# Patient Record
Sex: MALE | Race: WHITE | Employment: FULL TIME | RURAL
[De-identification: names, ages, dates, MRNs, and addresses within clinical notes are randomized per-mention and may not be internally consistent; named-entity substitution may affect disease eponyms.]

---

## 2017-07-18 ENCOUNTER — OFFICE VISIT (OUTPATIENT)
Dept: FAMILY MEDICINE CLINIC | Age: 32
End: 2017-07-18

## 2017-07-18 VITALS
WEIGHT: 289 LBS | HEART RATE: 78 BPM | SYSTOLIC BLOOD PRESSURE: 122 MMHG | RESPIRATION RATE: 16 BRPM | OXYGEN SATURATION: 100 % | TEMPERATURE: 98.6 F | BODY MASS INDEX: 40.46 KG/M2 | HEIGHT: 71 IN | DIASTOLIC BLOOD PRESSURE: 80 MMHG

## 2017-07-18 DIAGNOSIS — L98.9 SKIN LESION OF SCALP: ICD-10-CM

## 2017-07-18 DIAGNOSIS — I10 ESSENTIAL HYPERTENSION: Primary | ICD-10-CM

## 2017-07-18 RX ORDER — AMLODIPINE BESYLATE 10 MG/1
10 TABLET ORAL DAILY
Qty: 90 TAB | Refills: 0 | Status: SHIPPED | OUTPATIENT
Start: 2017-07-18 | End: 2017-10-09 | Stop reason: SDUPTHER

## 2017-07-18 NOTE — PROGRESS NOTES
Subjective:     Georgia Bundy is a 34 y.o. male who presents for follow up of hypertension. Diet and Lifestyle: generally follows a low fat low cholesterol diet  Home BP Monitoring: is not well controlled at home, ranging 140's/90's    Cardiovascular ROS: taking medications as instructed, no medication side effects noted, no TIA's, no dyspnea on exertion. New concerns: Pt states that he feels like he needs to increase his BP medication. Over the past 2-3 weeks, he has noted increase in BP's, when he is checking them at home. His BP has been running around 140/150's/80's-90's. He sometimes will have headaches, and chest pain off and on. At times, he will have swelling in his ankles. Stress has increased at work, and he knows that this has made it worse. In addition, patient states that he has a spot on his head that showed up a few weeks ago. It is red, and is worrisome to the patient. It does not itch, and does not hurt, but he has a lot of skin cancer in his family, so would like for it to be looked at. Patient Active Problem List    Diagnosis Date Noted    Skin lesion of scalp 07/18/2017    Physical exam, routine 10/20/2016    Acute right-sided thoracic back pain 09/27/2016    Elevated liver enzymes 08/05/2016    Strep throat exposure 10/20/2015    Screening for thyroid disorder 10/14/2015    Poison ivy 09/15/2015    JESUS (obstructive sleep apnea) 10/15/2014    Hypertension 10/15/2014    S/P vasectomy 08/06/2014    Pure hypercholesterolemia      Current Outpatient Prescriptions   Medication Sig Dispense Refill    amLODIPine (NORVASC) 10 mg tablet Take 1 Tab by mouth daily. 90 Tab 0    b complex vitamins (B COMPLEX 1) tablet Take 1 Tab by mouth daily.  FERROUS FUMARATE/VIT BCOMP&C (SUPER B COMPLEX PO) Take  by mouth.  cholecalciferol (VITAMIN D3) 1,000 unit tablet Take  by mouth daily.       atorvastatin (LIPITOR) 10 mg tablet TAKE ONE TABLET BY MOUTH ONCE DAILY 30 Tab 1     No Known Allergies  Past Medical History:   Diagnosis Date    Hypertension 10/15/2014    Poison ivy 9/15/2015    Pure hypercholesterolemia     S/P vasectomy 2014     History reviewed. No pertinent surgical history. Family History   Problem Relation Age of Onset    Diabetes Mother     Heart Attack Father 43    Stroke Maternal Grandmother     Cancer Paternal Grandfather       age 45 - Hodgkin's   [place] Cancer Maternal Grandfather       in 42's with melanoma     Social History   Substance Use Topics    Smoking status: Never Smoker    Smokeless tobacco: Never Used    Alcohol use No        Lab Results  Component Value Date/Time   WBC 7.8 2016 09:34 AM   HGB 16.0 2016 09:34 AM   HCT 47.9 2016 09:34 AM   PLATELET 489 13/10/1199 09:34 AM   MCV 92 2016 09:34 AM     Lab Results  Component Value Date/Time   Cholesterol, total 200 2016 09:34 AM   HDL Cholesterol 53 2016 09:34 AM   LDL, calculated 124 2016 09:34 AM   Triglyceride 115 2016 09:34 AM   CHOL/HDL Ratio 3.9 2010 10:30 AM   Lab Results  Component Value Date/Time   GFR est non- 2016 09:34 AM   GFR est  2016 09:34 AM   Creatinine 0.83 2016 09:34 AM   BUN 13 2016 09:34 AM   Sodium 142 2016 09:34 AM   Potassium 4.1 2016 09:34 AM   Chloride 99 2016 09:34 AM   CO2 25 2016 09:34 AM                Review of Systems, additional:  Pertinent items are noted in HPI. Objective:   Visit Vitals    /80    Pulse 78    Temp 98.6 °F (37 °C) (Oral)    Resp 16    Ht 5' 11\" (1.803 m)    Wt 289 lb (131.1 kg)    SpO2 100%    BMI 40.31 kg/m2     Appearance: alert, well appearing, and in no distress. General exam: CVS exam BP noted to be well controlled today in office, S1, S2 normal, no gallop, no murmur, chest clear, no JVD, no HSM, no edema, peripheral vascular exam both carotids normal upstroke without bruits.   Pin pint, shiny, erythematous annular lesion noted on right side of scalp. Lab review: patient is going to return for labs in 1 month. Assessment/Plan:     hypertension stable. current treatment plan is effective, no change in therapy  orders and follow up as documented in patient record. ICD-10-CM ICD-9-CM    1. Essential hypertension I10 401.9 amLODIPine (NORVASC) 10 mg tablet   2. Skin lesion of scalp L98.9 709.9 REFERRAL TO DERMATOLOGY     Increased BP medication to 10 mg daily, based on history and BP's taken at home. Emphasized the importance of returning to the office in 1 month, for office visit and labs, and we will be able to re-check BP at that time. Due to lesion on scalp, informed patient to call dermatology and make an appointment ASAP. Pt informed to return to office with worsening of symptoms, or PRN with any questions or concerns. Pt verbalizes understanding of plan of care and denies further questions or concerns at this time.

## 2017-07-18 NOTE — PROGRESS NOTES
Identified pt with two pt identifiers(name and ). Chief Complaint   Patient presents with    Hypertension     elevated BP x2 months    Headache    Skin Problem     right scalp x3 weeks      Pt is not fasting today. There are no preventive care reminders to display for this patient. Wt Readings from Last 3 Encounters:   17 289 lb (131.1 kg)   10/28/16 289 lb (131.1 kg)   10/20/16 278 lb (126.1 kg)     Temp Readings from Last 3 Encounters:   17 98.6 °F (37 °C) (Oral)   10/28/16 98.1 °F (36.7 °C) (Oral)   10/20/16 98.2 °F (36.8 °C) (Oral)     BP Readings from Last 3 Encounters:   17 122/80   10/28/16 130/82   10/20/16 132/82     Pulse Readings from Last 3 Encounters:   17 78   10/28/16 (!) 120   10/20/16 90         Learning Assessment:  :     Learning Assessment 2014   PRIMARY LEARNER Patient   HIGHEST LEVEL OF EDUCATION - PRIMARY LEARNER  GRADUATED HIGH SCHOOL OR GED   BARRIERS PRIMARY LEARNER NONE   CO-LEARNER CAREGIVER No   PRIMARY LANGUAGE ENGLISH   LEARNER PREFERENCE PRIMARY DEMONSTRATION     PICTURES     VIDEOS     LISTENING   ANSWERED BY patient   RELATIONSHIP SELF       Depression Screening:  :     PHQ over the last two weeks 2017   Little interest or pleasure in doing things Not at all   Feeling down, depressed or hopeless Not at all   Total Score PHQ 2 0         Abuse Screening:  :     Abuse Screening Questionnaire 10/15/2014   Do you ever feel afraid of your partner? N   Are you in a relationship with someone who physically or mentally threatens you? N   Is it safe for you to go home?  Y       Coordination of Care Questionnaire:  :     1) Have you been to an emergency room, urgent care clinic since your last visit? no   Hospitalized since your last visit? no             2) Have you seen or consulted any other health care providers outside of [address] [place] [address] since your last visit? no  (Include any pap smears or colon screenings in this section.)    3) Do you have an Advance Directive on file? no  Are you interested in receiving information about Advance Directives? no    Patient is accompanied by self I have received verbal consent from Ramiro Sarkar to discuss any/all medical information while they are present in the room. Reviewed record in preparation for visit and have obtained necessary documentation. Medication reconciliation up to date and corrected with patient at this time.

## 2017-07-18 NOTE — MR AVS SNAPSHOT
Visit Information Date & Time Provider Department Dept. Phone Encounter #  
 7/18/2017  9:30 AM Davey Otoniel Macdonald [phone] [id number] Follow-up Instructions Return in about 1 month (around 8/18/2017), or if symptoms worsen or fail to improve, for labs and office visit, recheck BP. Upcoming Health Maintenance Date Due INFLUENZA AGE 9 TO ADULT 8/1/2017 DTaP/Tdap/Td series (2 - Td) 2/1/2020 Allergies as of 7/18/2017  Review Complete On: 7/18/2017 By: Davey Macdonald NP No Known Allergies Current Immunizations  Reviewed on 5/16/2016 Name Date Influenza Vaccine 10/17/2013 Rabies Immune Globulin 5/2/2016  5:54 PM  
 Rabies Vaccine IM 5/16/2016  3:53 PM, 5/9/2016  3:09 PM, 5/5/2016  4:06 PM, 5/2/2016  5:53 PM  
 TDAP Vaccine 2/1/2010 Not reviewed this visit You Were Diagnosed With   
  
 Codes Comments Essential hypertension    -  Primary ICD-10-CM: I10 
ICD-9-CM: 401.9 Skin lesion of scalp     ICD-10-CM: L98.9 ICD-9-CM: 709.9 Vitals BP Pulse Temp Resp Height(growth percentile) Weight(growth percentile) 122/80 78 98.6 °F (37 °C) (Oral) 16 5' 11\" (1.803 m) 289 lb (131.1 kg) SpO2 BMI Smoking Status 100% 40.31 kg/m2 Never Smoker BMI and BSA Data Body Mass Index Body Surface Area  
 40.31 kg/m 2 2.56 m 2 Preferred Pharmacy Pharmacy Name Phone [facility] [address] [phone] Your Updated Medication List  
  
   
This list is accurate as of: 7/18/17  9:45 AM.  Always use your most recent med list. amLODIPine 10 mg tablet Commonly known as:  Delcie Sparks Take 1 Tab by mouth daily. atorvastatin 10 mg tablet Commonly known as:  LIPITOR  
TAKE ONE TABLET BY MOUTH ONCE DAILY B COMPLEX 1 tablet Generic drug:  b complex vitamins Take 1 Tab by mouth daily. SUPER B COMPLEX PO Take  by mouth. VITAMIN D3 1,000 unit tablet Generic drug:  cholecalciferol Take  by mouth daily. Prescriptions Sent to Pharmacy Refills  
 amLODIPine (NORVASC) 10 mg tablet 0 Sig: Take 1 Tab by mouth daily. Class: Normal  
 Pharmacy: [facility] [address]  #: [phone] Route: Oral  
  
We Performed the Following REFERRAL TO DERMATOLOGY [REF19 Custom] Comments:  
 Please evaluate patient for skin lesion on scalp Follow-up Instructions Return in about 1 month (around 8/18/2017), or if symptoms worsen or fail to improve, for labs and office visit, recheck BP. Referral Information Referral ID Referred By Referred To  
  
 [id number] Rowan BRAR 91 Ronald Steve MD   
   [facility] [address] Phone: [phone] Fax: [phone] Visits Status Start Date End Date 1 New Request 7/18/17 7/18/18 If your referral has a status of pending review or denied, additional information will be sent to support the outcome of this decision. Patient Instructions High Blood Pressure: Care Instructions Your Care Instructions If your blood pressure is usually above 140/90, you have high blood pressure, or hypertension. That means the top number is 140 or higher or the bottom number is 90 or higher, or both. Despite what a lot of people think, high blood pressure usually doesn't cause headaches or make you feel dizzy or lightheaded. It usually has no symptoms. But it does increase your risk for heart attack, stroke, and kidney or eye damage. The higher your blood pressure, the more your risk increases. Your doctor will give you a goal for your blood pressure. Your goal will be based on your health and your age. An example of a goal is to keep your blood pressure below 140/90.  
Lifestyle changes, such as eating healthy and being active, are always important to help lower blood pressure. You might also take medicine to reach your blood pressure goal. 
Follow-up care is a key part of your treatment and safety. Be sure to make and go to all appointments, and call your doctor if you are having problems. It's also a good idea to know your test results and keep a list of the medicines you take. How can you care for yourself at home? Medical treatment · If you stop taking your medicine, your blood pressure will go back up. You may take one or more types of medicine to lower your blood pressure. Be safe with medicines. Take your medicine exactly as prescribed. Call your doctor if you think you are having a problem with your medicine. · Talk to your doctor before you start taking aspirin every day. Aspirin can help certain people lower their risk of a heart attack or stroke. But taking aspirin isn't right for everyone, because it can cause serious bleeding. · See your doctor regularly. You may need to see the doctor more often at first or until your blood pressure comes down. · If you are taking blood pressure medicine, talk to your doctor before you take decongestants or anti-inflammatory medicine, such as ibuprofen. Some of these medicines can raise blood pressure. · Learn how to check your blood pressure at home. Lifestyle changes · Stay at a healthy weight. This is especially important if you put on weight around the waist. Losing even 10 pounds can help you lower your blood pressure. · If your doctor recommends it, get more exercise. Walking is a good choice. Bit by bit, increase the amount you walk every day. Try for at least 30 minutes on most days of the week. You also may want to swim, bike, or do other activities. · Avoid or limit alcohol. Talk to your doctor about whether you can drink any alcohol. · Try to limit how much sodium you eat to less than 2,300 milligrams (mg) a day. Your doctor may ask you to try to eat less than 1,500 mg a day. · Eat plenty of fruits (such as bananas and oranges), vegetables, legumes, whole grains, and low-fat dairy products. · Lower the amount of saturated fat in your diet. Saturated fat is found in animal products such as milk, cheese, and meat. Limiting these foods may help you lose weight and also lower your risk for heart disease. · Do not smoke. Smoking increases your risk for heart attack and stroke. If you need help quitting, talk to your doctor about stop-smoking programs and medicines. These can increase your chances of quitting for good. When should you call for help? Call 911 anytime you think you may need emergency care. This may mean having symptoms that suggest that your blood pressure is causing a serious heart or blood vessel problem. Your blood pressure may be over 180/110. For example, call 911 if: 
· You have symptoms of a heart attack. These may include: ¨ Chest pain or pressure, or a strange feeling in the chest. 
¨ Sweating. ¨ Shortness of breath. ¨ Nausea or vomiting. ¨ Pain, pressure, or a strange feeling in the back, neck, jaw, or upper belly or in one or both shoulders or arms. ¨ Lightheadedness or sudden weakness. ¨ A fast or irregular heartbeat. · You have symptoms of a stroke. These may include: 
¨ Sudden numbness, tingling, weakness, or loss of movement in your face, arm, or leg, especially on only one side of your body. ¨ Sudden vision changes. ¨ Sudden trouble speaking. ¨ Sudden confusion or trouble understanding simple statements. ¨ Sudden problems with walking or balance. ¨ A sudden, severe headache that is different from past headaches. · You have severe back or belly pain. Do not wait until your blood pressure comes down on its own. Get help right away. Call your doctor now or seek immediate care if: 
· Your blood pressure is much higher than normal (such as 180/110 or higher), but you don't have symptoms. · You think high blood pressure is causing symptoms, such as: ¨ Severe headache. ¨ Blurry vision. Watch closely for changes in your health, and be sure to contact your doctor if: 
· Your blood pressure measures 140/90 or higher at least 2 times. That means the top number is 140 or higher or the bottom number is 90 or higher, or both. · You think you may be having side effects from your blood pressure medicine. · Your blood pressure is usually normal, but it goes above normal at least 2 times. Where can you learn more? Go to http://cele-morris.info/. Enter G045 in the search box to learn more about \"High Blood Pressure: Care Instructions. \" Current as of: August 8, 2016 Content Version: 11.3 © 1993-8912 [employer]. Care instructions adapted under license by [employer] (which disclaims liability or warranty for this information). If you have questions about a medical condition or this instruction, always ask your healthcare professional. [place] [address] any warranty or liability for your use of this information. Introducing [facility]! Dear Branda Brunner: Thank you for requesting a MashMango account. Our records indicate that you have previously registered for a MashMango account but its currently inactive. Please call our MashMango support line at [phone]. Additional Information If you have questions, please visit the Frequently Asked Questions section of the MashMango website at [email or web address] Remember, [email or web address] is NOT to be used for urgent needs. For medical emergencies, dial 911. Now available from your iPhone and Android! Please provide this summary of care documentation to your next provider. Your primary care clinician is listed as Lizbeth Martinez. If you have any questions after today's visit, please call [phone].

## 2017-07-18 NOTE — PATIENT INSTRUCTIONS

## 2017-10-09 ENCOUNTER — OFFICE VISIT (OUTPATIENT)
Dept: FAMILY MEDICINE CLINIC | Age: 32
End: 2017-10-09

## 2017-10-09 VITALS
SYSTOLIC BLOOD PRESSURE: 120 MMHG | DIASTOLIC BLOOD PRESSURE: 82 MMHG | HEART RATE: 98 BPM | RESPIRATION RATE: 18 BRPM | BODY MASS INDEX: 39.62 KG/M2 | TEMPERATURE: 97.8 F | HEIGHT: 71 IN | OXYGEN SATURATION: 97 % | WEIGHT: 283 LBS

## 2017-10-09 DIAGNOSIS — Z13.29 SCREENING FOR THYROID DISORDER: ICD-10-CM

## 2017-10-09 DIAGNOSIS — H91.92 DECREASED HEARING OF LEFT EAR: ICD-10-CM

## 2017-10-09 DIAGNOSIS — E78.00 PURE HYPERCHOLESTEROLEMIA: ICD-10-CM

## 2017-10-09 DIAGNOSIS — Z00.00 PHYSICAL EXAM, ROUTINE: Primary | ICD-10-CM

## 2017-10-09 DIAGNOSIS — I10 ESSENTIAL HYPERTENSION: ICD-10-CM

## 2017-10-09 RX ORDER — AMLODIPINE BESYLATE 10 MG/1
10 TABLET ORAL DAILY
Qty: 90 TAB | Refills: 1 | Status: SHIPPED | OUTPATIENT
Start: 2017-10-09 | End: 2018-06-05 | Stop reason: SDUPTHER

## 2017-10-09 RX ORDER — MULTIVITAMIN
2 TABLET ORAL
COMMUNITY
End: 2019-10-04 | Stop reason: ALTCHOICE

## 2017-10-09 NOTE — PROGRESS NOTES
Subjective:   Sheryl Wright is a 34 y.o. male presenting for his annual checkup. ROS:  Feeling well. No dyspnea or chest pain on exertion. No abdominal pain, change in bowel habits, black or bloody stools. No urinary tract or prostatic symptoms. No neurological complaints. Specific concerns today: Pt states that he is here for an annual physical, with fasting labs. Pt declines flu vaccine at this time. Pt's only concern is that he has noted some decreased hearing in his left ear. Pt states that they have started using head sets at work, and he notes that he has to turn the volume up very high in the left ear, but not in the right ear. Patient Active Problem List    Diagnosis Date Noted    Decreased hearing of left ear 10/09/2017    Skin lesion of scalp 07/18/2017    Physical exam, routine 10/20/2016    Acute right-sided thoracic back pain 09/27/2016    Elevated liver enzymes 08/05/2016    Strep throat exposure 10/20/2015    Screening for thyroid disorder 10/14/2015    Poison ivy 09/15/2015    JESUS (obstructive sleep apnea) 10/15/2014    Hypertension 10/15/2014    S/P vasectomy 08/06/2014    Pure hypercholesterolemia      Current Outpatient Prescriptions   Medication Sig Dispense Refill    Krill-OM3-DHA-EPA-OM6-Lip-Astx (KRILL OIL) 1000-130(40-80) mg cap Take  by mouth.  cinnamon bark (CINNAMON) 500 mg cap Take  by mouth.  amLODIPine (NORVASC) 10 mg tablet Take 1 Tab by mouth daily. 90 Tab 1    b complex vitamins (B COMPLEX 1) tablet Take 1 Tab by mouth daily.  FERROUS FUMARATE/VIT BCOMP&C (SUPER B COMPLEX PO) Take  by mouth.  cholecalciferol (VITAMIN D3) 1,000 unit tablet Take  by mouth daily.       atorvastatin (LIPITOR) 10 mg tablet TAKE ONE TABLET BY MOUTH ONCE DAILY 30 Tab 1     No Known Allergies  Past Medical History:   Diagnosis Date    Hypertension 10/15/2014    Poison ivy 9/15/2015    Pure hypercholesterolemia     S/P vasectomy 8/6/2014 History reviewed. No pertinent surgical history. Family History   Problem Relation Age of Onset    Diabetes Mother     Heart Attack Father 43    Stroke Maternal Grandmother     Cancer Paternal Grandfather       age 45 - Hodgkin's   [facility] Cancer Maternal Grandfather       in 42's with melanoma     Social History   Substance Use Topics    Smoking status: Never Smoker    Smokeless tobacco: Never Used    Alcohol use No        Lab Results  Component Value Date/Time   WBC 7.8 2016 09:34 AM   HGB 16.0 2016 09:34 AM   HCT 47.9 2016 09:34 AM   PLATELET 260  09:34 AM   MCV 92 2016 09:34 AM     Lab Results  Component Value Date/Time   Cholesterol, total 200 2016 09:34 AM   HDL Cholesterol 53 2016 09:34 AM   LDL, calculated 124 2016 09:34 AM   Triglyceride 115 2016 09:34 AM   CHOL/HDL Ratio 3.9 2010 10:30 AM   Lab Results  Component Value Date/Time   GFR est non- 2016 09:34 AM   GFR est  2016 09:34 AM   Creatinine 0.83 2016 09:34 AM   BUN 13 2016 09:34 AM   Sodium 142 2016 09:34 AM   Potassium 4.1 2016 09:34 AM   Chloride 99 2016 09:34 AM   CO2 25 2016 09:34 AM                Objective:   Visit Vitals    BP (!) 135/96 (BP 1 Location: Left arm, BP Patient Position: Sitting)    Pulse 98    Temp 97.8 °F (36.6 °C) (Oral)    Resp 18    Ht 5' 11\" (1.803 m)    Wt 283 lb (128.4 kg)    SpO2 97%    BMI 39.47 kg/m2     The patient appears well, alert, oriented x 3, in no distress. ENT normal.  Neck supple. No adenopathy or thyromegaly. CARLA. Lungs are clear, good air entry, no wheezes, rhonchi or rales. S1 and S2 normal, no murmurs, regular rate and rhythm. Abdomen is soft without tenderness, guarding, mass or organomegaly.  exam: not examined. Extremities show no edema, normal peripheral pulses. Neurological is normal without focal findings.     Assessment/Plan:   healthy adult male  lose weight, increase physical activity. ICD-10-CM ICD-9-CM    1. Physical exam, routine Z00.00 V70.0 CBC W/O DIFF      METABOLIC PANEL, COMPREHENSIVE   2. Essential hypertension I10 401.9 amLODIPine (NORVASC) 10 mg tablet   3. Pure hypercholesterolemia E78.00 272.0 LIPID PANEL   4. Decreased hearing of left ear H91.92 389.9 REFERRAL TO ENT-OTOLARYNGOLOGY   5. Screening for thyroid disorder Z13.29 V77.0 THYROID CASCADE PROFILE   . Informed patient that we will notify him when his labs return, and inform him of any change in plan of care at that time. Educated about calling ENT for an appointment today, for full hearing test.    Pt informed to return to office with worsening of symptoms, or PRN with any questions or concerns. Pt verbalizes understanding of plan of care and denies further questions or concerns at this time.

## 2017-10-09 NOTE — PROGRESS NOTES
Chief Complaint   Patient presents with    Physical     pt is fasting for labs today    Medication Refill     needs Amlodipine refilled     \"REVIEWED RECORD IN PREPARATION FOR VISIT AND HAVE OBTAINED THE NECESSARY DOCUMENTATION\"  1. Have you been to the ER, urgent care clinic since your last visit? Hospitalized since your last visit? No    2. Have you seen or consulted any other health care providers outside of the [address] [place] [address] since your last visit? Include any pap smears or colon screening. No  Patient does not have advanced directives.

## 2017-10-09 NOTE — MR AVS SNAPSHOT
Visit Information Date & Time Provider Department Dept. Phone Encounter #  
 10/9/2017  8:45 AM Fan Cline  [address] [phone] [id number] Follow-up Instructions Return if symptoms worsen or fail to improve. Upcoming Health Maintenance Date Due DTaP/Tdap/Td series (2 - Td) 2/1/2020 Allergies as of 10/9/2017  Review Complete On: 10/9/2017 By: Fan Cline NP No Known Allergies Current Immunizations  Reviewed on 5/16/2016 Name Date Influenza Vaccine 10/17/2013 Rabies Immune Globulin 5/2/2016  5:54 PM  
 Rabies Vaccine IM 5/16/2016  3:53 PM, 5/9/2016  3:09 PM, 5/5/2016  4:06 PM, 5/2/2016  5:53 PM  
 TDAP Vaccine 2/1/2010 Not reviewed this visit You Were Diagnosed With   
  
 Codes Comments Physical exam, routine    -  Primary ICD-10-CM: Z00.00 ICD-9-CM: V70.0 Essential hypertension     ICD-10-CM: I10 
ICD-9-CM: 401.9 Pure hypercholesterolemia     ICD-10-CM: E78.00 ICD-9-CM: 272.0 Decreased hearing of left ear     ICD-10-CM: H91.92 
ICD-9-CM: 389.9 Screening for thyroid disorder     ICD-10-CM: Z13.29 ICD-9-CM: V77.0 Vitals BP Pulse Temp Resp Height(growth percentile) Weight(growth percentile) 120/82 98 97.8 °F (36.6 °C) (Oral) 18 5' 11\" (1.803 m) 283 lb (128.4 kg) SpO2 BMI Smoking Status 97% 39.47 kg/m2 Never Smoker Vitals History BMI and BSA Data Body Mass Index Body Surface Area  
 39.47 kg/m 2 2.54 m 2 Preferred Pharmacy Pharmacy Name Phone  [address] [phone] Your Updated Medication List  
  
   
This list is accurate as of: 10/9/17  9:01 AM.  Always use your most recent med list. amLODIPine 10 mg tablet Commonly known as:  Elspeth Bakes Take 1 Tab by mouth daily. atorvastatin 10 mg tablet Commonly known as:  LIPITOR  
TAKE ONE TABLET BY MOUTH ONCE DAILY B COMPLEX 1 tablet Generic drug:  b complex vitamins Take 1 Tab by mouth daily. CINNAMON 500 mg Cap Generic drug:  cinnamon bark Take  by mouth. Krill Oil 1000-130(40-80) mg Cap Generic drug:  Krill-OM3-DHA-EPA-OM6-Lip-Astx Take  by mouth. SUPER B COMPLEX PO Take  by mouth. VITAMIN D3 1,000 unit tablet Generic drug:  cholecalciferol Take  by mouth daily. Prescriptions Sent to Pharmacy Refills  
 amLODIPine (NORVASC) 10 mg tablet 1 Sig: Take 1 Tab by mouth daily. Class: Normal  
 Pharmacy: [facility] [address] Ph #: [phone] Route: Oral  
  
We Performed the Following CBC W/O DIFF [27456 CPT(R)] LIPID PANEL [79511 CPT(R)] METABOLIC PANEL, COMPREHENSIVE [38364 CPT(R)] REFERRAL TO ENT-OTOLARYNGOLOGY [ZDR90 Custom] THYROID CASCADE PROFILE [[id number] Custom] Follow-up Instructions Return if symptoms worsen or fail to improve. Referral Information Referral ID Referred By Referred To  
  
 [id number] Anamaria Chaparro, [address] [place] [address] [facility] [address] [place] [address]  [place], [address] [facility] Visits Status Start Date End Date 1 New Request 10/9/17 10/9/18 If your referral has a status of pending review or denied, additional information will be sent to support the outcome of this decision. Patient Instructions Hearing Loss: Care Instructions Your Care Instructions Hearing loss is a sudden or slow decrease in how well you hear. It can range from mild to severe. Permanent hearing loss can occur with aging. It also can happen when you are exposed long-term to loud noise. Examples include listening to loud music, riding motorcycles, or being around other loud machines. Hearing loss can affect your work and home life. It can make you feel lonely or depressed. You may feel that you have lost your independence. But hearing aids and other devices can help you hear better and feel connected to others. Follow-up care is a key part of your treatment and safety. Be sure to make and go to all appointments, and call your doctor if you are having problems. It's also a good idea to know your test results and keep a list of the medicines you take. How can you care for yourself at home? · Avoid loud noises whenever possible. This helps keep your hearing from getting worse. · Always wear hearing protection around loud noises. · Wear a hearing aid as directed. See a person who can help you pick a hearing aid that fits you. · Have hearing tests as your doctor suggests. They can show whether your hearing has changed. Your hearing aid may need to be adjusted. · Use other devices as needed. These may include: ¨ Telephone amplifiers and hearing aids that can connect to a television, stereo, radio, or microphone. ¨ Devices that use lights or vibrations. These alert you to the doorbell, a ringing telephone, or a baby monitor. ¨ Television closed-captioning. This shows the words at the bottom of the screen. Most new TVs can do this. Savannah Joshua (text telephone). This lets you type messages back and forth on the telephone instead of talking or listening. These devices are also called TDD. When messages are typed on the keyboard, they are sent over the phone line to a receiving TTY. The message is shown on a monitor. · Use pagers, fax machines, and email if it is hard for you to communicate by telephone. · Try to learn a listening technique called speech-reading. It is not lip-reading. You pay attention to people's gestures, expressions, posture, and tone of voice. These clues can help you understand what a person is saying. Face the person you are talking to, and have him or her face you. Make sure the lighting is good. You need to see the other person's face clearly. · Think about counseling if you need help to adjust to your hearing loss. When should you call for help? Watch closely for changes in your health, and be sure to contact your doctor if: 
· You think your hearing is getting worse. · You have new symptoms, such as dizziness or nausea. Where can you learn more? Go to http://cele-morris.info/. Enter Q247 in the search box to learn more about \"Hearing Loss: Care Instructions. \" Current as of: July 29, 2016 Content Version: 11.3 © 1468-0964 6connect. Care instructions adapted under license by [employer] (which disclaims liability or warranty for this information). If you have questions about a medical condition or this instruction, always ask your healthcare professional. [address] any warranty or liability for your use of this information. Introducing [facility]! Dear Kena Sy: Thank you for requesting a G-cluster account. Our records indicate that you have previously registered for a G-cluster account but its currently inactive. Please call our G-cluster support line at [phone]. Additional Information If you have questions, please visit the Frequently Asked Questions section of the G-cluster website at [email or web address] Remember, G-cluster is NOT to be used for urgent needs. For medical emergencies, dial 911. Now available from your iPhone and Android! Please provide this summary of care documentation to your next provider. Your primary care clinician is listed as Lizbeth Martinez. If you have any questions after today's visit, please call [phone].

## 2017-10-09 NOTE — PATIENT INSTRUCTIONS
Hearing Loss: Care Instructions  Your Care Instructions  Hearing loss is a sudden or slow decrease in how well you hear. It can range from mild to severe. Permanent hearing loss can occur with aging. It also can happen when you are exposed long-term to loud noise. Examples include listening to loud music, riding motorcycles, or being around other loud machines. Hearing loss can affect your work and home life. It can make you feel lonely or depressed. You may feel that you have lost your independence. But hearing aids and other devices can help you hear better and feel connected to others. Follow-up care is a key part of your treatment and safety. Be sure to make and go to all appointments, and call your doctor if you are having problems. It's also a good idea to know your test results and keep a list of the medicines you take. How can you care for yourself at home? · Avoid loud noises whenever possible. This helps keep your hearing from getting worse. · Always wear hearing protection around loud noises. · Wear a hearing aid as directed. See a person who can help you pick a hearing aid that fits you. · Have hearing tests as your doctor suggests. They can show whether your hearing has changed. Your hearing aid may need to be adjusted. · Use other devices as needed. These may include:  ¨ Telephone amplifiers and hearing aids that can connect to a television, stereo, radio, or microphone. ¨ Devices that use lights or vibrations. These alert you to the doorbell, a ringing telephone, or a baby monitor. ¨ Television closed-captioning. This shows the words at the bottom of the screen. Most new TVs can do this. Kaycee Maria (text telephone). This lets you type messages back and forth on the telephone instead of talking or listening. These devices are also called TDD. When messages are typed on the keyboard, they are sent over the phone line to a receiving TTY. The message is shown on a monitor.   · Use pagers, fax machines, and email if it is hard for you to communicate by telephone. · Try to learn a listening technique called speech-reading. It is not lip-reading. You pay attention to people's gestures, expressions, posture, and tone of voice. These clues can help you understand what a person is saying. Face the person you are talking to, and have him or her face you. Make sure the lighting is good. You need to see the other person's face clearly. · Think about counseling if you need help to adjust to your hearing loss. When should you call for help? Watch closely for changes in your health, and be sure to contact your doctor if:  · You think your hearing is getting worse. · You have new symptoms, such as dizziness or nausea. Where can you learn more? Go to http://cele-morris.info/. Enter Z648 in the search box to learn more about \"Hearing Loss: Care Instructions. \"  Current as of: July 29, 2016  Content Version: 11.3  © 5369-7826 [employer]aster[employer]. Care instructions adapted under license by [employer] (which disclaims liability or warranty for this information). If you have questions about a medical condition or this instruction, always ask your healthcare professional. [place] [address] any warranty or liability for your use of this information.

## 2017-10-10 ENCOUNTER — TELEPHONE (OUTPATIENT)
Dept: FAMILY MEDICINE CLINIC | Age: 32
End: 2017-10-10

## 2017-10-10 LAB
ALBUMIN SERPL-MCNC: 4.6 G/DL (ref 3.5–5.5)
ALBUMIN/GLOB SERPL: 1.9 {RATIO} (ref 1.2–2.2)
ALP SERPL-CCNC: 62 IU/L (ref 39–117)
ALT SERPL-CCNC: 56 IU/L (ref 0–44)
AST SERPL-CCNC: 28 IU/L (ref 0–40)
BILIRUB SERPL-MCNC: 0.6 MG/DL (ref 0–1.2)
BUN SERPL-MCNC: 10 MG/DL (ref 6–20)
BUN/CREAT SERPL: 12 (ref 9–20)
CALCIUM SERPL-MCNC: 9.4 MG/DL (ref 8.7–10.2)
CHLORIDE SERPL-SCNC: 99 MMOL/L (ref 96–106)
CHOLEST SERPL-MCNC: 207 MG/DL (ref 100–199)
CO2 SERPL-SCNC: 26 MMOL/L (ref 18–29)
CREAT SERPL-MCNC: 0.84 MG/DL (ref 0.76–1.27)
ERYTHROCYTE [DISTWIDTH] IN BLOOD BY AUTOMATED COUNT: 13.4 % (ref 12.3–15.4)
GLOBULIN SER CALC-MCNC: 2.4 G/DL (ref 1.5–4.5)
GLUCOSE SERPL-MCNC: 89 MG/DL (ref 65–99)
HCT VFR BLD AUTO: 47 % (ref 37.5–51)
HDLC SERPL-MCNC: 52 MG/DL
HGB BLD-MCNC: 15.7 G/DL (ref 12.6–17.7)
INTERPRETATION, 910389: NORMAL
LDLC SERPL CALC-MCNC: 127 MG/DL (ref 0–99)
MCH RBC QN AUTO: 30.5 PG (ref 26.6–33)
MCHC RBC AUTO-ENTMCNC: 33.4 G/DL (ref 31.5–35.7)
MCV RBC AUTO: 91 FL (ref 79–97)
PLATELET # BLD AUTO: 259 X10E3/UL (ref 150–379)
POTASSIUM SERPL-SCNC: 4.2 MMOL/L (ref 3.5–5.2)
PROT SERPL-MCNC: 7 G/DL (ref 6–8.5)
RBC # BLD AUTO: 5.15 X10E6/UL (ref 4.14–5.8)
SODIUM SERPL-SCNC: 141 MMOL/L (ref 134–144)
TRIGL SERPL-MCNC: 142 MG/DL (ref 0–149)
TSH SERPL DL<=0.005 MIU/L-ACNC: 1.97 UIU/ML (ref 0.45–4.5)
VLDLC SERPL CALC-MCNC: 28 MG/DL (ref 5–40)
WBC # BLD AUTO: 8 X10E3/UL (ref 3.4–10.8)

## 2017-10-10 NOTE — TELEPHONE ENCOUNTER
----- Message from Gage Jimenez NP sent at 10/10/2017 11:44 AM EDT -----  Please call patient and let him know that his labs returned:  1. Cholesterol is still high. Is he willing to re-start medication? He is at increased risk of heart attack and/or stroke, due to HTN, obesity and hyperlipidemia. Let me know if he needs refills. Thanks!

## 2017-10-10 NOTE — TELEPHONE ENCOUNTER
Pt states he wants to hold off on medication at this time and try dietary changes. Will return in 6 months for fasting labs.

## 2017-10-10 NOTE — PROGRESS NOTES
Please call patient and let him know that his labs returned:  1. Cholesterol is still high. Is he willing to re-start medication? He is at increased risk of heart attack and/or stroke, due to HTN, obesity and hyperlipidemia. Let me know if he needs refills. Thanks!

## 2018-02-15 ENCOUNTER — OFFICE VISIT (OUTPATIENT)
Dept: FAMILY MEDICINE CLINIC | Age: 33
End: 2018-02-15

## 2018-02-15 VITALS
TEMPERATURE: 98.6 F | OXYGEN SATURATION: 98 % | WEIGHT: 308 LBS | SYSTOLIC BLOOD PRESSURE: 124 MMHG | BODY MASS INDEX: 43.12 KG/M2 | HEIGHT: 71 IN | DIASTOLIC BLOOD PRESSURE: 90 MMHG | RESPIRATION RATE: 18 BRPM | HEART RATE: 122 BPM

## 2018-02-15 DIAGNOSIS — M54.41 ACUTE RIGHT-SIDED LOW BACK PAIN WITH RIGHT-SIDED SCIATICA: Primary | ICD-10-CM

## 2018-02-15 RX ORDER — DICLOFENAC SODIUM 75 MG/1
75 TABLET, DELAYED RELEASE ORAL
Qty: 30 TAB | Refills: 0 | Status: SHIPPED | OUTPATIENT
Start: 2018-02-15 | End: 2019-10-04 | Stop reason: ALTCHOICE

## 2018-02-15 RX ORDER — METAXALONE 800 MG/1
800 TABLET ORAL
Qty: 30 TAB | Refills: 0 | Status: SHIPPED | OUTPATIENT
Start: 2018-02-15 | End: 2019-10-04 | Stop reason: ALTCHOICE

## 2018-02-15 NOTE — PATIENT INSTRUCTIONS
Learning About Relief for Back Pain  What is back tension and strain? Back strain happens when you overstretch, or pull, a muscle in your back. You may hurt your back in an accident or when you exercise or lift something. Most back pain will get better with rest and time. You can take care of yourself at home to help your back heal.  What can you do first to relieve back pain? When you first feel back pain, try these steps:  · Walk. Take a short walk (10 to 20 minutes) on a level surface (no slopes, hills, or stairs) every 2 to 3 hours. Walk only distances you can manage without pain, especially leg pain. · Relax. Find a comfortable position for rest. Some people are comfortable on the floor or a medium-firm bed with a small pillow under their head and another under their knees. Some people prefer to lie on their side with a pillow between their knees. Don't stay in one position for too long. · Try heat or ice. Try using a heating pad on a low or medium setting, or take a warm shower, for 15 to 20 minutes every 2 to 3 hours. Or you can buy single-use heat wraps that last up to 8 hours. You can also try an ice pack for 10 to 15 minutes every 2 to 3 hours. You can use an ice pack or a bag of frozen vegetables wrapped in a thin towel. There is not strong evidence that either heat or ice will help, but you can try them to see if they help. You may also want to try switching between heat and cold. · Take pain medicine exactly as directed. ¨ If the doctor gave you a prescription medicine for pain, take it as prescribed. ¨ If you are not taking a prescription pain medicine, ask your doctor if you can take an over-the-counter medicine. What else can you do? · Stretch and exercise. Exercises that increase flexibility may relieve your pain and make it easier for your muscles to keep your spine in a good, neutral position. And don't forget to keep walking. · Do self-massage.  You can use self-massage to unwind after work or school or to energize yourself in the morning. You can easily massage your feet, hands, or neck. Self-massage works best if you are in comfortable clothes and are sitting or lying in a comfortable position. Use oil or lotion to massage bare skin. · Reduce stress. Back pain can lead to a vicious [place]: Distress about the pain tenses the muscles in your back, which in turn causes more pain. Learn how to relax your mind and your muscles to lower your stress. Where can you learn more? Go to http://cele-morris.info/. Enter K136 in the search box to learn more about \"Learning About Relief for Back Pain. \"  Current as of: March 21, 2017  Content Version: 11.4  © 6439-0474 Healthwise, Incorporated. Care instructions adapted under license by [employer] (which disclaims liability or warranty for this information). If you have questions about a medical condition or this instruction, always ask your healthcare professional. [place] [address] any warranty or liability for your use of this information.

## 2018-02-15 NOTE — LETTER
NOTIFICATION OF RETURN TO WORK / SCHOOL 
 
2/15/2018 12:00 PM 
 
Mr. Geraldo Vazquez [address] [place] [phone] Brenda Gooden To Whom It May Concern: 
 
Geraldo Vazquez is under the care of [address] [place] [address]. He will be able to return to work/school on 2/15/18 with light duty - no heavy lifting (more than 10 pounds) for the next 5 days. If there are questions or concerns please have the patient contact our office. Sincerely, Bonita Escobar MD

## 2018-02-15 NOTE — PROGRESS NOTES
Subjective:      Maik Saleem is a 34 y.o. male here with complaint of right lower back pain. Onset was earlier today while at work lifting an object. Pain is located mostly in on the right with radiation into the top of the right thigh, associated with numbness in the right leg. He reports previous h/o sciatica. Denies bowel or bladder incontinence, dysuria, saddle anesthesia. Exacerbated with certain movements. Alleviating factors: Aleve with some minimal relief. Current Outpatient Prescriptions   Medication Sig Dispense Refill    Krill-OM3-DHA-EPA-OM6-Lip-Astx (KRILL OIL) 1000-130(40-80) mg cap Take  by mouth.  cinnamon bark (CINNAMON) 500 mg cap Take  by mouth.  amLODIPine (NORVASC) 10 mg tablet Take 1 Tab by mouth daily. 90 Tab 1    b complex vitamins (B COMPLEX 1) tablet Take 1 Tab by mouth daily.  FERROUS FUMARATE/VIT BCOMP&C (SUPER B COMPLEX PO) Take  by mouth. No Known Allergies      Past Medical History:   Diagnosis Date    Hypertension 10/15/2014    Poison ivy 9/15/2015    Pure hypercholesterolemia     S/P vasectomy 8/6/2014       Social History   Substance Use Topics    Smoking status: Never Smoker    Smokeless tobacco: Never Used    Alcohol use No        Review of Systems  Pertinent items are noted in HPI. Objective:     Visit Vitals    /90 (BP 1 Location: Right arm, BP Patient Position: Sitting)  Comment: Manual    Pulse (!) 122    Temp 98.6 °F (37 °C) (Oral)  Comment: .     Resp 18    Ht 5' 11\" (1.803 m)    Wt 308 lb (139.7 kg)    SpO2 98%    BMI 42.96 kg/m2      General appearance - alert, well appearing, and in no distress  Eyes - pupils equal and reactive, extraocular eye movements intact, sclera anicteric  Chest - clear to auscultation, no wheezes, rales or rhonchi, symmetric air entry, no tachypnea, retractions or cyanosis  Heart - normal rate, regular rhythm, normal S1, S2, no murmurs, rubs, clicks or gallops  Back - limited range of motion, pain with motion noted during exam, tenderness noted right lumbar paraspinal muscles, positive straight-leg raise, sensory exam intact bilateral lower extremities, gait is slightly antalgic, peripheral pulses intact     Assessment/Plan:   Oliva George is a 34 y.o. male seen for:     1. Acute right-sided low back pain with right-sided sciatica: likely back strain. No alarming history or physical exam findings and XR imaging not warranted at this time. Will treat as below. - metaxalone (SKELAXIN) 800 mg tablet; Take 1 Tab by mouth three (3) times daily as needed. Indications: Muscle Spasm  Dispense: 30 Tab; Refill: 0  - diclofenac EC (VOLTAREN) 75 mg EC tablet; Take 1 Tab by mouth two (2) times daily as needed. Dispense: 30 Tab; Refill: 0  - work note provided     I have discussed the diagnosis with the patient and the intended plan as seen in the above orders. The patient has received an after-visit summary and questions were answered concerning future plans. I have discussed medication side effects and warnings with the patient as well. Patient verbalizes understanding of plan of care and denies further questions or concerns at this time. Informed patient to return to the office if symptoms worsen or if new symptoms arise.     Follow-up Disposition: Not on File

## 2018-02-15 NOTE — PROGRESS NOTES
Identified pt with two pt identifiers(name and ). Chief Complaint   Patient presents with    Back Pain     Threw out back this morning        There are no preventive care reminders to display for this patient. Wt Readings from Last 3 Encounters:   02/15/18 308 lb (139.7 kg)   10/09/17 283 lb (128.4 kg)   17 289 lb (131.1 kg)     Temp Readings from Last 3 Encounters:   02/15/18 98.6 °F (37 °C) (Oral)   10/09/17 97.8 °F (36.6 °C) (Oral)   17 98.6 °F (37 °C) (Oral)     BP Readings from Last 3 Encounters:   02/15/18 124/90   10/09/17 120/82   17 122/80     Pulse Readings from Last 3 Encounters:   02/15/18 (!) 122   10/09/17 98   17 78         Learning Assessment:  :     Learning Assessment 2014   PRIMARY LEARNER Patient   HIGHEST LEVEL OF EDUCATION - PRIMARY LEARNER  GRADUATED HIGH SCHOOL OR GED   BARRIERS PRIMARY LEARNER NONE   CO-LEARNER CAREGIVER No   PRIMARY LANGUAGE ENGLISH   LEARNER PREFERENCE PRIMARY DEMONSTRATION     PICTURES     VIDEOS     LISTENING   ANSWERED BY patient   RELATIONSHIP SELF       Depression Screening:  :     PHQ over the last two weeks 2017   Little interest or pleasure in doing things Not at all   Feeling down, depressed or hopeless Not at all   Total Score PHQ 2 0       Fall Risk Assessment:  :     No flowsheet data found. Abuse Screening:  :     Abuse Screening Questionnaire 10/15/2014   Do you ever feel afraid of your partner? N   Are you in a relationship with someone who physically or mentally threatens you? N   Is it safe for you to go home?  Y       Coordination of Care Questionnaire:  :     1) Have you been to an emergency room, urgent care clinic since your last visit? no   Hospitalized since your last visit? no             2) Have you seen or consulted any other health care providers outside of [address] [place] [address] since your last visit? no  (Include any pap smears or colon screenings in this section.)    3) Do you have an Advance Directive on file? no  Are you interested in receiving information about Advance Directives? no    Reviewed record in preparation for visit and have obtained necessary documentation. Medication reconciliation up to date and corrected with patient at this time.

## 2018-04-10 ENCOUNTER — OFFICE VISIT (OUTPATIENT)
Dept: FAMILY MEDICINE CLINIC | Age: 33
End: 2018-04-10

## 2018-04-10 VITALS
HEIGHT: 71 IN | RESPIRATION RATE: 18 BRPM | SYSTOLIC BLOOD PRESSURE: 127 MMHG | HEART RATE: 93 BPM | OXYGEN SATURATION: 97 % | BODY MASS INDEX: 42.7 KG/M2 | WEIGHT: 305 LBS | TEMPERATURE: 97.3 F | DIASTOLIC BLOOD PRESSURE: 80 MMHG

## 2018-04-10 DIAGNOSIS — L23.7 POISON IVY: Primary | ICD-10-CM

## 2018-04-10 DIAGNOSIS — M79.604 RIGHT LEG PAIN: ICD-10-CM

## 2018-04-10 PROBLEM — E66.01 OBESITY, MORBID (HCC): Status: ACTIVE | Noted: 2018-04-10

## 2018-04-10 RX ORDER — METHYLPREDNISOLONE 4 MG/1
TABLET ORAL
Qty: 1 DOSE PACK | Refills: 0 | Status: SHIPPED | OUTPATIENT
Start: 2018-04-10 | End: 2018-06-05 | Stop reason: ALTCHOICE

## 2018-04-10 RX ORDER — DICLOFENAC SODIUM 75 MG/1
75 TABLET, DELAYED RELEASE ORAL 2 TIMES DAILY
Qty: 30 TAB | Refills: 0 | Status: SHIPPED | OUTPATIENT
Start: 2018-04-10 | End: 2018-06-05 | Stop reason: SDUPTHER

## 2018-04-10 NOTE — MR AVS SNAPSHOT
[address] [facility], [place] 
 
 
 [address] [facility] 
[phone] Patient: Yamini Ruiz MRN: [id number] :[id number] Visit Information Date & Time Provider Department Dept. Phone Encounter #  
 4/10/2018  8:45 AM Cammy Dee  [address] [phone] [id number] Follow-up Instructions Return if symptoms worsen or fail to improve. Upcoming Health Maintenance Date Due DTaP/Tdap/Td series (2 - Td) 2020 Allergies as of 4/10/2018  Review Complete On: 4/10/2018 By: Cammy Dee NP No Known Allergies Current Immunizations  Reviewed on 2016 Name Date Influenza Vaccine 10/17/2013 Rabies Immune Globulin 2016  5:54 PM  
 Rabies Vaccine IM 2016  3:53 PM, 2016  3:09 PM, 2016  4:06 PM, 2016  5:53 PM  
 TDAP Vaccine 2010 Not reviewed this visit You Were Diagnosed With   
  
 Codes Comments Poison ivy    -  Primary ICD-10-CM: L23.7 ICD-9-CM: 692.6 Right leg pain     ICD-10-CM: M79.604 ICD-9-CM: 729.5 Vitals BP Pulse Temp Resp Height(growth percentile) Weight(growth percentile) 127/80 (BP 1 Location: Left arm, BP Patient Position: Sitting) 93 97.3 °F (36.3 °C) (Oral) 18 5' 11\" (1.803 m) 305 lb (138.3 kg) SpO2 BMI Smoking Status 97% 42.54 kg/m2 Never Smoker BMI and BSA Data Body Mass Index Body Surface Area 42.54 kg/m 2 2.63 m 2 Preferred Pharmacy Pharmacy Name Phone [facility] [address] [phone] Your Updated Medication List  
  
   
This list is accurate as of 4/10/18  9:02 AM.  Always use your most recent med list. amLODIPine 10 mg tablet Commonly known as:  Skip Newcomer Take 1 Tab by mouth daily. B COMPLEX 1 tablet Generic drug:  b complex vitamins Take 1 Tab by mouth daily. CINNAMON 500 mg Cap Generic drug:  cinnamon bark Take  by mouth. * diclofenac EC 75 mg EC tablet Commonly known as:  VOLTAREN Take 1 Tab by mouth two (2) times daily as needed. * diclofenac EC 75 mg EC tablet Commonly known as:  VOLTAREN Take 1 Tab by mouth two (2) times a day. Krill Oil 1000-130(40-80) mg Cap Generic drug:  Krill-OM3-DHA-EPA-OM6-Lip-Astx Take  by mouth.  
  
 metaxalone 800 mg tablet Commonly known as:  SKELAXIN Take 1 Tab by mouth three (3) times daily as needed. Indications: Muscle Spasm  
  
 methylPREDNISolone 4 mg tablet Commonly known as:  Murriel Him Take as prescribed. SUPER B COMPLEX PO Take  by mouth. * Notice: This list has 2 medication(s) that are the same as other medications prescribed for you. Read the directions carefully, and ask your doctor or other care provider to review them with you. Prescriptions Sent to Pharmacy Refills  
 diclofenac EC (VOLTAREN) 75 mg EC tablet 0 Sig: Take 1 Tab by mouth two (2) times a day. Class: Normal  
 Pharmacy: [address] [place] [address] #: [phone] Route: Oral  
 methylPREDNISolone (MEDROL DOSEPACK) 4 mg tablet 0 Sig: Take as prescribed. Class: Normal  
 Pharmacy: [address] [place] [address] #: [phone] Follow-up Instructions Return if symptoms worsen or fail to improve. Patient Instructions Poison [place], [place], and [place]: Care Instructions Your Care Instructions Poison ivy, poison oak, and poison sumac are plants that can cause a skin rash upon contact. The red, itchy rash often shows up in lines or streaks and may cause fluid-filled blisters or large, raised hives. The rash is caused by an allergic reaction to an oil in poison ivy, oak, and sumac.  The rash may occur when you touch the plant or when you touch clothing, pet fur, sporting gear, gardening tools, or other objects that have come in contact with one of these plants. You cannot catch or spread the rash, even if you touch it or the blister fluid, because the plant oil will already have been absorbed or washed off the skin. The rash may seem to be spreading, but either it is still developing from earlier contact or you have touched something that still has the plant oil on it. Follow-up care is a key part of your treatment and safety. Be sure to make and go to all appointments, and call your doctor if you are having problems. It's also a good idea to know your test results and keep a list of the medicines you take. How can you care for yourself at home? · If your doctor prescribed a cream, use it as directed. If your doctor prescribed medicine, take it exactly as prescribed. Call your doctor if you think you are having a problem with your medicine. · Use cold, wet cloths to reduce itching. · Keep cool, and stay out of the sun. · Leave the rash open to the air. · Wash all clothing or other things that may have come in contact with the plant oil. · Avoid most lotions and ointments until the rash heals. Calamine lotion may help relieve symptoms of a plant rash. Use it 3 or 4 times a day. To prevent poison ivy exposure If you know that you will be near poison ivy, oak, or sumac, you can try these options: · Use a product designed to help prevent plant oil from getting on the skin. These products, such as Ivy X Pre-Contact Skin Solution, come in lotions, sprays, or towelettes. You put the product on your skin right before you go outdoors. · If you did not use a preventive product and you have had contact with plant oil, clean it off your skin as soon as possible. Use a product such as Tecnu Original Outdoor Skin Cleanser. These products can also be used to clean plant oil from clothing or tools. When should you call for help? Call your doctor now or seek immediate medical care if: 
? · Your rash gets worse, and you start to feel bad and have a fever, a stiff neck, nausea, and vomiting. ? · You have signs of infection, such as: 
¨ Increased pain, swelling, warmth, or redness. ¨ Red streaks leading from the rash. ¨ Pus draining from the rash. ¨ A fever. ? Watch closely for changes in your health, and be sure to contact your doctor if: 
? · You have new blisters or bruises, or the rash spreads and looks like a sunburn. ? · The rash gets worse, or it comes back after nearly disappearing. ? · You think a medicine you are using is making your rash worse. ? · Your rash does not clear up after 1 to 2 weeks of home treatment. ? · You have joint aches or body aches with your rash. Where can you learn more? Go to http://cele-morris.info/. Enter G102 in the search box to learn more about \"Poison [facility], and Sum: Care Instructions. \" Current as of: October 13, 2016 Content Version: 11.4 © 2171-6602 Drippler. Care instructions adapted under license by [employer] (IMMI) (which disclaims liability or warranty for this information). If you have questions about a medical condition or this instruction, always ask your healthcare professional. [place] [address] any warranty or liability for your use of this information. Introducing [facility]! Dear Branda Brunner: Thank you for requesting a MedPassage account. Our records indicate that you have previously registered for a MedPassage account but its currently inactive. Please call our MedPassage support line at [phone]. Additional Information If you have questions, please visit the Frequently Asked Questions section of the MedPassage website at [email or web address] Remember, [place] is NOT to be used for urgent needs. For medical emergencies, dial 911. Now available from your iPhone and Android! Please provide this summary of care documentation to your next provider. Your primary care clinician is listed as Lizbeth Martinez. If you have any questions after today's visit, please call [phone].

## 2018-04-10 NOTE — PROGRESS NOTES
Chief Complaint   Patient presents with    Poison Ivy/Poison Oak/Poison [place] Exposure     pt states he has poison ivy on both legs    Leg Pain     pt states he pulled a muscle in the back of his right leg    Immunization/Injection     pt states he is going to [facility] in the near future and asking if he needs any vaccines     \"REVIEWED RECORD IN PREPARATION FOR VISIT AND HAVE OBTAINED THE NECESSARY DOCUMENTATION\"  1. Have you been to the ER, urgent care clinic since your last visit? Hospitalized since your last visit? No    2. Have you seen or consulted any other health care providers outside of the [address] [place] [address] since your last visit? Include any pap smears or colon screening. No  Patient does not have advanced directives.

## 2018-04-10 NOTE — PROGRESS NOTES
HISTORY OF PRESENT ILLNESS  Chanda Chandler is a 34 y.o. male. HPI  Pt presents with \"poison ivy and leg pain\"    Pt states that he has poison ivy on his left lower leg, that has been present for about a week and a half. He has tried OTC treatment with creams, without any improvement. The rash is itchy, red and raised. In addition, he has pulled a muscle in his right lower leg. Pt states that this has been going on for about 2 weeks. No swelling or redness noted in the leg. Pain is noted when he extends the leg fully. No pain to palpation, rest, flexion, etc.  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 He has not taken anything OTC for the pain. Review of Systems   Constitutional: Negative for fever. HENT: Negative for congestion. Respiratory: Negative for cough. Gastrointestinal: Negative for diarrhea and vomiting. Musculoskeletal: Positive for joint pain. Skin: Positive for itching and rash. Physical Exam   Constitutional: He is oriented to person, place, and time. He appears well-developed and well-nourished. HENT:   Head: Normocephalic and atraumatic. Cardiovascular: Normal rate, regular rhythm and normal heart sounds. Pulmonary/Chest: Effort normal and breath sounds normal.   Musculoskeletal:        Legs:  Neurological: He is alert and oriented to person, place, and time. Skin: Skin is warm and dry. Psychiatric: He has a normal mood and affect. His behavior is normal.       ASSESSMENT and PLAN    ICD-10-CM ICD-9-CM    1. Poison ivy L23.7 692.6 methylPREDNISolone (MEDROL DOSEPACK) 4 mg tablet   2. Right leg pain M79.604 729.5 diclofenac EC (VOLTAREN) 75 mg EC tablet      methylPREDNISolone (MEDROL DOSEPACK) 4 mg tablet     Informed patient that I have sent medication to the pharmacy, and he should take as prescribed.   Educated about warning signs of blood clot in leg, and to go to ER with ANY of these symptoms. Informed patient to return to office with continued and/or worsening of pain. Pt informed to return to office with worsening of symptoms, or PRN with any questions or concerns. Pt verbalizes understanding of plan of care and denies further questions or concerns at this time.

## 2018-04-10 NOTE — PATIENT INSTRUCTIONS
Poison [place], [place], and [place]: Care Instructions  Your Care Instructions    Poison ivy, poison oak, and poison sumac are plants that can cause a skin rash upon contact. The red, itchy rash often shows up in lines or streaks and may cause fluid-filled blisters or large, raised hives. The rash is caused by an allergic reaction to an oil in poison ivy, oak, and sumac. The rash may occur when you touch the plant or when you touch clothing, pet fur, sporting gear, gardening tools, or other objects that have come in contact with one of these plants. You cannot catch or spread the rash, even if you touch it or the blister fluid, because the plant oil will already have been absorbed or washed off the skin. The rash may seem to be spreading, but either it is still developing from earlier contact or you have touched something that still has the plant oil on it. Follow-up care is a key part of your treatment and safety. Be sure to make and go to all appointments, and call your doctor if you are having problems. It's also a good idea to know your test results and keep a list of the medicines you take. How can you care for yourself at home? · If your doctor prescribed a cream, use it as directed. If your doctor prescribed medicine, take it exactly as prescribed. Call your doctor if you think you are having a problem with your medicine. · Use cold, wet cloths to reduce itching. · Keep cool, and stay out of the sun. · Leave the rash open to the air. · Wash all clothing or other things that may have come in contact with the plant oil. · Avoid most lotions and ointments until the rash heals. Calamine lotion may help relieve symptoms of a plant rash. Use it 3 or 4 times a day. To prevent poison ivy exposure  If you know that you will be near poison ivy, oak, or sumac, you can try these options:  · Use a product designed to help prevent plant oil from getting on the skin.  These products, such as Ivy X Pre-Contact Skin Solution, come in lotions, sprays, or towelettes. You put the product on your skin right before you go outdoors. · If you did not use a preventive product and you have had contact with plant oil, clean it off your skin as soon as possible. Use a product such as Tecnu Original Outdoor Skin Cleanser. These products can also be used to clean plant oil from clothing or tools. When should you call for help? Call your doctor now or seek immediate medical care if:  ? · Your rash gets worse, and you start to feel bad and have a fever, a stiff neck, nausea, and vomiting. ? · You have signs of infection, such as:  ¨ Increased pain, swelling, warmth, or redness. ¨ Red streaks leading from the rash. ¨ Pus draining from the rash. ¨ A fever. ? Watch closely for changes in your health, and be sure to contact your doctor if:  ? · You have new blisters or bruises, or the rash spreads and looks like a sunburn. ? · The rash gets worse, or it comes back after nearly disappearing. ? · You think a medicine you are using is making your rash worse. ? · Your rash does not clear up after 1 to 2 weeks of home treatment. ? · You have joint aches or body aches with your rash. Where can you learn more? Go to http://cele-morris.info/. Enter Z894 in the search box to learn more about \"Poison [place], [place], and [place]: Care Instructions. \"  Current as of: October 13, 2016  Content Version: 11.4  © 3775-7627 Insmed. Care instructions adapted under license by iConnectivity (which disclaims liability or warranty for this information). If you have questions about a medical condition or this instruction, always ask your healthcare professional. [place] [address] any warranty or liability for your use of this information.

## 2018-04-20 ENCOUNTER — TELEPHONE (OUTPATIENT)
Dept: FAMILY MEDICINE CLINIC | Age: 33
End: 2018-04-20

## 2018-04-20 DIAGNOSIS — L23.7 POISON IVY: Primary | ICD-10-CM

## 2018-04-20 RX ORDER — PREDNISONE 5 MG/1
TABLET ORAL
Qty: 21 TAB | Refills: 0 | Status: SHIPPED | OUTPATIENT
Start: 2018-04-20 | End: 2018-06-05 | Stop reason: ALTCHOICE

## 2018-04-20 NOTE — TELEPHONE ENCOUNTER
Pt came in office on 4/10/18 and was given medication to clear up poison ivy but finished medication on 4/17/18 and still not completely gone and would like more medication called into the [facility] DR JUANPABLO MENDOZA please call and advise

## 2018-04-20 NOTE — TELEPHONE ENCOUNTER
I have sent in another dose pack.   Should symptoms continue, would need another appointment  Thanks

## 2018-06-05 ENCOUNTER — OFFICE VISIT (OUTPATIENT)
Dept: FAMILY MEDICINE CLINIC | Age: 33
End: 2018-06-05

## 2018-06-05 VITALS
OXYGEN SATURATION: 97 % | WEIGHT: 302.8 LBS | RESPIRATION RATE: 20 BRPM | HEART RATE: 96 BPM | TEMPERATURE: 98.9 F | SYSTOLIC BLOOD PRESSURE: 132 MMHG | BODY MASS INDEX: 42.39 KG/M2 | HEIGHT: 71 IN | DIASTOLIC BLOOD PRESSURE: 82 MMHG

## 2018-06-05 DIAGNOSIS — I10 ESSENTIAL HYPERTENSION: Primary | ICD-10-CM

## 2018-06-05 RX ORDER — AMLODIPINE BESYLATE 10 MG/1
10 TABLET ORAL DAILY
Qty: 90 TAB | Refills: 1 | Status: SHIPPED | OUTPATIENT
Start: 2018-06-05 | End: 2018-11-30 | Stop reason: SDUPTHER

## 2018-06-05 NOTE — PATIENT INSTRUCTIONS

## 2018-06-05 NOTE — PROGRESS NOTES
Subjective:     Kaila Alegria is a 34 y.o. male who presents for follow up of hypertension. Diet and Lifestyle: generally follows a low fat low cholesterol diet  Home BP Monitoring: is not measured at home    Cardiovascular ROS: taking medications as instructed, no medication side effects noted, no TIA's, no chest pain on exertion, no dyspnea on exertion, no swelling of ankles. New concerns: Pt states that he is just here for refills of BP medication. He did not take his BP medication this morning, and BP noted to be mildly elevated. He is not fasting at this time. Patient Active Problem List    Diagnosis Date Noted    Obesity, morbid ([address] 04/10/2018    Decreased hearing of left ear 10/09/2017    Skin lesion of scalp 07/18/2017    Physical exam, routine 10/20/2016    Acute right-sided thoracic back pain 09/27/2016    Elevated liver enzymes 08/05/2016    Strep throat exposure 10/20/2015    Screening for thyroid disorder 10/14/2015    Poison ivy 09/15/2015    JESUS (obstructive sleep apnea) 10/15/2014    Hypertension 10/15/2014    S/P vasectomy 08/06/2014    Pure hypercholesterolemia      Current Outpatient Prescriptions   Medication Sig Dispense Refill    amLODIPine (NORVASC) 10 mg tablet Take 1 Tab by mouth daily. 90 Tab 1    metaxalone (SKELAXIN) 800 mg tablet Take 1 Tab by mouth three (3) times daily as needed. Indications: Muscle Spasm 30 Tab 0    diclofenac EC (VOLTAREN) 75 mg EC tablet Take 1 Tab by mouth two (2) times daily as needed. 30 Tab 0    Krill-OM3-DHA-EPA-OM6-Lip-Astx (KRILL OIL) 1000-130(40-80) mg cap Take  by mouth.  cinnamon bark (CINNAMON) 500 mg cap Take  by mouth.  b complex vitamins (B COMPLEX 1) tablet Take 1 Tab by mouth daily.  FERROUS FUMARATE/VIT BCOMP&C (SUPER B COMPLEX PO) Take  by mouth.        No Known Allergies  Past Medical History:   Diagnosis Date    Hypertension 10/15/2014    Poison ivy 9/15/2015    Pure hypercholesterolemia  S/P vasectomy 2014     History reviewed. No pertinent surgical history. Family History   Problem Relation Age of Onset    Diabetes Mother     Heart Attack Father 43    Stroke Maternal Grandmother     Cancer Paternal Grandfather       age 45 - Hodgkin's   Talia Virginiaman Cancer Maternal Grandfather       in 42's with melanoma     Social History   Substance Use Topics    Smoking status: Never Smoker    Smokeless tobacco: Never Used    Alcohol use No        Lab Results  Component Value Date/Time   WBC 8.0 10/09/2017 09:07 AM   HGB 15.7 10/09/2017 09:07 AM   HCT 47.0 10/09/2017 09:07 AM   PLATELET 082  09:07 AM   MCV 91 10/09/2017 09:07 AM     Lab Results  Component Value Date/Time   Cholesterol, total 207 (H) 10/09/2017 09:07 AM   HDL Cholesterol 52 10/09/2017 09:07 AM   LDL, calculated 127 (H) 10/09/2017 09:07 AM   Triglyceride 142 10/09/2017 09:07 AM   CHOL/HDL Ratio 3.9 2010 10:30 AM     Lab Results  Component Value Date/Time   GFR est non- 10/09/2017 09:07 AM   GFR est  10/09/2017 09:07 AM   Creatinine 0.84 10/09/2017 09:07 AM   BUN 10 10/09/2017 09:07 AM   Sodium 141 10/09/2017 09:07 AM   Potassium 4.2 10/09/2017 09:07 AM   Chloride 99 10/09/2017 09:07 AM   CO2 26 10/09/2017 09:07 AM        Review of Systems, additional:  Pertinent items are noted in HPI. Objective:     Visit Vitals    /90 (BP 1 Location: Left arm, BP Patient Position: Sitting)    Pulse 96    Temp 98.9 °F (37.2 °C) (Oral)    Resp 20    Ht 5' 11\" (1.803 m)    Wt 302 lb 12.8 oz (137.3 kg)    SpO2 97%    BMI 42.23 kg/m2     Appearance: alert, well appearing, and in no distress. General exam: CVS exam BP noted to be mildly elevated today in office, S1, S2 normal, no gallop, no murmur, chest clear, no JVD, no HSM, no edema, peripheral vascular exam both carotids normal upstroke without bruits. Lab review: orders written for new lab studies as appropriate; see orders.      Assessment/Plan: hypertension stable. current treatment plan is effective, no change in therapy  orders and follow up as documented in patient record  reviewed diet, exercise and weight control. ICD-10-CM ICD-9-CM    1. Essential hypertension I10 401.9 amLODIPine (NORVASC) 10 mg tablet      CBC W/O DIFF      METABOLIC PANEL, COMPREHENSIVE     Informed patient that we will notify him when his labs return. Educated about importance of taking medication daily, and risks of uncontrolled BP such as heart attack, stroke, etc.    Pt informed to return to office with worsening of symptoms, or PRN with any questions or concerns. Pt verbalizes understanding of plan of care and denies further questions or concerns at this time.

## 2018-06-05 NOTE — PROGRESS NOTES
Chief Complaint   Patient presents with    Medication Refill     pt states he needs Amlodipine refilled before he goes on a missions trip to [address] [place] [address] [address] DOCUMENTATION\"  1. Have you been to the ER, urgent care clinic since your last visit? Hospitalized since your last visit? No    2. Have you seen or consulted any other health care providers outside of the [address] [place] [address] since your last visit? Include any pap smears or colon screening. No  Patient does not have advanced directives.

## 2018-06-05 NOTE — MR AVS SNAPSHOT
[facility] ([place], [place] [address] [facility] 
[phone] Patient: Alon Christopher MRN: [id number] :1985 Visit Information Date & Time Provider Department Dept. Phone Encounter #  
 2018  8:00 AM Otoniel Saucedo Gerardo [phone] [id number] Follow-up Instructions Return in about 6 months (around 2018), or if symptoms worsen or fail to improve. Upcoming Health Maintenance Date Due Influenza Age 5 to Adult 2018 DTaP/Tdap/Td series (2 - Td) 2020 Allergies as of 2018  Review Complete On: 2018 By: Jami Hinkle NP No Known Allergies Current Immunizations  Reviewed on 2016 Name Date Influenza Vaccine 10/17/2013 Rabies Immune Globulin 2016  5:54 PM  
 Rabies Vaccine IM 2016  3:53 PM, 2016  3:09 PM, 2016  4:06 PM, 2016  5:53 PM  
 TDAP Vaccine 2010 Not reviewed this visit You Were Diagnosed With   
  
 Codes Comments Essential hypertension    -  Primary ICD-10-CM: I10 
ICD-9-CM: 401.9 Vitals BP Pulse Temp Resp Height(growth percentile) Weight(growth percentile) 132/82 96 98.9 °F (37.2 °C) (Oral) 20 5' 11\" (1.803 m) 302 lb 12.8 oz (137.3 kg) SpO2 BMI Smoking Status 97% 42.23 kg/m2 Never Smoker Vitals History BMI and BSA Data Body Mass Index Body Surface Area  
 42.23 kg/m 2 2.62 m 2 Preferred Pharmacy Pharmacy Name Phone  [address] [phone] Your Updated Medication List  
  
   
This list is accurate as of 18  8:13 AM.  Always use your most recent med list. amLODIPine 10 mg tablet Commonly known as:  Choudhary Barges Take 1 Tab by mouth daily. B COMPLEX 1 tablet Generic drug:  b complex vitamins Take 1 Tab by mouth daily. CINNAMON 500 mg Cap Generic drug:  cinnamon bark Take  by mouth. diclofenac EC 75 mg EC tablet Commonly known as:  VOLTAREN Take 1 Tab by mouth two (2) times daily as needed. Krill Oil 1000-130(40-80) mg Cap Generic drug:  Krill-OM3-DHA-EPA-OM6-Lip-Astx Take  by mouth.  
  
 metaxalone 800 mg tablet Commonly known as:  SKELAXIN Take 1 Tab by mouth three (3) times daily as needed. Indications: Muscle Spasm SUPER B COMPLEX PO Take  by mouth. Prescriptions Sent to Pharmacy Refills  
 amLODIPine (NORVASC) 10 mg tablet 1 Sig: Take 1 Tab by mouth daily. Class: Normal  
 Pharmacy: [place] [address] Ph #: [phone] Route: Oral  
  
We Performed the Following CBC W/O DIFF [24659 CPT(R)] METABOLIC PANEL, COMPREHENSIVE [02889 CPT(R)] Follow-up Instructions Return in about 6 months (around 12/5/2018), or if symptoms worsen or fail to improve. Patient Instructions DASH Diet: Care Instructions Your Care Instructions The DASH diet is an eating plan that can help lower your blood pressure. DASH stands for Dietary Approaches to Stop Hypertension. Hypertension is high blood pressure. The DASH diet focuses on eating foods that are high in calcium, potassium, and magnesium. These nutrients can lower blood pressure. The foods that are highest in these nutrients are fruits, vegetables, low-fat dairy products, nuts, seeds, and legumes. But taking calcium, potassium, and magnesium supplements instead of eating foods that are high in those nutrients does not have the same effect. The DASH diet also includes whole grains, fish, and poultry. The DASH diet is one of several lifestyle changes your doctor may recommend to lower your high blood pressure. Your doctor may also want you to decrease the amount of sodium in your diet. Lowering sodium while following the DASH diet can lower blood pressure even further than just the DASH diet alone. Follow-up care is a key part of your treatment and safety. Be sure to make and go to all appointments, and call your doctor if you are having problems. It's also a good idea to know your test results and keep a list of the medicines you take. How can you care for yourself at home? Following the DASH diet · Eat 4 to 5 servings of fruit each day. A serving is 1 medium-sized piece of fruit, ½ cup chopped or canned fruit, 1/4 cup dried fruit, or 4 ounces (½ cup) of fruit juice. Choose fruit more often than fruit juice. · Eat 4 to 5 servings of vegetables each day. A serving is 1 cup of lettuce or raw leafy vegetables, ½ cup of chopped or cooked vegetables, or 4 ounces (½ cup) of vegetable juice. Choose vegetables more often than vegetable juice. · Get 2 to 3 servings of low-fat and fat-free dairy each day. A serving is 8 ounces of milk, 1 cup of yogurt, or 1 ½ ounces of cheese. · Eat 6 to 8 servings of grains each day. A serving is 1 slice of bread, 1 ounce of dry cereal, or ½ cup of cooked rice, pasta, or cooked cereal. Try to choose whole-grain products as much as possible. · Limit lean meat, poultry, and fish to 2 servings each day. A serving is 3 ounces, about the size of a deck of cards. · Eat 4 to 5 servings of nuts, seeds, and legumes (cooked dried beans, lentils, and split peas) each week. A serving is 1/3 cup of nuts, 2 tablespoons of seeds, or ½ cup of cooked beans or peas. · Limit fats and oils to 2 to 3 servings each day. A serving is 1 teaspoon of vegetable oil or 2 tablespoons of salad dressing. · Limit sweets and added sugars to 5 servings or less a week. A serving is 1 tablespoon jelly or jam, ½ cup sorbet, or 1 cup of lemonade. · Eat less than 2,300 milligrams (mg) of sodium a day. If you limit your sodium to 1,500 mg a day, you can lower your blood pressure even more. Tips for success · Start small.  Do not try to make dramatic changes to your diet all at once. You might feel that you are missing out on your favorite foods and then be more likely to not follow the plan. Make small changes, and stick with them. Once those changes become habit, add a few more changes. · Try some of the following: ¨ Make it a goal to eat a fruit or vegetable at every meal and at snacks. This will make it easy to get the recommended amount of fruits and vegetables each day. ¨ Try yogurt topped with fruit and nuts for a snack or healthy dessert. ¨ Add lettuce, tomato, cucumber, and onion to sandwiches. ¨ Combine a ready-made pizza crust with low-fat mozzarella cheese and lots of vegetable toppings. Try using tomatoes, squash, spinach, broccoli, carrots, cauliflower, and onions. ¨ Have a variety of cut-up vegetables with a low-fat dip as an appetizer instead of chips and dip. ¨ Sprinkle sunflower seeds or chopped almonds over salads. Or try adding chopped walnuts or almonds to cooked vegetables. ¨ Try some vegetarian meals using beans and peas. Add garbanzo or kidney beans to salads. Make burritos and tacos with mashed zabala beans or black beans. Where can you learn more? Go to http://ecle-morris.info/. Enter F138 in the search box to learn more about \"DASH Diet: Care Instructions. \" Current as of: September 21, 2016 Content Version: 11.4 © 4484-5398 [employer]. Care instructions adapted under license by [employer] (which disclaims liability or warranty for this information). If you have questions about a medical condition or this instruction, always ask your healthcare professional. [place] [address] any warranty or liability for your use of this information. Introducing [facility]! Dear Jeovany Mariscal: Thank you for requesting a Cloud[employer] account.   Our records indicate that you have previously registered for a Cloud[employer]eds account but its currently inactive. Please call our [employer] support line at [phone]. Additional Information If you have questions, please visit the Frequently Asked Questions section of the [employer] website at https://Open mHealth. [employer]. [email or web address]/. Remember, [employer] is NOT to be used for urgent needs. For medical emergencies, dial 911. Now available from your iPhone and Android! Please provide this summary of care documentation to your next provider. Your primary care clinician is listed as Lizbeth Martinez. If you have any questions after today's visit, please call [phone].

## 2018-06-06 ENCOUNTER — TELEPHONE (OUTPATIENT)
Dept: FAMILY MEDICINE CLINIC | Age: 33
End: 2018-06-06

## 2018-06-06 LAB
ALBUMIN SERPL-MCNC: 4.6 G/DL (ref 3.5–5.5)
ALBUMIN/GLOB SERPL: 1.6 {RATIO} (ref 1.2–2.2)
ALP SERPL-CCNC: 61 IU/L (ref 39–117)
ALT SERPL-CCNC: 66 IU/L (ref 0–44)
AST SERPL-CCNC: 37 IU/L (ref 0–40)
BILIRUB SERPL-MCNC: 0.6 MG/DL (ref 0–1.2)
BUN SERPL-MCNC: 10 MG/DL (ref 6–20)
BUN/CREAT SERPL: 13 (ref 9–20)
CALCIUM SERPL-MCNC: 9.2 MG/DL (ref 8.7–10.2)
CHLORIDE SERPL-SCNC: 100 MMOL/L (ref 96–106)
CO2 SERPL-SCNC: 26 MMOL/L (ref 18–29)
CREAT SERPL-MCNC: 0.78 MG/DL (ref 0.76–1.27)
ERYTHROCYTE [DISTWIDTH] IN BLOOD BY AUTOMATED COUNT: 13.9 % (ref 12.3–15.4)
GFR SERPLBLD CREATININE-BSD FMLA CKD-EPI: 119 ML/MIN/1.73
GFR SERPLBLD CREATININE-BSD FMLA CKD-EPI: 137 ML/MIN/1.73
GLOBULIN SER CALC-MCNC: 2.9 G/DL (ref 1.5–4.5)
GLUCOSE SERPL-MCNC: 84 MG/DL (ref 65–99)
HCT VFR BLD AUTO: 45.9 % (ref 37.5–51)
HGB BLD-MCNC: 15.4 G/DL (ref 13–17.7)
MCH RBC QN AUTO: 30.8 PG (ref 26.6–33)
MCHC RBC AUTO-ENTMCNC: 33.6 G/DL (ref 31.5–35.7)
MCV RBC AUTO: 92 FL (ref 79–97)
PLATELET # BLD AUTO: 277 X10E3/UL (ref 150–379)
POTASSIUM SERPL-SCNC: 4.2 MMOL/L (ref 3.5–5.2)
PROT SERPL-MCNC: 7.5 G/DL (ref 6–8.5)
RBC # BLD AUTO: 5 X10E6/UL (ref 4.14–5.8)
SODIUM SERPL-SCNC: 138 MMOL/L (ref 134–144)
WBC # BLD AUTO: 8.8 X10E3/UL (ref 3.4–10.8)

## 2018-06-06 NOTE — TELEPHONE ENCOUNTER
----- Message from Lennox Judd NP sent at 6/6/2018 10:14 AM EDT -----  Please call patient and let him know that his labs returned. Stable, besides liver enzyme. This number has been increasing over the past year. Needs to decrease alcohol and acetaminophen intake, and re-check in 3 months. If still elevated, will discuss referral to hepatology. Thanks!

## 2018-06-06 NOTE — PROGRESS NOTES
Please call patient and let him know that his labs returned. Stable, besides liver enzyme. This number has been increasing over the past year. Needs to decrease alcohol and acetaminophen intake, and re-check in 3 months. If still elevated, will discuss referral to hepatology. Thanks!

## 2018-11-30 ENCOUNTER — OFFICE VISIT (OUTPATIENT)
Dept: FAMILY MEDICINE CLINIC | Age: 33
End: 2018-11-30

## 2018-11-30 VITALS
OXYGEN SATURATION: 99 % | DIASTOLIC BLOOD PRESSURE: 79 MMHG | TEMPERATURE: 98.3 F | HEIGHT: 71 IN | BODY MASS INDEX: 43.4 KG/M2 | RESPIRATION RATE: 18 BRPM | SYSTOLIC BLOOD PRESSURE: 124 MMHG | HEART RATE: 99 BPM | WEIGHT: 310 LBS

## 2018-11-30 DIAGNOSIS — R19.4 CHANGE IN BOWEL HABITS: ICD-10-CM

## 2018-11-30 DIAGNOSIS — M54.41 ACUTE RIGHT-SIDED LOW BACK PAIN WITH RIGHT-SIDED SCIATICA: ICD-10-CM

## 2018-11-30 DIAGNOSIS — I10 ESSENTIAL HYPERTENSION: ICD-10-CM

## 2018-11-30 DIAGNOSIS — E78.00 PURE HYPERCHOLESTEROLEMIA: Primary | ICD-10-CM

## 2018-11-30 DIAGNOSIS — Z13.29 SCREENING FOR THYROID DISORDER: ICD-10-CM

## 2018-11-30 RX ORDER — METAXALONE 800 MG/1
800 TABLET ORAL
Qty: 30 TAB | Refills: 0 | Status: CANCELLED | OUTPATIENT
Start: 2018-11-30

## 2018-11-30 RX ORDER — NAPROXEN 500 MG/1
500 TABLET ORAL 2 TIMES DAILY WITH MEALS
Qty: 30 TAB | Refills: 2 | Status: SHIPPED | OUTPATIENT
Start: 2018-11-30 | End: 2019-10-04 | Stop reason: ALTCHOICE

## 2018-11-30 RX ORDER — METHYLPREDNISOLONE 4 MG/1
TABLET ORAL
Qty: 1 DOSE PACK | Refills: 0 | Status: SHIPPED | OUTPATIENT
Start: 2018-11-30 | End: 2018-12-13 | Stop reason: ALTCHOICE

## 2018-11-30 RX ORDER — AMLODIPINE BESYLATE 10 MG/1
10 TABLET ORAL DAILY
Qty: 90 TAB | Refills: 1 | Status: SHIPPED | OUTPATIENT
Start: 2018-11-30 | End: 2019-10-04 | Stop reason: SDUPTHER

## 2018-11-30 RX ORDER — AMLODIPINE BESYLATE 10 MG/1
10 TABLET ORAL DAILY
Qty: 90 TAB | Refills: 1 | Status: SHIPPED | OUTPATIENT
Start: 2018-11-30 | End: 2018-11-30 | Stop reason: SDUPTHER

## 2018-11-30 NOTE — PROGRESS NOTES
Subjective:   Rochelle Meyers is a 28 y.o. male presenting for his annual checkup. ROS:  Feeling well. No dyspnea or chest pain on exertion. No abdominal pain, black or bloody stools. No urinary tract or prostatic symptoms. No neurological complaints. Specific concerns today:     Pt is fasting, and will get labs today. Pt declines a flu vaccine at this time. Pt states that he has been having difficulty with his low back pain and sciatica for about 2 weeks. When he sits for long periods of times, drives, etc, he has shooting pain down his right leg and some numbness in the right leg. He has dealt with this for years. Naproxen normally works quite well for him, without any negative side effects. In addition, he has dealt with GI issues for about 12 years. He has 4-5 bowel movements a day. Sometimes it is diarrhea, other times it is formed stool. No abdominal pain, no blood in stool. Sometimes, he battles constipation. Pt states that his family has a lot of GI history and complications, and this worries him. Patient Active Problem List    Diagnosis Date Noted    Obesity, morbid ([address] 04/10/2018    Decreased hearing of left ear 10/09/2017    Skin lesion of scalp 07/18/2017    Physical exam, routine 10/20/2016    Acute right-sided thoracic back pain 09/27/2016    Elevated liver enzymes 08/05/2016    Strep throat exposure 10/20/2015    Screening for thyroid disorder 10/14/2015    Poison ivy 09/15/2015    JESUS (obstructive sleep apnea) 10/15/2014    Hypertension 10/15/2014    S/P vasectomy 08/06/2014    Pure hypercholesterolemia      Current Outpatient Medications   Medication Sig Dispense Refill    naproxen (NAPROSYN) 500 mg tablet Take 1 Tab by mouth two (2) times daily (with meals). 30 Tab 2    methylPREDNISolone (MEDROL DOSEPACK) 4 mg tablet Take as prescribed. 1 Dose Pack 0    amLODIPine (NORVASC) 10 mg tablet Take 1 Tab by mouth daily.  90 Tab 1    Krill-OM3-DHA-EPA-OM6-Lip-Astx (KRILL OIL) 1000-130(40-80) mg cap Take  by mouth.  cinnamon bark (CINNAMON) 500 mg cap Take  by mouth.  b complex vitamins (B COMPLEX 1) tablet Take 1 Tab by mouth daily.  FERROUS FUMARATE/VIT BCOMP&C (SUPER B COMPLEX PO) Take  by mouth.  metaxalone (SKELAXIN) 800 mg tablet Take 1 Tab by mouth three (3) times daily as needed. Indications: Muscle Spasm 30 Tab 0    diclofenac EC (VOLTAREN) 75 mg EC tablet Take 1 Tab by mouth two (2) times daily as needed. 30 Tab 0     No Known Allergies  Past Medical History:   Diagnosis Date    Hypertension 10/15/2014    Poison ivy 9/15/2015    Pure hypercholesterolemia     S/P vasectomy 2014     History reviewed. No pertinent surgical history.   Family History   Problem Relation Age of Onset    Diabetes Mother     Heart Attack Father 43    Stroke Maternal Grandmother     Cancer Paternal Grandfather          age 45 - Hodgkin's   [facility] Cancer Maternal Grandfather          in 42's with melanoma     Social History     Tobacco Use    Smoking status: Never Smoker    Smokeless tobacco: Never Used   Substance Use Topics    Alcohol use: No     Alcohol/week: 0.0 oz        Lab Results   Component Value Date/Time    WBC 8.8 2018 08:18 AM    HGB 15.4 2018 08:18 AM    HCT 45.9 2018 08:18 AM    PLATELET 461  08:18 AM    MCV 92 2018 08:18 AM     Lab Results   Component Value Date/Time    Cholesterol, total 207 (H) 10/09/2017 09:07 AM    HDL Cholesterol 52 10/09/2017 09:07 AM    LDL, calculated 127 (H) 10/09/2017 09:07 AM    Triglyceride 142 10/09/2017 09:07 AM    CHOL/HDL Ratio 3.9 2010 10:30 AM     Lab Results   Component Value Date/Time    GFR est non- 2018 08:18 AM    GFR est  2018 08:18 AM    Creatinine 0.78 2018 08:18 AM    BUN 10 2018 08:18 AM    Sodium 138 2018 08:18 AM    Potassium 4.2 2018 08:18 AM    Chloride 100 2018 08:18 AM CO2 26 06/05/2018 08:18 AM        Objective:     Visit Vitals  /79 (BP 1 Location: Left arm, BP Patient Position: Sitting)   Pulse 99   Temp 98.3 °F (36.8 °C) (Oral)   Resp 18   Ht 5' 11\" (1.803 m)   Wt 310 lb (140.6 kg)   SpO2 99%   BMI 43.24 kg/m²     The patient appears well, alert, oriented x 3, in no distress. ENT normal.  Neck supple. No adenopathy or thyromegaly. CARLA. Lungs are clear, good air entry, no wheezes, rhonchi or rales. S1 and S2 normal, no murmurs, regular rate and rhythm. Abdomen is soft without tenderness, guarding, mass or organomegaly.  exam: no penile lesions or discharge, no testicular masses or tenderness, no hernias. Extremities show no edema, normal peripheral pulses. Neurological is normal without focal findings. Assessment/Plan:   healthy adult male  lose weight, increase physical activity. ICD-10-CM ICD-9-CM    1. Pure hypercholesterolemia E78.00 272.0 CBC W/O DIFF      METABOLIC PANEL, COMPREHENSIVE      LIPID PANEL   2. Essential hypertension I10 401.9 amLODIPine (NORVASC) 10 mg tablet      DISCONTINUED: amLODIPine (NORVASC) 10 mg tablet   3. Acute right-sided low back pain with right-sided sciatica M54.41 724.2 naproxen (NAPROSYN) 500 mg tablet     724.3 methylPREDNISolone (MEDROL DOSEPACK) 4 mg tablet      XR SPINE LUMB 2 OR 3 V   4. Screening for thyroid disorder Z13.29 V77.0 THYROID CASCADE PROFILE   5. Change in bowel habits R19.4 787.99 REFERRAL TO GASTROENTEROLOGY   . HISTORY OF PRESENT ILLNESS  Deshawn Vasquez is a 28 y.o. male. HPI    Review of Systems   Constitutional: Negative for fever. HENT: Negative for congestion. Respiratory: Negative for cough. Gastrointestinal: Positive for constipation and diarrhea. Negative for vomiting. Musculoskeletal: Positive for back pain. Physical Exam   HENT:   Nose: Nose normal.       ASSESSMENT and PLAN    ICD-10-CM ICD-9-CM    1.  Pure hypercholesterolemia E78.00 272.0 CBC W/O DIFF      METABOLIC PANEL, COMPREHENSIVE      LIPID PANEL   2. Essential hypertension I10 401.9 amLODIPine (NORVASC) 10 mg tablet      DISCONTINUED: amLODIPine (NORVASC) 10 mg tablet   3. Acute right-sided low back pain with right-sided sciatica M54.41 724.2 naproxen (NAPROSYN) 500 mg tablet     724.3 methylPREDNISolone (MEDROL DOSEPACK) 4 mg tablet      XR SPINE LUMB 2 OR 3 V   4. Screening for thyroid disorder Z13.29 V77.0 THYROID CASCADE PROFILE   5. Change in bowel habits R19.4 787.99 REFERRAL TO GASTROENTEROLOGY     Informed patient that I have sent medication to the pharmacy, and he should take as prescribed. Educated about calling GI for an appointment. Educated that we will notify him when labs return, and inform him of any change in plan of care at that time. Pt informed to return to office with worsening of symptoms, or PRN with any questions or concerns. Pt verbalizes understanding of plan of care and denies further questions or concerns at this time.

## 2018-11-30 NOTE — PROGRESS NOTES
Identified pt with two pt identifiers(name and ). Chief Complaint   Patient presents with    Complete Physical    Cholesterol Problem    Hypertension    Labs     NPO since midnight    Medication Refill     muscle relaxer, BP medication and cholesterol medication if labs show he needs to resume using it    Leg Pain     has been experiencing sciatica on/off     Irritable Bowel Syndrome     has had some IBS sx and would like to discuss this        Health Maintenance Due   Topic    Influenza Age 5 to Adult        Wt Readings from Last 3 Encounters:   18 310 lb (140.6 kg)   18 302 lb 12.8 oz (137.3 kg)   04/10/18 305 lb (138.3 kg)     Temp Readings from Last 3 Encounters:   18 98.3 °F (36.8 °C) (Oral)   18 98.9 °F (37.2 °C) (Oral)   04/10/18 97.3 °F (36.3 °C) (Oral)     BP Readings from Last 3 Encounters:   18 124/79   18 132/82   04/10/18 127/80     Pulse Readings from Last 3 Encounters:   18 99   18 96   04/10/18 93         Learning Assessment:  :     Learning Assessment 2014   PRIMARY LEARNER Patient   HIGHEST LEVEL OF EDUCATION - PRIMARY LEARNER  GRADUATED HIGH SCHOOL OR GED   BARRIERS PRIMARY LEARNER NONE   CO-LEARNER CAREGIVER No   PRIMARY LANGUAGE ENGLISH   LEARNER PREFERENCE PRIMARY DEMONSTRATION     PICTURES     VIDEOS     LISTENING   ANSWERED BY patient   RELATIONSHIP SELF       Depression Screening:  :     PHQ over the last two weeks 2018   Little interest or pleasure in doing things Not at all   Feeling down, depressed, irritable, or hopeless Not at all   Total Score PHQ 2 0       Fall Risk Assessment:  :     No flowsheet data found. Abuse Screening:  :     Abuse Screening Questionnaire 10/15/2014   Do you ever feel afraid of your partner? N   Are you in a relationship with someone who physically or mentally threatens you? N   Is it safe for you to go home?  Y       Coordination of Care Questionnaire:  :     1) Have you been to an emergency room, urgent care clinic since your last visit? no   Hospitalized since your last visit? no             2) Have you seen or consulted any other health care providers outside of [address] [place] [address] since your last visit? yes, has been to see dermatology since last office visit twice and had to pre-cancerous moles removed  (Include any pap smears or colon screenings in this section.)    3) Do you have an Advance Directive on file? no  Are you interested in receiving information about Advance Directives? no    Patient is accompanied by self. Reviewed record in preparation for visit and have obtained necessary documentation. Medication reconciliation up to date and corrected with patient at this time.

## 2018-11-30 NOTE — PATIENT INSTRUCTIONS
Back Pain: Care Instructions  Your Care Instructions    Back pain has many possible causes. It is often related to problems with muscles and ligaments of the back. It may also be related to problems with the nerves, discs, or bones of the back. Moving, lifting, standing, sitting, or sleeping in an awkward way can strain the back. Sometimes you don't notice the injury until later. Arthritis is another common cause of back pain. Although it may hurt a lot, back pain usually improves on its own within several weeks. Most people recover in 12 weeks or less. Using good home treatment and being careful not to stress your back can help you feel better sooner. Follow-up care is a key part of your treatment and safety. Be sure to make and go to all appointments, and call your doctor if you are having problems. It's also a good idea to know your test results and keep a list of the medicines you take. How can you care for yourself at home? · Sit or lie in positions that are most comfortable and reduce your pain. Try one of these positions when you lie down:  ? Lie on your back with your knees bent and supported by large pillows. ? Lie on the floor with your legs on the seat of a sofa or chair. ? Lie on your side with your knees and hips bent and a pillow between your legs. ? Lie on your stomach if it does not make pain worse. · Do not sit up in bed, and avoid soft couches and twisted positions. Bed rest can help relieve pain at first, but it delays healing. Avoid bed rest after the first day of back pain. · Change positions every 30 minutes. If you must sit for long periods of time, take breaks from sitting. Get up and walk around, or lie in a comfortable position. · Try using a heating pad on a low or medium setting for 15 to 20 minutes every 2 or 3 hours. Try a warm shower in place of one session with the heating pad. · You can also try an ice pack for 10 to 15 minutes every 2 to 3 hours.  Put a thin cloth between the ice pack and your skin. · Take pain medicines exactly as directed. ? If the doctor gave you a prescription medicine for pain, take it as prescribed. ? If you are not taking a prescription pain medicine, ask your doctor if you can take an over-the-counter medicine. · Take short walks several times a day. You can start with 5 to 10 minutes, 3 or 4 times a day, and work up to longer walks. Walk on level surfaces and avoid hills and stairs until your back is better. · Return to work and other activities as soon as you can. Continued rest without activity is usually not good for your back. · To prevent future back pain, do exercises to stretch and strengthen your back and stomach. Learn how to use good posture, safe lifting techniques, and proper body mechanics. When should you call for help? Call your doctor now or seek immediate medical care if:    · You have new or worsening numbness in your legs.     · You have new or worsening weakness in your legs. (This could make it hard to stand up.)     · You lose control of your bladder or bowels.    Watch closely for changes in your health, and be sure to contact your doctor if:    · You have a fever, lose weight, or don't feel well.     · You do not get better as expected. Where can you learn more? Go to http://cele-morris.info/. Enter C706 in the search box to learn more about \"Back Pain: Care Instructions. \"  Current as of: November 29, 2017  Content Version: 11.8  © 5114-1184 [employer]. Care instructions adapted under license by [employer] (which disclaims liability or warranty for this information). If you have questions about a medical condition or this instruction, always ask your healthcare professional. [place] [address] any warranty or liability for your use of this information.

## 2018-12-01 LAB
ALBUMIN SERPL-MCNC: 4.5 G/DL (ref 3.5–5.5)
ALBUMIN/GLOB SERPL: 1.8 {RATIO} (ref 1.2–2.2)
ALP SERPL-CCNC: 69 IU/L (ref 39–117)
ALT SERPL-CCNC: 59 IU/L (ref 0–44)
AST SERPL-CCNC: 31 IU/L (ref 0–40)
BILIRUB SERPL-MCNC: 0.5 MG/DL (ref 0–1.2)
BUN SERPL-MCNC: 12 MG/DL (ref 6–20)
BUN/CREAT SERPL: 14 (ref 9–20)
CALCIUM SERPL-MCNC: 9.4 MG/DL (ref 8.7–10.2)
CHLORIDE SERPL-SCNC: 100 MMOL/L (ref 96–106)
CHOLEST SERPL-MCNC: 206 MG/DL (ref 100–199)
CO2 SERPL-SCNC: 27 MMOL/L (ref 20–29)
CREAT SERPL-MCNC: 0.87 MG/DL (ref 0.76–1.27)
ERYTHROCYTE [DISTWIDTH] IN BLOOD BY AUTOMATED COUNT: 13.9 % (ref 12.3–15.4)
GLOBULIN SER CALC-MCNC: 2.5 G/DL (ref 1.5–4.5)
GLUCOSE SERPL-MCNC: 103 MG/DL (ref 65–99)
HCT VFR BLD AUTO: 46.1 % (ref 37.5–51)
HDLC SERPL-MCNC: 50 MG/DL
HGB BLD-MCNC: 15.8 G/DL (ref 13–17.7)
INTERPRETATION, 910389: NORMAL
LDLC SERPL CALC-MCNC: 125 MG/DL (ref 0–99)
MCH RBC QN AUTO: 30.9 PG (ref 26.6–33)
MCHC RBC AUTO-ENTMCNC: 34.3 G/DL (ref 31.5–35.7)
MCV RBC AUTO: 90 FL (ref 79–97)
PLATELET # BLD AUTO: 252 X10E3/UL (ref 150–379)
POTASSIUM SERPL-SCNC: 4.4 MMOL/L (ref 3.5–5.2)
PROT SERPL-MCNC: 7 G/DL (ref 6–8.5)
RBC # BLD AUTO: 5.12 X10E6/UL (ref 4.14–5.8)
SODIUM SERPL-SCNC: 141 MMOL/L (ref 134–144)
TRIGL SERPL-MCNC: 154 MG/DL (ref 0–149)
TSH SERPL DL<=0.005 MIU/L-ACNC: 2.51 UIU/ML (ref 0.45–4.5)
VLDLC SERPL CALC-MCNC: 31 MG/DL (ref 5–40)
WBC # BLD AUTO: 8 X10E3/UL (ref 3.4–10.8)

## 2018-12-03 NOTE — PROGRESS NOTES
Pt was advised and verbalized understanding. He is willing to start a medication at this time and would like it sent to [facility] on file. He is aware of need to return in 4-6 months for appt and fasting labs to have this rechecked.

## 2018-12-03 NOTE — PROGRESS NOTES
Please call patient and let him know that his labs returned:  Cholesterol is high, and has been for 3 years. Is he willing to start a cholesterol medication at this time?   Let me know  Thanks

## 2018-12-04 DIAGNOSIS — E78.00 PURE HYPERCHOLESTEROLEMIA: Primary | ICD-10-CM

## 2018-12-04 RX ORDER — ATORVASTATIN CALCIUM 10 MG/1
10 TABLET, FILM COATED ORAL DAILY
Qty: 90 TAB | Refills: 1 | Status: SHIPPED | OUTPATIENT
Start: 2018-12-04 | End: 2018-12-04 | Stop reason: SDUPTHER

## 2018-12-04 RX ORDER — ATORVASTATIN CALCIUM 10 MG/1
10 TABLET, FILM COATED ORAL DAILY
Qty: 90 TAB | Refills: 1 | Status: SHIPPED | OUTPATIENT
Start: 2018-12-04 | End: 2019-06-10 | Stop reason: SDUPTHER

## 2018-12-06 ENCOUNTER — HOSPITAL ENCOUNTER (OUTPATIENT)
Dept: GENERAL RADIOLOGY | Age: 33
Discharge: HOME | End: 2018-12-06
Attending: NURSE PRACTITIONER
Payer: COMMERCIAL

## 2018-12-06 DIAGNOSIS — M54.41 ACUTE RIGHT-SIDED LOW BACK PAIN WITH RIGHT-SIDED SCIATICA: ICD-10-CM

## 2018-12-06 PROCEDURE — 72100 X-RAY EXAM L-S SPINE 2/3 VWS: CPT

## 2018-12-07 NOTE — PROGRESS NOTES
Patient was advised, verbalized understanding and has no further questions at this time. He reports he is going to see [facility] as the last time he had trouble with same sx massage worked the [employer] out.

## 2018-12-07 NOTE — PROGRESS NOTES
Please call patient and let him know that his x-ray returned. Stable, besides mild degenerative changes. I am happy to refer to PT if he would be interested. Thanks!

## 2018-12-13 ENCOUNTER — OFFICE VISIT (OUTPATIENT)
Dept: FAMILY MEDICINE CLINIC | Age: 33
End: 2018-12-13

## 2018-12-13 VITALS
HEART RATE: 108 BPM | RESPIRATION RATE: 20 BRPM | TEMPERATURE: 97.6 F | BODY MASS INDEX: 43.82 KG/M2 | DIASTOLIC BLOOD PRESSURE: 78 MMHG | HEIGHT: 71 IN | OXYGEN SATURATION: 97 % | SYSTOLIC BLOOD PRESSURE: 135 MMHG | WEIGHT: 313 LBS

## 2018-12-13 DIAGNOSIS — Z87.09 HISTORY OF STREP PHARYNGITIS: ICD-10-CM

## 2018-12-13 DIAGNOSIS — R09.81 NASAL CONGESTION: ICD-10-CM

## 2018-12-13 DIAGNOSIS — J02.9 SORE THROAT: ICD-10-CM

## 2018-12-13 DIAGNOSIS — J02.0 STREP THROAT: Primary | ICD-10-CM

## 2018-12-13 LAB — S PYO AG THROAT QL: POSITIVE

## 2018-12-13 RX ORDER — AZITHROMYCIN 250 MG/1
TABLET, FILM COATED ORAL
Qty: 6 TAB | Refills: 0 | Status: SHIPPED | OUTPATIENT
Start: 2018-12-13 | End: 2018-12-18

## 2018-12-13 RX ORDER — AZITHROMYCIN 250 MG/1
TABLET, FILM COATED ORAL
Qty: 6 TAB | Refills: 0 | Status: SHIPPED | OUTPATIENT
Start: 2018-12-13 | End: 2018-12-13 | Stop reason: SDUPTHER

## 2018-12-13 NOTE — PROGRESS NOTES
HISTORY OF PRESENT ILLNESS  Javed Orta is a 28 y.o. male. HPI  Pt presents with \"sore throat, cough and laryngitis\"    Symptoms started 5 days ago, and have been worsening over the past 2 days  Sore throat  Cough, productive at times  He lost his voice 2 days ago  Nasal congestion    No fever  OTC: Mucinex DM  Review of Systems   Constitutional: Negative for fever. HENT: Positive for congestion and sore throat. Respiratory: Positive for cough and sputum production. Gastrointestinal: Negative for diarrhea and vomiting. Physical Exam   Constitutional: He is oriented to person, place, and time. He appears well-developed and well-nourished. HENT:   Head: Normocephalic and atraumatic. Right Ear: Hearing, tympanic membrane, external ear and ear canal normal.   Left Ear: Hearing, tympanic membrane, external ear and ear canal normal.   Nose: Mucosal edema and rhinorrhea present. Mouth/Throat: Posterior oropharyngeal edema and posterior oropharyngeal erythema present. Neck: Normal range of motion. Neck supple. Cardiovascular: Normal rate, regular rhythm and normal heart sounds. Pulmonary/Chest: Effort normal and breath sounds normal. He has no wheezes. He has no rales. Lymphadenopathy:     He has cervical adenopathy. Right cervical: Superficial cervical adenopathy present. Left cervical: Superficial cervical adenopathy present. Neurological: He is alert and oriented to person, place, and time. Skin: Skin is warm and dry. Psychiatric: He has a normal mood and affect. His behavior is normal.       ASSESSMENT and PLAN    ICD-10-CM ICD-9-CM    1. Strep throat J02.0 034.0 azithromycin (ZITHROMAX) 250 mg tablet      DISCONTINUED: azithromycin (ZITHROMAX) 250 mg tablet   2. Sore throat J02.9 462 AMB POC RAPID STREP TEST   3. Nasal congestion R09.81 478.19 AMB POC RAPID STREP TEST   4.  History of strep pharyngitis Z87.09 V12.09 AMB POC RAPID STREP TEST     Informed patient that I have sent medication to the pharmacy, and he should take as prescribed. Educated about staying well hydrated, and treating fever as needed. Pt informed to return to office with worsening of symptoms, or PRN with any questions or concerns. Pt verbalizes understanding of plan of care and denies further questions or concerns at this time.

## 2018-12-13 NOTE — LETTER
NOTIFICATION RETURN TO WORK / SCHOOL 
 
12/13/2018 10:24 AM 
 
Mr. Baron Kendall [address] [place] [id number] To Whom It May Concern: 
 
Baron Kendall is currently under the care of [facility]. He needs to be excused from work on 12/13, due to illness. If there are questions or concerns please have the patient contact our office. Sincerely, Andrzej Olivo NP

## 2018-12-13 NOTE — PROGRESS NOTES
Identified pt with two pt identifiers(name and ). Chief Complaint   Patient presents with    Sore Throat     Sx first started a few days ago.  Cough    Laryngitis        Health Maintenance Due   Topic    Influenza Age 5 to Adult        Wt Readings from Last 3 Encounters:   18 313 lb (142 kg)   18 310 lb (140.6 kg)   18 302 lb 12.8 oz (137.3 kg)     Temp Readings from Last 3 Encounters:   18 97.6 °F (36.4 °C) (Oral)   18 98.3 °F (36.8 °C) (Oral)   18 98.9 °F (37.2 °C) (Oral)     BP Readings from Last 3 Encounters:   18 135/78   18 124/79   18 132/82     Pulse Readings from Last 3 Encounters:   18 (!) 108   18 99   18 96         Learning Assessment:  :     Learning Assessment 2014   PRIMARY LEARNER Patient   HIGHEST LEVEL OF EDUCATION - PRIMARY LEARNER  GRADUATED HIGH SCHOOL OR GED   BARRIERS PRIMARY LEARNER NONE   CO-LEARNER CAREGIVER No   PRIMARY LANGUAGE ENGLISH   LEARNER PREFERENCE PRIMARY DEMONSTRATION     PICTURES     VIDEOS     LISTENING   ANSWERED BY patient   RELATIONSHIP SELF       Depression Screening:  :     PHQ over the last two weeks 2018   Little interest or pleasure in doing things Not at all   Feeling down, depressed, irritable, or hopeless Not at all   Total Score PHQ 2 0       Fall Risk Assessment:  :     No flowsheet data found. Abuse Screening:  :     Abuse Screening Questionnaire 10/15/2014   Do you ever feel afraid of your partner? N   Are you in a relationship with someone who physically or mentally threatens you? N   Is it safe for you to go home?  Y       Coordination of Care Questionnaire:  :     1) Have you been to an emergency room, urgent care clinic since your last visit? no   Hospitalized since your last visit? no             2) Have you seen or consulted any other health care providers outside of [address] [place] [address] since your last visit? no  (Include any pap smears or colon screenings in this section.)    3) Do you have an Advance Directive on file? no  Are you interested in receiving information about Advance Directives? no    Patient is accompanied by self. Reviewed record in preparation for visit and have obtained necessary documentation. Medication reconciliation up to date and corrected with patient at this time. Order for POC Rapid Strep Testing placed per [facility] Michelle's verbal order.

## 2018-12-13 NOTE — PATIENT INSTRUCTIONS

## 2018-12-19 ENCOUNTER — TELEPHONE (OUTPATIENT)
Dept: FAMILY MEDICINE CLINIC | Age: 33
End: 2018-12-19

## 2018-12-19 NOTE — TELEPHONE ENCOUNTER
Pt came in 12/13/18 and has strep and feeling a little better but would like more meds to get rid of all of it please send to [facility] in [address] - pt can be reached at [phone]

## 2018-12-19 NOTE — TELEPHONE ENCOUNTER
Pt was advised, per Bella Trevino, NP if sx did not resolve with tx given at last week's appt he would need to be seen for reevaluation of current sx. Pt verbalized understanding.

## 2019-03-19 ENCOUNTER — OFFICE VISIT (OUTPATIENT)
Dept: FAMILY MEDICINE CLINIC | Age: 34
End: 2019-03-19

## 2019-03-19 ENCOUNTER — HOSPITAL ENCOUNTER (OUTPATIENT)
Dept: GENERAL RADIOLOGY | Age: 34
Discharge: HOME | End: 2019-03-19
Attending: NURSE PRACTITIONER
Payer: COMMERCIAL

## 2019-03-19 VITALS
HEART RATE: 100 BPM | SYSTOLIC BLOOD PRESSURE: 138 MMHG | BODY MASS INDEX: 44.1 KG/M2 | DIASTOLIC BLOOD PRESSURE: 82 MMHG | WEIGHT: 315 LBS | RESPIRATION RATE: 18 BRPM | TEMPERATURE: 97.7 F | HEIGHT: 71 IN | OXYGEN SATURATION: 97 %

## 2019-03-19 DIAGNOSIS — M79.641 PAIN IN BOTH HANDS: Primary | ICD-10-CM

## 2019-03-19 DIAGNOSIS — M79.642 PAIN IN BOTH HANDS: Primary | ICD-10-CM

## 2019-03-19 DIAGNOSIS — M25.511 ACUTE PAIN OF RIGHT SHOULDER: ICD-10-CM

## 2019-03-19 DIAGNOSIS — I10 ESSENTIAL HYPERTENSION: ICD-10-CM

## 2019-03-19 PROCEDURE — 73030 X-RAY EXAM OF SHOULDER: CPT

## 2019-03-19 NOTE — PROGRESS NOTES
HISTORY OF PRESENT ILLNESS  Victorino Colon is a 28 y.o. male. HPI   Pt presents with \"hands and feet swelling, and right shoulder pain\"    Pt states that half way through his work day, he notes that his hands and feet, bilaterally, will be swollen. His hands are painful when swollen, but his feet are not swollen. Pt states that when he does not work, he does not have the swelling. This has been going for about 8 months, but has been worsening in the past 2 months. Pt never wakes up with the swelling. In addition, patient states that for the past couple of weeks, he has had right shoulder pain. Pain is noted with lifting the shoulder, and is a deep pain. No swelling or erythema noted. Pt lifts a lot while at work, and is left handed. Review of Systems   Constitutional: Negative for fever. HENT: Negative for congestion. Respiratory: Negative for cough. Gastrointestinal: Negative for diarrhea and vomiting. Physical Exam   Constitutional: He is oriented to person, place, and time. He appears well-developed and well-nourished. HENT:   Head: Normocephalic and atraumatic. Cardiovascular: Normal rate, regular rhythm and normal heart sounds. Pulmonary/Chest: Effort normal and breath sounds normal.   Musculoskeletal:        Right shoulder: He exhibits decreased range of motion and pain. Neurological: He is alert and oriented to person, place, and time. Skin: Skin is warm and dry. Psychiatric: He has a normal mood and affect. His behavior is normal.       ASSESSMENT and PLAN    ICD-10-CM ICD-9-CM    1. Pain in both hands M79.641 729.5 CBC W/O DIFF    A98.998  METABOLIC PANEL, COMPREHENSIVE      NATALEE IFA W/REFLEX  CASCADE      RHEUMATOID FACTOR, QL   2. Acute pain of right shoulder M25.511 719.41 CBC W/O DIFF      METABOLIC PANEL, COMPREHENSIVE      NATALEE IFA W/REFLEX  CASCADE      RHEUMATOID FACTOR, QL      XR SHOULDER RT AP/LAT MIN 2 V   3.  Essential hypertension I10 401.9 CBC W/O DIFF METABOLIC PANEL, COMPREHENSIVE     Informed patient that we will notify him when labs return  If stable, will add in Hctz, as BP is still elevated mildly  Educated about getting x-ray of shoulder, and we will notify him of this when it returns. Pt informed to return to office with worsening of symptoms, or PRN with any questions or concerns. Pt verbalizes understanding of plan of care and denies further questions or concerns at this time.

## 2019-03-19 NOTE — PROGRESS NOTES
Identified pt with two pt identifiers(name and ). Chief Complaint   Patient presents with    Ankle swelling     reports midway through day his feet, ankles and hands will swell - reports right foot swells first - all sx have been occurring over the past 3 wks - questions if sx could be related to increase in BP medication or addition of cholesterol medication    Hand Swelling     reports when hands swell they are painful    Shoulder Pain     right shoulder over the past two weeks        There are no preventive care reminders to display for this patient. Wt Readings from Last 3 Encounters:   19 317 lb (143.8 kg)   18 313 lb (142 kg)   18 310 lb (140.6 kg)     Temp Readings from Last 3 Encounters:   19 97.7 °F (36.5 °C) (Oral)   18 97.6 °F (36.4 °C) (Oral)   18 98.3 °F (36.8 °C) (Oral)     BP Readings from Last 3 Encounters:   19 138/82   18 135/78   18 124/79     Pulse Readings from Last 3 Encounters:   19 100   18 (!) 108   18 99         Learning Assessment:  :     Learning Assessment 2014   PRIMARY LEARNER Patient   HIGHEST LEVEL OF EDUCATION - PRIMARY LEARNER  GRADUATED HIGH SCHOOL OR GED   BARRIERS PRIMARY LEARNER NONE   CO-LEARNER CAREGIVER No   PRIMARY LANGUAGE ENGLISH   LEARNER PREFERENCE PRIMARY DEMONSTRATION     PICTURES     VIDEOS     LISTENING   ANSWERED BY patient   RELATIONSHIP SELF       Depression Screening:  :     3 most recent PHQ Screens 2018   Little interest or pleasure in doing things Not at all   Feeling down, depressed, irritable, or hopeless Not at all   Total Score PHQ 2 0       Fall Risk Assessment:  :     No flowsheet data found. Abuse Screening:  :     Abuse Screening Questionnaire 3/19/2019 10/15/2014   Do you ever feel afraid of your partner? N N   Are you in a relationship with someone who physically or mentally threatens you? N N   Is it safe for you to go home?  Lobo Mejia       Coordination of Care Questionnaire:  :     1) Have you been to an emergency room, urgent care clinic since your last visit? no   Hospitalized since your last visit? no             2) Have you seen or consulted any other health care providers outside of [facility] since your last visit? no  (Include any pap smears or colon screenings in this section.)    3) Do you have an Advance Directive on file? no  Are you interested in receiving information about Advance Directives? no    Patient is accompanied by self. Reviewed record in preparation for visit and have obtained necessary documentation. Medication reconciliation up to date and corrected with patient at this time.

## 2019-03-19 NOTE — PATIENT INSTRUCTIONS
Leg and Ankle Edema: Care Instructions  Your Care Instructions  Swelling in the legs, ankles, and feet is called edema. It is common after you sit or stand for a while. Long plane flights or car rides often cause swelling in the legs and feet. You may also have swelling if you have to stand for long periods of time at your job. Problems with the veins in the legs (varicose veins) and changes in hormones can also cause swelling. Sometimes the swelling in the ankles and feet is caused by a more serious problem, such as heart failure, infection, blood clots, or liver or kidney disease. Follow-up care is a key part of your treatment and safety. Be sure to make and go to all appointments, and call your doctor if you are having problems. It's also a good idea to know your test results and keep a list of the medicines you take. How can you care for yourself at home? · If your doctor gave you medicine, take it as prescribed. Call your doctor if you think you are having a problem with your medicine. · Whenever you are resting, raise your legs up. Try to keep the swollen area higher than the level of your heart. · Take breaks from standing or sitting in one position. ? Walk around to increase the blood flow in your lower legs. ? Move your feet and ankles often while you stand, or tighten and relax your leg muscles. · Wear support stockings. Put them on in the morning, before swelling gets worse. · Eat a balanced diet. Lose weight if you need to. · Limit the amount of salt (sodium) in your diet. Salt holds fluid in the body and may increase swelling. When should you call for help? Call 911 anytime you think you may need emergency care. For example, call if:    · You have symptoms of a blood clot in your lung (called a pulmonary embolism). These may include:  ? Sudden chest pain. ? Trouble breathing. ?  Coughing up blood.    Call your doctor now or seek immediate medical care if:    · You have signs of a blood clot, such as:  ? Pain in your calf, back of the knee, thigh, or groin. ? Redness and swelling in your leg or groin.     · You have symptoms of infection, such as:  ? Increased pain, swelling, warmth, or redness. ? Red streaks or pus. ? A fever.    Watch closely for changes in your health, and be sure to contact your doctor if:    · Your swelling is getting worse.     · You have new or worsening pain in your legs.     · You do not get better as expected. Where can you learn more? Go to http://cele-morris.info/. Enter V431 in the search box to learn more about \"Leg and Ankle Edema: Care Instructions. \"  Current as of: September 23, 2018  Content Version: 11.9  © 7377-9678 [employer]. Care instructions adapted under license by [employer] (which disclaims liability or warranty for this information). If you have questions about a medical condition or this instruction, always ask your healthcare professional. [place] [address] any warranty or liability for your use of this information.

## 2019-03-20 NOTE — PROGRESS NOTES
Please call patient and let him know that x-ray was normal.  If pain continues, should be seen by Ortho  Thanks

## 2019-03-21 LAB
ALBUMIN SERPL-MCNC: 4.5 G/DL (ref 3.5–5.5)
ALBUMIN/GLOB SERPL: 1.7 {RATIO} (ref 1.2–2.2)
ALP SERPL-CCNC: 73 IU/L (ref 39–117)
ALT SERPL-CCNC: 71 IU/L (ref 0–44)
ANA TITR SER IF: NEGATIVE {TITER}
AST SERPL-CCNC: 44 IU/L (ref 0–40)
BILIRUB SERPL-MCNC: 0.4 MG/DL (ref 0–1.2)
BUN SERPL-MCNC: 11 MG/DL (ref 6–20)
BUN/CREAT SERPL: 15 (ref 9–20)
CALCIUM SERPL-MCNC: 9.2 MG/DL (ref 8.7–10.2)
CHLORIDE SERPL-SCNC: 102 MMOL/L (ref 96–106)
CO2 SERPL-SCNC: 23 MMOL/L (ref 20–29)
CREAT SERPL-MCNC: 0.73 MG/DL (ref 0.76–1.27)
ERYTHROCYTE [DISTWIDTH] IN BLOOD BY AUTOMATED COUNT: 13.8 % (ref 12.3–15.4)
GLOBULIN SER CALC-MCNC: 2.6 G/DL (ref 1.5–4.5)
GLUCOSE SERPL-MCNC: 107 MG/DL (ref 65–99)
HCT VFR BLD AUTO: 45.8 % (ref 37.5–51)
HGB BLD-MCNC: 15.5 G/DL (ref 13–17.7)
MCH RBC QN AUTO: 31 PG (ref 26.6–33)
MCHC RBC AUTO-ENTMCNC: 33.8 G/DL (ref 31.5–35.7)
MCV RBC AUTO: 92 FL (ref 79–97)
PLATELET # BLD AUTO: 272 X10E3/UL (ref 150–379)
POTASSIUM SERPL-SCNC: 4.4 MMOL/L (ref 3.5–5.2)
PROT SERPL-MCNC: 7.1 G/DL (ref 6–8.5)
RBC # BLD AUTO: 5 X10E6/UL (ref 4.14–5.8)
RHEUMATOID FACT SERPL-ACNC: <10 IU/ML (ref 0–13.9)
SODIUM SERPL-SCNC: 142 MMOL/L (ref 134–144)
WBC # BLD AUTO: 8.7 X10E3/UL (ref 3.4–10.8)

## 2019-03-22 ENCOUNTER — TELEPHONE (OUTPATIENT)
Dept: FAMILY MEDICINE CLINIC | Age: 34
End: 2019-03-22

## 2019-03-22 RX ORDER — HYDROCHLOROTHIAZIDE 12.5 MG/1
12.5 TABLET ORAL DAILY
Qty: 90 TAB | Refills: 0 | Status: SHIPPED | OUTPATIENT
Start: 2019-03-22 | End: 2019-10-04 | Stop reason: SDUPTHER

## 2019-03-22 NOTE — TELEPHONE ENCOUNTER
----- Message from Teresa Story NP sent at 3/22/2019  8:17 AM EDT -----  Please call patient and let him know that his labs returned and are stable.   I will send in low dose Hctz for his swelling and BP  Should return to office in 3 months for office visit and fasting labs  Thanks

## 2019-03-22 NOTE — PROGRESS NOTES
Please call patient and let him know that his labs returned and are stable.   I will send in low dose Hctz for his swelling and BP  Should return to office in 3 months for office visit and fasting labs  Thanks

## 2019-06-10 DIAGNOSIS — E78.00 PURE HYPERCHOLESTEROLEMIA: ICD-10-CM

## 2019-06-11 RX ORDER — ATORVASTATIN CALCIUM 10 MG/1
TABLET, FILM COATED ORAL
Qty: 90 TAB | Refills: 1 | Status: SHIPPED | OUTPATIENT
Start: 2019-06-11 | End: 2019-10-04 | Stop reason: SDUPTHER

## 2019-10-04 ENCOUNTER — OFFICE VISIT (OUTPATIENT)
Dept: FAMILY MEDICINE CLINIC | Age: 34
End: 2019-10-04

## 2019-10-04 VITALS
HEIGHT: 71 IN | BODY MASS INDEX: 43.96 KG/M2 | WEIGHT: 314 LBS | TEMPERATURE: 98.7 F | OXYGEN SATURATION: 98 % | HEART RATE: 102 BPM | RESPIRATION RATE: 16 BRPM | DIASTOLIC BLOOD PRESSURE: 91 MMHG | SYSTOLIC BLOOD PRESSURE: 129 MMHG

## 2019-10-04 DIAGNOSIS — I10 ESSENTIAL HYPERTENSION: Primary | ICD-10-CM

## 2019-10-04 DIAGNOSIS — E78.00 PURE HYPERCHOLESTEROLEMIA: ICD-10-CM

## 2019-10-04 RX ORDER — AMLODIPINE BESYLATE 10 MG/1
10 TABLET ORAL DAILY
Qty: 90 TAB | Refills: 1 | Status: SHIPPED | OUTPATIENT
Start: 2019-10-04 | End: 2019-12-06 | Stop reason: SDUPTHER

## 2019-10-04 RX ORDER — HYDROCHLOROTHIAZIDE 12.5 MG/1
12.5 TABLET ORAL DAILY
Qty: 90 TAB | Refills: 1 | Status: SHIPPED | OUTPATIENT
Start: 2019-10-04 | End: 2019-12-06 | Stop reason: SDUPTHER

## 2019-10-04 RX ORDER — ATORVASTATIN CALCIUM 10 MG/1
TABLET, FILM COATED ORAL
Qty: 90 TAB | Refills: 1 | Status: SHIPPED | OUTPATIENT
Start: 2019-10-04 | End: 2019-12-06 | Stop reason: SDUPTHER

## 2019-10-04 NOTE — PROGRESS NOTES
Identified pt with two pt identifiers(name and ). Chief Complaint   Patient presents with    Cholesterol Problem    Hypertension    Labs     NPO since midnight        There are no preventive care reminders to display for this patient. Wt Readings from Last 3 Encounters:   10/04/19 314 lb (142.4 kg)   19 317 lb (143.8 kg)   18 313 lb (142 kg)     Temp Readings from Last 3 Encounters:   10/04/19 98.7 °F (37.1 °C) (Oral)   19 97.7 °F (36.5 °C) (Oral)   18 97.6 °F (36.4 °C) (Oral)     BP Readings from Last 3 Encounters:   10/04/19 (!) 129/91   19 138/82   18 135/78     Pulse Readings from Last 3 Encounters:   10/04/19 (!) 102   19 100   18 (!) 108         Learning Assessment:  :     Learning Assessment 2014   PRIMARY LEARNER Patient   HIGHEST LEVEL OF EDUCATION - PRIMARY LEARNER  GRADUATED HIGH SCHOOL OR GED   BARRIERS PRIMARY LEARNER NONE   CO-LEARNER CAREGIVER No   PRIMARY LANGUAGE ENGLISH   LEARNER PREFERENCE PRIMARY DEMONSTRATION     PICTURES     VIDEOS     LISTENING   ANSWERED BY patient   RELATIONSHIP SELF       Depression Screening:  :     3 most recent PHQ Screens 10/4/2019   Little interest or pleasure in doing things Not at all   Feeling down, depressed, irritable, or hopeless Not at all   Total Score PHQ 2 0       Fall Risk Assessment:  :     No flowsheet data found. Abuse Screening:  :     Abuse Screening Questionnaire 10/4/2019 3/19/2019 10/15/2014   Do you ever feel afraid of your partner? N N N   Are you in a relationship with someone who physically or mentally threatens you? N N N   Is it safe for you to go home?  Rosalinda Castano       Coordination of Care Questionnaire:  :     1) Have you been to an emergency room, urgent care clinic since your last visit? no   Hospitalized since your last visit? no             2) Have you seen or consulted any other health care providers outside of [address] [place] [address] since your last visit? no  (Include any pap smears or colon screenings in this section.)    3) Do you have an Advance Directive on file? no  Are you interested in receiving information about Advance Directives? no    Patient is accompanied by spouse. I have received verbal consent from Ever Hyde to discuss any/all medical information while they are present in the room. Reviewed record in preparation for visit and have obtained necessary documentation. Medication reconciliation up to date and corrected with patient at this time.

## 2019-10-04 NOTE — PROGRESS NOTES
Subjective:     Filiberto Cartwright is a 27 y.o. male who presents for follow up of hypertension and hyperlipidemia. Diet and Lifestyle: generally follows a low fat low cholesterol diet  Home BP Monitoring: is not measured at home    Cardiovascular ROS: taking medications as instructed, no medication side effects noted, no TIA's, no chest pain on exertion, no dyspnea on exertion, no swelling of ankles. New concerns: Pt is here for refills and fasting labs. He has no concerns at this time. He has been out of HCTZ for a while, and BP is noted to be mildly elevated. Patient Active Problem List    Diagnosis Date Noted    Obesity, morbid ([address] 04/10/2018    Decreased hearing of left ear 10/09/2017    Skin lesion of scalp 07/18/2017    Acute right-sided thoracic back pain 09/27/2016    Elevated liver enzymes 08/05/2016    Strep throat exposure 10/20/2015    Poison ivy 09/15/2015    JESUS (obstructive sleep apnea) 10/15/2014    Hypertension 10/15/2014    S/P vasectomy 08/06/2014    Pure hypercholesterolemia      Current Outpatient Medications   Medication Sig Dispense Refill    glucosamine HCl/chondroitin bone (GLUCOSAMINE-CHONDROITIN PO) Take  by mouth. 1,500mg/1,200mg/MGMSM 1,000mg      atorvastatin (LIPITOR) 10 mg tablet TAKE 1 TABLET DAILY 90 Tab 1    amLODIPine (NORVASC) 10 mg tablet Take 1 Tab by mouth daily. 90 Tab 1    hydroCHLOROthiazide (HYDRODIURIL) 12.5 mg tablet Take 1 Tab by mouth daily. 90 Tab 1    Krill-OM3-DHA-EPA-OM6-Lip-Astx (KRILL OIL) 1000-130(40-80) mg cap Take 1 Cap by mouth once over twenty-four (24) hours.  FERROUS FUMARATE/VIT BCOMP&C (SUPER B COMPLEX PO) Take  by mouth. No Known Allergies  Past Medical History:   Diagnosis Date    Hypertension 10/15/2014    Poison ivy 9/15/2015    Pure hypercholesterolemia     S/P vasectomy 8/6/2014     History reviewed. No pertinent surgical history.   Family History   Problem Relation Age of Onset    Diabetes Mother    [address] [facility] Heart Attack Father 43    Stroke Maternal Grandmother     Cancer Paternal Grandfather          age 45 - Hodgkin's   [facility] Cancer Maternal Grandfather          in 42's with melanoma     Social History     Tobacco Use    Smoking status: Never Smoker    Smokeless tobacco: Never Used   Substance Use Topics    Alcohol use: No     Alcohol/week: 0.0 standard drinks        Lab Results   Component Value Date/Time    WBC 8.7 2019 01:46 PM    HGB 15.5 2019 01:46 PM    HCT 45.8 2019 01:46 PM    PLATELET 258  01:46 PM    MCV 92 2019 01:46 PM     Lab Results   Component Value Date/Time    Cholesterol, total 206 (H) 2018 09:47 AM    HDL Cholesterol 50 2018 09:47 AM    LDL, calculated 125 (H) 2018 09:47 AM    Triglyceride 154 (H) 2018 09:47 AM    CHOL/HDL Ratio 3.9 2010 10:30 AM     Lab Results   Component Value Date/Time    GFR est non- 2019 01:46 PM    GFR est  2019 01:46 PM    Creatinine 0.73 (L) 2019 01:46 PM    BUN 11 2019 01:46 PM    Sodium 142 2019 01:46 PM    Potassium 4.4 2019 01:46 PM    Chloride 102 2019 01:46 PM    CO2 23 2019 01:46 PM        Review of Systems, additional:  Pertinent items are noted in HPI. Objective:     Visit Vitals  BP (!) 129/91 (BP 1 Location: Left arm, BP Patient Position: Sitting)   Pulse (!) 102   Temp 98.7 °F (37.1 °C) (Oral)   Resp 16   Ht 5' 11\" (1.803 m)   Wt 314 lb (142.4 kg)   SpO2 98%   BMI 43.79 kg/m²     Appearance: alert, well appearing, and in no distress. General exam: CVS exam BP noted to be mildly elevated today in office, S1, S2 normal, no gallop, no murmur, chest clear, no JVD, no HSM, no edema. Lab review: orders written for new lab studies as appropriate; see orders. Assessment/Plan:     hypertension stable. current treatment plan is effective, no change in therapy. ICD-10-CM ICD-9-CM    1.  Essential hypertension I10 401.9 CBC W/O DIFF      METABOLIC PANEL, COMPREHENSIVE      LIPID PANEL      amLODIPine (NORVASC) 10 mg tablet      hydroCHLOROthiazide (HYDRODIURIL) 12.5 mg tablet   2. Pure hypercholesterolemia E78.00 272.0 atorvastatin (LIPITOR) 10 mg tablet     Will notify when labs return  Educated about taking medications as prescribed    Pt informed to return to office with worsening of symptoms, or PRN with any questions or concerns. Pt verbalizes understanding of plan of care and denies further questions or concerns at this time.

## 2019-10-05 LAB
ALBUMIN SERPL-MCNC: 4.5 G/DL (ref 3.5–5.5)
ALBUMIN/GLOB SERPL: 1.7 {RATIO} (ref 1.2–2.2)
ALP SERPL-CCNC: 71 IU/L (ref 39–117)
ALT SERPL-CCNC: 67 IU/L (ref 0–44)
AST SERPL-CCNC: 38 IU/L (ref 0–40)
BILIRUB SERPL-MCNC: 0.6 MG/DL (ref 0–1.2)
BUN SERPL-MCNC: 9 MG/DL (ref 6–20)
BUN/CREAT SERPL: 11 (ref 9–20)
CALCIUM SERPL-MCNC: 9.2 MG/DL (ref 8.7–10.2)
CHLORIDE SERPL-SCNC: 99 MMOL/L (ref 96–106)
CHOLEST SERPL-MCNC: 182 MG/DL (ref 100–199)
CO2 SERPL-SCNC: 25 MMOL/L (ref 20–29)
CREAT SERPL-MCNC: 0.81 MG/DL (ref 0.76–1.27)
ERYTHROCYTE [DISTWIDTH] IN BLOOD BY AUTOMATED COUNT: 13.4 % (ref 12.3–15.4)
GLOBULIN SER CALC-MCNC: 2.6 G/DL (ref 1.5–4.5)
GLUCOSE SERPL-MCNC: 88 MG/DL (ref 65–99)
HCT VFR BLD AUTO: 48.9 % (ref 37.5–51)
HDLC SERPL-MCNC: 54 MG/DL
HGB BLD-MCNC: 16 G/DL (ref 13–17.7)
INTERPRETATION, 910389: NORMAL
LDLC SERPL CALC-MCNC: 105 MG/DL (ref 0–99)
MCH RBC QN AUTO: 30.4 PG (ref 26.6–33)
MCHC RBC AUTO-ENTMCNC: 32.7 G/DL (ref 31.5–35.7)
MCV RBC AUTO: 93 FL (ref 79–97)
PLATELET # BLD AUTO: 284 X10E3/UL (ref 150–450)
POTASSIUM SERPL-SCNC: 4.3 MMOL/L (ref 3.5–5.2)
PROT SERPL-MCNC: 7.1 G/DL (ref 6–8.5)
RBC # BLD AUTO: 5.27 X10E6/UL (ref 4.14–5.8)
SODIUM SERPL-SCNC: 141 MMOL/L (ref 134–144)
TRIGL SERPL-MCNC: 113 MG/DL (ref 0–149)
VLDLC SERPL CALC-MCNC: 23 MG/DL (ref 5–40)
WBC # BLD AUTO: 8.6 X10E3/UL (ref 3.4–10.8)

## 2019-10-07 NOTE — PROGRESS NOTES
Please call patient and let him know that his labs returned and are stable.   Should return in 6 months for office visit and fasting labs  Thanks

## 2019-12-06 ENCOUNTER — OFFICE VISIT (OUTPATIENT)
Dept: FAMILY MEDICINE CLINIC | Age: 34
End: 2019-12-06

## 2019-12-06 VITALS
BODY MASS INDEX: 44.1 KG/M2 | WEIGHT: 315 LBS | SYSTOLIC BLOOD PRESSURE: 134 MMHG | OXYGEN SATURATION: 97 % | TEMPERATURE: 98 F | HEIGHT: 71 IN | HEART RATE: 99 BPM | RESPIRATION RATE: 16 BRPM | DIASTOLIC BLOOD PRESSURE: 86 MMHG

## 2019-12-06 DIAGNOSIS — Z00.00 PHYSICAL EXAM: Primary | ICD-10-CM

## 2019-12-06 DIAGNOSIS — I10 ESSENTIAL HYPERTENSION: ICD-10-CM

## 2019-12-06 DIAGNOSIS — M54.50 ACUTE MIDLINE LOW BACK PAIN WITHOUT SCIATICA: ICD-10-CM

## 2019-12-06 DIAGNOSIS — E78.00 PURE HYPERCHOLESTEROLEMIA: ICD-10-CM

## 2019-12-06 RX ORDER — HYDROCHLOROTHIAZIDE 12.5 MG/1
12.5 TABLET ORAL DAILY
Qty: 90 TAB | Refills: 1 | Status: SHIPPED | OUTPATIENT
Start: 2019-12-06 | End: 2020-04-10

## 2019-12-06 RX ORDER — METHYLPREDNISOLONE 4 MG/1
TABLET ORAL
Qty: 1 DOSE PACK | Refills: 0 | Status: SHIPPED | OUTPATIENT
Start: 2019-12-06 | End: 2021-01-01 | Stop reason: ALTCHOICE

## 2019-12-06 RX ORDER — AMLODIPINE BESYLATE 10 MG/1
10 TABLET ORAL DAILY
Qty: 90 TAB | Refills: 1 | Status: SHIPPED | OUTPATIENT
Start: 2019-12-06 | End: 2020-09-21 | Stop reason: SDUPTHER

## 2019-12-06 RX ORDER — ATORVASTATIN CALCIUM 10 MG/1
TABLET, FILM COATED ORAL
Qty: 90 TAB | Refills: 1 | Status: SHIPPED | OUTPATIENT
Start: 2019-12-06 | End: 2020-09-21 | Stop reason: SDUPTHER

## 2019-12-06 NOTE — PATIENT INSTRUCTIONS
Well Visit, Ages 25 to 48: Care Instructions Your Care Instructions Physical exams can help you stay healthy. Your doctor has checked your overall health and may have suggested ways to take good care of yourself. He or she also may have recommended tests. At home, you can help prevent illness with healthy eating, regular exercise, and other steps. Follow-up care is a key part of your treatment and safety. Be sure to make and go to all appointments, and call your doctor if you are having problems. It's also a good idea to know your test results and keep a list of the medicines you take. How can you care for yourself at home? · Reach and stay at a healthy weight. This will lower your risk for many problems, such as obesity, diabetes, heart disease, and high blood pressure. · Get at least 30 minutes of physical activity on most days of the week. Walking is a good choice. You also may want to do other activities, such as running, swimming, cycling, or playing tennis or team sports. Discuss any changes in your exercise program with your doctor. · Do not smoke or allow others to smoke around you. If you need help quitting, talk to your doctor about stop-smoking programs and medicines. These can increase your chances of quitting for good. · Talk to your doctor about whether you have any risk factors for sexually transmitted infections (STIs). Having one sex partner (who does not have STIs and does not have sex with anyone else) is a good way to avoid these infections. · Use birth control if you do not want to have children at this time. Talk with your doctor about the choices available and what might be best for you. · Protect your skin from too much sun. When you're outdoors from 10 a.m. to 4 p.m., stay in the shade or cover up with clothing and a hat with a wide brim. Wear sunglasses that block UV rays. Even when it's cloudy, put broad-spectrum sunscreen (SPF 30 or higher) on any exposed skin. · See a dentist one or two times a year for checkups and to have your teeth cleaned. · Wear a seat belt in the car. Follow your doctor's advice about when to have certain tests. These tests can spot problems early. For everyone · Cholesterol. Have the fat (cholesterol) in your blood tested after age 21. Your doctor will tell you how often to have this done based on your age, family history, or other things that can increase your risk for heart disease. · Blood pressure. Have your blood pressure checked during a routine doctor visit. Your doctor will tell you how often to check your blood pressure based on your age, your blood pressure results, and other factors. · Vision. Talk with your doctor about how often to have a glaucoma test. 
· Diabetes. Ask your doctor whether you should have tests for diabetes. · Colon cancer. Your risk for colorectal cancer gets higher as you get older. Some experts say that adults should start regular screening at age 48 and stop at age 76. Others say to start before age 48 or continue after age 76. Talk with your doctor about your risk and when to start and stop screening. For women · Breast exam and mammogram. Talk to your doctor about when you should have a clinical breast exam and a mammogram. Medical experts differ on whether and how often women under 50 should have these tests. Your doctor can help you decide what is right for you. · Cervical cancer screening test and pelvic exam. Begin with a Pap test at age 24. The test often is part of a pelvic exam. Starting at age 27, you may choose to have a Pap test, an HPV test, or both tests at the same time (called co-testing). Talk with your doctor about how often to have testing. · Tests for sexually transmitted infections (STIs). Ask whether you should have tests for STIs. You may be at risk if you have sex with more than one person, especially if your partners do not wear condoms. For men · Tests for sexually transmitted infections (STIs). Ask whether you should have tests for STIs. You may be at risk if you have sex with more than one person, especially if you do not wear a condom. · Testicular cancer exam. Ask your doctor whether you should check your testicles regularly. · Prostate exam. Talk to your doctor about whether you should have a blood test (called a PSA test) for prostate cancer. Experts differ on whether and when men should have this test. Some experts suggest it if you are older than 39 and are -American or have a father or brother who got prostate cancer when he was younger than 72. When should you call for help? Watch closely for changes in your health, and be sure to contact your doctor if you have any problems or symptoms that concern you. Where can you learn more? Go to http://cele-morris.info/. Enter P072 in the search box to learn more about \"Well Visit, Ages 25 to 48: Care Instructions. \" Current as of: December 13, 2018 Content Version: 12.2 © 6772-1844 [employer]. Care instructions adapted under license by [employer] (which disclaims liability or warranty for this information). If you have questions about a medical condition or this instruction, always ask your healthcare professional. [address] any warranty or liability for your use of this information.

## 2019-12-06 NOTE — PROGRESS NOTES
Subjective:  
Diego Welch is a 27 y.o. male presenting for his annual checkup. ROS:  Feeling well. No dyspnea or chest pain on exertion. No abdominal pain, change in bowel habits, black or bloody stools. No urinary tract or prostatic symptoms. No neurological complaints. Specific concerns today: Pt is here today for physical.  Pt needs medication refills at this time. He declines flu vaccine. In addition, he has been picking up hours at a [employer], and believes that he has a pinched nerve in his back. At times, he will have some tingling in fingers. No radiation of pain down the legs. He is able to move through full ROM. Patient Active Problem List  
 Diagnosis Date Noted  Obesity, morbid ([address] 04/10/2018  Decreased hearing of left ear 10/09/2017  
 Skin lesion of scalp 07/18/2017  Acute right-sided thoracic back pain 09/27/2016  Elevated liver enzymes 08/05/2016  Strep throat exposure 10/20/2015  Poison ivy 09/15/2015  JESUS (obstructive sleep apnea) 10/15/2014  Hypertension 10/15/2014  S/P vasectomy 08/06/2014  Pure hypercholesterolemia Current Outpatient Medications Medication Sig Dispense Refill  atorvastatin (LIPITOR) 10 mg tablet TAKE 1 TABLET DAILY 90 Tab 1  
 hydroCHLOROthiazide (HYDRODIURIL) 12.5 mg tablet Take 1 Tab by mouth daily. 90 Tab 1  
 amLODIPine (NORVASC) 10 mg tablet Take 1 Tab by mouth daily. 90 Tab 1  
 methylPREDNISolone (MEDROL DOSEPACK) 4 mg tablet Take as prescribed 1 Dose Pack 0  
 glucosamine HCl/chondroitin bone (GLUCOSAMINE-CHONDROITIN PO) Take  by mouth. 1,500mg/1,200mg/MGMSM 1,000mg  Krill-OM3-DHA-EPA-OM6-Lip-Astx (KRILL OIL) 1000-130(40-80) mg cap Take 1 Cap by mouth once over twenty-four (24) hours.  FERROUS FUMARATE/VIT BCOMP&C (SUPER B COMPLEX PO) Take  by mouth. No Known Allergies Past Medical History:  
Diagnosis Date  Hypertension 10/15/2014  Poison ivy 9/15/2015  Pure hypercholesterolemia  S/P vasectomy 2014 History reviewed. No pertinent surgical history. Family History Problem Relation Age of Onset  Diabetes Mother  Heart Attack Father 43  Stroke Maternal Grandmother  Cancer Paternal Grandfather   
      age 45 - Hodgkin's  Cancer Maternal Grandfather   
      in 42's with melanoma Social History Tobacco Use  Smoking status: Never Smoker  Smokeless tobacco: Never Used Substance Use Topics  Alcohol use: No  
  Alcohol/week: 0.0 standard drinks Lab Results Component Value Date/Time WBC 8.6 10/04/2019 09:33 AM  
 HGB 16.0 10/04/2019 09:33 AM  
 HCT 48.9 10/04/2019 09:33 AM  
 PLATELET 095  09:33 AM  
 MCV 93 10/04/2019 09:33 AM  
 
Lab Results Component Value Date/Time Cholesterol, total 182 10/04/2019 09:33 AM  
 HDL Cholesterol 54 10/04/2019 09:33 AM  
 LDL, calculated 105 (H) 10/04/2019 09:33 AM  
 Triglyceride 113 10/04/2019 09:33 AM  
 CHOL/HDL Ratio 3.9 2010 10:30 AM  
 
Lab Results Component Value Date/Time GFR est non- 10/04/2019 09:33 AM  
 GFR est  10/04/2019 09:33 AM  
 Creatinine 0.81 10/04/2019 09:33 AM  
 BUN 9 10/04/2019 09:33 AM  
 Sodium 141 10/04/2019 09:33 AM  
 Potassium 4.3 10/04/2019 09:33 AM  
 Chloride 99 10/04/2019 09:33 AM  
 CO2 25 10/04/2019 09:33 AM  
  
 
Objective:  
 
Visit Vitals /86 (BP 1 Location: Left arm, BP Patient Position: Sitting) Pulse 99 Temp 98 °F (36.7 °C) (Oral) Resp 16 Ht 5' 11\" (1.803 m) Wt 320 lb (145.2 kg) SpO2 97% BMI 44.63 kg/m² The patient appears well, alert, oriented x 3, in no distress. ENT normal.  Neck supple. No adenopathy or thyromegaly. CARLA. Lungs are clear, good air entry, no wheezes, rhonchi or rales. S1 and S2 normal, no murmurs, regular rate and rhythm. Abdomen is soft without tenderness, guarding, mass or organomegaly.  exam: not examined.   Extremities show no edema, normal peripheral pulses. Neurological is normal without focal findings. Assessment/Plan:  
healthy adult male 
lose weight, increase physical activity, call if any problems. ICD-10-CM ICD-9-CM 1. Acute midline low back pain without sciatica M54.5 724.2 methylPREDNISolone (MEDROL DOSEPACK) 4 mg tablet 2. Pure hypercholesterolemia E78.00 272.0 atorvastatin (LIPITOR) 10 mg tablet 3. Essential hypertension I10 401.9 hydroCHLOROthiazide (HYDRODIURIL) 12.5 mg tablet  
   amLODIPine (NORVASC) 10 mg tablet Kyle Angry Educated about trying medrol dose pack for back pain Should pain continue should notify office, for x-ray. Pt informed to return to office with worsening of symptoms, or PRN with any questions or concerns. Pt verbalizes understanding of plan of care and denies further questions or concerns at this time.

## 2019-12-06 NOTE — PROGRESS NOTES
Identified pt with two pt identifiers(name and ). Chief Complaint Patient presents with  Complete Physical  
 Labs NPO since midnight Health Maintenance Due Topic  Influenza Age 5 to Adult Wt Readings from Last 3 Encounters:  
19 320 lb (145.2 kg) 10/04/19 314 lb (142.4 kg) 19 317 lb (143.8 kg) Temp Readings from Last 3 Encounters:  
19 98 °F (36.7 °C) (Oral) 10/04/19 98.7 °F (37.1 °C) (Oral) 19 97.7 °F (36.5 °C) (Oral) BP Readings from Last 3 Encounters:  
19 134/86  
10/04/19 (!) 129/91  
19 138/82 Pulse Readings from Last 3 Encounters:  
19 99  
10/04/19 (!) 102  
19 100 Learning Assessment: 
:  
 
Learning Assessment 2014 PRIMARY LEARNER Patient HIGHEST LEVEL OF EDUCATION - PRIMARY LEARNER  GRADUATED HIGH SCHOOL OR GED  
BARRIERS PRIMARY LEARNER NONE  
CO-LEARNER CAREGIVER No  
PRIMARY LANGUAGE ENGLISH  
LEARNER PREFERENCE PRIMARY DEMONSTRATION  
  PICTURES  
  VIDEOS  
  LISTENING  
ANSWERED BY patient RELATIONSHIP SELF Depression Screening: 
:  
 
3 most recent PHQ Screens 10/4/2019 Little interest or pleasure in doing things Not at all Feeling down, depressed, irritable, or hopeless Not at all Total Score PHQ 2 0 Fall Risk Assessment: 
:  
 
No flowsheet data found. Abuse Screening: 
:  
 
Abuse Screening Questionnaire 10/4/2019 3/19/2019 10/15/2014 Do you ever feel afraid of your partner? N N N Are you in a relationship with someone who physically or mentally threatens you? N N N Is it safe for you to go home? Jesse Oh Coordination of Care Questionnaire: 
:  
 
1) Have you been to an emergency room, urgent care clinic since your last visit? no  
Hospitalized since your last visit? no          
 
2) Have you seen or consulted any other health care providers outside of [address] [place] [address] since your last visit? no  (Include any pap smears or colon screenings in this section.) 3) Do you have an Advance Directive on file? no 
Are you interested in receiving information about Advance Directives? no 
 
Reviewed record in preparation for visit and have obtained necessary documentation. Medication reconciliation up to date and corrected with patient at this time.

## 2020-04-07 ENCOUNTER — HOSPITAL ENCOUNTER (EMERGENCY)
Age: 35
Discharge: OTHER INSTITUTION | End: 2020-04-07
Attending: EMERGENCY MEDICINE | Admitting: EMERGENCY MEDICINE
Payer: COMMERCIAL

## 2020-04-07 VITALS
DIASTOLIC BLOOD PRESSURE: 62 MMHG | TEMPERATURE: 98.3 F | BODY MASS INDEX: 44.1 KG/M2 | HEART RATE: 108 BPM | OXYGEN SATURATION: 91 % | HEIGHT: 71 IN | RESPIRATION RATE: 16 BRPM | SYSTOLIC BLOOD PRESSURE: 137 MMHG | WEIGHT: 315 LBS

## 2020-04-07 DIAGNOSIS — T20.10XA FACIAL BURN, FIRST DEGREE, INITIAL ENCOUNTER: ICD-10-CM

## 2020-04-07 DIAGNOSIS — T24.132A SUPERFICIAL BURN OF LEFT LOWER LEG, INITIAL ENCOUNTER: ICD-10-CM

## 2020-04-07 DIAGNOSIS — T22.222A PARTIAL THICKNESS BURN OF LEFT ELBOW, INITIAL ENCOUNTER: ICD-10-CM

## 2020-04-07 DIAGNOSIS — T30.0 BURN: Primary | ICD-10-CM

## 2020-04-07 PROCEDURE — 99285 EMERGENCY DEPT VISIT HI MDM: CPT

## 2020-04-07 PROCEDURE — 74011250636 HC RX REV CODE- 250/636: Performed by: EMERGENCY MEDICINE

## 2020-04-07 PROCEDURE — 74011250637 HC RX REV CODE- 250/637: Performed by: EMERGENCY MEDICINE

## 2020-04-07 RX ORDER — OXYCODONE AND ACETAMINOPHEN 5; 325 MG/1; MG/1
1 TABLET ORAL
Status: COMPLETED | OUTPATIENT
Start: 2020-04-07 | End: 2020-04-07

## 2020-04-07 RX ADMIN — SODIUM CHLORIDE, SODIUM LACTATE, POTASSIUM CHLORIDE, AND CALCIUM CHLORIDE 1000 ML: 600; 310; 30; 20 INJECTION, SOLUTION INTRAVENOUS at 21:25

## 2020-04-07 RX ADMIN — OXYCODONE HYDROCHLORIDE AND ACETAMINOPHEN 1 TABLET: 5; 325 TABLET ORAL at 21:22
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
## 2020-04-08 NOTE — ED NOTES
TRANSFER - OUT REPORT: 
 
Verbal report given to Aliza Mcleod(name) on Vazquez Talley  being transferred to ER(unit) for routine progression of care Report consisted of patients Situation, Background, Assessment and  
Recommendations(SBAR). Information from the following report(s) SBAR, ED Summary, MAR, Recent Results and Cardiac Rhythm Sinus Tachy was reviewed with the receiving nurse. Lines:  
Peripheral IV 04/07/20 Right Antecubital (Active) Site Assessment Clean, dry, & intact 4/7/2020  9:25 PM  
Phlebitis Assessment 0 4/7/2020  9:25 PM  
Infiltration Assessment 0 4/7/2020  9:25 PM  
Dressing Status Clean, dry, & intact 4/7/2020  9:25 PM  
Dressing Type Transparent 4/7/2020  9:25 PM  
Hub Color/Line Status Pink 4/7/2020  9:25 PM  
  
 
Opportunity for questions and clarification was provided. Patient transported with: 
 Monitor

## 2020-04-10 DIAGNOSIS — I10 ESSENTIAL HYPERTENSION: ICD-10-CM

## 2020-04-10 RX ORDER — HYDROCHLOROTHIAZIDE 12.5 MG/1
TABLET ORAL
Qty: 90 TAB | Refills: 1 | Status: SHIPPED | OUTPATIENT
Start: 2020-04-10 | End: 2020-09-21 | Stop reason: SDUPTHER

## 2020-09-21 ENCOUNTER — VIRTUAL VISIT (OUTPATIENT)
Dept: FAMILY MEDICINE CLINIC | Age: 35
End: 2020-09-21
Payer: COMMERCIAL

## 2020-09-21 DIAGNOSIS — I10 ESSENTIAL HYPERTENSION: Primary | ICD-10-CM

## 2020-09-21 DIAGNOSIS — E78.00 PURE HYPERCHOLESTEROLEMIA: ICD-10-CM

## 2020-09-21 PROCEDURE — 99213 OFFICE O/P EST LOW 20 MIN: CPT | Performed by: NURSE PRACTITIONER

## 2020-09-21 RX ORDER — HYDROCHLOROTHIAZIDE 12.5 MG/1
TABLET ORAL
Qty: 90 TAB | Refills: 1 | Status: SHIPPED | OUTPATIENT
Start: 2020-09-21 | End: 2021-01-01 | Stop reason: SDUPTHER

## 2020-09-21 RX ORDER — AMLODIPINE BESYLATE 10 MG/1
10 TABLET ORAL DAILY
Qty: 90 TAB | Refills: 1 | Status: SHIPPED | OUTPATIENT
Start: 2020-09-21 | End: 2021-01-01

## 2020-09-21 RX ORDER — ATORVASTATIN CALCIUM 10 MG/1
TABLET, FILM COATED ORAL
Qty: 90 TAB | Refills: 1 | Status: SHIPPED | OUTPATIENT
Start: 2020-09-21 | End: 2021-01-01

## 2020-09-21 NOTE — PROGRESS NOTES
HISTORY OF PRESENT ILLNESS Dallas Ramey is a 38 y.o. male. HPI Pt presents with \"medication refill\" Visit was conducted via [employer]. me Pt was located at home, provider was located at [facility] Visit lasted 4 minutes Dallas Ramey, who was evaluated through a synchronous (real-time) audio-video encounter, and/or his healthcare decision maker, is aware that it is a billable service, with coverage as determined by his insurance carrier. He provided verbal consent to proceed: Yes, and patient identification was verified. It was conducted pursuant to the emergency declaration under the [facility], [address] [place] [address] authority and the [employer] Act. A caregiver was present when appropriate. Ability to conduct physical exam was limited. I was in the office. The patient was at home. Pt states that he needs refills of his medications He notes that some days his BP is a little elevated, and when this occurs, he also notes that he has some swelling in his ankles He is wondering if the HCTZ should be increased? He denies any other concerns or complaints at this time Review of Systems Constitutional: Negative for fever. Physical Exam 
Constitutional:   
   Appearance: Normal appearance. Pulmonary:  
   Effort: Pulmonary effort is normal.  
Neurological:  
   Mental Status: He is alert and oriented to person, place, and time. Psychiatric:     
   Mood and Affect: Mood normal.  
 
 
 
ASSESSMENT and PLAN 
  ICD-10-CM ICD-9-CM 1. Essential hypertension  I10 401.9 hydroCHLOROthiazide (HYDRODIURIL) 12.5 mg tablet  
   amLODIPine (NORVASC) 10 mg tablet CBC W/O DIFF  
   METABOLIC PANEL, COMPREHENSIVE 2. Pure hypercholesterolemia  E78.00 272.0 atorvastatin (LIPITOR) 10 mg tablet LIPID PANEL Will notify when labs return Educated that should kidney function be stable, will allow increased dose of HCTZ Pt informed to return to office with worsening of symptoms, or PRN with any questions or concerns. Pt verbalizes understanding of plan of care and denies further questions or concerns at this time.

## 2020-10-08 DIAGNOSIS — R73.01 ELEVATED FASTING GLUCOSE: Primary | ICD-10-CM

## 2020-10-08 LAB
ALBUMIN SERPL-MCNC: 4.5 G/DL (ref 4–5)
ALBUMIN/GLOB SERPL: 1.9 {RATIO} (ref 1.2–2.2)
ALP SERPL-CCNC: 76 IU/L (ref 39–117)
ALT SERPL-CCNC: 59 IU/L (ref 0–44)
AST SERPL-CCNC: 32 IU/L (ref 0–40)
BILIRUB SERPL-MCNC: 0.5 MG/DL (ref 0–1.2)
BUN SERPL-MCNC: 8 MG/DL (ref 6–20)
BUN/CREAT SERPL: 10 (ref 9–20)
CALCIUM SERPL-MCNC: 9.1 MG/DL (ref 8.7–10.2)
CHLORIDE SERPL-SCNC: 104 MMOL/L (ref 96–106)
CHOLEST SERPL-MCNC: 158 MG/DL (ref 100–199)
CO2 SERPL-SCNC: 25 MMOL/L (ref 20–29)
CREAT SERPL-MCNC: 0.81 MG/DL (ref 0.76–1.27)
ERYTHROCYTE [DISTWIDTH] IN BLOOD BY AUTOMATED COUNT: 12.9 % (ref 11.6–15.4)
GLOBULIN SER CALC-MCNC: 2.4 G/DL (ref 1.5–4.5)
GLUCOSE SERPL-MCNC: 116 MG/DL (ref 65–99)
HCT VFR BLD AUTO: 46.1 % (ref 37.5–51)
HDLC SERPL-MCNC: 48 MG/DL
HGB BLD-MCNC: 15.8 G/DL (ref 13–17.7)
INTERPRETATION, 910389: NORMAL
LDLC SERPL CALC-MCNC: 93 MG/DL (ref 0–99)
MCH RBC QN AUTO: 30.6 PG (ref 26.6–33)
MCHC RBC AUTO-ENTMCNC: 34.3 G/DL (ref 31.5–35.7)
MCV RBC AUTO: 89 FL (ref 79–97)
PLATELET # BLD AUTO: 256 X10E3/UL (ref 150–450)
POTASSIUM SERPL-SCNC: 4.1 MMOL/L (ref 3.5–5.2)
PROT SERPL-MCNC: 6.9 G/DL (ref 6–8.5)
RBC # BLD AUTO: 5.16 X10E6/UL (ref 4.14–5.8)
SODIUM SERPL-SCNC: 142 MMOL/L (ref 134–144)
TRIGL SERPL-MCNC: 89 MG/DL (ref 0–149)
VLDLC SERPL CALC-MCNC: 17 MG/DL (ref 5–40)
WBC # BLD AUTO: 8.2 X10E3/UL (ref 3.4–10.8)

## 2020-10-08 NOTE — PROGRESS NOTES
Please call patient and let him know that his labs returned. Glucose is elevated.   Should return for lab only visit for re-check of HgbA1C  Otherwise stable  Thanks

## 2021-01-01 ENCOUNTER — APPOINTMENT (OUTPATIENT)
Dept: VASCULAR SURGERY | Age: 36
DRG: 870 | End: 2021-01-01
Attending: STUDENT IN AN ORGANIZED HEALTH CARE EDUCATION/TRAINING PROGRAM
Payer: COMMERCIAL

## 2021-01-01 ENCOUNTER — APPOINTMENT (OUTPATIENT)
Dept: GENERAL RADIOLOGY | Age: 36
DRG: 870 | End: 2021-01-01
Attending: INTERNAL MEDICINE
Payer: COMMERCIAL

## 2021-01-01 ENCOUNTER — APPOINTMENT (OUTPATIENT)
Dept: GENERAL RADIOLOGY | Age: 36
DRG: 870 | End: 2021-01-01
Attending: ANESTHESIOLOGY
Payer: COMMERCIAL

## 2021-01-01 ENCOUNTER — APPOINTMENT (OUTPATIENT)
Dept: GENERAL RADIOLOGY | Age: 36
DRG: 870 | End: 2021-01-01
Attending: FAMILY MEDICINE
Payer: COMMERCIAL

## 2021-01-01 ENCOUNTER — APPOINTMENT (OUTPATIENT)
Dept: GENERAL RADIOLOGY | Age: 36
DRG: 870 | End: 2021-01-01
Attending: STUDENT IN AN ORGANIZED HEALTH CARE EDUCATION/TRAINING PROGRAM
Payer: COMMERCIAL

## 2021-01-01 ENCOUNTER — OFFICE VISIT (OUTPATIENT)
Dept: FAMILY MEDICINE CLINIC | Age: 36
End: 2021-01-01
Payer: COMMERCIAL

## 2021-01-01 ENCOUNTER — LAB ONLY (OUTPATIENT)
Dept: FAMILY MEDICINE CLINIC | Age: 36
End: 2021-01-01

## 2021-01-01 ENCOUNTER — CLINICAL SUPPORT (OUTPATIENT)
Dept: DIABETES SERVICES | Age: 36
End: 2021-01-01
Payer: COMMERCIAL

## 2021-01-01 ENCOUNTER — TELEPHONE (OUTPATIENT)
Dept: FAMILY MEDICINE CLINIC | Age: 36
End: 2021-01-01

## 2021-01-01 ENCOUNTER — APPOINTMENT (OUTPATIENT)
Dept: VASCULAR SURGERY | Age: 36
DRG: 870 | End: 2021-01-01
Attending: INTERNAL MEDICINE
Payer: COMMERCIAL

## 2021-01-01 ENCOUNTER — HOSPITAL ENCOUNTER (INPATIENT)
Age: 36
LOS: 34 days | DRG: 870 | End: 2021-12-23
Attending: EMERGENCY MEDICINE | Admitting: STUDENT IN AN ORGANIZED HEALTH CARE EDUCATION/TRAINING PROGRAM
Payer: COMMERCIAL

## 2021-01-01 ENCOUNTER — APPOINTMENT (OUTPATIENT)
Dept: CT IMAGING | Age: 36
DRG: 870 | End: 2021-01-01
Attending: EMERGENCY MEDICINE
Payer: COMMERCIAL

## 2021-01-01 ENCOUNTER — VIRTUAL VISIT (OUTPATIENT)
Dept: FAMILY MEDICINE CLINIC | Age: 36
End: 2021-01-01
Payer: COMMERCIAL

## 2021-01-01 ENCOUNTER — APPOINTMENT (OUTPATIENT)
Dept: NON INVASIVE DIAGNOSTICS | Age: 36
DRG: 870 | End: 2021-01-01
Attending: INTERNAL MEDICINE
Payer: COMMERCIAL

## 2021-01-01 VITALS
DIASTOLIC BLOOD PRESSURE: 88 MMHG | HEIGHT: 71 IN | SYSTOLIC BLOOD PRESSURE: 126 MMHG | HEART RATE: 102 BPM | OXYGEN SATURATION: 98 % | BODY MASS INDEX: 41.3 KG/M2 | RESPIRATION RATE: 16 BRPM | WEIGHT: 295 LBS | TEMPERATURE: 97 F

## 2021-01-01 VITALS
RESPIRATION RATE: 30 BRPM | HEIGHT: 71 IN | SYSTOLIC BLOOD PRESSURE: 47 MMHG | BODY MASS INDEX: 35.63 KG/M2 | OXYGEN SATURATION: 85 % | WEIGHT: 254.5 LBS | HEART RATE: 110 BPM | DIASTOLIC BLOOD PRESSURE: 22 MMHG | TEMPERATURE: 103 F

## 2021-01-01 VITALS
HEART RATE: 90 BPM | SYSTOLIC BLOOD PRESSURE: 123 MMHG | RESPIRATION RATE: 20 BRPM | DIASTOLIC BLOOD PRESSURE: 85 MMHG | BODY MASS INDEX: 42.56 KG/M2 | WEIGHT: 304 LBS | OXYGEN SATURATION: 96 % | TEMPERATURE: 98.1 F | HEIGHT: 71 IN

## 2021-01-01 DIAGNOSIS — R65.21 SEPTIC SHOCK (HCC): ICD-10-CM

## 2021-01-01 DIAGNOSIS — H91.92 DECREASED HEARING OF LEFT EAR: ICD-10-CM

## 2021-01-01 DIAGNOSIS — E78.00 PURE HYPERCHOLESTEROLEMIA: ICD-10-CM

## 2021-01-01 DIAGNOSIS — E11.9 CONTROLLED TYPE 2 DIABETES MELLITUS WITHOUT COMPLICATION, UNSPECIFIED WHETHER LONG TERM INSULIN USE (HCC): ICD-10-CM

## 2021-01-01 DIAGNOSIS — J80 ACUTE RESPIRATORY DISTRESS SYNDROME (ARDS) DUE TO COVID-19 VIRUS (HCC): ICD-10-CM

## 2021-01-01 DIAGNOSIS — Z78.9 ON TOTAL PARENTERAL NUTRITION (TPN): ICD-10-CM

## 2021-01-01 DIAGNOSIS — E11.9 NEW ONSET TYPE 2 DIABETES MELLITUS (HCC): Primary | ICD-10-CM

## 2021-01-01 DIAGNOSIS — R73.9 HYPERGLYCEMIA: ICD-10-CM

## 2021-01-01 DIAGNOSIS — I10 PRIMARY HYPERTENSION: ICD-10-CM

## 2021-01-01 DIAGNOSIS — G47.33 OSA (OBSTRUCTIVE SLEEP APNEA): ICD-10-CM

## 2021-01-01 DIAGNOSIS — Z71.89 DNR (DO NOT RESUSCITATE) DISCUSSION: ICD-10-CM

## 2021-01-01 DIAGNOSIS — U07.1 ACUTE HYPOXEMIC RESPIRATORY FAILURE DUE TO COVID-19 (HCC): ICD-10-CM

## 2021-01-01 DIAGNOSIS — J96.01 ACUTE HYPOXEMIC RESPIRATORY FAILURE DUE TO COVID-19 (HCC): ICD-10-CM

## 2021-01-01 DIAGNOSIS — R74.8 ELEVATED LIVER ENZYMES: ICD-10-CM

## 2021-01-01 DIAGNOSIS — A41.9 SEVERE SEPSIS (HCC): ICD-10-CM

## 2021-01-01 DIAGNOSIS — A41.9 SEPTIC SHOCK (HCC): ICD-10-CM

## 2021-01-01 DIAGNOSIS — G89.29 CHRONIC BILATERAL LOW BACK PAIN, UNSPECIFIED WHETHER SCIATICA PRESENT: ICD-10-CM

## 2021-01-01 DIAGNOSIS — U07.1 COVID-19: Primary | ICD-10-CM

## 2021-01-01 DIAGNOSIS — I10 ESSENTIAL HYPERTENSION: ICD-10-CM

## 2021-01-01 DIAGNOSIS — U07.1 ACUTE RESPIRATORY DISTRESS SYNDROME (ARDS) DUE TO COVID-19 VIRUS (HCC): ICD-10-CM

## 2021-01-01 DIAGNOSIS — J98.2 PNEUMOMEDIASTINUM (HCC): ICD-10-CM

## 2021-01-01 DIAGNOSIS — J80 ARDS (ADULT RESPIRATORY DISTRESS SYNDROME) (HCC): ICD-10-CM

## 2021-01-01 DIAGNOSIS — E11.65 UNCONTROLLED TYPE 2 DIABETES MELLITUS WITH HYPERGLYCEMIA (HCC): Primary | ICD-10-CM

## 2021-01-01 DIAGNOSIS — R09.02 HYPOXIA: ICD-10-CM

## 2021-01-01 DIAGNOSIS — Z79.899 ENCOUNTER FOR LONG-TERM CURRENT USE OF MEDICATION: ICD-10-CM

## 2021-01-01 DIAGNOSIS — J93.9 PNEUMOTHORAX ON LEFT: ICD-10-CM

## 2021-01-01 DIAGNOSIS — E78.49 OTHER HYPERLIPIDEMIA: ICD-10-CM

## 2021-01-01 DIAGNOSIS — J93.83 OTHER PNEUMOTHORAX: ICD-10-CM

## 2021-01-01 DIAGNOSIS — Z51.5 ENCOUNTER FOR PALLIATIVE CARE: ICD-10-CM

## 2021-01-01 DIAGNOSIS — M54.50 CHRONIC BILATERAL LOW BACK PAIN, UNSPECIFIED WHETHER SCIATICA PRESENT: ICD-10-CM

## 2021-01-01 DIAGNOSIS — E66.01 OBESITY, MORBID (HCC): ICD-10-CM

## 2021-01-01 DIAGNOSIS — R65.20 SEVERE SEPSIS (HCC): ICD-10-CM

## 2021-01-01 DIAGNOSIS — Z78.9 ON TUBE FEEDING DIET: ICD-10-CM

## 2021-01-01 DIAGNOSIS — K42.9 PERIUMBILICAL HERNIA: ICD-10-CM

## 2021-01-01 DIAGNOSIS — E11.9 NEW ONSET TYPE 2 DIABETES MELLITUS (HCC): ICD-10-CM

## 2021-01-01 DIAGNOSIS — E87.1 HYPONATREMIA: ICD-10-CM

## 2021-01-01 DIAGNOSIS — E66.01 CLASS 3 OBESITY (HCC): ICD-10-CM

## 2021-01-01 DIAGNOSIS — I10 ESSENTIAL HYPERTENSION: Primary | ICD-10-CM

## 2021-01-01 DIAGNOSIS — L08.9 OOZING SKIN INFLAMMATION: ICD-10-CM

## 2021-01-01 LAB
1,3 BETA GLUCAN SER-MCNC: 36 PG/ML
ALBUMIN SERPL-MCNC: 1.2 G/DL (ref 3.5–5)
ALBUMIN SERPL-MCNC: 1.9 G/DL (ref 3.5–5)
ALBUMIN SERPL-MCNC: 2.2 G/DL (ref 3.5–5)
ALBUMIN SERPL-MCNC: 2.3 G/DL (ref 3.5–5)
ALBUMIN SERPL-MCNC: 2.4 G/DL (ref 3.5–5)
ALBUMIN SERPL-MCNC: 2.5 G/DL (ref 3.5–5)
ALBUMIN SERPL-MCNC: 2.6 G/DL (ref 3.5–5)
ALBUMIN SERPL-MCNC: 2.7 G/DL (ref 3.5–5)
ALBUMIN SERPL-MCNC: 2.8 G/DL (ref 3.5–5)
ALBUMIN SERPL-MCNC: 2.9 G/DL (ref 3.5–5)
ALBUMIN SERPL-MCNC: 2.9 G/DL (ref 3.5–5)
ALBUMIN SERPL-MCNC: 3 G/DL (ref 3.5–5)
ALBUMIN SERPL-MCNC: 3.1 G/DL (ref 3.5–5)
ALBUMIN SERPL-MCNC: 3.2 G/DL (ref 3.5–5)
ALBUMIN SERPL-MCNC: 3.3 G/DL (ref 3.5–5)
ALBUMIN SERPL-MCNC: 3.4 G/DL (ref 3.5–5)
ALBUMIN SERPL-MCNC: 3.5 G/DL (ref 3.5–5)
ALBUMIN SERPL-MCNC: 4.1 G/DL (ref 3.5–5)
ALBUMIN SERPL-MCNC: 4.3 G/DL (ref 3.5–5)
ALBUMIN/GLOB SERPL: 0.5 {RATIO} (ref 1.1–2.2)
ALBUMIN/GLOB SERPL: 0.6 {RATIO} (ref 1.1–2.2)
ALBUMIN/GLOB SERPL: 0.7 {RATIO} (ref 1.1–2.2)
ALBUMIN/GLOB SERPL: 0.8 {RATIO} (ref 1.1–2.2)
ALBUMIN/GLOB SERPL: 0.9 {RATIO} (ref 1.1–2.2)
ALBUMIN/GLOB SERPL: 1 {RATIO} (ref 1.1–2.2)
ALBUMIN/GLOB SERPL: 1.2 {RATIO} (ref 1.1–2.2)
ALBUMIN/GLOB SERPL: 1.4 {RATIO} (ref 1.1–2.2)
ALBUMIN/GLOB SERPL: ABNORMAL {RATIO} (ref 1.1–2.2)
ALP SERPL-CCNC: 100 U/L (ref 45–117)
ALP SERPL-CCNC: 101 U/L (ref 45–117)
ALP SERPL-CCNC: 102 U/L (ref 45–117)
ALP SERPL-CCNC: 103 U/L (ref 45–117)
ALP SERPL-CCNC: 105 U/L (ref 45–117)
ALP SERPL-CCNC: 106 U/L (ref 45–117)
ALP SERPL-CCNC: 112 U/L (ref 45–117)
ALP SERPL-CCNC: 112 U/L (ref 45–117)
ALP SERPL-CCNC: 114 U/L (ref 45–117)
ALP SERPL-CCNC: 117 U/L (ref 45–117)
ALP SERPL-CCNC: 122 U/L (ref 45–117)
ALP SERPL-CCNC: 125 U/L (ref 45–117)
ALP SERPL-CCNC: 126 U/L (ref 45–117)
ALP SERPL-CCNC: 128 U/L (ref 45–117)
ALP SERPL-CCNC: 130 U/L (ref 45–117)
ALP SERPL-CCNC: 132 U/L (ref 45–117)
ALP SERPL-CCNC: 133 U/L (ref 45–117)
ALP SERPL-CCNC: 54 U/L (ref 45–117)
ALP SERPL-CCNC: 54 U/L (ref 45–117)
ALP SERPL-CCNC: 58 U/L (ref 45–117)
ALP SERPL-CCNC: 58 U/L (ref 45–117)
ALP SERPL-CCNC: 59 U/L (ref 45–117)
ALP SERPL-CCNC: 62 U/L (ref 45–117)
ALP SERPL-CCNC: 68 U/L (ref 45–117)
ALP SERPL-CCNC: 70 U/L (ref 45–117)
ALP SERPL-CCNC: 80 U/L (ref 45–117)
ALP SERPL-CCNC: 81 U/L (ref 45–117)
ALP SERPL-CCNC: 82 U/L (ref 45–117)
ALP SERPL-CCNC: 89 U/L (ref 45–117)
ALP SERPL-CCNC: 90 U/L (ref 45–117)
ALP SERPL-CCNC: 94 U/L (ref 45–117)
ALP SERPL-CCNC: 94 U/L (ref 45–117)
ALP SERPL-CCNC: 99 U/L (ref 45–117)
ALT SERPL-CCNC: 101 U/L (ref 12–78)
ALT SERPL-CCNC: 149 U/L (ref 12–78)
ALT SERPL-CCNC: 166 U/L (ref 12–78)
ALT SERPL-CCNC: 190 U/L (ref 12–78)
ALT SERPL-CCNC: 200 U/L (ref 12–78)
ALT SERPL-CCNC: 205 U/L (ref 12–78)
ALT SERPL-CCNC: 207 U/L (ref 12–78)
ALT SERPL-CCNC: 209 U/L (ref 12–78)
ALT SERPL-CCNC: 210 U/L (ref 12–78)
ALT SERPL-CCNC: 215 U/L (ref 12–78)
ALT SERPL-CCNC: 236 U/L (ref 12–78)
ALT SERPL-CCNC: 29 U/L (ref 12–78)
ALT SERPL-CCNC: 30 U/L (ref 12–78)
ALT SERPL-CCNC: 31 U/L (ref 12–78)
ALT SERPL-CCNC: 31 U/L (ref 12–78)
ALT SERPL-CCNC: 34 U/L (ref 12–78)
ALT SERPL-CCNC: 35 U/L (ref 12–78)
ALT SERPL-CCNC: 352 U/L (ref 12–78)
ALT SERPL-CCNC: 36 U/L (ref 12–78)
ALT SERPL-CCNC: 36 U/L (ref 12–78)
ALT SERPL-CCNC: 365 U/L (ref 12–78)
ALT SERPL-CCNC: 37 U/L (ref 12–78)
ALT SERPL-CCNC: 37 U/L (ref 12–78)
ALT SERPL-CCNC: 38 U/L (ref 12–78)
ALT SERPL-CCNC: 44 U/L (ref 12–78)
ALT SERPL-CCNC: 45 U/L (ref 12–78)
ALT SERPL-CCNC: 46 U/L (ref 12–78)
ALT SERPL-CCNC: 46 U/L (ref 12–78)
ALT SERPL-CCNC: 66 U/L (ref 12–78)
ALT SERPL-CCNC: 70 U/L (ref 12–78)
ALT SERPL-CCNC: 70 U/L (ref 12–78)
ALT SERPL-CCNC: 78 U/L (ref 12–78)
ALT SERPL-CCNC: 82 U/L (ref 12–78)
ANION GAP SERPL CALC-SCNC: 1 MMOL/L (ref 5–15)
ANION GAP SERPL CALC-SCNC: 12 MMOL/L (ref 5–15)
ANION GAP SERPL CALC-SCNC: 12 MMOL/L (ref 5–15)
ANION GAP SERPL CALC-SCNC: 2 MMOL/L (ref 5–15)
ANION GAP SERPL CALC-SCNC: 3 MMOL/L (ref 5–15)
ANION GAP SERPL CALC-SCNC: 4 MMOL/L (ref 5–15)
ANION GAP SERPL CALC-SCNC: 5 MMOL/L (ref 5–15)
ANION GAP SERPL CALC-SCNC: 6 MMOL/L (ref 5–15)
ANION GAP SERPL CALC-SCNC: 7 MMOL/L (ref 5–15)
ANION GAP SERPL CALC-SCNC: 8 MMOL/L (ref 5–15)
ANION GAP SERPL CALC-SCNC: 8 MMOL/L (ref 5–15)
ANION GAP SERPL CALC-SCNC: 9 MMOL/L (ref 5–15)
ANION GAP SERPL CALC-SCNC: 9 MMOL/L (ref 5–15)
APPEARANCE UR: CLEAR
APTT PPP: 20.3 SEC (ref 22.1–31)
APTT PPP: 20.6 SEC (ref 22.1–31)
APTT PPP: 20.8 SEC (ref 22.1–31)
APTT PPP: 21 SEC (ref 22.1–31)
APTT PPP: 21.4 SEC (ref 22.1–31)
APTT PPP: 21.6 SEC (ref 22.1–31)
APTT PPP: 21.7 SEC (ref 22.1–31)
APTT PPP: 21.7 SEC (ref 22.1–31)
APTT PPP: 21.9 SEC (ref 22.1–31)
APTT PPP: 22.1 SEC (ref 22.1–31)
APTT PPP: 22.4 SEC (ref 22.1–31)
APTT PPP: 22.8 SEC (ref 22.1–31)
APTT PPP: 22.8 SEC (ref 22.1–31)
APTT PPP: 23 SEC (ref 22.1–31)
APTT PPP: 23.9 SEC (ref 22.1–31)
APTT PPP: 24.2 SEC (ref 22.1–31)
APTT PPP: 25 SEC (ref 24–33)
APTT PPP: 25.3 SEC (ref 22.1–31)
APTT PPP: <20 SEC (ref 22.1–31)
APTT PPP: <20 SEC (ref 22.1–31)
APTT PPP: NORMAL SEC (ref 22.1–31)
ARTERIAL PATENCY WRIST A: ABNORMAL
ARTERIAL PATENCY WRIST A: NO
ARTERIAL PATENCY WRIST A: POSITIVE
ARTERIAL PATENCY WRIST A: YES
AST SERPL-CCNC: 114 U/L (ref 15–37)
AST SERPL-CCNC: 115 U/L (ref 15–37)
AST SERPL-CCNC: 122 U/L (ref 15–37)
AST SERPL-CCNC: 28 U/L (ref 15–37)
AST SERPL-CCNC: 30 U/L (ref 15–37)
AST SERPL-CCNC: 37 U/L (ref 15–37)
AST SERPL-CCNC: 38 U/L (ref 15–37)
AST SERPL-CCNC: 40 U/L (ref 15–37)
AST SERPL-CCNC: 41 U/L (ref 15–37)
AST SERPL-CCNC: 42 U/L (ref 15–37)
AST SERPL-CCNC: 43 U/L (ref 15–37)
AST SERPL-CCNC: 44 U/L (ref 15–37)
AST SERPL-CCNC: 48 U/L (ref 15–37)
AST SERPL-CCNC: 49 U/L (ref 15–37)
AST SERPL-CCNC: 54 U/L (ref 15–37)
AST SERPL-CCNC: 54 U/L (ref 15–37)
AST SERPL-CCNC: 56 U/L (ref 15–37)
AST SERPL-CCNC: 58 U/L (ref 15–37)
AST SERPL-CCNC: 62 U/L (ref 15–37)
AST SERPL-CCNC: 63 U/L (ref 15–37)
AST SERPL-CCNC: 63 U/L (ref 15–37)
AST SERPL-CCNC: 64 U/L (ref 15–37)
AST SERPL-CCNC: 65 U/L (ref 15–37)
AST SERPL-CCNC: 65 U/L (ref 15–37)
AST SERPL-CCNC: 71 U/L (ref 15–37)
AST SERPL-CCNC: 72 U/L (ref 15–37)
AST SERPL-CCNC: 76 U/L (ref 15–37)
AST SERPL-CCNC: 81 U/L (ref 15–37)
AST SERPL-CCNC: 83 U/L (ref 15–37)
AST SERPL-CCNC: 91 U/L (ref 15–37)
AST SERPL-CCNC: 98 U/L (ref 15–37)
ATRIAL RATE: 126 BPM
ATRIAL RATE: 141 BPM
B PERT DNA SPEC QL NAA+PROBE: NOT DETECTED
BACTERIA SPEC CULT: NORMAL
BACTERIA URNS QL MICRO: NEGATIVE /HPF
BASE DEFICIT BLDA-SCNC: 1.5 MMOL/L
BASE DEFICIT BLDA-SCNC: 4.8 MMOL/L
BASE DEFICIT BLDV-SCNC: 0.5 MMOL/L
BASE EXCESS BLD CALC-SCNC: 10.4 MMOL/L
BASE EXCESS BLD CALC-SCNC: 10.7 MMOL/L
BASE EXCESS BLD CALC-SCNC: 10.9 MMOL/L
BASE EXCESS BLD CALC-SCNC: 11.2 MMOL/L
BASE EXCESS BLD CALC-SCNC: 11.4 MMOL/L
BASE EXCESS BLD CALC-SCNC: 13.4 MMOL/L
BASE EXCESS BLD CALC-SCNC: 15 MMOL/L
BASE EXCESS BLD CALC-SCNC: 4.3 MMOL/L
BASE EXCESS BLD CALC-SCNC: 4.5 MMOL/L
BASE EXCESS BLD CALC-SCNC: 5.3 MMOL/L
BASE EXCESS BLD CALC-SCNC: 5.6 MMOL/L
BASE EXCESS BLD CALC-SCNC: 5.8 MMOL/L
BASE EXCESS BLD CALC-SCNC: 6.7 MMOL/L
BASE EXCESS BLD CALC-SCNC: 7.5 MMOL/L
BASE EXCESS BLD CALC-SCNC: 8.9 MMOL/L
BASE EXCESS BLDA CALC-SCNC: 0.8 MMOL/L
BASE EXCESS BLDA CALC-SCNC: 10.1 MMOL/L
BASE EXCESS BLDA CALC-SCNC: 11.1 MMOL/L
BASE EXCESS BLDA CALC-SCNC: 11.9 MMOL/L
BASE EXCESS BLDA CALC-SCNC: 13.4 MMOL/L
BASE EXCESS BLDA CALC-SCNC: 5.2 MMOL/L
BASE EXCESS BLDA CALC-SCNC: 5.6 MMOL/L
BASE EXCESS BLDA CALC-SCNC: 7.1 MMOL/L
BASE EXCESS BLDA CALC-SCNC: 7.3 MMOL/L
BASE EXCESS BLDA CALC-SCNC: 8 MMOL/L
BASE EXCESS BLDA CALC-SCNC: 9 MMOL/L
BASE EXCESS BLDA CALC-SCNC: 9.7 MMOL/L
BASE EXCESS BLDV CALC-SCNC: 1.3 MMOL/L
BASOPHILS # BLD: 0 K/UL (ref 0–0.1)
BASOPHILS # BLD: 0.1 K/UL (ref 0–0.1)
BASOPHILS # BLD: NORMAL K/UL (ref 0–0.1)
BASOPHILS NFR BLD: 0 % (ref 0–1)
BASOPHILS NFR BLD: 1 % (ref 0–1)
BASOPHILS NFR BLD: NORMAL % (ref 0–1)
BDY SITE: ABNORMAL
BILIRUB DIRECT SERPL-MCNC: 0.3 MG/DL (ref 0–0.2)
BILIRUB SERPL-MCNC: 0.4 MG/DL (ref 0.2–1)
BILIRUB SERPL-MCNC: 0.4 MG/DL (ref 0.2–1)
BILIRUB SERPL-MCNC: 0.5 MG/DL (ref 0.2–1)
BILIRUB SERPL-MCNC: 0.6 MG/DL (ref 0.2–1)
BILIRUB SERPL-MCNC: 0.7 MG/DL (ref 0.2–1)
BILIRUB SERPL-MCNC: 0.8 MG/DL (ref 0.2–1)
BILIRUB SERPL-MCNC: 0.9 MG/DL (ref 0.2–1)
BILIRUB SERPL-MCNC: 1 MG/DL (ref 0.2–1)
BILIRUB SERPL-MCNC: 1.1 MG/DL (ref 0.2–1)
BILIRUB SERPL-MCNC: 1.5 MG/DL (ref 0.2–1)
BILIRUB SERPL-MCNC: 1.6 MG/DL (ref 0.2–1)
BILIRUB UR QL: NEGATIVE
BNP SERPL-MCNC: 15 PG/ML
BNP SERPL-MCNC: 88 PG/ML
BORDETELLA PARAPERTUSSIS PCR, BORPAR: NOT DETECTED
BUN SERPL-MCNC: 11 MG/DL (ref 6–20)
BUN SERPL-MCNC: 18 MG/DL (ref 6–20)
BUN SERPL-MCNC: 18 MG/DL (ref 6–20)
BUN SERPL-MCNC: 20 MG/DL (ref 6–20)
BUN SERPL-MCNC: 20 MG/DL (ref 6–20)
BUN SERPL-MCNC: 21 MG/DL (ref 6–20)
BUN SERPL-MCNC: 21 MG/DL (ref 6–20)
BUN SERPL-MCNC: 22 MG/DL (ref 6–20)
BUN SERPL-MCNC: 22 MG/DL (ref 6–20)
BUN SERPL-MCNC: 23 MG/DL (ref 6–20)
BUN SERPL-MCNC: 24 MG/DL (ref 6–20)
BUN SERPL-MCNC: 25 MG/DL (ref 6–20)
BUN SERPL-MCNC: 26 MG/DL (ref 6–20)
BUN SERPL-MCNC: 27 MG/DL (ref 6–20)
BUN SERPL-MCNC: 27 MG/DL (ref 6–20)
BUN SERPL-MCNC: 28 MG/DL (ref 6–20)
BUN SERPL-MCNC: 29 MG/DL (ref 6–20)
BUN SERPL-MCNC: 29 MG/DL (ref 6–20)
BUN SERPL-MCNC: 30 MG/DL (ref 6–20)
BUN SERPL-MCNC: 32 MG/DL (ref 6–20)
BUN SERPL-MCNC: 34 MG/DL (ref 6–20)
BUN SERPL-MCNC: 38 MG/DL (ref 6–20)
BUN SERPL-MCNC: 40 MG/DL (ref 6–20)
BUN SERPL-MCNC: 40 MG/DL (ref 6–20)
BUN SERPL-MCNC: 41 MG/DL (ref 6–20)
BUN SERPL-MCNC: 43 MG/DL (ref 6–20)
BUN SERPL-MCNC: 46 MG/DL (ref 6–20)
BUN SERPL-MCNC: 47 MG/DL (ref 6–20)
BUN SERPL-MCNC: 49 MG/DL (ref 6–20)
BUN SERPL-MCNC: 8 MG/DL (ref 6–20)
BUN/CREAT SERPL: 10 (ref 12–20)
BUN/CREAT SERPL: 16 (ref 12–20)
BUN/CREAT SERPL: 21 (ref 12–20)
BUN/CREAT SERPL: 21 (ref 12–20)
BUN/CREAT SERPL: 22 (ref 12–20)
BUN/CREAT SERPL: 24 (ref 12–20)
BUN/CREAT SERPL: 24 (ref 12–20)
BUN/CREAT SERPL: 25 (ref 12–20)
BUN/CREAT SERPL: 26 (ref 12–20)
BUN/CREAT SERPL: 27 (ref 12–20)
BUN/CREAT SERPL: 29 (ref 12–20)
BUN/CREAT SERPL: 30 (ref 12–20)
BUN/CREAT SERPL: 31 (ref 12–20)
BUN/CREAT SERPL: 32 (ref 12–20)
BUN/CREAT SERPL: 33 (ref 12–20)
BUN/CREAT SERPL: 38 (ref 12–20)
BUN/CREAT SERPL: 42 (ref 12–20)
BUN/CREAT SERPL: 43 (ref 12–20)
BUN/CREAT SERPL: 45 (ref 12–20)
BUN/CREAT SERPL: 48 (ref 12–20)
BUN/CREAT SERPL: 50 (ref 12–20)
BUN/CREAT SERPL: 54 (ref 12–20)
BUN/CREAT SERPL: 56 (ref 12–20)
BUN/CREAT SERPL: 58 (ref 12–20)
BUN/CREAT SERPL: 58 (ref 12–20)
BUN/CREAT SERPL: 59 (ref 12–20)
BUN/CREAT SERPL: 60 (ref 12–20)
BUN/CREAT SERPL: 62 (ref 12–20)
BUN/CREAT SERPL: 63 (ref 12–20)
BUN/CREAT SERPL: 63 (ref 12–20)
BUN/CREAT SERPL: 68 (ref 12–20)
BUN/CREAT SERPL: 69 (ref 12–20)
BUN/CREAT SERPL: 72 (ref 12–20)
BUN/CREAT SERPL: 76 (ref 12–20)
BUN/CREAT SERPL: 85 (ref 12–20)
BUN/CREAT SERPL: 88 (ref 12–20)
C PNEUM DNA SPEC QL NAA+PROBE: NOT DETECTED
CALCIUM SERPL-MCNC: 7.4 MG/DL (ref 8.5–10.1)
CALCIUM SERPL-MCNC: 7.8 MG/DL (ref 8.5–10.1)
CALCIUM SERPL-MCNC: 7.9 MG/DL (ref 8.5–10.1)
CALCIUM SERPL-MCNC: 8.1 MG/DL (ref 8.5–10.1)
CALCIUM SERPL-MCNC: 8.2 MG/DL (ref 8.5–10.1)
CALCIUM SERPL-MCNC: 8.3 MG/DL (ref 8.5–10.1)
CALCIUM SERPL-MCNC: 8.3 MG/DL (ref 8.5–10.1)
CALCIUM SERPL-MCNC: 8.4 MG/DL (ref 8.5–10.1)
CALCIUM SERPL-MCNC: 8.5 MG/DL (ref 8.5–10.1)
CALCIUM SERPL-MCNC: 8.6 MG/DL (ref 8.5–10.1)
CALCIUM SERPL-MCNC: 8.7 MG/DL (ref 8.5–10.1)
CALCIUM SERPL-MCNC: 8.8 MG/DL (ref 8.5–10.1)
CALCIUM SERPL-MCNC: 8.9 MG/DL (ref 8.5–10.1)
CALCIUM SERPL-MCNC: 9 MG/DL (ref 8.5–10.1)
CALCIUM SERPL-MCNC: 9.1 MG/DL (ref 8.5–10.1)
CALCIUM SERPL-MCNC: 9.1 MG/DL (ref 8.5–10.1)
CALCIUM SERPL-MCNC: 9.2 MG/DL (ref 8.5–10.1)
CALCIUM SERPL-MCNC: 9.3 MG/DL (ref 8.5–10.1)
CALCIUM SERPL-MCNC: 9.5 MG/DL (ref 8.5–10.1)
CALCIUM SERPL-MCNC: 9.6 MG/DL (ref 8.5–10.1)
CALCIUM SERPL-MCNC: ABNORMAL MG/DL (ref 8.5–10.1)
CALCULATED P AXIS, ECG09: -25 DEGREES
CALCULATED P AXIS, ECG09: 32 DEGREES
CALCULATED R AXIS, ECG10: 22 DEGREES
CALCULATED R AXIS, ECG10: 54 DEGREES
CALCULATED T AXIS, ECG11: 22 DEGREES
CALCULATED T AXIS, ECG11: 28 DEGREES
CAMPYLOBACTER SPECIES, DNA: NEGATIVE
CHLORIDE SERPL-SCNC: 100 MMOL/L (ref 97–108)
CHLORIDE SERPL-SCNC: 101 MMOL/L (ref 97–108)
CHLORIDE SERPL-SCNC: 102 MMOL/L (ref 97–108)
CHLORIDE SERPL-SCNC: 103 MMOL/L (ref 97–108)
CHLORIDE SERPL-SCNC: 104 MMOL/L (ref 97–108)
CHLORIDE SERPL-SCNC: 104 MMOL/L (ref 97–108)
CHLORIDE SERPL-SCNC: 105 MMOL/L (ref 97–108)
CHLORIDE SERPL-SCNC: 106 MMOL/L (ref 97–108)
CHLORIDE SERPL-SCNC: 108 MMOL/L (ref 97–108)
CHLORIDE SERPL-SCNC: 109 MMOL/L (ref 97–108)
CHLORIDE SERPL-SCNC: 109 MMOL/L (ref 97–108)
CHLORIDE SERPL-SCNC: 110 MMOL/L (ref 97–108)
CHLORIDE SERPL-SCNC: 112 MMOL/L (ref 97–108)
CHLORIDE SERPL-SCNC: 90 MMOL/L (ref 97–108)
CHLORIDE SERPL-SCNC: 91 MMOL/L (ref 97–108)
CHLORIDE SERPL-SCNC: 94 MMOL/L (ref 97–108)
CHLORIDE SERPL-SCNC: 95 MMOL/L (ref 97–108)
CHLORIDE SERPL-SCNC: 95 MMOL/L (ref 97–108)
CHLORIDE SERPL-SCNC: 96 MMOL/L (ref 97–108)
CHLORIDE SERPL-SCNC: 97 MMOL/L (ref 97–108)
CHLORIDE SERPL-SCNC: 97 MMOL/L (ref 97–108)
CHLORIDE SERPL-SCNC: 98 MMOL/L (ref 97–108)
CHLORIDE SERPL-SCNC: 98 MMOL/L (ref 97–108)
CHLORIDE SERPL-SCNC: 99 MMOL/L (ref 97–108)
CHOLEST SERPL-MCNC: 160 MG/DL
CHOLEST SERPL-MCNC: 174 MG/DL
CO2 SERPL-SCNC: 21 MMOL/L (ref 21–32)
CO2 SERPL-SCNC: 22 MMOL/L (ref 21–32)
CO2 SERPL-SCNC: 24 MMOL/L (ref 21–32)
CO2 SERPL-SCNC: 25 MMOL/L (ref 21–32)
CO2 SERPL-SCNC: 26 MMOL/L (ref 21–32)
CO2 SERPL-SCNC: 27 MMOL/L (ref 21–32)
CO2 SERPL-SCNC: 28 MMOL/L (ref 21–32)
CO2 SERPL-SCNC: 29 MMOL/L (ref 21–32)
CO2 SERPL-SCNC: 30 MMOL/L (ref 21–32)
CO2 SERPL-SCNC: 32 MMOL/L (ref 21–32)
CO2 SERPL-SCNC: 32 MMOL/L (ref 21–32)
CO2 SERPL-SCNC: 33 MMOL/L (ref 21–32)
CO2 SERPL-SCNC: 34 MMOL/L (ref 21–32)
CO2 SERPL-SCNC: 34 MMOL/L (ref 21–32)
CO2 SERPL-SCNC: 35 MMOL/L (ref 21–32)
CO2 SERPL-SCNC: 35 MMOL/L (ref 21–32)
CO2 SERPL-SCNC: 36 MMOL/L (ref 21–32)
CO2 SERPL-SCNC: 37 MMOL/L (ref 21–32)
CO2 SERPL-SCNC: 37 MMOL/L (ref 21–32)
CO2 SERPL-SCNC: 38 MMOL/L (ref 21–32)
COLOR UR: ABNORMAL
COLOR UR: NORMAL
COLOR UR: NORMAL
COMMENT, HOLDF: NORMAL
CREAT SERPL-MCNC: 0.33 MG/DL (ref 0.7–1.3)
CREAT SERPL-MCNC: 0.37 MG/DL (ref 0.7–1.3)
CREAT SERPL-MCNC: 0.4 MG/DL (ref 0.7–1.3)
CREAT SERPL-MCNC: 0.4 MG/DL (ref 0.7–1.3)
CREAT SERPL-MCNC: 0.43 MG/DL (ref 0.7–1.3)
CREAT SERPL-MCNC: 0.46 MG/DL (ref 0.7–1.3)
CREAT SERPL-MCNC: 0.47 MG/DL (ref 0.7–1.3)
CREAT SERPL-MCNC: 0.48 MG/DL (ref 0.7–1.3)
CREAT SERPL-MCNC: 0.5 MG/DL (ref 0.7–1.3)
CREAT SERPL-MCNC: 0.53 MG/DL (ref 0.7–1.3)
CREAT SERPL-MCNC: 0.56 MG/DL (ref 0.7–1.3)
CREAT SERPL-MCNC: 0.56 MG/DL (ref 0.7–1.3)
CREAT SERPL-MCNC: 0.59 MG/DL (ref 0.7–1.3)
CREAT SERPL-MCNC: 0.6 MG/DL (ref 0.7–1.3)
CREAT SERPL-MCNC: 0.61 MG/DL (ref 0.7–1.3)
CREAT SERPL-MCNC: 0.61 MG/DL (ref 0.7–1.3)
CREAT SERPL-MCNC: 0.68 MG/DL (ref 0.7–1.3)
CREAT SERPL-MCNC: 0.69 MG/DL (ref 0.7–1.3)
CREAT SERPL-MCNC: 0.69 MG/DL (ref 0.7–1.3)
CREAT SERPL-MCNC: 0.71 MG/DL (ref 0.7–1.3)
CREAT SERPL-MCNC: 0.76 MG/DL (ref 0.7–1.3)
CREAT SERPL-MCNC: 0.78 MG/DL (ref 0.7–1.3)
CREAT SERPL-MCNC: 0.79 MG/DL (ref 0.7–1.3)
CREAT SERPL-MCNC: 0.81 MG/DL (ref 0.7–1.3)
CREAT SERPL-MCNC: 0.82 MG/DL (ref 0.7–1.3)
CREAT SERPL-MCNC: 0.83 MG/DL (ref 0.7–1.3)
CREAT SERPL-MCNC: 0.83 MG/DL (ref 0.7–1.3)
CREAT SERPL-MCNC: 0.84 MG/DL (ref 0.7–1.3)
CREAT SERPL-MCNC: 0.84 MG/DL (ref 0.7–1.3)
CREAT SERPL-MCNC: 0.85 MG/DL (ref 0.7–1.3)
CREAT SERPL-MCNC: 0.94 MG/DL (ref 0.7–1.3)
CREAT SERPL-MCNC: 0.95 MG/DL (ref 0.7–1.3)
CREAT SERPL-MCNC: 0.98 MG/DL (ref 0.7–1.3)
CREAT SERPL-MCNC: 1.07 MG/DL (ref 0.7–1.3)
CREAT SERPL-MCNC: 1.31 MG/DL (ref 0.7–1.3)
CREAT UR-MCNC: 160 MG/DL
CRP SERPL-MCNC: 0.47 MG/DL (ref 0–0.6)
CRP SERPL-MCNC: 0.92 MG/DL (ref 0–0.6)
CRP SERPL-MCNC: 1.61 MG/DL (ref 0–0.6)
CRP SERPL-MCNC: 24.7 MG/DL (ref 0–0.6)
CRP SERPL-MCNC: 25.8 MG/DL (ref 0–0.6)
CRP SERPL-MCNC: 3.04 MG/DL (ref 0–0.6)
CRP SERPL-MCNC: 9.77 MG/DL (ref 0–0.6)
CRP SERPL-MCNC: <0.29 MG/DL (ref 0–0.6)
D DIMER PPP FEU-MCNC: 0.64 MG/L FEU (ref 0–0.65)
D DIMER PPP FEU-MCNC: 0.81 MG/L FEU (ref 0–0.65)
D DIMER PPP FEU-MCNC: 0.86 MG/L FEU (ref 0–0.65)
D DIMER PPP FEU-MCNC: 1.14 MG/L FEU (ref 0–0.65)
D DIMER PPP FEU-MCNC: 1.19 MG/L FEU (ref 0–0.65)
D DIMER PPP FEU-MCNC: 1.22 MG/L FEU (ref 0–0.65)
D DIMER PPP FEU-MCNC: 1.31 MG/L FEU (ref 0–0.65)
D DIMER PPP FEU-MCNC: 11 MG/L FEU (ref 0–0.65)
D DIMER PPP FEU-MCNC: 11.35 MG/L FEU (ref 0–0.65)
D DIMER PPP FEU-MCNC: 2.15 MG/L FEU (ref 0–0.65)
D DIMER PPP FEU-MCNC: 2.36 MG/L FEU (ref 0–0.65)
D DIMER PPP FEU-MCNC: 2.4 MG/L FEU (ref 0–0.65)
D DIMER PPP FEU-MCNC: 2.49 MG/L FEU (ref 0–0.65)
D DIMER PPP FEU-MCNC: 2.49 MG/L FEU (ref 0–0.65)
D DIMER PPP FEU-MCNC: 2.59 MG/L FEU (ref 0–0.65)
D DIMER PPP FEU-MCNC: 2.68 MG/L FEU (ref 0–0.65)
D DIMER PPP FEU-MCNC: 4.13 MG/L FEU (ref 0–0.65)
D DIMER PPP FEU-MCNC: 4.16 MG/L FEU (ref 0–0.65)
D DIMER PPP FEU-MCNC: 4.23 MG/L FEU (ref 0–0.65)
D DIMER PPP FEU-MCNC: 4.47 MG/L FEU (ref 0–0.65)
D DIMER PPP FEU-MCNC: 6.32 MG/L FEU (ref 0–0.65)
D DIMER PPP FEU-MCNC: 6.46 MG/L FEU (ref 0–0.65)
D DIMER PPP FEU-MCNC: 9.23 MG/L FEU (ref 0–0.65)
D DIMER PPP FEU-MCNC: 9.58 MG/L FEU (ref 0–0.65)
D DIMER PPP FEU-MCNC: NORMAL MG/L FEU (ref 0–0.65)
D-DIMER, 115188: 5.59 MG/L FEU (ref 0–0.49)
DATE LAST DOSE: ABNORMAL
DATE LAST DOSE: ABNORMAL
DIAGNOSIS, 93000: NORMAL
DIAGNOSIS, 93000: NORMAL
DIFFERENTIAL METHOD BLD: ABNORMAL
ECHO AO ASC DIAM: 2.89 CM
ECHO AV ANNULUS DIAM: 3.52 CM
ECHO AV AREA PEAK VELOCITY: 3.35 CM2
ECHO AV AREA VTI: 2.88 CM2
ECHO AV AREA/BSA PEAK VELOCITY: 1.4 CM2/M2
ECHO AV AREA/BSA VTI: 1.2 CM2/M2
ECHO AV MEAN GRADIENT: 3.16 MMHG
ECHO AV PEAK GRADIENT: 5.24 MMHG
ECHO AV PEAK VELOCITY: 114.46 CM/S
ECHO AV VTI: 20.08 CM
ECHO IVC PROX: 2.28 CM
ECHO LA AREA 4C: 14.4 CM2
ECHO LA MAJOR AXIS: 3.1 CM
ECHO LA MINOR AXIS: 1.31 CM
ECHO LA VOL 2C: 42.13 ML (ref 18–58)
ECHO LA VOL 4C: 30.58 ML (ref 18–58)
ECHO LA VOL BP: 40.55 ML (ref 18–58)
ECHO LA VOL/BSA BIPLANE: 17.18 ML/M2 (ref 16–28)
ECHO LA VOLUME INDEX A2C: 17.85 ML/M2 (ref 16–28)
ECHO LA VOLUME INDEX A4C: 12.96 ML/M2 (ref 16–28)
ECHO LV E' LATERAL VELOCITY: 8.75 CM/S
ECHO LV E' SEPTAL VELOCITY: 8.92 CM/S
ECHO LV INTERNAL DIMENSION DIASTOLIC: 4.28 CM (ref 4.2–5.9)
ECHO LV INTERNAL DIMENSION SYSTOLIC: 2.76 CM
ECHO LV IVSD: 1.04 CM (ref 0.6–1)
ECHO LV MASS 2D: 153.5 G (ref 88–224)
ECHO LV MASS INDEX 2D: 65 G/M2 (ref 49–115)
ECHO LV POSTERIOR WALL DIASTOLIC: 1.08 CM (ref 0.6–1)
ECHO LVOT CARDIAC OUTPUT: 16.63 LITER/MINUTE
ECHO LVOT DIAM: 2.26 CM
ECHO LVOT PEAK GRADIENT: 3.63 MMHG
ECHO LVOT PEAK VELOCITY: 95.3 CM/S
ECHO LVOT SV: 57.8 ML
ECHO LVOT VTI: 14.35 CM
ECHO MV A VELOCITY: 53.14 CM/S
ECHO MV E DECELERATION TIME (DT): 290.97 MS
ECHO MV E VELOCITY: 66.51 CM/S
ECHO MV E/A RATIO: 1.25
ECHO MV E/E' LATERAL: 7.6
ECHO MV E/E' RATIO (AVERAGED): 7.53
ECHO MV E/E' SEPTAL: 7.46
ECHO PV MAX VELOCITY: 86.49 CM/S
ECHO PV PEAK INSTANTANEOUS GRADIENT SYSTOLIC: 3.06 MMHG
ECHO PV REGURGITANT MAX VELOCITY: 190.84 CM/S
ECHO RV INTERNAL DIMENSION: 3.79 CM
ECHO RV TAPSE: 1.73 CM (ref 1.5–2)
ENTEROTOXIGEN E COLI, DNA: NEGATIVE
EOSINOPHIL # BLD: 0 K/UL (ref 0–0.4)
EOSINOPHIL # BLD: 0.1 K/UL (ref 0–0.4)
EOSINOPHIL # BLD: 0.2 K/UL (ref 0–0.4)
EOSINOPHIL # BLD: 0.3 K/UL (ref 0–0.4)
EOSINOPHIL # BLD: 0.3 K/UL (ref 0–0.4)
EOSINOPHIL # BLD: 0.5 K/UL (ref 0–0.4)
EOSINOPHIL # BLD: 0.5 K/UL (ref 0–0.4)
EOSINOPHIL # BLD: 0.6 K/UL (ref 0–0.4)
EOSINOPHIL # BLD: NORMAL K/UL (ref 0–0.4)
EOSINOPHIL NFR BLD: 0 % (ref 0–7)
EOSINOPHIL NFR BLD: 1 % (ref 0–7)
EOSINOPHIL NFR BLD: 2 % (ref 0–7)
EOSINOPHIL NFR BLD: 2 % (ref 0–7)
EOSINOPHIL NFR BLD: 3 % (ref 0–7)
EOSINOPHIL NFR BLD: 4 % (ref 0–7)
EOSINOPHIL NFR BLD: NORMAL % (ref 0–7)
EPITH CASTS URNS QL MICRO: ABNORMAL /LPF
EPITH CASTS URNS QL MICRO: NORMAL /LPF
EPITH CASTS URNS QL MICRO: NORMAL /LPF
ERYTHROCYTE [DISTWIDTH] IN BLOOD BY AUTOMATED COUNT: 12.1 % (ref 11.5–14.5)
ERYTHROCYTE [DISTWIDTH] IN BLOOD BY AUTOMATED COUNT: 12.4 % (ref 11.5–14.5)
ERYTHROCYTE [DISTWIDTH] IN BLOOD BY AUTOMATED COUNT: 12.7 % (ref 11.5–14.5)
ERYTHROCYTE [DISTWIDTH] IN BLOOD BY AUTOMATED COUNT: 12.7 % (ref 11.5–14.5)
ERYTHROCYTE [DISTWIDTH] IN BLOOD BY AUTOMATED COUNT: 12.8 % (ref 11.5–14.5)
ERYTHROCYTE [DISTWIDTH] IN BLOOD BY AUTOMATED COUNT: 12.9 % (ref 11.5–14.5)
ERYTHROCYTE [DISTWIDTH] IN BLOOD BY AUTOMATED COUNT: 12.9 % (ref 11.5–14.5)
ERYTHROCYTE [DISTWIDTH] IN BLOOD BY AUTOMATED COUNT: 13 % (ref 11.5–14.5)
ERYTHROCYTE [DISTWIDTH] IN BLOOD BY AUTOMATED COUNT: 13.1 % (ref 11.5–14.5)
ERYTHROCYTE [DISTWIDTH] IN BLOOD BY AUTOMATED COUNT: 13.2 % (ref 11.5–14.5)
ERYTHROCYTE [DISTWIDTH] IN BLOOD BY AUTOMATED COUNT: 13.2 % (ref 11.5–14.5)
ERYTHROCYTE [DISTWIDTH] IN BLOOD BY AUTOMATED COUNT: 13.4 % (ref 11.5–14.5)
ERYTHROCYTE [DISTWIDTH] IN BLOOD BY AUTOMATED COUNT: 13.6 % (ref 11.5–14.5)
ERYTHROCYTE [DISTWIDTH] IN BLOOD BY AUTOMATED COUNT: 13.7 % (ref 11.5–14.5)
ERYTHROCYTE [DISTWIDTH] IN BLOOD BY AUTOMATED COUNT: 13.7 % (ref 11.5–14.5)
ERYTHROCYTE [DISTWIDTH] IN BLOOD BY AUTOMATED COUNT: 13.8 % (ref 11.5–14.5)
ERYTHROCYTE [DISTWIDTH] IN BLOOD BY AUTOMATED COUNT: 13.8 % (ref 11.5–14.5)
ERYTHROCYTE [DISTWIDTH] IN BLOOD BY AUTOMATED COUNT: 13.9 % (ref 11.5–14.5)
ERYTHROCYTE [DISTWIDTH] IN BLOOD BY AUTOMATED COUNT: 14.2 % (ref 11.5–14.5)
ERYTHROCYTE [DISTWIDTH] IN BLOOD BY AUTOMATED COUNT: 14.3 % (ref 11.5–14.5)
ERYTHROCYTE [DISTWIDTH] IN BLOOD BY AUTOMATED COUNT: 14.6 % (ref 11.5–14.5)
ERYTHROCYTE [DISTWIDTH] IN BLOOD BY AUTOMATED COUNT: 14.8 % (ref 11.5–14.5)
ERYTHROCYTE [DISTWIDTH] IN BLOOD BY AUTOMATED COUNT: 15.1 % (ref 11.5–14.5)
ERYTHROCYTE [DISTWIDTH] IN BLOOD BY AUTOMATED COUNT: NORMAL % (ref 11.5–14.5)
EST. AVERAGE GLUCOSE BLD GHB EST-MCNC: 120 MG/DL
EST. AVERAGE GLUCOSE BLD GHB EST-MCNC: 272 MG/DL
FERRITIN SERPL-MCNC: 1006 NG/ML (ref 26–388)
FERRITIN SERPL-MCNC: 1031 NG/ML (ref 26–388)
FERRITIN SERPL-MCNC: 1036 NG/ML (ref 26–388)
FERRITIN SERPL-MCNC: 1078 NG/ML (ref 26–388)
FERRITIN SERPL-MCNC: 1110 NG/ML (ref 26–388)
FERRITIN SERPL-MCNC: 1249 NG/ML (ref 26–388)
FERRITIN SERPL-MCNC: 848 NG/ML (ref 26–388)
FERRITIN SERPL-MCNC: 871 NG/ML (ref 26–388)
FERRITIN SERPL-MCNC: 908 NG/ML (ref 26–388)
FERRITIN SERPL-MCNC: 941 NG/ML (ref 26–388)
FERRITIN SERPL-MCNC: 943 NG/ML (ref 26–388)
FERRITIN SERPL-MCNC: 954 NG/ML (ref 26–388)
FERRITIN SERPL-MCNC: 975 NG/ML (ref 26–388)
FERRITIN SERPL-MCNC: 992 NG/ML (ref 26–388)
FIBRINOGEN ACTIVITY, 001610: 258 MG/DL (ref 193–507)
FIBRINOGEN PPP-MCNC: 239 MG/DL (ref 200–475)
FIBRINOGEN PPP-MCNC: 261 MG/DL (ref 200–475)
FIBRINOGEN PPP-MCNC: 274 MG/DL (ref 200–475)
FIBRINOGEN PPP-MCNC: 352 MG/DL (ref 200–475)
FIBRINOGEN PPP-MCNC: 391 MG/DL (ref 200–475)
FIBRINOGEN PPP-MCNC: 458 MG/DL (ref 200–475)
FIBRINOGEN PPP-MCNC: 462 MG/DL (ref 200–475)
FIBRINOGEN PPP-MCNC: 469 MG/DL (ref 200–475)
FIBRINOGEN PPP-MCNC: 530 MG/DL (ref 200–475)
FIBRINOGEN PPP-MCNC: 556 MG/DL (ref 200–475)
FIBRINOGEN PPP-MCNC: 577 MG/DL (ref 200–475)
FIBRINOGEN PPP-MCNC: 598 MG/DL (ref 200–475)
FIBRINOGEN PPP-MCNC: 616 MG/DL (ref 200–475)
FIBRINOGEN PPP-MCNC: 627 MG/DL (ref 200–475)
FIBRINOGEN PPP-MCNC: 639 MG/DL (ref 200–475)
FIBRINOGEN PPP-MCNC: 644 MG/DL (ref 200–475)
FIBRINOGEN PPP-MCNC: 670 MG/DL (ref 200–475)
FIBRINOGEN PPP-MCNC: 676 MG/DL (ref 200–475)
FIBRINOGEN PPP-MCNC: 727 MG/DL (ref 200–475)
FIBRINOGEN PPP-MCNC: 727 MG/DL (ref 200–475)
FIBRINOGEN PPP-MCNC: 799 MG/DL (ref 200–475)
FIBRINOGEN PPP-MCNC: >800 MG/DL (ref 200–475)
FIBRINOGEN PPP-MCNC: NORMAL MG/DL (ref 200–475)
FIO2 ON VENT: 100 %
FIO2 ON VENT: 70 %
FIO2 ON VENT: 80 %
FLUAV H1 2009 PAND RNA SPEC QL NAA+PROBE: NOT DETECTED
FLUAV H1 RNA SPEC QL NAA+PROBE: NOT DETECTED
FLUAV H3 RNA SPEC QL NAA+PROBE: NOT DETECTED
FLUAV SUBTYP SPEC NAA+PROBE: NOT DETECTED
FLUBV RNA SPEC QL NAA+PROBE: NOT DETECTED
FLUID CULTURE, SPNG2: NORMAL
FSP PPP LA-ACNC: 5 UG/ML
GAS FLOW.O2 O2 DELIVERY SYS: 70 L/MIN
GAS FLOW.O2 O2 DELIVERY SYS: ABNORMAL L/MIN
GAS FLOW.O2 SETTING OXYMISER: 20 BPM
GAS FLOW.O2 SETTING OXYMISER: 26 L/MIN
GAS FLOW.O2 SETTING OXYMISER: 28 BPM
GAS FLOW.O2 SETTING OXYMISER: 28 L/MIN
GAS FLOW.O2 SETTING OXYMISER: 30 BPM
GLOBULIN SER CALC-MCNC: 3 G/DL (ref 2–4)
GLOBULIN SER CALC-MCNC: 3.1 G/DL (ref 2–4)
GLOBULIN SER CALC-MCNC: 3.1 G/DL (ref 2–4)
GLOBULIN SER CALC-MCNC: 3.3 G/DL (ref 2–4)
GLOBULIN SER CALC-MCNC: 3.4 G/DL (ref 2–4)
GLOBULIN SER CALC-MCNC: 3.4 G/DL (ref 2–4)
GLOBULIN SER CALC-MCNC: 3.5 G/DL (ref 2–4)
GLOBULIN SER CALC-MCNC: 3.6 G/DL (ref 2–4)
GLOBULIN SER CALC-MCNC: 3.6 G/DL (ref 2–4)
GLOBULIN SER CALC-MCNC: 3.7 G/DL (ref 2–4)
GLOBULIN SER CALC-MCNC: 3.8 G/DL (ref 2–4)
GLOBULIN SER CALC-MCNC: 3.8 G/DL (ref 2–4)
GLOBULIN SER CALC-MCNC: 3.9 G/DL (ref 2–4)
GLOBULIN SER CALC-MCNC: 4 G/DL (ref 2–4)
GLOBULIN SER CALC-MCNC: 4 G/DL (ref 2–4)
GLOBULIN SER CALC-MCNC: 4.2 G/DL (ref 2–4)
GLOBULIN SER CALC-MCNC: 4.3 G/DL (ref 2–4)
GLOBULIN SER CALC-MCNC: 4.4 G/DL (ref 2–4)
GLOBULIN SER CALC-MCNC: 4.6 G/DL (ref 2–4)
GLOBULIN SER CALC-MCNC: 4.6 G/DL (ref 2–4)
GLOBULIN SER CALC-MCNC: 4.7 G/DL (ref 2–4)
GLOBULIN SER CALC-MCNC: 4.8 G/DL (ref 2–4)
GLOBULIN SER CALC-MCNC: 4.9 G/DL (ref 2–4)
GLOBULIN SER CALC-MCNC: 4.9 G/DL (ref 2–4)
GLOBULIN SER CALC-MCNC: 5 G/DL (ref 2–4)
GLOBULIN SER CALC-MCNC: ABNORMAL G/DL (ref 2–4)
GLUCOSE BLD STRIP.AUTO-MCNC: 105 MG/DL (ref 65–117)
GLUCOSE BLD STRIP.AUTO-MCNC: 107 MG/DL (ref 65–117)
GLUCOSE BLD STRIP.AUTO-MCNC: 108 MG/DL (ref 65–117)
GLUCOSE BLD STRIP.AUTO-MCNC: 113 MG/DL (ref 65–117)
GLUCOSE BLD STRIP.AUTO-MCNC: 114 MG/DL (ref 65–117)
GLUCOSE BLD STRIP.AUTO-MCNC: 114 MG/DL (ref 65–117)
GLUCOSE BLD STRIP.AUTO-MCNC: 117 MG/DL (ref 65–117)
GLUCOSE BLD STRIP.AUTO-MCNC: 120 MG/DL (ref 65–117)
GLUCOSE BLD STRIP.AUTO-MCNC: 122 MG/DL (ref 65–117)
GLUCOSE BLD STRIP.AUTO-MCNC: 125 MG/DL (ref 65–117)
GLUCOSE BLD STRIP.AUTO-MCNC: 125 MG/DL (ref 65–117)
GLUCOSE BLD STRIP.AUTO-MCNC: 126 MG/DL (ref 65–117)
GLUCOSE BLD STRIP.AUTO-MCNC: 127 MG/DL (ref 65–117)
GLUCOSE BLD STRIP.AUTO-MCNC: 129 MG/DL (ref 65–117)
GLUCOSE BLD STRIP.AUTO-MCNC: 131 MG/DL (ref 65–117)
GLUCOSE BLD STRIP.AUTO-MCNC: 132 MG/DL (ref 65–117)
GLUCOSE BLD STRIP.AUTO-MCNC: 133 MG/DL (ref 65–117)
GLUCOSE BLD STRIP.AUTO-MCNC: 134 MG/DL (ref 65–117)
GLUCOSE BLD STRIP.AUTO-MCNC: 136 MG/DL (ref 65–117)
GLUCOSE BLD STRIP.AUTO-MCNC: 137 MG/DL (ref 65–117)
GLUCOSE BLD STRIP.AUTO-MCNC: 138 MG/DL (ref 65–117)
GLUCOSE BLD STRIP.AUTO-MCNC: 140 MG/DL (ref 65–117)
GLUCOSE BLD STRIP.AUTO-MCNC: 141 MG/DL (ref 65–117)
GLUCOSE BLD STRIP.AUTO-MCNC: 142 MG/DL (ref 65–117)
GLUCOSE BLD STRIP.AUTO-MCNC: 143 MG/DL (ref 65–117)
GLUCOSE BLD STRIP.AUTO-MCNC: 143 MG/DL (ref 65–117)
GLUCOSE BLD STRIP.AUTO-MCNC: 144 MG/DL (ref 65–117)
GLUCOSE BLD STRIP.AUTO-MCNC: 145 MG/DL (ref 65–117)
GLUCOSE BLD STRIP.AUTO-MCNC: 146 MG/DL (ref 65–117)
GLUCOSE BLD STRIP.AUTO-MCNC: 147 MG/DL (ref 65–117)
GLUCOSE BLD STRIP.AUTO-MCNC: 148 MG/DL (ref 65–117)
GLUCOSE BLD STRIP.AUTO-MCNC: 148 MG/DL (ref 65–117)
GLUCOSE BLD STRIP.AUTO-MCNC: 149 MG/DL (ref 65–117)
GLUCOSE BLD STRIP.AUTO-MCNC: 150 MG/DL (ref 65–117)
GLUCOSE BLD STRIP.AUTO-MCNC: 151 MG/DL (ref 65–117)
GLUCOSE BLD STRIP.AUTO-MCNC: 151 MG/DL (ref 65–117)
GLUCOSE BLD STRIP.AUTO-MCNC: 152 MG/DL (ref 65–117)
GLUCOSE BLD STRIP.AUTO-MCNC: 152 MG/DL (ref 65–117)
GLUCOSE BLD STRIP.AUTO-MCNC: 153 MG/DL (ref 65–117)
GLUCOSE BLD STRIP.AUTO-MCNC: 154 MG/DL (ref 65–117)
GLUCOSE BLD STRIP.AUTO-MCNC: 155 MG/DL (ref 65–117)
GLUCOSE BLD STRIP.AUTO-MCNC: 155 MG/DL (ref 65–117)
GLUCOSE BLD STRIP.AUTO-MCNC: 156 MG/DL (ref 65–117)
GLUCOSE BLD STRIP.AUTO-MCNC: 157 MG/DL (ref 65–117)
GLUCOSE BLD STRIP.AUTO-MCNC: 159 MG/DL (ref 65–117)
GLUCOSE BLD STRIP.AUTO-MCNC: 159 MG/DL (ref 65–117)
GLUCOSE BLD STRIP.AUTO-MCNC: 160 MG/DL (ref 65–117)
GLUCOSE BLD STRIP.AUTO-MCNC: 161 MG/DL (ref 65–117)
GLUCOSE BLD STRIP.AUTO-MCNC: 162 MG/DL (ref 65–117)
GLUCOSE BLD STRIP.AUTO-MCNC: 164 MG/DL (ref 65–117)
GLUCOSE BLD STRIP.AUTO-MCNC: 166 MG/DL (ref 65–117)
GLUCOSE BLD STRIP.AUTO-MCNC: 166 MG/DL (ref 65–117)
GLUCOSE BLD STRIP.AUTO-MCNC: 168 MG/DL (ref 65–117)
GLUCOSE BLD STRIP.AUTO-MCNC: 168 MG/DL (ref 65–117)
GLUCOSE BLD STRIP.AUTO-MCNC: 169 MG/DL (ref 65–117)
GLUCOSE BLD STRIP.AUTO-MCNC: 170 MG/DL (ref 65–117)
GLUCOSE BLD STRIP.AUTO-MCNC: 171 MG/DL (ref 65–117)
GLUCOSE BLD STRIP.AUTO-MCNC: 171 MG/DL (ref 65–117)
GLUCOSE BLD STRIP.AUTO-MCNC: 172 MG/DL (ref 65–117)
GLUCOSE BLD STRIP.AUTO-MCNC: 172 MG/DL (ref 65–117)
GLUCOSE BLD STRIP.AUTO-MCNC: 173 MG/DL (ref 65–117)
GLUCOSE BLD STRIP.AUTO-MCNC: 173 MG/DL (ref 65–117)
GLUCOSE BLD STRIP.AUTO-MCNC: 175 MG/DL (ref 65–117)
GLUCOSE BLD STRIP.AUTO-MCNC: 176 MG/DL (ref 65–117)
GLUCOSE BLD STRIP.AUTO-MCNC: 177 MG/DL (ref 65–117)
GLUCOSE BLD STRIP.AUTO-MCNC: 178 MG/DL (ref 65–117)
GLUCOSE BLD STRIP.AUTO-MCNC: 178 MG/DL (ref 65–117)
GLUCOSE BLD STRIP.AUTO-MCNC: 179 MG/DL (ref 65–117)
GLUCOSE BLD STRIP.AUTO-MCNC: 183 MG/DL (ref 65–117)
GLUCOSE BLD STRIP.AUTO-MCNC: 183 MG/DL (ref 65–117)
GLUCOSE BLD STRIP.AUTO-MCNC: 186 MG/DL (ref 65–117)
GLUCOSE BLD STRIP.AUTO-MCNC: 186 MG/DL (ref 65–117)
GLUCOSE BLD STRIP.AUTO-MCNC: 189 MG/DL (ref 65–117)
GLUCOSE BLD STRIP.AUTO-MCNC: 190 MG/DL (ref 65–117)
GLUCOSE BLD STRIP.AUTO-MCNC: 192 MG/DL (ref 65–117)
GLUCOSE BLD STRIP.AUTO-MCNC: 195 MG/DL (ref 65–117)
GLUCOSE BLD STRIP.AUTO-MCNC: 197 MG/DL (ref 65–117)
GLUCOSE BLD STRIP.AUTO-MCNC: 198 MG/DL (ref 65–117)
GLUCOSE BLD STRIP.AUTO-MCNC: 199 MG/DL (ref 65–117)
GLUCOSE BLD STRIP.AUTO-MCNC: 200 MG/DL (ref 65–117)
GLUCOSE BLD STRIP.AUTO-MCNC: 201 MG/DL (ref 65–117)
GLUCOSE BLD STRIP.AUTO-MCNC: 201 MG/DL (ref 65–117)
GLUCOSE BLD STRIP.AUTO-MCNC: 202 MG/DL (ref 65–117)
GLUCOSE BLD STRIP.AUTO-MCNC: 202 MG/DL (ref 65–117)
GLUCOSE BLD STRIP.AUTO-MCNC: 208 MG/DL (ref 65–117)
GLUCOSE BLD STRIP.AUTO-MCNC: 208 MG/DL (ref 65–117)
GLUCOSE BLD STRIP.AUTO-MCNC: 209 MG/DL (ref 65–117)
GLUCOSE BLD STRIP.AUTO-MCNC: 210 MG/DL (ref 65–117)
GLUCOSE BLD STRIP.AUTO-MCNC: 213 MG/DL (ref 65–117)
GLUCOSE BLD STRIP.AUTO-MCNC: 217 MG/DL (ref 65–117)
GLUCOSE BLD STRIP.AUTO-MCNC: 225 MG/DL (ref 65–117)
GLUCOSE BLD STRIP.AUTO-MCNC: 227 MG/DL (ref 65–117)
GLUCOSE BLD STRIP.AUTO-MCNC: 228 MG/DL (ref 65–117)
GLUCOSE BLD STRIP.AUTO-MCNC: 236 MG/DL (ref 65–117)
GLUCOSE BLD STRIP.AUTO-MCNC: 238 MG/DL (ref 65–117)
GLUCOSE BLD STRIP.AUTO-MCNC: 250 MG/DL (ref 65–117)
GLUCOSE BLD STRIP.AUTO-MCNC: 251 MG/DL (ref 65–117)
GLUCOSE BLD STRIP.AUTO-MCNC: 255 MG/DL (ref 65–117)
GLUCOSE BLD STRIP.AUTO-MCNC: 263 MG/DL (ref 65–117)
GLUCOSE BLD STRIP.AUTO-MCNC: 284 MG/DL (ref 65–117)
GLUCOSE BLD STRIP.AUTO-MCNC: 307 MG/DL (ref 65–117)
GLUCOSE BLD STRIP.AUTO-MCNC: 98 MG/DL (ref 65–117)
GLUCOSE SERPL-MCNC: 115 MG/DL (ref 65–100)
GLUCOSE SERPL-MCNC: 121 MG/DL (ref 65–100)
GLUCOSE SERPL-MCNC: 122 MG/DL (ref 65–100)
GLUCOSE SERPL-MCNC: 126 MG/DL (ref 65–100)
GLUCOSE SERPL-MCNC: 129 MG/DL (ref 65–100)
GLUCOSE SERPL-MCNC: 130 MG/DL (ref 65–100)
GLUCOSE SERPL-MCNC: 132 MG/DL (ref 65–100)
GLUCOSE SERPL-MCNC: 132 MG/DL (ref 65–100)
GLUCOSE SERPL-MCNC: 136 MG/DL (ref 65–100)
GLUCOSE SERPL-MCNC: 137 MG/DL (ref 65–100)
GLUCOSE SERPL-MCNC: 137 MG/DL (ref 65–100)
GLUCOSE SERPL-MCNC: 140 MG/DL (ref 65–100)
GLUCOSE SERPL-MCNC: 144 MG/DL (ref 65–100)
GLUCOSE SERPL-MCNC: 152 MG/DL (ref 65–100)
GLUCOSE SERPL-MCNC: 153 MG/DL (ref 65–100)
GLUCOSE SERPL-MCNC: 153 MG/DL (ref 65–100)
GLUCOSE SERPL-MCNC: 154 MG/DL (ref 65–100)
GLUCOSE SERPL-MCNC: 159 MG/DL (ref 65–100)
GLUCOSE SERPL-MCNC: 160 MG/DL (ref 65–100)
GLUCOSE SERPL-MCNC: 162 MG/DL (ref 65–100)
GLUCOSE SERPL-MCNC: 164 MG/DL (ref 65–100)
GLUCOSE SERPL-MCNC: 170 MG/DL (ref 65–100)
GLUCOSE SERPL-MCNC: 191 MG/DL (ref 65–100)
GLUCOSE SERPL-MCNC: 195 MG/DL (ref 65–100)
GLUCOSE SERPL-MCNC: 211 MG/DL (ref 65–100)
GLUCOSE SERPL-MCNC: 219 MG/DL (ref 65–100)
GLUCOSE SERPL-MCNC: 220 MG/DL (ref 65–100)
GLUCOSE SERPL-MCNC: 226 MG/DL (ref 65–100)
GLUCOSE SERPL-MCNC: 232 MG/DL (ref 65–100)
GLUCOSE SERPL-MCNC: 234 MG/DL (ref 65–100)
GLUCOSE SERPL-MCNC: 235 MG/DL (ref 65–100)
GLUCOSE SERPL-MCNC: 238 MG/DL (ref 65–100)
GLUCOSE SERPL-MCNC: 243 MG/DL (ref 65–100)
GLUCOSE SERPL-MCNC: 271 MG/DL (ref 65–100)
GLUCOSE SERPL-MCNC: 297 MG/DL (ref 65–100)
GLUCOSE SERPL-MCNC: 299 MG/DL (ref 65–100)
GLUCOSE SERPL-MCNC: 310 MG/DL (ref 65–100)
GLUCOSE SERPL-MCNC: 341 MG/DL (ref 65–100)
GLUCOSE SERPL-MCNC: 468 MG/DL (ref 65–100)
GLUCOSE SERPL-MCNC: 95 MG/DL (ref 65–100)
GLUCOSE UR STRIP.AUTO-MCNC: NEGATIVE MG/DL
GRAM STN SPEC: NORMAL
HADV DNA SPEC QL NAA+PROBE: NOT DETECTED
HBA1C MFR BLD: 11.1 % (ref 4–5.6)
HBA1C MFR BLD: 5.8 % (ref 4–5.6)
HCO3 BLD-SCNC: 33.1 MMOL/L (ref 22–26)
HCO3 BLD-SCNC: 34.8 MMOL/L (ref 22–26)
HCO3 BLD-SCNC: 35.2 MMOL/L (ref 22–26)
HCO3 BLD-SCNC: 36 MMOL/L (ref 22–26)
HCO3 BLD-SCNC: 36.2 MMOL/L (ref 22–26)
HCO3 BLD-SCNC: 37.1 MMOL/L (ref 22–26)
HCO3 BLD-SCNC: 37.3 MMOL/L (ref 22–26)
HCO3 BLD-SCNC: 38.5 MMOL/L (ref 22–26)
HCO3 BLD-SCNC: 38.5 MMOL/L (ref 22–26)
HCO3 BLD-SCNC: 38.8 MMOL/L (ref 22–26)
HCO3 BLD-SCNC: 38.8 MMOL/L (ref 22–26)
HCO3 BLD-SCNC: 39 MMOL/L (ref 22–26)
HCO3 BLD-SCNC: 39.6 MMOL/L (ref 22–26)
HCO3 BLD-SCNC: 40.2 MMOL/L (ref 22–26)
HCO3 BLD-SCNC: 47.9 MMOL/L (ref 22–26)
HCO3 BLDA-SCNC: 20 MMOL/L (ref 22–26)
HCO3 BLDA-SCNC: 21 MMOL/L (ref 22–26)
HCO3 BLDA-SCNC: 29 MMOL/L (ref 22–26)
HCO3 BLDA-SCNC: 32 MMOL/L (ref 22–26)
HCO3 BLDA-SCNC: 34 MMOL/L (ref 22–26)
HCO3 BLDA-SCNC: 35 MMOL/L (ref 22–26)
HCO3 BLDA-SCNC: 37 MMOL/L (ref 22–26)
HCO3 BLDA-SCNC: 38 MMOL/L (ref 22–26)
HCO3 BLDA-SCNC: 39 MMOL/L (ref 22–26)
HCO3 BLDV-SCNC: 24.3 MMOL/L (ref 23–28)
HCO3 BLDV-SCNC: 24.9 MMOL/L (ref 23–28)
HCOV 229E RNA SPEC QL NAA+PROBE: NOT DETECTED
HCOV HKU1 RNA SPEC QL NAA+PROBE: NOT DETECTED
HCOV NL63 RNA SPEC QL NAA+PROBE: NOT DETECTED
HCOV OC43 RNA SPEC QL NAA+PROBE: NOT DETECTED
HCT VFR BLD AUTO: 31 % (ref 36.6–50.3)
HCT VFR BLD AUTO: 32.2 % (ref 36.6–50.3)
HCT VFR BLD AUTO: 32.3 % (ref 36.6–50.3)
HCT VFR BLD AUTO: 32.4 % (ref 36.6–50.3)
HCT VFR BLD AUTO: 32.5 % (ref 36.6–50.3)
HCT VFR BLD AUTO: 33 % (ref 36.6–50.3)
HCT VFR BLD AUTO: 33.1 % (ref 36.6–50.3)
HCT VFR BLD AUTO: 34 % (ref 36.6–50.3)
HCT VFR BLD AUTO: 34.4 % (ref 36.6–50.3)
HCT VFR BLD AUTO: 34.7 % (ref 36.6–50.3)
HCT VFR BLD AUTO: 35.4 % (ref 36.6–50.3)
HCT VFR BLD AUTO: 36.5 % (ref 36.6–50.3)
HCT VFR BLD AUTO: 37 % (ref 36.6–50.3)
HCT VFR BLD AUTO: 38.6 % (ref 36.6–50.3)
HCT VFR BLD AUTO: 39 % (ref 36.6–50.3)
HCT VFR BLD AUTO: 39 % (ref 36.6–50.3)
HCT VFR BLD AUTO: 39.8 % (ref 36.6–50.3)
HCT VFR BLD AUTO: 40.1 % (ref 36.6–50.3)
HCT VFR BLD AUTO: 40.9 % (ref 36.6–50.3)
HCT VFR BLD AUTO: 41 % (ref 36.6–50.3)
HCT VFR BLD AUTO: 41.1 % (ref 36.6–50.3)
HCT VFR BLD AUTO: 41.1 % (ref 36.6–50.3)
HCT VFR BLD AUTO: 41.9 % (ref 36.6–50.3)
HCT VFR BLD AUTO: 42.3 % (ref 36.6–50.3)
HCT VFR BLD AUTO: 42.7 % (ref 36.6–50.3)
HCT VFR BLD AUTO: 43 % (ref 36.6–50.3)
HCT VFR BLD AUTO: 43 % (ref 36.6–50.3)
HCT VFR BLD AUTO: 45.4 % (ref 36.6–50.3)
HCT VFR BLD AUTO: 45.7 % (ref 36.6–50.3)
HCT VFR BLD AUTO: 45.8 % (ref 36.6–50.3)
HCT VFR BLD AUTO: 45.9 % (ref 36.6–50.3)
HCT VFR BLD AUTO: 45.9 % (ref 36.6–50.3)
HCT VFR BLD AUTO: 46.2 % (ref 36.6–50.3)
HCT VFR BLD AUTO: 48.1 % (ref 36.6–50.3)
HCT VFR BLD AUTO: 48.8 % (ref 36.6–50.3)
HCT VFR BLD AUTO: 49.3 % (ref 36.6–50.3)
HCT VFR BLD AUTO: NORMAL % (ref 36.6–50.3)
HDLC SERPL-MCNC: 37 MG/DL
HDLC SERPL-MCNC: 54 MG/DL
HDLC SERPL: 3.2 {RATIO} (ref 0–5)
HDLC SERPL: 4.3 {RATIO} (ref 0–5)
HGB BLD-MCNC: 10 G/DL (ref 12.1–17)
HGB BLD-MCNC: 10.1 G/DL (ref 12.1–17)
HGB BLD-MCNC: 10.2 G/DL (ref 12.1–17)
HGB BLD-MCNC: 10.3 G/DL (ref 12.1–17)
HGB BLD-MCNC: 10.7 G/DL (ref 12.1–17)
HGB BLD-MCNC: 11.1 G/DL (ref 12.1–17)
HGB BLD-MCNC: 11.4 G/DL (ref 12.1–17)
HGB BLD-MCNC: 11.8 G/DL (ref 12.1–17)
HGB BLD-MCNC: 11.8 G/DL (ref 12.1–17)
HGB BLD-MCNC: 11.9 G/DL (ref 12.1–17)
HGB BLD-MCNC: 12.3 G/DL (ref 12.1–17)
HGB BLD-MCNC: 12.7 G/DL (ref 12.1–17)
HGB BLD-MCNC: 12.7 G/DL (ref 12.1–17)
HGB BLD-MCNC: 13 G/DL (ref 12.1–17)
HGB BLD-MCNC: 13 G/DL (ref 12.1–17)
HGB BLD-MCNC: 13.1 G/DL (ref 12.1–17)
HGB BLD-MCNC: 13.4 G/DL (ref 12.1–17)
HGB BLD-MCNC: 13.7 G/DL (ref 12.1–17)
HGB BLD-MCNC: 13.7 G/DL (ref 12.1–17)
HGB BLD-MCNC: 13.9 G/DL (ref 12.1–17)
HGB BLD-MCNC: 14 G/DL (ref 12.1–17)
HGB BLD-MCNC: 14.2 G/DL (ref 12.1–17)
HGB BLD-MCNC: 14.2 G/DL (ref 12.1–17)
HGB BLD-MCNC: 14.9 G/DL (ref 12.1–17)
HGB BLD-MCNC: 15.5 G/DL (ref 12.1–17)
HGB BLD-MCNC: 15.6 G/DL (ref 12.1–17)
HGB BLD-MCNC: 15.7 G/DL (ref 12.1–17)
HGB BLD-MCNC: 15.8 G/DL (ref 12.1–17)
HGB BLD-MCNC: 16.6 G/DL (ref 12.1–17)
HGB BLD-MCNC: 16.8 G/DL (ref 12.1–17)
HGB BLD-MCNC: 17.1 G/DL (ref 12.1–17)
HGB BLD-MCNC: NORMAL G/DL (ref 12.1–17)
HGB UR QL STRIP: NEGATIVE
HMPV RNA SPEC QL NAA+PROBE: NOT DETECTED
HPIV1 RNA SPEC QL NAA+PROBE: NOT DETECTED
HPIV2 RNA SPEC QL NAA+PROBE: NOT DETECTED
HPIV3 RNA SPEC QL NAA+PROBE: NOT DETECTED
HPIV4 RNA SPEC QL NAA+PROBE: NOT DETECTED
HYALINE CASTS URNS QL MICRO: ABNORMAL /LPF (ref 0–5)
HYALINE CASTS URNS QL MICRO: NORMAL /LPF (ref 0–5)
HYALINE CASTS URNS QL MICRO: NORMAL /LPF (ref 0–5)
IMM GRANULOCYTES # BLD AUTO: 0 K/UL
IMM GRANULOCYTES # BLD AUTO: 0.1 K/UL (ref 0–0.04)
IMM GRANULOCYTES # BLD AUTO: 0.2 K/UL (ref 0–0.04)
IMM GRANULOCYTES # BLD AUTO: 0.3 K/UL (ref 0–0.04)
IMM GRANULOCYTES # BLD AUTO: 0.3 K/UL (ref 0–0.04)
IMM GRANULOCYTES # BLD AUTO: 0.6 K/UL (ref 0–0.04)
IMM GRANULOCYTES # BLD AUTO: 0.6 K/UL (ref 0–0.04)
IMM GRANULOCYTES # BLD AUTO: 0.8 K/UL (ref 0–0.04)
IMM GRANULOCYTES # BLD AUTO: 0.8 K/UL (ref 0–0.04)
IMM GRANULOCYTES # BLD AUTO: NORMAL K/UL (ref 0–0.04)
IMM GRANULOCYTES NFR BLD AUTO: 0 %
IMM GRANULOCYTES NFR BLD AUTO: 1 % (ref 0–0.5)
IMM GRANULOCYTES NFR BLD AUTO: 2 % (ref 0–0.5)
IMM GRANULOCYTES NFR BLD AUTO: 3 % (ref 0–0.5)
IMM GRANULOCYTES NFR BLD AUTO: 3 % (ref 0–0.5)
IMM GRANULOCYTES NFR BLD AUTO: 4 % (ref 0–0.5)
IMM GRANULOCYTES NFR BLD AUTO: NORMAL % (ref 0–0.5)
INR PPP: 1 (ref 0.9–1.1)
INR PPP: 1.1 (ref 0.9–1.1)
INR PPP: 1.2 (ref 0.9–1.1)
INR PPP: 1.2 (ref 0.9–1.1)
INR PPP: 1.2 (ref 0.9–1.2)
INR PPP: NORMAL (ref 0.9–1.1)
KETONES UR QL STRIP.AUTO: ABNORMAL MG/DL
KETONES UR QL STRIP.AUTO: NEGATIVE MG/DL
KETONES UR QL STRIP.AUTO: NEGATIVE MG/DL
L PNEUMO1 AG UR QL IA: NEGATIVE
LA VOL DISK BP: 37.53 ML (ref 18–58)
LACTATE SERPL-SCNC: 1.1 MMOL/L (ref 0.4–2)
LDH SERPL L TO P-CCNC: 1058 U/L (ref 85–241)
LDH SERPL L TO P-CCNC: 1062 U/L (ref 85–241)
LDH SERPL L TO P-CCNC: 1142 U/L (ref 85–241)
LDH SERPL L TO P-CCNC: 1146 U/L (ref 85–241)
LDH SERPL L TO P-CCNC: 1316 U/L (ref 85–241)
LDH SERPL L TO P-CCNC: 1356 U/L (ref 85–241)
LDH SERPL L TO P-CCNC: 1399 U/L (ref 85–241)
LDH SERPL L TO P-CCNC: 1817 U/L (ref 85–241)
LDH SERPL L TO P-CCNC: 731 U/L (ref 85–241)
LDH SERPL L TO P-CCNC: 756 U/L (ref 85–241)
LDH SERPL L TO P-CCNC: 979 U/L (ref 85–241)
LDH SERPL L TO P-CCNC: 984 U/L (ref 85–241)
LDLC SERPL CALC-MCNC: 70 MG/DL (ref 0–100)
LDLC SERPL CALC-MCNC: 98.6 MG/DL (ref 0–100)
LEUKOCYTE ESTERASE UR QL STRIP.AUTO: NEGATIVE
LIPID PROFILE,FLP: ABNORMAL
LVOT MG: 2.05 MMHG
LYMPHOCYTES # BLD: 0.3 K/UL (ref 0.8–3.5)
LYMPHOCYTES # BLD: 0.3 K/UL (ref 0.8–3.5)
LYMPHOCYTES # BLD: 0.4 K/UL (ref 0.8–3.5)
LYMPHOCYTES # BLD: 0.5 K/UL (ref 0.8–3.5)
LYMPHOCYTES # BLD: 0.5 K/UL (ref 0.8–3.5)
LYMPHOCYTES # BLD: 0.6 K/UL (ref 0.8–3.5)
LYMPHOCYTES # BLD: 0.7 K/UL (ref 0.8–3.5)
LYMPHOCYTES # BLD: 0.8 K/UL (ref 0.8–3.5)
LYMPHOCYTES # BLD: 0.9 K/UL (ref 0.8–3.5)
LYMPHOCYTES # BLD: 1 K/UL (ref 0.8–3.5)
LYMPHOCYTES # BLD: 1.2 K/UL (ref 0.8–3.5)
LYMPHOCYTES # BLD: 1.3 K/UL (ref 0.8–3.5)
LYMPHOCYTES # BLD: 1.4 K/UL (ref 0.8–3.5)
LYMPHOCYTES # BLD: 1.6 K/UL (ref 0.8–3.5)
LYMPHOCYTES # BLD: 1.7 K/UL (ref 0.8–3.5)
LYMPHOCYTES # BLD: 1.8 K/UL (ref 0.8–3.5)
LYMPHOCYTES # BLD: 1.8 K/UL (ref 0.8–3.5)
LYMPHOCYTES # BLD: NORMAL K/UL (ref 0.8–3.5)
LYMPHOCYTES NFR BLD: 11 % (ref 12–49)
LYMPHOCYTES NFR BLD: 15 % (ref 12–49)
LYMPHOCYTES NFR BLD: 2 % (ref 12–49)
LYMPHOCYTES NFR BLD: 3 % (ref 12–49)
LYMPHOCYTES NFR BLD: 4 % (ref 12–49)
LYMPHOCYTES NFR BLD: 4 % (ref 12–49)
LYMPHOCYTES NFR BLD: 5 % (ref 12–49)
LYMPHOCYTES NFR BLD: 6 % (ref 12–49)
LYMPHOCYTES NFR BLD: 7 % (ref 12–49)
LYMPHOCYTES NFR BLD: 7 % (ref 12–49)
LYMPHOCYTES NFR BLD: 8 % (ref 12–49)
LYMPHOCYTES NFR BLD: 9 % (ref 12–49)
LYMPHOCYTES NFR BLD: 9 % (ref 12–49)
LYMPHOCYTES NFR BLD: NORMAL % (ref 12–49)
M PNEUMO DNA SPEC QL NAA+PROBE: NOT DETECTED
MAGNESIUM SERPL-MCNC: 1.6 MG/DL (ref 1.6–2.4)
MAGNESIUM SERPL-MCNC: 1.9 MG/DL (ref 1.6–2.4)
MAGNESIUM SERPL-MCNC: 1.9 MG/DL (ref 1.6–2.4)
MAGNESIUM SERPL-MCNC: 2 MG/DL (ref 1.6–2.4)
MAGNESIUM SERPL-MCNC: 2.1 MG/DL (ref 1.6–2.4)
MAGNESIUM SERPL-MCNC: 2.2 MG/DL (ref 1.6–2.4)
MAGNESIUM SERPL-MCNC: 2.3 MG/DL (ref 1.6–2.4)
MAGNESIUM SERPL-MCNC: 2.4 MG/DL (ref 1.6–2.4)
MAGNESIUM SERPL-MCNC: 2.5 MG/DL (ref 1.6–2.4)
MAGNESIUM SERPL-MCNC: 2.6 MG/DL (ref 1.6–2.4)
MAGNESIUM SERPL-MCNC: 2.6 MG/DL (ref 1.6–2.4)
MAGNESIUM SERPL-MCNC: 2.7 MG/DL (ref 1.6–2.4)
MAGNESIUM SERPL-MCNC: 2.8 MG/DL (ref 1.6–2.4)
MAGNESIUM SERPL-MCNC: <0.3 MG/DL (ref 1.6–2.4)
MCH RBC QN AUTO: 30 PG (ref 26–34)
MCH RBC QN AUTO: 30 PG (ref 26–34)
MCH RBC QN AUTO: 30.2 PG (ref 26–34)
MCH RBC QN AUTO: 30.2 PG (ref 26–34)
MCH RBC QN AUTO: 30.3 PG (ref 26–34)
MCH RBC QN AUTO: 30.4 PG (ref 26–34)
MCH RBC QN AUTO: 30.4 PG (ref 26–34)
MCH RBC QN AUTO: 30.5 PG (ref 26–34)
MCH RBC QN AUTO: 30.6 PG (ref 26–34)
MCH RBC QN AUTO: 30.7 PG (ref 26–34)
MCH RBC QN AUTO: 30.7 PG (ref 26–34)
MCH RBC QN AUTO: 30.8 PG (ref 26–34)
MCH RBC QN AUTO: 30.9 PG (ref 26–34)
MCH RBC QN AUTO: 30.9 PG (ref 26–34)
MCH RBC QN AUTO: 31 PG (ref 26–34)
MCH RBC QN AUTO: 31 PG (ref 26–34)
MCH RBC QN AUTO: 31.3 PG (ref 26–34)
MCH RBC QN AUTO: 31.6 PG (ref 26–34)
MCH RBC QN AUTO: 31.6 PG (ref 26–34)
MCH RBC QN AUTO: 32.1 PG (ref 26–34)
MCH RBC QN AUTO: 33.2 PG (ref 26–34)
MCH RBC QN AUTO: 35.5 PG (ref 26–34)
MCH RBC QN AUTO: NORMAL PG (ref 26–34)
MCHC RBC AUTO-ENTMCNC: 29.7 G/DL (ref 30–36.5)
MCHC RBC AUTO-ENTMCNC: 30.3 G/DL (ref 30–36.5)
MCHC RBC AUTO-ENTMCNC: 30.5 G/DL (ref 30–36.5)
MCHC RBC AUTO-ENTMCNC: 30.8 G/DL (ref 30–36.5)
MCHC RBC AUTO-ENTMCNC: 31.4 G/DL (ref 30–36.5)
MCHC RBC AUTO-ENTMCNC: 31.5 G/DL (ref 30–36.5)
MCHC RBC AUTO-ENTMCNC: 31.5 G/DL (ref 30–36.5)
MCHC RBC AUTO-ENTMCNC: 31.6 G/DL (ref 30–36.5)
MCHC RBC AUTO-ENTMCNC: 31.7 G/DL (ref 30–36.5)
MCHC RBC AUTO-ENTMCNC: 31.9 G/DL (ref 30–36.5)
MCHC RBC AUTO-ENTMCNC: 32.2 G/DL (ref 30–36.5)
MCHC RBC AUTO-ENTMCNC: 32.2 G/DL (ref 30–36.5)
MCHC RBC AUTO-ENTMCNC: 32.3 G/DL (ref 30–36.5)
MCHC RBC AUTO-ENTMCNC: 32.6 G/DL (ref 30–36.5)
MCHC RBC AUTO-ENTMCNC: 32.6 G/DL (ref 30–36.5)
MCHC RBC AUTO-ENTMCNC: 32.7 G/DL (ref 30–36.5)
MCHC RBC AUTO-ENTMCNC: 32.9 G/DL (ref 30–36.5)
MCHC RBC AUTO-ENTMCNC: 32.9 G/DL (ref 30–36.5)
MCHC RBC AUTO-ENTMCNC: 33 G/DL (ref 30–36.5)
MCHC RBC AUTO-ENTMCNC: 33.1 G/DL (ref 30–36.5)
MCHC RBC AUTO-ENTMCNC: 33.2 G/DL (ref 30–36.5)
MCHC RBC AUTO-ENTMCNC: 33.3 G/DL (ref 30–36.5)
MCHC RBC AUTO-ENTMCNC: 33.3 G/DL (ref 30–36.5)
MCHC RBC AUTO-ENTMCNC: 33.5 G/DL (ref 30–36.5)
MCHC RBC AUTO-ENTMCNC: 33.7 G/DL (ref 30–36.5)
MCHC RBC AUTO-ENTMCNC: 33.8 G/DL (ref 30–36.5)
MCHC RBC AUTO-ENTMCNC: 33.9 G/DL (ref 30–36.5)
MCHC RBC AUTO-ENTMCNC: 34 G/DL (ref 30–36.5)
MCHC RBC AUTO-ENTMCNC: 34.1 G/DL (ref 30–36.5)
MCHC RBC AUTO-ENTMCNC: 34.2 G/DL (ref 30–36.5)
MCHC RBC AUTO-ENTMCNC: 34.3 G/DL (ref 30–36.5)
MCHC RBC AUTO-ENTMCNC: 34.4 G/DL (ref 30–36.5)
MCHC RBC AUTO-ENTMCNC: 34.5 G/DL (ref 30–36.5)
MCHC RBC AUTO-ENTMCNC: 34.7 G/DL (ref 30–36.5)
MCHC RBC AUTO-ENTMCNC: 34.7 G/DL (ref 30–36.5)
MCHC RBC AUTO-ENTMCNC: 36.6 G/DL (ref 30–36.5)
MCHC RBC AUTO-ENTMCNC: NORMAL G/DL (ref 30–36.5)
MCV RBC AUTO: 101 FL (ref 80–99)
MCV RBC AUTO: 103 FL (ref 80–99)
MCV RBC AUTO: 104.4 FL (ref 80–99)
MCV RBC AUTO: 87.8 FL (ref 80–99)
MCV RBC AUTO: 88.8 FL (ref 80–99)
MCV RBC AUTO: 88.9 FL (ref 80–99)
MCV RBC AUTO: 89.2 FL (ref 80–99)
MCV RBC AUTO: 89.2 FL (ref 80–99)
MCV RBC AUTO: 89.3 FL (ref 80–99)
MCV RBC AUTO: 89.4 FL (ref 80–99)
MCV RBC AUTO: 89.6 FL (ref 80–99)
MCV RBC AUTO: 90.7 FL (ref 80–99)
MCV RBC AUTO: 90.9 FL (ref 80–99)
MCV RBC AUTO: 91 FL (ref 80–99)
MCV RBC AUTO: 91.1 FL (ref 80–99)
MCV RBC AUTO: 91.3 FL (ref 80–99)
MCV RBC AUTO: 91.9 FL (ref 80–99)
MCV RBC AUTO: 92.6 FL (ref 80–99)
MCV RBC AUTO: 92.8 FL (ref 80–99)
MCV RBC AUTO: 93 FL (ref 80–99)
MCV RBC AUTO: 93.3 FL (ref 80–99)
MCV RBC AUTO: 93.4 FL (ref 80–99)
MCV RBC AUTO: 94.1 FL (ref 80–99)
MCV RBC AUTO: 94.4 FL (ref 80–99)
MCV RBC AUTO: 95 FL (ref 80–99)
MCV RBC AUTO: 96.1 FL (ref 80–99)
MCV RBC AUTO: 96.5 FL (ref 80–99)
MCV RBC AUTO: 96.5 FL (ref 80–99)
MCV RBC AUTO: 97 FL (ref 80–99)
MCV RBC AUTO: 97.3 FL (ref 80–99)
MCV RBC AUTO: 97.3 FL (ref 80–99)
MCV RBC AUTO: 98.3 FL (ref 80–99)
MCV RBC AUTO: 98.6 FL (ref 80–99)
MCV RBC AUTO: 98.9 FL (ref 80–99)
MCV RBC AUTO: 99.4 FL (ref 80–99)
MCV RBC AUTO: 99.4 FL (ref 80–99)
MCV RBC AUTO: NORMAL FL (ref 80–99)
METAMYELOCYTES NFR BLD MANUAL: 1 %
METAMYELOCYTES NFR BLD MANUAL: 2 %
METAMYELOCYTES NFR BLD MANUAL: 22 %
METAMYELOCYTES NFR BLD MANUAL: 3 %
METAMYELOCYTES NFR BLD MANUAL: 4 %
METAMYELOCYTES NFR BLD MANUAL: 7 %
MICROALBUMIN UR-MCNC: 5.07 MG/DL
MICROALBUMIN/CREAT UR-RTO: 32 MG/G (ref 0–30)
MONOCYTES # BLD: 0 K/UL (ref 0–1)
MONOCYTES # BLD: 0.1 K/UL (ref 0–1)
MONOCYTES # BLD: 0.2 K/UL (ref 0–1)
MONOCYTES # BLD: 0.2 K/UL (ref 0–1)
MONOCYTES # BLD: 0.3 K/UL (ref 0–1)
MONOCYTES # BLD: 0.4 K/UL (ref 0–1)
MONOCYTES # BLD: 0.5 K/UL (ref 0–1)
MONOCYTES # BLD: 0.6 K/UL (ref 0–1)
MONOCYTES # BLD: 0.6 K/UL (ref 0–1)
MONOCYTES # BLD: 0.7 K/UL (ref 0–1)
MONOCYTES # BLD: 0.8 K/UL (ref 0–1)
MONOCYTES # BLD: 1 K/UL (ref 0–1)
MONOCYTES # BLD: 1 K/UL (ref 0–1)
MONOCYTES # BLD: 1.1 K/UL (ref 0–1)
MONOCYTES # BLD: 1.2 K/UL (ref 0–1)
MONOCYTES # BLD: 1.3 K/UL (ref 0–1)
MONOCYTES # BLD: NORMAL K/UL (ref 0–1)
MONOCYTES NFR BLD: 0 % (ref 5–13)
MONOCYTES NFR BLD: 1 % (ref 5–13)
MONOCYTES NFR BLD: 2 % (ref 5–13)
MONOCYTES NFR BLD: 3 % (ref 5–13)
MONOCYTES NFR BLD: 4 % (ref 5–13)
MONOCYTES NFR BLD: 5 % (ref 5–13)
MONOCYTES NFR BLD: 6 % (ref 5–13)
MONOCYTES NFR BLD: 6 % (ref 5–13)
MONOCYTES NFR BLD: 7 % (ref 5–13)
MONOCYTES NFR BLD: 7 % (ref 5–13)
MONOCYTES NFR BLD: NORMAL % (ref 5–13)
MYELOCYTES NFR BLD MANUAL: 1 %
MYELOCYTES NFR BLD MANUAL: 11 %
MYELOCYTES NFR BLD MANUAL: 2 %
MYELOCYTES NFR BLD MANUAL: 3 %
MYELOCYTES NFR BLD MANUAL: 4 %
MYELOCYTES NFR BLD MANUAL: 5 %
MYELOCYTES NFR BLD MANUAL: 6 %
NEUTS BAND NFR BLD MANUAL: 1 % (ref 0–6)
NEUTS BAND NFR BLD MANUAL: 1 % (ref 0–6)
NEUTS BAND NFR BLD MANUAL: 10 % (ref 0–6)
NEUTS BAND NFR BLD MANUAL: 13 % (ref 0–6)
NEUTS BAND NFR BLD MANUAL: 13 % (ref 0–6)
NEUTS BAND NFR BLD MANUAL: 14 % (ref 0–6)
NEUTS BAND NFR BLD MANUAL: 16 % (ref 0–6)
NEUTS BAND NFR BLD MANUAL: 16 % (ref 0–6)
NEUTS BAND NFR BLD MANUAL: 18 % (ref 0–6)
NEUTS BAND NFR BLD MANUAL: 19 % (ref 0–6)
NEUTS BAND NFR BLD MANUAL: 2 % (ref 0–6)
NEUTS BAND NFR BLD MANUAL: 2 % (ref 0–6)
NEUTS BAND NFR BLD MANUAL: 3 % (ref 0–6)
NEUTS BAND NFR BLD MANUAL: 3 % (ref 0–6)
NEUTS BAND NFR BLD MANUAL: 4 % (ref 0–6)
NEUTS BAND NFR BLD MANUAL: 9 % (ref 0–6)
NEUTS SEG # BLD: 10.3 K/UL (ref 1.8–8)
NEUTS SEG # BLD: 10.8 K/UL (ref 1.8–8)
NEUTS SEG # BLD: 11.2 K/UL (ref 1.8–8)
NEUTS SEG # BLD: 11.4 K/UL (ref 1.8–8)
NEUTS SEG # BLD: 11.4 K/UL (ref 1.8–8)
NEUTS SEG # BLD: 11.5 K/UL (ref 1.8–8)
NEUTS SEG # BLD: 12 K/UL (ref 1.8–8)
NEUTS SEG # BLD: 12.3 K/UL (ref 1.8–8)
NEUTS SEG # BLD: 12.5 K/UL (ref 1.8–8)
NEUTS SEG # BLD: 12.6 K/UL (ref 1.8–8)
NEUTS SEG # BLD: 13.4 K/UL (ref 1.8–8)
NEUTS SEG # BLD: 13.7 K/UL (ref 1.8–8)
NEUTS SEG # BLD: 14.2 K/UL (ref 1.8–8)
NEUTS SEG # BLD: 15.1 K/UL (ref 1.8–8)
NEUTS SEG # BLD: 16.1 K/UL (ref 1.8–8)
NEUTS SEG # BLD: 16.4 K/UL (ref 1.8–8)
NEUTS SEG # BLD: 17.2 K/UL (ref 1.8–8)
NEUTS SEG # BLD: 17.4 K/UL (ref 1.8–8)
NEUTS SEG # BLD: 18.4 K/UL (ref 1.8–8)
NEUTS SEG # BLD: 19.3 K/UL (ref 1.8–8)
NEUTS SEG # BLD: 21.4 K/UL (ref 1.8–8)
NEUTS SEG # BLD: 22.8 K/UL (ref 1.8–8)
NEUTS SEG # BLD: 24.3 K/UL (ref 1.8–8)
NEUTS SEG # BLD: 25.4 K/UL (ref 1.8–8)
NEUTS SEG # BLD: 25.7 K/UL (ref 1.8–8)
NEUTS SEG # BLD: 26.1 K/UL (ref 1.8–8)
NEUTS SEG # BLD: 27.8 K/UL (ref 1.8–8)
NEUTS SEG # BLD: 30.3 K/UL (ref 1.8–8)
NEUTS SEG # BLD: 6.5 K/UL (ref 1.8–8)
NEUTS SEG # BLD: 8.7 K/UL (ref 1.8–8)
NEUTS SEG # BLD: 8.9 K/UL (ref 1.8–8)
NEUTS SEG # BLD: 9 K/UL (ref 1.8–8)
NEUTS SEG # BLD: 9.2 K/UL (ref 1.8–8)
NEUTS SEG # BLD: 9.4 K/UL (ref 1.8–8)
NEUTS SEG # BLD: 9.6 K/UL (ref 1.8–8)
NEUTS SEG # BLD: 9.9 K/UL (ref 1.8–8)
NEUTS SEG # BLD: NORMAL K/UL (ref 1.8–8)
NEUTS SEG NFR BLD: 49 % (ref 32–75)
NEUTS SEG NFR BLD: 59 % (ref 32–75)
NEUTS SEG NFR BLD: 63 % (ref 32–75)
NEUTS SEG NFR BLD: 69 % (ref 32–75)
NEUTS SEG NFR BLD: 72 % (ref 32–75)
NEUTS SEG NFR BLD: 73 % (ref 32–75)
NEUTS SEG NFR BLD: 73 % (ref 32–75)
NEUTS SEG NFR BLD: 75 % (ref 32–75)
NEUTS SEG NFR BLD: 77 % (ref 32–75)
NEUTS SEG NFR BLD: 77 % (ref 32–75)
NEUTS SEG NFR BLD: 81 % (ref 32–75)
NEUTS SEG NFR BLD: 82 % (ref 32–75)
NEUTS SEG NFR BLD: 85 % (ref 32–75)
NEUTS SEG NFR BLD: 87 % (ref 32–75)
NEUTS SEG NFR BLD: 88 % (ref 32–75)
NEUTS SEG NFR BLD: 89 % (ref 32–75)
NEUTS SEG NFR BLD: 90 % (ref 32–75)
NEUTS SEG NFR BLD: 91 % (ref 32–75)
NEUTS SEG NFR BLD: 92 % (ref 32–75)
NEUTS SEG NFR BLD: 93 % (ref 32–75)
NEUTS SEG NFR BLD: 94 % (ref 32–75)
NEUTS SEG NFR BLD: NORMAL % (ref 32–75)
NITRITE UR QL STRIP.AUTO: NEGATIVE
NRBC # BLD: 0 K/UL (ref 0–0.01)
NRBC # BLD: 0.02 K/UL (ref 0–0.01)
NRBC # BLD: 0.04 K/UL (ref 0–0.01)
NRBC # BLD: 0.08 K/UL (ref 0–0.01)
NRBC # BLD: 0.1 K/UL (ref 0–0.01)
NRBC # BLD: 0.16 K/UL (ref 0–0.01)
NRBC # BLD: 0.5 K/UL (ref 0–0.01)
NRBC # BLD: NORMAL K/UL (ref 0–0.01)
NRBC BLD-RTO: 0 PER 100 WBC
NRBC BLD-RTO: 0.1 PER 100 WBC
NRBC BLD-RTO: 0.1 PER 100 WBC
NRBC BLD-RTO: 0.2 PER 100 WBC
NRBC BLD-RTO: 0.2 PER 100 WBC
NRBC BLD-RTO: 0.3 PER 100 WBC
NRBC BLD-RTO: 0.7 PER 100 WBC
NRBC BLD-RTO: 1 PER 100 WBC
NRBC BLD-RTO: 1.4 PER 100 WBC
NRBC BLD-RTO: 2.2 PER 100 WBC
NRBC BLD-RTO: NORMAL PER 100 WBC
O+P SPEC MICRO: NORMAL
O+P STL CONC: NORMAL
O2/TOTAL GAS SETTING VFR VENT: 100 %
O2/TOTAL GAS SETTING VFR VENT: 60 %
O2/TOTAL GAS SETTING VFR VENT: 70 %
O2/TOTAL GAS SETTING VFR VENT: 80 %
ORGANISM ID, SPNG3: NORMAL
P SHIGELLOIDES DNA STL QL NAA+PROBE: NEGATIVE
P-R INTERVAL, ECG05: 160 MS
P-R INTERVAL, ECG05: 70 MS
PCO2 BLD: 104.4 MMHG (ref 35–45)
PCO2 BLD: 114 MMHG (ref 35–45)
PCO2 BLD: 124.9 MMHG (ref 35–45)
PCO2 BLD: 56.6 MMHG (ref 35–45)
PCO2 BLD: 56.6 MMHG (ref 35–45)
PCO2 BLD: 59.3 MMHG (ref 35–45)
PCO2 BLD: 59.4 MMHG (ref 35–45)
PCO2 BLD: 61 MMHG (ref 35–45)
PCO2 BLD: 63.3 MMHG (ref 35–45)
PCO2 BLD: 63.8 MMHG (ref 35–45)
PCO2 BLD: 64.1 MMHG (ref 35–45)
PCO2 BLD: 66.1 MMHG (ref 35–45)
PCO2 BLD: 71.5 MMHG (ref 35–45)
PCO2 BLD: 76.3 MMHG (ref 35–45)
PCO2 BLD: 93.9 MMHG (ref 35–45)
PCO2 BLDA: 32 MMHG (ref 35–45)
PCO2 BLDA: 36 MMHG (ref 35–45)
PCO2 BLDA: 48 MMHG (ref 35–45)
PCO2 BLDA: 53 MMHG (ref 35–45)
PCO2 BLDA: 54 MMHG (ref 35–45)
PCO2 BLDA: 54 MMHG (ref 35–45)
PCO2 BLDA: 55 MMHG (ref 35–45)
PCO2 BLDA: 55 MMHG (ref 35–45)
PCO2 BLDA: 56 MMHG (ref 35–45)
PCO2 BLDA: 57 MMHG (ref 35–45)
PCO2 BLDA: 58 MMHG (ref 35–45)
PCO2 BLDA: 59 MMHG (ref 35–45)
PCO2 BLDA: 65 MMHG (ref 35–45)
PCO2 BLDA: 65 MMHG (ref 35–45)
PCO2 BLDV: 35.7 MMHG (ref 41–51)
PCO2 BLDV: 39.3 MMHG (ref 41–51)
PEEP RESPIRATORY: 12 CM[H2O]
PEEP RESPIRATORY: 13 CMH2O
PEEP RESPIRATORY: 13 CM[H2O]
PEEP RESPIRATORY: 14 CMH2O
PEEP RESPIRATORY: 14 CM[H2O]
PEEP RESPIRATORY: 14 CM[H2O]
PEEP RESPIRATORY: 15 CMH2O
PEEP RESPIRATORY: 16 CMH2O
PEEP RESPIRATORY: 16 CM[H2O]
PEEP RESPIRATORY: 18 CM[H2O]
PH BLD: 7.1 [PH] (ref 7.35–7.45)
PH BLD: 7.18 [PH] (ref 7.35–7.45)
PH BLD: 7.19 [PH] (ref 7.35–7.45)
PH BLD: 7.23 [PH] (ref 7.35–7.45)
PH BLD: 7.28 [PH] (ref 7.35–7.45)
PH BLD: 7.31 [PH] (ref 7.35–7.45)
PH BLD: 7.36 [PH] (ref 7.35–7.45)
PH BLD: 7.36 [PH] (ref 7.35–7.45)
PH BLD: 7.39 [PH] (ref 7.35–7.45)
PH BLD: 7.39 [PH] (ref 7.35–7.45)
PH BLD: 7.4 [PH] (ref 7.35–7.45)
PH BLD: 7.4 [PH] (ref 7.35–7.45)
PH BLD: 7.41 [PH] (ref 7.35–7.45)
PH BLD: 7.43 [PH] (ref 7.35–7.45)
PH BLD: 7.44 [PH] (ref 7.35–7.45)
PH BLDA: 7.31 [PH] (ref 7.35–7.45)
PH BLDA: 7.33 [PH] (ref 7.35–7.45)
PH BLDA: 7.36 [PH] (ref 7.35–7.45)
PH BLDA: 7.38 [PH] (ref 7.35–7.45)
PH BLDA: 7.4 [PH] (ref 7.35–7.45)
PH BLDA: 7.43 [PH] (ref 7.35–7.45)
PH BLDA: 7.43 [PH] (ref 7.35–7.45)
PH BLDA: 7.44 [PH] (ref 7.35–7.45)
PH BLDA: 7.45 [PH] (ref 7.35–7.45)
PH BLDA: 7.45 [PH] (ref 7.35–7.45)
PH BLDA: 7.48 [PH] (ref 7.35–7.45)
PH BLDA: 7.5 [PH] (ref 7.35–7.45)
PH BLDV: 7.4 [PH] (ref 7.32–7.42)
PH BLDV: 7.45 [PH] (ref 7.32–7.42)
PH UR STRIP: 5.5 [PH] (ref 5–8)
PH UR STRIP: 5.5 [PH] (ref 5–8)
PH UR STRIP: 6 [PH] (ref 5–8)
PHOSPHATE SERPL-MCNC: 3.4 MG/DL (ref 2.6–4.7)
PHOSPHATE SERPL-MCNC: 3.4 MG/DL (ref 2.6–4.7)
PHOSPHATE SERPL-MCNC: 3.5 MG/DL (ref 2.6–4.7)
PHOSPHATE SERPL-MCNC: 3.6 MG/DL (ref 2.6–4.7)
PHOSPHATE SERPL-MCNC: 3.6 MG/DL (ref 2.6–4.7)
PHOSPHATE SERPL-MCNC: 3.8 MG/DL (ref 2.6–4.7)
PHOSPHATE SERPL-MCNC: 4 MG/DL (ref 2.6–4.7)
PHOSPHATE SERPL-MCNC: 4.4 MG/DL (ref 2.6–4.7)
PHOSPHATE SERPL-MCNC: 4.5 MG/DL (ref 2.6–4.7)
PHOSPHATE SERPL-MCNC: 5 MG/DL (ref 2.6–4.7)
PHOSPHATE SERPL-MCNC: 5.1 MG/DL (ref 2.6–4.7)
PHOSPHATE SERPL-MCNC: 6.2 MG/DL (ref 2.6–4.7)
PIP ISTAT,IPIP: 37
PLATELET # BLD AUTO: 202 K/UL (ref 150–400)
PLATELET # BLD AUTO: 225 K/UL (ref 150–400)
PLATELET # BLD AUTO: 230 K/UL (ref 150–400)
PLATELET # BLD AUTO: 230 K/UL (ref 150–400)
PLATELET # BLD AUTO: 235 K/UL (ref 150–400)
PLATELET # BLD AUTO: 239 K/UL (ref 150–400)
PLATELET # BLD AUTO: 240 K/UL (ref 150–400)
PLATELET # BLD AUTO: 240 K/UL (ref 150–400)
PLATELET # BLD AUTO: 253 X10E3/UL (ref 150–450)
PLATELET # BLD AUTO: 255 K/UL (ref 150–400)
PLATELET # BLD AUTO: 255 K/UL (ref 150–400)
PLATELET # BLD AUTO: 256 K/UL (ref 150–400)
PLATELET # BLD AUTO: 258 K/UL (ref 150–400)
PLATELET # BLD AUTO: 264 K/UL (ref 150–400)
PLATELET # BLD AUTO: 265 K/UL (ref 150–400)
PLATELET # BLD AUTO: 268 K/UL (ref 150–400)
PLATELET # BLD AUTO: 273 K/UL (ref 150–400)
PLATELET # BLD AUTO: 276 K/UL (ref 150–400)
PLATELET # BLD AUTO: 280 K/UL (ref 150–400)
PLATELET # BLD AUTO: 282 K/UL (ref 150–400)
PLATELET # BLD AUTO: 291 K/UL (ref 150–400)
PLATELET # BLD AUTO: 292 K/UL (ref 150–400)
PLATELET # BLD AUTO: 293 K/UL (ref 150–400)
PLATELET # BLD AUTO: 295 K/UL (ref 150–400)
PLATELET # BLD AUTO: 308 K/UL (ref 150–400)
PLATELET # BLD AUTO: 310 K/UL (ref 150–400)
PLATELET # BLD AUTO: 310 K/UL (ref 150–400)
PLATELET # BLD AUTO: 317 K/UL (ref 150–400)
PLATELET # BLD AUTO: 320 K/UL (ref 150–400)
PLATELET # BLD AUTO: 321 K/UL (ref 150–400)
PLATELET # BLD AUTO: 326 K/UL (ref 150–400)
PLATELET # BLD AUTO: 332 K/UL (ref 150–400)
PLATELET # BLD AUTO: 332 K/UL (ref 150–400)
PLATELET # BLD AUTO: 334 K/UL (ref 150–400)
PLATELET # BLD AUTO: 340 K/UL (ref 150–400)
PLATELET # BLD AUTO: 352 K/UL (ref 150–400)
PLATELET # BLD AUTO: 352 K/UL (ref 150–400)
PLATELET # BLD AUTO: NORMAL K/UL (ref 150–400)
PLEASE NOTE, SPNG4: NORMAL
PMV BLD AUTO: 10.2 FL (ref 8.9–12.9)
PMV BLD AUTO: 10.2 FL (ref 8.9–12.9)
PMV BLD AUTO: 10.3 FL (ref 8.9–12.9)
PMV BLD AUTO: 10.4 FL (ref 8.9–12.9)
PMV BLD AUTO: 10.5 FL (ref 8.9–12.9)
PMV BLD AUTO: 10.7 FL (ref 8.9–12.9)
PMV BLD AUTO: 10.8 FL (ref 8.9–12.9)
PMV BLD AUTO: 10.9 FL (ref 8.9–12.9)
PMV BLD AUTO: 11 FL (ref 8.9–12.9)
PMV BLD AUTO: 11.1 FL (ref 8.9–12.9)
PMV BLD AUTO: 11.2 FL (ref 8.9–12.9)
PMV BLD AUTO: 11.2 FL (ref 8.9–12.9)
PMV BLD AUTO: 11.3 FL (ref 8.9–12.9)
PMV BLD AUTO: 11.4 FL (ref 8.9–12.9)
PMV BLD AUTO: 11.5 FL (ref 8.9–12.9)
PMV BLD AUTO: 11.5 FL (ref 8.9–12.9)
PMV BLD AUTO: 11.6 FL (ref 8.9–12.9)
PMV BLD AUTO: 11.7 FL (ref 8.9–12.9)
PMV BLD AUTO: 12 FL (ref 8.9–12.9)
PMV BLD AUTO: NORMAL FL (ref 8.9–12.9)
PO2 BLD: 115 MMHG (ref 80–100)
PO2 BLD: 147 MMHG (ref 80–100)
PO2 BLD: 163 MMHG (ref 80–100)
PO2 BLD: 168 MMHG (ref 80–100)
PO2 BLD: 195 MMHG (ref 80–100)
PO2 BLD: 27 MMHG (ref 80–100)
PO2 BLD: 47 MMHG (ref 80–100)
PO2 BLD: 52 MMHG (ref 80–100)
PO2 BLD: 54 MMHG (ref 80–100)
PO2 BLD: 55 MMHG (ref 80–100)
PO2 BLD: 64 MMHG (ref 80–100)
PO2 BLD: 75 MMHG (ref 80–100)
PO2 BLD: 83 MMHG (ref 80–100)
PO2 BLD: 84 MMHG (ref 80–100)
PO2 BLD: 98 MMHG (ref 80–100)
PO2 BLDA: 126 MMHG (ref 80–100)
PO2 BLDA: 135 MMHG (ref 80–100)
PO2 BLDA: 140 MMHG (ref 80–100)
PO2 BLDA: 141 MMHG (ref 80–100)
PO2 BLDA: 160 MMHG (ref 80–100)
PO2 BLDA: 208 MMHG (ref 80–100)
PO2 BLDA: 55 MMHG (ref 80–100)
PO2 BLDA: 56 MMHG (ref 80–100)
PO2 BLDA: 65 MMHG (ref 80–100)
PO2 BLDA: 67 MMHG (ref 80–100)
PO2 BLDA: 68 MMHG (ref 80–100)
PO2 BLDA: 72 MMHG (ref 80–100)
PO2 BLDA: 74 MMHG (ref 80–100)
PO2 BLDA: 94 MMHG (ref 80–100)
PO2 BLDV: 36 MMHG (ref 25–40)
PO2 BLDV: 38 MMHG (ref 25–40)
POTASSIUM SERPL-SCNC: 3.1 MMOL/L (ref 3.5–5.1)
POTASSIUM SERPL-SCNC: 3.2 MMOL/L (ref 3.5–5.1)
POTASSIUM SERPL-SCNC: 3.3 MMOL/L (ref 3.5–5.1)
POTASSIUM SERPL-SCNC: 3.4 MMOL/L (ref 3.5–5.1)
POTASSIUM SERPL-SCNC: 3.4 MMOL/L (ref 3.5–5.1)
POTASSIUM SERPL-SCNC: 3.6 MMOL/L (ref 3.5–5.1)
POTASSIUM SERPL-SCNC: 3.8 MMOL/L (ref 3.5–5.1)
POTASSIUM SERPL-SCNC: 3.8 MMOL/L (ref 3.5–5.1)
POTASSIUM SERPL-SCNC: 3.9 MMOL/L (ref 3.5–5.1)
POTASSIUM SERPL-SCNC: 3.9 MMOL/L (ref 3.5–5.1)
POTASSIUM SERPL-SCNC: 4 MMOL/L (ref 3.5–5.1)
POTASSIUM SERPL-SCNC: 4.1 MMOL/L (ref 3.5–5.1)
POTASSIUM SERPL-SCNC: 4.2 MMOL/L (ref 3.5–5.1)
POTASSIUM SERPL-SCNC: 4.3 MMOL/L (ref 3.5–5.1)
POTASSIUM SERPL-SCNC: 4.3 MMOL/L (ref 3.5–5.1)
POTASSIUM SERPL-SCNC: 4.4 MMOL/L (ref 3.5–5.1)
POTASSIUM SERPL-SCNC: 4.5 MMOL/L (ref 3.5–5.1)
POTASSIUM SERPL-SCNC: 4.5 MMOL/L (ref 3.5–5.1)
POTASSIUM SERPL-SCNC: 4.6 MMOL/L (ref 3.5–5.1)
POTASSIUM SERPL-SCNC: 4.7 MMOL/L (ref 3.5–5.1)
POTASSIUM SERPL-SCNC: 4.8 MMOL/L (ref 3.5–5.1)
PRESSURE SUPPORT SETTING VENT: 19 CMH2O
PROCALCITONIN SERPL-MCNC: 0.05 NG/ML
PROCALCITONIN SERPL-MCNC: 0.06 NG/ML
PROCALCITONIN SERPL-MCNC: 0.1 NG/ML
PROCALCITONIN SERPL-MCNC: 0.13 NG/ML
PROCALCITONIN SERPL-MCNC: 0.14 NG/ML
PROCALCITONIN SERPL-MCNC: 0.15 NG/ML
PROCALCITONIN SERPL-MCNC: 0.15 NG/ML
PROCALCITONIN SERPL-MCNC: 0.43 NG/ML
PROCALCITONIN SERPL-MCNC: 0.52 NG/ML
PROT SERPL-MCNC: 5.8 G/DL (ref 6.4–8.2)
PROT SERPL-MCNC: 5.9 G/DL (ref 6.4–8.2)
PROT SERPL-MCNC: 6 G/DL (ref 6.4–8.2)
PROT SERPL-MCNC: 6 G/DL (ref 6.4–8.2)
PROT SERPL-MCNC: 6.1 G/DL (ref 6.4–8.2)
PROT SERPL-MCNC: 6.3 G/DL (ref 6.4–8.2)
PROT SERPL-MCNC: 6.4 G/DL (ref 6.4–8.2)
PROT SERPL-MCNC: 6.4 G/DL (ref 6.4–8.2)
PROT SERPL-MCNC: 6.5 G/DL (ref 6.4–8.2)
PROT SERPL-MCNC: 6.6 G/DL (ref 6.4–8.2)
PROT SERPL-MCNC: 6.7 G/DL (ref 6.4–8.2)
PROT SERPL-MCNC: 6.9 G/DL (ref 6.4–8.2)
PROT SERPL-MCNC: 7 G/DL (ref 6.4–8.2)
PROT SERPL-MCNC: 7.1 G/DL (ref 6.4–8.2)
PROT SERPL-MCNC: 7.2 G/DL (ref 6.4–8.2)
PROT SERPL-MCNC: 7.3 G/DL (ref 6.4–8.2)
PROT SERPL-MCNC: 7.3 G/DL (ref 6.4–8.2)
PROT SERPL-MCNC: 7.4 G/DL (ref 6.4–8.2)
PROT SERPL-MCNC: 7.5 G/DL (ref 6.4–8.2)
PROT SERPL-MCNC: 7.5 G/DL (ref 6.4–8.2)
PROT SERPL-MCNC: 7.7 G/DL (ref 6.4–8.2)
PROT SERPL-MCNC: 7.8 G/DL (ref 6.4–8.2)
PROT SERPL-MCNC: 8.2 G/DL (ref 6.4–8.2)
PROT SERPL-MCNC: ABNORMAL G/DL (ref 6.4–8.2)
PROT UR STRIP-MCNC: 30 MG/DL
PROT UR STRIP-MCNC: NEGATIVE MG/DL
PROT UR STRIP-MCNC: NEGATIVE MG/DL
PROTHROMBIN TIME: 10.2 SEC (ref 9–11.1)
PROTHROMBIN TIME: 10.3 SEC (ref 9–11.1)
PROTHROMBIN TIME: 10.4 SEC (ref 9–11.1)
PROTHROMBIN TIME: 10.4 SEC (ref 9–11.1)
PROTHROMBIN TIME: 10.6 SEC (ref 9–11.1)
PROTHROMBIN TIME: 10.7 SEC (ref 9–11.1)
PROTHROMBIN TIME: 10.8 SEC (ref 9–11.1)
PROTHROMBIN TIME: 10.8 SEC (ref 9–11.1)
PROTHROMBIN TIME: 10.9 SEC (ref 9–11.1)
PROTHROMBIN TIME: 10.9 SEC (ref 9–11.1)
PROTHROMBIN TIME: 11.3 SEC (ref 9–11.1)
PROTHROMBIN TIME: 11.4 SEC (ref 9–11.1)
PROTHROMBIN TIME: 11.4 SEC (ref 9–11.1)
PROTHROMBIN TIME: 11.5 SEC (ref 9–11.1)
PROTHROMBIN TIME: 11.5 SEC (ref 9–11.1)
PROTHROMBIN TIME: 11.6 SEC (ref 9–11.1)
PROTHROMBIN TIME: 11.7 SEC (ref 9–11.1)
PROTHROMBIN TIME: 12.1 SEC (ref 9–11.1)
PROTHROMBIN TIME: 12.2 SEC (ref 9.1–12)
PROTHROMBIN TIME: 12.2 SEC (ref 9–11.1)
PROTHROMBIN TIME: NORMAL SEC (ref 9–11.1)
Q-T INTERVAL, ECG07: 278 MS
Q-T INTERVAL, ECG07: 360 MS
QRS DURATION, ECG06: 68 MS
QRS DURATION, ECG06: 82 MS
QTC CALCULATION (BEZET), ECG08: 402 MS
QTC CALCULATION (BEZET), ECG08: 551 MS
RBC # BLD AUTO: 3.07 M/UL (ref 4.1–5.7)
RBC # BLD AUTO: 3.16 M/UL (ref 4.1–5.7)
RBC # BLD AUTO: 3.26 M/UL (ref 4.1–5.7)
RBC # BLD AUTO: 3.32 M/UL (ref 4.1–5.7)
RBC # BLD AUTO: 3.33 M/UL (ref 4.1–5.7)
RBC # BLD AUTO: 3.34 M/UL (ref 4.1–5.7)
RBC # BLD AUTO: 3.34 M/UL (ref 4.1–5.7)
RBC # BLD AUTO: 3.36 M/UL (ref 4.1–5.7)
RBC # BLD AUTO: 3.46 M/UL (ref 4.1–5.7)
RBC # BLD AUTO: 3.51 M/UL (ref 4.1–5.7)
RBC # BLD AUTO: 3.64 M/UL (ref 4.1–5.7)
RBC # BLD AUTO: 3.91 M/UL (ref 4.1–5.7)
RBC # BLD AUTO: 3.93 M/UL (ref 4.1–5.7)
RBC # BLD AUTO: 4.04 M/UL (ref 4.1–5.7)
RBC # BLD AUTO: 4.16 M/UL (ref 4.1–5.7)
RBC # BLD AUTO: 4.16 M/UL (ref 4.1–5.7)
RBC # BLD AUTO: 4.21 M/UL (ref 4.1–5.7)
RBC # BLD AUTO: 4.22 M/UL (ref 4.1–5.7)
RBC # BLD AUTO: 4.26 M/UL (ref 4.1–5.7)
RBC # BLD AUTO: 4.28 M/UL (ref 4.1–5.7)
RBC # BLD AUTO: 4.48 M/UL (ref 4.1–5.7)
RBC # BLD AUTO: 4.48 M/UL (ref 4.1–5.7)
RBC # BLD AUTO: 4.49 M/UL (ref 4.1–5.7)
RBC # BLD AUTO: 4.51 M/UL (ref 4.1–5.7)
RBC # BLD AUTO: 4.69 M/UL (ref 4.1–5.7)
RBC # BLD AUTO: 4.74 M/UL (ref 4.1–5.7)
RBC # BLD AUTO: 4.84 M/UL (ref 4.1–5.7)
RBC # BLD AUTO: 5.05 M/UL (ref 4.1–5.7)
RBC # BLD AUTO: 5.12 M/UL (ref 4.1–5.7)
RBC # BLD AUTO: 5.12 M/UL (ref 4.1–5.7)
RBC # BLD AUTO: 5.15 M/UL (ref 4.1–5.7)
RBC # BLD AUTO: 5.17 M/UL (ref 4.1–5.7)
RBC # BLD AUTO: 5.18 M/UL (ref 4.1–5.7)
RBC # BLD AUTO: 5.31 M/UL (ref 4.1–5.7)
RBC # BLD AUTO: 5.38 M/UL (ref 4.1–5.7)
RBC # BLD AUTO: 5.53 M/UL (ref 4.1–5.7)
RBC # BLD AUTO: NORMAL M/UL (ref 4.1–5.7)
RBC #/AREA URNS HPF: ABNORMAL /HPF (ref 0–5)
RBC #/AREA URNS HPF: NORMAL /HPF (ref 0–5)
RBC #/AREA URNS HPF: NORMAL /HPF (ref 0–5)
RBC MORPH BLD: ABNORMAL
REPORTED DOSE,DOSE: ABNORMAL UNITS
REPORTED DOSE,DOSE: ABNORMAL UNITS
REPORTED DOSE/TIME,TMG: ABNORMAL
REPORTED DOSE/TIME,TMG: ABNORMAL
RSV RNA SPEC QL NAA+PROBE: NOT DETECTED
RV+EV RNA SPEC QL NAA+PROBE: NOT DETECTED
S PNEUM AG SPEC QL LA: NEGATIVE
SALMONELLA SPECIES, DNA: NEGATIVE
SAMPLES BEING HELD,HOLD: NORMAL
SAO2 % BLD: 35.1 % (ref 92–97)
SAO2 % BLD: 62.6 % (ref 92–97)
SAO2 % BLD: 74.5 % (ref 92–97)
SAO2 % BLD: 76.7 % (ref 92–97)
SAO2 % BLD: 87.2 % (ref 92–97)
SAO2 % BLD: 88 % (ref 92–97)
SAO2 % BLD: 88 % (ref 92–97)
SAO2 % BLD: 90 % (ref 92–97)
SAO2 % BLD: 91 % (ref 92–97)
SAO2 % BLD: 91 % (ref 92–97)
SAO2 % BLD: 93.7 % (ref 92–97)
SAO2 % BLD: 94 % (ref 92–97)
SAO2 % BLD: 94 % (ref 92–97)
SAO2 % BLD: 94.7 % (ref 92–97)
SAO2 % BLD: 95.5 % (ref 92–97)
SAO2 % BLD: 96 % (ref 92–97)
SAO2 % BLD: 97 % (ref 92–97)
SAO2 % BLD: 97.3 % (ref 92–97)
SAO2 % BLD: 98.1 % (ref 92–97)
SAO2 % BLD: 99 % (ref 92–97)
SAO2 % BLD: 99.3 % (ref 92–97)
SAO2 % BLD: 99.4 % (ref 92–97)
SAO2 % BLD: 99.5 % (ref 92–97)
SAO2 % BLD: 99.7 % (ref 92–97)
SAO2 % BLDV: 72 % (ref 65–88)
SAO2 % BLDV: 72.7 % (ref 65–88)
SAO2% DEVICE SAO2% SENSOR NAME: ABNORMAL
SARS-COV-2 PCR, COVPCR: DETECTED
SARS-COV-2, COV2: NORMAL
SARS-COV-2, COV2: NORMAL
SARS-COV-2, XPLCVT: NOT DETECTED
SARS-COV-2, XPLCVT: NOT DETECTED
SERVICE CMNT-IMP: ABNORMAL
SERVICE CMNT-IMP: NORMAL
SHIGA TOXIN PRODUCING, DNA: NEGATIVE
SHIGELLA SP+EIEC IPAH STL QL NAA+PROBE: NEGATIVE
SODIUM SERPL-SCNC: 130 MMOL/L (ref 136–145)
SODIUM SERPL-SCNC: 132 MMOL/L (ref 136–145)
SODIUM SERPL-SCNC: 133 MMOL/L (ref 136–145)
SODIUM SERPL-SCNC: 134 MMOL/L (ref 136–145)
SODIUM SERPL-SCNC: 135 MMOL/L (ref 136–145)
SODIUM SERPL-SCNC: 135 MMOL/L (ref 136–145)
SODIUM SERPL-SCNC: 136 MMOL/L (ref 136–145)
SODIUM SERPL-SCNC: 137 MMOL/L (ref 136–145)
SODIUM SERPL-SCNC: 138 MMOL/L (ref 136–145)
SODIUM SERPL-SCNC: 139 MMOL/L (ref 136–145)
SODIUM SERPL-SCNC: 140 MMOL/L (ref 136–145)
SODIUM SERPL-SCNC: 141 MMOL/L (ref 136–145)
SODIUM SERPL-SCNC: 143 MMOL/L (ref 136–145)
SODIUM SERPL-SCNC: 144 MMOL/L (ref 136–145)
SODIUM SERPL-SCNC: 144 MMOL/L (ref 136–145)
SODIUM SERPL-SCNC: 146 MMOL/L (ref 136–145)
SODIUM SERPL-SCNC: 146 MMOL/L (ref 136–145)
SODIUM SERPL-SCNC: 147 MMOL/L (ref 136–145)
SODIUM SERPL-SCNC: 147 MMOL/L (ref 136–145)
SOURCE, COVRS: NORMAL
SOURCE, COVRS: NORMAL
SP GR UR REFRACTOMETRY: 1.01 (ref 1–1.03)
SP GR UR REFRACTOMETRY: 1.02 (ref 1–1.03)
SP GR UR REFRACTOMETRY: 1.02 (ref 1–1.03)
SPECIMEN SITE: ABNORMAL
SPECIMEN SOURCE: NORMAL
SPECIMEN TYPE: ABNORMAL
SPECIMEN, SPNG1: NORMAL
THERAPEUTIC RANGE,PTTT: ABNORMAL SECS (ref 58–77)
THERAPEUTIC RANGE,PTTT: NORMAL SECS (ref 58–77)
TRIGL SERPL-MCNC: 107 MG/DL (ref ?–150)
TRIGL SERPL-MCNC: 258 MG/DL (ref ?–150)
TRIGL SERPL-MCNC: 265 MG/DL (ref ?–150)
TRIGL SERPL-MCNC: 299 MG/DL (ref ?–150)
TRIGL SERPL-MCNC: 358 MG/DL (ref ?–150)
TRIGL SERPL-MCNC: 358 MG/DL (ref ?–150)
TRIGL SERPL-MCNC: 390 MG/DL (ref ?–150)
TRIGL SERPL-MCNC: 618 MG/DL (ref ?–150)
TROPONIN-HIGH SENSITIVITY: 9 NG/L (ref 0–76)
TSH SERPL DL<=0.05 MIU/L-ACNC: 0.38 UIU/ML (ref 0.36–3.74)
UA: UC IF INDICATED,UAUC: ABNORMAL
UA: UC IF INDICATED,UAUC: NORMAL
UA: UC IF INDICATED,UAUC: NORMAL
UROBILINOGEN UR QL STRIP.AUTO: 0.2 EU/DL (ref 0.2–1)
UROBILINOGEN UR QL STRIP.AUTO: 0.2 EU/DL (ref 0.2–1)
UROBILINOGEN UR QL STRIP.AUTO: 1 EU/DL (ref 0.2–1)
VANCOMYCIN SERPL-MCNC: 8.2 UG/ML
VANCOMYCIN TROUGH SERPL-MCNC: 12.6 UG/ML (ref 5–10)
VANCOMYCIN TROUGH SERPL-MCNC: 2.9 UG/ML (ref 5–10)
VENTILATION MODE VENT: ABNORMAL
VENTRICULAR RATE, ECG03: 126 BPM
VENTRICULAR RATE, ECG03: 141 BPM
VIBRIO SPECIES, DNA: NEGATIVE
VLDLC SERPL CALC-MCNC: 21.4 MG/DL
VLDLC SERPL CALC-MCNC: 53 MG/DL
VT SETTING VENT: 400 ML
VT SETTING VENT: 550 ML
WBC # BLD AUTO: 10.3 K/UL (ref 4.1–11.1)
WBC # BLD AUTO: 11.2 K/UL (ref 4.1–11.1)
WBC # BLD AUTO: 11.5 K/UL (ref 4.1–11.1)
WBC # BLD AUTO: 11.5 K/UL (ref 4.1–11.1)
WBC # BLD AUTO: 11.6 K/UL (ref 4.1–11.1)
WBC # BLD AUTO: 11.6 K/UL (ref 4.1–11.1)
WBC # BLD AUTO: 11.7 K/UL (ref 4.1–11.1)
WBC # BLD AUTO: 12 K/UL (ref 4.1–11.1)
WBC # BLD AUTO: 12.3 K/UL (ref 4.1–11.1)
WBC # BLD AUTO: 12.4 K/UL (ref 4.1–11.1)
WBC # BLD AUTO: 12.6 K/UL (ref 4.1–11.1)
WBC # BLD AUTO: 12.8 K/UL (ref 4.1–11.1)
WBC # BLD AUTO: 12.9 K/UL (ref 4.1–11.1)
WBC # BLD AUTO: 13.1 K/UL (ref 4.1–11.1)
WBC # BLD AUTO: 13.1 K/UL (ref 4.1–11.1)
WBC # BLD AUTO: 13.5 K/UL (ref 4.1–11.1)
WBC # BLD AUTO: 13.5 K/UL (ref 4.1–11.1)
WBC # BLD AUTO: 14.7 K/UL (ref 4.1–11.1)
WBC # BLD AUTO: 15.4 K/UL (ref 4.1–11.1)
WBC # BLD AUTO: 15.5 K/UL (ref 4.1–11.1)
WBC # BLD AUTO: 17.2 K/UL (ref 4.1–11.1)
WBC # BLD AUTO: 18.5 K/UL (ref 4.1–11.1)
WBC # BLD AUTO: 19.2 K/UL (ref 4.1–11.1)
WBC # BLD AUTO: 19.3 K/UL (ref 4.1–11.1)
WBC # BLD AUTO: 21.1 K/UL (ref 4.1–11.1)
WBC # BLD AUTO: 22.3 K/UL (ref 4.1–11.1)
WBC # BLD AUTO: 22.6 K/UL (ref 4.1–11.1)
WBC # BLD AUTO: 24.3 K/UL (ref 4.1–11.1)
WBC # BLD AUTO: 25.7 K/UL (ref 4.1–11.1)
WBC # BLD AUTO: 26.1 K/UL (ref 4.1–11.1)
WBC # BLD AUTO: 27 K/UL (ref 4.1–11.1)
WBC # BLD AUTO: 27.6 K/UL (ref 4.1–11.1)
WBC # BLD AUTO: 28.3 K/UL (ref 4.1–11.1)
WBC # BLD AUTO: 31.6 K/UL (ref 4.1–11.1)
WBC # BLD AUTO: 32.9 K/UL (ref 4.1–11.1)
WBC # BLD AUTO: 9.9 K/UL (ref 4.1–11.1)
WBC # BLD AUTO: NORMAL K/UL (ref 4.1–11.1)
WBC #/AREA STL HPF: NORMAL /HPF (ref 0–4)
WBC MORPH BLD: ABNORMAL
WBC URNS QL MICRO: ABNORMAL /HPF (ref 0–4)
WBC URNS QL MICRO: NORMAL /HPF (ref 0–4)
WBC URNS QL MICRO: NORMAL /HPF (ref 0–4)
Y. ENTEROCOLITICA, DNA: NEGATIVE

## 2021-01-01 PROCEDURE — 74011250637 HC RX REV CODE- 250/637: Performed by: STUDENT IN AN ORGANIZED HEALTH CARE EDUCATION/TRAINING PROGRAM

## 2021-01-01 PROCEDURE — 76937 US GUIDE VASCULAR ACCESS: CPT

## 2021-01-01 PROCEDURE — 85379 FIBRIN DEGRADATION QUANT: CPT

## 2021-01-01 PROCEDURE — 83735 ASSAY OF MAGNESIUM: CPT

## 2021-01-01 PROCEDURE — 74011000250 HC RX REV CODE- 250: Performed by: INTERNAL MEDICINE

## 2021-01-01 PROCEDURE — 36600 WITHDRAWAL OF ARTERIAL BLOOD: CPT

## 2021-01-01 PROCEDURE — 74011250636 HC RX REV CODE- 250/636: Performed by: STUDENT IN AN ORGANIZED HEALTH CARE EDUCATION/TRAINING PROGRAM

## 2021-01-01 PROCEDURE — 84100 ASSAY OF PHOSPHORUS: CPT

## 2021-01-01 PROCEDURE — 82728 ASSAY OF FERRITIN: CPT

## 2021-01-01 PROCEDURE — 85730 THROMBOPLASTIN TIME PARTIAL: CPT

## 2021-01-01 PROCEDURE — 74011250637 HC RX REV CODE- 250/637: Performed by: INTERNAL MEDICINE

## 2021-01-01 PROCEDURE — 74011636637 HC RX REV CODE- 636/637: Performed by: STUDENT IN AN ORGANIZED HEALTH CARE EDUCATION/TRAINING PROGRAM

## 2021-01-01 PROCEDURE — 36415 COLL VENOUS BLD VENIPUNCTURE: CPT

## 2021-01-01 PROCEDURE — 86140 C-REACTIVE PROTEIN: CPT

## 2021-01-01 PROCEDURE — 74011250637 HC RX REV CODE- 250/637: Performed by: ANESTHESIOLOGY

## 2021-01-01 PROCEDURE — C9113 INJ PANTOPRAZOLE SODIUM, VIA: HCPCS | Performed by: INTERNAL MEDICINE

## 2021-01-01 PROCEDURE — 85384 FIBRINOGEN ACTIVITY: CPT

## 2021-01-01 PROCEDURE — 80202 ASSAY OF VANCOMYCIN: CPT

## 2021-01-01 PROCEDURE — 85025 COMPLETE CBC W/AUTO DIFF WBC: CPT

## 2021-01-01 PROCEDURE — 94640 AIRWAY INHALATION TREATMENT: CPT

## 2021-01-01 PROCEDURE — 74011250636 HC RX REV CODE- 250/636: Performed by: INTERNAL MEDICINE

## 2021-01-01 PROCEDURE — 65610000006 HC RM INTENSIVE CARE

## 2021-01-01 PROCEDURE — 85610 PROTHROMBIN TIME: CPT

## 2021-01-01 PROCEDURE — 74011000250 HC RX REV CODE- 250: Performed by: STUDENT IN AN ORGANIZED HEALTH CARE EDUCATION/TRAINING PROGRAM

## 2021-01-01 PROCEDURE — 94660 CPAP INITIATION&MGMT: CPT

## 2021-01-01 PROCEDURE — 99291 CRITICAL CARE FIRST HOUR: CPT | Performed by: FAMILY MEDICINE

## 2021-01-01 PROCEDURE — 74011000258 HC RX REV CODE- 258: Performed by: INTERNAL MEDICINE

## 2021-01-01 PROCEDURE — 94664 DEMO&/EVAL PT USE INHALER: CPT

## 2021-01-01 PROCEDURE — 94003 VENT MGMT INPAT SUBQ DAY: CPT

## 2021-01-01 PROCEDURE — 77030037878 HC DRSG MEPILEX >48IN BORD MOLN -B

## 2021-01-01 PROCEDURE — 71045 X-RAY EXAM CHEST 1 VIEW: CPT

## 2021-01-01 PROCEDURE — 87040 BLOOD CULTURE FOR BACTERIA: CPT

## 2021-01-01 PROCEDURE — 80053 COMPREHEN METABOLIC PANEL: CPT

## 2021-01-01 PROCEDURE — 74011000258 HC RX REV CODE- 258: Performed by: ANESTHESIOLOGY

## 2021-01-01 PROCEDURE — 82962 GLUCOSE BLOOD TEST: CPT

## 2021-01-01 PROCEDURE — 94645 CONT INHLJ TX EACH ADDL HOUR: CPT

## 2021-01-01 PROCEDURE — 87899 AGENT NOS ASSAY W/OPTIC: CPT

## 2021-01-01 PROCEDURE — 74011250636 HC RX REV CODE- 250/636: Performed by: ANESTHESIOLOGY

## 2021-01-01 PROCEDURE — 74011000250 HC RX REV CODE- 250: Performed by: ANESTHESIOLOGY

## 2021-01-01 PROCEDURE — 77010033711 HC HIGH FLOW OXYGEN

## 2021-01-01 PROCEDURE — 87449 NOS EACH ORGANISM AG IA: CPT

## 2021-01-01 PROCEDURE — XW033H5 INTRODUCTION OF TOCILIZUMAB INTO PERIPHERAL VEIN, PERCUTANEOUS APPROACH, NEW TECHNOLOGY GROUP 5: ICD-10-PCS | Performed by: INTERNAL MEDICINE

## 2021-01-01 PROCEDURE — 2709999900 HC NON-CHARGEABLE SUPPLY

## 2021-01-01 PROCEDURE — 81001 URINALYSIS AUTO W/SCOPE: CPT

## 2021-01-01 PROCEDURE — 77010033678 HC OXYGEN DAILY

## 2021-01-01 PROCEDURE — 77030018798 HC PMP KT ENTRL FED COVD -A

## 2021-01-01 PROCEDURE — C1751 CATH, INF, PER/CENT/MIDLINE: HCPCS

## 2021-01-01 PROCEDURE — 77030040393 HC DRSG OPTIFOAM GENT MDII -B

## 2021-01-01 PROCEDURE — 74011250636 HC RX REV CODE- 250/636

## 2021-01-01 PROCEDURE — 74011250636 HC RX REV CODE- 250/636: Performed by: EMERGENCY MEDICINE

## 2021-01-01 PROCEDURE — C9113 INJ PANTOPRAZOLE SODIUM, VIA: HCPCS | Performed by: ANESTHESIOLOGY

## 2021-01-01 PROCEDURE — 74011000636 HC RX REV CODE- 636: Performed by: RADIOLOGY

## 2021-01-01 PROCEDURE — 82803 BLOOD GASES ANY COMBINATION: CPT

## 2021-01-01 PROCEDURE — 65660000000 HC RM CCU STEPDOWN

## 2021-01-01 PROCEDURE — 83615 LACTATE (LD) (LDH) ENZYME: CPT

## 2021-01-01 PROCEDURE — 99233 SBSQ HOSP IP/OBS HIGH 50: CPT | Performed by: FAMILY MEDICINE

## 2021-01-01 PROCEDURE — 77030020847 HC STATLOK BARD -A

## 2021-01-01 PROCEDURE — 97110 THERAPEUTIC EXERCISES: CPT

## 2021-01-01 PROCEDURE — 99233 SBSQ HOSP IP/OBS HIGH 50: CPT | Performed by: STUDENT IN AN ORGANIZED HEALTH CARE EDUCATION/TRAINING PROGRAM

## 2021-01-01 PROCEDURE — 84145 PROCALCITONIN (PCT): CPT

## 2021-01-01 PROCEDURE — 83880 ASSAY OF NATRIURETIC PEPTIDE: CPT

## 2021-01-01 PROCEDURE — 84478 ASSAY OF TRIGLYCERIDES: CPT

## 2021-01-01 PROCEDURE — C9113 INJ PANTOPRAZOLE SODIUM, VIA: HCPCS | Performed by: STUDENT IN AN ORGANIZED HEALTH CARE EDUCATION/TRAINING PROGRAM

## 2021-01-01 PROCEDURE — 87177 OVA AND PARASITES SMEARS: CPT

## 2021-01-01 PROCEDURE — 93005 ELECTROCARDIOGRAM TRACING: CPT

## 2021-01-01 PROCEDURE — 84443 ASSAY THYROID STIM HORMONE: CPT

## 2021-01-01 PROCEDURE — 74018 RADEX ABDOMEN 1 VIEW: CPT

## 2021-01-01 PROCEDURE — 77030040831 HC BAG URINE DRNG MDII -A

## 2021-01-01 PROCEDURE — 89055 LEUKOCYTE ASSESSMENT FECAL: CPT

## 2021-01-01 PROCEDURE — 74011000258 HC RX REV CODE- 258: Performed by: STUDENT IN AN ORGANIZED HEALTH CARE EDUCATION/TRAINING PROGRAM

## 2021-01-01 PROCEDURE — 99232 SBSQ HOSP IP/OBS MODERATE 35: CPT | Performed by: FAMILY MEDICINE

## 2021-01-01 PROCEDURE — 77030008761

## 2021-01-01 PROCEDURE — 87103 BLOOD FUNGUS CULTURE: CPT

## 2021-01-01 PROCEDURE — 87506 IADNA-DNA/RNA PROBE TQ 6-11: CPT

## 2021-01-01 PROCEDURE — 77030005513 HC CATH URETH FOL11 MDII -B

## 2021-01-01 PROCEDURE — 74011636637 HC RX REV CODE- 636/637: Performed by: INTERNAL MEDICINE

## 2021-01-01 PROCEDURE — 97162 PT EVAL MOD COMPLEX 30 MIN: CPT

## 2021-01-01 PROCEDURE — 0202U NFCT DS 22 TRGT SARS-COV-2: CPT

## 2021-01-01 PROCEDURE — 93306 TTE W/DOPPLER COMPLETE: CPT | Performed by: SPECIALIST

## 2021-01-01 PROCEDURE — 84484 ASSAY OF TROPONIN QUANT: CPT

## 2021-01-01 PROCEDURE — 5A1955Z RESPIRATORY VENTILATION, GREATER THAN 96 CONSECUTIVE HOURS: ICD-10-PCS | Performed by: FAMILY MEDICINE

## 2021-01-01 PROCEDURE — 93306 TTE W/DOPPLER COMPLETE: CPT

## 2021-01-01 PROCEDURE — 77030020291 HC FLEXSEAL FMS BMS -C

## 2021-01-01 PROCEDURE — 3E0436Z INTRODUCTION OF NUTRITIONAL SUBSTANCE INTO CENTRAL VEIN, PERCUTANEOUS APPROACH: ICD-10-PCS | Performed by: FAMILY MEDICINE

## 2021-01-01 PROCEDURE — U0005 INFEC AGEN DETEC AMPLI PROBE: HCPCS

## 2021-01-01 PROCEDURE — 77030029065 HC DRSG HEMO QCLOT ZMED -B

## 2021-01-01 PROCEDURE — 99223 1ST HOSP IP/OBS HIGH 75: CPT | Performed by: INTERNAL MEDICINE

## 2021-01-01 PROCEDURE — 77030040922 HC BLNKT HYPOTHRM STRY -A

## 2021-01-01 PROCEDURE — 99223 1ST HOSP IP/OBS HIGH 75: CPT | Performed by: STUDENT IN AN ORGANIZED HEALTH CARE EDUCATION/TRAINING PROGRAM

## 2021-01-01 PROCEDURE — 97116 GAIT TRAINING THERAPY: CPT

## 2021-01-01 PROCEDURE — 77030018729 HC ELECTRD DEFIB PAD CARD -B

## 2021-01-01 PROCEDURE — 82248 BILIRUBIN DIRECT: CPT

## 2021-01-01 PROCEDURE — 99214 OFFICE O/P EST MOD 30 MIN: CPT | Performed by: FAMILY MEDICINE

## 2021-01-01 PROCEDURE — 94760 N-INVAS EAR/PLS OXIMETRY 1: CPT

## 2021-01-01 PROCEDURE — 93970 EXTREMITY STUDY: CPT

## 2021-01-01 PROCEDURE — 83605 ASSAY OF LACTIC ACID: CPT

## 2021-01-01 PROCEDURE — 87086 URINE CULTURE/COLONY COUNT: CPT

## 2021-01-01 PROCEDURE — 74011000636 HC RX REV CODE- 636

## 2021-01-01 PROCEDURE — 71275 CT ANGIOGRAPHY CHEST: CPT

## 2021-01-01 PROCEDURE — 0BH17EZ INSERTION OF ENDOTRACHEAL AIRWAY INTO TRACHEA, VIA NATURAL OR ARTIFICIAL OPENING: ICD-10-PCS | Performed by: ANESTHESIOLOGY

## 2021-01-01 PROCEDURE — 36573 INSJ PICC RS&I 5 YR+: CPT | Performed by: INTERNAL MEDICINE

## 2021-01-01 PROCEDURE — 99232 SBSQ HOSP IP/OBS MODERATE 35: CPT | Performed by: STUDENT IN AN ORGANIZED HEALTH CARE EDUCATION/TRAINING PROGRAM

## 2021-01-01 PROCEDURE — 77030018786 HC NDL GD F/USND BARD -B

## 2021-01-01 PROCEDURE — 31500 INSERT EMERGENCY AIRWAY: CPT

## 2021-01-01 PROCEDURE — G0108 DIAB MANAGE TRN  PER INDIV: HCPCS

## 2021-01-01 PROCEDURE — 94644 CONT INHLJ TX 1ST HOUR: CPT

## 2021-01-01 PROCEDURE — P9045 ALBUMIN (HUMAN), 5%, 250 ML: HCPCS | Performed by: STUDENT IN AN ORGANIZED HEALTH CARE EDUCATION/TRAINING PROGRAM

## 2021-01-01 PROCEDURE — 92950 HEART/LUNG RESUSCITATION CPR: CPT

## 2021-01-01 PROCEDURE — 94761 N-INVAS EAR/PLS OXIMETRY MLT: CPT

## 2021-01-01 PROCEDURE — 87070 CULTURE OTHR SPECIMN AEROBIC: CPT

## 2021-01-01 PROCEDURE — 05HN33Z INSERTION OF INFUSION DEVICE INTO LEFT INTERNAL JUGULAR VEIN, PERCUTANEOUS APPROACH: ICD-10-PCS | Performed by: ANESTHESIOLOGY

## 2021-01-01 PROCEDURE — 5A09557 ASSISTANCE WITH RESPIRATORY VENTILATION, GREATER THAN 96 CONSECUTIVE HOURS, CONTINUOUS POSITIVE AIRWAY PRESSURE: ICD-10-PCS | Performed by: FAMILY MEDICINE

## 2021-01-01 PROCEDURE — 77030019940 HC BLNKT HYPOTHRM STRY -B

## 2021-01-01 PROCEDURE — 99284 EMERGENCY DEPT VISIT MOD MDM: CPT

## 2021-01-01 PROCEDURE — 94002 VENT MGMT INPAT INIT DAY: CPT

## 2021-01-01 PROCEDURE — 77030040361 HC SLV COMPR DVT MDII -B

## 2021-01-01 RX ORDER — BUMETANIDE 0.25 MG/ML
1 INJECTION INTRAMUSCULAR; INTRAVENOUS EVERY 12 HOURS
Status: DISCONTINUED | OUTPATIENT
Start: 2021-01-01 | End: 2021-01-01

## 2021-01-01 RX ORDER — BUMETANIDE 0.25 MG/ML
0.5 INJECTION INTRAMUSCULAR; INTRAVENOUS EVERY 8 HOURS
Status: DISCONTINUED | OUTPATIENT
Start: 2021-01-01 | End: 2021-01-01

## 2021-01-01 RX ORDER — SIMETHICONE 80 MG
80 TABLET,CHEWABLE ORAL
Status: DISCONTINUED | OUTPATIENT
Start: 2021-01-01 | End: 2021-01-01

## 2021-01-01 RX ORDER — PROCHLORPERAZINE EDISYLATE 5 MG/ML
10 INJECTION INTRAMUSCULAR; INTRAVENOUS
Status: DISCONTINUED | OUTPATIENT
Start: 2021-01-01 | End: 2021-01-01

## 2021-01-01 RX ORDER — POTASSIUM CHLORIDE 29.8 MG/ML
20 INJECTION INTRAVENOUS ONCE
Status: COMPLETED | OUTPATIENT
Start: 2021-01-01 | End: 2021-01-01

## 2021-01-01 RX ORDER — POTASSIUM CHLORIDE 7.45 MG/ML
10 INJECTION INTRAVENOUS
Status: COMPLETED | OUTPATIENT
Start: 2021-01-01 | End: 2021-01-01

## 2021-01-01 RX ORDER — PROPOFOL 10 MG/ML
0-50 VIAL (ML) INTRAVENOUS
Status: DISCONTINUED | OUTPATIENT
Start: 2021-01-01 | End: 2021-01-01 | Stop reason: HOSPADM

## 2021-01-01 RX ORDER — FACIAL-BODY WIPES
10 EACH TOPICAL DAILY PRN
Status: DISCONTINUED | OUTPATIENT
Start: 2021-01-01 | End: 2021-01-01 | Stop reason: HOSPADM

## 2021-01-01 RX ORDER — VANCOMYCIN/0.9 % SOD CHLORIDE 1.5G/250ML
1500 PLASTIC BAG, INJECTION (ML) INTRAVENOUS EVERY 8 HOURS
Status: DISCONTINUED | OUTPATIENT
Start: 2021-01-01 | End: 2021-01-01 | Stop reason: DRUGHIGH

## 2021-01-01 RX ORDER — BENZONATATE 100 MG/1
200 CAPSULE ORAL 3 TIMES DAILY
Status: DISCONTINUED | OUTPATIENT
Start: 2021-01-01 | End: 2021-01-01

## 2021-01-01 RX ORDER — METOCLOPRAMIDE HYDROCHLORIDE 5 MG/ML
5 INJECTION INTRAMUSCULAR; INTRAVENOUS 2 TIMES DAILY
Status: DISCONTINUED | OUTPATIENT
Start: 2021-01-01 | End: 2021-01-01

## 2021-01-01 RX ORDER — BUMETANIDE 0.25 MG/ML
1 INJECTION INTRAMUSCULAR; INTRAVENOUS EVERY 8 HOURS
Status: DISCONTINUED | OUTPATIENT
Start: 2021-01-01 | End: 2021-01-01

## 2021-01-01 RX ORDER — PROPOFOL 10 MG/ML
0-50 VIAL (ML) INTRAVENOUS
Status: DISCONTINUED | OUTPATIENT
Start: 2021-01-01 | End: 2021-01-01

## 2021-01-01 RX ORDER — INSULIN GLARGINE 100 [IU]/ML
3 INJECTION, SOLUTION SUBCUTANEOUS ONCE
Status: COMPLETED | OUTPATIENT
Start: 2021-01-01 | End: 2021-01-01

## 2021-01-01 RX ORDER — SODIUM CHLORIDE 9 MG/ML
75 INJECTION, SOLUTION INTRAVENOUS CONTINUOUS
Status: DISCONTINUED | OUTPATIENT
Start: 2021-01-01 | End: 2021-01-01

## 2021-01-01 RX ORDER — DEXAMETHASONE SODIUM PHOSPHATE 10 MG/ML
10 INJECTION INTRAMUSCULAR; INTRAVENOUS EVERY 24 HOURS
Status: DISCONTINUED | OUTPATIENT
Start: 2021-01-01 | End: 2021-01-01

## 2021-01-01 RX ORDER — PANTOPRAZOLE SODIUM 40 MG/1
40 TABLET, DELAYED RELEASE ORAL
Status: DISCONTINUED | OUTPATIENT
Start: 2021-01-01 | End: 2021-01-01

## 2021-01-01 RX ORDER — POLYETHYLENE GLYCOL 3350 17 G/17G
17 POWDER, FOR SOLUTION ORAL DAILY PRN
Status: DISCONTINUED | OUTPATIENT
Start: 2021-01-01 | End: 2021-01-01

## 2021-01-01 RX ORDER — ATORVASTATIN CALCIUM 20 MG/1
20 TABLET, FILM COATED ORAL DAILY
Status: DISCONTINUED | OUTPATIENT
Start: 2021-01-01 | End: 2021-01-01 | Stop reason: HOSPADM

## 2021-01-01 RX ORDER — METOCLOPRAMIDE HYDROCHLORIDE 5 MG/ML
5 INJECTION INTRAMUSCULAR; INTRAVENOUS EVERY 6 HOURS
Status: DISCONTINUED | OUTPATIENT
Start: 2021-01-01 | End: 2021-01-01

## 2021-01-01 RX ORDER — BLOOD-GLUCOSE METER
EACH MISCELLANEOUS
COMMUNITY
Start: 2021-01-01 | End: 2021-01-01

## 2021-01-01 RX ORDER — PROPOFOL 10 MG/ML
200 INJECTION, EMULSION INTRAVENOUS
Status: COMPLETED | OUTPATIENT
Start: 2021-01-01 | End: 2021-01-01

## 2021-01-01 RX ORDER — ENOXAPARIN SODIUM 100 MG/ML
40 INJECTION SUBCUTANEOUS EVERY 12 HOURS
Status: DISCONTINUED | OUTPATIENT
Start: 2021-01-01 | End: 2021-01-01

## 2021-01-01 RX ORDER — IPRATROPIUM BROMIDE AND ALBUTEROL SULFATE 2.5; .5 MG/3ML; MG/3ML
3 SOLUTION RESPIRATORY (INHALATION)
Status: DISCONTINUED | OUTPATIENT
Start: 2021-01-01 | End: 2021-01-01 | Stop reason: HOSPADM

## 2021-01-01 RX ORDER — INSULIN LISPRO 100 [IU]/ML
INJECTION, SOLUTION INTRAVENOUS; SUBCUTANEOUS
Status: DISCONTINUED | OUTPATIENT
Start: 2021-01-01 | End: 2021-01-01

## 2021-01-01 RX ORDER — AMLODIPINE BESYLATE 5 MG/1
10 TABLET ORAL DAILY
Status: DISCONTINUED | OUTPATIENT
Start: 2021-01-01 | End: 2021-01-01

## 2021-01-01 RX ORDER — ACETAMINOPHEN 650 MG/1
975 SUPPOSITORY RECTAL
Status: DISCONTINUED | OUTPATIENT
Start: 2021-01-01 | End: 2021-01-01

## 2021-01-01 RX ORDER — GUAIFENESIN 600 MG/1
600 TABLET, EXTENDED RELEASE ORAL EVERY 12 HOURS
Status: DISCONTINUED | OUTPATIENT
Start: 2021-01-01 | End: 2021-01-01

## 2021-01-01 RX ORDER — NOREPINEPHRINE BITARTRATE/D5W 8 MG/250ML
.5-16 PLASTIC BAG, INJECTION (ML) INTRAVENOUS
Status: DISCONTINUED | OUTPATIENT
Start: 2021-01-01 | End: 2021-01-01 | Stop reason: HOSPADM

## 2021-01-01 RX ORDER — INSULIN GLARGINE 100 [IU]/ML
8 INJECTION, SOLUTION SUBCUTANEOUS DAILY
Status: DISCONTINUED | OUTPATIENT
Start: 2021-01-01 | End: 2021-01-01

## 2021-01-01 RX ORDER — DEXMEDETOMIDINE HYDROCHLORIDE 4 UG/ML
.1-1.5 INJECTION, SOLUTION INTRAVENOUS
Status: DISCONTINUED | OUTPATIENT
Start: 2021-01-01 | End: 2021-01-01

## 2021-01-01 RX ORDER — VANCOMYCIN/0.9 % SOD CHLORIDE 1.5G/250ML
1500 PLASTIC BAG, INJECTION (ML) INTRAVENOUS EVERY 12 HOURS
Status: DISCONTINUED | OUTPATIENT
Start: 2021-01-01 | End: 2021-01-01

## 2021-01-01 RX ORDER — MAGNESIUM SULFATE 100 %
4 CRYSTALS MISCELLANEOUS AS NEEDED
Status: DISCONTINUED | OUTPATIENT
Start: 2021-01-01 | End: 2021-01-01 | Stop reason: HOSPADM

## 2021-01-01 RX ORDER — LIDOCAINE 4 G/100G
1 PATCH TOPICAL EVERY 24 HOURS
Status: DISCONTINUED | OUTPATIENT
Start: 2021-01-01 | End: 2021-01-01

## 2021-01-01 RX ORDER — ACETAMINOPHEN 500 MG
1000 TABLET ORAL
Status: DISCONTINUED | OUTPATIENT
Start: 2021-01-01 | End: 2021-01-01

## 2021-01-01 RX ORDER — DEXAMETHASONE SODIUM PHOSPHATE 100 MG/10ML
10 INJECTION INTRAMUSCULAR; INTRAVENOUS EVERY 12 HOURS
Status: COMPLETED | OUTPATIENT
Start: 2021-01-01 | End: 2021-01-01

## 2021-01-01 RX ORDER — INSULIN GLARGINE 100 [IU]/ML
30 INJECTION, SOLUTION SUBCUTANEOUS DAILY
Status: DISCONTINUED | OUTPATIENT
Start: 2021-01-01 | End: 2021-01-01

## 2021-01-01 RX ORDER — ACETAMINOPHEN 325 MG/1
650 TABLET ORAL
Status: DISCONTINUED | OUTPATIENT
Start: 2021-01-01 | End: 2021-01-01

## 2021-01-01 RX ORDER — INSULIN LISPRO 100 [IU]/ML
INJECTION, SOLUTION INTRAVENOUS; SUBCUTANEOUS EVERY 6 HOURS
Status: DISCONTINUED | OUTPATIENT
Start: 2021-01-01 | End: 2021-01-01 | Stop reason: HOSPADM

## 2021-01-01 RX ORDER — NOREPINEPHRINE BITARTRATE/D5W 8 MG/250ML
.5-3 PLASTIC BAG, INJECTION (ML) INTRAVENOUS
Status: DISCONTINUED | OUTPATIENT
Start: 2021-01-01 | End: 2021-01-01

## 2021-01-01 RX ORDER — ROCURONIUM BROMIDE 10 MG/ML
50 INJECTION, SOLUTION INTRAVENOUS
Status: COMPLETED | OUTPATIENT
Start: 2021-01-01 | End: 2021-01-01

## 2021-01-01 RX ORDER — HYDROCODONE POLISTIREX AND CHLORPHENIRAMINE POLISTIREX 10; 8 MG/5ML; MG/5ML
5 SUSPENSION, EXTENDED RELEASE ORAL EVERY 12 HOURS
Status: DISCONTINUED | OUTPATIENT
Start: 2021-01-01 | End: 2021-01-01

## 2021-01-01 RX ORDER — ONDANSETRON 2 MG/ML
4 INJECTION INTRAMUSCULAR; INTRAVENOUS
Status: DISCONTINUED | OUTPATIENT
Start: 2021-01-01 | End: 2021-01-01 | Stop reason: HOSPADM

## 2021-01-01 RX ORDER — GLYCERIN ADULT
1 SUPPOSITORY, RECTAL RECTAL
Status: COMPLETED | OUTPATIENT
Start: 2021-01-01 | End: 2021-01-01

## 2021-01-01 RX ORDER — HYDROCHLOROTHIAZIDE 25 MG/1
12.5 TABLET ORAL DAILY
Status: DISCONTINUED | OUTPATIENT
Start: 2021-01-01 | End: 2021-01-01

## 2021-01-01 RX ORDER — ATORVASTATIN CALCIUM 10 MG/1
TABLET, FILM COATED ORAL
Qty: 90 TABLET | Refills: 0 | Status: SHIPPED | OUTPATIENT
Start: 2021-01-01

## 2021-01-01 RX ORDER — ZINC GLUCONATE 50 MG
1 TABLET ORAL DAILY
Status: DISCONTINUED | OUTPATIENT
Start: 2021-01-01 | End: 2021-01-01

## 2021-01-01 RX ORDER — ENOXAPARIN SODIUM 100 MG/ML
0.5 INJECTION SUBCUTANEOUS EVERY 12 HOURS
Status: DISCONTINUED | OUTPATIENT
Start: 2021-01-01 | End: 2021-01-01

## 2021-01-01 RX ORDER — INSULIN GLARGINE 100 [IU]/ML
20 INJECTION, SOLUTION SUBCUTANEOUS
Qty: 15 ML | Refills: 1 | Status: SHIPPED | OUTPATIENT
Start: 2021-01-01 | End: 2021-01-01

## 2021-01-01 RX ORDER — ZINC SULFATE 50(220)MG
1 CAPSULE ORAL DAILY
Status: DISCONTINUED | OUTPATIENT
Start: 2021-01-01 | End: 2021-01-01

## 2021-01-01 RX ORDER — HYDROCHLOROTHIAZIDE 12.5 MG/1
TABLET ORAL
Qty: 90 TABLET | Refills: 1 | Status: SHIPPED | OUTPATIENT
Start: 2021-01-01 | End: 2021-01-01

## 2021-01-01 RX ORDER — CALCIUM CARB/MAGNESIUM CARB 311-232MG
5 TABLET ORAL
Status: DISCONTINUED | OUTPATIENT
Start: 2021-01-01 | End: 2021-01-01

## 2021-01-01 RX ORDER — SODIUM BICARBONATE 1 MEQ/ML
150 SYRINGE (ML) INTRAVENOUS ONCE
Status: COMPLETED | OUTPATIENT
Start: 2021-01-01 | End: 2021-01-01

## 2021-01-01 RX ORDER — BUMETANIDE 0.25 MG/ML
0.5 INJECTION INTRAMUSCULAR; INTRAVENOUS 2 TIMES DAILY
Status: DISCONTINUED | OUTPATIENT
Start: 2021-01-01 | End: 2021-01-01

## 2021-01-01 RX ORDER — METFORMIN HYDROCHLORIDE 500 MG/1
1000 TABLET, EXTENDED RELEASE ORAL
Qty: 60 TAB | Refills: 5 | Status: SHIPPED | OUTPATIENT
Start: 2021-01-01 | End: 2021-01-01

## 2021-01-01 RX ORDER — METAXALONE 800 MG/1
800 TABLET ORAL
COMMUNITY

## 2021-01-01 RX ORDER — LORAZEPAM 2 MG/ML
2 INJECTION INTRAMUSCULAR ONCE
Status: COMPLETED | OUTPATIENT
Start: 2021-01-01 | End: 2021-01-01

## 2021-01-01 RX ORDER — NAPROXEN SODIUM 220 MG
220 TABLET ORAL ONCE
Status: COMPLETED | OUTPATIENT
Start: 2021-01-01 | End: 2021-01-01

## 2021-01-01 RX ORDER — INSULIN PUMP SYRINGE, 3 ML
EACH MISCELLANEOUS
Qty: 1 KIT | Refills: 0 | Status: SHIPPED | OUTPATIENT
Start: 2021-01-01 | End: 2021-01-01

## 2021-01-01 RX ORDER — ATORVASTATIN CALCIUM 10 MG/1
10 TABLET, FILM COATED ORAL DAILY
Qty: 90 TAB | Refills: 1 | Status: SHIPPED | OUTPATIENT
Start: 2021-01-01 | End: 2021-01-01

## 2021-01-01 RX ORDER — MIDAZOLAM IN 0.9 % SOD.CHLORID 1 MG/ML
0-20 PLASTIC BAG, INJECTION (ML) INTRAVENOUS
Status: DISCONTINUED | OUTPATIENT
Start: 2021-01-01 | End: 2021-01-01 | Stop reason: HOSPADM

## 2021-01-01 RX ORDER — ENOXAPARIN SODIUM 100 MG/ML
40 INJECTION SUBCUTANEOUS DAILY
Status: DISCONTINUED | OUTPATIENT
Start: 2021-01-01 | End: 2021-01-01

## 2021-01-01 RX ORDER — HYDROCHLOROTHIAZIDE 12.5 MG/1
TABLET ORAL
Qty: 90 TABLET | Refills: 1 | Status: SHIPPED | OUTPATIENT
Start: 2021-01-01

## 2021-01-01 RX ORDER — ACETAMINOPHEN 500 MG
1000 TABLET ORAL
Status: DISCONTINUED | OUTPATIENT
Start: 2021-01-01 | End: 2021-01-01 | Stop reason: HOSPADM

## 2021-01-01 RX ORDER — HYDROCHLOROTHIAZIDE 12.5 MG/1
TABLET ORAL
Qty: 90 TAB | Refills: 1 | Status: SHIPPED | OUTPATIENT
Start: 2021-01-01 | End: 2021-01-01 | Stop reason: SDUPTHER

## 2021-01-01 RX ORDER — ENOXAPARIN SODIUM 150 MG/ML
135 INJECTION SUBCUTANEOUS EVERY 12 HOURS
Status: DISCONTINUED | OUTPATIENT
Start: 2021-01-01 | End: 2021-01-01

## 2021-01-01 RX ORDER — MIDAZOLAM IN 0.9 % SOD.CHLORID 1 MG/ML
0-10 PLASTIC BAG, INJECTION (ML) INTRAVENOUS
Status: DISCONTINUED | OUTPATIENT
Start: 2021-01-01 | End: 2021-01-01

## 2021-01-01 RX ORDER — AMLODIPINE BESYLATE 10 MG/1
TABLET ORAL
Qty: 90 TAB | Refills: 1 | Status: SHIPPED | OUTPATIENT
Start: 2021-01-01

## 2021-01-01 RX ORDER — BUMETANIDE 0.25 MG/ML
2 INJECTION INTRAMUSCULAR; INTRAVENOUS
Status: COMPLETED | OUTPATIENT
Start: 2021-01-01 | End: 2021-01-01

## 2021-01-01 RX ORDER — FLUTICASONE PROPIONATE 50 MCG
2 SPRAY, SUSPENSION (ML) NASAL DAILY
Status: DISCONTINUED | OUTPATIENT
Start: 2021-01-01 | End: 2021-01-01

## 2021-01-01 RX ORDER — ATORVASTATIN CALCIUM 10 MG/1
TABLET, FILM COATED ORAL
Qty: 90 TABLET | Refills: 0 | Status: ON HOLD | OUTPATIENT
Start: 2021-01-01 | End: 2021-01-01

## 2021-01-01 RX ORDER — ALBUMIN HUMAN 50 G/1000ML
25 SOLUTION INTRAVENOUS ONCE
Status: COMPLETED | OUTPATIENT
Start: 2021-01-01 | End: 2021-01-01

## 2021-01-01 RX ORDER — POTASSIUM CHLORIDE 7.45 MG/ML
10 INJECTION INTRAVENOUS
Status: DISCONTINUED | OUTPATIENT
Start: 2021-01-01 | End: 2021-01-01

## 2021-01-01 RX ORDER — IPRATROPIUM BROMIDE AND ALBUTEROL SULFATE 2.5; .5 MG/3ML; MG/3ML
3 SOLUTION RESPIRATORY (INHALATION)
Status: DISCONTINUED | OUTPATIENT
Start: 2021-01-01 | End: 2021-01-01

## 2021-01-01 RX ORDER — INSULIN LISPRO 100 [IU]/ML
INJECTION, SOLUTION INTRAVENOUS; SUBCUTANEOUS EVERY 6 HOURS
Status: DISCONTINUED | OUTPATIENT
Start: 2021-01-01 | End: 2021-01-01

## 2021-01-01 RX ORDER — AMOXICILLIN 250 MG
1 CAPSULE ORAL 2 TIMES DAILY
Status: DISCONTINUED | OUTPATIENT
Start: 2021-01-01 | End: 2021-01-01 | Stop reason: HOSPADM

## 2021-01-01 RX ORDER — ACETAMINOPHEN 650 MG/1
975 SUPPOSITORY RECTAL
Status: DISCONTINUED | OUTPATIENT
Start: 2021-01-01 | End: 2021-01-01 | Stop reason: HOSPADM

## 2021-01-01 RX ORDER — ATORVASTATIN CALCIUM 20 MG/1
20 TABLET, FILM COATED ORAL DAILY
Status: DISCONTINUED | OUTPATIENT
Start: 2021-01-01 | End: 2021-01-01

## 2021-01-01 RX ORDER — OXYMETAZOLINE HCL 0.05 %
2 SPRAY, NON-AEROSOL (ML) NASAL
Status: DISCONTINUED | OUTPATIENT
Start: 2021-01-01 | End: 2021-01-01

## 2021-01-01 RX ORDER — NAPROXEN 500 MG/1
500 TABLET ORAL
COMMUNITY

## 2021-01-01 RX ORDER — LANCETS
EACH MISCELLANEOUS
Qty: 50 EACH | Refills: 11 | Status: SHIPPED | OUTPATIENT
Start: 2021-01-01 | End: 2021-01-01

## 2021-01-01 RX ORDER — INSULIN GLARGINE 100 [IU]/ML
25 INJECTION, SOLUTION SUBCUTANEOUS DAILY
Status: DISCONTINUED | OUTPATIENT
Start: 2021-01-01 | End: 2021-01-01

## 2021-01-01 RX ORDER — DEXAMETHASONE SODIUM PHOSPHATE 10 MG/ML
6 INJECTION INTRAMUSCULAR; INTRAVENOUS EVERY 24 HOURS
Status: DISCONTINUED | OUTPATIENT
Start: 2021-01-01 | End: 2021-01-01 | Stop reason: HOSPADM

## 2021-01-01 RX ORDER — ONDANSETRON 4 MG/1
4 TABLET, ORALLY DISINTEGRATING ORAL
Status: DISCONTINUED | OUTPATIENT
Start: 2021-01-01 | End: 2021-01-01 | Stop reason: HOSPADM

## 2021-01-01 RX ORDER — ENOXAPARIN SODIUM 100 MG/ML
40 INJECTION SUBCUTANEOUS EVERY 12 HOURS
Status: DISCONTINUED | OUTPATIENT
Start: 2021-01-01 | End: 2021-01-01 | Stop reason: HOSPADM

## 2021-01-01 RX ORDER — INSULIN GLARGINE 100 [IU]/ML
38 INJECTION, SOLUTION SUBCUTANEOUS DAILY
Status: DISCONTINUED | OUTPATIENT
Start: 2021-01-01 | End: 2021-01-01 | Stop reason: HOSPADM

## 2021-01-01 RX ORDER — LANCETS 30 GAUGE
EACH MISCELLANEOUS
COMMUNITY
Start: 2021-01-01 | End: 2021-01-01

## 2021-01-01 RX ORDER — ROCURONIUM BROMIDE 10 MG/ML
0.6 INJECTION, SOLUTION INTRAVENOUS
Status: COMPLETED | OUTPATIENT
Start: 2021-01-01 | End: 2021-01-01

## 2021-01-01 RX ORDER — METAXALONE 800 MG/1
800 TABLET ORAL 3 TIMES DAILY
Qty: 30 TABLET | Refills: 1 | Status: SHIPPED | OUTPATIENT
Start: 2021-01-01 | End: 2021-01-01

## 2021-01-01 RX ORDER — MAGNESIUM SULFATE HEPTAHYDRATE 40 MG/ML
2 INJECTION, SOLUTION INTRAVENOUS
Status: DISCONTINUED | OUTPATIENT
Start: 2021-01-01 | End: 2021-01-01

## 2021-01-01 RX ORDER — LORAZEPAM 2 MG/ML
0.5 INJECTION INTRAMUSCULAR ONCE
Status: COMPLETED | OUTPATIENT
Start: 2021-01-01 | End: 2021-01-01

## 2021-01-01 RX ORDER — INSULIN GLARGINE 100 [IU]/ML
18 INJECTION, SOLUTION SUBCUTANEOUS DAILY
Status: DISCONTINUED | OUTPATIENT
Start: 2021-01-01 | End: 2021-01-01

## 2021-01-01 RX ORDER — HYDROMORPHONE HYDROCHLORIDE 1 MG/ML
0.5 INJECTION, SOLUTION INTRAMUSCULAR; INTRAVENOUS; SUBCUTANEOUS
Status: DISCONTINUED | OUTPATIENT
Start: 2021-01-01 | End: 2021-01-01

## 2021-01-01 RX ORDER — SODIUM CHLORIDE 0.9 % (FLUSH) 0.9 %
5-40 SYRINGE (ML) INJECTION AS NEEDED
Status: DISCONTINUED | OUTPATIENT
Start: 2021-01-01 | End: 2021-01-01 | Stop reason: HOSPADM

## 2021-01-01 RX ORDER — SODIUM CHLORIDE 0.9 % (FLUSH) 0.9 %
5-40 SYRINGE (ML) INJECTION EVERY 8 HOURS
Status: DISCONTINUED | OUTPATIENT
Start: 2021-01-01 | End: 2021-01-01 | Stop reason: HOSPADM

## 2021-01-01 RX ORDER — INSULIN GLARGINE 100 [IU]/ML
14 INJECTION, SOLUTION SUBCUTANEOUS DAILY
Status: DISCONTINUED | OUTPATIENT
Start: 2021-01-01 | End: 2021-01-01

## 2021-01-01 RX ORDER — ENOXAPARIN SODIUM 150 MG/ML
1 INJECTION SUBCUTANEOUS EVERY 12 HOURS
Status: DISCONTINUED | OUTPATIENT
Start: 2021-01-01 | End: 2021-01-01

## 2021-01-01 RX ORDER — DEXAMETHASONE SODIUM PHOSPHATE 10 MG/ML
10 INJECTION INTRAMUSCULAR; INTRAVENOUS EVERY 12 HOURS
Status: DISCONTINUED | OUTPATIENT
Start: 2021-01-01 | End: 2021-01-01

## 2021-01-01 RX ORDER — PEN NEEDLE, DIABETIC 30 GX3/16"
NEEDLE, DISPOSABLE MISCELLANEOUS
Qty: 50 PACKAGE | Refills: 11 | Status: SHIPPED | OUTPATIENT
Start: 2021-01-01 | End: 2021-01-01

## 2021-01-01 RX ORDER — NAPROXEN 500 MG/1
500 TABLET ORAL 2 TIMES DAILY WITH MEALS
Qty: 60 TABLET | Refills: 1 | Status: SHIPPED | OUTPATIENT
Start: 2021-01-01 | End: 2021-01-01

## 2021-01-01 RX ORDER — VANCOMYCIN 1.75 GRAM/500 ML IN 0.9 % SODIUM CHLORIDE INTRAVENOUS
1750 EVERY 8 HOURS
Status: DISCONTINUED | OUTPATIENT
Start: 2021-01-01 | End: 2021-01-01

## 2021-01-01 RX ORDER — LORAZEPAM 2 MG/ML
1 INJECTION INTRAMUSCULAR
Status: DISCONTINUED | OUTPATIENT
Start: 2021-01-01 | End: 2021-01-01

## 2021-01-01 RX ORDER — FUROSEMIDE 10 MG/ML
20 INJECTION INTRAMUSCULAR; INTRAVENOUS 2 TIMES DAILY
Status: DISCONTINUED | OUTPATIENT
Start: 2021-01-01 | End: 2021-01-01 | Stop reason: HOSPADM

## 2021-01-01 RX ORDER — DEXTROSE 50 % IN WATER (D50W) INTRAVENOUS SYRINGE
12.5-25 AS NEEDED
Status: DISCONTINUED | OUTPATIENT
Start: 2021-01-01 | End: 2021-01-01 | Stop reason: HOSPADM

## 2021-01-01 RX ORDER — ACETAMINOPHEN 650 MG/1
650 SUPPOSITORY RECTAL
Status: DISCONTINUED | OUTPATIENT
Start: 2021-01-01 | End: 2021-01-01

## 2021-01-01 RX ORDER — ASCORBIC ACID 500 MG
500 TABLET ORAL 2 TIMES DAILY
Status: DISCONTINUED | OUTPATIENT
Start: 2021-01-01 | End: 2021-01-01

## 2021-01-01 RX ORDER — HYDROXYZINE 25 MG/1
25 TABLET, FILM COATED ORAL
Status: DISCONTINUED | OUTPATIENT
Start: 2021-01-01 | End: 2021-01-01

## 2021-01-01 RX ORDER — INSULIN GLARGINE 100 [IU]/ML
34 INJECTION, SOLUTION SUBCUTANEOUS DAILY
Status: DISCONTINUED | OUTPATIENT
Start: 2021-01-01 | End: 2021-01-01

## 2021-01-01 RX ORDER — MAGNESIUM SULFATE 1 G/100ML
1 INJECTION INTRAVENOUS
Status: COMPLETED | OUTPATIENT
Start: 2021-01-01 | End: 2021-01-01

## 2021-01-01 RX ORDER — CHLORHEXIDINE GLUCONATE 1.2 MG/ML
15 RINSE ORAL EVERY 12 HOURS
Status: DISCONTINUED | OUTPATIENT
Start: 2021-01-01 | End: 2021-01-01 | Stop reason: HOSPADM

## 2021-01-01 RX ORDER — DEXAMETHASONE SODIUM PHOSPHATE 10 MG/ML
6 INJECTION INTRAMUSCULAR; INTRAVENOUS EVERY 24 HOURS
Status: DISCONTINUED | OUTPATIENT
Start: 2021-01-01 | End: 2021-01-01

## 2021-01-01 RX ORDER — MAGNESIUM SULFATE 100 %
4 CRYSTALS MISCELLANEOUS AS NEEDED
Status: DISCONTINUED | OUTPATIENT
Start: 2021-01-01 | End: 2021-01-01

## 2021-01-01 RX ORDER — INSULIN GLARGINE 100 [IU]/ML
22 INJECTION, SOLUTION SUBCUTANEOUS DAILY
Status: DISCONTINUED | OUTPATIENT
Start: 2021-01-01 | End: 2021-01-01

## 2021-01-01 RX ORDER — PETROLATUM,WHITE
OINTMENT IN PACKET (GRAM) TOPICAL AS NEEDED
Status: DISCONTINUED | OUTPATIENT
Start: 2021-01-01 | End: 2021-01-01 | Stop reason: HOSPADM

## 2021-01-01 RX ORDER — METOLAZONE 2.5 MG/1
5 TABLET ORAL 2 TIMES DAILY
Status: DISCONTINUED | OUTPATIENT
Start: 2021-01-01 | End: 2021-01-01

## 2021-01-01 RX ORDER — POLYETHYLENE GLYCOL 3350 17 G/17G
17 POWDER, FOR SOLUTION ORAL DAILY
Status: DISCONTINUED | OUTPATIENT
Start: 2021-01-01 | End: 2021-01-01 | Stop reason: HOSPADM

## 2021-01-01 RX ORDER — BISACODYL 5 MG
5 TABLET, DELAYED RELEASE (ENTERIC COATED) ORAL DAILY PRN
Status: DISCONTINUED | OUTPATIENT
Start: 2021-01-01 | End: 2021-01-01

## 2021-01-01 RX ADMIN — MIDAZOLAM 16 MG/HR: 5 INJECTION, SOLUTION INTRAMUSCULAR; INTRAVENOUS at 04:18

## 2021-01-01 RX ADMIN — Medication 50 MG: at 08:06

## 2021-01-01 RX ADMIN — ATORVASTATIN CALCIUM 20 MG: 20 TABLET, FILM COATED ORAL at 08:22

## 2021-01-01 RX ADMIN — METOLAZONE 5 MG: 2.5 TABLET ORAL at 08:02

## 2021-01-01 RX ADMIN — PHENYLEPHRINE HYDROCHLORIDE 60 MCG/MIN: 10 INJECTION INTRAVENOUS at 23:15

## 2021-01-01 RX ADMIN — IPRATROPIUM BROMIDE AND ALBUTEROL 2 PUFF: 20; 100 SPRAY, METERED RESPIRATORY (INHALATION) at 00:29

## 2021-01-01 RX ADMIN — PROPOFOL 50 MCG/KG/MIN: 10 INJECTION, EMULSION INTRAVENOUS at 13:02

## 2021-01-01 RX ADMIN — IPRATROPIUM BROMIDE AND ALBUTEROL SULFATE 3 ML: 2.5; .5 SOLUTION RESPIRATORY (INHALATION) at 11:25

## 2021-01-01 RX ADMIN — GUAIFENESIN 600 MG: 600 TABLET ORAL at 08:08

## 2021-01-01 RX ADMIN — Medication 150 MCG/HR: at 10:37

## 2021-01-01 RX ADMIN — DOCUSATE SODIUM 50 MG AND SENNOSIDES 8.6 MG 1 TABLET: 8.6; 5 TABLET, FILM COATED ORAL at 18:31

## 2021-01-01 RX ADMIN — Medication 1 CAPSULE: at 08:43

## 2021-01-01 RX ADMIN — BUMETANIDE 0.5 MG: 0.25 INJECTION INTRAMUSCULAR; INTRAVENOUS at 13:15

## 2021-01-01 RX ADMIN — BUMETANIDE 0.5 MG: 0.25 INJECTION INTRAMUSCULAR; INTRAVENOUS at 17:36

## 2021-01-01 RX ADMIN — PROPOFOL 45 MCG/KG/MIN: 10 INJECTION, EMULSION INTRAVENOUS at 18:46

## 2021-01-01 RX ADMIN — PHENYLEPHRINE HYDROCHLORIDE 35 MCG/MIN: 10 INJECTION INTRAVENOUS at 09:44

## 2021-01-01 RX ADMIN — MIDAZOLAM 8 MG/HR: 5 INJECTION, SOLUTION INTRAMUSCULAR; INTRAVENOUS at 06:42

## 2021-01-01 RX ADMIN — INSULIN LISPRO 3 UNITS: 100 INJECTION, SOLUTION INTRAVENOUS; SUBCUTANEOUS at 11:09

## 2021-01-01 RX ADMIN — Medication 10 ML: at 06:09

## 2021-01-01 RX ADMIN — CISATRACURIUM BESYLATE 3.5 MCG/KG/MIN: 10 INJECTION INTRAVENOUS at 07:31

## 2021-01-01 RX ADMIN — INSULIN LISPRO 3 UNITS: 100 INJECTION, SOLUTION INTRAVENOUS; SUBCUTANEOUS at 00:40

## 2021-01-01 RX ADMIN — PROPOFOL 50 MCG/KG/MIN: 10 INJECTION, EMULSION INTRAVENOUS at 02:33

## 2021-01-01 RX ADMIN — MINERAL OIL AND PETROLATUM: 150; 830 OINTMENT OPHTHALMIC at 20:45

## 2021-01-01 RX ADMIN — MINERAL OIL AND PETROLATUM: 150; 830 OINTMENT OPHTHALMIC at 09:02

## 2021-01-01 RX ADMIN — LANSOPRAZOLE 30 MG: KIT at 08:20

## 2021-01-01 RX ADMIN — ACETAMINOPHEN 975 MG: 650 SUPPOSITORY RECTAL at 00:31

## 2021-01-01 RX ADMIN — POTASSIUM PHOSPHATE, MONOBASIC POTASSIUM PHOSPHATE, DIBASIC: 224; 236 INJECTION, SOLUTION, CONCENTRATE INTRAVENOUS at 17:12

## 2021-01-01 RX ADMIN — CHLORHEXIDINE GLUCONATE 15 ML: 1.2 RINSE ORAL at 20:03

## 2021-01-01 RX ADMIN — BENZONATATE 200 MG: 100 CAPSULE ORAL at 21:13

## 2021-01-01 RX ADMIN — OXYCODONE HYDROCHLORIDE AND ACETAMINOPHEN 500 MG: 500 TABLET ORAL at 08:31

## 2021-01-01 RX ADMIN — Medication 5 MG: at 21:43

## 2021-01-01 RX ADMIN — PROPOFOL 50 MCG/KG/MIN: 10 INJECTION, EMULSION INTRAVENOUS at 17:55

## 2021-01-01 RX ADMIN — Medication 10 ML: at 06:04

## 2021-01-01 RX ADMIN — PANTOPRAZOLE SODIUM 40 MG: 40 TABLET, DELAYED RELEASE ORAL at 07:30

## 2021-01-01 RX ADMIN — INSULIN LISPRO 3 UNITS: 100 INJECTION, SOLUTION INTRAVENOUS; SUBCUTANEOUS at 00:22

## 2021-01-01 RX ADMIN — INSULIN LISPRO 3 UNITS: 100 INJECTION, SOLUTION INTRAVENOUS; SUBCUTANEOUS at 17:08

## 2021-01-01 RX ADMIN — SIMETHICONE 80 MG: 80 TABLET, CHEWABLE ORAL at 21:51

## 2021-01-01 RX ADMIN — SODIUM CHLORIDE 200 MG: 9 INJECTION, SOLUTION INTRAVENOUS at 09:58

## 2021-01-01 RX ADMIN — Medication 1 CAPSULE: at 08:22

## 2021-01-01 RX ADMIN — MIDAZOLAM 13 MG/HR: 5 INJECTION, SOLUTION INTRAMUSCULAR; INTRAVENOUS at 20:22

## 2021-01-01 RX ADMIN — PROPOFOL 45 MCG/KG/MIN: 10 INJECTION, EMULSION INTRAVENOUS at 06:21

## 2021-01-01 RX ADMIN — GUAIFENESIN 600 MG: 600 TABLET ORAL at 20:32

## 2021-01-01 RX ADMIN — HYDROCODONE POLISTIREX AND CHLORPHENIRAMINE POLISTIREX 5 ML: 10; 8 SUSPENSION, EXTENDED RELEASE ORAL at 08:12

## 2021-01-01 RX ADMIN — INSULIN LISPRO 3 UNITS: 100 INJECTION, SOLUTION INTRAVENOUS; SUBCUTANEOUS at 17:30

## 2021-01-01 RX ADMIN — DEXAMETHASONE SODIUM PHOSPHATE 6 MG: 10 INJECTION INTRAMUSCULAR; INTRAVENOUS at 20:00

## 2021-01-01 RX ADMIN — MIDAZOLAM 15 MG/HR: 5 INJECTION, SOLUTION INTRAMUSCULAR; INTRAVENOUS at 00:05

## 2021-01-01 RX ADMIN — VANCOMYCIN HYDROCHLORIDE 1500 MG: 10 INJECTION, POWDER, LYOPHILIZED, FOR SOLUTION INTRAVENOUS at 10:51

## 2021-01-01 RX ADMIN — FUROSEMIDE 20 MG: 10 INJECTION, SOLUTION INTRAMUSCULAR; INTRAVENOUS at 09:11

## 2021-01-01 RX ADMIN — INSULIN LISPRO 3 UNITS: 100 INJECTION, SOLUTION INTRAVENOUS; SUBCUTANEOUS at 18:31

## 2021-01-01 RX ADMIN — BUMETANIDE 1 MG: 0.25 INJECTION INTRAMUSCULAR; INTRAVENOUS at 13:53

## 2021-01-01 RX ADMIN — CISATRACURIUM BESYLATE 3 MCG/KG/MIN: 10 INJECTION INTRAVENOUS at 16:05

## 2021-01-01 RX ADMIN — PROPOFOL 50 MCG/KG/MIN: 10 INJECTION, EMULSION INTRAVENOUS at 18:37

## 2021-01-01 RX ADMIN — PROPOFOL 50 MCG/KG/MIN: 10 INJECTION, EMULSION INTRAVENOUS at 17:15

## 2021-01-01 RX ADMIN — CHLORHEXIDINE GLUCONATE 15 ML: 1.2 RINSE ORAL at 20:10

## 2021-01-01 RX ADMIN — Medication 1 CAPSULE: at 08:11

## 2021-01-01 RX ADMIN — PHENYLEPHRINE HYDROCHLORIDE 50 MCG/MIN: 10 INJECTION INTRAVENOUS at 09:58

## 2021-01-01 RX ADMIN — METOCLOPRAMIDE 5 MG: 5 INJECTION, SOLUTION INTRAMUSCULAR; INTRAVENOUS at 08:00

## 2021-01-01 RX ADMIN — Medication 300 MCG/HR: at 20:15

## 2021-01-01 RX ADMIN — SIMETHICONE 80 MG: 80 TABLET, CHEWABLE ORAL at 15:04

## 2021-01-01 RX ADMIN — ATORVASTATIN CALCIUM 20 MG: 20 TABLET, FILM COATED ORAL at 08:01

## 2021-01-01 RX ADMIN — Medication 10 ML: at 05:27

## 2021-01-01 RX ADMIN — CISATRACURIUM BESYLATE 1 MCG/KG/MIN: 10 INJECTION INTRAVENOUS at 06:35

## 2021-01-01 RX ADMIN — SALINE NASAL SPRAY 2 SPRAY: 1.5 SOLUTION NASAL at 08:50

## 2021-01-01 RX ADMIN — IPRATROPIUM BROMIDE AND ALBUTEROL SULFATE 3 ML: 2.5; .5 SOLUTION RESPIRATORY (INHALATION) at 15:12

## 2021-01-01 RX ADMIN — MIDAZOLAM 15 MG/HR: 5 INJECTION, SOLUTION INTRAMUSCULAR; INTRAVENOUS at 10:09

## 2021-01-01 RX ADMIN — INSULIN LISPRO 3 UNITS: 100 INJECTION, SOLUTION INTRAVENOUS; SUBCUTANEOUS at 08:21

## 2021-01-01 RX ADMIN — Medication 10 ML: at 05:39

## 2021-01-01 RX ADMIN — BUMETANIDE 0.5 MG: 0.25 INJECTION INTRAMUSCULAR; INTRAVENOUS at 23:31

## 2021-01-01 RX ADMIN — ATORVASTATIN CALCIUM 20 MG: 20 TABLET, FILM COATED ORAL at 08:28

## 2021-01-01 RX ADMIN — HYDROXYZINE HYDROCHLORIDE 25 MG: 25 TABLET, FILM COATED ORAL at 22:44

## 2021-01-01 RX ADMIN — CISATRACURIUM BESYLATE 2 MCG/KG/MIN: 10 INJECTION INTRAVENOUS at 23:22

## 2021-01-01 RX ADMIN — Medication 10 ML: at 13:29

## 2021-01-01 RX ADMIN — ENOXAPARIN SODIUM 135 MG: 150 INJECTION SUBCUTANEOUS at 08:12

## 2021-01-01 RX ADMIN — MIDAZOLAM 8 MG/HR: 5 INJECTION, SOLUTION INTRAMUSCULAR; INTRAVENOUS at 20:19

## 2021-01-01 RX ADMIN — DOCUSATE SODIUM 50 MG AND SENNOSIDES 8.6 MG 1 TABLET: 8.6; 5 TABLET, FILM COATED ORAL at 08:41

## 2021-01-01 RX ADMIN — Medication 10 ML: at 06:26

## 2021-01-01 RX ADMIN — PHENYLEPHRINE HYDROCHLORIDE 50 MCG/MIN: 10 INJECTION INTRAVENOUS at 03:25

## 2021-01-01 RX ADMIN — MIDAZOLAM 10 MG/HR: 5 INJECTION, SOLUTION INTRAMUSCULAR; INTRAVENOUS at 23:08

## 2021-01-01 RX ADMIN — HYDROXYZINE HYDROCHLORIDE 25 MG: 25 TABLET, FILM COATED ORAL at 08:01

## 2021-01-01 RX ADMIN — ACETAMINOPHEN 975 MG: 650 SUPPOSITORY RECTAL at 08:26

## 2021-01-01 RX ADMIN — PROPOFOL 50 MCG/KG/MIN: 10 INJECTION, EMULSION INTRAVENOUS at 00:44

## 2021-01-01 RX ADMIN — Medication 10 ML: at 14:03

## 2021-01-01 RX ADMIN — DOCUSATE SODIUM 50 MG AND SENNOSIDES 8.6 MG 1 TABLET: 8.6; 5 TABLET, FILM COATED ORAL at 17:11

## 2021-01-01 RX ADMIN — BUMETANIDE 1 MG: 0.25 INJECTION INTRAMUSCULAR; INTRAVENOUS at 13:29

## 2021-01-01 RX ADMIN — BENZONATATE 200 MG: 100 CAPSULE ORAL at 13:56

## 2021-01-01 RX ADMIN — VANCOMYCIN HYDROCHLORIDE 1500 MG: 10 INJECTION, POWDER, LYOPHILIZED, FOR SOLUTION INTRAVENOUS at 21:40

## 2021-01-01 RX ADMIN — IOPAMIDOL 80 ML: 755 INJECTION, SOLUTION INTRAVENOUS at 15:01

## 2021-01-01 RX ADMIN — FENTANYL CITRATE 150 MCG/HR: 50 INJECTION, SOLUTION INTRAMUSCULAR; INTRAVENOUS at 21:25

## 2021-01-01 RX ADMIN — Medication 5 MG: at 00:47

## 2021-01-01 RX ADMIN — PROPOFOL 50 MCG/KG/MIN: 10 INJECTION, EMULSION INTRAVENOUS at 17:38

## 2021-01-01 RX ADMIN — ATORVASTATIN CALCIUM 20 MG: 20 TABLET, FILM COATED ORAL at 09:39

## 2021-01-01 RX ADMIN — MIDAZOLAM 10 MG/HR: 5 INJECTION, SOLUTION INTRAMUSCULAR; INTRAVENOUS at 04:24

## 2021-01-01 RX ADMIN — INSULIN LISPRO 10 UNITS: 100 INJECTION, SOLUTION INTRAVENOUS; SUBCUTANEOUS at 06:18

## 2021-01-01 RX ADMIN — IPRATROPIUM BROMIDE AND ALBUTEROL SULFATE 3 ML: 2.5; .5 SOLUTION RESPIRATORY (INHALATION) at 07:49

## 2021-01-01 RX ADMIN — Medication 10 ML: at 21:16

## 2021-01-01 RX ADMIN — BENZONATATE 200 MG: 100 CAPSULE ORAL at 15:04

## 2021-01-01 RX ADMIN — MEROPENEM 500 MG: 500 INJECTION, POWDER, FOR SOLUTION INTRAVENOUS at 03:51

## 2021-01-01 RX ADMIN — DEXAMETHASONE SODIUM PHOSPHATE 10 MG: 10 INJECTION, SOLUTION INTRAMUSCULAR; INTRAVENOUS at 08:02

## 2021-01-01 RX ADMIN — Medication 200 MCG/HR: at 10:25

## 2021-01-01 RX ADMIN — FENTANYL CITRATE 150 MCG/HR: 50 INJECTION, SOLUTION INTRAMUSCULAR; INTRAVENOUS at 03:37

## 2021-01-01 RX ADMIN — Medication 10 ML: at 13:54

## 2021-01-01 RX ADMIN — HYDROMORPHONE HYDROCHLORIDE 0.5 MG: 1 INJECTION, SOLUTION INTRAMUSCULAR; INTRAVENOUS; SUBCUTANEOUS at 16:30

## 2021-01-01 RX ADMIN — INSULIN LISPRO 3 UNITS: 100 INJECTION, SOLUTION INTRAVENOUS; SUBCUTANEOUS at 00:16

## 2021-01-01 RX ADMIN — DEXAMETHASONE SODIUM PHOSPHATE 6 MG: 10 INJECTION, SOLUTION INTRAMUSCULAR; INTRAVENOUS at 08:39

## 2021-01-01 RX ADMIN — MINERAL OIL AND PETROLATUM: 150; 830 OINTMENT OPHTHALMIC at 20:44

## 2021-01-01 RX ADMIN — SALINE NASAL SPRAY 2 SPRAY: 1.5 SOLUTION NASAL at 12:27

## 2021-01-01 RX ADMIN — SALINE NASAL SPRAY 2 SPRAY: 1.5 SOLUTION NASAL at 07:46

## 2021-01-01 RX ADMIN — BUMETANIDE 1 MG: 0.25 INJECTION INTRAMUSCULAR; INTRAVENOUS at 00:29

## 2021-01-01 RX ADMIN — CISATRACURIUM BESYLATE 3 MCG/KG/MIN: 10 INJECTION INTRAVENOUS at 23:04

## 2021-01-01 RX ADMIN — MEROPENEM 500 MG: 500 INJECTION, POWDER, FOR SOLUTION INTRAVENOUS at 20:45

## 2021-01-01 RX ADMIN — PROPOFOL 200 MG: 10 INJECTION, EMULSION INTRAVENOUS at 15:39

## 2021-01-01 RX ADMIN — DOCUSATE SODIUM 50 MG AND SENNOSIDES 8.6 MG 1 TABLET: 8.6; 5 TABLET, FILM COATED ORAL at 18:00

## 2021-01-01 RX ADMIN — PROPOFOL 45 MCG/KG/MIN: 10 INJECTION, EMULSION INTRAVENOUS at 13:06

## 2021-01-01 RX ADMIN — PROPOFOL 50 MCG/KG/MIN: 10 INJECTION, EMULSION INTRAVENOUS at 01:01

## 2021-01-01 RX ADMIN — DOCUSATE SODIUM 50 MG AND SENNOSIDES 8.6 MG 1 TABLET: 8.6; 5 TABLET, FILM COATED ORAL at 17:30

## 2021-01-01 RX ADMIN — CISATRACURIUM BESYLATE 3 MCG/KG/MIN: 200 INJECTION, SOLUTION INTRAVENOUS at 21:22

## 2021-01-01 RX ADMIN — SALINE NASAL SPRAY 2 SPRAY: 1.5 SOLUTION NASAL at 11:50

## 2021-01-01 RX ADMIN — INSULIN LISPRO 4 UNITS: 100 INJECTION, SOLUTION INTRAVENOUS; SUBCUTANEOUS at 06:32

## 2021-01-01 RX ADMIN — PHENYLEPHRINE HYDROCHLORIDE 55 MCG/MIN: 10 INJECTION INTRAVENOUS at 23:15

## 2021-01-01 RX ADMIN — MINERAL OIL AND PETROLATUM: 150; 830 OINTMENT OPHTHALMIC at 08:33

## 2021-01-01 RX ADMIN — VANCOMYCIN HYDROCHLORIDE 1750 MG: 10 INJECTION, POWDER, LYOPHILIZED, FOR SOLUTION INTRAVENOUS at 13:57

## 2021-01-01 RX ADMIN — DEXMEDETOMIDINE HYDROCHLORIDE 0.4 MCG/KG/HR: 400 INJECTION INTRAVENOUS at 10:34

## 2021-01-01 RX ADMIN — IPRATROPIUM BROMIDE AND ALBUTEROL SULFATE 3 ML: 2.5; .5 SOLUTION RESPIRATORY (INHALATION) at 01:38

## 2021-01-01 RX ADMIN — OXYCODONE HYDROCHLORIDE AND ACETAMINOPHEN 500 MG: 500 TABLET ORAL at 08:43

## 2021-01-01 RX ADMIN — BUMETANIDE 1 MG: 0.25 INJECTION INTRAMUSCULAR; INTRAVENOUS at 13:05

## 2021-01-01 RX ADMIN — ACETAMINOPHEN 975 MG: 650 SUPPOSITORY RECTAL at 23:47

## 2021-01-01 RX ADMIN — MIDAZOLAM 8 MG/HR: 5 INJECTION, SOLUTION INTRAMUSCULAR; INTRAVENOUS at 07:13

## 2021-01-01 RX ADMIN — PHENYLEPHRINE HYDROCHLORIDE 30 MCG/MIN: 10 INJECTION INTRAVENOUS at 02:46

## 2021-01-01 RX ADMIN — VASOPRESSIN 0.03 UNITS/MIN: 20 INJECTION INTRAVENOUS at 10:31

## 2021-01-01 RX ADMIN — BUMETANIDE 1 MG: 0.25 INJECTION INTRAMUSCULAR; INTRAVENOUS at 05:52

## 2021-01-01 RX ADMIN — Medication 10 ML: at 22:29

## 2021-01-01 RX ADMIN — SALINE NASAL SPRAY 2 SPRAY: 1.5 SOLUTION NASAL at 15:05

## 2021-01-01 RX ADMIN — CISATRACURIUM BESYLATE 4 MCG/KG/MIN: 10 INJECTION INTRAVENOUS at 23:06

## 2021-01-01 RX ADMIN — OXYCODONE HYDROCHLORIDE AND ACETAMINOPHEN 500 MG: 500 TABLET ORAL at 08:02

## 2021-01-01 RX ADMIN — Medication 50 MG: at 08:48

## 2021-01-01 RX ADMIN — VASOPRESSIN 0.03 UNITS/MIN: 20 INJECTION INTRAVENOUS at 22:29

## 2021-01-01 RX ADMIN — BUMETANIDE 0.5 MG: 0.25 INJECTION INTRAMUSCULAR; INTRAVENOUS at 17:27

## 2021-01-01 RX ADMIN — Medication 10 ML: at 21:12

## 2021-01-01 RX ADMIN — Medication 10 ML: at 13:17

## 2021-01-01 RX ADMIN — IPRATROPIUM BROMIDE AND ALBUTEROL SULFATE 3 ML: 2.5; .5 SOLUTION RESPIRATORY (INHALATION) at 07:31

## 2021-01-01 RX ADMIN — INSULIN LISPRO 3 UNITS: 100 INJECTION, SOLUTION INTRAVENOUS; SUBCUTANEOUS at 17:11

## 2021-01-01 RX ADMIN — Medication 10 ML: at 05:33

## 2021-01-01 RX ADMIN — ENOXAPARIN SODIUM 40 MG: 100 INJECTION SUBCUTANEOUS at 19:47

## 2021-01-01 RX ADMIN — MINERAL OIL AND PETROLATUM: 150; 830 OINTMENT OPHTHALMIC at 21:58

## 2021-01-01 RX ADMIN — HYDROCODONE POLISTIREX AND CHLORPHENIRAMINE POLISTIREX 5 ML: 10; 8 SUSPENSION, EXTENDED RELEASE ORAL at 08:06

## 2021-01-01 RX ADMIN — INSULIN LISPRO 4 UNITS: 100 INJECTION, SOLUTION INTRAVENOUS; SUBCUTANEOUS at 05:32

## 2021-01-01 RX ADMIN — HYDROXYZINE HYDROCHLORIDE 25 MG: 25 TABLET, FILM COATED ORAL at 00:47

## 2021-01-01 RX ADMIN — PROPOFOL 50 MCG/KG/MIN: 10 INJECTION, EMULSION INTRAVENOUS at 02:15

## 2021-01-01 RX ADMIN — FUROSEMIDE 20 MG: 10 INJECTION, SOLUTION INTRAMUSCULAR; INTRAVENOUS at 18:31

## 2021-01-01 RX ADMIN — Medication 10 ML: at 05:29

## 2021-01-01 RX ADMIN — MIDAZOLAM 8 MG/HR: 5 INJECTION, SOLUTION INTRAMUSCULAR; INTRAVENOUS at 09:17

## 2021-01-01 RX ADMIN — DEXAMETHASONE SODIUM PHOSPHATE 10 MG: 10 INJECTION INTRAMUSCULAR; INTRAVENOUS at 21:47

## 2021-01-01 RX ADMIN — DEXAMETHASONE SODIUM PHOSPHATE 10 MG: 10 INJECTION INTRAMUSCULAR; INTRAVENOUS at 21:43

## 2021-01-01 RX ADMIN — MEROPENEM 500 MG: 500 INJECTION, POWDER, FOR SOLUTION INTRAVENOUS at 15:42

## 2021-01-01 RX ADMIN — DOCUSATE SODIUM 50 MG AND SENNOSIDES 8.6 MG 1 TABLET: 8.6; 5 TABLET, FILM COATED ORAL at 08:39

## 2021-01-01 RX ADMIN — CISATRACURIUM BESYLATE 2 MCG/KG/MIN: 10 INJECTION INTRAVENOUS at 00:15

## 2021-01-01 RX ADMIN — CHLORHEXIDINE GLUCONATE 15 ML: 1.2 RINSE ORAL at 20:40

## 2021-01-01 RX ADMIN — CISATRACURIUM BESYLATE 4 MCG/KG/MIN: 10 INJECTION INTRAVENOUS at 21:20

## 2021-01-01 RX ADMIN — PROPOFOL 45 MCG/KG/MIN: 10 INJECTION, EMULSION INTRAVENOUS at 19:51

## 2021-01-01 RX ADMIN — IPRATROPIUM BROMIDE AND ALBUTEROL SULFATE 3 ML: 2.5; .5 SOLUTION RESPIRATORY (INHALATION) at 20:26

## 2021-01-01 RX ADMIN — BENZONATATE 200 MG: 100 CAPSULE ORAL at 20:32

## 2021-01-01 RX ADMIN — PROPOFOL 50 MCG/KG/MIN: 10 INJECTION, EMULSION INTRAVENOUS at 15:20

## 2021-01-01 RX ADMIN — IPRATROPIUM BROMIDE AND ALBUTEROL 2 PUFF: 20; 100 SPRAY, METERED RESPIRATORY (INHALATION) at 00:58

## 2021-01-01 RX ADMIN — INSULIN LISPRO 3 UNITS: 100 INJECTION, SOLUTION INTRAVENOUS; SUBCUTANEOUS at 17:18

## 2021-01-01 RX ADMIN — IPRATROPIUM BROMIDE AND ALBUTEROL SULFATE 3 ML: 2.5; .5 SOLUTION RESPIRATORY (INHALATION) at 01:54

## 2021-01-01 RX ADMIN — PROPOFOL 50 MCG/KG/MIN: 10 INJECTION, EMULSION INTRAVENOUS at 21:02

## 2021-01-01 RX ADMIN — MIDAZOLAM 8 MG/HR: 5 INJECTION, SOLUTION INTRAMUSCULAR; INTRAVENOUS at 20:56

## 2021-01-01 RX ADMIN — Medication 5 MG: at 21:28

## 2021-01-01 RX ADMIN — DOCUSATE SODIUM 50 MG AND SENNOSIDES 8.6 MG 1 TABLET: 8.6; 5 TABLET, FILM COATED ORAL at 17:26

## 2021-01-01 RX ADMIN — CHLORHEXIDINE GLUCONATE 15 ML: 1.2 RINSE ORAL at 08:07

## 2021-01-01 RX ADMIN — Medication 200 MCG/HR: at 14:05

## 2021-01-01 RX ADMIN — SALINE NASAL SPRAY 2 SPRAY: 1.5 SOLUTION NASAL at 03:59

## 2021-01-01 RX ADMIN — PROPOFOL 50 MCG/KG/MIN: 10 INJECTION, EMULSION INTRAVENOUS at 18:07

## 2021-01-01 RX ADMIN — ATORVASTATIN CALCIUM 20 MG: 20 TABLET, FILM COATED ORAL at 08:21

## 2021-01-01 RX ADMIN — PHENYLEPHRINE HYDROCHLORIDE 20 MCG/MIN: 10 INJECTION INTRAVENOUS at 02:04

## 2021-01-01 RX ADMIN — BENZONATATE 200 MG: 100 CAPSULE ORAL at 20:25

## 2021-01-01 RX ADMIN — Medication 1 CAPSULE: at 09:39

## 2021-01-01 RX ADMIN — IPRATROPIUM BROMIDE AND ALBUTEROL SULFATE 3 ML: 2.5; .5 SOLUTION RESPIRATORY (INHALATION) at 07:10

## 2021-01-01 RX ADMIN — IPRATROPIUM BROMIDE AND ALBUTEROL SULFATE 3 ML: 2.5; .5 SOLUTION RESPIRATORY (INHALATION) at 14:27

## 2021-01-01 RX ADMIN — DOCUSATE SODIUM 50 MG AND SENNOSIDES 8.6 MG 1 TABLET: 8.6; 5 TABLET, FILM COATED ORAL at 08:06

## 2021-01-01 RX ADMIN — GUAIFENESIN 600 MG: 600 TABLET ORAL at 08:01

## 2021-01-01 RX ADMIN — IPRATROPIUM BROMIDE AND ALBUTEROL 2 PUFF: 20; 100 SPRAY, METERED RESPIRATORY (INHALATION) at 08:51

## 2021-01-01 RX ADMIN — IPRATROPIUM BROMIDE AND ALBUTEROL SULFATE 3 ML: 2.5; .5 SOLUTION RESPIRATORY (INHALATION) at 14:24

## 2021-01-01 RX ADMIN — PANTOPRAZOLE SODIUM 40 MG: 40 INJECTION, POWDER, FOR SOLUTION INTRAVENOUS at 08:54

## 2021-01-01 RX ADMIN — PROPOFOL 50 MCG/KG/MIN: 10 INJECTION, EMULSION INTRAVENOUS at 04:12

## 2021-01-01 RX ADMIN — INSULIN LISPRO 3 UNITS: 100 INJECTION, SOLUTION INTRAVENOUS; SUBCUTANEOUS at 00:00

## 2021-01-01 RX ADMIN — PROPOFOL 50 MCG/KG/MIN: 10 INJECTION, EMULSION INTRAVENOUS at 12:50

## 2021-01-01 RX ADMIN — Medication 50 MG: at 08:26

## 2021-01-01 RX ADMIN — IPRATROPIUM BROMIDE AND ALBUTEROL SULFATE 3 ML: 2.5; .5 SOLUTION RESPIRATORY (INHALATION) at 08:16

## 2021-01-01 RX ADMIN — INSULIN LISPRO 4 UNITS: 100 INJECTION, SOLUTION INTRAVENOUS; SUBCUTANEOUS at 05:35

## 2021-01-01 RX ADMIN — PROPOFOL 50 MCG/KG/MIN: 10 INJECTION, EMULSION INTRAVENOUS at 01:46

## 2021-01-01 RX ADMIN — IPRATROPIUM BROMIDE AND ALBUTEROL SULFATE 3 ML: 2.5; .5 SOLUTION RESPIRATORY (INHALATION) at 01:06

## 2021-01-01 RX ADMIN — DOCUSATE SODIUM 50 MG AND SENNOSIDES 8.6 MG 1 TABLET: 8.6; 5 TABLET, FILM COATED ORAL at 17:54

## 2021-01-01 RX ADMIN — PHENYLEPHRINE HYDROCHLORIDE 30 MCG/MIN: 10 INJECTION INTRAVENOUS at 19:45

## 2021-01-01 RX ADMIN — IPRATROPIUM BROMIDE AND ALBUTEROL SULFATE 3 ML: 2.5; .5 SOLUTION RESPIRATORY (INHALATION) at 04:09

## 2021-01-01 RX ADMIN — SALINE NASAL SPRAY 2 SPRAY: 1.5 SOLUTION NASAL at 16:20

## 2021-01-01 RX ADMIN — CISATRACURIUM BESYLATE 3 MCG/KG/MIN: 10 INJECTION INTRAVENOUS at 10:07

## 2021-01-01 RX ADMIN — Medication 300 MCG/HR: at 02:27

## 2021-01-01 RX ADMIN — PROPOFOL 45 MCG/KG/MIN: 10 INJECTION, EMULSION INTRAVENOUS at 16:43

## 2021-01-01 RX ADMIN — OXYCODONE HYDROCHLORIDE AND ACETAMINOPHEN 500 MG: 500 TABLET ORAL at 09:36

## 2021-01-01 RX ADMIN — IPRATROPIUM BROMIDE AND ALBUTEROL 1 PUFF: 20; 100 SPRAY, METERED RESPIRATORY (INHALATION) at 01:15

## 2021-01-01 RX ADMIN — PROPOFOL 50 MCG/KG/MIN: 10 INJECTION, EMULSION INTRAVENOUS at 15:05

## 2021-01-01 RX ADMIN — CHLORHEXIDINE GLUCONATE 15 ML: 1.2 RINSE ORAL at 21:10

## 2021-01-01 RX ADMIN — INSULIN LISPRO 7 UNITS: 100 INJECTION, SOLUTION INTRAVENOUS; SUBCUTANEOUS at 23:43

## 2021-01-01 RX ADMIN — MINERAL OIL AND PETROLATUM: 150; 830 OINTMENT OPHTHALMIC at 20:47

## 2021-01-01 RX ADMIN — PANTOPRAZOLE SODIUM 40 MG: 40 INJECTION, POWDER, FOR SOLUTION INTRAVENOUS at 08:39

## 2021-01-01 RX ADMIN — PHENYLEPHRINE HYDROCHLORIDE 45 MCG/MIN: 10 INJECTION INTRAVENOUS at 05:00

## 2021-01-01 RX ADMIN — VANCOMYCIN HYDROCHLORIDE 1500 MG: 10 INJECTION, POWDER, LYOPHILIZED, FOR SOLUTION INTRAVENOUS at 12:27

## 2021-01-01 RX ADMIN — Medication 200 MCG/HR: at 12:25

## 2021-01-01 RX ADMIN — PANTOPRAZOLE SODIUM 40 MG: 40 INJECTION, POWDER, FOR SOLUTION INTRAVENOUS at 08:01

## 2021-01-01 RX ADMIN — ALBUMIN (HUMAN) 25 G: 12.5 INJECTION, SOLUTION INTRAVENOUS at 00:03

## 2021-01-01 RX ADMIN — DEXAMETHASONE SODIUM PHOSPHATE 10 MG: 10 INJECTION, SOLUTION INTRAMUSCULAR; INTRAVENOUS at 08:32

## 2021-01-01 RX ADMIN — MIDAZOLAM 20 MG/HR: 5 INJECTION, SOLUTION INTRAMUSCULAR; INTRAVENOUS at 20:25

## 2021-01-01 RX ADMIN — IPRATROPIUM BROMIDE AND ALBUTEROL SULFATE 3 ML: 2.5; .5 SOLUTION RESPIRATORY (INHALATION) at 07:56

## 2021-01-01 RX ADMIN — MIDAZOLAM 13 MG/HR: 5 INJECTION, SOLUTION INTRAMUSCULAR; INTRAVENOUS at 21:20

## 2021-01-01 RX ADMIN — PROPOFOL 50 MCG/KG/MIN: 10 INJECTION, EMULSION INTRAVENOUS at 18:03

## 2021-01-01 RX ADMIN — ATORVASTATIN CALCIUM 20 MG: 20 TABLET, FILM COATED ORAL at 08:41

## 2021-01-01 RX ADMIN — VANCOMYCIN HYDROCHLORIDE 1750 MG: 10 INJECTION, POWDER, LYOPHILIZED, FOR SOLUTION INTRAVENOUS at 04:15

## 2021-01-01 RX ADMIN — BUMETANIDE 1 MG: 0.25 INJECTION INTRAMUSCULAR; INTRAVENOUS at 20:38

## 2021-01-01 RX ADMIN — CHLORHEXIDINE GLUCONATE 15 ML: 1.2 RINSE ORAL at 08:40

## 2021-01-01 RX ADMIN — DOXYCYCLINE 100 MG: 100 INJECTION, POWDER, LYOPHILIZED, FOR SOLUTION INTRAVENOUS at 04:40

## 2021-01-01 RX ADMIN — SALINE NASAL SPRAY 2 SPRAY: 1.5 SOLUTION NASAL at 15:46

## 2021-01-01 RX ADMIN — POLYETHYLENE GLYCOL 3350 17 G: 17 POWDER, FOR SOLUTION ORAL at 08:03

## 2021-01-01 RX ADMIN — INSULIN LISPRO 7 UNITS: 100 INJECTION, SOLUTION INTRAVENOUS; SUBCUTANEOUS at 01:37

## 2021-01-01 RX ADMIN — VASOPRESSIN 0.03 UNITS/MIN: 20 INJECTION INTRAVENOUS at 10:35

## 2021-01-01 RX ADMIN — ATORVASTATIN CALCIUM 20 MG: 20 TABLET, FILM COATED ORAL at 08:07

## 2021-01-01 RX ADMIN — METOCLOPRAMIDE 5 MG: 5 INJECTION, SOLUTION INTRAMUSCULAR; INTRAVENOUS at 17:54

## 2021-01-01 RX ADMIN — OXYCODONE HYDROCHLORIDE AND ACETAMINOPHEN 500 MG: 500 TABLET ORAL at 17:54

## 2021-01-01 RX ADMIN — PROPOFOL 50 MCG/KG/MIN: 10 INJECTION, EMULSION INTRAVENOUS at 00:30

## 2021-01-01 RX ADMIN — INSULIN LISPRO 3 UNITS: 100 INJECTION, SOLUTION INTRAVENOUS; SUBCUTANEOUS at 05:36

## 2021-01-01 RX ADMIN — MEROPENEM 500 MG: 500 INJECTION, POWDER, FOR SOLUTION INTRAVENOUS at 20:21

## 2021-01-01 RX ADMIN — CHLORHEXIDINE GLUCONATE 15 ML: 1.2 RINSE ORAL at 08:33

## 2021-01-01 RX ADMIN — MINERAL OIL AND PETROLATUM: 150; 830 OINTMENT OPHTHALMIC at 09:29

## 2021-01-01 RX ADMIN — OXYCODONE HYDROCHLORIDE AND ACETAMINOPHEN 500 MG: 500 TABLET ORAL at 08:06

## 2021-01-01 RX ADMIN — SODIUM CHLORIDE 100 MG: 900 INJECTION, SOLUTION INTRAVENOUS at 16:34

## 2021-01-01 RX ADMIN — CHLORHEXIDINE GLUCONATE 15 ML: 1.2 RINSE ORAL at 20:30

## 2021-01-01 RX ADMIN — Medication 1 CAPSULE: at 08:01

## 2021-01-01 RX ADMIN — METOCLOPRAMIDE 5 MG: 5 INJECTION, SOLUTION INTRAMUSCULAR; INTRAVENOUS at 18:43

## 2021-01-01 RX ADMIN — BUMETANIDE 1 MG: 0.25 INJECTION INTRAMUSCULAR; INTRAVENOUS at 08:33

## 2021-01-01 RX ADMIN — MIDAZOLAM 20 MG/HR: 5 INJECTION, SOLUTION INTRAMUSCULAR; INTRAVENOUS at 06:22

## 2021-01-01 RX ADMIN — OXYCODONE HYDROCHLORIDE AND ACETAMINOPHEN 500 MG: 500 TABLET ORAL at 17:26

## 2021-01-01 RX ADMIN — MIDAZOLAM 13 MG/HR: 5 INJECTION, SOLUTION INTRAMUSCULAR; INTRAVENOUS at 09:09

## 2021-01-01 RX ADMIN — OXYCODONE HYDROCHLORIDE AND ACETAMINOPHEN 500 MG: 500 TABLET ORAL at 17:40

## 2021-01-01 RX ADMIN — FENTANYL CITRATE 150 MCG/HR: 50 INJECTION, SOLUTION INTRAMUSCULAR; INTRAVENOUS at 06:11

## 2021-01-01 RX ADMIN — Medication 10 ML: at 21:43

## 2021-01-01 RX ADMIN — DOCUSATE SODIUM 50 MG AND SENNOSIDES 8.6 MG 1 TABLET: 8.6; 5 TABLET, FILM COATED ORAL at 17:50

## 2021-01-01 RX ADMIN — MIDAZOLAM 16 MG/HR: 5 INJECTION, SOLUTION INTRAMUSCULAR; INTRAVENOUS at 23:09

## 2021-01-01 RX ADMIN — DEXAMETHASONE SODIUM PHOSPHATE 10 MG: 10 INJECTION INTRAMUSCULAR; INTRAVENOUS at 20:25

## 2021-01-01 RX ADMIN — PROPOFOL 50 MCG/KG/MIN: 10 INJECTION, EMULSION INTRAVENOUS at 16:09

## 2021-01-01 RX ADMIN — MEROPENEM 500 MG: 500 INJECTION, POWDER, FOR SOLUTION INTRAVENOUS at 20:00

## 2021-01-01 RX ADMIN — CHLORHEXIDINE GLUCONATE 15 ML: 1.2 RINSE ORAL at 21:41

## 2021-01-01 RX ADMIN — CISATRACURIUM BESYLATE 2 MCG/KG/MIN: 10 INJECTION INTRAVENOUS at 02:49

## 2021-01-01 RX ADMIN — FLUTICASONE PROPIONATE 2 SPRAY: 50 SPRAY, METERED NASAL at 09:39

## 2021-01-01 RX ADMIN — INSULIN LISPRO 3 UNITS: 100 INJECTION, SOLUTION INTRAVENOUS; SUBCUTANEOUS at 17:45

## 2021-01-01 RX ADMIN — BUMETANIDE 1 MG: 0.25 INJECTION INTRAMUSCULAR; INTRAVENOUS at 20:48

## 2021-01-01 RX ADMIN — IPRATROPIUM BROMIDE AND ALBUTEROL 1 PUFF: 20; 100 SPRAY, METERED RESPIRATORY (INHALATION) at 20:00

## 2021-01-01 RX ADMIN — CISATRACURIUM BESYLATE 3.5 MCG/KG/MIN: 10 INJECTION INTRAVENOUS at 08:09

## 2021-01-01 RX ADMIN — ATORVASTATIN CALCIUM 20 MG: 20 TABLET, FILM COATED ORAL at 08:36

## 2021-01-01 RX ADMIN — OXYCODONE HYDROCHLORIDE AND ACETAMINOPHEN 500 MG: 500 TABLET ORAL at 17:07

## 2021-01-01 RX ADMIN — HYDROCODONE POLISTIREX AND CHLORPHENIRAMINE POLISTIREX 5 ML: 10; 8 SUSPENSION, EXTENDED RELEASE ORAL at 08:03

## 2021-01-01 RX ADMIN — SALINE NASAL SPRAY 2 SPRAY: 1.5 SOLUTION NASAL at 08:41

## 2021-01-01 RX ADMIN — PROPOFOL 50 MCG/KG/MIN: 10 INJECTION, EMULSION INTRAVENOUS at 19:34

## 2021-01-01 RX ADMIN — WATER 2 G: 1 INJECTION INTRAMUSCULAR; INTRAVENOUS; SUBCUTANEOUS at 15:16

## 2021-01-01 RX ADMIN — FENTANYL CITRATE 150 MCG/HR: 50 INJECTION, SOLUTION INTRAMUSCULAR; INTRAVENOUS at 09:56

## 2021-01-01 RX ADMIN — VASOPRESSIN 0.04 UNITS/MIN: 20 INJECTION INTRAVENOUS at 03:36

## 2021-01-01 RX ADMIN — ACETAMINOPHEN 975 MG: 650 SUPPOSITORY RECTAL at 06:07

## 2021-01-01 RX ADMIN — CISATRACURIUM BESYLATE 3 MCG/KG/MIN: 10 INJECTION INTRAVENOUS at 08:26

## 2021-01-01 RX ADMIN — INSULIN LISPRO 4 UNITS: 100 INJECTION, SOLUTION INTRAVENOUS; SUBCUTANEOUS at 05:56

## 2021-01-01 RX ADMIN — SALINE NASAL SPRAY 2 SPRAY: 1.5 SOLUTION NASAL at 23:48

## 2021-01-01 RX ADMIN — ATORVASTATIN CALCIUM 20 MG: 20 TABLET, FILM COATED ORAL at 08:39

## 2021-01-01 RX ADMIN — CHLORHEXIDINE GLUCONATE 15 ML: 1.2 RINSE ORAL at 20:38

## 2021-01-01 RX ADMIN — CHLORHEXIDINE GLUCONATE 15 ML: 1.2 RINSE ORAL at 20:13

## 2021-01-01 RX ADMIN — MINERAL OIL AND PETROLATUM: 150; 830 OINTMENT OPHTHALMIC at 08:06

## 2021-01-01 RX ADMIN — FENTANYL CITRATE 150 MCG/HR: 50 INJECTION, SOLUTION INTRAMUSCULAR; INTRAVENOUS at 20:05

## 2021-01-01 RX ADMIN — PHENYLEPHRINE HYDROCHLORIDE 40 MCG/MIN: 10 INJECTION INTRAVENOUS at 00:20

## 2021-01-01 RX ADMIN — IPRATROPIUM BROMIDE AND ALBUTEROL SULFATE 3 ML: 2.5; .5 SOLUTION RESPIRATORY (INHALATION) at 07:41

## 2021-01-01 RX ADMIN — Medication 300 MCG/HR: at 05:34

## 2021-01-01 RX ADMIN — BUMETANIDE 1 MG: 0.25 INJECTION INTRAMUSCULAR; INTRAVENOUS at 21:57

## 2021-01-01 RX ADMIN — IPRATROPIUM BROMIDE AND ALBUTEROL SULFATE 3 ML: 2.5; .5 SOLUTION RESPIRATORY (INHALATION) at 01:42

## 2021-01-01 RX ADMIN — BENZONATATE 200 MG: 100 CAPSULE ORAL at 17:25

## 2021-01-01 RX ADMIN — ENOXAPARIN SODIUM 120 MG: 120 INJECTION SUBCUTANEOUS at 21:10

## 2021-01-01 RX ADMIN — MIDAZOLAM 10 MG/HR: 5 INJECTION, SOLUTION INTRAMUSCULAR; INTRAVENOUS at 05:57

## 2021-01-01 RX ADMIN — CHLORHEXIDINE GLUCONATE 15 ML: 1.2 RINSE ORAL at 10:42

## 2021-01-01 RX ADMIN — CISATRACURIUM BESYLATE 3 MCG/KG/MIN: 10 INJECTION INTRAVENOUS at 04:50

## 2021-01-01 RX ADMIN — ENOXAPARIN SODIUM 40 MG: 100 INJECTION SUBCUTANEOUS at 08:06

## 2021-01-01 RX ADMIN — ENOXAPARIN SODIUM 40 MG: 100 INJECTION SUBCUTANEOUS at 08:48

## 2021-01-01 RX ADMIN — IPRATROPIUM BROMIDE AND ALBUTEROL 2 PUFF: 20; 100 SPRAY, METERED RESPIRATORY (INHALATION) at 08:29

## 2021-01-01 RX ADMIN — ATORVASTATIN CALCIUM 20 MG: 20 TABLET, FILM COATED ORAL at 08:12

## 2021-01-01 RX ADMIN — Medication 10 ML: at 05:41

## 2021-01-01 RX ADMIN — FENTANYL CITRATE 150 MCG/HR: 50 INJECTION, SOLUTION INTRAMUSCULAR; INTRAVENOUS at 23:56

## 2021-01-01 RX ADMIN — ATORVASTATIN CALCIUM 20 MG: 20 TABLET, FILM COATED ORAL at 08:26

## 2021-01-01 RX ADMIN — MIDAZOLAM 8 MG/HR: 5 INJECTION, SOLUTION INTRAMUSCULAR; INTRAVENOUS at 16:41

## 2021-01-01 RX ADMIN — SODIUM CHLORIDE 100 MG: 900 INJECTION, SOLUTION INTRAVENOUS at 17:12

## 2021-01-01 RX ADMIN — VASOPRESSIN 0.03 UNITS/MIN: 20 INJECTION INTRAVENOUS at 23:30

## 2021-01-01 RX ADMIN — ATORVASTATIN CALCIUM 20 MG: 20 TABLET, FILM COATED ORAL at 08:05

## 2021-01-01 RX ADMIN — IPRATROPIUM BROMIDE AND ALBUTEROL SULFATE 3 ML: 2.5; .5 SOLUTION RESPIRATORY (INHALATION) at 02:37

## 2021-01-01 RX ADMIN — OXYCODONE HYDROCHLORIDE AND ACETAMINOPHEN 500 MG: 500 TABLET ORAL at 08:03

## 2021-01-01 RX ADMIN — INSULIN GLARGINE 14 UNITS: 100 INJECTION, SOLUTION SUBCUTANEOUS at 08:06

## 2021-01-01 RX ADMIN — POLYETHYLENE GLYCOL 3350 17 G: 17 POWDER, FOR SOLUTION ORAL at 08:41

## 2021-01-01 RX ADMIN — ATORVASTATIN CALCIUM 20 MG: 20 TABLET, FILM COATED ORAL at 08:19

## 2021-01-01 RX ADMIN — POLYETHYLENE GLYCOL 3350 17 G: 17 POWDER, FOR SOLUTION ORAL at 08:06

## 2021-01-01 RX ADMIN — POLYETHYLENE GLYCOL 3350 17 G: 17 POWDER, FOR SOLUTION ORAL at 08:54

## 2021-01-01 RX ADMIN — IPRATROPIUM BROMIDE AND ALBUTEROL SULFATE 3 ML: 2.5; .5 SOLUTION RESPIRATORY (INHALATION) at 08:28

## 2021-01-01 RX ADMIN — Medication 300 MCG/HR: at 10:08

## 2021-01-01 RX ADMIN — PROPOFOL 50 MCG/KG/MIN: 10 INJECTION, EMULSION INTRAVENOUS at 09:58

## 2021-01-01 RX ADMIN — Medication 300 MCG/HR: at 15:56

## 2021-01-01 RX ADMIN — CHLORHEXIDINE GLUCONATE 15 ML: 1.2 RINSE ORAL at 21:44

## 2021-01-01 RX ADMIN — SODIUM CHLORIDE 75 ML/HR: 9 INJECTION, SOLUTION INTRAVENOUS at 00:58

## 2021-01-01 RX ADMIN — INSULIN GLARGINE 3 UNITS: 100 INJECTION, SOLUTION SUBCUTANEOUS at 10:09

## 2021-01-01 RX ADMIN — PROPOFOL 50 MCG/KG/MIN: 10 INJECTION, EMULSION INTRAVENOUS at 16:47

## 2021-01-01 RX ADMIN — DOXYCYCLINE 100 MG: 100 INJECTION, POWDER, LYOPHILIZED, FOR SOLUTION INTRAVENOUS at 17:07

## 2021-01-01 RX ADMIN — INSULIN LISPRO 3 UNITS: 100 INJECTION, SOLUTION INTRAVENOUS; SUBCUTANEOUS at 17:00

## 2021-01-01 RX ADMIN — IPRATROPIUM BROMIDE AND ALBUTEROL SULFATE 3 ML: 2.5; .5 SOLUTION RESPIRATORY (INHALATION) at 11:11

## 2021-01-01 RX ADMIN — Medication 10 ML: at 21:10

## 2021-01-01 RX ADMIN — HYDROCODONE POLISTIREX AND CHLORPHENIRAMINE POLISTIREX 5 ML: 10; 8 SUSPENSION, EXTENDED RELEASE ORAL at 20:23

## 2021-01-01 RX ADMIN — IPRATROPIUM BROMIDE AND ALBUTEROL SULFATE 3 ML: 2.5; .5 SOLUTION RESPIRATORY (INHALATION) at 08:17

## 2021-01-01 RX ADMIN — PROPOFOL 50 MCG/KG/MIN: 10 INJECTION, EMULSION INTRAVENOUS at 08:09

## 2021-01-01 RX ADMIN — PHENYLEPHRINE HYDROCHLORIDE 30 MCG/MIN: 10 INJECTION INTRAVENOUS at 14:06

## 2021-01-01 RX ADMIN — ENOXAPARIN SODIUM 40 MG: 100 INJECTION SUBCUTANEOUS at 19:36

## 2021-01-01 RX ADMIN — INSULIN LISPRO 3 UNITS: 100 INJECTION, SOLUTION INTRAVENOUS; SUBCUTANEOUS at 00:03

## 2021-01-01 RX ADMIN — IPRATROPIUM BROMIDE AND ALBUTEROL SULFATE 3 ML: 2.5; .5 SOLUTION RESPIRATORY (INHALATION) at 13:50

## 2021-01-01 RX ADMIN — METOCLOPRAMIDE 5 MG: 5 INJECTION, SOLUTION INTRAMUSCULAR; INTRAVENOUS at 01:00

## 2021-01-01 RX ADMIN — Medication 10 ML: at 13:59

## 2021-01-01 RX ADMIN — MEROPENEM 500 MG: 500 INJECTION, POWDER, FOR SOLUTION INTRAVENOUS at 08:00

## 2021-01-01 RX ADMIN — SIMETHICONE 80 MG: 80 TABLET, CHEWABLE ORAL at 10:38

## 2021-01-01 RX ADMIN — MINERAL OIL AND PETROLATUM: 150; 830 OINTMENT OPHTHALMIC at 20:03

## 2021-01-01 RX ADMIN — CHLORHEXIDINE GLUCONATE 15 ML: 1.2 RINSE ORAL at 09:29

## 2021-01-01 RX ADMIN — HYDROXYZINE HYDROCHLORIDE 25 MG: 25 TABLET, FILM COATED ORAL at 09:01

## 2021-01-01 RX ADMIN — PROPOFOL 50 MCG/KG/MIN: 10 INJECTION, EMULSION INTRAVENOUS at 10:38

## 2021-01-01 RX ADMIN — Medication 10 ML: at 05:40

## 2021-01-01 RX ADMIN — INSULIN LISPRO 3 UNITS: 100 INJECTION, SOLUTION INTRAVENOUS; SUBCUTANEOUS at 17:46

## 2021-01-01 RX ADMIN — IPRATROPIUM BROMIDE AND ALBUTEROL 2 PUFF: 20; 100 SPRAY, METERED RESPIRATORY (INHALATION) at 15:47

## 2021-01-01 RX ADMIN — SALINE NASAL SPRAY 2 SPRAY: 1.5 SOLUTION NASAL at 00:00

## 2021-01-01 RX ADMIN — MEROPENEM 500 MG: 500 INJECTION, POWDER, FOR SOLUTION INTRAVENOUS at 02:55

## 2021-01-01 RX ADMIN — Medication 10 ML: at 13:43

## 2021-01-01 RX ADMIN — POTASSIUM PHOSPHATE, MONOBASIC POTASSIUM PHOSPHATE, DIBASIC: 224; 236 INJECTION, SOLUTION, CONCENTRATE INTRAVENOUS at 17:00

## 2021-01-01 RX ADMIN — MIDAZOLAM 20 MG/HR: 5 INJECTION, SOLUTION INTRAMUSCULAR; INTRAVENOUS at 11:27

## 2021-01-01 RX ADMIN — IPRATROPIUM BROMIDE AND ALBUTEROL SULFATE 3 ML: 2.5; .5 SOLUTION RESPIRATORY (INHALATION) at 09:02

## 2021-01-01 RX ADMIN — PROPOFOL 45 MCG/KG/MIN: 10 INJECTION, EMULSION INTRAVENOUS at 07:11

## 2021-01-01 RX ADMIN — IPRATROPIUM BROMIDE AND ALBUTEROL SULFATE 3 ML: 2.5; .5 SOLUTION RESPIRATORY (INHALATION) at 20:49

## 2021-01-01 RX ADMIN — BENZONATATE 200 MG: 100 CAPSULE ORAL at 14:41

## 2021-01-01 RX ADMIN — MINERAL OIL AND PETROLATUM: 150; 830 OINTMENT OPHTHALMIC at 08:12

## 2021-01-01 RX ADMIN — FENTANYL CITRATE 150 MCG/HR: 50 INJECTION, SOLUTION INTRAMUSCULAR; INTRAVENOUS at 01:26

## 2021-01-01 RX ADMIN — Medication 1 CAPSULE: at 08:07

## 2021-01-01 RX ADMIN — IPRATROPIUM BROMIDE AND ALBUTEROL SULFATE 3 ML: 2.5; .5 SOLUTION RESPIRATORY (INHALATION) at 20:00

## 2021-01-01 RX ADMIN — DEXAMETHASONE SODIUM PHOSPHATE 6 MG: 10 INJECTION INTRAMUSCULAR; INTRAVENOUS at 20:23

## 2021-01-01 RX ADMIN — IPRATROPIUM BROMIDE AND ALBUTEROL SULFATE 3 ML: 2.5; .5 SOLUTION RESPIRATORY (INHALATION) at 13:21

## 2021-01-01 RX ADMIN — IPRATROPIUM BROMIDE AND ALBUTEROL SULFATE 3 ML: 2.5; .5 SOLUTION RESPIRATORY (INHALATION) at 04:50

## 2021-01-01 RX ADMIN — Medication 10 ML: at 06:29

## 2021-01-01 RX ADMIN — GUAIFENESIN 600 MG: 600 TABLET ORAL at 21:46

## 2021-01-01 RX ADMIN — INSULIN LISPRO 3 UNITS: 100 INJECTION, SOLUTION INTRAVENOUS; SUBCUTANEOUS at 17:04

## 2021-01-01 RX ADMIN — MEROPENEM 500 MG: 500 INJECTION, POWDER, FOR SOLUTION INTRAVENOUS at 04:24

## 2021-01-01 RX ADMIN — CISATRACURIUM BESYLATE 3 MCG/KG/MIN: 200 INJECTION, SOLUTION INTRAVENOUS at 06:52

## 2021-01-01 RX ADMIN — ENOXAPARIN SODIUM 40 MG: 100 INJECTION SUBCUTANEOUS at 08:11

## 2021-01-01 RX ADMIN — IPRATROPIUM BROMIDE AND ALBUTEROL 2 PUFF: 20; 100 SPRAY, METERED RESPIRATORY (INHALATION) at 13:49

## 2021-01-01 RX ADMIN — PROPOFOL 50 MCG/KG/MIN: 10 INJECTION, EMULSION INTRAVENOUS at 18:30

## 2021-01-01 RX ADMIN — IPRATROPIUM BROMIDE AND ALBUTEROL SULFATE 3 ML: 2.5; .5 SOLUTION RESPIRATORY (INHALATION) at 14:54

## 2021-01-01 RX ADMIN — PROPOFOL 50 MCG/KG/MIN: 10 INJECTION, EMULSION INTRAVENOUS at 04:57

## 2021-01-01 RX ADMIN — DEXAMETHASONE SODIUM PHOSPHATE 6 MG: 10 INJECTION INTRAMUSCULAR; INTRAVENOUS at 20:29

## 2021-01-01 RX ADMIN — VASOPRESSIN 0.03 UNITS/MIN: 20 INJECTION INTRAVENOUS at 23:29

## 2021-01-01 RX ADMIN — CISATRACURIUM BESYLATE 3.5 MCG/KG/MIN: 10 INJECTION INTRAVENOUS at 00:00

## 2021-01-01 RX ADMIN — BENZONATATE 200 MG: 100 CAPSULE ORAL at 13:18

## 2021-01-01 RX ADMIN — ENOXAPARIN SODIUM 40 MG: 100 INJECTION SUBCUTANEOUS at 08:22

## 2021-01-01 RX ADMIN — PHENYLEPHRINE HYDROCHLORIDE 60 MCG/MIN: 10 INJECTION INTRAVENOUS at 06:06

## 2021-01-01 RX ADMIN — Medication 50 MG: at 08:01

## 2021-01-01 RX ADMIN — WATER 2 G: 1 INJECTION INTRAMUSCULAR; INTRAVENOUS; SUBCUTANEOUS at 23:08

## 2021-01-01 RX ADMIN — DEXMEDETOMIDINE HYDROCHLORIDE 0.4 MCG/KG/HR: 400 INJECTION INTRAVENOUS at 16:22

## 2021-01-01 RX ADMIN — DEXAMETHASONE SODIUM PHOSPHATE 6 MG: 10 INJECTION, SOLUTION INTRAMUSCULAR; INTRAVENOUS at 08:32

## 2021-01-01 RX ADMIN — ACETAMINOPHEN 1000 MG: 500 TABLET ORAL at 14:02

## 2021-01-01 RX ADMIN — FENTANYL CITRATE 150 MCG/HR: 50 INJECTION, SOLUTION INTRAMUSCULAR; INTRAVENOUS at 15:36

## 2021-01-01 RX ADMIN — ENOXAPARIN SODIUM 40 MG: 100 INJECTION SUBCUTANEOUS at 08:05

## 2021-01-01 RX ADMIN — IPRATROPIUM BROMIDE AND ALBUTEROL SULFATE 3 ML: 2.5; .5 SOLUTION RESPIRATORY (INHALATION) at 19:58

## 2021-01-01 RX ADMIN — OXYCODONE HYDROCHLORIDE AND ACETAMINOPHEN 500 MG: 500 TABLET ORAL at 17:36

## 2021-01-01 RX ADMIN — MINERAL OIL AND PETROLATUM: 150; 830 OINTMENT OPHTHALMIC at 08:16

## 2021-01-01 RX ADMIN — ACETAMINOPHEN 975 MG: 650 SUPPOSITORY RECTAL at 21:25

## 2021-01-01 RX ADMIN — Medication 1 CAPSULE: at 08:39

## 2021-01-01 RX ADMIN — FENTANYL CITRATE 150 MCG/HR: 50 INJECTION, SOLUTION INTRAMUSCULAR; INTRAVENOUS at 04:47

## 2021-01-01 RX ADMIN — PROPOFOL 50 MCG/KG/MIN: 10 INJECTION, EMULSION INTRAVENOUS at 07:12

## 2021-01-01 RX ADMIN — PROPOFOL 45 MCG/KG/MIN: 10 INJECTION, EMULSION INTRAVENOUS at 00:37

## 2021-01-01 RX ADMIN — BUMETANIDE 1 MG: 0.25 INJECTION INTRAMUSCULAR; INTRAVENOUS at 13:01

## 2021-01-01 RX ADMIN — SODIUM BICARBONATE 150 MEQ: 84 INJECTION, SOLUTION INTRAVENOUS at 18:12

## 2021-01-01 RX ADMIN — DEXAMETHASONE SODIUM PHOSPHATE 6 MG: 10 INJECTION INTRAMUSCULAR; INTRAVENOUS at 21:43

## 2021-01-01 RX ADMIN — METOLAZONE 5 MG: 2.5 TABLET ORAL at 18:07

## 2021-01-01 RX ADMIN — IPRATROPIUM BROMIDE AND ALBUTEROL SULFATE 3 ML: 2.5; .5 SOLUTION RESPIRATORY (INHALATION) at 13:13

## 2021-01-01 RX ADMIN — MIDAZOLAM 10 MG/HR: 5 INJECTION, SOLUTION INTRAMUSCULAR; INTRAVENOUS at 13:15

## 2021-01-01 RX ADMIN — POTASSIUM CHLORIDE 10 MEQ: 7.46 INJECTION, SOLUTION INTRAVENOUS at 11:26

## 2021-01-01 RX ADMIN — DEXAMETHASONE SODIUM PHOSPHATE 6 MG: 10 INJECTION INTRAMUSCULAR; INTRAVENOUS at 20:25

## 2021-01-01 RX ADMIN — ATORVASTATIN CALCIUM 20 MG: 20 TABLET, FILM COATED ORAL at 08:31

## 2021-01-01 RX ADMIN — DEXMEDETOMIDINE HYDROCHLORIDE 0.4 MCG/KG/HR: 400 INJECTION INTRAVENOUS at 06:55

## 2021-01-01 RX ADMIN — Medication 10 ML: at 21:49

## 2021-01-01 RX ADMIN — NOREPINEPHRINE BITARTRATE 20 MCG/MIN: 1 INJECTION, SOLUTION, CONCENTRATE INTRAVENOUS at 03:24

## 2021-01-01 RX ADMIN — IPRATROPIUM BROMIDE AND ALBUTEROL SULFATE 3 ML: 2.5; .5 SOLUTION RESPIRATORY (INHALATION) at 21:01

## 2021-01-01 RX ADMIN — DOCUSATE SODIUM 50 MG AND SENNOSIDES 8.6 MG 1 TABLET: 8.6; 5 TABLET, FILM COATED ORAL at 08:31

## 2021-01-01 RX ADMIN — SALINE NASAL SPRAY 2 SPRAY: 1.5 SOLUTION NASAL at 20:37

## 2021-01-01 RX ADMIN — ENOXAPARIN SODIUM 40 MG: 100 INJECTION SUBCUTANEOUS at 20:32

## 2021-01-01 RX ADMIN — FLUTICASONE PROPIONATE 2 SPRAY: 50 SPRAY, METERED NASAL at 08:13

## 2021-01-01 RX ADMIN — MINERAL OIL AND PETROLATUM: 150; 830 OINTMENT OPHTHALMIC at 09:07

## 2021-01-01 RX ADMIN — IPRATROPIUM BROMIDE AND ALBUTEROL SULFATE 3 ML: 2.5; .5 SOLUTION RESPIRATORY (INHALATION) at 14:55

## 2021-01-01 RX ADMIN — CHLORHEXIDINE GLUCONATE 15 ML: 1.2 RINSE ORAL at 20:48

## 2021-01-01 RX ADMIN — Medication 250 MCG/HR: at 15:34

## 2021-01-01 RX ADMIN — BUMETANIDE 1 MG: 0.25 INJECTION INTRAMUSCULAR; INTRAVENOUS at 21:16

## 2021-01-01 RX ADMIN — ACETAMINOPHEN 975 MG: 650 SUPPOSITORY RECTAL at 09:46

## 2021-01-01 RX ADMIN — CISATRACURIUM BESYLATE 3 MCG/KG/MIN: 10 INJECTION INTRAVENOUS at 19:08

## 2021-01-01 RX ADMIN — ACETAMINOPHEN 975 MG: 650 SUPPOSITORY RECTAL at 06:16

## 2021-01-01 RX ADMIN — MINERAL OIL AND PETROLATUM: 150; 830 OINTMENT OPHTHALMIC at 08:01

## 2021-01-01 RX ADMIN — FENTANYL CITRATE 150 MCG/HR: 50 INJECTION, SOLUTION INTRAMUSCULAR; INTRAVENOUS at 03:08

## 2021-01-01 RX ADMIN — DEXAMETHASONE SODIUM PHOSPHATE 6 MG: 10 INJECTION INTRAMUSCULAR; INTRAVENOUS at 20:38

## 2021-01-01 RX ADMIN — IPRATROPIUM BROMIDE AND ALBUTEROL SULFATE 3 ML: 2.5; .5 SOLUTION RESPIRATORY (INHALATION) at 16:15

## 2021-01-01 RX ADMIN — CHLORHEXIDINE GLUCONATE 15 ML: 1.2 RINSE ORAL at 08:55

## 2021-01-01 RX ADMIN — METOCLOPRAMIDE 5 MG: 5 INJECTION, SOLUTION INTRAMUSCULAR; INTRAVENOUS at 06:26

## 2021-01-01 RX ADMIN — MINERAL OIL AND PETROLATUM: 150; 830 OINTMENT OPHTHALMIC at 20:58

## 2021-01-01 RX ADMIN — Medication 300 MCG/HR: at 15:16

## 2021-01-01 RX ADMIN — POLYETHYLENE GLYCOL 3350 17 G: 17 POWDER, FOR SOLUTION ORAL at 08:02

## 2021-01-01 RX ADMIN — MIDAZOLAM 20 MG/HR: 5 INJECTION, SOLUTION INTRAMUSCULAR; INTRAVENOUS at 21:31

## 2021-01-01 RX ADMIN — HYDROCODONE POLISTIREX AND CHLORPHENIRAMINE POLISTIREX 5 ML: 10; 8 SUSPENSION, EXTENDED RELEASE ORAL at 08:20

## 2021-01-01 RX ADMIN — DEXAMETHASONE SODIUM PHOSPHATE 10 MG: 10 INJECTION INTRAMUSCULAR; INTRAVENOUS at 20:27

## 2021-01-01 RX ADMIN — PROPOFOL 50 MCG/KG/MIN: 10 INJECTION, EMULSION INTRAVENOUS at 11:15

## 2021-01-01 RX ADMIN — SALINE NASAL SPRAY 2 SPRAY: 1.5 SOLUTION NASAL at 20:18

## 2021-01-01 RX ADMIN — SALINE NASAL SPRAY 2 SPRAY: 1.5 SOLUTION NASAL at 23:30

## 2021-01-01 RX ADMIN — OXYCODONE HYDROCHLORIDE AND ACETAMINOPHEN 500 MG: 500 TABLET ORAL at 08:05

## 2021-01-01 RX ADMIN — METOLAZONE 5 MG: 2.5 TABLET ORAL at 10:48

## 2021-01-01 RX ADMIN — MINERAL OIL AND PETROLATUM: 150; 830 OINTMENT OPHTHALMIC at 08:34

## 2021-01-01 RX ADMIN — IPRATROPIUM BROMIDE AND ALBUTEROL SULFATE 3 ML: 2.5; .5 SOLUTION RESPIRATORY (INHALATION) at 04:00

## 2021-01-01 RX ADMIN — PROPOFOL 50 MCG/KG/MIN: 10 INJECTION, EMULSION INTRAVENOUS at 21:29

## 2021-01-01 RX ADMIN — MINERAL OIL AND PETROLATUM: 150; 830 OINTMENT OPHTHALMIC at 21:10

## 2021-01-01 RX ADMIN — OXYCODONE HYDROCHLORIDE AND ACETAMINOPHEN 500 MG: 500 TABLET ORAL at 08:19

## 2021-01-01 RX ADMIN — POLYETHYLENE GLYCOL 3350 17 G: 17 POWDER, FOR SOLUTION ORAL at 08:48

## 2021-01-01 RX ADMIN — INSULIN LISPRO 4 UNITS: 100 INJECTION, SOLUTION INTRAVENOUS; SUBCUTANEOUS at 23:53

## 2021-01-01 RX ADMIN — PROPOFOL 45 MCG/KG/MIN: 10 INJECTION, EMULSION INTRAVENOUS at 04:06

## 2021-01-01 RX ADMIN — INSULIN LISPRO 3 UNITS: 100 INJECTION, SOLUTION INTRAVENOUS; SUBCUTANEOUS at 11:45

## 2021-01-01 RX ADMIN — PROPOFOL 50 MCG/KG/MIN: 10 INJECTION, EMULSION INTRAVENOUS at 20:39

## 2021-01-01 RX ADMIN — Medication 10 ML: at 20:30

## 2021-01-01 RX ADMIN — Medication 10 ML: at 22:00

## 2021-01-01 RX ADMIN — PROPOFOL 45 MCG/KG/MIN: 10 INJECTION, EMULSION INTRAVENOUS at 19:58

## 2021-01-01 RX ADMIN — DOCUSATE SODIUM 50 MG AND SENNOSIDES 8.6 MG 1 TABLET: 8.6; 5 TABLET, FILM COATED ORAL at 08:11

## 2021-01-01 RX ADMIN — ACETAMINOPHEN 975 MG: 650 SUPPOSITORY RECTAL at 17:30

## 2021-01-01 RX ADMIN — IPRATROPIUM BROMIDE AND ALBUTEROL SULFATE 3 ML: 2.5; .5 SOLUTION RESPIRATORY (INHALATION) at 01:02

## 2021-01-01 RX ADMIN — BENZONATATE 200 MG: 100 CAPSULE ORAL at 07:30

## 2021-01-01 RX ADMIN — BUMETANIDE 0.5 MG: 0.25 INJECTION INTRAMUSCULAR; INTRAVENOUS at 06:48

## 2021-01-01 RX ADMIN — SALINE NASAL SPRAY 2 SPRAY: 1.5 SOLUTION NASAL at 20:38

## 2021-01-01 RX ADMIN — DEXAMETHASONE SODIUM PHOSPHATE 10 MG: 10 INJECTION INTRAMUSCULAR; INTRAVENOUS at 21:26

## 2021-01-01 RX ADMIN — SIMETHICONE 80 MG: 80 TABLET, CHEWABLE ORAL at 09:37

## 2021-01-01 RX ADMIN — IPRATROPIUM BROMIDE AND ALBUTEROL SULFATE 3 ML: 2.5; .5 SOLUTION RESPIRATORY (INHALATION) at 00:50

## 2021-01-01 RX ADMIN — IPRATROPIUM BROMIDE AND ALBUTEROL SULFATE 3 ML: 2.5; .5 SOLUTION RESPIRATORY (INHALATION) at 14:04

## 2021-01-01 RX ADMIN — IPRATROPIUM BROMIDE AND ALBUTEROL SULFATE 3 ML: 2.5; .5 SOLUTION RESPIRATORY (INHALATION) at 20:09

## 2021-01-01 RX ADMIN — Medication 300 MCG/HR: at 06:38

## 2021-01-01 RX ADMIN — DOCUSATE SODIUM 50 MG AND SENNOSIDES 8.6 MG 1 TABLET: 8.6; 5 TABLET, FILM COATED ORAL at 17:05

## 2021-01-01 RX ADMIN — DEXAMETHASONE SODIUM PHOSPHATE 6 MG: 10 INJECTION INTRAMUSCULAR; INTRAVENOUS at 21:16

## 2021-01-01 RX ADMIN — Medication 1 CAPSULE: at 08:30

## 2021-01-01 RX ADMIN — Medication 1 CAPSULE: at 09:46

## 2021-01-01 RX ADMIN — PANTOPRAZOLE SODIUM 40 MG: 40 INJECTION, POWDER, FOR SOLUTION INTRAVENOUS at 08:33

## 2021-01-01 RX ADMIN — BUMETANIDE 1 MG: 0.25 INJECTION INTRAMUSCULAR; INTRAVENOUS at 18:43

## 2021-01-01 RX ADMIN — Medication 10 ML: at 13:12

## 2021-01-01 RX ADMIN — PANTOPRAZOLE SODIUM 40 MG: 40 INJECTION, POWDER, FOR SOLUTION INTRAVENOUS at 08:32

## 2021-01-01 RX ADMIN — HYDROXYZINE HYDROCHLORIDE 25 MG: 25 TABLET, FILM COATED ORAL at 21:50

## 2021-01-01 RX ADMIN — Medication 5 MG: at 21:50

## 2021-01-01 RX ADMIN — IPRATROPIUM BROMIDE AND ALBUTEROL SULFATE 3 ML: 2.5; .5 SOLUTION RESPIRATORY (INHALATION) at 21:08

## 2021-01-01 RX ADMIN — PROPOFOL 45 MCG/KG/MIN: 10 INJECTION, EMULSION INTRAVENOUS at 10:08

## 2021-01-01 RX ADMIN — BUMETANIDE 1 MG: 0.25 INJECTION INTRAMUSCULAR; INTRAVENOUS at 06:26

## 2021-01-01 RX ADMIN — MEROPENEM 500 MG: 500 INJECTION, POWDER, FOR SOLUTION INTRAVENOUS at 14:11

## 2021-01-01 RX ADMIN — ENOXAPARIN SODIUM 40 MG: 100 INJECTION SUBCUTANEOUS at 20:37

## 2021-01-01 RX ADMIN — IPRATROPIUM BROMIDE AND ALBUTEROL SULFATE 3 ML: 2.5; .5 SOLUTION RESPIRATORY (INHALATION) at 20:01

## 2021-01-01 RX ADMIN — ATORVASTATIN CALCIUM 20 MG: 20 TABLET, FILM COATED ORAL at 08:32

## 2021-01-01 RX ADMIN — PROPOFOL 50 MCG/KG/MIN: 10 INJECTION, EMULSION INTRAVENOUS at 10:47

## 2021-01-01 RX ADMIN — MIDAZOLAM 20 MG/HR: 5 INJECTION, SOLUTION INTRAMUSCULAR; INTRAVENOUS at 01:48

## 2021-01-01 RX ADMIN — INSULIN LISPRO 4 UNITS: 100 INJECTION, SOLUTION INTRAVENOUS; SUBCUTANEOUS at 23:51

## 2021-01-01 RX ADMIN — SODIUM CHLORIDE 40 MG: 9 INJECTION INTRAMUSCULAR; INTRAVENOUS; SUBCUTANEOUS at 08:22

## 2021-01-01 RX ADMIN — MIDAZOLAM 16 MG/HR: 5 INJECTION, SOLUTION INTRAMUSCULAR; INTRAVENOUS at 21:58

## 2021-01-01 RX ADMIN — PROPOFOL 50 MCG/KG/MIN: 10 INJECTION, EMULSION INTRAVENOUS at 11:17

## 2021-01-01 RX ADMIN — PROPOFOL 50 MCG/KG/MIN: 10 INJECTION, EMULSION INTRAVENOUS at 01:28

## 2021-01-01 RX ADMIN — POTASSIUM CHLORIDE 10 MEQ: 7.46 INJECTION, SOLUTION INTRAVENOUS at 09:46

## 2021-01-01 RX ADMIN — IPRATROPIUM BROMIDE AND ALBUTEROL SULFATE 3 ML: 2.5; .5 SOLUTION RESPIRATORY (INHALATION) at 20:25

## 2021-01-01 RX ADMIN — DOCUSATE SODIUM 50 MG AND SENNOSIDES 8.6 MG 1 TABLET: 8.6; 5 TABLET, FILM COATED ORAL at 17:12

## 2021-01-01 RX ADMIN — DOCUSATE SODIUM 50 MG AND SENNOSIDES 8.6 MG 1 TABLET: 8.6; 5 TABLET, FILM COATED ORAL at 18:02

## 2021-01-01 RX ADMIN — Medication 10 ML: at 19:37

## 2021-01-01 RX ADMIN — IPRATROPIUM BROMIDE AND ALBUTEROL SULFATE 3 ML: 2.5; .5 SOLUTION RESPIRATORY (INHALATION) at 20:05

## 2021-01-01 RX ADMIN — MIDAZOLAM 8 MG/HR: 5 INJECTION, SOLUTION INTRAMUSCULAR; INTRAVENOUS at 20:20

## 2021-01-01 RX ADMIN — PROPOFOL 50 MCG/KG/MIN: 10 INJECTION, EMULSION INTRAVENOUS at 16:46

## 2021-01-01 RX ADMIN — PROPOFOL 50 MCG/KG/MIN: 10 INJECTION, EMULSION INTRAVENOUS at 23:29

## 2021-01-01 RX ADMIN — IPRATROPIUM BROMIDE AND ALBUTEROL 2 PUFF: 20; 100 SPRAY, METERED RESPIRATORY (INHALATION) at 07:53

## 2021-01-01 RX ADMIN — SALINE NASAL SPRAY 2 SPRAY: 1.5 SOLUTION NASAL at 00:31

## 2021-01-01 RX ADMIN — PROPOFOL 50 MCG/KG/MIN: 10 INJECTION, EMULSION INTRAVENOUS at 16:13

## 2021-01-01 RX ADMIN — Medication 10 ML: at 22:13

## 2021-01-01 RX ADMIN — ENOXAPARIN SODIUM 40 MG: 100 INJECTION SUBCUTANEOUS at 20:21

## 2021-01-01 RX ADMIN — BUMETANIDE 1 MG: 0.25 INJECTION INTRAMUSCULAR; INTRAVENOUS at 21:01

## 2021-01-01 RX ADMIN — SALINE NASAL SPRAY 2 SPRAY: 1.5 SOLUTION NASAL at 20:19

## 2021-01-01 RX ADMIN — OXYCODONE HYDROCHLORIDE AND ACETAMINOPHEN 500 MG: 500 TABLET ORAL at 18:07

## 2021-01-01 RX ADMIN — SALINE NASAL SPRAY 2 SPRAY: 1.5 SOLUTION NASAL at 11:49

## 2021-01-01 RX ADMIN — MIDAZOLAM 20 MG/HR: 5 INJECTION, SOLUTION INTRAMUSCULAR; INTRAVENOUS at 01:00

## 2021-01-01 RX ADMIN — OXYCODONE HYDROCHLORIDE AND ACETAMINOPHEN 500 MG: 500 TABLET ORAL at 17:21

## 2021-01-01 RX ADMIN — POTASSIUM CHLORIDE 10 MEQ: 7.46 INJECTION, SOLUTION INTRAVENOUS at 08:35

## 2021-01-01 RX ADMIN — Medication 10 ML: at 06:48

## 2021-01-01 RX ADMIN — Medication 200 MCG/HR: at 03:28

## 2021-01-01 RX ADMIN — DOCUSATE SODIUM 50 MG AND SENNOSIDES 8.6 MG 1 TABLET: 8.6; 5 TABLET, FILM COATED ORAL at 08:47

## 2021-01-01 RX ADMIN — MINERAL OIL AND PETROLATUM: 150; 830 OINTMENT OPHTHALMIC at 09:39

## 2021-01-01 RX ADMIN — ACETAMINOPHEN 975 MG: 650 SUPPOSITORY RECTAL at 20:15

## 2021-01-01 RX ADMIN — CHLORHEXIDINE GLUCONATE 15 ML: 1.2 RINSE ORAL at 20:01

## 2021-01-01 RX ADMIN — CISATRACURIUM BESYLATE 5 MCG/KG/MIN: 200 INJECTION, SOLUTION INTRAVENOUS at 01:48

## 2021-01-01 RX ADMIN — ENOXAPARIN SODIUM 40 MG: 100 INJECTION SUBCUTANEOUS at 08:27

## 2021-01-01 RX ADMIN — PROCHLORPERAZINE EDISYLATE 10 MG: 5 INJECTION INTRAMUSCULAR; INTRAVENOUS at 09:37

## 2021-01-01 RX ADMIN — PROPOFOL 50 MCG/KG/MIN: 10 INJECTION, EMULSION INTRAVENOUS at 20:20

## 2021-01-01 RX ADMIN — IPRATROPIUM BROMIDE AND ALBUTEROL 2 PUFF: 20; 100 SPRAY, METERED RESPIRATORY (INHALATION) at 23:32

## 2021-01-01 RX ADMIN — CHLORHEXIDINE GLUCONATE 15 ML: 1.2 RINSE ORAL at 08:24

## 2021-01-01 RX ADMIN — ENOXAPARIN SODIUM 40 MG: 100 INJECTION SUBCUTANEOUS at 20:23

## 2021-01-01 RX ADMIN — PROPOFOL 50 MCG/KG/MIN: 10 INJECTION, EMULSION INTRAVENOUS at 19:23

## 2021-01-01 RX ADMIN — CISATRACURIUM BESYLATE 4 MCG/KG/MIN: 10 INJECTION INTRAVENOUS at 03:45

## 2021-01-01 RX ADMIN — PROPOFOL 45 MCG/KG/MIN: 10 INJECTION, EMULSION INTRAVENOUS at 03:50

## 2021-01-01 RX ADMIN — SALINE NASAL SPRAY 2 SPRAY: 1.5 SOLUTION NASAL at 20:34

## 2021-01-01 RX ADMIN — OXYCODONE HYDROCHLORIDE AND ACETAMINOPHEN 500 MG: 500 TABLET ORAL at 08:36

## 2021-01-01 RX ADMIN — Medication 10 ML: at 02:54

## 2021-01-01 RX ADMIN — Medication 10 ML: at 13:02

## 2021-01-01 RX ADMIN — BUMETANIDE 1 MG: 0.25 INJECTION INTRAMUSCULAR; INTRAVENOUS at 20:10

## 2021-01-01 RX ADMIN — Medication 10 ML: at 05:47

## 2021-01-01 RX ADMIN — PROPOFOL 50 MCG/KG/MIN: 10 INJECTION, EMULSION INTRAVENOUS at 04:21

## 2021-01-01 RX ADMIN — ENOXAPARIN SODIUM 40 MG: 100 INJECTION SUBCUTANEOUS at 21:36

## 2021-01-01 RX ADMIN — POTASSIUM PHOSPHATE, MONOBASIC POTASSIUM PHOSPHATE, DIBASIC: 224; 236 INJECTION, SOLUTION, CONCENTRATE INTRAVENOUS at 18:37

## 2021-01-01 RX ADMIN — PANTOPRAZOLE SODIUM 40 MG: 40 INJECTION, POWDER, FOR SOLUTION INTRAVENOUS at 09:11

## 2021-01-01 RX ADMIN — ENOXAPARIN SODIUM 40 MG: 100 INJECTION SUBCUTANEOUS at 21:16

## 2021-01-01 RX ADMIN — Medication 275 MCG/HR: at 21:08

## 2021-01-01 RX ADMIN — Medication 10 ML: at 21:29

## 2021-01-01 RX ADMIN — MIDAZOLAM 7 MG/HR: 5 INJECTION INTRAMUSCULAR; INTRAVENOUS at 14:22

## 2021-01-01 RX ADMIN — MIDAZOLAM 10 MG/HR: 5 INJECTION, SOLUTION INTRAMUSCULAR; INTRAVENOUS at 09:07

## 2021-01-01 RX ADMIN — PROPOFOL 50 MCG/KG/MIN: 10 INJECTION, EMULSION INTRAVENOUS at 07:46

## 2021-01-01 RX ADMIN — IPRATROPIUM BROMIDE AND ALBUTEROL 2 PUFF: 20; 100 SPRAY, METERED RESPIRATORY (INHALATION) at 08:54

## 2021-01-01 RX ADMIN — POTASSIUM CHLORIDE 10 MEQ: 7.46 INJECTION, SOLUTION INTRAVENOUS at 11:43

## 2021-01-01 RX ADMIN — Medication: at 18:02

## 2021-01-01 RX ADMIN — SODIUM CHLORIDE 75 ML/HR: 9 INJECTION, SOLUTION INTRAVENOUS at 11:20

## 2021-01-01 RX ADMIN — Medication 300 MCG/HR: at 07:30

## 2021-01-01 RX ADMIN — Medication 10 ML: at 13:20

## 2021-01-01 RX ADMIN — IPRATROPIUM BROMIDE AND ALBUTEROL 2 PUFF: 20; 100 SPRAY, METERED RESPIRATORY (INHALATION) at 20:22

## 2021-01-01 RX ADMIN — Medication 10 ML: at 06:16

## 2021-01-01 RX ADMIN — IPRATROPIUM BROMIDE AND ALBUTEROL SULFATE 3 ML: 2.5; .5 SOLUTION RESPIRATORY (INHALATION) at 08:09

## 2021-01-01 RX ADMIN — INSULIN LISPRO 3 UNITS: 100 INJECTION, SOLUTION INTRAVENOUS; SUBCUTANEOUS at 11:30

## 2021-01-01 RX ADMIN — IPRATROPIUM BROMIDE AND ALBUTEROL SULFATE 3 ML: 2.5; .5 SOLUTION RESPIRATORY (INHALATION) at 01:04

## 2021-01-01 RX ADMIN — PROPOFOL 50 MCG/KG/MIN: 10 INJECTION, EMULSION INTRAVENOUS at 13:01

## 2021-01-01 RX ADMIN — ACETAMINOPHEN 975 MG: 650 SUPPOSITORY RECTAL at 20:14

## 2021-01-01 RX ADMIN — FUROSEMIDE 20 MG: 10 INJECTION, SOLUTION INTRAMUSCULAR; INTRAVENOUS at 17:18

## 2021-01-01 RX ADMIN — INSULIN LISPRO 3 UNITS: 100 INJECTION, SOLUTION INTRAVENOUS; SUBCUTANEOUS at 12:01

## 2021-01-01 RX ADMIN — DOCUSATE SODIUM 50 MG AND SENNOSIDES 8.6 MG 1 TABLET: 8.6; 5 TABLET, FILM COATED ORAL at 17:36

## 2021-01-01 RX ADMIN — SALINE NASAL SPRAY 2 SPRAY: 1.5 SOLUTION NASAL at 11:10

## 2021-01-01 RX ADMIN — PROPOFOL 45 MCG/KG/MIN: 10 INJECTION, EMULSION INTRAVENOUS at 16:21

## 2021-01-01 RX ADMIN — Medication 10 ML: at 22:24

## 2021-01-01 RX ADMIN — VASOPRESSIN 0.03 UNITS/MIN: 20 INJECTION INTRAVENOUS at 12:12

## 2021-01-01 RX ADMIN — MIDAZOLAM 2 MG/HR: 5 INJECTION INTRAMUSCULAR; INTRAVENOUS at 15:32

## 2021-01-01 RX ADMIN — DEXAMETHASONE SODIUM PHOSPHATE 6 MG: 10 INJECTION INTRAMUSCULAR; INTRAVENOUS at 20:13

## 2021-01-01 RX ADMIN — IPRATROPIUM BROMIDE AND ALBUTEROL 2 PUFF: 20; 100 SPRAY, METERED RESPIRATORY (INHALATION) at 00:04

## 2021-01-01 RX ADMIN — DEXAMETHASONE SODIUM PHOSPHATE 10 MG: 10 INJECTION INTRAMUSCULAR; INTRAVENOUS at 08:22

## 2021-01-01 RX ADMIN — PROPOFOL 50 MCG/KG/MIN: 10 INJECTION, EMULSION INTRAVENOUS at 20:55

## 2021-01-01 RX ADMIN — LANSOPRAZOLE 30 MG: KIT at 05:52

## 2021-01-01 RX ADMIN — Medication 150 MCG/HR: at 16:10

## 2021-01-01 RX ADMIN — ACETAMINOPHEN 975 MG: 650 SUPPOSITORY RECTAL at 11:33

## 2021-01-01 RX ADMIN — WATER 2 G: 1 INJECTION INTRAMUSCULAR; INTRAVENOUS; SUBCUTANEOUS at 15:30

## 2021-01-01 RX ADMIN — PROPOFOL 50 MCG/KG/MIN: 10 INJECTION, EMULSION INTRAVENOUS at 02:08

## 2021-01-01 RX ADMIN — DOXYCYCLINE 100 MG: 100 INJECTION, POWDER, LYOPHILIZED, FOR SOLUTION INTRAVENOUS at 05:33

## 2021-01-01 RX ADMIN — GUAIFENESIN 600 MG: 600 TABLET ORAL at 08:43

## 2021-01-01 RX ADMIN — HYDROXYZINE HYDROCHLORIDE 25 MG: 25 TABLET, FILM COATED ORAL at 00:34

## 2021-01-01 RX ADMIN — MINERAL OIL AND PETROLATUM: 150; 830 OINTMENT OPHTHALMIC at 20:32

## 2021-01-01 RX ADMIN — WATER 2 G: 1 INJECTION INTRAMUSCULAR; INTRAVENOUS; SUBCUTANEOUS at 15:04

## 2021-01-01 RX ADMIN — Medication: at 17:01

## 2021-01-01 RX ADMIN — LACTULOSE 30 ML: 20 SOLUTION ORAL at 15:05

## 2021-01-01 RX ADMIN — IPRATROPIUM BROMIDE AND ALBUTEROL SULFATE 3 ML: 2.5; .5 SOLUTION RESPIRATORY (INHALATION) at 01:39

## 2021-01-01 RX ADMIN — OXYCODONE HYDROCHLORIDE AND ACETAMINOPHEN 500 MG: 500 TABLET ORAL at 08:30

## 2021-01-01 RX ADMIN — Medication 5 MG: at 20:38

## 2021-01-01 RX ADMIN — POLYETHYLENE GLYCOL 3350 17 G: 17 POWDER, FOR SOLUTION ORAL at 08:27

## 2021-01-01 RX ADMIN — SALINE NASAL SPRAY 2 SPRAY: 1.5 SOLUTION NASAL at 20:25

## 2021-01-01 RX ADMIN — PHENYLEPHRINE HYDROCHLORIDE 70 MCG/MIN: 10 INJECTION INTRAVENOUS at 00:13

## 2021-01-01 RX ADMIN — CISATRACURIUM BESYLATE 2 MCG/KG/MIN: 10 INJECTION INTRAVENOUS at 12:25

## 2021-01-01 RX ADMIN — POTASSIUM PHOSPHATE, MONOBASIC POTASSIUM PHOSPHATE, DIBASIC: 224; 236 INJECTION, SOLUTION, CONCENTRATE INTRAVENOUS at 17:57

## 2021-01-01 RX ADMIN — CISATRACURIUM BESYLATE 3 MCG/KG/MIN: 10 INJECTION INTRAVENOUS at 06:03

## 2021-01-01 RX ADMIN — POTASSIUM PHOSPHATE, MONOBASIC POTASSIUM PHOSPHATE, DIBASIC: 224; 236 INJECTION, SOLUTION, CONCENTRATE INTRAVENOUS at 18:06

## 2021-01-01 RX ADMIN — BUMETANIDE 1 MG: 0.25 INJECTION INTRAMUSCULAR; INTRAVENOUS at 06:12

## 2021-01-01 RX ADMIN — PROPOFOL 50 MCG/KG/MIN: 10 INJECTION, EMULSION INTRAVENOUS at 10:23

## 2021-01-01 RX ADMIN — PROPOFOL 50 MCG/KG/MIN: 10 INJECTION, EMULSION INTRAVENOUS at 12:18

## 2021-01-01 RX ADMIN — PROPOFOL 50 MCG/KG/MIN: 10 INJECTION, EMULSION INTRAVENOUS at 06:38

## 2021-01-01 RX ADMIN — INSULIN LISPRO 4 UNITS: 100 INJECTION, SOLUTION INTRAVENOUS; SUBCUTANEOUS at 23:34

## 2021-01-01 RX ADMIN — SODIUM CHLORIDE 75 ML/HR: 9 INJECTION, SOLUTION INTRAVENOUS at 17:13

## 2021-01-01 RX ADMIN — Medication 300 MCG/HR: at 17:58

## 2021-01-01 RX ADMIN — PROPOFOL 50 MCG/KG/MIN: 10 INJECTION, EMULSION INTRAVENOUS at 14:42

## 2021-01-01 RX ADMIN — ACETAMINOPHEN 650 MG: 325 TABLET ORAL at 22:02

## 2021-01-01 RX ADMIN — DOCUSATE SODIUM 50 MG AND SENNOSIDES 8.6 MG 1 TABLET: 8.6; 5 TABLET, FILM COATED ORAL at 08:53

## 2021-01-01 RX ADMIN — IPRATROPIUM BROMIDE AND ALBUTEROL SULFATE 3 ML: 2.5; .5 SOLUTION RESPIRATORY (INHALATION) at 08:23

## 2021-01-01 RX ADMIN — GUAIFENESIN 600 MG: 600 TABLET ORAL at 09:36

## 2021-01-01 RX ADMIN — PANTOPRAZOLE SODIUM 40 MG: 40 INJECTION, POWDER, FOR SOLUTION INTRAVENOUS at 13:29

## 2021-01-01 RX ADMIN — MINERAL OIL AND PETROLATUM: 150; 830 OINTMENT OPHTHALMIC at 20:01

## 2021-01-01 RX ADMIN — PROPOFOL 50 MCG/KG/MIN: 10 INJECTION, EMULSION INTRAVENOUS at 05:32

## 2021-01-01 RX ADMIN — SALINE NASAL SPRAY 2 SPRAY: 1.5 SOLUTION NASAL at 08:05

## 2021-01-01 RX ADMIN — MEROPENEM 500 MG: 500 INJECTION, POWDER, FOR SOLUTION INTRAVENOUS at 14:03

## 2021-01-01 RX ADMIN — VANCOMYCIN HYDROCHLORIDE 1750 MG: 10 INJECTION, POWDER, LYOPHILIZED, FOR SOLUTION INTRAVENOUS at 04:24

## 2021-01-01 RX ADMIN — PROPOFOL 50 MCG/KG/MIN: 10 INJECTION, EMULSION INTRAVENOUS at 14:08

## 2021-01-01 RX ADMIN — PROPOFOL 50 MCG/KG/MIN: 10 INJECTION, EMULSION INTRAVENOUS at 06:35

## 2021-01-01 RX ADMIN — CISATRACURIUM BESYLATE 3.5 MCG/KG/MIN: 10 INJECTION INTRAVENOUS at 23:09

## 2021-01-01 RX ADMIN — BUMETANIDE 0.5 MG: 0.25 INJECTION INTRAMUSCULAR; INTRAVENOUS at 05:38

## 2021-01-01 RX ADMIN — BENZONATATE 200 MG: 100 CAPSULE ORAL at 21:24

## 2021-01-01 RX ADMIN — OXYCODONE HYDROCHLORIDE AND ACETAMINOPHEN 500 MG: 500 TABLET ORAL at 08:48

## 2021-01-01 RX ADMIN — IPRATROPIUM BROMIDE AND ALBUTEROL SULFATE 3 ML: 2.5; .5 SOLUTION RESPIRATORY (INHALATION) at 10:20

## 2021-01-01 RX ADMIN — PROPOFOL 50 MCG/KG/MIN: 10 INJECTION, EMULSION INTRAVENOUS at 11:34

## 2021-01-01 RX ADMIN — FENTANYL CITRATE 150 MCG/HR: 50 INJECTION, SOLUTION INTRAMUSCULAR; INTRAVENOUS at 12:43

## 2021-01-01 RX ADMIN — PROPOFOL 50 MCG/KG/MIN: 10 INJECTION, EMULSION INTRAVENOUS at 12:51

## 2021-01-01 RX ADMIN — GUAIFENESIN 600 MG: 600 TABLET ORAL at 08:19

## 2021-01-01 RX ADMIN — Medication 10 ML: at 00:29

## 2021-01-01 RX ADMIN — Medication 1 CAPSULE: at 08:02

## 2021-01-01 RX ADMIN — ENOXAPARIN SODIUM 40 MG: 100 INJECTION SUBCUTANEOUS at 19:58

## 2021-01-01 RX ADMIN — DOCUSATE SODIUM 50 MG AND SENNOSIDES 8.6 MG 1 TABLET: 8.6; 5 TABLET, FILM COATED ORAL at 18:08

## 2021-01-01 RX ADMIN — ATORVASTATIN CALCIUM 20 MG: 20 TABLET, FILM COATED ORAL at 09:11

## 2021-01-01 RX ADMIN — INSULIN LISPRO 3 UNITS: 100 INJECTION, SOLUTION INTRAVENOUS; SUBCUTANEOUS at 11:39

## 2021-01-01 RX ADMIN — BUMETANIDE 1 MG: 0.25 INJECTION INTRAMUSCULAR; INTRAVENOUS at 13:42

## 2021-01-01 RX ADMIN — OXYCODONE HYDROCHLORIDE AND ACETAMINOPHEN 500 MG: 500 TABLET ORAL at 09:40

## 2021-01-01 RX ADMIN — MINERAL OIL AND PETROLATUM: 150; 830 OINTMENT OPHTHALMIC at 08:42

## 2021-01-01 RX ADMIN — IPRATROPIUM BROMIDE AND ALBUTEROL SULFATE 3 ML: 2.5; .5 SOLUTION RESPIRATORY (INHALATION) at 01:33

## 2021-01-01 RX ADMIN — ENOXAPARIN SODIUM 135 MG: 150 INJECTION SUBCUTANEOUS at 20:33

## 2021-01-01 RX ADMIN — VANCOMYCIN HYDROCHLORIDE 1750 MG: 10 INJECTION, POWDER, LYOPHILIZED, FOR SOLUTION INTRAVENOUS at 20:29

## 2021-01-01 RX ADMIN — INSULIN LISPRO 3 UNITS: 100 INJECTION, SOLUTION INTRAVENOUS; SUBCUTANEOUS at 12:12

## 2021-01-01 RX ADMIN — DEXAMETHASONE SODIUM PHOSPHATE 6 MG: 10 INJECTION INTRAMUSCULAR; INTRAVENOUS at 20:34

## 2021-01-01 RX ADMIN — Medication 50 MG: at 09:08

## 2021-01-01 RX ADMIN — HYDROCODONE POLISTIREX AND CHLORPHENIRAMINE POLISTIREX 5 ML: 10; 8 SUSPENSION, EXTENDED RELEASE ORAL at 20:28

## 2021-01-01 RX ADMIN — PANTOPRAZOLE SODIUM 40 MG: 40 INJECTION, POWDER, FOR SOLUTION INTRAVENOUS at 08:48

## 2021-01-01 RX ADMIN — Medication 10 ML: at 13:30

## 2021-01-01 RX ADMIN — CISATRACURIUM BESYLATE 2 MCG/KG/MIN: 10 INJECTION INTRAVENOUS at 18:30

## 2021-01-01 RX ADMIN — PROPOFOL 50 MCG/KG/MIN: 10 INJECTION, EMULSION INTRAVENOUS at 16:25

## 2021-01-01 RX ADMIN — PROPOFOL 50 MCG/KG/MIN: 10 INJECTION, EMULSION INTRAVENOUS at 21:36

## 2021-01-01 RX ADMIN — INSULIN LISPRO 3 UNITS: 100 INJECTION, SOLUTION INTRAVENOUS; SUBCUTANEOUS at 17:41

## 2021-01-01 RX ADMIN — DOCUSATE SODIUM 50 MG AND SENNOSIDES 8.6 MG 1 TABLET: 8.6; 5 TABLET, FILM COATED ORAL at 18:03

## 2021-01-01 RX ADMIN — HYDROXYZINE HYDROCHLORIDE 25 MG: 25 TABLET, FILM COATED ORAL at 10:38

## 2021-01-01 RX ADMIN — Medication 10 ML: at 05:15

## 2021-01-01 RX ADMIN — SALINE NASAL SPRAY 2 SPRAY: 1.5 SOLUTION NASAL at 16:34

## 2021-01-01 RX ADMIN — ENOXAPARIN SODIUM 135 MG: 150 INJECTION SUBCUTANEOUS at 08:02

## 2021-01-01 RX ADMIN — MIDAZOLAM 8 MG/HR: 5 INJECTION, SOLUTION INTRAMUSCULAR; INTRAVENOUS at 15:55

## 2021-01-01 RX ADMIN — FENTANYL CITRATE 150 MCG/HR: 50 INJECTION, SOLUTION INTRAMUSCULAR; INTRAVENOUS at 18:53

## 2021-01-01 RX ADMIN — ACETAMINOPHEN 975 MG: 650 SUPPOSITORY RECTAL at 22:02

## 2021-01-01 RX ADMIN — ENOXAPARIN SODIUM 135 MG: 150 INJECTION SUBCUTANEOUS at 08:31

## 2021-01-01 RX ADMIN — Medication 10 ML: at 21:58

## 2021-01-01 RX ADMIN — PROPOFOL 20 MCG/KG/MIN: 10 INJECTION, EMULSION INTRAVENOUS at 15:32

## 2021-01-01 RX ADMIN — PROPOFOL 50 MCG/KG/MIN: 10 INJECTION, EMULSION INTRAVENOUS at 22:48

## 2021-01-01 RX ADMIN — Medication: at 17:28

## 2021-01-01 RX ADMIN — Medication: at 17:11

## 2021-01-01 RX ADMIN — Medication 1 CAPSULE: at 17:01

## 2021-01-01 RX ADMIN — PROPOFOL 50 MCG/KG/MIN: 10 INJECTION, EMULSION INTRAVENOUS at 14:34

## 2021-01-01 RX ADMIN — MINERAL OIL AND PETROLATUM: 150; 830 OINTMENT OPHTHALMIC at 20:36

## 2021-01-01 RX ADMIN — METOCLOPRAMIDE 5 MG: 5 INJECTION, SOLUTION INTRAMUSCULAR; INTRAVENOUS at 08:39

## 2021-01-01 RX ADMIN — BUMETANIDE 1 MG: 0.25 INJECTION INTRAMUSCULAR; INTRAVENOUS at 10:08

## 2021-01-01 RX ADMIN — PANTOPRAZOLE SODIUM 40 MG: 40 INJECTION, POWDER, FOR SOLUTION INTRAVENOUS at 08:05

## 2021-01-01 RX ADMIN — Medication 200 MCG/HR: at 06:47

## 2021-01-01 RX ADMIN — Medication 10 ML: at 21:01

## 2021-01-01 RX ADMIN — IPRATROPIUM BROMIDE AND ALBUTEROL 2 PUFF: 20; 100 SPRAY, METERED RESPIRATORY (INHALATION) at 00:32

## 2021-01-01 RX ADMIN — PROPOFOL 50 MCG/KG/MIN: 10 INJECTION, EMULSION INTRAVENOUS at 05:12

## 2021-01-01 RX ADMIN — CISATRACURIUM BESYLATE 4 MCG/KG/MIN: 10 INJECTION INTRAVENOUS at 18:39

## 2021-01-01 RX ADMIN — Medication 5 MG: at 23:04

## 2021-01-01 RX ADMIN — IPRATROPIUM BROMIDE AND ALBUTEROL SULFATE 3 ML: 2.5; .5 SOLUTION RESPIRATORY (INHALATION) at 15:14

## 2021-01-01 RX ADMIN — DEXAMETHASONE SODIUM PHOSPHATE 6 MG: 10 INJECTION INTRAMUSCULAR; INTRAVENOUS at 21:06

## 2021-01-01 RX ADMIN — CHLORHEXIDINE GLUCONATE 15 ML: 1.2 RINSE ORAL at 20:16

## 2021-01-01 RX ADMIN — HYDROCODONE POLISTIREX AND CHLORPHENIRAMINE POLISTIREX 5 ML: 10; 8 SUSPENSION, EXTENDED RELEASE ORAL at 20:24

## 2021-01-01 RX ADMIN — IPRATROPIUM BROMIDE AND ALBUTEROL SULFATE 3 ML: 2.5; .5 SOLUTION RESPIRATORY (INHALATION) at 13:49

## 2021-01-01 RX ADMIN — IPRATROPIUM BROMIDE AND ALBUTEROL SULFATE 3 ML: 2.5; .5 SOLUTION RESPIRATORY (INHALATION) at 00:21

## 2021-01-01 RX ADMIN — SODIUM CHLORIDE 100 MG: 900 INJECTION, SOLUTION INTRAVENOUS at 20:21

## 2021-01-01 RX ADMIN — PROPOFOL 40 MCG/KG/MIN: 10 INJECTION, EMULSION INTRAVENOUS at 19:43

## 2021-01-01 RX ADMIN — Medication 10 ML: at 13:44

## 2021-01-01 RX ADMIN — ENOXAPARIN SODIUM 40 MG: 100 INJECTION SUBCUTANEOUS at 08:01

## 2021-01-01 RX ADMIN — PROPOFOL 50 MCG/KG/MIN: 10 INJECTION, EMULSION INTRAVENOUS at 11:33

## 2021-01-01 RX ADMIN — PROPOFOL 45 MCG/KG/MIN: 10 INJECTION, EMULSION INTRAVENOUS at 09:26

## 2021-01-01 RX ADMIN — PROPOFOL 50 MCG/KG/MIN: 10 INJECTION, EMULSION INTRAVENOUS at 13:45

## 2021-01-01 RX ADMIN — EPOPROSTENOL 30 NG/KG/MIN: 1.5 INJECTION, POWDER, LYOPHILIZED, FOR SOLUTION INTRAVENOUS at 12:05

## 2021-01-01 RX ADMIN — CISATRACURIUM BESYLATE 3.5 MCG/KG/MIN: 10 INJECTION INTRAVENOUS at 15:39

## 2021-01-01 RX ADMIN — PROPOFOL 45 MCG/KG/MIN: 10 INJECTION, EMULSION INTRAVENOUS at 15:45

## 2021-01-01 RX ADMIN — VANCOMYCIN HYDROCHLORIDE 1750 MG: 10 INJECTION, POWDER, LYOPHILIZED, FOR SOLUTION INTRAVENOUS at 20:58

## 2021-01-01 RX ADMIN — MIDAZOLAM 15 MG/HR: 5 INJECTION, SOLUTION INTRAMUSCULAR; INTRAVENOUS at 13:40

## 2021-01-01 RX ADMIN — FLUTICASONE PROPIONATE 2 SPRAY: 50 SPRAY, METERED NASAL at 08:50

## 2021-01-01 RX ADMIN — PROPOFOL 50 MCG/KG/MIN: 10 INJECTION, EMULSION INTRAVENOUS at 23:28

## 2021-01-01 RX ADMIN — INSULIN LISPRO 3 UNITS: 100 INJECTION, SOLUTION INTRAVENOUS; SUBCUTANEOUS at 17:29

## 2021-01-01 RX ADMIN — MIDAZOLAM 15 MG/HR: 5 INJECTION, SOLUTION INTRAMUSCULAR; INTRAVENOUS at 17:12

## 2021-01-01 RX ADMIN — OXYCODONE HYDROCHLORIDE AND ACETAMINOPHEN 500 MG: 500 TABLET ORAL at 08:12

## 2021-01-01 RX ADMIN — DEXAMETHASONE SODIUM PHOSPHATE 10 MG: 10 INJECTION INTRAMUSCULAR; INTRAVENOUS at 08:11

## 2021-01-01 RX ADMIN — INSULIN LISPRO 3 UNITS: 100 INJECTION, SOLUTION INTRAVENOUS; SUBCUTANEOUS at 05:51

## 2021-01-01 RX ADMIN — DOCUSATE SODIUM 50 MG AND SENNOSIDES 8.6 MG 1 TABLET: 8.6; 5 TABLET, FILM COATED ORAL at 08:26

## 2021-01-01 RX ADMIN — POLYETHYLENE GLYCOL 3350 17 G: 17 POWDER, FOR SOLUTION ORAL at 08:32

## 2021-01-01 RX ADMIN — ENOXAPARIN SODIUM 135 MG: 150 INJECTION SUBCUTANEOUS at 08:15

## 2021-01-01 RX ADMIN — EPOPROSTENOL 30 NG/KG/MIN: 1.5 INJECTION, POWDER, LYOPHILIZED, FOR SOLUTION INTRAVENOUS at 15:26

## 2021-01-01 RX ADMIN — ENOXAPARIN SODIUM 40 MG: 100 INJECTION SUBCUTANEOUS at 08:03

## 2021-01-01 RX ADMIN — SODIUM CHLORIDE, POTASSIUM CHLORIDE, SODIUM LACTATE AND CALCIUM CHLORIDE 1000 ML: 600; 310; 30; 20 INJECTION, SOLUTION INTRAVENOUS at 14:32

## 2021-01-01 RX ADMIN — PROPOFOL 50 MCG/KG/MIN: 10 INJECTION, EMULSION INTRAVENOUS at 22:37

## 2021-01-01 RX ADMIN — Medication 300 MCG/HR: at 05:04

## 2021-01-01 RX ADMIN — ACETAMINOPHEN 975 MG: 650 SUPPOSITORY RECTAL at 03:24

## 2021-01-01 RX ADMIN — Medication 200 MCG/HR: at 19:46

## 2021-01-01 RX ADMIN — PHENYLEPHRINE HYDROCHLORIDE 50 MCG/MIN: 10 INJECTION INTRAVENOUS at 14:03

## 2021-01-01 RX ADMIN — Medication 10 ML: at 21:21

## 2021-01-01 RX ADMIN — SODIUM CHLORIDE 250 ML: 9 INJECTION, SOLUTION INTRAVENOUS at 21:49

## 2021-01-01 RX ADMIN — IPRATROPIUM BROMIDE AND ALBUTEROL SULFATE 3 ML: 2.5; .5 SOLUTION RESPIRATORY (INHALATION) at 15:07

## 2021-01-01 RX ADMIN — FUROSEMIDE 20 MG: 10 INJECTION, SOLUTION INTRAMUSCULAR; INTRAVENOUS at 17:29

## 2021-01-01 RX ADMIN — MINERAL OIL AND PETROLATUM: 150; 830 OINTMENT OPHTHALMIC at 08:55

## 2021-01-01 RX ADMIN — MIDAZOLAM 16 MG/HR: 5 INJECTION, SOLUTION INTRAMUSCULAR; INTRAVENOUS at 15:40

## 2021-01-01 RX ADMIN — Medication 300 MCG/HR: at 21:45

## 2021-01-01 RX ADMIN — DOXYCYCLINE 100 MG: 100 INJECTION, POWDER, LYOPHILIZED, FOR SOLUTION INTRAVENOUS at 06:01

## 2021-01-01 RX ADMIN — DEXAMETHASONE SODIUM PHOSPHATE 10 MG: 10 INJECTION INTRAMUSCULAR; INTRAVENOUS at 21:16

## 2021-01-01 RX ADMIN — Medication 300 MCG/HR: at 23:54

## 2021-01-01 RX ADMIN — PROPOFOL 45 MCG/KG/MIN: 10 INJECTION, EMULSION INTRAVENOUS at 00:47

## 2021-01-01 RX ADMIN — Medication 150 MCG/HR: at 00:36

## 2021-01-01 RX ADMIN — BENZONATATE 200 MG: 100 CAPSULE ORAL at 21:43

## 2021-01-01 RX ADMIN — FUROSEMIDE 20 MG: 10 INJECTION, SOLUTION INTRAMUSCULAR; INTRAVENOUS at 17:51

## 2021-01-01 RX ADMIN — IPRATROPIUM BROMIDE AND ALBUTEROL SULFATE 3 ML: 2.5; .5 SOLUTION RESPIRATORY (INHALATION) at 15:04

## 2021-01-01 RX ADMIN — Medication 10 ML: at 07:53

## 2021-01-01 RX ADMIN — MIDAZOLAM 8 MG/HR: 5 INJECTION, SOLUTION INTRAMUSCULAR; INTRAVENOUS at 06:44

## 2021-01-01 RX ADMIN — Medication 10 ML: at 07:07

## 2021-01-01 RX ADMIN — BUMETANIDE 1 MG: 0.25 INJECTION INTRAMUSCULAR; INTRAVENOUS at 05:27

## 2021-01-01 RX ADMIN — PROPOFOL 50 MCG/KG/MIN: 10 INJECTION, EMULSION INTRAVENOUS at 09:28

## 2021-01-01 RX ADMIN — Medication 200 MCG/HR: at 01:00

## 2021-01-01 RX ADMIN — MINERAL OIL AND PETROLATUM: 150; 830 OINTMENT OPHTHALMIC at 10:00

## 2021-01-01 RX ADMIN — PROPOFOL 40 MCG/KG/MIN: 10 INJECTION, EMULSION INTRAVENOUS at 02:53

## 2021-01-01 RX ADMIN — BUMETANIDE 1 MG: 0.25 INJECTION INTRAMUSCULAR; INTRAVENOUS at 14:55

## 2021-01-01 RX ADMIN — MIDAZOLAM 13 MG/HR: 5 INJECTION, SOLUTION INTRAMUSCULAR; INTRAVENOUS at 16:43

## 2021-01-01 RX ADMIN — BUMETANIDE 1 MG: 0.25 INJECTION INTRAMUSCULAR; INTRAVENOUS at 22:00

## 2021-01-01 RX ADMIN — Medication 1 CAPSULE: at 08:03

## 2021-01-01 RX ADMIN — IPRATROPIUM BROMIDE AND ALBUTEROL SULFATE 3 ML: 2.5; .5 SOLUTION RESPIRATORY (INHALATION) at 01:13

## 2021-01-01 RX ADMIN — INSULIN LISPRO 3 UNITS: 100 INJECTION, SOLUTION INTRAVENOUS; SUBCUTANEOUS at 11:19

## 2021-01-01 RX ADMIN — HYDROCODONE POLISTIREX AND CHLORPHENIRAMINE POLISTIREX 5 ML: 10; 8 SUSPENSION, EXTENDED RELEASE ORAL at 08:21

## 2021-01-01 RX ADMIN — IPRATROPIUM BROMIDE AND ALBUTEROL SULFATE 3 ML: 2.5; .5 SOLUTION RESPIRATORY (INHALATION) at 02:00

## 2021-01-01 RX ADMIN — Medication 10 ML: at 21:06

## 2021-01-01 RX ADMIN — DOXYCYCLINE 100 MG: 100 INJECTION, POWDER, LYOPHILIZED, FOR SOLUTION INTRAVENOUS at 17:45

## 2021-01-01 RX ADMIN — ENOXAPARIN SODIUM 40 MG: 100 INJECTION SUBCUTANEOUS at 20:44

## 2021-01-01 RX ADMIN — ENOXAPARIN SODIUM 40 MG: 100 INJECTION SUBCUTANEOUS at 20:45

## 2021-01-01 RX ADMIN — SODIUM CHLORIDE 75 ML/HR: 9 INJECTION, SOLUTION INTRAVENOUS at 10:15

## 2021-01-01 RX ADMIN — FENTANYL CITRATE 50 MCG/HR: 50 INJECTION, SOLUTION INTRAMUSCULAR; INTRAVENOUS at 15:34

## 2021-01-01 RX ADMIN — PHENYLEPHRINE HYDROCHLORIDE 40 MCG/MIN: 10 INJECTION INTRAVENOUS at 10:30

## 2021-01-01 RX ADMIN — IPRATROPIUM BROMIDE AND ALBUTEROL SULFATE 3 ML: 2.5; .5 SOLUTION RESPIRATORY (INHALATION) at 20:58

## 2021-01-01 RX ADMIN — POLYETHYLENE GLYCOL 3350 17 G: 17 POWDER, FOR SOLUTION ORAL at 09:11

## 2021-01-01 RX ADMIN — ENOXAPARIN SODIUM 40 MG: 100 INJECTION SUBCUTANEOUS at 20:31

## 2021-01-01 RX ADMIN — PROPOFOL 50 MCG/KG/MIN: 10 INJECTION, EMULSION INTRAVENOUS at 19:50

## 2021-01-01 RX ADMIN — ACETAMINOPHEN 1000 MG: 500 TABLET ORAL at 06:50

## 2021-01-01 RX ADMIN — PROPOFOL 50 MCG/KG/MIN: 10 INJECTION, EMULSION INTRAVENOUS at 04:02

## 2021-01-01 RX ADMIN — INSULIN LISPRO 3 UNITS: 100 INJECTION, SOLUTION INTRAVENOUS; SUBCUTANEOUS at 16:02

## 2021-01-01 RX ADMIN — CISATRACURIUM BESYLATE 3 MCG/KG/MIN: 200 INJECTION, SOLUTION INTRAVENOUS at 13:40

## 2021-01-01 RX ADMIN — DEXAMETHASONE SODIUM PHOSPHATE 10 MG: 10 INJECTION INTRAMUSCULAR; INTRAVENOUS at 08:05

## 2021-01-01 RX ADMIN — IPRATROPIUM BROMIDE AND ALBUTEROL SULFATE 3 ML: 2.5; .5 SOLUTION RESPIRATORY (INHALATION) at 14:48

## 2021-01-01 RX ADMIN — INSULIN LISPRO 3 UNITS: 100 INJECTION, SOLUTION INTRAVENOUS; SUBCUTANEOUS at 18:02

## 2021-01-01 RX ADMIN — CHLORHEXIDINE GLUCONATE 15 ML: 1.2 RINSE ORAL at 08:06

## 2021-01-01 RX ADMIN — WATER 2 G: 1 INJECTION INTRAMUSCULAR; INTRAVENOUS; SUBCUTANEOUS at 22:00

## 2021-01-01 RX ADMIN — PROPOFOL 45 MCG/KG/MIN: 10 INJECTION, EMULSION INTRAVENOUS at 06:26

## 2021-01-01 RX ADMIN — IPRATROPIUM BROMIDE AND ALBUTEROL SULFATE 3 ML: 2.5; .5 SOLUTION RESPIRATORY (INHALATION) at 08:52

## 2021-01-01 RX ADMIN — ENOXAPARIN SODIUM 135 MG: 150 INJECTION SUBCUTANEOUS at 21:26

## 2021-01-01 RX ADMIN — INSULIN LISPRO 3 UNITS: 100 INJECTION, SOLUTION INTRAVENOUS; SUBCUTANEOUS at 05:14

## 2021-01-01 RX ADMIN — CISATRACURIUM BESYLATE 5 MCG/KG/MIN: 200 INJECTION, SOLUTION INTRAVENOUS at 20:25

## 2021-01-01 RX ADMIN — ENOXAPARIN SODIUM 135 MG: 150 INJECTION SUBCUTANEOUS at 09:37

## 2021-01-01 RX ADMIN — BENZONATATE 200 MG: 100 CAPSULE ORAL at 08:07

## 2021-01-01 RX ADMIN — PROPOFOL 40 MCG/KG/MIN: 10 INJECTION, EMULSION INTRAVENOUS at 09:40

## 2021-01-01 RX ADMIN — SODIUM CHLORIDE 500 ML: 9 INJECTION, SOLUTION INTRAVENOUS at 17:14

## 2021-01-01 RX ADMIN — SALINE NASAL SPRAY 2 SPRAY: 1.5 SOLUTION NASAL at 08:40

## 2021-01-01 RX ADMIN — Medication 300 MCG/HR: at 09:20

## 2021-01-01 RX ADMIN — PROPOFOL 50 MCG/KG/MIN: 10 INJECTION, EMULSION INTRAVENOUS at 00:06

## 2021-01-01 RX ADMIN — HYDROXYZINE HYDROCHLORIDE 25 MG: 25 TABLET, FILM COATED ORAL at 13:18

## 2021-01-01 RX ADMIN — LACTULOSE 30 ML: 20 SOLUTION ORAL at 08:27

## 2021-01-01 RX ADMIN — PHENYLEPHRINE HYDROCHLORIDE 55 MCG/MIN: 10 INJECTION INTRAVENOUS at 17:28

## 2021-01-01 RX ADMIN — PROPOFOL 50 MCG/KG/MIN: 10 INJECTION, EMULSION INTRAVENOUS at 20:41

## 2021-01-01 RX ADMIN — EPOPROSTENOL 30 NG/KG/MIN: 1.5 INJECTION, POWDER, LYOPHILIZED, FOR SOLUTION INTRAVENOUS at 22:56

## 2021-01-01 RX ADMIN — Medication 30 UNITS: at 09:11

## 2021-01-01 RX ADMIN — FLUTICASONE PROPIONATE 2 SPRAY: 50 SPRAY, METERED NASAL at 08:21

## 2021-01-01 RX ADMIN — PROPOFOL 50 MCG/KG/MIN: 10 INJECTION, EMULSION INTRAVENOUS at 23:02

## 2021-01-01 RX ADMIN — MIDAZOLAM 10 MG/HR: 5 INJECTION, SOLUTION INTRAMUSCULAR; INTRAVENOUS at 15:53

## 2021-01-01 RX ADMIN — OXYCODONE HYDROCHLORIDE AND ACETAMINOPHEN 500 MG: 500 TABLET ORAL at 17:23

## 2021-01-01 RX ADMIN — HYDROCODONE POLISTIREX AND CHLORPHENIRAMINE POLISTIREX 5 ML: 10; 8 SUSPENSION, EXTENDED RELEASE ORAL at 21:16

## 2021-01-01 RX ADMIN — PANTOPRAZOLE SODIUM 40 MG: 40 INJECTION, POWDER, FOR SOLUTION INTRAVENOUS at 08:06

## 2021-01-01 RX ADMIN — BUMETANIDE 1 MG: 0.25 INJECTION INTRAMUSCULAR; INTRAVENOUS at 08:26

## 2021-01-01 RX ADMIN — HYDROXYZINE HYDROCHLORIDE 25 MG: 25 TABLET, FILM COATED ORAL at 09:36

## 2021-01-01 RX ADMIN — PROPOFOL 50 MCG/KG/MIN: 10 INJECTION, EMULSION INTRAVENOUS at 07:03

## 2021-01-01 RX ADMIN — IPRATROPIUM BROMIDE AND ALBUTEROL SULFATE 3 ML: 2.5; .5 SOLUTION RESPIRATORY (INHALATION) at 01:28

## 2021-01-01 RX ADMIN — PANTOPRAZOLE SODIUM 40 MG: 40 TABLET, DELAYED RELEASE ORAL at 08:03

## 2021-01-01 RX ADMIN — ENOXAPARIN SODIUM 40 MG: 100 INJECTION SUBCUTANEOUS at 20:15

## 2021-01-01 RX ADMIN — HYDROXYZINE HYDROCHLORIDE 25 MG: 25 TABLET, FILM COATED ORAL at 00:12

## 2021-01-01 RX ADMIN — BUMETANIDE 0.5 MG: 0.25 INJECTION INTRAMUSCULAR; INTRAVENOUS at 08:17

## 2021-01-01 RX ADMIN — METOCLOPRAMIDE 5 MG: 5 INJECTION, SOLUTION INTRAMUSCULAR; INTRAVENOUS at 17:49

## 2021-01-01 RX ADMIN — INSULIN LISPRO 3 UNITS: 100 INJECTION, SOLUTION INTRAVENOUS; SUBCUTANEOUS at 12:29

## 2021-01-01 RX ADMIN — IOPAMIDOL 80 ML: 755 INJECTION, SOLUTION INTRAVENOUS at 15:02

## 2021-01-01 RX ADMIN — CISATRACURIUM BESYLATE 2 MCG/KG/MIN: 10 INJECTION INTRAVENOUS at 06:40

## 2021-01-01 RX ADMIN — IPRATROPIUM BROMIDE AND ALBUTEROL SULFATE 3 ML: 2.5; .5 SOLUTION RESPIRATORY (INHALATION) at 18:12

## 2021-01-01 RX ADMIN — ENOXAPARIN SODIUM 40 MG: 100 INJECTION SUBCUTANEOUS at 19:34

## 2021-01-01 RX ADMIN — METOCLOPRAMIDE 5 MG: 5 INJECTION, SOLUTION INTRAMUSCULAR; INTRAVENOUS at 08:17

## 2021-01-01 RX ADMIN — Medication 10 ML: at 13:39

## 2021-01-01 RX ADMIN — OXYCODONE HYDROCHLORIDE AND ACETAMINOPHEN 500 MG: 500 TABLET ORAL at 17:52

## 2021-01-01 RX ADMIN — PHENYLEPHRINE HYDROCHLORIDE 30 MCG/MIN: 10 INJECTION INTRAVENOUS at 11:40

## 2021-01-01 RX ADMIN — IPRATROPIUM BROMIDE AND ALBUTEROL SULFATE 3 ML: 2.5; .5 SOLUTION RESPIRATORY (INHALATION) at 09:18

## 2021-01-01 RX ADMIN — PROPOFOL 50 MCG/KG/MIN: 10 INJECTION, EMULSION INTRAVENOUS at 06:59

## 2021-01-01 RX ADMIN — Medication 10 ML: at 14:00

## 2021-01-01 RX ADMIN — MINERAL OIL AND PETROLATUM: 150; 830 OINTMENT OPHTHALMIC at 20:24

## 2021-01-01 RX ADMIN — SODIUM CHLORIDE 100 MG: 900 INJECTION, SOLUTION INTRAVENOUS at 18:10

## 2021-01-01 RX ADMIN — HYDROCODONE POLISTIREX AND CHLORPHENIRAMINE POLISTIREX 5 ML: 10; 8 SUSPENSION, EXTENDED RELEASE ORAL at 08:01

## 2021-01-01 RX ADMIN — PROPOFOL 45 MCG/KG/MIN: 10 INJECTION, EMULSION INTRAVENOUS at 21:39

## 2021-01-01 RX ADMIN — POTASSIUM CHLORIDE 10 MEQ: 7.46 INJECTION, SOLUTION INTRAVENOUS at 08:02

## 2021-01-01 RX ADMIN — ENOXAPARIN SODIUM 40 MG: 100 INJECTION SUBCUTANEOUS at 08:35

## 2021-01-01 RX ADMIN — SALINE NASAL SPRAY 2 SPRAY: 1.5 SOLUTION NASAL at 20:56

## 2021-01-01 RX ADMIN — MIDAZOLAM 15 MG/HR: 5 INJECTION, SOLUTION INTRAMUSCULAR; INTRAVENOUS at 20:32

## 2021-01-01 RX ADMIN — PANTOPRAZOLE SODIUM 40 MG: 40 INJECTION, POWDER, FOR SOLUTION INTRAVENOUS at 08:23

## 2021-01-01 RX ADMIN — IPRATROPIUM BROMIDE AND ALBUTEROL SULFATE 3 ML: 2.5; .5 SOLUTION RESPIRATORY (INHALATION) at 17:56

## 2021-01-01 RX ADMIN — WATER 2 G: 1 INJECTION INTRAMUSCULAR; INTRAVENOUS; SUBCUTANEOUS at 16:32

## 2021-01-01 RX ADMIN — Medication 10 ML: at 21:15

## 2021-01-01 RX ADMIN — OXYCODONE HYDROCHLORIDE AND ACETAMINOPHEN 500 MG: 500 TABLET ORAL at 08:41

## 2021-01-01 RX ADMIN — INSULIN LISPRO 3 UNITS: 100 INJECTION, SOLUTION INTRAVENOUS; SUBCUTANEOUS at 23:10

## 2021-01-01 RX ADMIN — VANCOMYCIN HYDROCHLORIDE 1500 MG: 10 INJECTION, POWDER, LYOPHILIZED, FOR SOLUTION INTRAVENOUS at 20:36

## 2021-01-01 RX ADMIN — PROPOFOL 50 MCG/KG/MIN: 10 INJECTION, EMULSION INTRAVENOUS at 07:48

## 2021-01-01 RX ADMIN — Medication 200 MCG/HR: at 08:41

## 2021-01-01 RX ADMIN — ENOXAPARIN SODIUM 40 MG: 100 INJECTION SUBCUTANEOUS at 21:06

## 2021-01-01 RX ADMIN — SALINE NASAL SPRAY 2 SPRAY: 1.5 SOLUTION NASAL at 23:06

## 2021-01-01 RX ADMIN — FUROSEMIDE 20 MG: 10 INJECTION, SOLUTION INTRAMUSCULAR; INTRAVENOUS at 08:07

## 2021-01-01 RX ADMIN — INSULIN LISPRO 3 UNITS: 100 INJECTION, SOLUTION INTRAVENOUS; SUBCUTANEOUS at 11:31

## 2021-01-01 RX ADMIN — PHENYLEPHRINE HYDROCHLORIDE 75 MCG/MIN: 10 INJECTION INTRAVENOUS at 06:59

## 2021-01-01 RX ADMIN — MEROPENEM 500 MG: 500 INJECTION, POWDER, FOR SOLUTION INTRAVENOUS at 20:28

## 2021-01-01 RX ADMIN — OXYCODONE HYDROCHLORIDE AND ACETAMINOPHEN 500 MG: 500 TABLET ORAL at 17:45

## 2021-01-01 RX ADMIN — CISATRACURIUM BESYLATE 3 MCG/KG/MIN: 10 INJECTION INTRAVENOUS at 17:13

## 2021-01-01 RX ADMIN — Medication 300 MCG/HR: at 08:30

## 2021-01-01 RX ADMIN — BENZONATATE 200 MG: 100 CAPSULE ORAL at 20:36

## 2021-01-01 RX ADMIN — IPRATROPIUM BROMIDE AND ALBUTEROL SULFATE 3 ML: 2.5; .5 SOLUTION RESPIRATORY (INHALATION) at 08:54

## 2021-01-01 RX ADMIN — KETAMINE HYDROCHLORIDE 0.05 MG/KG/HR: 50 INJECTION, SOLUTION INTRAMUSCULAR; INTRAVENOUS at 18:48

## 2021-01-01 RX ADMIN — PROPOFOL 50 MCG/KG/MIN: 10 INJECTION, EMULSION INTRAVENOUS at 04:15

## 2021-01-01 RX ADMIN — HYDROCODONE POLISTIREX AND CHLORPHENIRAMINE POLISTIREX 5 ML: 10; 8 SUSPENSION, EXTENDED RELEASE ORAL at 09:36

## 2021-01-01 RX ADMIN — Medication 10 ML: at 14:11

## 2021-01-01 RX ADMIN — FENTANYL CITRATE 150 MCG/HR: 50 INJECTION, SOLUTION INTRAMUSCULAR; INTRAVENOUS at 22:42

## 2021-01-01 RX ADMIN — IPRATROPIUM BROMIDE AND ALBUTEROL SULFATE 3 ML: 2.5; .5 SOLUTION RESPIRATORY (INHALATION) at 20:17

## 2021-01-01 RX ADMIN — PANTOPRAZOLE SODIUM 40 MG: 40 TABLET, DELAYED RELEASE ORAL at 06:23

## 2021-01-01 RX ADMIN — PHENYLEPHRINE HYDROCHLORIDE 40 MCG/MIN: 10 INJECTION INTRAVENOUS at 05:49

## 2021-01-01 RX ADMIN — PHENYLEPHRINE HYDROCHLORIDE 60 MCG/MIN: 10 INJECTION INTRAVENOUS at 14:42

## 2021-01-01 RX ADMIN — POLYETHYLENE GLYCOL 3350 17 G: 17 POWDER, FOR SOLUTION ORAL at 18:32

## 2021-01-01 RX ADMIN — Medication 1 CAPSULE: at 08:36

## 2021-01-01 RX ADMIN — PHENYLEPHRINE HYDROCHLORIDE 40 MCG/MIN: 10 INJECTION INTRAVENOUS at 11:51

## 2021-01-01 RX ADMIN — DEXAMETHASONE SODIUM PHOSPHATE 6 MG: 10 INJECTION, SOLUTION INTRAMUSCULAR; INTRAVENOUS at 08:17

## 2021-01-01 RX ADMIN — MIDAZOLAM 8 MG/HR: 5 INJECTION, SOLUTION INTRAMUSCULAR; INTRAVENOUS at 03:01

## 2021-01-01 RX ADMIN — ATORVASTATIN CALCIUM 20 MG: 20 TABLET, FILM COATED ORAL at 08:54

## 2021-01-01 RX ADMIN — IPRATROPIUM BROMIDE AND ALBUTEROL SULFATE 3 ML: 2.5; .5 SOLUTION RESPIRATORY (INHALATION) at 20:11

## 2021-01-01 RX ADMIN — POTASSIUM CHLORIDE 10 MEQ: 7.46 INJECTION, SOLUTION INTRAVENOUS at 10:49

## 2021-01-01 RX ADMIN — DOCUSATE SODIUM 50 MG AND SENNOSIDES 8.6 MG 1 TABLET: 8.6; 5 TABLET, FILM COATED ORAL at 17:49

## 2021-01-01 RX ADMIN — Medication 10 ML: at 21:17

## 2021-01-01 RX ADMIN — MIDAZOLAM 8 MG/HR: 5 INJECTION INTRAMUSCULAR; INTRAVENOUS at 05:13

## 2021-01-01 RX ADMIN — DEXAMETHASONE SODIUM PHOSPHATE 10 MG: 10 INJECTION INTRAMUSCULAR; INTRAVENOUS at 21:13

## 2021-01-01 RX ADMIN — IPRATROPIUM BROMIDE AND ALBUTEROL SULFATE 3 ML: 2.5; .5 SOLUTION RESPIRATORY (INHALATION) at 20:06

## 2021-01-01 RX ADMIN — Medication 300 MCG/HR: at 00:31

## 2021-01-01 RX ADMIN — Medication 10 ML: at 05:25

## 2021-01-01 RX ADMIN — ENOXAPARIN SODIUM 135 MG: 150 INJECTION SUBCUTANEOUS at 08:04

## 2021-01-01 RX ADMIN — IPRATROPIUM BROMIDE AND ALBUTEROL SULFATE 3 ML: 2.5; .5 SOLUTION RESPIRATORY (INHALATION) at 20:12

## 2021-01-01 RX ADMIN — INSULIN GLARGINE 18 UNITS: 100 INJECTION, SOLUTION SUBCUTANEOUS at 08:22

## 2021-01-01 RX ADMIN — MINERAL OIL AND PETROLATUM: 150; 830 OINTMENT OPHTHALMIC at 21:41

## 2021-01-01 RX ADMIN — Medication 10 ML: at 06:01

## 2021-01-01 RX ADMIN — POTASSIUM CHLORIDE 10 MEQ: 7.46 INJECTION, SOLUTION INTRAVENOUS at 10:30

## 2021-01-01 RX ADMIN — DOCUSATE SODIUM 50 MG AND SENNOSIDES 8.6 MG 1 TABLET: 8.6; 5 TABLET, FILM COATED ORAL at 08:01

## 2021-01-01 RX ADMIN — PHENYLEPHRINE HYDROCHLORIDE 40 MCG/MIN: 10 INJECTION INTRAVENOUS at 06:52

## 2021-01-01 RX ADMIN — POTASSIUM CHLORIDE 20 MEQ: 400 INJECTION, SOLUTION INTRAVENOUS at 13:31

## 2021-01-01 RX ADMIN — CISATRACURIUM BESYLATE 3.5 MCG/KG/MIN: 10 INJECTION INTRAVENOUS at 07:17

## 2021-01-01 RX ADMIN — BENZONATATE 200 MG: 100 CAPSULE ORAL at 15:33

## 2021-01-01 RX ADMIN — POLYETHYLENE GLYCOL 3350 17 G: 17 POWDER, FOR SOLUTION ORAL at 09:39

## 2021-01-01 RX ADMIN — Medication 10 ML: at 23:04

## 2021-01-01 RX ADMIN — ENOXAPARIN SODIUM 40 MG: 100 INJECTION SUBCUTANEOUS at 19:14

## 2021-01-01 RX ADMIN — IPRATROPIUM BROMIDE AND ALBUTEROL SULFATE 3 ML: 2.5; .5 SOLUTION RESPIRATORY (INHALATION) at 01:05

## 2021-01-01 RX ADMIN — ROCURONIUM BROMIDE 50 MG: 10 INJECTION INTRAVENOUS at 15:40

## 2021-01-01 RX ADMIN — DOCUSATE SODIUM 50 MG AND SENNOSIDES 8.6 MG 1 TABLET: 8.6; 5 TABLET, FILM COATED ORAL at 09:11

## 2021-01-01 RX ADMIN — DEXMEDETOMIDINE HYDROCHLORIDE 0.4 MCG/KG/HR: 400 INJECTION INTRAVENOUS at 22:39

## 2021-01-01 RX ADMIN — PHENYLEPHRINE HYDROCHLORIDE 75 MCG/MIN: 10 INJECTION INTRAVENOUS at 13:58

## 2021-01-01 RX ADMIN — CISATRACURIUM BESYLATE 3 MCG/KG/MIN: 10 INJECTION INTRAVENOUS at 09:25

## 2021-01-01 RX ADMIN — DEXAMETHASONE SODIUM PHOSPHATE 10 MG: 10 INJECTION INTRAMUSCULAR; INTRAVENOUS at 08:31

## 2021-01-01 RX ADMIN — HYDROCODONE POLISTIREX AND CHLORPHENIRAMINE POLISTIREX 5 ML: 10; 8 SUSPENSION, EXTENDED RELEASE ORAL at 21:28

## 2021-01-01 RX ADMIN — INSULIN LISPRO 2 UNITS: 100 INJECTION, SOLUTION INTRAVENOUS; SUBCUTANEOUS at 11:35

## 2021-01-01 RX ADMIN — ENOXAPARIN SODIUM 40 MG: 100 INJECTION SUBCUTANEOUS at 20:27

## 2021-01-01 RX ADMIN — IPRATROPIUM BROMIDE AND ALBUTEROL SULFATE 3 ML: 2.5; .5 SOLUTION RESPIRATORY (INHALATION) at 01:56

## 2021-01-01 RX ADMIN — CISATRACURIUM BESYLATE 3 MCG/KG/MIN: 200 INJECTION, SOLUTION INTRAVENOUS at 03:05

## 2021-01-01 RX ADMIN — INSULIN LISPRO 7 UNITS: 100 INJECTION, SOLUTION INTRAVENOUS; SUBCUTANEOUS at 12:01

## 2021-01-01 RX ADMIN — ACETAMINOPHEN 975 MG: 650 SUPPOSITORY RECTAL at 12:01

## 2021-01-01 RX ADMIN — SALINE NASAL SPRAY 2 SPRAY: 1.5 SOLUTION NASAL at 11:40

## 2021-01-01 RX ADMIN — PANTOPRAZOLE SODIUM 40 MG: 40 INJECTION, POWDER, FOR SOLUTION INTRAVENOUS at 08:41

## 2021-01-01 RX ADMIN — MIDAZOLAM 16 MG/HR: 5 INJECTION, SOLUTION INTRAMUSCULAR; INTRAVENOUS at 10:44

## 2021-01-01 RX ADMIN — IPRATROPIUM BROMIDE AND ALBUTEROL SULFATE 3 ML: 2.5; .5 SOLUTION RESPIRATORY (INHALATION) at 07:24

## 2021-01-01 RX ADMIN — MINERAL OIL AND PETROLATUM: 150; 830 OINTMENT OPHTHALMIC at 20:28

## 2021-01-01 RX ADMIN — MEROPENEM 500 MG: 500 INJECTION, POWDER, FOR SOLUTION INTRAVENOUS at 21:10

## 2021-01-01 RX ADMIN — PROPOFOL 45 MCG/KG/MIN: 10 INJECTION, EMULSION INTRAVENOUS at 13:23

## 2021-01-01 RX ADMIN — Medication 10 ML: at 20:33

## 2021-01-01 RX ADMIN — INSULIN LISPRO 3 UNITS: 100 INJECTION, SOLUTION INTRAVENOUS; SUBCUTANEOUS at 01:00

## 2021-01-01 RX ADMIN — INSULIN LISPRO 4 UNITS: 100 INJECTION, SOLUTION INTRAVENOUS; SUBCUTANEOUS at 05:57

## 2021-01-01 RX ADMIN — BENZONATATE 200 MG: 100 CAPSULE ORAL at 08:19

## 2021-01-01 RX ADMIN — BENZONATATE 200 MG: 100 CAPSULE ORAL at 21:36

## 2021-01-01 RX ADMIN — FENTANYL CITRATE 150 MCG/HR: 50 INJECTION, SOLUTION INTRAMUSCULAR; INTRAVENOUS at 13:46

## 2021-01-01 RX ADMIN — MIDAZOLAM 8 MG/HR: 5 INJECTION, SOLUTION INTRAMUSCULAR; INTRAVENOUS at 16:10

## 2021-01-01 RX ADMIN — SALINE NASAL SPRAY 2 SPRAY: 1.5 SOLUTION NASAL at 01:00

## 2021-01-01 RX ADMIN — INSULIN LISPRO 3 UNITS: 100 INJECTION, SOLUTION INTRAVENOUS; SUBCUTANEOUS at 11:18

## 2021-01-01 RX ADMIN — PROPOFOL 50 MCG/KG/MIN: 10 INJECTION, EMULSION INTRAVENOUS at 09:40

## 2021-01-01 RX ADMIN — Medication 10 ML: at 06:05

## 2021-01-01 RX ADMIN — MINERAL OIL AND PETROLATUM: 150; 830 OINTMENT OPHTHALMIC at 08:41

## 2021-01-01 RX ADMIN — Medication 50 MG: at 08:22

## 2021-01-01 RX ADMIN — BENZONATATE 200 MG: 100 CAPSULE ORAL at 08:03

## 2021-01-01 RX ADMIN — BENZONATATE 200 MG: 100 CAPSULE ORAL at 08:32

## 2021-01-01 RX ADMIN — CHLORHEXIDINE GLUCONATE 15 ML: 1.2 RINSE ORAL at 08:01

## 2021-01-01 RX ADMIN — DEXAMETHASONE SODIUM PHOSPHATE 6 MG: 10 INJECTION INTRAMUSCULAR; INTRAVENOUS at 20:12

## 2021-01-01 RX ADMIN — FLUTICASONE PROPIONATE 2 SPRAY: 50 SPRAY, METERED NASAL at 08:05

## 2021-01-01 RX ADMIN — MIDAZOLAM 10 MG/HR: 5 INJECTION, SOLUTION INTRAMUSCULAR; INTRAVENOUS at 04:02

## 2021-01-01 RX ADMIN — POTASSIUM CHLORIDE 10 MEQ: 7.46 INJECTION, SOLUTION INTRAVENOUS at 11:18

## 2021-01-01 RX ADMIN — ATORVASTATIN CALCIUM 20 MG: 20 TABLET, FILM COATED ORAL at 08:11

## 2021-01-01 RX ADMIN — SODIUM CHLORIDE 40 MG: 9 INJECTION INTRAMUSCULAR; INTRAVENOUS; SUBCUTANEOUS at 09:00

## 2021-01-01 RX ADMIN — Medication 300 MCG/HR: at 03:08

## 2021-01-01 RX ADMIN — PROPOFOL 50 MCG/KG/MIN: 10 INJECTION, EMULSION INTRAVENOUS at 09:43

## 2021-01-01 RX ADMIN — Medication 1 CAPSULE: at 08:12

## 2021-01-01 RX ADMIN — INSULIN LISPRO 3 UNITS: 100 INJECTION, SOLUTION INTRAVENOUS; SUBCUTANEOUS at 18:15

## 2021-01-01 RX ADMIN — Medication 10 ML: at 17:01

## 2021-01-01 RX ADMIN — FLUTICASONE PROPIONATE 2 SPRAY: 50 SPRAY, METERED NASAL at 08:08

## 2021-01-01 RX ADMIN — GUAIFENESIN 600 MG: 600 TABLET ORAL at 08:03

## 2021-01-01 RX ADMIN — PROPOFOL 45 MCG/KG/MIN: 10 INJECTION, EMULSION INTRAVENOUS at 16:37

## 2021-01-01 RX ADMIN — HYDROXYZINE HYDROCHLORIDE 25 MG: 25 TABLET, FILM COATED ORAL at 14:38

## 2021-01-01 RX ADMIN — IPRATROPIUM BROMIDE AND ALBUTEROL SULFATE 3 ML: 2.5; .5 SOLUTION RESPIRATORY (INHALATION) at 01:10

## 2021-01-01 RX ADMIN — PROPOFOL 50 MCG/KG/MIN: 10 INJECTION, EMULSION INTRAVENOUS at 08:04

## 2021-01-01 RX ADMIN — IPRATROPIUM BROMIDE AND ALBUTEROL SULFATE 3 ML: 2.5; .5 SOLUTION RESPIRATORY (INHALATION) at 14:22

## 2021-01-01 RX ADMIN — ROCURONIUM BROMIDE 65.3 MG: 10 SOLUTION INTRAVENOUS at 14:00

## 2021-01-01 RX ADMIN — HYDROXYZINE HYDROCHLORIDE 25 MG: 25 TABLET, FILM COATED ORAL at 17:44

## 2021-01-01 RX ADMIN — PROPOFOL 50 MCG/KG/MIN: 10 INJECTION, EMULSION INTRAVENOUS at 09:46

## 2021-01-01 RX ADMIN — DOXYCYCLINE 100 MG: 100 INJECTION, POWDER, LYOPHILIZED, FOR SOLUTION INTRAVENOUS at 06:03

## 2021-01-01 RX ADMIN — PROPOFOL 50 MCG/KG/MIN: 10 INJECTION, EMULSION INTRAVENOUS at 05:37

## 2021-01-01 RX ADMIN — Medication 10 ML: at 05:16

## 2021-01-01 RX ADMIN — PHENYLEPHRINE HYDROCHLORIDE 40 MCG/MIN: 10 INJECTION INTRAVENOUS at 16:11

## 2021-01-01 RX ADMIN — MINERAL OIL AND PETROLATUM: 150; 830 OINTMENT OPHTHALMIC at 20:38

## 2021-01-01 RX ADMIN — INSULIN LISPRO 3 UNITS: 100 INJECTION, SOLUTION INTRAVENOUS; SUBCUTANEOUS at 12:08

## 2021-01-01 RX ADMIN — PROPOFOL 50 MCG/KG/MIN: 10 INJECTION, EMULSION INTRAVENOUS at 12:22

## 2021-01-01 RX ADMIN — PROPOFOL 50 MCG/KG/MIN: 10 INJECTION, EMULSION INTRAVENOUS at 12:27

## 2021-01-01 RX ADMIN — GUAIFENESIN 600 MG: 600 TABLET ORAL at 08:30

## 2021-01-01 RX ADMIN — DEXAMETHASONE SODIUM PHOSPHATE 10 MG: 10 INJECTION INTRAMUSCULAR; INTRAVENOUS at 09:37

## 2021-01-01 RX ADMIN — BUMETANIDE 1 MG: 0.25 INJECTION INTRAMUSCULAR; INTRAVENOUS at 05:04

## 2021-01-01 RX ADMIN — IPRATROPIUM BROMIDE AND ALBUTEROL SULFATE 3 ML: 2.5; .5 SOLUTION RESPIRATORY (INHALATION) at 07:19

## 2021-01-01 RX ADMIN — SALINE NASAL SPRAY 2 SPRAY: 1.5 SOLUTION NASAL at 04:24

## 2021-01-01 RX ADMIN — Medication 10 ML: at 13:21

## 2021-01-01 RX ADMIN — IPRATROPIUM BROMIDE AND ALBUTEROL SULFATE 3 ML: 2.5; .5 SOLUTION RESPIRATORY (INHALATION) at 19:55

## 2021-01-01 RX ADMIN — ATORVASTATIN CALCIUM 20 MG: 20 TABLET, FILM COATED ORAL at 08:03

## 2021-01-01 RX ADMIN — CHLORHEXIDINE GLUCONATE 15 ML: 1.2 RINSE ORAL at 21:16

## 2021-01-01 RX ADMIN — MEROPENEM 500 MG: 500 INJECTION, POWDER, FOR SOLUTION INTRAVENOUS at 08:17

## 2021-01-01 RX ADMIN — PROPOFOL 50 MCG/KG/MIN: 10 INJECTION, EMULSION INTRAVENOUS at 09:27

## 2021-01-01 RX ADMIN — IPRATROPIUM BROMIDE AND ALBUTEROL SULFATE 3 ML: 2.5; .5 SOLUTION RESPIRATORY (INHALATION) at 07:38

## 2021-01-01 RX ADMIN — MINERAL OIL AND PETROLATUM: 150; 830 OINTMENT OPHTHALMIC at 20:22

## 2021-01-01 RX ADMIN — VANCOMYCIN HYDROCHLORIDE 1750 MG: 10 INJECTION, POWDER, LYOPHILIZED, FOR SOLUTION INTRAVENOUS at 04:32

## 2021-01-01 RX ADMIN — VASOPRESSIN 0.03 UNITS/MIN: 20 INJECTION INTRAVENOUS at 04:15

## 2021-01-01 RX ADMIN — DOCUSATE SODIUM 50 MG AND SENNOSIDES 8.6 MG 1 TABLET: 8.6; 5 TABLET, FILM COATED ORAL at 09:40

## 2021-01-01 RX ADMIN — CHLORHEXIDINE GLUCONATE 15 ML: 1.2 RINSE ORAL at 21:12

## 2021-01-01 RX ADMIN — IPRATROPIUM BROMIDE AND ALBUTEROL SULFATE 3 ML: 2.5; .5 SOLUTION RESPIRATORY (INHALATION) at 14:53

## 2021-01-01 RX ADMIN — INSULIN LISPRO 3 UNITS: 100 INJECTION, SOLUTION INTRAVENOUS; SUBCUTANEOUS at 23:14

## 2021-01-01 RX ADMIN — MINERAL OIL AND PETROLATUM: 150; 830 OINTMENT OPHTHALMIC at 08:44

## 2021-01-01 RX ADMIN — ENOXAPARIN SODIUM 135 MG: 150 INJECTION SUBCUTANEOUS at 21:38

## 2021-01-01 RX ADMIN — IPRATROPIUM BROMIDE AND ALBUTEROL SULFATE 3 ML: 2.5; .5 SOLUTION RESPIRATORY (INHALATION) at 13:37

## 2021-01-01 RX ADMIN — PROPOFOL 45 MCG/KG/MIN: 10 INJECTION, EMULSION INTRAVENOUS at 19:28

## 2021-01-01 RX ADMIN — SALINE NASAL SPRAY 2 SPRAY: 1.5 SOLUTION NASAL at 04:19

## 2021-01-01 RX ADMIN — CISATRACURIUM BESYLATE 2 MCG/KG/MIN: 10 INJECTION INTRAVENOUS at 11:52

## 2021-01-01 RX ADMIN — BUMETANIDE 0.5 MG: 0.25 INJECTION INTRAMUSCULAR; INTRAVENOUS at 13:17

## 2021-01-01 RX ADMIN — CHLORHEXIDINE GLUCONATE 15 ML: 1.2 RINSE ORAL at 21:05

## 2021-01-01 RX ADMIN — INSULIN LISPRO 3 UNITS: 100 INJECTION, SOLUTION INTRAVENOUS; SUBCUTANEOUS at 12:24

## 2021-01-01 RX ADMIN — PROPOFOL 45 MCG/KG/MIN: 10 INJECTION, EMULSION INTRAVENOUS at 00:57

## 2021-01-01 RX ADMIN — Medication 300 MCG/HR: at 22:35

## 2021-01-01 RX ADMIN — OXYCODONE HYDROCHLORIDE AND ACETAMINOPHEN 500 MG: 500 TABLET ORAL at 08:11

## 2021-01-01 RX ADMIN — VASOPRESSIN 0.03 UNITS/MIN: 20 INJECTION INTRAVENOUS at 22:31

## 2021-01-01 RX ADMIN — PROPOFOL 50 MCG/KG/MIN: 10 INJECTION, EMULSION INTRAVENOUS at 05:06

## 2021-01-01 RX ADMIN — POLYETHYLENE GLYCOL 3350 17 G: 17 POWDER, FOR SOLUTION ORAL at 08:07

## 2021-01-01 RX ADMIN — PROPOFOL 50 MCG/KG/MIN: 10 INJECTION, EMULSION INTRAVENOUS at 15:45

## 2021-01-01 RX ADMIN — PHENYLEPHRINE HYDROCHLORIDE 24 MCG/MIN: 10 INJECTION INTRAVENOUS at 04:05

## 2021-01-01 RX ADMIN — PROPOFOL 50 MCG/KG/MIN: 10 INJECTION, EMULSION INTRAVENOUS at 22:43

## 2021-01-01 RX ADMIN — IPRATROPIUM BROMIDE AND ALBUTEROL SULFATE 3 ML: 2.5; .5 SOLUTION RESPIRATORY (INHALATION) at 00:39

## 2021-01-01 RX ADMIN — INSULIN LISPRO 3 UNITS: 100 INJECTION, SOLUTION INTRAVENOUS; SUBCUTANEOUS at 12:42

## 2021-01-01 RX ADMIN — INSULIN LISPRO 3 UNITS: 100 INJECTION, SOLUTION INTRAVENOUS; SUBCUTANEOUS at 16:21

## 2021-01-01 RX ADMIN — Medication 200 MCG/HR: at 17:45

## 2021-01-01 RX ADMIN — WATER 2 G: 1 INJECTION INTRAMUSCULAR; INTRAVENOUS; SUBCUTANEOUS at 15:00

## 2021-01-01 RX ADMIN — CHLORHEXIDINE GLUCONATE 15 ML: 1.2 RINSE ORAL at 21:58

## 2021-01-01 RX ADMIN — MEROPENEM 500 MG: 500 INJECTION, POWDER, FOR SOLUTION INTRAVENOUS at 15:49

## 2021-01-01 RX ADMIN — POTASSIUM CHLORIDE 10 MEQ: 7.46 INJECTION, SOLUTION INTRAVENOUS at 09:25

## 2021-01-01 RX ADMIN — MINERAL OIL AND PETROLATUM: 150; 830 OINTMENT OPHTHALMIC at 08:07

## 2021-01-01 RX ADMIN — BENZONATATE 200 MG: 100 CAPSULE ORAL at 13:12

## 2021-01-01 RX ADMIN — PROPOFOL 50 MCG/KG/MIN: 10 INJECTION, EMULSION INTRAVENOUS at 20:03

## 2021-01-01 RX ADMIN — Medication 10 ML: at 13:00

## 2021-01-01 RX ADMIN — CHLORHEXIDINE GLUCONATE 15 ML: 1.2 RINSE ORAL at 21:17

## 2021-01-01 RX ADMIN — BENZONATATE 200 MG: 100 CAPSULE ORAL at 14:36

## 2021-01-01 RX ADMIN — INSULIN LISPRO 3 UNITS: 100 INJECTION, SOLUTION INTRAVENOUS; SUBCUTANEOUS at 18:01

## 2021-01-01 RX ADMIN — DOXYCYCLINE 100 MG: 100 INJECTION, POWDER, LYOPHILIZED, FOR SOLUTION INTRAVENOUS at 04:31

## 2021-01-01 RX ADMIN — PROPOFOL 50 MCG/KG/MIN: 10 INJECTION, EMULSION INTRAVENOUS at 02:45

## 2021-01-01 RX ADMIN — GUAIFENESIN 600 MG: 600 TABLET ORAL at 08:12

## 2021-01-01 RX ADMIN — Medication 10 ML: at 23:32

## 2021-01-01 RX ADMIN — CISATRACURIUM BESYLATE 3 MCG/KG/MIN: 200 INJECTION, SOLUTION INTRAVENOUS at 16:42

## 2021-01-01 RX ADMIN — PHENYLEPHRINE HYDROCHLORIDE 40 MCG/MIN: 10 INJECTION INTRAVENOUS at 20:57

## 2021-01-01 RX ADMIN — Medication 10 ML: at 14:55

## 2021-01-01 RX ADMIN — Medication 10 ML: at 21:40

## 2021-01-01 RX ADMIN — CISATRACURIUM BESYLATE 3.5 MCG/KG/MIN: 10 INJECTION INTRAVENOUS at 00:11

## 2021-01-01 RX ADMIN — SALINE NASAL SPRAY 2 SPRAY: 1.5 SOLUTION NASAL at 21:42

## 2021-01-01 RX ADMIN — PHENYLEPHRINE HYDROCHLORIDE 75 MCG/MIN: 10 INJECTION INTRAVENOUS at 14:00

## 2021-01-01 RX ADMIN — INSULIN LISPRO 4 UNITS: 100 INJECTION, SOLUTION INTRAVENOUS; SUBCUTANEOUS at 00:22

## 2021-01-01 RX ADMIN — METOLAZONE 5 MG: 2.5 TABLET ORAL at 08:27

## 2021-01-01 RX ADMIN — Medication 5 MG: at 20:22

## 2021-01-01 RX ADMIN — PROPOFOL 50 MCG/KG/MIN: 10 INJECTION, EMULSION INTRAVENOUS at 18:41

## 2021-01-01 RX ADMIN — INSULIN LISPRO 3 UNITS: 100 INJECTION, SOLUTION INTRAVENOUS; SUBCUTANEOUS at 06:26

## 2021-01-01 RX ADMIN — IPRATROPIUM BROMIDE AND ALBUTEROL SULFATE 3 ML: 2.5; .5 SOLUTION RESPIRATORY (INHALATION) at 02:49

## 2021-01-01 RX ADMIN — MEROPENEM 500 MG: 500 INJECTION, POWDER, FOR SOLUTION INTRAVENOUS at 09:27

## 2021-01-01 RX ADMIN — HYDROXYZINE HYDROCHLORIDE 25 MG: 25 TABLET, FILM COATED ORAL at 14:21

## 2021-01-01 RX ADMIN — ONDANSETRON 4 MG: 2 INJECTION INTRAMUSCULAR; INTRAVENOUS at 09:12

## 2021-01-01 RX ADMIN — PHENYLEPHRINE HYDROCHLORIDE 40 MCG/MIN: 10 INJECTION INTRAVENOUS at 06:22

## 2021-01-01 RX ADMIN — Medication 300 MCG/HR: at 18:54

## 2021-01-01 RX ADMIN — SALINE NASAL SPRAY 2 SPRAY: 1.5 SOLUTION NASAL at 09:38

## 2021-01-01 RX ADMIN — HYDROCODONE POLISTIREX AND CHLORPHENIRAMINE POLISTIREX 5 ML: 10; 8 SUSPENSION, EXTENDED RELEASE ORAL at 08:30

## 2021-01-01 RX ADMIN — INSULIN LISPRO 3 UNITS: 100 INJECTION, SOLUTION INTRAVENOUS; SUBCUTANEOUS at 06:12

## 2021-01-01 RX ADMIN — Medication 10 ML: at 05:35

## 2021-01-01 RX ADMIN — OXYCODONE HYDROCHLORIDE AND ACETAMINOPHEN 500 MG: 500 TABLET ORAL at 08:01

## 2021-01-01 RX ADMIN — Medication 300 MCG/HR: at 04:12

## 2021-01-01 RX ADMIN — INSULIN LISPRO 3 UNITS: 100 INJECTION, SOLUTION INTRAVENOUS; SUBCUTANEOUS at 12:04

## 2021-01-01 RX ADMIN — BUMETANIDE 0.5 MG: 0.25 INJECTION INTRAMUSCULAR; INTRAVENOUS at 17:54

## 2021-01-01 RX ADMIN — PROPOFOL 50 MCG/KG/MIN: 10 INJECTION, EMULSION INTRAVENOUS at 14:49

## 2021-01-01 RX ADMIN — WATER 2 G: 1 INJECTION INTRAMUSCULAR; INTRAVENOUS; SUBCUTANEOUS at 17:13

## 2021-01-01 RX ADMIN — SALINE NASAL SPRAY 2 SPRAY: 1.5 SOLUTION NASAL at 20:28

## 2021-01-01 RX ADMIN — CISATRACURIUM BESYLATE 3.5 MCG/KG/MIN: 10 INJECTION INTRAVENOUS at 15:10

## 2021-01-01 RX ADMIN — PHENYLEPHRINE HYDROCHLORIDE 65 MCG/MIN: 10 INJECTION INTRAVENOUS at 08:14

## 2021-01-01 RX ADMIN — SALINE NASAL SPRAY 2 SPRAY: 1.5 SOLUTION NASAL at 17:05

## 2021-01-01 RX ADMIN — WATER 2 G: 1 INJECTION INTRAMUSCULAR; INTRAVENOUS; SUBCUTANEOUS at 06:09

## 2021-01-01 RX ADMIN — PROPOFOL 50 MCG/KG/MIN: 10 INJECTION, EMULSION INTRAVENOUS at 09:09

## 2021-01-01 RX ADMIN — IPRATROPIUM BROMIDE AND ALBUTEROL SULFATE 3 ML: 2.5; .5 SOLUTION RESPIRATORY (INHALATION) at 00:19

## 2021-01-01 RX ADMIN — PROPOFOL 50 MCG/KG/MIN: 10 INJECTION, EMULSION INTRAVENOUS at 18:02

## 2021-01-01 RX ADMIN — SALINE NASAL SPRAY 2 SPRAY: 1.5 SOLUTION NASAL at 12:26

## 2021-01-01 RX ADMIN — HYDROCODONE POLISTIREX AND CHLORPHENIRAMINE POLISTIREX 5 ML: 10; 8 SUSPENSION, EXTENDED RELEASE ORAL at 08:05

## 2021-01-01 RX ADMIN — BENZONATATE 200 MG: 100 CAPSULE ORAL at 21:41

## 2021-01-01 RX ADMIN — Medication 30 UNITS: at 08:07

## 2021-01-01 RX ADMIN — IPRATROPIUM BROMIDE AND ALBUTEROL 2 PUFF: 20; 100 SPRAY, METERED RESPIRATORY (INHALATION) at 17:40

## 2021-01-01 RX ADMIN — INSULIN LISPRO 3 UNITS: 100 INJECTION, SOLUTION INTRAVENOUS; SUBCUTANEOUS at 17:49

## 2021-01-01 RX ADMIN — MINERAL OIL AND PETROLATUM: 150; 830 OINTMENT OPHTHALMIC at 21:16

## 2021-01-01 RX ADMIN — IPRATROPIUM BROMIDE AND ALBUTEROL SULFATE 3 ML: 2.5; .5 SOLUTION RESPIRATORY (INHALATION) at 08:05

## 2021-01-01 RX ADMIN — IPRATROPIUM BROMIDE AND ALBUTEROL SULFATE 3 ML: 2.5; .5 SOLUTION RESPIRATORY (INHALATION) at 14:13

## 2021-01-01 RX ADMIN — BENZONATATE 200 MG: 100 CAPSULE ORAL at 13:17

## 2021-01-01 RX ADMIN — Medication 300 MCG/HR: at 12:32

## 2021-01-01 RX ADMIN — PROPOFOL 50 MCG/KG/MIN: 10 INJECTION, EMULSION INTRAVENOUS at 06:21

## 2021-01-01 RX ADMIN — CHLORHEXIDINE GLUCONATE 15 ML: 1.2 RINSE ORAL at 08:34

## 2021-01-01 RX ADMIN — HYDROCODONE POLISTIREX AND CHLORPHENIRAMINE POLISTIREX 5 ML: 10; 8 SUSPENSION, EXTENDED RELEASE ORAL at 20:37

## 2021-01-01 RX ADMIN — MIDAZOLAM 16 MG/HR: 5 INJECTION, SOLUTION INTRAMUSCULAR; INTRAVENOUS at 09:52

## 2021-01-01 RX ADMIN — BENZONATATE 200 MG: 100 CAPSULE ORAL at 13:44

## 2021-01-01 RX ADMIN — IPRATROPIUM BROMIDE AND ALBUTEROL SULFATE 3 ML: 2.5; .5 SOLUTION RESPIRATORY (INHALATION) at 01:27

## 2021-01-01 RX ADMIN — INSULIN LISPRO 3 UNITS: 100 INJECTION, SOLUTION INTRAVENOUS; SUBCUTANEOUS at 12:23

## 2021-01-01 RX ADMIN — EPOPROSTENOL 30 NG/KG/MIN: 1.5 INJECTION, POWDER, LYOPHILIZED, FOR SOLUTION INTRAVENOUS at 20:08

## 2021-01-01 RX ADMIN — METOCLOPRAMIDE 5 MG: 5 INJECTION, SOLUTION INTRAMUSCULAR; INTRAVENOUS at 09:46

## 2021-01-01 RX ADMIN — Medication 300 MCG/HR: at 01:21

## 2021-01-01 RX ADMIN — INSULIN LISPRO 3 UNITS: 100 INJECTION, SOLUTION INTRAVENOUS; SUBCUTANEOUS at 11:53

## 2021-01-01 RX ADMIN — PROPOFOL 50 MCG/KG/MIN: 10 INJECTION, EMULSION INTRAVENOUS at 06:03

## 2021-01-01 RX ADMIN — DOCUSATE SODIUM 50 MG AND SENNOSIDES 8.6 MG 1 TABLET: 8.6; 5 TABLET, FILM COATED ORAL at 18:42

## 2021-01-01 RX ADMIN — SALINE NASAL SPRAY 2 SPRAY: 1.5 SOLUTION NASAL at 04:00

## 2021-01-01 RX ADMIN — BENZONATATE 200 MG: 100 CAPSULE ORAL at 20:22

## 2021-01-01 RX ADMIN — SODIUM CHLORIDE 100 MG: 900 INJECTION, SOLUTION INTRAVENOUS at 16:40

## 2021-01-01 RX ADMIN — BUMETANIDE 2 MG: 0.25 INJECTION INTRAMUSCULAR; INTRAVENOUS at 22:54

## 2021-01-01 RX ADMIN — PROPOFOL 50 MCG/KG/MIN: 10 INJECTION, EMULSION INTRAVENOUS at 04:00

## 2021-01-01 RX ADMIN — DOXYCYCLINE 100 MG: 100 INJECTION, POWDER, LYOPHILIZED, FOR SOLUTION INTRAVENOUS at 17:02

## 2021-01-01 RX ADMIN — Medication 10 ML: at 15:04

## 2021-01-01 RX ADMIN — POTASSIUM PHOSPHATE, MONOBASIC POTASSIUM PHOSPHATE, DIBASIC: 224; 236 INJECTION, SOLUTION, CONCENTRATE INTRAVENOUS at 17:05

## 2021-01-01 RX ADMIN — DOCUSATE SODIUM 50 MG AND SENNOSIDES 8.6 MG 1 TABLET: 8.6; 5 TABLET, FILM COATED ORAL at 08:02

## 2021-01-01 RX ADMIN — PANTOPRAZOLE SODIUM 40 MG: 40 INJECTION, POWDER, FOR SOLUTION INTRAVENOUS at 08:03

## 2021-01-01 RX ADMIN — MEROPENEM 500 MG: 500 INJECTION, POWDER, FOR SOLUTION INTRAVENOUS at 20:12

## 2021-01-01 RX ADMIN — IPRATROPIUM BROMIDE AND ALBUTEROL SULFATE 3 ML: 2.5; .5 SOLUTION RESPIRATORY (INHALATION) at 03:29

## 2021-01-01 RX ADMIN — IPRATROPIUM BROMIDE AND ALBUTEROL SULFATE 3 ML: 2.5; .5 SOLUTION RESPIRATORY (INHALATION) at 14:43

## 2021-01-01 RX ADMIN — HYDROXYZINE HYDROCHLORIDE 25 MG: 25 TABLET, FILM COATED ORAL at 00:50

## 2021-01-01 RX ADMIN — PROPOFOL 45 MCG/KG/MIN: 10 INJECTION, EMULSION INTRAVENOUS at 12:40

## 2021-01-01 RX ADMIN — Medication 300 MCG/HR: at 17:27

## 2021-01-01 RX ADMIN — INSULIN LISPRO 3 UNITS: 100 INJECTION, SOLUTION INTRAVENOUS; SUBCUTANEOUS at 17:23

## 2021-01-01 RX ADMIN — ENOXAPARIN SODIUM 40 MG: 100 INJECTION SUBCUTANEOUS at 08:02

## 2021-01-01 RX ADMIN — Medication 10 ML: at 21:44

## 2021-01-01 RX ADMIN — HYDROCODONE POLISTIREX AND CHLORPHENIRAMINE POLISTIREX 5 ML: 10; 8 SUSPENSION, EXTENDED RELEASE ORAL at 17:25

## 2021-01-01 RX ADMIN — OXYCODONE HYDROCHLORIDE AND ACETAMINOPHEN 500 MG: 500 TABLET ORAL at 08:28

## 2021-01-01 RX ADMIN — PROPOFOL 50 MCG/KG/MIN: 10 INJECTION, EMULSION INTRAVENOUS at 04:30

## 2021-01-01 RX ADMIN — DEXAMETHASONE SODIUM PHOSPHATE 10 MG: 10 INJECTION INTRAMUSCULAR; INTRAVENOUS at 08:01

## 2021-01-01 RX ADMIN — SALINE NASAL SPRAY 2 SPRAY: 1.5 SOLUTION NASAL at 21:44

## 2021-01-01 RX ADMIN — Medication 10 ML: at 13:08

## 2021-01-01 RX ADMIN — INSULIN LISPRO 7 UNITS: 100 INJECTION, SOLUTION INTRAVENOUS; SUBCUTANEOUS at 01:46

## 2021-01-01 RX ADMIN — ATORVASTATIN CALCIUM 20 MG: 20 TABLET, FILM COATED ORAL at 08:30

## 2021-01-01 RX ADMIN — PHENYLEPHRINE HYDROCHLORIDE 30 MCG/MIN: 10 INJECTION INTRAVENOUS at 12:41

## 2021-01-01 RX ADMIN — DEXAMETHASONE SODIUM PHOSPHATE 10 MG: 10 INJECTION INTRAMUSCULAR; INTRAVENOUS at 08:03

## 2021-01-01 RX ADMIN — IPRATROPIUM BROMIDE AND ALBUTEROL SULFATE 3 ML: 2.5; .5 SOLUTION RESPIRATORY (INHALATION) at 08:51

## 2021-01-01 RX ADMIN — POLYETHYLENE GLYCOL 3350 17 G: 17 POWDER, FOR SOLUTION ORAL at 08:12

## 2021-01-01 RX ADMIN — ATORVASTATIN CALCIUM 20 MG: 20 TABLET, FILM COATED ORAL at 08:43

## 2021-01-01 RX ADMIN — CISATRACURIUM BESYLATE 3 MCG/KG/MIN: 10 INJECTION INTRAVENOUS at 22:52

## 2021-01-01 RX ADMIN — PROPOFOL 50 MCG/KG/MIN: 10 INJECTION, EMULSION INTRAVENOUS at 03:00

## 2021-01-01 RX ADMIN — MIDAZOLAM 13 MG/HR: 5 INJECTION, SOLUTION INTRAMUSCULAR; INTRAVENOUS at 00:55

## 2021-01-01 RX ADMIN — Medication 300 MCG/HR: at 10:44

## 2021-01-01 RX ADMIN — PROPOFOL 50 MCG/KG/MIN: 10 INJECTION, EMULSION INTRAVENOUS at 15:10

## 2021-01-01 RX ADMIN — PHENOL 2 SPRAY: 1.5 LIQUID ORAL at 02:13

## 2021-01-01 RX ADMIN — HYDROCODONE POLISTIREX AND CHLORPHENIRAMINE POLISTIREX 5 ML: 10; 8 SUSPENSION, EXTENDED RELEASE ORAL at 21:45

## 2021-01-01 RX ADMIN — DEXAMETHASONE SODIUM PHOSPHATE 10 MG: 10 INJECTION INTRAMUSCULAR; INTRAVENOUS at 17:24

## 2021-01-01 RX ADMIN — CHLORHEXIDINE GLUCONATE 15 ML: 1.2 RINSE ORAL at 08:44

## 2021-01-01 RX ADMIN — Medication 10 ML: at 20:39

## 2021-01-01 RX ADMIN — IPRATROPIUM BROMIDE AND ALBUTEROL SULFATE 3 ML: 2.5; .5 SOLUTION RESPIRATORY (INHALATION) at 13:48

## 2021-01-01 RX ADMIN — INSULIN GLARGINE 34 UNITS: 100 INJECTION, SOLUTION SUBCUTANEOUS at 08:44

## 2021-01-01 RX ADMIN — PROPOFOL 50 MCG/KG/MIN: 10 INJECTION, EMULSION INTRAVENOUS at 20:58

## 2021-01-01 RX ADMIN — CHLORHEXIDINE GLUCONATE 15 ML: 1.2 RINSE ORAL at 08:00

## 2021-01-01 RX ADMIN — SALINE NASAL SPRAY 2 SPRAY: 1.5 SOLUTION NASAL at 00:23

## 2021-01-01 RX ADMIN — DEXAMETHASONE SODIUM PHOSPHATE 10 MG: 10 INJECTION INTRAMUSCULAR; INTRAVENOUS at 08:16

## 2021-01-01 RX ADMIN — MINERAL OIL AND PETROLATUM: 150; 830 OINTMENT OPHTHALMIC at 20:14

## 2021-01-01 RX ADMIN — ENOXAPARIN SODIUM 135 MG: 150 INJECTION SUBCUTANEOUS at 21:13

## 2021-01-01 RX ADMIN — IPRATROPIUM BROMIDE AND ALBUTEROL SULFATE 3 ML: 2.5; .5 SOLUTION RESPIRATORY (INHALATION) at 15:36

## 2021-01-01 RX ADMIN — MEROPENEM 500 MG: 500 INJECTION, POWDER, FOR SOLUTION INTRAVENOUS at 15:33

## 2021-01-01 RX ADMIN — LANSOPRAZOLE 30 MG: KIT at 08:11

## 2021-01-01 RX ADMIN — Medication 10 ML: at 20:23

## 2021-01-01 RX ADMIN — IPRATROPIUM BROMIDE AND ALBUTEROL SULFATE 3 ML: 2.5; .5 SOLUTION RESPIRATORY (INHALATION) at 21:32

## 2021-01-01 RX ADMIN — DEXMEDETOMIDINE HYDROCHLORIDE 0.4 MCG/KG/HR: 400 INJECTION INTRAVENOUS at 14:21

## 2021-01-01 RX ADMIN — Medication 1 CAPSULE: at 08:05

## 2021-01-01 RX ADMIN — EPOPROSTENOL 30 NG/KG/MIN: 1.5 INJECTION, POWDER, LYOPHILIZED, FOR SOLUTION INTRAVENOUS at 09:12

## 2021-01-01 RX ADMIN — BENZONATATE 200 MG: 100 CAPSULE ORAL at 08:12

## 2021-01-01 RX ADMIN — SODIUM CHLORIDE 40 MG: 9 INJECTION INTRAMUSCULAR; INTRAVENOUS; SUBCUTANEOUS at 08:00

## 2021-01-01 RX ADMIN — POTASSIUM PHOSPHATE, MONOBASIC POTASSIUM PHOSPHATE, DIBASIC: 224; 236 INJECTION, SOLUTION, CONCENTRATE INTRAVENOUS at 17:37

## 2021-01-01 RX ADMIN — LORAZEPAM 1 MG: 2 INJECTION INTRAMUSCULAR; INTRAVENOUS at 23:11

## 2021-01-01 RX ADMIN — PANTOPRAZOLE SODIUM 40 MG: 40 INJECTION, POWDER, FOR SOLUTION INTRAVENOUS at 08:07

## 2021-01-01 RX ADMIN — ATORVASTATIN CALCIUM 20 MG: 20 TABLET, FILM COATED ORAL at 08:02

## 2021-01-01 RX ADMIN — IPRATROPIUM BROMIDE AND ALBUTEROL SULFATE 3 ML: 2.5; .5 SOLUTION RESPIRATORY (INHALATION) at 15:05

## 2021-01-01 RX ADMIN — INSULIN LISPRO 3 UNITS: 100 INJECTION, SOLUTION INTRAVENOUS; SUBCUTANEOUS at 12:18

## 2021-01-01 RX ADMIN — VANCOMYCIN HYDROCHLORIDE 1750 MG: 10 INJECTION, POWDER, LYOPHILIZED, FOR SOLUTION INTRAVENOUS at 12:12

## 2021-01-01 RX ADMIN — ACETAMINOPHEN 975 MG: 650 SUPPOSITORY RECTAL at 23:17

## 2021-01-01 RX ADMIN — IPRATROPIUM BROMIDE AND ALBUTEROL 2 PUFF: 20; 100 SPRAY, METERED RESPIRATORY (INHALATION) at 19:57

## 2021-01-01 RX ADMIN — MINERAL OIL AND PETROLATUM: 150; 830 OINTMENT OPHTHALMIC at 21:12

## 2021-01-01 RX ADMIN — DOXYCYCLINE 100 MG: 100 INJECTION, POWDER, LYOPHILIZED, FOR SOLUTION INTRAVENOUS at 16:53

## 2021-01-01 RX ADMIN — ENOXAPARIN SODIUM 135 MG: 150 INJECTION SUBCUTANEOUS at 21:43

## 2021-01-01 RX ADMIN — Medication 10 ML: at 06:06

## 2021-01-01 RX ADMIN — POLYETHYLENE GLYCOL 3350 17 G: 17 POWDER, FOR SOLUTION ORAL at 08:01

## 2021-01-01 RX ADMIN — ENOXAPARIN SODIUM 40 MG: 100 INJECTION SUBCUTANEOUS at 20:30

## 2021-01-01 RX ADMIN — INSULIN LISPRO 3 UNITS: 100 INJECTION, SOLUTION INTRAVENOUS; SUBCUTANEOUS at 23:47

## 2021-01-01 RX ADMIN — ACETAMINOPHEN 1000 MG: 500 TABLET ORAL at 17:54

## 2021-01-01 RX ADMIN — PROPOFOL 50 MCG/KG/MIN: 10 INJECTION, EMULSION INTRAVENOUS at 01:27

## 2021-01-01 RX ADMIN — ENOXAPARIN SODIUM 135 MG: 150 INJECTION SUBCUTANEOUS at 08:39

## 2021-01-01 RX ADMIN — ENOXAPARIN SODIUM 135 MG: 150 INJECTION SUBCUTANEOUS at 08:43

## 2021-01-01 RX ADMIN — BUMETANIDE 1 MG: 0.25 INJECTION INTRAMUSCULAR; INTRAVENOUS at 05:25

## 2021-01-01 RX ADMIN — Medication 10 ML: at 05:58

## 2021-01-01 RX ADMIN — IPRATROPIUM BROMIDE AND ALBUTEROL SULFATE 3 ML: 2.5; .5 SOLUTION RESPIRATORY (INHALATION) at 09:51

## 2021-01-01 RX ADMIN — MIDAZOLAM 8 MG/HR: 5 INJECTION INTRAMUSCULAR; INTRAVENOUS at 06:03

## 2021-01-01 RX ADMIN — POTASSIUM CHLORIDE 10 MEQ: 7.46 INJECTION, SOLUTION INTRAVENOUS at 06:27

## 2021-01-01 RX ADMIN — Medication 300 MCG/HR: at 17:30

## 2021-01-01 RX ADMIN — CISATRACURIUM BESYLATE 4 MCG/KG/MIN: 10 INJECTION INTRAVENOUS at 04:50

## 2021-01-01 RX ADMIN — CISATRACURIUM BESYLATE 3 MCG/KG/MIN: 10 INJECTION INTRAVENOUS at 20:20

## 2021-01-01 RX ADMIN — INSULIN LISPRO 4 UNITS: 100 INJECTION, SOLUTION INTRAVENOUS; SUBCUTANEOUS at 11:19

## 2021-01-01 RX ADMIN — INSULIN LISPRO 3 UNITS: 100 INJECTION, SOLUTION INTRAVENOUS; SUBCUTANEOUS at 11:46

## 2021-01-01 RX ADMIN — Medication 200 MCG/HR: at 03:08

## 2021-01-01 RX ADMIN — IPRATROPIUM BROMIDE AND ALBUTEROL SULFATE 3 ML: 2.5; .5 SOLUTION RESPIRATORY (INHALATION) at 20:07

## 2021-01-01 RX ADMIN — BUMETANIDE 0.5 MG: 0.25 INJECTION INTRAMUSCULAR; INTRAVENOUS at 08:39

## 2021-01-01 RX ADMIN — PROPOFOL 45 MCG/KG/MIN: 10 INJECTION, EMULSION INTRAVENOUS at 18:19

## 2021-01-01 RX ADMIN — OXYCODONE HYDROCHLORIDE AND ACETAMINOPHEN 500 MG: 500 TABLET ORAL at 17:12

## 2021-01-01 RX ADMIN — GLYCERIN 1 SUPPOSITORY: 2 SUPPOSITORY RECTAL at 11:10

## 2021-01-01 RX ADMIN — MIDAZOLAM 8 MG/HR: 5 INJECTION, SOLUTION INTRAMUSCULAR; INTRAVENOUS at 16:31

## 2021-01-01 RX ADMIN — WATER 2 G: 1 INJECTION INTRAMUSCULAR; INTRAVENOUS; SUBCUTANEOUS at 06:00

## 2021-01-01 RX ADMIN — PHENYLEPHRINE HYDROCHLORIDE 75 MCG/MIN: 10 INJECTION INTRAVENOUS at 19:40

## 2021-01-01 RX ADMIN — DEXAMETHASONE SODIUM PHOSPHATE 10 MG: 10 INJECTION INTRAMUSCULAR; INTRAVENOUS at 08:43

## 2021-01-01 RX ADMIN — Medication 200 MCG/HR: at 15:52

## 2021-01-01 RX ADMIN — IPRATROPIUM BROMIDE AND ALBUTEROL SULFATE 3 ML: 2.5; .5 SOLUTION RESPIRATORY (INHALATION) at 19:32

## 2021-01-01 RX ADMIN — BENZONATATE 200 MG: 100 CAPSULE ORAL at 13:40

## 2021-01-01 RX ADMIN — DEXAMETHASONE SODIUM PHOSPHATE 6 MG: 10 INJECTION INTRAMUSCULAR; INTRAVENOUS at 20:15

## 2021-01-01 RX ADMIN — IPRATROPIUM BROMIDE AND ALBUTEROL SULFATE 3 ML: 2.5; .5 SOLUTION RESPIRATORY (INHALATION) at 11:35

## 2021-01-01 RX ADMIN — PROPOFOL 50 MCG/KG/MIN: 10 INJECTION, EMULSION INTRAVENOUS at 07:05

## 2021-01-01 RX ADMIN — PROPOFOL 50 MCG/KG/MIN: 10 INJECTION, EMULSION INTRAVENOUS at 14:05

## 2021-01-01 RX ADMIN — PROPOFOL 50 MCG/KG/MIN: 10 INJECTION, EMULSION INTRAVENOUS at 01:35

## 2021-01-01 RX ADMIN — LACTULOSE 30 ML: 20 SOLUTION ORAL at 12:00

## 2021-01-01 RX ADMIN — IPRATROPIUM BROMIDE AND ALBUTEROL SULFATE 3 ML: 2.5; .5 SOLUTION RESPIRATORY (INHALATION) at 14:47

## 2021-01-01 RX ADMIN — PROPOFOL 50 MCG/KG/MIN: 10 INJECTION, EMULSION INTRAVENOUS at 08:47

## 2021-01-01 RX ADMIN — SALINE NASAL SPRAY 2 SPRAY: 1.5 SOLUTION NASAL at 03:54

## 2021-01-01 RX ADMIN — SALINE NASAL SPRAY 2 SPRAY: 1.5 SOLUTION NASAL at 08:08

## 2021-01-01 RX ADMIN — BENZONATATE 200 MG: 100 CAPSULE ORAL at 08:05

## 2021-01-01 RX ADMIN — ENOXAPARIN SODIUM 40 MG: 100 INJECTION SUBCUTANEOUS at 09:11

## 2021-01-01 RX ADMIN — DOCUSATE SODIUM 50 MG AND SENNOSIDES 8.6 MG 1 TABLET: 8.6; 5 TABLET, FILM COATED ORAL at 17:40

## 2021-01-01 RX ADMIN — Medication 5 MG: at 22:00

## 2021-01-01 RX ADMIN — CISATRACURIUM BESYLATE 3 MCG/KG/MIN: 10 INJECTION INTRAVENOUS at 07:49

## 2021-01-01 RX ADMIN — ATORVASTATIN CALCIUM 20 MG: 20 TABLET, FILM COATED ORAL at 17:01

## 2021-01-01 RX ADMIN — GUAIFENESIN 600 MG: 600 TABLET ORAL at 21:13

## 2021-01-01 RX ADMIN — OXYCODONE HYDROCHLORIDE AND ACETAMINOPHEN 500 MG: 500 TABLET ORAL at 17:16

## 2021-01-01 RX ADMIN — BUMETANIDE 1 MG: 0.25 INJECTION INTRAMUSCULAR; INTRAVENOUS at 05:32

## 2021-01-01 RX ADMIN — IPRATROPIUM BROMIDE AND ALBUTEROL SULFATE 3 ML: 2.5; .5 SOLUTION RESPIRATORY (INHALATION) at 20:19

## 2021-01-01 RX ADMIN — CHLORHEXIDINE GLUCONATE 15 ML: 1.2 RINSE ORAL at 08:42

## 2021-01-01 RX ADMIN — MIDAZOLAM 12 MG/HR: 5 INJECTION, SOLUTION INTRAMUSCULAR; INTRAVENOUS at 07:19

## 2021-01-01 RX ADMIN — CISATRACURIUM BESYLATE 3 MCG/KG/MIN: 10 INJECTION INTRAVENOUS at 13:18

## 2021-01-01 RX ADMIN — DOCUSATE SODIUM 50 MG AND SENNOSIDES 8.6 MG 1 TABLET: 8.6; 5 TABLET, FILM COATED ORAL at 08:28

## 2021-01-01 RX ADMIN — FENTANYL CITRATE 150 MCG/HR: 50 INJECTION, SOLUTION INTRAMUSCULAR; INTRAVENOUS at 17:53

## 2021-01-01 RX ADMIN — HYDROCODONE POLISTIREX AND CHLORPHENIRAMINE POLISTIREX 5 ML: 10; 8 SUSPENSION, EXTENDED RELEASE ORAL at 21:43

## 2021-01-01 RX ADMIN — CHLORHEXIDINE GLUCONATE 15 ML: 1.2 RINSE ORAL at 09:02

## 2021-01-01 RX ADMIN — PHENYLEPHRINE HYDROCHLORIDE 60 MCG/MIN: 10 INJECTION INTRAVENOUS at 20:24

## 2021-01-01 RX ADMIN — Medication 1 CAPSULE: at 08:31

## 2021-01-01 RX ADMIN — CHLORHEXIDINE GLUCONATE 15 ML: 1.2 RINSE ORAL at 20:24

## 2021-01-01 RX ADMIN — PROPOFOL 50 MCG/KG/MIN: 10 INJECTION, EMULSION INTRAVENOUS at 03:47

## 2021-01-01 RX ADMIN — MIDAZOLAM 13 MG/HR: 5 INJECTION, SOLUTION INTRAMUSCULAR; INTRAVENOUS at 05:17

## 2021-01-01 RX ADMIN — GUAIFENESIN 600 MG: 600 TABLET ORAL at 20:22

## 2021-01-01 RX ADMIN — OXYCODONE HYDROCHLORIDE AND ACETAMINOPHEN 500 MG: 500 TABLET ORAL at 08:32

## 2021-01-01 RX ADMIN — IPRATROPIUM BROMIDE AND ALBUTEROL SULFATE 3 ML: 2.5; .5 SOLUTION RESPIRATORY (INHALATION) at 14:49

## 2021-01-01 RX ADMIN — INSULIN LISPRO 3 UNITS: 100 INJECTION, SOLUTION INTRAVENOUS; SUBCUTANEOUS at 05:46

## 2021-01-01 RX ADMIN — MEROPENEM 500 MG: 500 INJECTION, POWDER, FOR SOLUTION INTRAVENOUS at 08:27

## 2021-01-01 RX ADMIN — Medication 300 MCG/HR: at 14:28

## 2021-01-01 RX ADMIN — SALINE NASAL SPRAY 2 SPRAY: 1.5 SOLUTION NASAL at 03:29

## 2021-01-01 RX ADMIN — OXYCODONE HYDROCHLORIDE AND ACETAMINOPHEN 500 MG: 500 TABLET ORAL at 17:08

## 2021-01-01 RX ADMIN — BENZONATATE 200 MG: 100 CAPSULE ORAL at 21:16

## 2021-01-01 RX ADMIN — CISATRACURIUM BESYLATE 3 MCG/KG/MIN: 10 INJECTION INTRAVENOUS at 15:54

## 2021-01-01 RX ADMIN — BUMETANIDE 0.5 MG: 0.25 INJECTION INTRAMUSCULAR; INTRAVENOUS at 21:06

## 2021-01-01 RX ADMIN — POTASSIUM CHLORIDE 10 MEQ: 7.46 INJECTION, SOLUTION INTRAVENOUS at 11:24

## 2021-01-01 RX ADMIN — DOCUSATE SODIUM 50 MG AND SENNOSIDES 8.6 MG 1 TABLET: 8.6; 5 TABLET, FILM COATED ORAL at 17:18

## 2021-01-01 RX ADMIN — ATORVASTATIN CALCIUM 20 MG: 20 TABLET, FILM COATED ORAL at 08:48

## 2021-01-01 RX ADMIN — PROPOFOL 50 MCG/KG/MIN: 10 INJECTION, EMULSION INTRAVENOUS at 23:15

## 2021-01-01 RX ADMIN — PROPOFOL 50 MCG/KG/MIN: 10 INJECTION, EMULSION INTRAVENOUS at 10:37

## 2021-01-01 RX ADMIN — MAGNESIUM SULFATE HEPTAHYDRATE 1 G: 1 INJECTION, SOLUTION INTRAVENOUS at 13:40

## 2021-01-01 RX ADMIN — PANTOPRAZOLE SODIUM 40 MG: 40 INJECTION, POWDER, FOR SOLUTION INTRAVENOUS at 08:38

## 2021-01-01 RX ADMIN — INSULIN LISPRO 4 UNITS: 100 INJECTION, SOLUTION INTRAVENOUS; SUBCUTANEOUS at 11:36

## 2021-01-01 RX ADMIN — PROPOFOL 45 MCG/KG/MIN: 10 INJECTION, EMULSION INTRAVENOUS at 03:34

## 2021-01-01 RX ADMIN — DEXAMETHASONE SODIUM PHOSPHATE 6 MG: 10 INJECTION INTRAMUSCULAR; INTRAVENOUS at 20:37

## 2021-01-01 RX ADMIN — OXYCODONE HYDROCHLORIDE AND ACETAMINOPHEN 500 MG: 500 TABLET ORAL at 08:07

## 2021-01-01 RX ADMIN — IPRATROPIUM BROMIDE AND ALBUTEROL SULFATE 3 ML: 2.5; .5 SOLUTION RESPIRATORY (INHALATION) at 02:59

## 2021-01-01 RX ADMIN — BUMETANIDE 1 MG: 0.25 INJECTION INTRAMUSCULAR; INTRAVENOUS at 13:57

## 2021-01-01 RX ADMIN — SODIUM CHLORIDE 1000 ML: 9 INJECTION, SOLUTION INTRAVENOUS at 15:28

## 2021-01-01 RX ADMIN — Medication 300 MCG/HR: at 19:29

## 2021-01-01 RX ADMIN — MEROPENEM 500 MG: 500 INJECTION, POWDER, FOR SOLUTION INTRAVENOUS at 02:05

## 2021-01-01 RX ADMIN — PROPOFOL 50 MCG/KG/MIN: 10 INJECTION, EMULSION INTRAVENOUS at 05:50

## 2021-01-01 RX ADMIN — Medication 300 MCG/HR: at 11:45

## 2021-01-01 RX ADMIN — PROPOFOL 50 MCG/KG/MIN: 10 INJECTION, EMULSION INTRAVENOUS at 14:13

## 2021-01-01 RX ADMIN — INSULIN LISPRO 3 UNITS: 100 INJECTION, SOLUTION INTRAVENOUS; SUBCUTANEOUS at 18:07

## 2021-01-01 RX ADMIN — INSULIN LISPRO 3 UNITS: 100 INJECTION, SOLUTION INTRAVENOUS; SUBCUTANEOUS at 16:15

## 2021-01-01 RX ADMIN — ENOXAPARIN SODIUM 40 MG: 100 INJECTION SUBCUTANEOUS at 20:12

## 2021-01-01 RX ADMIN — CHLORHEXIDINE GLUCONATE 15 ML: 1.2 RINSE ORAL at 20:26

## 2021-01-01 RX ADMIN — Medication 300 MCG/HR: at 12:25

## 2021-01-01 RX ADMIN — SODIUM CHLORIDE 100 MG: 900 INJECTION, SOLUTION INTRAVENOUS at 16:00

## 2021-01-01 RX ADMIN — PANTOPRAZOLE SODIUM 40 MG: 40 TABLET, DELAYED RELEASE ORAL at 08:19

## 2021-01-01 RX ADMIN — HYDROXYZINE HYDROCHLORIDE 25 MG: 25 TABLET, FILM COATED ORAL at 15:04

## 2021-01-01 RX ADMIN — Medication 5 MG: at 22:44

## 2021-01-01 RX ADMIN — SALINE NASAL SPRAY 2 SPRAY: 1.5 SOLUTION NASAL at 20:39

## 2021-01-01 RX ADMIN — PANTOPRAZOLE SODIUM 40 MG: 40 INJECTION, POWDER, FOR SOLUTION INTRAVENOUS at 08:31

## 2021-01-01 RX ADMIN — VASOPRESSIN 0.03 UNITS/MIN: 20 INJECTION INTRAVENOUS at 10:38

## 2021-01-01 RX ADMIN — CISATRACURIUM BESYLATE 3.5 MCG/KG/MIN: 10 INJECTION INTRAVENOUS at 15:51

## 2021-01-01 RX ADMIN — PROPOFOL 40 MCG/KG/MIN: 10 INJECTION, EMULSION INTRAVENOUS at 06:15

## 2021-01-01 RX ADMIN — FUROSEMIDE 20 MG: 10 INJECTION, SOLUTION INTRAMUSCULAR; INTRAVENOUS at 08:41

## 2021-01-01 RX ADMIN — INSULIN LISPRO 3 UNITS: 100 INJECTION, SOLUTION INTRAVENOUS; SUBCUTANEOUS at 17:50

## 2021-01-01 RX ADMIN — ENOXAPARIN SODIUM 40 MG: 100 INJECTION SUBCUTANEOUS at 20:11

## 2021-01-01 RX ADMIN — Medication 10 ML: at 05:52

## 2021-01-01 RX ADMIN — MIDAZOLAM 16 MG/HR: 5 INJECTION, SOLUTION INTRAMUSCULAR; INTRAVENOUS at 02:59

## 2021-01-01 RX ADMIN — BUMETANIDE 1 MG: 0.25 INJECTION INTRAMUSCULAR; INTRAVENOUS at 14:02

## 2021-01-01 RX ADMIN — PROPOFOL 50 MCG/KG/MIN: 10 INJECTION, EMULSION INTRAVENOUS at 09:35

## 2021-01-01 RX ADMIN — PROPOFOL 50 MCG/KG/MIN: 10 INJECTION, EMULSION INTRAVENOUS at 02:53

## 2021-01-01 RX ADMIN — MEROPENEM 500 MG: 500 INJECTION, POWDER, FOR SOLUTION INTRAVENOUS at 20:37

## 2021-01-01 RX ADMIN — CISATRACURIUM BESYLATE 3 MCG/KG/MIN: 10 INJECTION INTRAVENOUS at 04:29

## 2021-01-01 RX ADMIN — FENTANYL CITRATE 150 MCG/HR: 50 INJECTION, SOLUTION INTRAMUSCULAR; INTRAVENOUS at 07:19

## 2021-01-01 RX ADMIN — IPRATROPIUM BROMIDE AND ALBUTEROL 2 PUFF: 20; 100 SPRAY, METERED RESPIRATORY (INHALATION) at 11:37

## 2021-01-01 RX ADMIN — MIDAZOLAM 20 MG/HR: 5 INJECTION, SOLUTION INTRAMUSCULAR; INTRAVENOUS at 16:30

## 2021-01-01 RX ADMIN — MAGNESIUM SULFATE HEPTAHYDRATE 2 G: 2 INJECTION, SOLUTION INTRAVENOUS at 06:12

## 2021-01-01 RX ADMIN — ENOXAPARIN SODIUM 40 MG: 100 INJECTION SUBCUTANEOUS at 08:41

## 2021-01-01 RX ADMIN — INSULIN LISPRO 3 UNITS: 100 INJECTION, SOLUTION INTRAVENOUS; SUBCUTANEOUS at 06:14

## 2021-01-01 RX ADMIN — IPRATROPIUM BROMIDE AND ALBUTEROL SULFATE 3 ML: 2.5; .5 SOLUTION RESPIRATORY (INHALATION) at 03:27

## 2021-01-01 RX ADMIN — BUMETANIDE 1 MG: 0.25 INJECTION INTRAMUSCULAR; INTRAVENOUS at 21:29

## 2021-01-01 RX ADMIN — BENZONATATE 200 MG: 100 CAPSULE ORAL at 09:36

## 2021-01-01 RX ADMIN — MINERAL OIL AND PETROLATUM: 150; 830 OINTMENT OPHTHALMIC at 09:12

## 2021-01-01 RX ADMIN — Medication 1 CAPSULE: at 08:28

## 2021-01-01 RX ADMIN — ATORVASTATIN CALCIUM 20 MG: 20 TABLET, FILM COATED ORAL at 08:06

## 2021-01-01 RX ADMIN — MIDAZOLAM 13 MG/HR: 5 INJECTION, SOLUTION INTRAMUSCULAR; INTRAVENOUS at 06:43

## 2021-01-01 RX ADMIN — OXYCODONE HYDROCHLORIDE AND ACETAMINOPHEN 500 MG: 500 TABLET ORAL at 17:05

## 2021-01-01 RX ADMIN — BUMETANIDE 1 MG: 0.25 INJECTION INTRAMUSCULAR; INTRAVENOUS at 05:50

## 2021-01-01 RX ADMIN — Medication 300 MCG/HR: at 03:03

## 2021-01-01 RX ADMIN — ENOXAPARIN SODIUM 135 MG: 150 INJECTION SUBCUTANEOUS at 20:28

## 2021-01-01 RX ADMIN — DOCUSATE SODIUM 50 MG AND SENNOSIDES 8.6 MG 1 TABLET: 8.6; 5 TABLET, FILM COATED ORAL at 08:36

## 2021-01-01 RX ADMIN — SODIUM CHLORIDE, POTASSIUM CHLORIDE, SODIUM LACTATE AND CALCIUM CHLORIDE 500 ML: 600; 310; 30; 20 INJECTION, SOLUTION INTRAVENOUS at 00:42

## 2021-01-01 RX ADMIN — CHLORHEXIDINE GLUCONATE 15 ML: 1.2 RINSE ORAL at 20:45

## 2021-01-01 RX ADMIN — VANCOMYCIN HYDROCHLORIDE 1500 MG: 10 INJECTION, POWDER, LYOPHILIZED, FOR SOLUTION INTRAVENOUS at 03:51

## 2021-01-01 RX ADMIN — Medication 300 MCG/HR: at 07:02

## 2021-01-01 RX ADMIN — INSULIN LISPRO 4 UNITS: 100 INJECTION, SOLUTION INTRAVENOUS; SUBCUTANEOUS at 05:27

## 2021-01-01 RX ADMIN — DEXAMETHASONE SODIUM PHOSPHATE 10 MG: 10 INJECTION INTRAMUSCULAR; INTRAVENOUS at 20:36

## 2021-01-01 RX ADMIN — Medication 10 ML: at 20:00

## 2021-01-01 RX ADMIN — BUMETANIDE 0.5 MG: 0.25 INJECTION INTRAMUSCULAR; INTRAVENOUS at 08:32

## 2021-01-01 RX ADMIN — OXYCODONE HYDROCHLORIDE AND ACETAMINOPHEN 500 MG: 500 TABLET ORAL at 18:03

## 2021-01-01 RX ADMIN — HYDROXYZINE HYDROCHLORIDE 25 MG: 25 TABLET, FILM COATED ORAL at 22:47

## 2021-01-01 RX ADMIN — Medication 1 CAPSULE: at 08:19

## 2021-01-01 RX ADMIN — ENOXAPARIN SODIUM 40 MG: 100 INJECTION SUBCUTANEOUS at 07:45

## 2021-01-01 RX ADMIN — VANCOMYCIN HYDROCHLORIDE 1750 MG: 10 INJECTION, POWDER, LYOPHILIZED, FOR SOLUTION INTRAVENOUS at 21:20

## 2021-01-01 RX ADMIN — IPRATROPIUM BROMIDE AND ALBUTEROL SULFATE 3 ML: 2.5; .5 SOLUTION RESPIRATORY (INHALATION) at 07:55

## 2021-01-01 RX ADMIN — MIDAZOLAM 10 MG/HR: 5 INJECTION, SOLUTION INTRAMUSCULAR; INTRAVENOUS at 15:54

## 2021-01-01 RX ADMIN — PROPOFOL 50 MCG/KG/MIN: 10 INJECTION, EMULSION INTRAVENOUS at 15:33

## 2021-01-01 RX ADMIN — HYDROCODONE POLISTIREX AND CHLORPHENIRAMINE POLISTIREX 5 ML: 10; 8 SUSPENSION, EXTENDED RELEASE ORAL at 08:11

## 2021-01-01 RX ADMIN — PROPOFOL 45 MCG/KG/MIN: 10 INJECTION, EMULSION INTRAVENOUS at 21:35

## 2021-01-01 RX ADMIN — VANCOMYCIN HYDROCHLORIDE 2500 MG: 10 INJECTION, POWDER, LYOPHILIZED, FOR SOLUTION INTRAVENOUS at 09:26

## 2021-01-01 RX ADMIN — BUMETANIDE 0.5 MG: 0.25 INJECTION INTRAMUSCULAR; INTRAVENOUS at 13:55

## 2021-01-01 RX ADMIN — IPRATROPIUM BROMIDE AND ALBUTEROL SULFATE 3 ML: 2.5; .5 SOLUTION RESPIRATORY (INHALATION) at 01:21

## 2021-01-01 RX ADMIN — CHLORHEXIDINE GLUCONATE 15 ML: 1.2 RINSE ORAL at 08:29

## 2021-01-01 RX ADMIN — PROPOFOL 50 MCG/KG/MIN: 10 INJECTION, EMULSION INTRAVENOUS at 21:21

## 2021-01-01 RX ADMIN — INSULIN LISPRO 3 UNITS: 100 INJECTION, SOLUTION INTRAVENOUS; SUBCUTANEOUS at 00:26

## 2021-01-01 RX ADMIN — Medication 10 ML: at 13:03

## 2021-01-01 RX ADMIN — INSULIN GLARGINE 8 UNITS: 100 INJECTION, SOLUTION SUBCUTANEOUS at 09:08

## 2021-01-01 RX ADMIN — HYDROCODONE POLISTIREX AND CHLORPHENIRAMINE POLISTIREX 5 ML: 10; 8 SUSPENSION, EXTENDED RELEASE ORAL at 08:43

## 2021-01-01 RX ADMIN — IPRATROPIUM BROMIDE AND ALBUTEROL SULFATE 3 ML: 2.5; .5 SOLUTION RESPIRATORY (INHALATION) at 20:10

## 2021-01-01 RX ADMIN — FUROSEMIDE 20 MG: 10 INJECTION, SOLUTION INTRAMUSCULAR; INTRAVENOUS at 08:53

## 2021-01-01 RX ADMIN — CISATRACURIUM BESYLATE 5 MCG/KG/MIN: 200 INJECTION, SOLUTION INTRAVENOUS at 13:54

## 2021-01-01 RX ADMIN — PROPOFOL 50 MCG/KG/MIN: 10 INJECTION, EMULSION INTRAVENOUS at 23:52

## 2021-01-01 RX ADMIN — Medication 10 ML: at 13:57

## 2021-01-01 RX ADMIN — IPRATROPIUM BROMIDE AND ALBUTEROL 1 PUFF: 20; 100 SPRAY, METERED RESPIRATORY (INHALATION) at 05:30

## 2021-01-01 RX ADMIN — INSULIN LISPRO 4 UNITS: 100 INJECTION, SOLUTION INTRAVENOUS; SUBCUTANEOUS at 05:50

## 2021-01-01 RX ADMIN — ENOXAPARIN SODIUM 40 MG: 100 INJECTION SUBCUTANEOUS at 08:32

## 2021-01-01 RX ADMIN — CISATRACURIUM BESYLATE 3 MCG/KG/MIN: 10 INJECTION INTRAVENOUS at 20:35

## 2021-01-01 RX ADMIN — POTASSIUM CHLORIDE 20 MEQ: 29.8 INJECTION INTRAVENOUS at 17:50

## 2021-01-01 RX ADMIN — SALINE NASAL SPRAY 2 SPRAY: 1.5 SOLUTION NASAL at 08:21

## 2021-01-01 RX ADMIN — POTASSIUM PHOSPHATE, MONOBASIC POTASSIUM PHOSPHATE, DIBASIC: 224; 236 INJECTION, SOLUTION, CONCENTRATE INTRAVENOUS at 18:38

## 2021-01-01 RX ADMIN — OXYCODONE HYDROCHLORIDE AND ACETAMINOPHEN 500 MG: 500 TABLET ORAL at 17:04

## 2021-01-01 RX ADMIN — POTASSIUM CHLORIDE 10 MEQ: 7.46 INJECTION, SOLUTION INTRAVENOUS at 12:25

## 2021-01-01 RX ADMIN — IPRATROPIUM BROMIDE AND ALBUTEROL SULFATE 3 ML: 2.5; .5 SOLUTION RESPIRATORY (INHALATION) at 11:26

## 2021-01-01 RX ADMIN — MIDAZOLAM 12 MG/HR: 5 INJECTION, SOLUTION INTRAMUSCULAR; INTRAVENOUS at 23:46

## 2021-01-01 RX ADMIN — MEROPENEM 500 MG: 500 INJECTION, POWDER, FOR SOLUTION INTRAVENOUS at 15:54

## 2021-01-01 RX ADMIN — DEXAMETHASONE SODIUM PHOSPHATE 10 MG: 10 INJECTION INTRAMUSCULAR; INTRAVENOUS at 20:23

## 2021-01-01 RX ADMIN — SALINE NASAL SPRAY 2 SPRAY: 1.5 SOLUTION NASAL at 11:03

## 2021-01-01 RX ADMIN — POTASSIUM CHLORIDE 10 MEQ: 7.46 INJECTION, SOLUTION INTRAVENOUS at 10:43

## 2021-01-01 RX ADMIN — BENZONATATE 200 MG: 100 CAPSULE ORAL at 20:28

## 2021-01-01 RX ADMIN — SODIUM CHLORIDE 40 MG: 9 INJECTION INTRAMUSCULAR; INTRAVENOUS; SUBCUTANEOUS at 08:30

## 2021-01-01 RX ADMIN — IPRATROPIUM BROMIDE AND ALBUTEROL SULFATE 3 ML: 2.5; .5 SOLUTION RESPIRATORY (INHALATION) at 14:44

## 2021-01-01 RX ADMIN — PROPOFOL 40 MCG/KG/MIN: 10 INJECTION, EMULSION INTRAVENOUS at 18:27

## 2021-01-01 RX ADMIN — IPRATROPIUM BROMIDE AND ALBUTEROL SULFATE 3 ML: 2.5; .5 SOLUTION RESPIRATORY (INHALATION) at 13:34

## 2021-01-01 RX ADMIN — OXYCODONE HYDROCHLORIDE AND ACETAMINOPHEN 500 MG: 500 TABLET ORAL at 17:44

## 2021-01-01 RX ADMIN — CHLORHEXIDINE GLUCONATE 15 ML: 1.2 RINSE ORAL at 20:58

## 2021-01-01 RX ADMIN — METOCLOPRAMIDE 5 MG: 5 INJECTION, SOLUTION INTRAMUSCULAR; INTRAVENOUS at 17:36

## 2021-01-01 RX ADMIN — MEROPENEM 500 MG: 500 INJECTION, POWDER, FOR SOLUTION INTRAVENOUS at 15:05

## 2021-01-01 RX ADMIN — MIDAZOLAM 7 MG/HR: 5 INJECTION INTRAMUSCULAR; INTRAVENOUS at 00:00

## 2021-01-01 RX ADMIN — DOCUSATE SODIUM 50 MG AND SENNOSIDES 8.6 MG 1 TABLET: 8.6; 5 TABLET, FILM COATED ORAL at 08:32

## 2021-01-01 RX ADMIN — ACETAMINOPHEN 975 MG: 650 SUPPOSITORY RECTAL at 02:01

## 2021-01-01 RX ADMIN — DEXAMETHASONE SODIUM PHOSPHATE 6 MG: 10 INJECTION INTRAMUSCULAR; INTRAVENOUS at 21:57

## 2021-01-01 RX ADMIN — Medication 10 ML: at 14:21

## 2021-01-01 RX ADMIN — DEXAMETHASONE SODIUM PHOSPHATE 6 MG: 10 INJECTION INTRAMUSCULAR; INTRAVENOUS at 20:43

## 2021-01-01 RX ADMIN — PROPOFOL 50 MCG/KG/MIN: 10 INJECTION, EMULSION INTRAVENOUS at 02:04

## 2021-01-01 RX ADMIN — INSULIN LISPRO 7 UNITS: 100 INJECTION, SOLUTION INTRAVENOUS; SUBCUTANEOUS at 06:48

## 2021-01-01 RX ADMIN — Medication 10 ML: at 14:45

## 2021-01-01 RX ADMIN — PROPOFOL 50 MCG/KG/MIN: 10 INJECTION, EMULSION INTRAVENOUS at 01:38

## 2021-01-01 RX ADMIN — LORAZEPAM 2 MG: 2 INJECTION INTRAMUSCULAR; INTRAVENOUS at 23:50

## 2021-01-01 RX ADMIN — INSULIN LISPRO 4 UNITS: 100 INJECTION, SOLUTION INTRAVENOUS; SUBCUTANEOUS at 05:39

## 2021-01-01 RX ADMIN — MINERAL OIL AND PETROLATUM: 150; 830 OINTMENT OPHTHALMIC at 20:20

## 2021-01-01 RX ADMIN — Medication: at 18:35

## 2021-01-01 RX ADMIN — Medication: at 17:38

## 2021-01-01 RX ADMIN — MIDAZOLAM 8 MG/HR: 5 INJECTION INTRAMUSCULAR; INTRAVENOUS at 17:38

## 2021-01-01 RX ADMIN — INSULIN GLARGINE 8 UNITS: 100 INJECTION, SOLUTION SUBCUTANEOUS at 08:05

## 2021-01-01 RX ADMIN — CHLORHEXIDINE GLUCONATE 15 ML: 1.2 RINSE ORAL at 08:05

## 2021-01-01 RX ADMIN — Medication 10 ML: at 05:50

## 2021-01-01 RX ADMIN — Medication 10 ML: at 19:45

## 2021-01-01 RX ADMIN — PROPOFOL 45 MCG/KG/MIN: 10 INJECTION, EMULSION INTRAVENOUS at 22:44

## 2021-01-01 RX ADMIN — POTASSIUM PHOSPHATE, MONOBASIC POTASSIUM PHOSPHATE, DIBASIC: 224; 236 INJECTION, SOLUTION, CONCENTRATE INTRAVENOUS at 17:46

## 2021-01-01 RX ADMIN — BUMETANIDE 1 MG: 0.25 INJECTION INTRAMUSCULAR; INTRAVENOUS at 20:30

## 2021-01-01 RX ADMIN — CHLORHEXIDINE GLUCONATE 15 ML: 1.2 RINSE ORAL at 09:12

## 2021-01-01 RX ADMIN — Medication 10 ML: at 14:36

## 2021-01-01 RX ADMIN — Medication 10 ML: at 01:02

## 2021-01-01 RX ADMIN — PROPOFOL 45 MCG/KG/MIN: 10 INJECTION, EMULSION INTRAVENOUS at 13:21

## 2021-01-01 RX ADMIN — Medication: at 17:06

## 2021-01-01 RX ADMIN — CISATRACURIUM BESYLATE 2 MCG/KG/MIN: 10 INJECTION INTRAVENOUS at 16:05

## 2021-01-01 RX ADMIN — DOXYCYCLINE 100 MG: 100 INJECTION, POWDER, LYOPHILIZED, FOR SOLUTION INTRAVENOUS at 16:32

## 2021-01-01 RX ADMIN — Medication 200 MCG/HR: at 23:10

## 2021-01-01 RX ADMIN — PROPOFOL 40 MCG/KG/MIN: 10 INJECTION, EMULSION INTRAVENOUS at 22:32

## 2021-01-01 RX ADMIN — PROPOFOL 50 MCG/KG/MIN: 10 INJECTION, EMULSION INTRAVENOUS at 08:03

## 2021-01-01 RX ADMIN — MINERAL OIL AND PETROLATUM: 150; 830 OINTMENT OPHTHALMIC at 08:03

## 2021-01-01 RX ADMIN — INSULIN LISPRO 3 UNITS: 100 INJECTION, SOLUTION INTRAVENOUS; SUBCUTANEOUS at 17:56

## 2021-01-01 RX ADMIN — BUMETANIDE 1 MG: 0.25 INJECTION INTRAMUSCULAR; INTRAVENOUS at 20:29

## 2021-01-01 RX ADMIN — BENZONATATE 200 MG: 100 CAPSULE ORAL at 08:21

## 2021-01-01 RX ADMIN — ENOXAPARIN SODIUM 40 MG: 100 INJECTION SUBCUTANEOUS at 21:43

## 2021-01-01 RX ADMIN — INSULIN LISPRO 3 UNITS: 100 INJECTION, SOLUTION INTRAVENOUS; SUBCUTANEOUS at 17:38

## 2021-01-01 RX ADMIN — POTASSIUM CHLORIDE 10 MEQ: 7.46 INJECTION, SOLUTION INTRAVENOUS at 13:29

## 2021-01-01 RX ADMIN — HYDROCODONE POLISTIREX AND CHLORPHENIRAMINE POLISTIREX 5 ML: 10; 8 SUSPENSION, EXTENDED RELEASE ORAL at 21:14

## 2021-01-01 RX ADMIN — MEROPENEM 500 MG: 500 INJECTION, POWDER, FOR SOLUTION INTRAVENOUS at 08:36

## 2021-01-01 RX ADMIN — MIDAZOLAM 10 MG/HR: 5 INJECTION, SOLUTION INTRAMUSCULAR; INTRAVENOUS at 18:43

## 2021-01-01 RX ADMIN — SODIUM CHLORIDE 75 ML/HR: 9 INJECTION, SOLUTION INTRAVENOUS at 03:24

## 2021-01-01 RX ADMIN — ENOXAPARIN SODIUM 135 MG: 150 INJECTION SUBCUTANEOUS at 20:37

## 2021-01-01 RX ADMIN — LACTULOSE 30 ML: 20 SOLUTION ORAL at 18:00

## 2021-01-01 RX ADMIN — IPRATROPIUM BROMIDE AND ALBUTEROL SULFATE 3 ML: 2.5; .5 SOLUTION RESPIRATORY (INHALATION) at 21:24

## 2021-01-01 RX ADMIN — PANTOPRAZOLE SODIUM 40 MG: 40 INJECTION, POWDER, FOR SOLUTION INTRAVENOUS at 08:16

## 2021-01-01 RX ADMIN — INSULIN LISPRO 4 UNITS: 100 INJECTION, SOLUTION INTRAVENOUS; SUBCUTANEOUS at 23:49

## 2021-01-01 RX ADMIN — PROPOFOL 50 MCG/KG/MIN: 10 INJECTION, EMULSION INTRAVENOUS at 18:13

## 2021-01-01 RX ADMIN — Medication 10 ML: at 06:15

## 2021-01-01 RX ADMIN — DOCUSATE SODIUM 50 MG AND SENNOSIDES 8.6 MG 1 TABLET: 8.6; 5 TABLET, FILM COATED ORAL at 17:00

## 2021-01-01 RX ADMIN — CHLORHEXIDINE GLUCONATE 15 ML: 1.2 RINSE ORAL at 20:21

## 2021-01-01 RX ADMIN — MINERAL OIL AND PETROLATUM: 150; 830 OINTMENT OPHTHALMIC at 08:27

## 2021-01-01 RX ADMIN — IPRATROPIUM BROMIDE AND ALBUTEROL SULFATE 3 ML: 2.5; .5 SOLUTION RESPIRATORY (INHALATION) at 21:19

## 2021-01-01 RX ADMIN — ACETAMINOPHEN 975 MG: 650 SUPPOSITORY RECTAL at 04:17

## 2021-01-01 RX ADMIN — IPRATROPIUM BROMIDE AND ALBUTEROL SULFATE 3 ML: 2.5; .5 SOLUTION RESPIRATORY (INHALATION) at 08:44

## 2021-01-01 RX ADMIN — DOCUSATE SODIUM 50 MG AND SENNOSIDES 8.6 MG 1 TABLET: 8.6; 5 TABLET, FILM COATED ORAL at 17:04

## 2021-01-01 RX ADMIN — MEROPENEM 500 MG: 500 INJECTION, POWDER, FOR SOLUTION INTRAVENOUS at 08:22

## 2021-01-01 RX ADMIN — GUAIFENESIN 600 MG: 600 TABLET ORAL at 21:27

## 2021-01-01 RX ADMIN — CISATRACURIUM BESYLATE 3 MCG/KG/MIN: 10 INJECTION INTRAVENOUS at 14:18

## 2021-01-01 RX ADMIN — VANCOMYCIN HYDROCHLORIDE 1500 MG: 10 INJECTION, POWDER, LYOPHILIZED, FOR SOLUTION INTRAVENOUS at 20:14

## 2021-01-01 RX ADMIN — ATORVASTATIN CALCIUM 20 MG: 20 TABLET, FILM COATED ORAL at 09:36

## 2021-01-01 RX ADMIN — METOLAZONE 5 MG: 2.5 TABLET ORAL at 17:40

## 2021-01-01 RX ADMIN — POTASSIUM CHLORIDE 10 MEQ: 7.46 INJECTION, SOLUTION INTRAVENOUS at 09:45

## 2021-01-01 RX ADMIN — IPRATROPIUM BROMIDE AND ALBUTEROL SULFATE 3 ML: 2.5; .5 SOLUTION RESPIRATORY (INHALATION) at 15:48

## 2021-01-01 RX ADMIN — ACETAMINOPHEN 650 MG: 325 TABLET ORAL at 17:00

## 2021-01-01 RX ADMIN — HYDROCODONE POLISTIREX AND CHLORPHENIRAMINE POLISTIREX 5 ML: 10; 8 SUSPENSION, EXTENDED RELEASE ORAL at 20:33

## 2021-01-01 RX ADMIN — Medication 10 ML: at 13:16

## 2021-01-01 RX ADMIN — PROPOFOL 45 MCG/KG/MIN: 10 INJECTION, EMULSION INTRAVENOUS at 01:04

## 2021-01-01 RX ADMIN — MEROPENEM 500 MG: 500 INJECTION, POWDER, FOR SOLUTION INTRAVENOUS at 09:39

## 2021-01-01 RX ADMIN — IPRATROPIUM BROMIDE AND ALBUTEROL SULFATE 3 ML: 2.5; .5 SOLUTION RESPIRATORY (INHALATION) at 19:57

## 2021-01-01 RX ADMIN — CISATRACURIUM BESYLATE 2 MCG/KG/MIN: 10 INJECTION INTRAVENOUS at 02:29

## 2021-01-01 RX ADMIN — PROPOFOL 50 MCG/KG/MIN: 10 INJECTION, EMULSION INTRAVENOUS at 20:15

## 2021-01-01 RX ADMIN — ACETAMINOPHEN 1000 MG: 500 TABLET ORAL at 11:18

## 2021-01-01 RX ADMIN — Medication 300 MCG/HR: at 23:02

## 2021-01-01 RX ADMIN — PROPOFOL 50 MCG/KG/MIN: 10 INJECTION, EMULSION INTRAVENOUS at 15:46

## 2021-01-01 RX ADMIN — METOLAZONE 5 MG: 2.5 TABLET ORAL at 12:00

## 2021-01-01 RX ADMIN — METOCLOPRAMIDE 5 MG: 5 INJECTION, SOLUTION INTRAMUSCULAR; INTRAVENOUS at 08:22

## 2021-01-01 RX ADMIN — GUAIFENESIN 600 MG: 600 TABLET ORAL at 20:25

## 2021-01-01 RX ADMIN — Medication 30 UNITS: at 09:00

## 2021-01-01 RX ADMIN — OXYCODONE HYDROCHLORIDE AND ACETAMINOPHEN 500 MG: 500 TABLET ORAL at 08:21

## 2021-01-01 RX ADMIN — INSULIN LISPRO 3 UNITS: 100 INJECTION, SOLUTION INTRAVENOUS; SUBCUTANEOUS at 05:39

## 2021-01-01 RX ADMIN — POLYETHYLENE GLYCOL 3350 17 G: 17 POWDER, FOR SOLUTION ORAL at 08:36

## 2021-01-01 RX ADMIN — SALINE NASAL SPRAY 2 SPRAY: 1.5 SOLUTION NASAL at 02:54

## 2021-01-01 RX ADMIN — PROPOFOL 45 MCG/KG/MIN: 10 INJECTION, EMULSION INTRAVENOUS at 04:00

## 2021-01-01 RX ADMIN — IPRATROPIUM BROMIDE AND ALBUTEROL SULFATE 3 ML: 2.5; .5 SOLUTION RESPIRATORY (INHALATION) at 20:02

## 2021-01-01 RX ADMIN — TOCILIZUMAB 800 MG: 20 INJECTION, SOLUTION, CONCENTRATE INTRAVENOUS at 19:29

## 2021-01-01 RX ADMIN — Medication 5 MG: at 23:11

## 2021-01-01 RX ADMIN — POLYETHYLENE GLYCOL 3350 17 G: 17 POWDER, FOR SOLUTION ORAL at 08:22

## 2021-01-01 RX ADMIN — ENOXAPARIN SODIUM 135 MG: 150 INJECTION SUBCUTANEOUS at 20:22

## 2021-01-01 RX ADMIN — DOCUSATE SODIUM 50 MG AND SENNOSIDES 8.6 MG 1 TABLET: 8.6; 5 TABLET, FILM COATED ORAL at 18:07

## 2021-01-01 RX ADMIN — METOCLOPRAMIDE 5 MG: 5 INJECTION, SOLUTION INTRAMUSCULAR; INTRAVENOUS at 18:03

## 2021-01-01 RX ADMIN — IPRATROPIUM BROMIDE AND ALBUTEROL SULFATE 3 ML: 2.5; .5 SOLUTION RESPIRATORY (INHALATION) at 09:20

## 2021-01-01 RX ADMIN — METOLAZONE 5 MG: 2.5 TABLET ORAL at 17:12

## 2021-01-01 RX ADMIN — CHLORHEXIDINE GLUCONATE 15 ML: 1.2 RINSE ORAL at 08:12

## 2021-01-01 RX ADMIN — CHLORHEXIDINE GLUCONATE 15 ML: 1.2 RINSE ORAL at 08:32

## 2021-01-01 RX ADMIN — BISACODYL 10 MG: 10 SUPPOSITORY RECTAL at 12:01

## 2021-01-01 RX ADMIN — ENOXAPARIN SODIUM 40 MG: 100 INJECTION SUBCUTANEOUS at 08:30

## 2021-01-01 RX ADMIN — PANTOPRAZOLE SODIUM 40 MG: 40 TABLET, DELAYED RELEASE ORAL at 08:08

## 2021-01-01 RX ADMIN — FENTANYL CITRATE 150 MCG/HR: 50 INJECTION, SOLUTION INTRAMUSCULAR; INTRAVENOUS at 08:55

## 2021-01-01 RX ADMIN — WATER 2 G: 1 INJECTION INTRAMUSCULAR; INTRAVENOUS; SUBCUTANEOUS at 15:17

## 2021-01-01 RX ADMIN — PROPOFOL 45 MCG/KG/MIN: 10 INJECTION, EMULSION INTRAVENOUS at 21:19

## 2021-01-01 RX ADMIN — PROPOFOL 45 MCG/KG/MIN: 10 INJECTION, EMULSION INTRAVENOUS at 10:23

## 2021-01-01 RX ADMIN — DEXAMETHASONE SODIUM PHOSPHATE 10 MG: 10 INJECTION INTRAMUSCULAR; INTRAVENOUS at 08:12

## 2021-01-01 RX ADMIN — INSULIN LISPRO 4 UNITS: 100 INJECTION, SOLUTION INTRAVENOUS; SUBCUTANEOUS at 23:33

## 2021-01-01 RX ADMIN — DOCUSATE SODIUM 50 MG AND SENNOSIDES 8.6 MG 1 TABLET: 8.6; 5 TABLET, FILM COATED ORAL at 08:22

## 2021-01-01 RX ADMIN — INSULIN LISPRO 3 UNITS: 100 INJECTION, SOLUTION INTRAVENOUS; SUBCUTANEOUS at 12:50

## 2021-01-01 RX ADMIN — PROPOFOL 45 MCG/KG/MIN: 10 INJECTION, EMULSION INTRAVENOUS at 19:21

## 2021-01-01 RX ADMIN — ACETAMINOPHEN 975 MG: 650 SUPPOSITORY RECTAL at 23:54

## 2021-01-01 RX ADMIN — PROPOFOL 50 MCG/KG/MIN: 10 INJECTION, EMULSION INTRAVENOUS at 23:07

## 2021-01-01 RX ADMIN — Medication 200 MCG/HR: at 08:34

## 2021-01-01 RX ADMIN — IPRATROPIUM BROMIDE AND ALBUTEROL SULFATE 3 ML: 2.5; .5 SOLUTION RESPIRATORY (INHALATION) at 22:45

## 2021-01-01 RX ADMIN — Medication 1 CAPSULE: at 09:37

## 2021-01-01 RX ADMIN — Medication 300 MCG/HR: at 20:51

## 2021-01-01 RX ADMIN — MINERAL OIL AND PETROLATUM 1 EACH: 150; 830 OINTMENT OPHTHALMIC at 20:13

## 2021-01-01 RX ADMIN — POTASSIUM CHLORIDE 10 MEQ: 7.46 INJECTION, SOLUTION INTRAVENOUS at 08:37

## 2021-01-01 RX ADMIN — DEXAMETHASONE SODIUM PHOSPHATE 10 MG: 10 INJECTION, SOLUTION INTRAMUSCULAR; INTRAVENOUS at 20:38

## 2021-01-01 RX ADMIN — MEROPENEM 500 MG: 500 INJECTION, POWDER, FOR SOLUTION INTRAVENOUS at 03:55

## 2021-01-01 RX ADMIN — MEROPENEM 500 MG: 500 INJECTION, POWDER, FOR SOLUTION INTRAVENOUS at 02:08

## 2021-01-01 RX ADMIN — MIDAZOLAM 18 MG/HR: 5 INJECTION, SOLUTION INTRAMUSCULAR; INTRAVENOUS at 16:26

## 2021-01-01 RX ADMIN — INSULIN LISPRO 3 UNITS: 100 INJECTION, SOLUTION INTRAVENOUS; SUBCUTANEOUS at 23:19

## 2021-01-01 RX ADMIN — VANCOMYCIN HYDROCHLORIDE 1500 MG: 10 INJECTION, POWDER, LYOPHILIZED, FOR SOLUTION INTRAVENOUS at 10:08

## 2021-01-01 RX ADMIN — NAPROXEN SODIUM 220 MG: 220 TABLET, FILM COATED ORAL at 21:46

## 2021-01-01 RX ADMIN — MINERAL OIL AND PETROLATUM: 150; 830 OINTMENT OPHTHALMIC at 20:16

## 2021-01-01 RX ADMIN — LANSOPRAZOLE 30 MG: KIT at 08:44

## 2021-01-01 RX ADMIN — MIDAZOLAM 13 MG/HR: 5 INJECTION, SOLUTION INTRAMUSCULAR; INTRAVENOUS at 12:40

## 2021-01-01 RX ADMIN — Medication 150 MCG/HR: at 06:16

## 2021-01-01 RX ADMIN — GUAIFENESIN 600 MG: 600 TABLET ORAL at 20:37

## 2021-01-01 RX ADMIN — LORAZEPAM 0.5 MG: 2 INJECTION INTRAMUSCULAR; INTRAVENOUS at 03:07

## 2021-01-01 RX ADMIN — Medication 10 ML: at 13:40

## 2021-01-01 RX ADMIN — POTASSIUM CHLORIDE 10 MEQ: 7.46 INJECTION, SOLUTION INTRAVENOUS at 09:06

## 2021-01-01 RX ADMIN — PHENYLEPHRINE HYDROCHLORIDE 50 MCG/MIN: 10 INJECTION INTRAVENOUS at 04:23

## 2021-01-01 RX ADMIN — PROPOFOL 50 MCG/KG/MIN: 10 INJECTION, EMULSION INTRAVENOUS at 19:07

## 2021-01-01 RX ADMIN — CHLORHEXIDINE GLUCONATE 15 ML: 1.2 RINSE ORAL at 20:44

## 2021-01-01 RX ADMIN — INSULIN LISPRO 3 UNITS: 100 INJECTION, SOLUTION INTRAVENOUS; SUBCUTANEOUS at 08:06

## 2021-01-01 RX ADMIN — PROPOFOL 50 MCG/KG/MIN: 10 INJECTION, EMULSION INTRAVENOUS at 23:08

## 2021-01-01 RX ADMIN — PROPOFOL 50 MCG/KG/MIN: 10 INJECTION, EMULSION INTRAVENOUS at 19:58

## 2021-01-01 RX ADMIN — PROPOFOL 50 MCG/KG/MIN: 10 INJECTION, EMULSION INTRAVENOUS at 22:50

## 2021-01-01 RX ADMIN — INSULIN LISPRO 4 UNITS: 100 INJECTION, SOLUTION INTRAVENOUS; SUBCUTANEOUS at 23:55

## 2021-01-01 RX ADMIN — POTASSIUM CHLORIDE 10 MEQ: 7.46 INJECTION, SOLUTION INTRAVENOUS at 12:21

## 2021-01-01 RX ADMIN — IPRATROPIUM BROMIDE AND ALBUTEROL SULFATE 3 ML: 2.5; .5 SOLUTION RESPIRATORY (INHALATION) at 02:10

## 2021-01-01 RX ADMIN — Medication 300 MCG/HR: at 01:54

## 2021-01-01 RX ADMIN — CHLORHEXIDINE GLUCONATE 15 ML: 1.2 RINSE ORAL at 08:04

## 2021-01-01 RX ADMIN — DOCUSATE SODIUM 50 MG AND SENNOSIDES 8.6 MG 1 TABLET: 8.6; 5 TABLET, FILM COATED ORAL at 08:03

## 2021-01-01 RX ADMIN — ACETAMINOPHEN 1000 MG: 500 TABLET ORAL at 16:27

## 2021-01-01 RX ADMIN — PROPOFOL 50 MCG/KG/MIN: 10 INJECTION, EMULSION INTRAVENOUS at 12:24

## 2021-01-01 RX ADMIN — MIDAZOLAM 15 MG/HR: 5 INJECTION, SOLUTION INTRAMUSCULAR; INTRAVENOUS at 03:33

## 2021-01-01 RX ADMIN — FENTANYL CITRATE 150 MCG/HR: 50 INJECTION, SOLUTION INTRAMUSCULAR; INTRAVENOUS at 14:34

## 2021-01-01 RX ADMIN — FENTANYL CITRATE 150 MCG/HR: 50 INJECTION, SOLUTION INTRAMUSCULAR; INTRAVENOUS at 11:22

## 2021-01-01 RX ADMIN — MINERAL OIL AND PETROLATUM: 150; 830 OINTMENT OPHTHALMIC at 09:00

## 2021-01-01 RX ADMIN — MIDAZOLAM 10 MG/HR: 5 INJECTION, SOLUTION INTRAMUSCULAR; INTRAVENOUS at 03:08

## 2021-01-01 RX ADMIN — Medication 150 MCG/HR: at 20:20

## 2021-01-01 RX ADMIN — CHLORHEXIDINE GLUCONATE 15 ML: 1.2 RINSE ORAL at 09:07

## 2021-01-01 RX ADMIN — BENZONATATE 200 MG: 100 CAPSULE ORAL at 14:21

## 2021-01-01 RX ADMIN — ENOXAPARIN SODIUM 40 MG: 100 INJECTION SUBCUTANEOUS at 08:53

## 2021-01-01 RX ADMIN — PANTOPRAZOLE SODIUM 40 MG: 40 INJECTION, POWDER, FOR SOLUTION INTRAVENOUS at 09:38

## 2021-01-01 RX ADMIN — Medication 10 ML: at 20:18

## 2021-01-01 RX ADMIN — BENZONATATE 200 MG: 100 CAPSULE ORAL at 08:30

## 2021-01-01 RX ADMIN — IPRATROPIUM BROMIDE AND ALBUTEROL SULFATE 3 ML: 2.5; .5 SOLUTION RESPIRATORY (INHALATION) at 08:55

## 2021-01-01 RX ADMIN — DEXAMETHASONE SODIUM PHOSPHATE 6 MG: 10 INJECTION INTRAMUSCULAR; INTRAVENOUS at 20:32

## 2021-01-01 RX ADMIN — DOCUSATE SODIUM 50 MG AND SENNOSIDES 8.6 MG 1 TABLET: 8.6; 5 TABLET, FILM COATED ORAL at 18:01

## 2021-01-01 RX ADMIN — BUMETANIDE 1 MG: 0.25 INJECTION INTRAMUSCULAR; INTRAVENOUS at 13:30

## 2021-01-01 RX ADMIN — DEXAMETHASONE SODIUM PHOSPHATE 6 MG: 10 INJECTION INTRAMUSCULAR; INTRAVENOUS at 20:45

## 2021-01-01 RX ADMIN — Medication 200 MCG/HR: at 05:04

## 2021-01-01 RX ADMIN — GUAIFENESIN 600 MG: 600 TABLET ORAL at 20:28

## 2021-01-01 RX ADMIN — Medication 200 MCG/HR: at 21:43

## 2021-01-01 RX ADMIN — SALINE NASAL SPRAY 2 SPRAY: 1.5 SOLUTION NASAL at 11:30

## 2021-01-01 RX ADMIN — INSULIN LISPRO 3 UNITS: 100 INJECTION, SOLUTION INTRAVENOUS; SUBCUTANEOUS at 07:37

## 2021-01-01 RX ADMIN — OXYCODONE HYDROCHLORIDE AND ACETAMINOPHEN 500 MG: 500 TABLET ORAL at 17:49

## 2021-01-01 RX ADMIN — ENOXAPARIN SODIUM 40 MG: 100 INJECTION SUBCUTANEOUS at 20:26

## 2021-01-01 RX ADMIN — OXYCODONE HYDROCHLORIDE AND ACETAMINOPHEN 500 MG: 500 TABLET ORAL at 18:42

## 2021-01-01 RX ADMIN — MIDAZOLAM 8 MG/HR: 5 INJECTION INTRAMUSCULAR; INTRAVENOUS at 17:10

## 2021-01-01 RX ADMIN — DEXAMETHASONE SODIUM PHOSPHATE 10 MG: 10 INJECTION INTRAMUSCULAR; INTRAVENOUS at 20:32

## 2021-01-01 RX ADMIN — CISATRACURIUM BESYLATE 4 MCG/KG/MIN: 10 INJECTION INTRAVENOUS at 11:37

## 2021-01-01 RX ADMIN — Medication 5 MG: at 20:29

## 2021-01-01 RX ADMIN — PROPOFOL 50 MCG/KG/MIN: 10 INJECTION, EMULSION INTRAVENOUS at 07:18

## 2021-01-01 RX ADMIN — POTASSIUM CHLORIDE 10 MEQ: 7.46 INJECTION, SOLUTION INTRAVENOUS at 08:10

## 2021-01-01 RX ADMIN — MEROPENEM 500 MG: 500 INJECTION, POWDER, FOR SOLUTION INTRAVENOUS at 03:31

## 2021-01-01 RX ADMIN — PROPOFOL 50 MCG/KG/MIN: 10 INJECTION, EMULSION INTRAVENOUS at 22:26

## 2021-01-01 RX ADMIN — PROPOFOL 50 MCG/KG/MIN: 10 INJECTION, EMULSION INTRAVENOUS at 13:19

## 2021-01-01 RX ADMIN — Medication 300 MCG/HR: at 13:45

## 2021-01-01 RX ADMIN — MIDAZOLAM 11 MG/HR: 5 INJECTION, SOLUTION INTRAMUSCULAR; INTRAVENOUS at 13:00

## 2021-01-01 RX ADMIN — DEXAMETHASONE SODIUM PHOSPHATE 6 MG: 10 INJECTION INTRAMUSCULAR; INTRAVENOUS at 20:21

## 2021-01-01 RX ADMIN — INSULIN GLARGINE 22 UNITS: 100 INJECTION, SOLUTION SUBCUTANEOUS at 08:26

## 2021-01-01 RX ADMIN — PANTOPRAZOLE SODIUM 40 MG: 40 TABLET, DELAYED RELEASE ORAL at 08:12

## 2021-01-01 RX ADMIN — IPRATROPIUM BROMIDE AND ALBUTEROL SULFATE 3 ML: 2.5; .5 SOLUTION RESPIRATORY (INHALATION) at 01:31

## 2021-01-01 RX ADMIN — PROPOFOL 40 MCG/KG/MIN: 10 INJECTION, EMULSION INTRAVENOUS at 06:33

## 2021-01-01 RX ADMIN — INSULIN GLARGINE 22 UNITS: 100 INJECTION, SOLUTION SUBCUTANEOUS at 08:32

## 2021-01-01 RX ADMIN — FENTANYL CITRATE 150 MCG/HR: 50 INJECTION, SOLUTION INTRAMUSCULAR; INTRAVENOUS at 17:16

## 2021-01-01 RX ADMIN — DEXMEDETOMIDINE HYDROCHLORIDE 0.4 MCG/KG/HR: 400 INJECTION INTRAVENOUS at 21:14

## 2021-01-01 RX ADMIN — CISATRACURIUM BESYLATE 3 MCG/KG/MIN: 10 INJECTION INTRAVENOUS at 00:39

## 2021-01-01 RX ADMIN — EPOPROSTENOL 30 NG/KG/MIN: 1.5 INJECTION, POWDER, LYOPHILIZED, FOR SOLUTION INTRAVENOUS at 01:59

## 2021-01-01 RX ADMIN — PROPOFOL 50 MCG/KG/MIN: 10 INJECTION, EMULSION INTRAVENOUS at 17:07

## 2021-04-14 NOTE — PROGRESS NOTES
Rabia Olvera is a 38 y.o. male who was seen by synchronous (real-time) audio-video technology on 4/14/2021 for Medication Refill, Hypertension, Cholesterol Problem, and Behavioral Problem (trouble with concentration) Assessment & Plan:  
Diagnoses and all orders for this visit: 1. Essential hypertension 
-     amLODIPine (NORVASC) 10 mg tablet; TAKE 1 TABLET DAILY 
-     hydroCHLOROthiazide (HYDRODIURIL) 12.5 mg tablet; TAKE 1 TABLET DAILY 2. Pure hypercholesterolemia 
-     atorvastatin (LIPITOR) 10 mg tablet; Take 1 Tab by mouth daily. 
-     LIPID PANEL; Future 3. Encounter for long-term current use of medication -     METABOLIC PANEL, COMPREHENSIVE; Future 4. Hyperglycemia 
-     HEMOGLOBIN A1C WITH EAG; Future 5. JESUS (obstructive sleep apnea) Assessment & Plan: 
Improved with use of CPAP machine. med refills sent in. Order fasting labs. Have nurse check BP and weight in office. Refer to psychologist for ADD testing. I spent at least 7 minutes on this visit with this established patient. [address] Subjective:  
 
 
Prior to Admission medications Medication Sig Start Date End Date Taking? Authorizing Provider  
amLODIPine (NORVASC) 10 mg tablet TAKE 1 TABLET DAILY 9/34/18  Yes Lila Bates MD  
atorvastatin (LIPITOR) 10 mg tablet Take 1 Tab by mouth daily. 4/16/56  Yes Lila Bates MD  
hydroCHLOROthiazide (HYDRODIURIL) 12.5 mg tablet TAKE 1 TABLET DAILY 2/48/72  Yes Lila Bates MD  
glucosamine HCl/chondroitin bone (GLUCOSAMINE-CHONDROITIN PO) Take  by mouth. 1,500mg/1,200mg/MGMSM 1,000mg   Yes Provider, Historical  
Krill-OM3-DHA-EPA-OM6-Lip-Astx (KRILL OIL) 1000-130(40-80) mg cap Take 1 Cap by mouth once over twenty-four (24) hours. Yes Provider, Historical  
FERROUS FUMARATE/VIT BCOMP&C (SUPER B COMPLEX PO) Take  by mouth. Yes Provider, Historical  
 
Patient Active Problem List  
 Diagnosis Date Noted  Obesity, morbid ([facility] [address] 04/10/2018  Decreased hearing of left ear 10/09/2017  
 Skin lesion of scalp 2017  Acute right-sided thoracic back pain 2016  Elevated liver enzymes 2016  Strep throat exposure 10/20/2015  Poison ivy 09/15/2015  JESUS (obstructive sleep apnea) 10/15/2014  Hypertension 10/15/2014  S/P vasectomy 2014  Pure hypercholesterolemia Current Outpatient Medications Medication Sig Dispense Refill  amLODIPine (NORVASC) 10 mg tablet TAKE 1 TABLET DAILY 90 Tab 1  
 atorvastatin (LIPITOR) 10 mg tablet Take 1 Tab by mouth daily. 90 Tab 1  
 hydroCHLOROthiazide (HYDRODIURIL) 12.5 mg tablet TAKE 1 TABLET DAILY 90 Tab 1  
 glucosamine HCl/chondroitin bone (GLUCOSAMINE-CHONDROITIN PO) Take  by mouth. 1,500mg/1,200mg/MGMSM 1,000mg  Krill-OM3-DHA-EPA-OM6-Lip-Astx (KRILL OIL) 1000-130(40-80) mg cap Take 1 Cap by mouth once over twenty-four (24) hours.  FERROUS FUMARATE/VIT BCOMP&C (SUPER B COMPLEX PO) Take  by mouth. No Known Allergies Past Medical History:  
Diagnosis Date  Hypercholesteremia  Hypertension 10/15/2014  Poison ivy 9/15/2015  Pure hypercholesterolemia  S/P vasectomy 2014 History reviewed. No pertinent surgical history. Family History Problem Relation Age of Onset  Diabetes Mother  Heart Attack Father 43  Stroke Maternal Grandmother  Cancer Paternal Grandfather   
      age 45 - Hodgkin's  Cancer Maternal Grandfather   
      in 42's with melanoma Social History Tobacco Use  Smoking status: Never Smoker  Smokeless tobacco: Never Used Substance Use Topics  Alcohol use: No  
  Alcohol/week: 0.0 standard drinks ROS Need med refills for BP and chol meds. Pt now using CPAP machine and feels great. Weight loss, more energy. Pt concerned about poor attention and concentration. Never tested in school. Objective:  
 
Patient-Reported Vitals 2021 Patient-Reported Weight 298lb General: alert, cooperative, no distress Mental  status: normal mood, behavior, speech, dress, motor activity, and thought processes, able to follow commands HENT: NCAT Neck: no visualized mass Resp: no respiratory distress Neuro: no gross deficits Skin: no discoloration or lesions of concern on visible areas Psychiatric: normal affect, consistent with stated mood, no evidence of hallucinations Additional exam findings: We discussed the expected course, resolution and complications of the diagnosis(es) in detail. Medication risks, benefits, costs, interactions, and alternatives were discussed as indicated. I advised him to contact the office if his condition worsens, changes or fails to improve as anticipated. He expressed understanding with the diagnosis(es) and plan. Suleman Mendez, was evaluated through a synchronous (real-time) audio-video encounter. The patient (or guardian if applicable) is aware that this is a billable service. Verbal consent to proceed has been obtained within the past 12 months. The visit was conducted pursuant to the emergency declaration under the [address] [place] [address] authority and the [employer] and [employer] Act. Patient identification was verified, and a caregiver was present when appropriate. The patient was located in a state where the provider was credentialed to provide care.  
 
Hebert Neil MD

## 2021-04-16 NOTE — TELEPHONE ENCOUNTER
Pt came by for lab only visit and said you wanted nurse to take BP and weight.     Vitals:  BP - 133/93  P - 89  R - 18  02 - 98%  WGT - 299lb

## 2021-04-18 NOTE — TELEPHONE ENCOUNTER
Please call pt. I need to see him in office to discuss labs. Very high sugar and A1C. Newly dx diabetes. Need treatment.

## 2021-04-19 NOTE — TELEPHONE ENCOUNTER
Pt was advised, verbalized understanding and reports the soonest he is able to come in the office for an appt is on 04/30/21.  I have scheduled him for 04/30/21 at 830am.

## 2021-04-30 NOTE — PROGRESS NOTES
Subjective:     Suleman Mendez is a 38 y.o. male seen for new onset of diabetes. He has hypertension and hyperlipidemia. Diabetic Review of Systems: no polyuria or polydipsia, no chest pain, dyspnea or TIA's, no numbness, tingling or pain in extremities. Diet and Lifestyle: generally follows a low fat low cholesterol diet, generally follows a low sodium diet, exercises sporadically. Home BP Monitoring: is not measured at home. Family History: FH of diabetes: mother. .  Cardiovascular risk analysis - LDL goal is under 80  diabetic  hypertension  hyperlipidemia. No Known Allergies  Past Medical History:   Diagnosis Date    Hypercholesteremia     Hypertension 10/15/2014    Poison ivy 9/15/2015    Pure hypercholesterolemia     S/P vasectomy 8/6/2014     History reviewed. No pertinent surgical history. Lab Results   Component Value Date/Time    Hemoglobin A1c 11.1 (H) 04/16/2021 10:47 AM    Glucose 341 (H) 04/16/2021 10:47 AM    Microalbumin/Creat ratio (mg/g creat) 32 (H) 04/30/2021 09:26 AM    Microalbumin,urine random 5.07 04/30/2021 09:26 AM    LDL, calculated 70 04/16/2021 10:47 AM    Creatinine 0.83 04/16/2021 10:47 AM      Lab Results   Component Value Date/Time    Cholesterol, total 160 04/16/2021 10:47 AM    HDL Cholesterol 37 04/16/2021 10:47 AM    LDL, calculated 70 04/16/2021 10:47 AM    Triglyceride 265 (H) 04/16/2021 10:47 AM    CHOL/HDL Ratio 4.3 04/16/2021 10:47 AM     No results found for: INR, INREXT, PTMR, PTP, PT1, PT2, INREXT, INREXT      Review of Systems  Pertinent items are noted in HPI. Objective:     Visit Vitals  /88 (BP 1 Location: Left upper arm, BP Patient Position: Sitting, BP Cuff Size: Large adult)   Pulse (!) 102   Temp 97 °F (36.1 °C) (Oral)   Resp 16   Ht 5' 11\" (1.803 m)   Wt 295 lb (133.8 kg)   SpO2 98%   BMI 41.14 kg/m²     Appearance: alert, well appearing, and in no distress and overweight.   Exam: heart sounds normal rate, regular rhythm, normal S1, S2, no murmurs, rubs, clicks or gallops, chest clear  Diabetic foot exam:     Left Foot:   Visual Exam: normal    Pulse DP: 2+ (normal)   Filament test: normal sensation          Right Foot:   Visual Exam: normal    Pulse DP: 2+ (normal)   Filament test: normal sensation          Lab review: labs reviewed, I note that glycosylated hemoglobin abnormal high. Assessment/Plan:     New onset Diabetes poorly controlled, needs improvement. Diabetic issues reviewed with him: referral to Diabetic Education department, low cholesterol diet, weight control and daily exercise discussed, home glucose monitoring emphasized, annual eye examinations at Ophthalmology discussed and glycohemoglobin and other lab monitoring discussed. ICD-10-CM ICD-9-CM    1. New onset type 2 diabetes mellitus (HCC)  E11.9 250.00 MICROALBUMIN, UR, RAND W/ MICROALB/CREAT RATIO       DIABETES FOOT EXAM      REFERRAL TO DIABETES [facility]      insulin glargine (LANTUS,BASAGLAR) 100 unit/mL (3 mL) inpn      Insulin Needles, Disposable, 31 gauge x 5/16\" ndle      Blood-Glucose Meter monitoring kit      glucose blood VI test strips (ASCENSIA AUTODISC VI, ONE TOUCH ULTRA TEST VI) strip      lancets misc      metFORMIN ER (GLUCOPHAGE XR) 500 mg tablet      MICROALBUMIN, UR, RAND W/ MICROALB/CREAT RATIO   2. Essential hypertension  I10 401.9    3. Pure hypercholesterolemia  E78.00 272.0    4. Periumbilical hernia  K73.4 715.6      Will start on Lantus 20 units daily. Also Metformin ER. Check urine microalbumin  Recommend eye exam  FU 3 months  Discussed sx of hypoglycemia. Order glucometer and supplies.

## 2021-04-30 NOTE — PATIENT INSTRUCTIONS

## 2021-04-30 NOTE — PROGRESS NOTES
Identified pt with two pt identifiers(name and ). Chief Complaint   Patient presents with    Diabetes     discuss new onset diabetes and medications needed to treat - HgbA1c on 21 was 11.1        Health Maintenance Due   Topic    Foot Exam Q1     MICROALBUMIN Q1     Eye Exam Retinal or Dilated     COVID-19 Vaccine (1)    DTaP/Tdap/Td series (2 - Td)       Wt Readings from Last 3 Encounters:   21 295 lb (133.8 kg)   20 320 lb (145.2 kg)   19 320 lb (145.2 kg)     Temp Readings from Last 3 Encounters:   21 97 °F (36.1 °C) (Oral)   20 98.3 °F (36.8 °C)   19 98 °F (36.7 °C) (Oral)     BP Readings from Last 3 Encounters:   21 126/88   20 137/62   19 134/86     Pulse Readings from Last 3 Encounters:   21 (!) 102   20 (!) 108   19 99         Learning Assessment:  :     Learning Assessment 2014   PRIMARY LEARNER Patient   HIGHEST LEVEL OF EDUCATION - PRIMARY LEARNER  GRADUATED HIGH SCHOOL OR GED   BARRIERS PRIMARY LEARNER NONE   CO-LEARNER CAREGIVER No   PRIMARY LANGUAGE ENGLISH   LEARNER PREFERENCE PRIMARY DEMONSTRATION     PICTURES     VIDEOS     LISTENING   ANSWERED BY patient   RELATIONSHIP SELF       Depression Screening:  :     3 most recent PHQ Screens 2021   Little interest or pleasure in doing things Not at all   Feeling down, depressed, irritable, or hopeless Not at all   Total Score PHQ 2 0       Fall Risk Assessment:  :     No flowsheet data found. Abuse Screening:  :     Abuse Screening Questionnaire 2021 10/4/2019 3/19/2019 10/15/2014   Do you ever feel afraid of your partner? N N N N   Are you in a relationship with someone who physically or mentally threatens you? N N N N   Is it safe for you to go home?  Y Y Y Y           Coordination of Care Questionnaire:  :     1) Have you been to an emergency room, urgent care clinic since your last visit? no   Hospitalized since your last visit? no             2) Have you seen or consulted any other health care providers outside of [address] [place] [address] since your last visit? no  (Include any pap smears or colon screenings in this section.)    3) Do you have an Advance Directive on file? no  Are you interested in receiving information about Advance Directives? no    Patient is accompanied by self. Reviewed record in preparation for visit and have obtained necessary documentation. Medication reconciliation up to date and corrected with patient at this time.

## 2021-05-12 NOTE — PROGRESS NOTES
[employer] [facility] Diabetes Self-Management Education & Support Program 
Pre-program Assessment Reason for Referral: New diagnosis for type 2 diabetes mellitus Referral Source: Yvrose Martin MD 
Services requested: [facility] 
 
ASSESSMENT From my perspective, the participant would benefit from [facility] specifically related to Reducing risks, Healthy eating, Monitoring, Physical activity, Taking medications, Healthy coping and Problem solving. Will adapt DSMES program to build on participant's skills score, confidence score and preparedness score as noted in the Diabetes Skills, Confidence, and Preparedness Index. During the program, we will focus on providing DSMES that specifically addresses participant's interest in Healthy eating, Monitoring, Physical activity and Taking medications, as shown by their reported readiness to change. The participant would be best served by attending weekly individual sessions and group sessions. Diabetes Self-Management Education Follow-up Visit: 5/20/21 at 11:00 AM 
  
 
Clinical Presentation Yaquelin Pierson is a 38 y.o. White male referred for diabetes self-management education. Participant has Type 2 DM on insulin for <1 year. Family history positive for diabetes. Patient reports not receiving DSMES services in the past. Most recent A1c value:  
Lab Results Component Value Date/Time Hemoglobin A1c 11.1 (H) 04/16/2021 10:47 AM  
 
Diabetes-related medications: 
Current dosing:  
Key Antihyperglycemic Medications   
    
  
 insulin glargine (LANTUS,BASAGLAR) 100 unit/mL (3 mL) inpn 20 Units by SubCUTAneous route nightly. Indications: type 2 diabetes mellitus  
 metFORMIN ER (GLUCOPHAGE XR) 500 mg tablet Take 2 Tabs by mouth daily (with dinner). Indications: type 2 diabetes mellitus Blood Pressure Management Key ACE/ARB Medications Patient is on no ACE or ARB meds. Lipid Management Key Antihyperlipidemia Meds atorvastatin (LIPITOR) 10 mg tablet Take 1 Tab by mouth daily. Krill-OM3-DHA-EPA-OM6-Lip-Astx (KRILL OIL) 1000-130(40-80) mg cap Take 1 Cap by mouth once over twenty-four (24) hours. Clot Prevention Key Anti-Platelet Anticoagulant Meds The patient is on no antiplatelet meds or anticoagulants. Learning Assessment Learning objectives Educator assessment (5/12/2021) Diabetes Disease Process The participant can A) describe diabetes in basic terms;  
B) state the type of diabetes they have; &  
C) state accepted blood glucose targets. Healthy Eating The participant can A) identify carbohydrate foods; &  
B) accurately read food labels. Being Active The participant can A) state the benefits of physical activity; 
B) report their current PA practices; 
C) identify PA they would consider incorporating in their lives; & 
D) develop an implementation plan. Monitoring The participant can A) operate their blood glucose meter; & 
B) describe how they log their blood glucoses to share with their provider. Taking Medications The participant can A) name their diabetes medications; 
B) state the purpose and dose; 
C) note side effects; & 
D) describe proper storage, disposal & transport (if appropriate). Healthy Coping The participant can A) describe their response to diabetes diagnosis; B) describe their specific coping mechanisms; 
C) identify supportive people and/or other resources that positively support their diabetes self-care and health. Reducing Risks The participant can describe the preventive measures used by providers to promote health and prevent diabetes complications. Problem Solving The participant can A) identify signs, symptoms & treatment of hypoglycemia;  
B) identify signs, symptoms & treatment of hyperglycemia; 
C) describe their sick day plan; & 
D) identify BG patterns to discuss with their provider. No 
Yes No 
 
 
 Yes 
No 
 
 
 
No 
Yes Yes No 
 
 
 
Yes Yes Yes Yes No 
Yes Yes Yes Yes No 
 
 
 
 
No 
Yes No 
Yes Characteristics to Learning Barriers to Learning  
[] Cognitive loss 
[] Mental retardation  
[] Intellectual delay/cognitive impairment 
[] Psychiatric disorder 
[] Visually impaired 
[] Hearing loss               
 [] Low literacy (difficulty with written text) [] Low numeracy (difficulty with mathematical information 
[] Low health literacy (difficulty with understanding health information & services 
[] Language 
[] Functional limitation 
[] Pain 
 [] Financial 
[] Transportation 
[x] None Favorite Ways to Learn  
[] Lecture 
[] Slides 
[] Reading [x] Video-Internet 
[] Cassettes/CDs/MP3's 
[] Interactive Small Groups [] Other Behavioral Assessment Current self-care practices  Educator assessment (5/12/2021) Healthy Eating Current practices 24-hour Dietary Recall: 
Breakfast: BLT sandwich with whole wheat bread Lunch: 10 piece chicken nuggets Dinner: 6 oz, sirloin steak, green beans, creaser salad Snacks: 3 turkey sausages, 30 almonds, 2 small apples with peanut butter Beverages: Diet coke, diet pepsi, water Alcohol: None Would benefit from [facility] related to Healthy Eating: Yes Eats a carbohydrate controlled diet: No 
 
 
Stage of change: Preparation Being Active Current practices How many days during the past week have you performed physical activity where your heart beats faster and your breathing is harder than normal for 30 minutes or more? 5 days How many days in a typical week do you perform activity such as this? 
5 days Would benefit from [facility] related to Being Active: Yes Exercises 150 minutes/week: Yes 
 
 
Stage of change: Action Monitoring Current practices Do you monitor your blood sugar? Yes How often do you monitor? 2x/day What are the range of readings? 116-250 mg/dL Do you know your last A1c measurement? No 
 
Do you know the meaning of the A1c? No 
  
Would benefit from [facility] related to Monitoring: Yes Uses BG readings to establish trends and understand BG patterns: No 
 
 
Stage of change: Preparation Taking Medication Current practices Do you understand what your diabetes medications do? No 
 
How often do you miss doses of your diabetes medications? Never Can you afford your diabetes medications? Yes Would benefit from [facility] related to Taking Medication: Yes Takes medications consistently to receive full benefit: Yes 
 
 
Stage of change: Action Healthy Coping Current state Diabetes Skills, Confidence and Preparedness Index: Total score: 4.5 Skills: 3.1 Confidence: 4.9 Preparedness: 6.0 Would benefit from [facility] related to Healthy Coping: Yes Identifies specific people, organizations,etc, that actively support their diabetes self-care efforts: No 
 
 
Stage of change: Contemplation Reducing Risks Current state Vaccines: 
Influenza:  
Immunization History Administered Date(s) Administered  Influenza Vaccine 10/17/2013 Pneumococcal: There is no immunization history for the selected administration types on file for this patient. Hepatitis: There is no immunization history for the selected administration types on file for this patient. Examinations: 
Diabetic Foot and Eye Exam HM Status Topic Date Due  Eye Exam  Never done [facility]  04/30/2022 Dental exam: DUE Heart Protection: 
BP Readings from Last 2 Encounters:  
04/30/21 126/88  
04/07/20 137/62 Lab Results Component Value Date/Time LDL, calculated 70 04/16/2021 10:47 AM  
  
 
Kidney Protection: 
Lab Results Component Value Date/Time Microalbumin/Creat ratio (mg/g creat) 32 (H) 04/30/2021 09:26 AM  
 Microalbumin,urine random 5.07 04/30/2021 09:26 AM  
 
  
Would benefit from [facility] related to Reducing Risks: Yes Actively participates in decision-making with provider regarding secondary prevention:  Yes 
 
 
Stage of change: Action Problem Solving Current state Hypoglycemia Management: 
What are signs and symptoms of hypoglycemia that you experience? Pt reported being unaware of s/s of hypoglycemia How do you prevent hypoglycemia? Pt reported being unaware of how to prevent hypoglycemia How do you treat hypoglycemia? Pt reported being unaware of how to treat hypoglycemia Hyperglycemia Management: 
What are signs and symptoms of hyperglycemia that you experience? Extreme thirst 
 
How can you prevent hyperglycemia? Take medication as instructed Sick Day Management: 
What do you do differently on sick days? Pt reported being unaware of self-management on sick days Pattern Management: Do you notice blood glucose patterns when you look at the readings in your meter or logbook? Yes How do you use the blood glucose readings from your meter or logbook? Pt reported being unaware of pattern management skills Would benefit from [facility] related to Problem Solving: Yes Articulates appropriate strategies to address hypoglycemia, hyperglycemia, sick day care and BG pattern: No 
 
 
Stage of change: Preparation Note: Content derived from the American Association of Diabetes Educators' Diabetes Education Curriculum: A Guide to Successful Self-Management (2nd edition) Linda Ledesma RN on 5/12/2021 at 8:15 AM 
 
Time in appointment: 35 minutes. Thank you for completing the Skills, Confidence & Preparedness Index! Below are your scores. All scales and questions are out of 7. If you would like these results emailed, please enter your email address along with some identifying patient information. Email:  
 Patient Identifier:  
  
 
Overall SCPI score: 4.5 Skills Score: 3.1 Low: Taking Medication(Q2),Blood Sugar Monitoring(Q4),Reducing Risks(Q5),Problem Solving(Q6),Healthy Coping(Q7),Reducing Risks(Q9) Confidence Score: 4.9 
Low: Healthy Eating(Q1),Reducing FFKEW(J7) Preparedness Score: 6.0 Low: Taking Medication(Q5) Healthy Eating Score: 4.5 Low: Confidence(Q1) Taking Medication Score: 3.5 Low: Skills(Q2) Blood Sugar Monitoring Score: 5.2 Low: [id number]) Reducing Risks Score: 3.0 Low: Skills(Q5),Skills(Q9),Confidence(Q3) 
Problem Solving Score: 4.7 Low: Skills(Q6) Healthy Coping Score: 5.0 Low: [id number]) Being Active Score: 6.0 Low: Confidence(Q5),Preparedness(Q2) 
 
Skills/Knowledge Questions 1. I know how to plan meals that have the best balance between carbohydrates, proteins and vegetables. 4 
2. I know how my diabetes medications (pills, injectables and/or insulin) work in my body. 2 
3. I know when to check my blood sugar if I want to see how my body responded to a meal. 6 
4. I know when to check my blood sugars to determine if my medication or insulin doses are correct. 2 
5. I know what to do to prevent a low blood sugar when I exercise (either before, during, or after). 2 
6. When I am sick, I know what to do differently with my diabetes management. 2 
7. I know how stress can affect my diabetes management. 2 
8. When I look at my blood sugars over a given week, I can explain what my blood sugar pattern is. 6 
9. I know what my target levels are for A1c, blood pressure and cholesterol. 2 
Confidence Questions 1. I am confident that I can plan balanced meals and snacks. 2 
2. I am confident that I can manage my stress. 6 
3. I am confident that I can prevent a low blood sugar during or after exercise. 2 
4. I am confident that the next time I eat out, I will be able to choose foods that best keep my blood sugars in target. 6 
5. I am confident I can include exercise into my schedule. 6 
6. I am confident that I can use my daily blood sugars to adjust my diet, my activity, and/or my insulin. 6 
7.  When something out of my normal routine happens, I am confident that I can problem-solve and keep my diabetes on track. 6 
Preparedness Questions 1. Within the next month, I will begin to eat more balanced meals and snacks. 6 
2. Within the next month, I will choose an exercise activity and I will start fitting it into my schedule. 6 
3. Within the next month, I will make a list of stress management options that work for me. 8 
4. Within the next month, I will consistently plan ahead to prevent low blood sugars. 6 
5. Within the next month, I will start adjusting my insulin doses on my own. 5 
6. Within the next month, I will begin making changes to my diabetes management based on my daily blood sugars (eg - eating, activity and/or insulin). 6 
7. Within the next month, I will begin making changes to my diabetes management to meet my overall goals (eg - eating, activity and/or insulin).  6

## 2021-07-30 NOTE — PROGRESS NOTES
Identified pt with two pt identifiers(name and ).     Chief Complaint   Patient presents with    Diabetes     very high A1c when checked in April - has been able to avoid taking metformin and insulin as he is doing well with diet control and his numbers have been fine - will need quantity of test strips reevaluated as insurance is giving him grief about the quantity    Hypertension    Cholesterol Problem     high triglycerides when checked in April    LOW BACK PAIN     having muscular pain and would like anti-inflammatory or muscle relaxer    Labs     NPO since midnight        Health Maintenance Due   Topic    Eye Exam Retinal or Dilated     COVID-19 Vaccine (1)    DTaP/Tdap/Td series (2 - Td or Tdap)    A1C test (Diabetic or Prediabetic)        Wt Readings from Last 3 Encounters:   21 304 lb (137.9 kg)   21 295 lb (133.8 kg)   20 320 lb (145.2 kg)     Temp Readings from Last 3 Encounters:   21 98.1 °F (36.7 °C) (Temporal)   21 97 °F (36.1 °C) (Oral)   20 98.3 °F (36.8 °C)     BP Readings from Last 3 Encounters:   21 123/85   21 126/88   20 137/62     Pulse Readings from Last 3 Encounters:   21 90   21 (!) 102   20 (!) 108         Learning Assessment:  :     Learning Assessment 2021   PRIMARY LEARNER Patient Patient   HIGHEST LEVEL OF EDUCATION - PRIMARY LEARNER  GRADUATED HIGH SCHOOL OR GED GRADUATED HIGH SCHOOL OR GED   BARRIERS PRIMARY LEARNER NONE NONE   CO-LEARNER CAREGIVER No No   PRIMARY LANGUAGE ENGLISH ENGLISH   LEARNER PREFERENCE PRIMARY LISTENING DEMONSTRATION     DEMONSTRATION PICTURES     - VIDEOS     - LISTENING   ANSWERED BY PATIENT patient   RELATIONSHIP SELF SELF       Depression Screening:  :     3 most recent PHQ Screens 2021   Little interest or pleasure in doing things Not at all   Feeling down, depressed, irritable, or hopeless Not at all   Total Score PHQ 2 0       Fall Risk Assessment:  : No flowsheet data found. Abuse Screening:  :     Abuse Screening Questionnaire 4/30/2021 10/4/2019 3/19/2019 10/15/2014   Do you ever feel afraid of your partner? N N N N   Are you in a relationship with someone who physically or mentally threatens you? N N N N   Is it safe for you to go home? Y Y Y Y         Coordination of Care Questionnaire:  :     1) Have you been to an emergency room, urgent care clinic since your last visit? no   Hospitalized since your last visit? no             2) Have you seen or consulted any other health care providers outside of [address] [place] [address] since your last visit? no  (Include any pap smears or colon screenings in this section.)    3) Do you have an Advance Directive on file? no  Are you interested in receiving information about Advance Directives? no    Patient is accompanied by self. Reviewed record in preparation for visit and have obtained necessary documentation. Medication reconciliation up to date and corrected with patient at this time.

## 2021-07-30 NOTE — PROGRESS NOTES
Subjective:     Zoltan Briones is a 39 y.o. male who presents for follow up of diabetes and hyperlipidemia. Diet and Lifestyle: generally follows a low fat low cholesterol diet, generally follows a low sodium diet, follows a diabetic diet regularly  Home BP Monitoring: is not measured at home    Cardiovascular ROS: not taking medications regularly as instructed, no medication side effects noted, no TIA's, no chest pain on exertion, no dyspnea on exertion, no swelling of ankles. New concerns: pt stopped taking Lantus insulin and Metformin about a month ago because fasting sugars were running well . He denies SE from meds. Following diabetic diet only. Weight is up. He is taking statin. Also C/O low back pain x 1 week. He does a lot lifting at work. Hx XR mild DDD L3-L4. Patient Active Problem List    Diagnosis Date Noted    Obesity, morbid ([address] 04/10/2018    Decreased hearing of left ear 10/09/2017    Skin lesion of scalp 07/18/2017    Acute right-sided thoracic back pain 09/27/2016    Elevated liver enzymes 08/05/2016    Strep throat exposure 10/20/2015    Poison ivy 09/15/2015    JESUS (obstructive sleep apnea) 10/15/2014    Hypertension 10/15/2014    S/P vasectomy 08/06/2014    Pure hypercholesterolemia      Current Outpatient Medications   Medication Sig Dispense Refill    OneTouch Verio Flex meter misc USE TO TEST GLUCOSE ONCE DAILY      OneTouch Delica Plus Lancet 30 gauge misc TEST SUGAR TWICE DAILY.  glucose blood VI test strips (ASCENSIA AUTODISC VI, ONE TOUCH ULTRA TEST VI) strip Test fasting sugar and blood sugars qhs daily. Fill as One Touch Verio test strips. DX E 11.9 200 Strip 5    hydroCHLOROthiazide (HYDRODIURIL) 12.5 mg tablet TAKE 1 TABLET DAILY 90 Tablet 1    naproxen (NAPROSYN) 500 mg tablet Take 1 Tablet by mouth two (2) times daily (with meals).  Indications: pain 60 Tablet 1    metaxalone (SKELAXIN) 800 mg tablet Take 1 Tablet by mouth three (3) times daily. Indications: muscle spasm 30 Tablet 1    insulin glargine (LANTUS,BASAGLAR) 100 unit/mL (3 mL) inpn 20 Units by SubCUTAneous route nightly. Indications: type 2 diabetes mellitus (Patient taking differently: 20 Units by SubCUTAneous route nightly. *NOT CURRENTLY ON MEDICATION D/T DIET CONTROL OF BS*  Indications: type 2 diabetes mellitus) 15 mL 1    Insulin Needles, Disposable, 31 gauge x 5/16\" ndle Use 1 daily with insulin pen. (Patient taking differently: Use 1 daily with insulin pen. *NOT CURRENTLY ON MEDICATION D/T DIET CONTROL OF BS*) 50 Package 11    metFORMIN ER (GLUCOPHAGE XR) 500 mg tablet Take 2 Tabs by mouth daily (with dinner). Indications: type 2 diabetes mellitus (Patient taking differently: Take 1,000 mg by mouth daily (with dinner). NOT CURRENTLY ON MEDICATION D/T DIET CONTROL OF BS  Indications: type 2 diabetes mellitus) 60 Tab 5    amLODIPine (NORVASC) 10 mg tablet TAKE 1 TABLET DAILY 90 Tab 1    atorvastatin (LIPITOR) 10 mg tablet Take 1 Tab by mouth daily. 90 Tab 1    glucosamine HCl/chondroitin bone (GLUCOSAMINE-CHONDROITIN PO) Take  by mouth. 1,500mg/1,200mg/MGMSM 1,000mg      Krill-OM3-DHA-EPA-OM6-Lip-Astx (KRILL OIL) 1000-130(40-80) mg cap Take 1 Cap by mouth once over twenty-four (24) hours.  FERROUS FUMARATE/VIT BCOMP&C (SUPER B COMPLEX PO) Take  by mouth.        No Known Allergies     Lab Results   Component Value Date/Time    Hemoglobin A1c 11.1 (H) 04/16/2021 10:47 AM    Glucose 341 (H) 04/16/2021 10:47 AM    Microalbumin/Creat ratio (mg/g creat) 32 (H) 04/30/2021 09:26 AM    Microalbumin,urine random 5.07 04/30/2021 09:26 AM    LDL, calculated 70 04/16/2021 10:47 AM    Creatinine 0.83 04/16/2021 10:47 AM      Lab Results   Component Value Date/Time    Cholesterol, total 160 04/16/2021 10:47 AM    HDL Cholesterol 37 04/16/2021 10:47 AM    LDL, calculated 70 04/16/2021 10:47 AM    Triglyceride 265 (H) 04/16/2021 10:47 AM    CHOL/HDL Ratio 4.3 04/16/2021 10:47 AM Review of Systems, additional:  Pertinent items are noted in HPI. Objective:     Visit Vitals  /85 (BP 1 Location: Left upper arm, BP Patient Position: Sitting, BP Cuff Size: Large adult)   Pulse 90   Temp 98.1 °F (36.7 °C) (Temporal)   Resp 20   Ht 5' 11\" (1.803 m)   Wt 304 lb (137.9 kg)   SpO2 96%   BMI 42.40 kg/m²     Appearance: alert, well appearing, and in no distress and overweight. General exam: CVS exam BP noted to be well controlled today in office, S1, S2 normal, no gallop, no murmur, chest clear, no JVD, no HSM, no edema. Lab review: orders written for new lab studies as appropriate; see orders. Assessment/Plan:     diabetes control uncertain, hyperlipidemia control uncertain. orders and follow up as documented in patient record  reviewed diet, exercise and weight control. ICD-10-CM ICD-9-CM    1. Uncontrolled type 2 diabetes mellitus with hyperglycemia (HCC)  E11.65 250.02 HEMOGLOBIN A1C WITH EAG      HEMOGLOBIN A1C WITH EAG   2. Encounter for long-term current use of medication  Y34.038 F14.71 METABOLIC PANEL, COMPREHENSIVE      METABOLIC PANEL, COMPREHENSIVE      CANCELED: METABOLIC PANEL, BASIC   3. Pure hypercholesterolemia  E78.00 272.0 LIPID PANEL      LIPID PANEL   4. New onset type 2 diabetes mellitus (HCC)  E11.9 250.00 glucose blood VI test strips (ASCENSIA AUTODISC VI, ONE TOUCH ULTRA TEST VI) strip   5. Essential hypertension  I10 401.9 hydroCHLOROthiazide (HYDRODIURIL) 12.5 mg tablet   6. Chronic bilateral low back pain, unspecified whether sciatica present  M54.5 724.2 naproxen (NAPROSYN) 500 mg tablet    G89.29 338.29 metaxalone (SKELAXIN) 800 mg tablet     Recheck labs. May need to resume taking at least Metformin depending on A1C. He was taking only Metformin  mg tab daily instead of 1,000 mg as prescribed.   Reminder to get eye exam.

## 2021-07-30 NOTE — PATIENT INSTRUCTIONS
Learning About Carbohydrate (Carb) Counting and Eating Out When You Have Diabetes  Why plan your meals? Meal planning can be a key part of managing diabetes. Planning meals and snacks with the right balance of carbohydrate, protein, and fat can help you keep your blood sugar at the target level you set with your doctor. You don't have to eat special foods. You can eat what your family eats, including sweets once in a while. But you do have to pay attention to how often you eat and how much you eat of certain foods. You may want to work with a dietitian or a certified diabetes educator. He or she can give you tips and meal ideas and can answer your questions about meal planning. This health professional can also help you reach a healthy weight if that is one of your goals. What should you know about eating carbs? Managing the amount of carbohydrate (carbs) you eat is an important part of healthy meals when you have diabetes. Carbohydrate is found in many foods. · Learn which foods have carbs. And learn the amounts of carbs in different foods. ? Bread, cereal, pasta, and rice have about 15 grams of carbs in a serving. A serving is 1 slice of bread (1 ounce), ½ cup of cooked cereal, or 1/3 cup of cooked pasta or rice. ? Fruits have 15 grams of carbs in a serving. A serving is 1 small fresh fruit, such as an apple or orange; ½ of a banana; ½ cup of cooked or canned fruit; ½ cup of fruit juice; 1 cup of melon or raspberries; or 2 tablespoons of dried fruit. ? Milk and no-sugar-added yogurt have 15 grams of carbs in a serving. A serving is 1 cup of milk or 2/3 cup of no-sugar-added yogurt. ? Starchy vegetables have 15 grams of carbs in a serving. A serving is ½ cup of mashed potatoes or sweet potato; 1 cup winter squash; ½ of a small baked potato; ½ cup of cooked beans; or ½ cup cooked corn or green peas.   · Learn how much carbs to eat each day and at each meal. A dietitian or CDE can teach you how to keep track of the amount of carbs you eat. This is called carbohydrate counting. · If you are not sure how to count carbohydrate grams, use the Plate Method to plan meals. It is a good, quick way to make sure that you have a balanced meal. It also helps you spread carbs throughout the day. ? Divide your plate by types of foods. Put non-starchy vegetables on half the plate, meat or other protein food on one-quarter of the plate, and a grain or starchy vegetable in the final quarter of the plate. To this you can add a small piece of fruit and 1 cup of milk or yogurt, depending on how many carbs you are supposed to eat at a meal.  · Try to eat about the same amount of carbs at each meal. Do not \"save up\" your daily allowance of carbs to eat at one meal.  · Proteins have very little or no carbs per serving. Examples of proteins are beef, chicken, turkey, fish, eggs, tofu, cheese, cottage cheese, and peanut butter. A serving size of meat is 3 ounces, which is about the size of a deck of cards. Examples of meat substitute serving sizes (equal to 1 ounce of meat) are 1/4 cup of cottage cheese, 1 egg, 1 tablespoon of peanut butter, and ½ cup of tofu. How can you eat out and still eat healthy? · Learn to estimate the serving sizes of foods that have carbohydrate. If you measure food at home, it will be easier to estimate the amount in a serving of restaurant food. · If the meal you order has too much carbohydrate (such as potatoes, corn, or baked beans), ask to have a low-carbohydrate food instead. Ask for a salad or green vegetables. · If you use insulin, check your blood sugar before and after eating out to help you plan how much to eat in the future. · If you eat more carbohydrate at a meal than you had planned, take a walk or do other exercise. This will help lower your blood sugar. What are some tips for eating healthy? · Limit saturated fat, such as the fat from meat and dairy products.  This is a healthy choice because people who have diabetes are at higher risk of heart disease. So choose lean cuts of meat and nonfat or low-fat dairy products. Use olive or canola oil instead of butter or shortening when cooking. · Don't skip meals. Your blood sugar may drop too low if you skip meals and take insulin or certain medicines for diabetes. · Check with your doctor before you drink alcohol. Alcohol can cause your blood sugar to drop too low. Alcohol can also cause a bad reaction if you take certain diabetes medicines. Follow-up care is a key part of your treatment and safety. Be sure to make and go to all appointments, and call your doctor if you are having problems. It's also a good idea to know your test results and keep a list of the medicines you take. Where can you learn more? Go to http://www.foster.com/  Enter I147 in the search box to learn more about \"Learning About Carbohydrate (Carb) Counting and Eating Out When You Have Diabetes. \"  Current as of: August 31, 2020               Content Version: 12.8  © 2006-2021 [employer]. Care instructions adapted under license by [employer] (which disclaims liability or warranty for this information). If you have questions about a medical condition or this instruction, always ask your healthcare professional. [address] any warranty or liability for your use of this information.

## 2021-08-01 NOTE — PROGRESS NOTES
Great improvement in sugar level and diabetes control!!  Cholesterol numbers are fine. You may continue to stay off diabetes medicines but monitor sugars. If you see they are rising, let us know. Follow up in 6 months.

## 2021-11-19 PROBLEM — J96.01 ACUTE HYPOXEMIC RESPIRATORY FAILURE DUE TO COVID-19 (HCC): Status: ACTIVE | Noted: 2021-01-01

## 2021-11-19 PROBLEM — U07.1 ACUTE HYPOXEMIC RESPIRATORY FAILURE DUE TO COVID-19 (HCC): Status: ACTIVE | Noted: 2021-01-01

## 2021-11-19 NOTE — PROGRESS NOTES
[facility]: MED RECONCILIATION    Medications removed:  Lantus 20 units nightly  Metformin ER 100mg daily with dinner    Medications adjusted:  Metaxalone  Naproxen    Information obtained from: Patient (alert, oriented, reliable), [facility] (some data available), recent medical records      Allergies: Patient has no known allergies. Prior to Admission Medications:     Medication Documentation Review Audit       Reviewed by Pablo West., PHARMD (Pharmacist) on 11/19/21 at 1636      Medication Sig Documenting Provider Last Dose Status Taking? amLODIPine (NORVASC) 10 mg tablet TAKE 1 TABLET DAILY Leena Barry MD  Active Yes   atorvastatin (LIPITOR) 10 mg tablet TAKE 1 TABLET DAILY Gianni Suresh NP  Active Yes   FERROUS FUMARATE/VIT BCOMP&C (SUPER B COMPLEX PO) Take  by mouth. Provider, Historical  Active Yes   glucosamine HCl/chondroitin bone (GLUCOSAMINE-CHONDROITIN PO) Take  by mouth. 1,500mg/1,200mg/MGMSM 1,000mg Provider, Historical  Active Yes   hydroCHLOROthiazide (HYDRODIURIL) 12.5 mg tablet TAKE 1 TABLET BY MOUTH EVERY DAY Lizbeth Suresh NP  Active Yes   Krill-OM3-DHA-EPA-OM6-Lip-Astx (KRILL OIL) 1000-130(40-80) mg cap Take 1 Cap by mouth once over twenty-four (24) hours. Provider, Historical  Active Yes   metaxalone (SKELAXIN) 800 mg tablet Take 800 mg by mouth three (3) times daily as needed for Muscle Spasm(s). Provider, Historical  Active Yes   naproxen (NAPROSYN) 500 mg tablet Take 500 mg by mouth two (2) times daily as needed for Pain. Provider, Historical  Active Yes                      Kristen Rajan.  Luisito Nunez

## 2021-11-19 NOTE — ED TRIAGE NOTES
Pt arrives to ED with complaints of COVID positive since Sunday. Pt has room air sat of 78%. Pt reports weakness, fever, NVD, CP, SOB. Pt unvaccinated against the virus. Pt placed on O2 and taken to room.

## 2021-11-19 NOTE — ED PROVIDER NOTES
79-year-old male history of hypertension, high cholesterol presents to the emergency department with chief complaint of chest pain and shortness of breath. He began developing Covid symptoms about 1 week ago and tested + 5 days ago. He has been using over-the-counter remedies without significant relief. He developed worsening shortness of breath and chest pain last night. The history is provided by the patient and medical records. Shortness of Breath  This is a new problem. The current episode started yesterday. The problem has been gradually worsening. Associated symptoms include a fever, cough and chest pain. Pertinent negatives include no headaches, no sore throat, no wheezing, no vomiting, no abdominal pain, no rash and no leg swelling. Past Medical History:   Diagnosis Date    Hypercholesteremia     Hypertension 10/15/2014    Poison ivy 9/15/2015    Pure hypercholesterolemia     S/P vasectomy 2014       No past surgical history on file.       Family History:   Problem Relation Age of Onset    Diabetes Mother     Heart Attack Father 43    Stroke Maternal Grandmother     Cancer Paternal Grandfather          age 45 - Hodgkin's   Driscoll Sicard Cancer Maternal Grandfather          in 42's with melanoma       Social History     Socioeconomic History    Marital status:      Spouse name: Not on file    Number of children: Not on file    Years of education: Not on file    Highest education level: Not on file   Occupational History    Not on file   Tobacco Use    Smoking status: Never Smoker    Smokeless tobacco: Never Used   Vaping Use    Vaping Use: Never used   Substance and Sexual Activity    Alcohol use: No     Alcohol/week: 0.0 standard drinks    Drug use: Never    Sexual activity: Yes     Partners: Female   Other Topics Concern    Not on file   Social History Narrative    Not on file     Social Determinants of Health     Financial Resource Strain:     Difficulty of Paying Living Expenses: Not on file   Food Insecurity:     Worried About Running Out of Food in the Last Year: Not on file    Ran Out of Food in the Last Year: Not on file   Transportation Needs:     Lack of Transportation (Medical): Not on file    Lack of Transportation (Non-Medical): Not on file   Physical Activity:     Days of Exercise per Week: Not on file    Minutes of Exercise per Session: Not on file   Stress:     Feeling of Stress : Not on file   Social Connections:     Frequency of Communication with Friends and Family: Not on file    Frequency of Social Gatherings with Friends and Family: Not on file    Attends Buddhist Services: Not on file    Active Member of [address] [place] [address] [employer] or Organizations: Not on file    Attends Club or Organization Meetings: Not on file    Marital Status: Not on file   Intimate Partner Violence:     Fear of Current or Ex-Partner: Not on file    Emotionally Abused: Not on file    Physically Abused: Not on file    Sexually Abused: Not on file   Housing Stability:     Unable to Pay for Housing in the Last Year: Not on file    Number of [place] in the Last Year: Not on file    Unstable Housing in the Last Year: Not on file         ALLERGIES: Patient has no known allergies. Review of Systems   Constitutional: Positive for fatigue and fever. HENT: Negative for sneezing and sore throat. Respiratory: Positive for cough and shortness of breath. Negative for wheezing. Cardiovascular: Positive for chest pain. Negative for leg swelling. Gastrointestinal: Negative for abdominal pain, diarrhea, nausea and vomiting. Genitourinary: Negative for difficulty urinating and dysuria. Musculoskeletal: Negative for arthralgias and myalgias. Skin: Negative for color change and rash. Neurological: Negative for weakness and headaches. Psychiatric/Behavioral: Negative for agitation and behavioral problems. There were no vitals filed for this visit.          Physical Exam  Vitals and nursing note reviewed. Constitutional:       General: He is not in acute distress. Appearance: Normal appearance. He is well-developed. He is obese. He is not ill-appearing, toxic-appearing or diaphoretic. HENT:      Head: Normocephalic and atraumatic. Nose: Nose normal.      Mouth/Throat:      Mouth: Mucous membranes are moist.      Pharynx: Oropharynx is clear. Eyes:      Extraocular Movements: Extraocular movements intact. Conjunctiva/sclera: Conjunctivae normal.      Pupils: Pupils are equal, round, and reactive to light. Cardiovascular:      Rate and Rhythm: Normal rate and regular rhythm. Pulses: Normal pulses. Heart sounds: No murmur heard. Pulmonary:      Effort: Pulmonary effort is normal. Tachypnea present. No respiratory distress. Breath sounds: Normal breath sounds. No wheezing. Chest:      Chest wall: No mass or tenderness. Abdominal:      General: There is no distension. Palpations: Abdomen is soft. Tenderness: There is no abdominal tenderness. There is no guarding or rebound. Musculoskeletal:         General: No swelling, tenderness, deformity or signs of injury. Normal range of motion. Cervical back: Normal range of motion and neck supple. No rigidity. No muscular tenderness. Right lower leg: No tenderness. No edema. Left lower leg: No tenderness. No edema. Skin:     General: Skin is warm and dry. Capillary Refill: Capillary refill takes less than 2 seconds. Neurological:      General: No focal deficit present. Mental Status: He is alert and oriented to person, place, and time. Psychiatric:         Mood and Affect: Mood normal.         Behavior: Behavior normal.          MDM  Number of Diagnoses or Management Options  Diagnosis management comments: 48-year-old male presents as above with hypoxia in the setting of Covid. He is unvaccinated. Plan admit to the hospital for further management. Amount and/or Complexity of Data Reviewed  Clinical lab tests: reviewed  Tests in the radiology section of CPT®: reviewed  Tests in the medicine section of CPT®: reviewed  Decide to obtain previous medical records or to obtain history from someone other than the patient: yes    Risk of Complications, Morbidity, and/or Mortality  General comments: 3:15 PM  I have spent 45 minutes of critical care time involved in lab review, consultations with specialist, family decision-making, and documentation. During this entire length of time I was immediately available to the patient. Critical Care: The reason for providing this level of medical care for this critically ill patient was due a critical illness that impaired one or more vital organ systems such that there was a high probability of imminent or life threatening deterioration in the patients condition. This care involved high complexity decision making to assess, manipulate, and support vital system functions, to treat this degreee vital organ system failure and to prevent further life threatening deterioration of the patients condition. ED Course as of 11/19/21 1515   Fri Nov 19, 2021   1329 ED EKG interpretation:Rhythm: Sinus tachycardia rate of approximately 126. Axis: normal.  ST segment:  No concerning ST elevations or depressions. This EKG was interpreted by Louis Servin MD,ED Provider. [JM]      ED Course User Index  [JM] Frank Mir MD       Procedures          Perfect Serve Consult for Admission  3:15 PM    ED Room Number: ER01/01  Patient Name and age:  Maricruz Toro 39 y.o.  male  Working Diagnosis:   1. COVID-19    2.  Hypoxia        COVID-19 Suspicion:  yes  Sepsis present:  no  Reassessment needed: N/A  Code Status:  Full Code  Readmission: no  Isolation Requirements:  yes  Recommended Level of Care:  step down  Department:[facility] ED - [phone]  Other: Currently on nonrebreather with SaO2 mid 90s

## 2021-11-19 NOTE — PROGRESS NOTES
Tocilizumab Initiation Note    This patient meets criteria for initiation of tocilizumab based on the following:    Positive COVID-19 test since hospital admission or within 14 days prior to admission  Invasive mechanical ventilation, noninvasive ventilation or high flow oxygen and given within 24 hours of vital (respiratory/cardiovascular) organ support initiation due to COVID-19 OR increasing oxygen needs  Respiratory support includes but is not limited to: Non-invasive/invasive ventilation   Cardiovascular support includes but is not limited to: Vasopressor support   CRP ? 7.5 mg/dL  (? 75mg/L)  Ordering provider is intensivist, ID, or pulmonary  Receiving steroid therapy    Precautions:  Invasive active mycobacterial or fungal infection  Platelets <594,097 or active bleeding  Not receiving systemic steroids  Significantly immunosuppressed patient (either medication related or comorbidity)  Elevated AST/ALT>10X ULN      Dose ordered:  810mg    Supporting information:   Date of First Known Symptom Onset: 11/12/21   Date of hospital admission: 11/19/21   Date of COVID+ test (not the day of result): 11/14/21   Baseline O2 requirement: HFNC 40L, fiuo2 100%  Patient weight: 137.9kg

## 2021-11-19 NOTE — CONSULTS
Name: Sánchez 87: [facility]   : 1985 Admit Date: 2021   Phone: [phone]  Room: [place] [address]   PCP: Umair Woodruff NP  MRN: [id number]   Date: 2021  Code: Full Code          Chart and notes reviewed. Data reviewed. I review the patient's current medications in the medical record at each encounter. I have evaluated and examined the patient. HPI:    5:11 PM       History was obtained from patient. I was asked by Edvin Agrawal MD to see Pardeep Guidry in consultation for a chief complaint of COVID-19 PNA with acute respiratory failure with hypoxia. History of Present Illness:  Mr. Fadia Thorpe is a 40 yo with a history of JESUS (on CPAP), childhood asthma (grew out of it), HTN, HLD, and obesity who is admitted with COVID-19 with acute respiratory failure with hypoxia. He began feeling poorly about a week ago and tested positive on Monday. He has had progressive shortness of breath, cough, and chest pain. He has had fever and is febrile to 101. 3. He was hypoxic to 68% on RA and requiring HF. He was satting in the mid 80's on 40L and 100%, increased to 50L with sats around 90%. Labs reviewed: WBC 15.4, cr 1.31, AST 64, ALT 46, procalcitonin 0.52, CRP 25.8,     Images reviewed:  CTA 2021: extensive groudglass opacities with some nodular opacities, hilar and mediastional LAD      Past Medical History:   Diagnosis Date    Hypercholesteremia     Hypertension 10/15/2014    Poison ivy 9/15/2015    Pure hypercholesterolemia     S/P vasectomy 2014       No past surgical history on file.     Family History   Problem Relation Age of Onset    Diabetes Mother     Heart Attack Father 43    Stroke Maternal Grandmother     Cancer Paternal Grandfather          age 45 - Hodgkin's   [de-identified] Maternal Grandfather          in 42's with melanoma       Social History     Tobacco Use    Smoking status: Never Smoker    Smokeless tobacco: Never Used   Substance Use Topics    Alcohol use: No     Alcohol/week: 0.0 standard drinks       No Known Allergies    Current Facility-Administered Medications   Medication Dose Route Frequency    sodium chloride (NS) flush 5-40 mL  5-40 mL IntraVENous Q8H    sodium chloride (NS) flush 5-40 mL  5-40 mL IntraVENous PRN    acetaminophen (TYLENOL) tablet 650 mg  650 mg Oral Q6H PRN    Or    acetaminophen (TYLENOL) suppository 650 mg  650 mg Rectal Q6H PRN    polyethylene glycol (MIRALAX) packet 17 g  17 g Oral DAILY PRN    ondansetron (ZOFRAN ODT) tablet 4 mg  4 mg Oral Q8H PRN    Or    ondansetron (ZOFRAN) injection 4 mg  4 mg IntraVENous Q6H PRN    enoxaparin (LOVENOX) injection 40 mg  40 mg SubCUTAneous Q12H    ascorbic acid (vitamin C) (VITAMIN C) tablet 500 mg  500 mg Oral BID    zinc sulfate (ZINCATE) 50 mg zinc (220 mg) capsule 1 Capsule  1 Capsule Oral DAILY    atorvastatin (LIPITOR) tablet 20 mg  20 mg Oral DAILY    prochlorperazine (COMPAZINE) injection 10 mg  10 mg IntraVENous Q6H PRN    insulin lispro (HUMALOG) injection   SubCUTAneous AC&HS    glucose chewable tablet 16 g  4 Tablet Oral PRN    dextrose (D50W) injection syrg 12.5-25 g  12.5-25 g IntraVENous PRN    glucagon (GLUCAGEN) injection 1 mg  1 mg IntraMUSCular PRN    cefTRIAXone (ROCEPHIN) 2 g in sterile water (preservative free) 20 mL IV syringe  2 g IntraVENous Q24H    doxycycline (VIBRAMYCIN) 100 mg in 0.9% sodium chloride (MBP/ADV) 100 mL MBP  100 mg IntraVENous Q12H    [START ON 11/20/2021] amLODIPine (NORVASC) tablet 10 mg  10 mg Oral DAILY    [START ON 11/20/2021] hydroCHLOROthiazide (HYDRODIURIL) tablet 12.5 mg  12.5 mg Oral DAILY    sodium chloride 0.9 % bolus infusion 500 mL  500 mL IntraVENous ONCE    dexamethasone (DECADRON) 10 mg/mL injection 10 mg  10 mg IntraVENous Q12H    benzonatate (TESSALON) capsule 200 mg  200 mg Oral TID    HYDROcodone-chlorpheniramine (TUSSIONEX) oral suspension 5 mL  5 mL Oral Q12H Current Outpatient Medications   Medication Sig    metaxalone (SKELAXIN) 800 mg tablet Take 800 mg by mouth three (3) times daily as needed for Muscle Spasm(s).  naproxen (NAPROSYN) 500 mg tablet Take 500 mg by mouth two (2) times daily as needed for Pain.  hydroCHLOROthiazide (HYDRODIURIL) 12.5 mg tablet TAKE 1 TABLET BY MOUTH EVERY DAY    atorvastatin (LIPITOR) 10 mg tablet TAKE 1 TABLET DAILY    amLODIPine (NORVASC) 10 mg tablet TAKE 1 TABLET DAILY    glucosamine HCl/chondroitin bone (GLUCOSAMINE-CHONDROITIN PO) Take  by mouth. 1,500mg/1,200mg/MGMSM 1,000mg    Krill-OM3-DHA-EPA-OM6-Lip-Astx (KRILL OIL) 1000-130(40-80) mg cap Take 1 Cap by mouth once over twenty-four (24) hours.  FERROUS FUMARATE/VIT BCOMP&C (SUPER B COMPLEX PO) Take  by mouth. REVIEW OF SYSTEMS   12 point ROS negative except as stated in the HPI. Physical Exam:   Visit Vitals  /65   Pulse (!) 112   Temp (!) 101.3 °F (38.5 °C)   Resp 23   Ht 5' 11\" (1.803 m)   Wt 137.9 kg (304 lb)   SpO2 (!) 89%   BMI 42.40 kg/m²       General:  Alert, cooperative, no distress, appears stated age. Head:  Normocephalic, without obvious abnormality, atraumatic. Eyes:  Conjunctivae/corneas clear. Nose: Nares normal. Septum midline. Mucosa normal.    Throat: Lips, mucosa, and tongue normal.    Neck: Supple, symmetrical, trachea midline   Lungs:   Diminished   Chest wall:  No tenderness or deformity. Heart:  Regular rate and rhythm, S1, S2 normal, no murmur, click, rub or gallop. Abdomen:   Soft, non-tender. Bowel sounds normal.    Extremities: Extremities normal, atraumatic, no cyanosis or edema. Pulses: 2+ and symmetric all extremities.    Skin: Skin color, texture, turgor normal. No rashes or lesions       Neurologic: Grossly nonfocal       Lab Results   Component Value Date/Time    Sodium 133 (L) 11/19/2021 01:21 PM    Potassium 4.0 11/19/2021 01:21 PM    Chloride 99 11/19/2021 01:21 PM    CO2 28 11/19/2021 01:21 PM BUN 27 (H) 11/19/2021 01:21 PM    Creatinine 1.31 (H) 11/19/2021 01:21 PM    Glucose 122 (H) 11/19/2021 01:21 PM    Calcium 8.5 11/19/2021 01:21 PM       Lab Results   Component Value Date/Time    WBC 15.4 (H) 11/19/2021 01:21 PM    HGB 15.5 11/19/2021 01:21 PM    PLATELET 468 31/30/1911 01:21 PM    MCV 89.3 11/19/2021 01:21 PM       Lab Results   Component Value Date/Time    Alk.  phosphatase 58 11/19/2021 01:21 PM    Protein, total 8.2 11/19/2021 01:21 PM    Albumin 3.2 (L) 11/19/2021 01:21 PM    Globulin 5.0 (H) 11/19/2021 01:21 PM       No results found for: IRON, FE, TIBC, IBCT, PSAT, FERR    Lab Results   Component Value Date/Time    C-Reactive protein 25.80 (H) 11/19/2021 01:21 PM    TSH 2.510 11/30/2018 09:47 AM    TSH 1.260 10/17/2013 09:40 AM        No results found for: PH, PHI, PCO2, PCO2I, PO2, PO2I, HCO3, HCO3I, FIO2, FIO2I    No results found for: CPK, RCK1, RCK2, RCK3, RCK4, CKNDX, CKND1, TROPT, TROIQ, BNPP, BNP     No results found for: CULT    No results found for: TOXA1, RPR, HBCM, HBSAG, HAAB, HCAB1, HAAT, G6PD, CRYAC, HIVGT, HIVR, HIV1, HIV12, HIVPC, HIVRPI    No results found for: VANCT, CPK    Lab Results   Component Value Date/Time    Color Yellow 10/17/2013 09:41 AM    Appearance Clear 10/17/2013 09:41 AM    pH (UA) 6.0 10/17/2013 09:41 AM    Protein NEGATIVE  09/02/2010 10:30 AM    Glucose NEGATIVE  09/02/2010 10:30 AM    Ketone Negative 10/17/2013 09:41 AM    Bilirubin Negative 10/17/2013 09:41 AM    Blood Negative 10/17/2013 09:41 AM    Urobilinogen 0.2 09/02/2010 10:30 AM    Nitrites Negative 10/17/2013 09:41 AM    Leukocyte Esterase Negative 10/17/2013 09:41 AM       IMPRESSION  · COVID-19 PNA  · Acute respiratory failure with hypoxia  · JESUS  · Severe sepsis    PLAN  · Goal sats 88% or higher, currently on HF and can transition to BiPAP if unable to meet goal or has increased WOB  · Will consult pharmacy for Actemra  · Add high dose decadron BID  · Doxy/cetriaxone reasonable given the severity of his disease  · Will schedule combivent  · Tessalon, tussionex  · Lovenox, follow-up d-dimer and increase to therapeutic if significantly elevated  · Trend d-dimer, inflammatory markers  · Low threshold for transfer to the ICU    Patient is critically ill in the setting of COVID-19 and acute respiratory failure with hypoxia requiring continuous HF. CC time 35 minutes. Thank you for allowing us to participate in the care of this patient. We will be happy to follow along in his/her progress with you.     King Guzman MD

## 2021-11-19 NOTE — H&P
[facility] [address] [place] [address]   Office [phone]  Fax [phone]       Admission H&P     Name: Terrie Flores MRN: [id number]  Sex: Male   YOB: 1985  Age: 39 y.o. PCP: Daxa Daniel NP     Source of Information: patient, medical records    Chief complaint: chest pain, shortness of breath    History of Present Illness  Terrie Flores is a 39 y.o. unvaccinated male with PMHx of HTN, HLD who presents to the ED complaining of shortness of breath and chest pain in setting of recent positive COVID test at [facility] (urgent care [facility]) on 11/14. Symptom onset on 11/12 including productive cough with green sputum, dyspnea. Since then, he has also had nausea with vomiting and diarrhea x 2 days. Has been able to tolerate clear liquids and crackers without issue, but does endorse nausea with any other solid foods. Had fever to 102.8F yesterday night. Notes he has had some chest pain as well when he coughs, but no consistent CP with exertion. Denies urinary urgency, frequency, or dysuria. Reports anosmia and dysgeusia as well. Presented to the ED today as his SpO2 at home read at 68%. Attempted to use OTC Mucinex and Nyquil with mild relief of symptoms. COVID Questions: Tested positive for COVID. In the ED:  Vitals: Temp 99.3   /75      RR 26   SatO2  80% on 6L  Labs: CBC (WBC 15.4 with ANC 13.7, Hb 15.5, ), CMP (Na 133, K 4.0, BUN/Cr 27/1.31, TB 0.7, AST/ALT 64/46) procal 0.52, CRP 25.80  Imaging: CTA chest - no definite/prox PE, no aortic aneurysm/dissection, moderate to severe COVID PNA  Treatment: 1L NS    EKG: Sinus tachycardia at 126bpm. Septal infarct, age undetermined, QTc 402. Patient Vitals for the past 12 hrs:   Temp Pulse Resp BP SpO2   11/19/21 1259 99.3 °F (37.4 °C) (!) 135 26 133/75 (!) 80 %   11/19/21 1253 -- (!) 142 -- -- (!) 68 %       Review of Systems  Review of Systems   Constitutional: Positive for chills, fatigue and fever. HENT: Positive for congestion, sinus pressure and sore throat. Respiratory: Positive for cough, chest tightness and shortness of breath. Cardiovascular: Negative for chest pain and palpitations. Gastrointestinal: Positive for diarrhea, nausea and vomiting. Negative for abdominal pain and constipation. Genitourinary: Negative for difficulty urinating, dysuria, frequency and urgency. Musculoskeletal: Positive for myalgias. Negative for back pain. Neurological: Positive for dizziness and headaches. Home Medications   Prior to Admission medications    Medication Sig Start Date End Date Taking? Authorizing Provider   hydroCHLOROthiazide (HYDRODIURIL) 12.5 mg tablet TAKE 1 TABLET BY MOUTH EVERY DAY 10/25/21   Mia Camara, EARL   atorvastatin (LIPITOR) 10 mg tablet TAKE 1 TABLET DAILY 9/7/21   Mia Camara, NP   OneTouch Verio Flex meter misc USE TO TEST GLUCOSE ONCE DAILY 4/30/21   Provider, Historical   OneTouch Delica Plus Lancet 30 gauge misc TEST SUGAR TWICE DAILY. 4/30/21   Provider, Historical   glucose blood VI test strips (ASCENSIA AUTODISC VI, ONE TOUCH ULTRA TEST VI) strip Test fasting sugar and blood sugars qhs daily. Fill as One Touch Verio test strips. DX E 11.9 6/28/59   Violette Cosby MD   naproxen (NAPROSYN) 500 mg tablet Take 1 Tablet by mouth two (2) times daily (with meals). Indications: pain 8/68/24   Violette Cosby MD   metaxalone [facility]) 800 mg tablet Take 1 Tablet by mouth three (3) times daily. Indications: muscle spasm 9/71/49   Violette Cosby MD   insulin glargine (LANTUS,BASAGLAR) 100 unit/mL (3 mL) inpn 20 Units by SubCUTAneous route nightly. Indications: type 2 diabetes mellitus  Patient taking differently: 20 Units by SubCUTAneous route nightly.  *NOT CURRENTLY ON MEDICATION D/T DIET CONTROL OF BS*  Indications: type 2 diabetes mellitus 9/89/96   Violette Cosby MD   Insulin Needles, Disposable, 31 gauge x 5/16\" ndle Use 1 daily with insulin pen.  Patient taking differently: Use 1 daily with insulin pen. *NOT CURRENTLY ON MEDICATION D/T DIET CONTROL OF BS*    Deepak Jacobo MD   metFORMIN ER (GLUCOPHAGE XR) 500 mg tablet Take 2 Tabs by mouth daily (with dinner). Indications: type 2 diabetes mellitus  Patient taking differently: Take 1,000 mg by mouth daily (with dinner). NOT CURRENTLY ON MEDICATION D/T DIET CONTROL OF BS  Indications: type 2 diabetes mellitus 58   Deepak Jacobo MD   amLODIPine (NORVASC) 10 mg tablet TAKE 1 TABLET DAILY    Deepak Jacobo MD   glucosamine HCl/chondroitin bone (GLUCOSAMINE-CHONDROITIN PO) Take  by mouth. 1,500mg/1,200mg/MGMSM 1,000mg    Provider, Historical   Krill-OM3-DHA-EPA-OM6-Lip-Astx (KRILL OIL) 1000-130(40-80) mg cap Take 1 Cap by mouth once over twenty-four (24) hours. Provider, Historical   FERROUS FUMARATE/VIT BCOMP&C (SUPER B COMPLEX PO) Take  by mouth. Provider, Historical       Allergies  No Known Allergies    Past Medical History  Past Medical History:   Diagnosis Date    Hypercholesteremia     Hypertension 10/15/2014    Poison ivy 9/15/2015    Pure hypercholesterolemia     S/P vasectomy 2014       Previous Hospitalization(s)  No past surgical history on file. Family History  Family History   Problem Relation Age of Onset    Diabetes Mother     Heart Attack Father 43    Stroke Maternal Grandmother     Cancer Paternal Grandfather          age 45 - Hodgkin's   [facility] Cancer Maternal Grandfather          in 42's with melanoma       Social History  Alcohol history: Not at all  Smoking history: Non-smoker  Illicit drug history: Not at all  Living arrangement: patient lives with their family.   Ambulates: Independently     Vital Signs  Visit Vitals  /75 (BP 1 Location: Right arm, BP Patient Position: At rest)   Pulse (!) 135   Temp 99.3 °F (37.4 °C)   Resp 26   Ht 5' 11\" (1.803 m)   Wt 304 lb (137.9 kg)   SpO2 (!) 80%   BMI 42.40 kg/m²       Physical Exam  General: In mild distress. Alert. Cooperative. Head: Normocephalic. Atraumatic. Neck: Supple. Normal ROM. No stiffness. Respiratory: Bibasilar crackles. No wheezes or rhonchi   Cardiovascular: Tachycardic. Regular rhythm. Normal S1,S2. No m/r/g.    GI: + bowel sounds. Nontender. No rebound tenderness or guarding. Nondistended. Extremities: Trace LE edema. Musculoskeletal: Full ROM in all extremities. Skin: Warm, dry. No rashes. Neuro: Alert and oriented to conversation. Laboratory Data  Recent Results (from the past 8 hour(s))   CBC WITH AUTOMATED DIFF    Collection Time: 11/19/21  1:21 PM   Result Value Ref Range    WBC 15.4 (H) 4.1 - 11.1 K/uL    RBC 5.12 4.10 - 5.70 M/uL    HGB 15.5 12.1 - 17.0 g/dL    HCT 45.7 36.6 - 50.3 %    MCV 89.3 80.0 - 99.0 FL    MCH 30.3 26.0 - 34.0 PG    MCHC 33.9 30.0 - 36.5 g/dL    RDW 12.7 11.5 - 14.5 %    PLATELET 405 762 - 422 K/uL    MPV 10.3 8.9 - 12.9 FL    NRBC 0.0 0  WBC    ABSOLUTE NRBC 0.00 0.00 - 0.01 K/uL    NEUTROPHILS 89 (H) 32 - 75 %    LYMPHOCYTES 7 (L) 12 - 49 %    MONOCYTES 3 (L) 5 - 13 %    EOSINOPHILS 0 0 - 7 %    BASOPHILS 0 0 - 1 %    IMMATURE GRANULOCYTES 1 (H) 0.0 - 0.5 %    ABS. NEUTROPHILS 13.7 (H) 1.8 - 8.0 K/UL    ABS. LYMPHOCYTES 1.0 0.8 - 3.5 K/UL    ABS. MONOCYTES 0.5 0.0 - 1.0 K/UL    ABS. EOSINOPHILS 0.0 0.0 - 0.4 K/UL    ABS. BASOPHILS 0.0 0.0 - 0.1 K/UL    ABS. IMM.  GRANS. 0.1 (H) 0.00 - 0.04 K/UL    DF AUTOMATED     METABOLIC PANEL, COMPREHENSIVE    Collection Time: 11/19/21  1:21 PM   Result Value Ref Range    Sodium 133 (L) 136 - 145 mmol/L    Potassium 4.0 3.5 - 5.1 mmol/L    Chloride 99 97 - 108 mmol/L    CO2 28 21 - 32 mmol/L    Anion gap 6 5 - 15 mmol/L    Glucose 122 (H) 65 - 100 mg/dL    BUN 27 (H) 6 - 20 MG/DL    Creatinine 1.31 (H) 0.70 - 1.30 MG/DL    BUN/Creatinine ratio 21 (H) 12 - 20      GFR est AA >60 >60 ml/min/1.73m2    GFR est non-AA >60 >60 ml/min/1.73m2    Calcium 8.5 8.5 - 10.1 MG/DL Bilirubin, total 0.7 0.2 - 1.0 MG/DL    ALT (SGPT) 46 12 - 78 U/L    AST (SGOT) 64 (H) 15 - 37 U/L    Alk. phosphatase 58 45 - 117 U/L    Protein, total 8.2 6.4 - 8.2 g/dL    Albumin 3.2 (L) 3.5 - 5.0 g/dL    Globulin 5.0 (H) 2.0 - 4.0 g/dL    A-G Ratio 0.6 (L) 1.1 - 2.2     C REACTIVE PROTEIN, QT    Collection Time: 11/19/21  1:21 PM   Result Value Ref Range    C-Reactive protein 25.80 (H) 0.00 - 0.60 mg/dL   PROCALCITONIN    Collection Time: 11/19/21  1:21 PM   Result Value Ref Range    Procalcitonin 0.52 ng/mL   TROPONIN-HIGH SENSITIVITY    Collection Time: 11/19/21  1:21 PM   Result Value Ref Range    Troponin-High Sensitivity 9 0 - 76 ng/L   SAMPLES BEING HELD    Collection Time: 11/19/21  1:21 PM   Result Value Ref Range    SAMPLES BEING HELD 1SST     COMMENT        Add-on orders for these samples will be processed based on acceptable specimen integrity and analyte stability, which may vary by analyte. EKG, 12 LEAD, INITIAL    Collection Time: 11/19/21  1:26 PM   Result Value Ref Range    Ventricular Rate 126 BPM    Atrial Rate 126 BPM    P-R Interval 160 ms    QRS Duration 68 ms    Q-T Interval 278 ms    QTC Calculation (Bezet) 402 ms    Calculated P Axis 32 degrees    Calculated R Axis 22 degrees    Calculated T Axis 22 degrees    Diagnosis       Sinus tachycardia with fusion complexes  Septal infarct , age undetermined  Abnormal ECG  No previous ECGs available         Imaging  CXR Results  (Last 48 hours)    None        CT Results  (Last 48 hours)               11/19/21 1503  CTA CHEST W OR W WO CONT Final result    Impression:  There is no definite/proximal pulmonary embolism. There is no aortic aneurysm or dissection. Imaging findings consistent with moderate to severe Covid pneumonia. Associated hilar/mediastinal lymphadenopathy. Follow-up chest CT in 6-8 weeks to evaluate for stability or resolution is   recommended.        Narrative:  Clinical history: Covid, hypoxia, chest pain, eval for PE INDICATION:   Covid, hypoxia, chest pain, eval for PE   COMPARISON: None           CONTRAST: 100 ml Isovue 370   TECHNIQUE: CT of the chest with  IV contrast , Isovue-370 is performed. Axial   images from the thoracic inlet to the level of the upper abdomen are obtained. Manual post-processing of the images and coronal reformatting is also performed. CT dose reduction was achieved through use of a standardized protocol tailored   for this examination and automatic exposure control for dose modulation. Multiplanar reformatted imaging was performed. Sagittal and coronal reformatting. 3-D Postprocessing of imaging was performed. 3-D MIP reconstructed images were obtained in the coronal plane. FINDINGS:       Phase of contrast enhancement in this examination, phase of enhancement likely   altered due to extensive pulmonary parenchymal disease. There is no proximal pulmonary embolism. There is no aortic aneurysm or dissection. Extensive confluent groundglass, parenchymal and nodular opacities on the right   and on the left. Hilar adenopathy. Coronary vascular calcification. There is no   pleural or pericardial effusion. There The aorta is normal in course and   caliber. The proximal pulmonary arteries are without evidence of filling   defects. No lytic or blastic lesions are identified. The remainder of the upper   abdomen visualized is unremarkable. Assessment and Plan     Galilea Tony is a 39 y.o. male with a PMHx of HTN, HLD who is admitted for AHRF 2/2 COVID PNA. AHRF 2/2 COVID PNA: Unvaccinated. Symptom onset 11/12 with acute respiratory decompensation in past day - O2 sats in high 60s prior to admission. Currently on HFNC at [address] [place] [address].  CTA chest without evidence of definite/proximal PE, no aortic aneurysm/dissection, imaging consistent with moderate to severe COVID PNA and associated hilar/mediastinal lymphadenopathy.  - Admit to stepdown  - Vitals per unit protocol  - On HFNC, wean as tolerated  - VBG ordered to eval for BiPAP needs  - COVID labs (CRP, D-dimer, ferritin, LD), CBC with diff, CMP daily  - ABX as below   - COVID meds (Atorvastatin 20mg every day, Vit C 500mg BID, Zinc 220mg daily)  - Lovenox 40mg subQ BID  - Tessalon 200mg TID + Tussionex 5mL BID  - Consulted pulm, appreciate recs in regards to corticosteroids and Actemra (vs Baricitinib)  - Strongly suggest vaccination after discharge    Severe sepsis with 3/4 SIRS (HR, RR, WBC) 2/2 COVID PNA: POA , RR 26, WBC 15.4 (ANC 13.7). Given 3/4 SIRS and source, will eval for other underlying sources of infection. Given PCT 0.52, will start abx coverage for CAP which may be superimposed on COVID infection.  - BCx, UA, UCx ordered  - PNA labs ordered  - Stool studies ordered  - LA ordered  - CTX 2g q24h + Doxycycline 100mg q12h started 11/19  - S/p 1L NS in ED, however in view of COVID PNA, would prefer to keep patient dry. Will f/u on LA and eval for need of fluid if pt appears to decompensate or LA elevated. - Additional 500cc bolus ordered at this time, will continue to re-assess volume status   - Spoke with pulmonology in regards to pt meeting severe sepsis criteria and concern with providing full 30cc/kg bolus, was recommended to provide 1.5L total in lieu of providing total 2.25 bolus pt would receive per IBW   - f/u pro-BNP to eval for pt ability to tolerate fluids    At-risk JAMIR: POA Cr 1.31, BL 0.8. Likely IVVD in setting of poor PO intake. - S/p 1L NS  - Will order additional 500cc bolus, will address fluid status as above  - Daily CMP    Chest pain: Description is pleuritic in nature, worse with cough. EKG without evidence of acute ischemia. Troponin of 9 makes CAD less concerning.   - Cardiac monitoring, will continue to monitor    Diarrhea: Watery diarrhea x 2 days. Likely 2/2 COVID infection. - Will encourage PO intake   - Stool studies pending    HTN: Pt with low-normal Bps.  On Amlodipine 10mg daily and HCTZ 12.5mg daily  - Will hold BP meds at this time given low Bps and concern for septic shock    HLD: Chronic, stable. Last lipids 07/2021 , HDL 54, LDL 98.6, . On Atorvastatin 10mg daily. - Will start Atorvastatin 20mg daily for benefit in COVID pts    T2DM (diet-controlled): Prior A1c 5.8% in 05/2021. No meds at present.   - SSI (resistant given morbid obesity)  - POC q6h POC BG checks. JESUS: On CPAP at night. - Compliant with CPAP, will start BiPAP at night. Obesity:  - The pt's Body mass index is 42.4 kg/m². - Encouraging lifestyle modifications and further follow up outpatient. FEN/GI - NPO  Activity - Ambulate with assistance  DVT prophylaxis - Lovenox  GI prophylaxis - Protonix  Fall prophylaxis - Not indicated at this time. Disposition - Admit to Stepdown. Plan to d/c to TBD. Consulting PT, OT and CM  Code Status - Full. Discussed with patient / caregivers.   Next of Kin Name and [address],  Spouse - [phone]    Patient Flavia Mckeon will be discussed with Dr. Rajeev Alberts MD.    3:37 PM, 11/19/21  Hernan Thomason MD  Family Medicine Resident       For Billing    Chief Complaint   Patient presents with   [facility] Problems  Date Reviewed: 4/30/2021    None

## 2021-11-19 NOTE — ED NOTES
TRANSFER - OUT REPORT:    Verbal report given to Karen(name) on Diana Razor  being transferred to ICU(unit) for routine progression of care       Report consisted of patients Situation, Background, Assessment and   Recommendations(SBAR). Information from the following report(s) SBAR, Kardex and ED Summary was reviewed with the receiving nurse. Lines:   Peripheral IV 11/19/21 Left Antecubital (Active)   Site Assessment Clean, dry, & intact 11/19/21 1324   Phlebitis Assessment 0 11/19/21 1324   Infiltration Assessment 0 11/19/21 1324   Dressing Status Clean, dry, & intact 11/19/21 1324   Dressing Type Non-adherent dressing; Tape 11/19/21 1324   Hub Color/Line Status Pink 11/19/21 1324       Peripheral IV 11/19/21 Right Antecubital (Active)   Site Assessment Clean, dry, & intact 11/19/21 1650   Phlebitis Assessment 0 11/19/21 1650   Infiltration Assessment 0 11/19/21 1650   Dressing Status Clean, dry, & intact 11/19/21 1650        Opportunity for questions and clarification was provided.       Patient transported with:   Monitor  Registered Nurse

## 2021-11-19 NOTE — PROGRESS NOTES
Reason for Admission:  Acute hypoxic respiratory failure- COVID-19 positive. RUR Score: 6% LOW            Plan for utilizing home health: Per recommendation pending pt's progression during hospitalization stay. PCP: First and Last name:  Josette Merino NP   Name of Practice:    Are you a current patient: Yes/No: Yes    Approximate date of last visit: Pt sees PCP every 6 months- wife unsure of when the exact    Can you participate in a virtual visit with your PCP: Yes                     Current Advanced Directive/Advance Care Plan: Full Code- no AMD on file. Floor CM to assess completing them prior to d/c pending pt's medical improvement. Healthcare Decision Maker:  Updated to reflect information obtained. Transition of Care Plan:                    Pt is a 39year old,  male, admitted with acute hypoxic respiratory failure due to being COVID-19 positive. Pt remains in ED on NRB. CM reached out to pt's wife to complete initial assessment. Pt is AOX4 at baseline- lives with his wife and their two kids (4 y/o and 7 y/o) in a two level home- pt's bedroom is on the first level-3 steps to enter. Pt is independent with ADLs- has no DME, drives and works full time. Pt has not had HH or been to any SNF/IPR in the past.     Pt's wife confirmed insurance coverage of [facility]- states they use the local [facility] in [place] P[place] and have no problems affording or accessing medications. Pt's wife states her and the children are in quarantine kids were [phone] positive and symptomatic for 2 days and she is symptomatic but never got tested- all unvaccinated by choice. Pt's wife states she drove pt to the hospital and will drive him home at time of discharge- they also have a very strong family support system. Pt's wife states they have several family and friends who are able to drop off food/medications as needed while they are in [place].      Pt remains in ED pending bed for admission for further mgmt. Pt's wife states no questions or concerns at this time- floor CM will continue to follow and assist as needed. Care Management Interventions  PCP Verified by CM:  Yes  Mode of Transport at Discharge: Self  Transition of Care Consult (CM Consult): Discharge Planning  MyChart Signup: No  Discharge Durable Medical Equipment: No  Health Maintenance Reviewed: Yes  Physical Therapy Consult: No  Occupational Therapy Consult: No  Speech Therapy Consult: No  Support Systems: Spouse/Significant Other, Child(maco), Parent(s), Other Family Member(s)  Confirm Follow Up Transport: Family  The Patient and/or Patient Representative was Provided with a Choice of Provider and Agrees with the Discharge Plan?: Yes  Name of the Patient Representative Who was Provided with a Choice of Provider and Agrees with the Discharge Plan: Spoke with pt's wife  Freedom of Choice List was Provided with Basic Dialogue that Supports the Patient's Individualized Plan of Care/Goals, Treatment Preferences and Shares the Quality Data Associated with the Providers?: Yes  Discharge Location  Discharge Placement: Home with family assistance     Gina Carpenter, MSJOSE, [address]

## 2021-11-19 NOTE — PROGRESS NOTES
1730: TRANSFER - IN REPORT:    Verbal report received from SAUL Mendez(name) on Shukla American  being received from ED(unit) for routine progression of care      Report consisted of patients Situation, Background, Assessment and   Recommendations(SBAR). Information from the following report(s) SBAR, Intake/Output, MAR, Recent Results and Cardiac Rhythm Sinus Tach was reviewed with the receiving nurse. Opportunity for questions and clarification was provided. Assessment completed upon patients arrival to unit and care assumed. 1800: Patient arrived from ED. Assisted over to new bed and hooked up to monitor. Assessment completed. Patient refused gown at this time. Educated on call bell and to not get out of bed by himself. Requesting water, educated on NPO status and how he won't be able to eat while on high amounts of oxygen. Currently on Hi flow 50 L 100%. Complaining of dyspnea on exertion. 1820: Patient placed on bipap by RT, is trying to sleep. 1900: Bedside and Verbal shift change report given to [employer], RN (oncoming nurse) by Erna Gonzales RN (offgoing nurse). Report included the following information SBAR, Intake/Output, MAR, Recent Results and Cardiac Rhythm Sinus Tach.

## 2021-11-20 NOTE — PROGRESS NOTES
Verbal shift change report given to Jaqueline Blank RN (oncoming nurse) by Carey Meredith RN (offgoing nurse). Report included the following information SBAR, Kardex, ED Summary, Intake/Output, MAR, Recent Results, Cardiac Rhythm Sinus Tachycardia, Alarm Parameters  and Quality Measures. 2007: Wife called for update. Verified with patient OK to speak with Brandon Nolasco. Andie Bhakta MD from family medicine at bedside while speaking with wife. Informed provider patient's wife has questions she would like to discuss with her. Nona's contact information given to provider & informed wife provider would be contacting her to discuss her questions. 2136: Patient dangled on side of bed to use urinal. Patient desatted to 82%. Patient encouraged to take deep breaths through nose. Patient remained satting the 80's for approximately 20 minutes after activity. 0145: On call provider paged regarding patient c/o incessant cough. Awaiting return page at this time. 4648: Patient OOB to chair. Patient desatting to 80's on HFNC. Increased flow from 60lpm to 70lpm. Patient achieving 1000mL on incentive spirometry. Primary Nurse Shilo Oneil RN and Alice Arora RN performed a dual skin assessment on this patient No impairment noted  Cameron score is 18, tattoos noted. Verbal shift change report given to Gianna Patel RN (oncoming nurse) by [employer], RN (offgoing nurse). Report included the following information SBAR, Kardex, Intake/Output, MAR, Recent Results, Cardiac Rhythm NSR/Sinus Tachycardia, Alarm Parameters  and Quality Measures.      Signed By: Shilo Oneil RN     November 20, 2021

## 2021-11-20 NOTE — PROGRESS NOTES
Called and provided update to pt's wife: Sathish Rivera [phone]. Discussed pt's current oxygen needs and treatment plan as pt has now been placed on continuous bipap. Readdressed intubation if his oxygen needs continue to increase as pt remains FULL code. All questions were answered.      Chino Stanford MD

## 2021-11-20 NOTE — PROGRESS NOTES
[address] [facility] [address] [place] [address]   Office [phone]  Fax [phone]          Assessment and Plan     Terrie Flores is a 39 y.o. male with a PMH of HTN, HLD, JESUS admitted for AHRF and severe sepsis 2/2 COVID PNA. Patient was admitted on 11/19/2021.    24 Hour Events: No acute events overnight. AHRF 2/2 COVID PNA: Unvaccinated. Symptom onset 11/12 with acute respiratory decompensation in past day - O2 sats in high 60s prior to admission. CTA chest without evidence of definite/proximal PE, no aortic aneurysm/dissection, imaging consistent with moderate to severe COVID PNA and associated hilar/mediastinal lymphadenopathy. Currently on HFNC at 70L.  - On HFNC, may be need BiPAP - will order VBG if begins to desat on HFNC  - COVID labs (CRP, D-dimer, ferritin, LD), CBC with diff, CMP daily  - Continue Decadron 10mg IV BID  - S/p Tocilizumab 800mg 11/19  - Lovenox 40mg subQ BID  - Tessalon 200mg TID + Tussionex 5mL BID  - COVID meds (Atorvastatin 20mg every day, Vit C 500mg BID, Zinc 220mg daily)  - Consulted pulm, appreciate recs  - Strongly suggest vaccination after discharge     Severe sepsis with 3/4 SIRS (HR, RR, WBC) 2/2 COVID PNA: POA , RR 26, WBC 15.4 (ANC 13.7). Given 3/4 SIRS and source, will eval for other underlying sources of infection. Given PCT 0.52, continuing ABX for possible underlying CAP with COVID PNA. However, this is less likely given LA of 1.1.   - BCx, UA, UCx pending  - PNA labs pending  - Stool studies ordered  - CTX 2g q24h + Doxycycline 100mg q12h started 11/19     At-risk JAMIR: POA Cr 1.31, BL 0.8; down-trending this AM. Likely IVVD in setting of poor PO intake. - Pt NPO for possible need for BiPAP. In setting of COVID, will start half maintenance fluids and will continue to assess volume status  - Daily CMP     Chest pain: Description is pleuritic in nature, worse with cough. EKG without evidence of acute ischemia. Troponin of 9 makes CAD less concerning. - Cardiac monitoring, will continue to monitor     Diarrhea: Watery diarrhea x 2 days. Likely 2/2 COVID infection. Improved since presentation.  - Stool studies pending sample     HTN: Pt with low-normal Bps. On Amlodipine 10mg daily and HCTZ 12.5mg daily  - Will hold home meds     HLD: Chronic, stable. Last lipids 07/2021 , HDL 54, LDL 98.6, . On Atorvastatin 10mg daily. - Will start Atorvastatin 20mg daily for benefit in COVID pts     T2DM (diet-controlled): Prior A1c 5.8% in 05/2021. No meds at present.   - SSI (resistant given morbid obesity)  - POC q6h POC BG checks.     JESUS: On CPAP at night. - Compliant with CPAP, will start BiPAP at night.     Obesity:  - The pt's Body mass index is 42.4 kg/m². - Encouraging lifestyle modifications and further follow up outpatient.        FEN/GI - NPO with NS at 75 cc/hr  Activity - Ambulate with assistance  DVT prophylaxis - Lovenox  GI prophylaxis - Protonix  Fall prophylaxis - Not indicated at this time. Disposition - Admit to Stepdown. Plan to d/c to TBD. Consulting PT, OT and CM  Code Status - Full. Discussed with patient / caregivers. Next of Kin Name and [address]      Megan Ta MD  Family Medicine Resident         Subjective / Objective     Subjective  Pt feels breathing has improved since yesterday. No chest pain, palp, abdominal pain, n/v, c/d. Has not had a BM since admission yesterday. Respiratory: O2 Flow Rate (L/min): 70 l/min O2 Device: Hi flow nasal cannula   Visit Vitals  /64 (BP 1 Location: Right upper arm, BP Patient Position: At rest)   Pulse (!) 104   Temp 97.8 °F (36.6 °C)   Resp (!) 39   Ht 5' 11\" (1.803 m)   Wt 304 lb (137.9 kg)   SpO2 (!) 87%   BMI 42.40 kg/m²       General: No acute distress. Alert. Cooperative. Head: Normocephalic. Atraumatic. Neck: Supple. Normal ROM. No stiffness. Respiratory: CTAB. No w/r/r/c.  Cardiovascular: RRR. Normal S1,S2.  No m/r/g.   GI: + bowel sounds. Nontender. No rebound tenderness or guarding. Nondistended. Extremities:  Trace LE edema. Skin: Warm, dry. No rashes. Neuro: CN II-XII grossly intact. I/O:  Date 11/19/21 0700 - 11/20/21 0659 11/20/21 0700 - 11/21/21 0659   Shift 7671-8797 1356-3772 24 Hour Total 2243-4185 3755-0355 24 Hour Total   INTAKE   P.O.  240 240        P. O.  240 240      I. V.(mL/kg/hr)  100(0.1) 100(0)        Volume (tocilizumab (ACTEMRA) 800 mg in 0.9% sodium chloride 100 mL infusion)  100 100      Shift Total(mL/kg)  340(2.5) 340(2.5)      OUTPUT   Urine(mL/kg/hr)  975(0.6) 975(0.3)        Urine Voided  975 975      Shift Total(mL/kg)  975(7.1) 975(7.1)      NET  -635 -635      Weight (kg) 137.9 137.9 137.9 137.9 137.9 137.9       CBC:  Recent Labs     11/20/21  0413 11/19/21  1321   WBC 12.8* 15.4*   HGB 14.2 15.5   HCT 43.0 45.7    953       Metabolic Panel:  Recent Labs     11/20/21  0413 11/19/21  1321    133*   K 4.3 4.0    99   CO2 28 28   BUN 23* 27*   CREA 1.07 1.31*   * 122*   CA 8.3* 8.5   ALB 2.7* 3.2*   ALT 45 46          For Billing    Chief Complaint   Patient presents with   [facility] Problems  Date Reviewed: 4/30/2021          Codes Class Noted POA    Acute hypoxemic respiratory failure due to COVID-19 [facility]) ICD-10-CM: U07.1, J96.01  ICD-9-CM: 518.81, 079.89, 799.02  11/19/2021 Unknown

## 2021-11-20 NOTE — PROGRESS NOTES
Physical Therapy Note    Orders received and chart reviewed. Discussed pt case with covering RN who reports that pt is about to be switched over to continuous BiPAP with Respiratory Therapist at bedside. Pt  not medically stable for PT interventions at this time. Will follow up when medically appropriate. Thank you.     Laurel Arce PT, DPT

## 2021-11-21 NOTE — PROGRESS NOTES
Problem: Mobility Impaired (Adult and Pediatric)  Goal: *Acute Goals and Plan of Care (Insert Text)  Description: FUNCTIONAL STATUS PRIOR TO ADMISSION: Patient was independent and active without use of DME.    HOME SUPPORT PRIOR TO ADMISSION: The patient lived with his spouse and 2 school-aged children but did not require assist.    Physical Therapy Goals  Initiated 11/21/2021  1. Patient will move from supine to sit and sit to supine  in bed with independence within 7 day(s). 2.  Patient will transfer from bed to chair and chair to bed with independence using the least restrictive device within 7 day(s). 3.  Patient will perform sit to stand with independence within 7 day(s). 4.  Patient will ambulate with independence for 100 feet with the least restrictive device within 7 day(s). Outcome: Not Met   PHYSICAL THERAPY EVALUATION  Patient: Shilo Kate (12 y.o. male)  Date: 11/21/2021  Primary Diagnosis: Acute hypoxemic respiratory failure due to COVID-19 ([facility] [U07.1, J96.01]        Precautions:    (droplet +)      ASSESSMENT  Based on the objective data described below, the patient presents with increased oxygen requirements, mildly reduced endurance and functional mobility that is close to his functional baseline in the setting of hospital admission for COVID-19 infection. 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 Patient today demonstrates safe ambulation with SBA and assistance with lines 35ft total. He is received on continuous BIPAP and oxygen saturation maintained >95% throughout. Reviewed activity pacing, therapeutic exercises, pursed lip breathing, proning, activity recommendations and PT plan of care with patient demonstrating good understanding. Recommend OP pulm rehab vs. none upon d/c. Current Level of Function Impacting Discharge (mobility/balance):  SBA with assistance for lines     Functional Outcome Measure: The patient scored Total Score: 28/28 on the Tinetti outcome measure which is indicative of low fall risk. Other factors to consider for discharge: patient tells PT that his wife and children are currently sick but none are hospitalized except him. Patient will benefit from skilled therapy intervention to address the above noted impairments. PLAN :  Recommendations and Planned Interventions: bed mobility training, transfer training, gait training, therapeutic exercises, patient and family training/education, and therapeutic activities      Frequency/Duration: Patient will be followed by physical therapy:  2 times a week to address goals. Recommendation for discharge: (in order for the patient to meet his/her long term goals)  To be determined: OP pulm rehab vs none    This discharge recommendation:  Has not yet been discussed the attending provider and/or case management    IF patient discharges home will need the following DME: to be determined (TBD) likely none         SUBJECTIVE:   Patient stated I work at [employer] as a manager.     OBJECTIVE DATA SUMMARY:   HISTORY:    Past Medical History:   Diagnosis Date    Hypercholesteremia     Hypertension 10/15/2014    Poison ivy 9/15/2015    Pure hypercholesterolemia     S/P vasectomy 8/6/2014   No past surgical history on file.     Personal factors and/or comorbidities impacting plan of care: none additional    Home Situation  Home Environment: Private residence  # Steps to Enter: 2  Rails to Enter: Yes  One/Two Story Residence: Two story, live on 1st floor  Living Alone: No  Support Systems: Spouse/Significant Other, Child(maco) (2 children ages 5 & 6; family is sick with COVID not hospit)  Patient Expects to be Discharged to[de-identified] House  Current DME Used/Available at Home: None, Cane, straight, Walker, rolling  Tub or Shower Type: Shower    EXAMINATION/PRESENTATION/DECISION MAKING:   Critical Behavior:  Neurologic State: Alert  Orientation Level: Oriented X4  Cognition: Appropriate decision making, Follows commands     Hearing:     Skin:  all observed intact   Edema: none apparent   Range Of Motion:  AROM: Within functional limits                       Strength:    Strength: Within functional limits                    Tone & Sensation:   Tone: Normal              Sensation: Intact               Coordination:  Coordination: Within functional limits  Vision:      Functional Mobility:  Bed Mobility:           Scooting: Stand-by assistance  Transfers:  Sit to Stand: Stand-by assistance; Assist x1  Stand to Sit: Stand-by assistance; Assist x1        Bed to Chair: Stand-by assistance; Assist x1              Balance:   Sitting: Intact; Without support  Standing: Intact; Without support  Ambulation/Gait Training:  Distance (ft): 35 Feet (ft)  Assistive Device: Gait belt  Ambulation - Level of Assistance: Stand-by assistance (line management)        Gait Abnormalities: Trunk sway increased                 Step Length: Right shortened; Left shortened               Therapeutic Exercises:   Exercises:    Patient educated regarding home exercise program for strengthening, ROM, and respiratory function; they demonstrated appropriate performance. Reviewed importance of symptom monitoring and adjusting HEP based on symptoms and respiratory status. Pt verbalized understanding via demonstration.        LE exercises    Pursed lip breathing  PRN    Arm flexion 10 3   Arm abduction  10 3                            Seated ankle pump 10 3   Seated LAQ 10 3   Seated march 10 3           Functional Measure:  Tinetti test:    Sitting Balance: 1  Arises: 2  Attempts to Rise: 2  Immediate Standing Balance: 2  Standing Balance: 2  Nudged: 2  Eyes Closed: 1  Turn 360 Degrees - Continuous/Discontinuous: 1  Turn 360 Degrees - Steady/Unsteady: 1  Sitting Down: 2  Balance Score: 16 Balance total score  Indication of Gait: 1  R Step Length/Height: 1  L Step Length/Height: 1  R Foot Clearance: 1  L Foot Clearance: 1  Step Symmetry: 1  Step Continuity: 1  Path: 2  Trunk: 2  Walking Time: 1  Gait Score: 12 Gait total score  Total Score: 28/28 Overall total score         Tinetti Tool Score Risk of Falls  <19 = High Fall Risk  19-24 = Moderate Fall Risk  25-28 = Low Fall Risk  Zach RAMIREZ. Performance-Oriented Assessment of Mobility Problems in Elderly Patients. [facility] 66; [id number]. (Scoring Description: PT Bulletin Feb. 10, 1993)    Older adults: Dave Delacruz et al, 2009; n = 1000 [facility] elderly evaluated with ABC, KARENA, ADL, and IADL)  · Mean KARENA score for males aged 69-68 years = 26.21(3.40)  · Mean KARENA score for females age 69-68 years = 25.16(4.30)  · Mean KARENA score for males over 80 years = 23.29(6.02)  · Mean KARENA score for females over 80 years = 17.20(8.32)        Physical Therapy Evaluation Charge Determination   History Examination Presentation Decision-Making   MEDIUM  Complexity : 1-2 comorbidities / personal factors will impact the outcome/ POC  MEDIUM Complexity : 3 Standardized tests and measures addressing body structure, function, activity limitation and / or participation in recreation  MEDIUM Complexity : Evolving with changing characteristics  MEDIUM Complexity : FOTO score of 26-74      Based on the above components, the patient evaluation is determined to be of the following complexity level: MEDIUM    Pain Rating:  Patient without reports of pain during therapy    Activity Tolerance:   Good, tolerates ADLs without rest breaks, and desaturates with exertion and requires oxygen      After treatment patient left in no apparent distress:   Sitting in chair, Heels elevated for pressure relief, and Call bell within reach    COMMUNICATION/EDUCATION:   The patients plan of care was discussed with: Occupational therapist and Registered nurse. Fall prevention education was provided and the patient/caregiver indicated understanding.  and Patient/family have participated as able in goal setting and plan of care.     Thank you for this referral.  Cherylene Bouton PT, DPT   Time Calculation: 32 mins

## 2021-11-21 NOTE — PROGRESS NOTES
[address] [facility] [address] [place] [address]   Office [phone]  Fax [phone]          Assessment and Plan     Charles Yip is a 39 y.o. male with a PMH of HTN, HLD, JESUS admitted for AHRF and severe sepsis 2/2 COVID PNA. Patient was admitted on 11/19/2021. Pt intermittently on BiPAP, high dose steroids, and abx to cover potential bacterial superinfection. Will need to monitor closely. Overnight Events: Pt intermittently on BiPAP. AHRF 2/2 COVID PNA: Unvaccinated. Symptom onset 11/12 with acute respiratory decompensation in past day - O2 sats in high 60s prior to admission. CTA chest without evidence of definite/proximal PE, no aortic aneurysm/dissection, imaging consistent with moderate to severe COVID PNA and associated hilar/mediastinal lymphadenopathy. Currently on HFNC at [address] - BiPAP  - COVID labs (CRP, D-dimer, ferritin, LD), CBC with diff, CMP daily  - Continue Decadron 10mg IV BID  - S/p Tocilizumab 800mg 11/19  - Lovenox 40mg subQ BID  - Tessalon 200mg TID + Tussionex 5mL BID  - COVID meds (Atorvastatin 20mg every day, Vit C 500mg BID, Zinc 220mg daily)  - Consulted pulm, appreciate recs  - Strongly suggest vaccination after discharge     Severe sepsis with 3/4 SIRS (HR, RR, WBC) 2/2 COVID PNA: POA , RR 26, WBC 15.4 (ANC 13.7). Given 3/4 SIRS and source, will eval for other underlying sources of infection. Continuing ABX for possible underlying CAP with COVID PNA. Procalcitonin improving.   - BCx, UA, UCx pending  - PNA labs pending  - Stool studies ordered  - CTX 2g q24h + Doxycycline 100mg q12h started 11/19     At-risk JAMIR: POA Cr 1.31, BL 0.8; continues to improve (0.95 this AM). Likely IVVD in setting of poor PO intake. - Pt NPO for possible need for BiPAP. In setting of COVID, will start half maintenance fluids and will continue to assess volume status  - Daily CMP     Chest pain: Description is pleuritic in nature, worse with cough.  EKG without evidence of acute ischemia. Troponin of 9 makes CAD less concerning.   - Cardiac monitoring, will continue to monitor     Diarrhea: Watery diarrhea x 2 days. Likely 2/2 COVID infection. Improved since presentation.  - Stool studies pending sample     HTN: Pt with low-normal Bps. On Amlodipine 10mg daily and HCTZ 12.5mg daily  - Will hold home meds     HLD: Chronic, stable. Last lipids 07/2021 , HDL 54, LDL 98.6, . On Atorvastatin 10mg daily. - Will start Atorvastatin 20mg daily for benefit in COVID pts     T2DM (diet-controlled): Prior A1c 5.8% in 05/2021. No meds at present.   - SSI (resistant given morbid obesity)  - POC q6h POC BG checks.     JESUS: On CPAP at night. - Compliant with CPAP, now on BiPAP.     Obesity:  - The pt's Body mass index is 42.4 kg/m². - Encouraging lifestyle modifications and further follow up outpatient.        FEN/GI - NPO with NS at 75 cc/hr  Activity - Ambulate with assistance  DVT prophylaxis - Lovenox  GI prophylaxis - Protonix  Fall prophylaxis - Not indicated at this time. Disposition - Admit to Stepdown. Plan to d/c to TBD. Consulting PT, OT and CM  Code Status - Full. Discussed with patient / caregivers. Next of [facility] [address] and [address]      Sulma Davis MD  Family Medicine Resident         Subjective / Objective     Subjective   Pt reports cough. Denies SOB, chest pain, abdominal pain, nausea, vomiting, headache, or dizziness. Respiratory: O2 Flow Rate (L/min): 45 l/min O2 Device: BIPAP   Visit Vitals  BP (!) 104/48   Pulse 82   Temp 98.7 °F (37.1 °C)   Resp (!) 31   Ht 5' 11\" (1.803 m)   Wt 304 lb (137.9 kg)   SpO2 93%   BMI 42.40 kg/m²       General: No acute distress. Respiratory: Diminished air movement. Cardiovascular: Regular rate. GI: + bowel sounds. Nontender. Extremities:  Trace LE edema. Skin: Warm, dry.   Neuro: Awake and alert    I/O:  Date 11/20/21 0700 - 11/21/21 0659 11/21/21 0700 - 11/22/21 0659   Shift 1361-6054 1411-1011 24 Hour Total 6289-21842 3601-3159 24 Hour Total   INTAKE   Shift Total(mL/kg)         OUTPUT   Urine(mL/kg/hr) 1000(0.6)  1000(0.3)        Urine Voided 1000  1000      Stool           Stool Occurrence(s) 1 x  1 x      Shift Total(mL/kg) 1000(7.3)  1000(7.3)      NET -1000  -1000      Weight (kg) 137.9 137.9 137.9 137.9 137.9 137.9       CBC:  Recent Labs     11/21/21  0607 11/20/21  0413 11/19/21  1321   WBC 19.2* 12.8* 15.4*   HGB 14.2 14.2 15.5   HCT 42.7 43.0 45.7    292 428       Metabolic Panel:  Recent Labs     11/21/21  0607 11/20/21  0413 11/19/21  1321    137 133*   K 4.0 4.3 4.0    104 99   CO2 27 28 28   BUN 24* 23* 27*   CREA 0.95 1.07 1.31*   * 144* 122*   CA 8.2* 8.3* 8.5   ALB 2.6* 2.7* 3.2*   ALT 44 45 46          For Billing    Chief Complaint   Patient presents with   [address] [place] [address] Problems  Date Reviewed: 4/30/2021          Codes Class Noted POA    Acute hypoxemic respiratory failure due to COVID-19 [facility]) ICD-10-CM: U07.1, J96.01  ICD-9-CM: 518.81, 079.89, 799.02  11/19/2021 Unknown

## 2021-11-21 NOTE — PROGRESS NOTES
0700- Bedside and Verbal shift change report received from [facility] (offgoing nurse) by Sandra Woods RN (oncoming nurse). Report included the following information SBAR, Kardex, ED Summary, Procedure Summary, Intake/Output, MAR, Recent Results, Cardiac Rhythm SR,ST and Alarm Parameters . Primary Nurse Sandra Woods RN and SAUL Sanford performed a dual skin assessment on this patient No impairment noted. All drips verified. 0800- Patient shift assessment performed. VAP bundle performed. Patient turned and resting comfortably. Patient educated on call bell and patient safety measures. Call bell in reach, bed in low and locked position, and reminded patient to use call bell. Patient board updated and care plan discussed with patient. 0830:given due medication . Pt saturation only on 83-87%. encourged him to use the BIPAP and pt refused at this  time . 0900:pt saturating  and he is max on his high flow and explain him the risk of low saturation to the vital organs and the intubation . He agreed for the BIPAP . 1000: VAP bundle performed. Patient turned and resting comfortably. 1200- Reassessment performed. No changes noted. VAP bundle performed. Patient turned and resting comfortably. pt very anxious and Md made aware . 1400- VAP bundle performed. Patient resting in the [address] [place] [address] and given due medication          1600- Reassessment performed. VAP bundle performed. Patient sitting in the [address] [place] [address] and resting comfortably. 1800- VAP bundle performed. Patient sitting in Ch  H and resting comfortably. 1900- Bedside and Verbal shift change report given to Romeo(oncoming nurse) by Sandra Woods RN (offgoing nurse). Report included the following information SBAR, Kardex, OR Summary, Procedure Summary, Intake/Output, Recent Results, Cardiac Rhythm SR/ST and Alarm Parameters .

## 2021-11-22 NOTE — PROGRESS NOTES
0700 Bedside and Verbal shift change report given to [facility] [address], RN (oncoming nurse) by Polly Lewis RN (offgoing nurse). Report included the following information SBAR, Kardex, ED Summary, Procedure Summary, Intake/Output, MAR, Recent Results, Med Rec Status, Cardiac Rhythm NSR-sinus tach, Alarm Parameters , Quality Measures and Dual Neuro Assessment. Primary Nurse Beverly Duarte RN and Polly Lewis RN performed a dual skin assessment on this patient No impairment noted  Cameron score, see flowsheet. 0800 Pt assessed, see flowsheet. Currently on HFNC 70L, 100%. No complaints of SOB or pain. Does state feeling anxious, see MAR for [address] from HFNC to BIPAP. 36 Pt 1assist to chair. Placed back on HFNC at 70L, 100%. Desat to mid 76s when moving to chair. Sats back up to 90 once settled in the chair. Pt completed CHG and brushed teeth. 1200 Pt reassessed, see flowsheet. Assessment unchanged. Insulin held Per family practice MD  1450 Pt assisted back to bed for bilateral leg duplex. 1600 Pt reassessed, see flowsheet. Pt remains in chair. SATs between 86-93%. HFNC 70L, 100%. Assessment unchanged. 1800 Insulin held per [facility] MD. Pt remains in chair, no interventions needed at this time. 1900 Bedside and Verbal shift change report given to Sarah Lewis RN (oncoming nurse) by [facility] [address], RN (offgoing nurse). Report included the following information SBAR, Kardex, ED Summary, Procedure Summary, Intake/Output, MAR, Recent Results, Med Rec Status, Cardiac Rhythm NSR- Sinus tach, Alarm Parameters , Quality Measures and Dual Neuro Assessment.

## 2021-11-22 NOTE — PROGRESS NOTES
Problem: Risk for Spread of Infection  Goal: Prevent transmission of infectious organism to others  Description: Prevent the transmission of infectious organisms to other patients, staff members, and visitors.   Outcome: Not Progressing Towards Goal     Problem: Patient Education:  Go to Education Activity  Goal: Patient/Family Education  Outcome: Not Progressing Towards Goal

## 2021-11-22 NOTE — PROGRESS NOTES
Comprehensive Nutrition Assessment    Type and Reason for Visit: Initial, RD nutrition re-screen/LOS    Nutrition Recommendations/Plan:   1. For times NOT on BiPap allow: Clear Liquid Diet with Glucerna Shakes with meals as tolerated    2. If pt is unable accept oral nutrition on their own, consider small bowel DHT placement or if intubated with OG placement: Begin Tube Feeding - trickle feed Jevity 1.5 megan @ 20mL continuous, advance as tolerated to 65mL/hr, FWF 30mL QH    3. For Parenteral Nutrition support, pt would need more reliable IV access - place midline or similar for PPN;  PICC or other central line for TPN. · PPN:   Day 1: D10/AA 4.25% @ 63 mL/h  Day 2: D10/AA 4.25% @ 83 ml/hr     · TPN:   Day 1: D20/AA5% @ 63 mL/h  Day 2: D20/AA5% @ 83 mL/h      · Lipids: check triglycerides and begin if < 450 -   250mL of 20% Lipids biweekly (due to national shortage)      Nutrition Assessment:        11/22: pt [facility] [U07.1, J96.01] who  has a past medical history of Hypercholesteremia, Hypertension (10/15/2014), and S/P vasectomy (8/6/2014). Pt was sitting up in chair in room. Pt has been switching between High Flow NC and Bipap for oxygen needs. He is asking to be able to eat. Call to MDs to request diet or supplements when safe. Pt is still accepting meds 2-3 times daily. Review of history notes blood glucose much improved from earlier this year. On admission, notes reviewed pt had nausea and diarrhea for 2 days prior, has had clear liquids & crackers. Pt has been NPO x4 days (since evening of admission date). Morbidly obese male with high calorie/protein needs.         Malnutrition Assessment:  Malnutrition Status:  Insufficient data    Context:  Acute illness     Findings of the 6 clinical characteristics of malnutrition:   Energy Intake:  Mild decrease in energy intake (specify)  Weight Loss:  Unable to assess     Body Fat Loss:  Unable to assess, Muscle Mass Loss:  Unable to assess,    Fluid Accumulation:  No significant fluid accumulation,     Strength:  Not performed     Estimated Daily Nutrient Needs:  Energy (kcal): 3025 kcal/d (MSJ xAF1.3); Weight Used for Energy Requirements: Admission  Protein (g): 117 - 156g (1.5-2 g/kg of IBW); Weight Used for Protein Requirements: Ideal (78.2 kg)  Fluid (ml/day): ~ 3L; Method Used for Fluid Requirements: 1 ml/kcal    Nutrition Related Findings:        Last BM:   - loose  ABD: WDL  Edema: none noted     Oxygen needs: BiPap & Hi Flow    Temp (24hrs), Av.3 °F (36.8 °C), Min:97.8 °F (36.6 °C), Max:98.6 °F (37 °C)    Nutr. Related Meds:   IVF @ 75mL/h, Vit C, Zinc, Lipitor, decadron, protonix  PRN: correction insulin      Nutr. Related Labs:   Lab Results   Component Value Date/Time    Glucose 132 (H) 2021 06:12 AM    Glucose (POC) 142 (H) 2021 11:44 AM     Lab Results   Component Value Date/Time    Hemoglobin A1c 5.8 (H) 2021 10:25 AM    Hemoglobin A1c 11.1 (H) 2021 10:47 AM       Wounds:        None     Current Nutrition Therapies:  DIET NPO    Documented Meal intake:  Patient Vitals for the past 168 hrs:   % Diet Eaten   21 1800 0%   21 1200 0%     Documentation of supplement intake:  No data found. Anthropometric Measures:  · Height:  5' 11\" (180.3 cm)  · Current Body Wt:  137.9 kg (304 lb)   · Admission Body Wt:  304 lb    · Usual Body Wt:        · Ideal Body Wt:  172 lbs:  176.7 %   · Adjusted Body Weight:   ; Weight Adjustment for: No adjustment   · Adjusted BMI:       · BMI Category:  Obese class 3 (BMI 40.0 or greater)     Body mass index is 42.4 kg/m².     Last 3 Recorded Weights in this Encounter    21 1259   Weight: 137.9 kg (304 lb)       Wt Readings from Last 6 Encounters:   21 137.9 kg (304 lb)   21 137.9 kg (304 lb)   21 133.8 kg (295 lb)   20 145.2 kg (320 lb)   19 145.2 kg (320 lb)   10/04/19 142.4 kg (314 lb) Nutrition Diagnosis:   · Inadequate protein-energy intake related to catabolic illness, impaired respiratory function as evidenced by NPO or clear liquid status due to medical condition for 3-5 days, diarrhea, increased O2 needs. Nutrition Interventions:   Food and/or Nutrient Delivery: Start oral diet, Start oral nutrition supplement  Nutrition Education and Counseling: Education initiated  Coordination of Nutrition Care: Continue to monitor while inpatient    Goals:  provide and tolerate 50-75% of meals and ONS offered over the next 5-7 days        Nutrition Monitoring and Evaluation:   Behavioral-Environmental Outcomes: None identified  Food/Nutrient Intake Outcomes: Diet advancement/tolerance  Physical Signs/Symptoms Outcomes: Biochemical data, Weight    Discharge Planning:     Too soon to determine     Electronically signed by Zaynab Bishop RD, MS on 11/22/2021 at 4:10 PM  Contact via [facility] or office [phone]

## 2021-11-22 NOTE — PROGRESS NOTES
[address] [facility] [address] [address]   Office [phone]  Fax [phone]          Assessment and Plan     Flavia Mckeon is a 39 y.o. male with a PMH of HTN, HLD, JESUS admitted for AHRF and severe sepsis 2/2 COVID PNA. Patient was admitted on 11/19/2021. Pt intermittently on BiPAP, high dose steroids, and abx to cover potential bacterial superinfection. Will need to monitor closely. Overnight Events: Pt intermittently on BiPAP. AHRF 2/2 COVID PNA: Unvaccinated. Symptom onset 11/12 with acute respiratory decompensation in past day - O2 sats in high 60s prior to admission. CTA chest without evidence of definite/proximal PE, no aortic aneurysm/dissection, imaging consistent with moderate to severe COVID PNA and associated hilar/mediastinal lymphadenopathy.  - Pt unable to tolerate BiPAP constantly, will continue to encourage BiPAP but can use HFNC if unable to tolerate constant BiPAP  - COVID labs (CRP, D-dimer, ferritin, LD), CBC with diff, CMP daily  - Duplex ultrasound bilateral lower extremities ordered given elevated D-dimer  - Continue Decadron 10mg IV BID  - S/p Tocilizumab 800mg 11/19  - Lovenox 40mg subQ BID  - Tessalon 200mg TID + Tussionex 5mL BID  - COVID meds (Atorvastatin 20mg every day, Vit C 500mg BID, Zinc 220mg daily)  - Consulted pulm, appreciate recs  - Strongly suggest vaccination after discharge     Severe sepsis with 3/4 SIRS (HR, RR, WBC) 2/2 COVID PNA: POA , RR 26, WBC 15.4 (ANC 13.7). Given 3/4 SIRS and source, will eval for other underlying sources of infection. PNA labs negative, BCx NGTD x3d. UCx no growth. Likely severe sepsis on initial presentation is all related to COVID PNA. - Continue to follow BCx  - CTX 2g q24h + Doxycycline 100mg q12h started 11/19, will continue given severity of his disease, per pulm     HTN: Pt with low-normal Bps. On Amlodipine 10mg daily and HCTZ 12.5mg daily  - Will hold home meds     HLD: Chronic, stable.  Last lipids 07/2021 , HDL 54, LDL 98.6, . On Atorvastatin 10mg daily. - Will start Atorvastatin 20mg daily for benefit in COVID pts     T2DM (diet-controlled): Prior A1c 5.8% in 05/2021. No meds at present.   - SSI (resistant given morbid obesity)  - POC q6h POC BG checks.     JESUS: On CPAP at night. - Compliant with CPAP, now on BiPAP.     Obesity:  - The pt's Body mass index is 42.4 kg/m². - Encouraging lifestyle modifications and further follow up outpatient. At-risk JAMIR: RESOLVED. POA Cr 1.31, BL 0.8; continues to improve (0.95 this AM). Likely IVVD in setting of poor PO intake. - Cr downtrending, back at baseline, will continue half maintenance fluids.   - Daily CMP    Chest pain: RESOLVED. Description is pleuritic in nature, worse with cough. EKG without evidence of acute ischemia. Troponin of 9 makes CAD less concerning.   - Cardiac monitoring, will continue to monitor     Diarrhea: RESOLVED. Watery diarrhea x 2 days. Likely 2/2 COVID infection. Improved since presentation. Enteric bacteria panel negative.        FEN/GI - NPO with NS at 75 cc/hr  Activity - Ambulate with assistance  DVT prophylaxis - Lovenox  GI prophylaxis - Protonix  Fall prophylaxis - Not indicated at this time. Disposition - Admit to Stepdown. Plan to d/c to TBD. Consulting PT, OT and CM  Code Status - Full. Discussed with patient / caregivers. Next of Kin Name and [address]      Donovan Dumont MD  Family Medicine Resident         Subjective / Objective     Subjective   Pt reports congestion. Otherwise, no chest pain, palp, n/v, abdominal pain. Reports constipation, has not tried PRN meds for constipation. Respiratory: O2 Flow Rate (L/min): 60 l/min O2 Device: BIPAP   Visit Vitals  /60   Pulse 87   Temp 98.5 °F (36.9 °C)   Resp 29   Ht 5' 11\" (1.803 m)   Wt 304 lb (137.9 kg)   SpO2 96%   BMI 42.40 kg/m²       General: No acute distress.   Respiratory: Diminished air movement bilateral lung fields. Fine right lower lobe crackles   Cardiovascular: Regular rate. GI: + bowel sounds. Nontender. Extremities:  Trace LE edema. Skin: Warm, dry. Neuro: Awake and alert    I/O:  Date 11/21/21 0700 - 11/22/21 0659 11/22/21 0700 - 11/23/21 0659   Shift 4143-8496 3191-4711 24 Hour Total 6843-8338 0913-5191 24 Hour Total   INTAKE   P.O. 400  400        P. O. 400  400      I. V.(mL/kg/hr) 525(0.3)  525(0.2)        Volume (0.9% sodium chloride infusion) 525  525      Shift Total(mL/kg) 925(6.7)  925(6.7)      OUTPUT   Urine(mL/kg/hr) 1300(0.8)  1300(0.4)        Urine Voided 1300  1300      Shift Total(mL/kg) 1300(9.4)  1300(9.4)      NET -375  -375      Weight (kg) 137.9 137.9 137.9 137.9 137.9 137.9       CBC:  Recent Labs     11/22/21  0612 11/21/21  0607 11/20/21  0413   WBC 18.5* 19.2* 12.8*   HGB 12.7 14.2 14.2   HCT 39.0 42.7 43.0    332 403       Metabolic Panel:  Recent Labs     11/22/21  0612 11/21/21  0607 11/20/21  0413    138 137   K 3.9 4.0 4.3   * 106 104   CO2 24 27 28   BUN 21* 24* 23*   CREA 0.82 0.95 1.07   * 154* 144*   CA 7.4* 8.2* 8.3*   ALB 2.2* 2.6* 2.7*   ALT 36 44 45          For Billing    Chief Complaint   Patient presents with   [facility] Problems  Date Reviewed: 4/30/2021          Codes Class Noted POA    Acute hypoxemic respiratory failure due to COVID-19 [facility]) ICD-10-CM: U07.1, J96.01  ICD-9-CM: 518.81, 079.89, 799.02  11/19/2021 Unknown

## 2021-11-22 NOTE — PROGRESS NOTES
11/22/21  4:01 PM    Care Management Transition of Care:    RUR: 6%  LOS: 3D    1). Patient continues to require medical management  2). On 50L HF, Bipap  3). PT recommending outpatient pulmonary rehab vs none  4).  CM following for dc needs    CLAUDIO Burks  Care Manager

## 2021-11-22 NOTE — PROGRESS NOTES
Name: Sánchez 87: [address]   : 1985 Admit Date: 2021   Phone: [phone]  Room: [address] [place] [address]   PCP: Bettina South NP  MRN: [id number]   Date: 2021  Code: Full Code          Chart and notes reviewed. Data reviewed. I review the patient's current medications in the medical record at each encounter. I have evaluated and examined the patient. HPI:    5:11 PM       History was obtained from patient. I was asked by Carnella Rubinstein, MD to see [facility] in consultation for a chief complaint of COVID-19 PNA with acute respiratory failure with hypoxia. History of Present Illness:  Mr. Jaqueline Dozier is a 40 yo with a history of JESUS (on CPAP), childhood asthma (grew out of it), HTN, HLD, and obesity who is admitted with COVID-19 with acute respiratory failure with hypoxia. He began feeling poorly about a week ago and tested positive on Monday. He has had progressive shortness of breath, cough, and chest pain. He has had fever and is febrile to 101. 3. He was hypoxic to 68% on RA and requiring HF. He was satting in the mid 80's on 40L and 100%, increased to 50L with sats around 90%. Overnight events: On bipap overnight. Currently on 60L and 100% sats 84-91%. Pt complaining of fatigue. SOB same as yesterday. Liked getting up in a chair and open to doing it again today. Past Medical History:   Diagnosis Date    Hypercholesteremia     Hypertension 10/15/2014    Poison ivy 9/15/2015    Pure hypercholesterolemia     S/P vasectomy 2014       No past surgical history on file.     Family History   Problem Relation Age of Onset    Diabetes Mother     Heart Attack Father 43    Stroke Maternal Grandmother     Cancer Paternal Grandfather          age 45 - Hodgkin's   [de-identified] Maternal Grandfather          in 42's with melanoma       Social History     Tobacco Use    Smoking status: Never Smoker    Smokeless tobacco: Never Used Substance Use Topics    Alcohol use: No     Alcohol/week: 0.0 standard drinks       No Known Allergies    Current Facility-Administered Medications   Medication Dose Route Frequency    guaiFENesin ER (MUCINEX) tablet 600 mg  600 mg Oral Q12H    pantoprazole (PROTONIX) 40 mg in 0.9% sodium chloride 10 mL injection  40 mg IntraVENous DAILY    hydrOXYzine HCL (ATARAX) tablet 25 mg  25 mg Oral Q6H PRN    melatonin (rapid dissolve) tablet 5 mg  5 mg Oral QHS    phenol throat spray (CHLORASEPTIC) 2 Spray  2 Spray Oral Q6H PRN    0.9% sodium chloride infusion  75 mL/hr IntraVENous CONTINUOUS    sodium chloride (NS) flush 5-40 mL  5-40 mL IntraVENous Q8H    sodium chloride (NS) flush 5-40 mL  5-40 mL IntraVENous PRN    acetaminophen (TYLENOL) tablet 650 mg  650 mg Oral Q6H PRN    Or    acetaminophen (TYLENOL) suppository 650 mg  650 mg Rectal Q6H PRN    polyethylene glycol (MIRALAX) packet 17 g  17 g Oral DAILY PRN    ondansetron (ZOFRAN ODT) tablet 4 mg  4 mg Oral Q8H PRN    Or    ondansetron (ZOFRAN) injection 4 mg  4 mg IntraVENous Q6H PRN    enoxaparin (LOVENOX) injection 40 mg  40 mg SubCUTAneous Q12H    ascorbic acid (vitamin C) (VITAMIN C) tablet 500 mg  500 mg Oral BID    zinc sulfate (ZINCATE) 50 mg zinc (220 mg) capsule 1 Capsule  1 Capsule Oral DAILY    atorvastatin (LIPITOR) tablet 20 mg  20 mg Oral DAILY    prochlorperazine (COMPAZINE) injection 10 mg  10 mg IntraVENous Q6H PRN    glucose chewable tablet 16 g  4 Tablet Oral PRN    dextrose (D50W) injection syrg 12.5-25 g  12.5-25 g IntraVENous PRN    glucagon (GLUCAGEN) injection 1 mg  1 mg IntraMUSCular PRN    cefTRIAXone (ROCEPHIN) 2 g in sterile water (preservative free) 20 mL IV syringe  2 g IntraVENous Q24H    doxycycline (VIBRAMYCIN) 100 mg in 0.9% sodium chloride (MBP/ADV) 100 mL MBP  100 mg IntraVENous Q12H    dexamethasone (DECADRON) 10 mg/mL injection 10 mg  10 mg IntraVENous Q12H    benzonatate (TESSALON) capsule 200 mg 200 mg Oral TID    HYDROcodone-chlorpheniramine (TUSSIONEX) oral suspension 5 mL  5 mL Oral Q12H    insulin lispro (HUMALOG) injection   SubCUTAneous Q6H    albuterol-ipratropium (DUO-NEB) 2.5 MG-0.5 MG/3 ML  3 mL Nebulization Q4H RT    Or    ipratropium-albuterol (COMBIVENT RESPIMAT) 20 mcg-100 mcg inhalation spray  2 Puff Inhalation Q4H RT         REVIEW OF SYSTEMS   12 point ROS negative except as stated in the HPI. Physical Exam:   Visit Vitals  /60   Pulse 87   Temp 98.5 °F (36.9 °C)   Resp 29   Ht 5' 11\" (1.803 m)   Wt 137.9 kg (304 lb)   SpO2 (!) 88%   BMI 42.40 kg/m²       General:  Alert, cooperative, no distress, appears stated age. Head:  Normocephalic, without obvious abnormality, atraumatic. Eyes:  Conjunctivae/corneas clear. Nose: Nares normal. Septum midline. Mucosa normal.    Throat: Lips, mucosa, and tongue normal.    Neck: Supple, symmetrical, trachea midline   Lungs:   Diminished   Chest wall:  No tenderness or deformity. Heart:  Regular rate and rhythm, S1, S2 normal, no murmur, click, rub or gallop. Abdomen:   Soft, non-tender. Bowel sounds normal.    Extremities: Extremities normal, atraumatic, no cyanosis or edema. Pulses: 2+ and symmetric all extremities. Skin: Skin color, texture, turgor normal. No rashes or lesions       Neurologic: Grossly nonfocal       Lab Results   Component Value Date/Time    Sodium 141 11/22/2021 06:12 AM    Potassium 3.9 11/22/2021 06:12 AM    Chloride 112 (H) 11/22/2021 06:12 AM    CO2 24 11/22/2021 06:12 AM    BUN 21 (H) 11/22/2021 06:12 AM    Creatinine 0.82 11/22/2021 06:12 AM    Glucose 132 (H) 11/22/2021 06:12 AM    Calcium 7.4 (L) 11/22/2021 06:12 AM    Lactic acid 1.1 11/19/2021 04:39 PM       Lab Results   Component Value Date/Time    WBC 18.5 (H) 11/22/2021 06:12 AM    HGB 12.7 11/22/2021 06:12 AM    PLATELET 641 92/88/6134 06:12 AM    MCV 92.6 11/22/2021 06:12 AM       Lab Results   Component Value Date/Time    Alk. phosphatase 68 11/22/2021 06:12 AM    Protein, total 5.9 (L) 11/22/2021 06:12 AM    Albumin 2.2 (L) 11/22/2021 06:12 AM    Globulin 3.7 11/22/2021 06:12 AM       Lab Results   Component Value Date/Time    Ferritin 1,249 (H) 11/21/2021 06:07 AM       Lab Results   Component Value Date/Time    C-Reactive protein 3.04 (H) 11/22/2021 06:12 AM    TSH 2.510 11/30/2018 09:47 AM    TSH 1.260 10/17/2013 09:40 AM        No results found for: PH, PHI, PCO2, PCO2I, PO2, PO2I, HCO3, HCO3I, FIO2, FIO2I    No results found for: CPK, RCK1, RCK2, RCK3, RCK4, CKNDX, CKND1, TROPT, TROIQ, BNPP, BNP     Lab Results   Component Value Date/Time    Culture result: No growth (<1,000 CFU/ML) 11/19/2021 09:46 PM    Culture result: MRSA NOT PRESENT 11/19/2021 06:53 PM    Culture result:  11/19/2021 06:53 PM     Screening of patient nares for MRSA is for surveillance purposes and, if positive, to facilitate isolation considerations in high risk settings. It is not intended for automatic decolonization interventions per se as regimens are not sufficiently effective to warrant routine use.        No results found for: TOXA1, RPR, HBCM, HBSAG, HAAB, HCAB1, HAAT, G6PD, CRYAC, HIVGT, HIVR, HIV1, HIV12, HIVPC, HIVRPI    No results found for: VANCT, CPK    Lab Results   Component Value Date/Time    Color YELLOW/STRAW 11/19/2021 09:46 PM    Appearance CLEAR 11/19/2021 09:46 PM    Specific gravity 1.015 11/19/2021 09:46 PM    pH (UA) 5.5 11/19/2021 09:46 PM    Protein 30 (A) 11/19/2021 09:46 PM    Glucose Negative 11/19/2021 09:46 PM    Ketone TRACE (A) 11/19/2021 09:46 PM    Bilirubin Negative 11/19/2021 09:46 PM    Blood Negative 11/19/2021 09:46 PM    Urobilinogen 0.2 11/19/2021 09:46 PM    Nitrites Negative 11/19/2021 09:46 PM    Leukocyte Esterase Negative 11/19/2021 09:46 PM    WBC 0-4 11/19/2021 09:46 PM    RBC 0-5 11/19/2021 09:46 PM    Bacteria Negative 11/19/2021 09:46 PM       IMPRESSION  · COVID-19 PNA  · Acute respiratory failure with hypoxia  · JESUS  · Severe sepsis    PLAN  · Goal sats 88% or higher, currently on HF. Will get back on bipap this AM.   · S/p Actemra 11/19  · Add high dose decadron BID  · Doxy/cetriaxone reasonable given the severity of his disease  · scheduled combivent  · Tessalon, tussionex  · Lovenox 40 mg q12h, follow-up d-dimer and increase to therapeutic if significantly elevated  · Trend d-dimer, inflammatory markers  · Low threshold for transfer to the ICU  · Self prone at night and OOB into chair as much as possible. Discussed plan with nursing. Patient is critically ill in the setting of COVID-19 and acute respiratory failure with hypoxia requiring continuous HF. CC time 37 minutes.       Phoebe Wyatt MD

## 2021-11-23 NOTE — PROGRESS NOTES
Case Management follow-up:    RUR 5%    Covid 19+    High flow oxygen    I am continuing to follow pt for potential home oxygen needs.     Hannah Chairez

## 2021-11-23 NOTE — PROGRESS NOTES
Problem: Mobility Impaired (Adult and Pediatric)  Goal: *Acute Goals and Plan of Care (Insert Text)  Description: FUNCTIONAL STATUS PRIOR TO ADMISSION: Patient was independent and active without use of DME.    HOME SUPPORT PRIOR TO ADMISSION: The patient lived with his spouse and 2 school-aged children but did not require assist.    Physical Therapy Goals  Initiated 11/21/2021  1. Patient will move from supine to sit and sit to supine  in bed with independence within 7 day(s). 2.  Patient will transfer from bed to chair and chair to bed with independence using the least restrictive device within 7 day(s). 3.  Patient will perform sit to stand with independence within 7 day(s). 4.  Patient will ambulate with independence for 100 feet with the least restrictive device within 7 day(s). Outcome: Progressing Towards Goal   PHYSICAL THERAPY TREATMENT  Patient: Jaycob Aguila (26 y.o. male)  Date: 11/23/2021  Diagnosis: Acute hypoxemic respiratory failure due to COVID[facility]19 ([facility] [U07.1, J96.01]   <principal problem not specified>       Precautions:  (droplet +)  Chart, physical therapy assessment, plan of care and goals were reviewed. ASSESSMENT  Patient continues with skilled PT services and is progressing towards goals. Patient this afternoon with good tolerance and participation in physical therapy session. He is received on HFNC, >55L at 100% FiO2, seated in bedside chair. Provided patient with handout on activity pacing recommendations, diaphragmatic & pursed lip breathing techniques as well as updated & progressed LE HEP with handout. Patient performs HEP as below and instructed to perform 3x daily. Instructed patient that he is safe to do sit <> stand transfers with special care given to his lines independently. Also alerted RN to this. Patient able to manage his HEP independently as evidenced by him checking monitor in room to ascertain whether to continue exercising.  Patient also reports subjective SOB when he desaturates which helps him guide activity. Patient ambulates 50ft back and forth in room 2/2 short HFNC tubing with supervision level assist. There is no instability or LOB. Patient independently checks monitor, sees that he is at 88% spo2 and requests a break. Patient stands for 1 minute prior to sitting and ultimately only desaturates to 87%. He recovers within 2 minutes on HFNC parameters above. Continue to recommend OP pulm PT vs none upon d/c. Current Level of Function Impacting Discharge (mobility/balance): supervision level ambulation    Other factors to consider for discharge: none additional         PLAN :  Patient continues to benefit from skilled intervention to address the above impairments. Continue treatment per established plan of care. to address goals. Recommendation for discharge: (in order for the patient to meet his/her long term goals)  To be determined: OP pulm PT vs none    This discharge recommendation:  Has not yet been discussed the attending provider and/or case management    IF patient discharges home will need the following DME: none       SUBJECTIVE:   Patient stated I am hot natured.  re: patient expressing he is warm in room today; A/C checked and at lowest setting. OBJECTIVE DATA SUMMARY:   Patient received seated in bedside chair, agreeable to participate in PT session. Patient was cleared by nursing to participate in PT session. Critical Behavior:  Neurologic State: Alert  Orientation Level: Oriented X4  Cognition: Follows commands, Appropriate safety awareness, Appropriate for age attention/concentration, Appropriate decision making     Functional Mobility Training:  Bed Mobility:           Scooting: Supervision        Transfers:  Sit to Stand: Independent  Stand to Sit: Independent                             Balance:  Sitting: Intact; Without support  Standing: Intact;  Without support  Ambulation/Gait Training:  Distance (ft): 50 Feet (ft)  Assistive Device: Gait belt  Ambulation - Level of Assistance: Stand-by assistance; Assist x1 (line management)                          Step Length: Right shortened; Left shortened                      Therapeutic Exercises:   Exercises:    Patient educated regarding home exercise program for strengthening, ROM, and respiratory function; they demonstrated appropriate performance. Reviewed importance of symptom monitoring and adjusting HEP based on symptoms and respiratory status. Pt verbalized understanding via demonstration. Emphasized coordination of breathing with therapeutic exercises. LE exercises         Seated ankle pump 10 3   Seated LAQ 10 3   Seated march 10 3   Seated toe raise 10 3   Seated heel raise 10 3        Sit to Stand (With emphasis on 10 slow breaths prior to sitting and 1 minute sit prior to next repetition) 5 3         Pain Rating:  Patient without reports of pain during therapy    Activity Tolerance:   Fair, desaturates with exertion and requires oxygen, and requires rest breaks    After treatment patient left in no apparent distress:   Sitting in chair and Call bell within reach    COMMUNICATION/COLLABORATION:   The patients plan of care was discussed with: Occupational therapist and Registered nurse.      Natasha Maria PT, DPT   Time Calculation: 33 mins

## 2021-11-23 NOTE — PROGRESS NOTES
Name: Sánchez 87: [facility]   : 1985 Admit Date: 2021   Phone: [phone]  Room: [address] [place] [address]   PCP: Silvio St NP  MRN: [id number]   Date: 2021  Code: Full Code          Chart and notes reviewed. Data reviewed. I review the patient's current medications in the medical record at each encounter. I have evaluated and examined the patient. HPI:    5:11 PM       History was obtained from patient. I was asked by Lenore Perkins MD to see Alak Fan in consultation for a chief complaint of COVID-19 PNA with acute respiratory failure with hypoxia. History of Present Illness:  Mr. Marilyn Crook is a 40 yo with a history of JESUS (on CPAP), childhood asthma (grew out of it), HTN, HLD, and obesity who is admitted with COVID-19 with acute respiratory failure with hypoxia. He began feeling poorly about a week ago and tested positive on Monday. He has had progressive shortness of breath, cough, and chest pain. He has had fever and is febrile to 101. 3. He was hypoxic to 68% on RA and requiring HF. He was satting in the mid 80's on 40L and 100%, increased to 50L with sats around 90%. Overnight events:   Desaturated while moving to a chair this AM. Currently 89% on 70L and 100%. Feels the same as yesterday. Was able to self prone for 3 hrs last night. Past Medical History:   Diagnosis Date    Hypercholesteremia     Hypertension 10/15/2014    Poison ivy 9/15/2015    Pure hypercholesterolemia     S/P vasectomy 2014       No past surgical history on file.     Family History   Problem Relation Age of Onset    Diabetes Mother     Heart Attack Father 43    Stroke Maternal Grandmother     Cancer Paternal Grandfather          age 45 - Hodgkin's   [de-identified] Maternal Grandfather          in 42's with melanoma       Social History     Tobacco Use    Smoking status: Never Smoker    Smokeless tobacco: Never Used   Substance Use Topics    Alcohol use: No     Alcohol/week: 0.0 standard drinks       No Known Allergies    Current Facility-Administered Medications   Medication Dose Route Frequency    guaiFENesin ER (MUCINEX) tablet 600 mg  600 mg Oral Q12H    pantoprazole (PROTONIX) 40 mg in 0.9% sodium chloride 10 mL injection  40 mg IntraVENous DAILY    hydrOXYzine HCL (ATARAX) tablet 25 mg  25 mg Oral Q6H PRN    melatonin (rapid dissolve) tablet 5 mg  5 mg Oral QHS    phenol throat spray (CHLORASEPTIC) 2 Spray  2 Spray Oral Q6H PRN    sodium chloride (NS) flush 5-40 mL  5-40 mL IntraVENous Q8H    sodium chloride (NS) flush 5-40 mL  5-40 mL IntraVENous PRN    acetaminophen (TYLENOL) tablet 650 mg  650 mg Oral Q6H PRN    Or    acetaminophen (TYLENOL) suppository 650 mg  650 mg Rectal Q6H PRN    polyethylene glycol (MIRALAX) packet 17 g  17 g Oral DAILY PRN    ondansetron (ZOFRAN ODT) tablet 4 mg  4 mg Oral Q8H PRN    Or    ondansetron (ZOFRAN) injection 4 mg  4 mg IntraVENous Q6H PRN    enoxaparin (LOVENOX) injection 40 mg  40 mg SubCUTAneous Q12H    ascorbic acid (vitamin C) (VITAMIN C) tablet 500 mg  500 mg Oral BID    zinc sulfate (ZINCATE) 50 mg zinc (220 mg) capsule 1 Capsule  1 Capsule Oral DAILY    atorvastatin (LIPITOR) tablet 20 mg  20 mg Oral DAILY    prochlorperazine (COMPAZINE) injection 10 mg  10 mg IntraVENous Q6H PRN    glucose chewable tablet 16 g  4 Tablet Oral PRN    dextrose (D50W) injection syrg 12.5-25 g  12.5-25 g IntraVENous PRN    glucagon (GLUCAGEN) injection 1 mg  1 mg IntraMUSCular PRN    cefTRIAXone (ROCEPHIN) 2 g in sterile water (preservative free) 20 mL IV syringe  2 g IntraVENous Q24H    doxycycline (VIBRAMYCIN) 100 mg in 0.9% sodium chloride (MBP/ADV) 100 mL MBP  100 mg IntraVENous Q12H    dexamethasone (DECADRON) 10 mg/mL injection 10 mg  10 mg IntraVENous Q12H    benzonatate (TESSALON) capsule 200 mg  200 mg Oral TID    HYDROcodone-chlorpheniramine (TUSSIONEX) oral suspension 5 mL  5 mL Oral Q12H    insulin lispro (HUMALOG) injection   SubCUTAneous Q6H    albuterol-ipratropium (DUO-NEB) 2.5 MG-0.5 MG/3 ML  3 mL Nebulization Q4H RT    Or    ipratropium-albuterol (COMBIVENT RESPIMAT) 20 mcg-100 mcg inhalation spray  2 Puff Inhalation Q4H RT         REVIEW OF SYSTEMS   12 point ROS negative except as stated in the HPI. Physical Exam:   Visit Vitals  /70   Pulse 79   Temp 98 °F (36.7 °C)   Resp 28   Ht 5' 11\" (1.803 m)   Wt 137.9 kg (304 lb)   SpO2 (!) 87%   BMI 42.40 kg/m²       General:  Alert, cooperative, no distress, appears stated age. Head:  Normocephalic, without obvious abnormality, atraumatic. Eyes:  Conjunctivae/corneas clear. Nose: Nares normal. Septum midline. Mucosa normal.    Throat: Lips, mucosa, and tongue normal.    Neck: Supple, symmetrical, trachea midline   Lungs:   Diminished   Chest wall:  No tenderness or deformity. Heart:  Regular rate and rhythm, S1, S2 normal, no murmur, click, rub or gallop. Abdomen:   Soft, non-tender. Bowel sounds normal.    Extremities: Extremities normal, atraumatic, no cyanosis or edema. Pulses: 2+ and symmetric all extremities. Skin: Skin color, texture, turgor normal. No rashes or lesions       Neurologic: Grossly nonfocal       Lab Results   Component Value Date/Time    Sodium 139 11/23/2021 04:28 AM    Potassium 4.3 11/23/2021 04:28 AM    Chloride 108 11/23/2021 04:28 AM    CO2 26 11/23/2021 04:28 AM    BUN 21 (H) 11/23/2021 04:28 AM    Creatinine 0.81 11/23/2021 04:28 AM    Glucose 153 (H) 11/23/2021 04:28 AM    Calcium 8.1 (L) 11/23/2021 04:28 AM    Lactic acid 1.1 11/19/2021 04:39 PM       Lab Results   Component Value Date/Time    WBC 19.3 (H) 11/23/2021 04:28 AM    HGB 13.7 11/23/2021 04:28 AM    PLATELET 501 39/25/3573 04:28 AM    MCV 91.1 11/23/2021 04:28 AM       Lab Results   Component Value Date/Time    Alk.  phosphatase 94 11/23/2021 04:28 AM    Protein, total 6.6 11/23/2021 04:28 AM    Albumin 2.7 (L) 11/23/2021 04:28 AM    Globulin 3.9 11/23/2021 04:28 AM       Lab Results   Component Value Date/Time    Ferritin 992 (H) 11/23/2021 04:28 AM       Lab Results   Component Value Date/Time    C-Reactive protein 1.61 (H) 11/23/2021 04:28 AM    TSH 2.510 11/30/2018 09:47 AM    TSH 1.260 10/17/2013 09:40 AM        No results found for: PH, PHI, PCO2, PCO2I, PO2, PO2I, HCO3, HCO3I, FIO2, FIO2I    No results found for: CPK, RCK1, RCK2, RCK3, RCK4, CKNDX, CKND1, TROPT, TROIQ, BNPP, BNP     Lab Results   Component Value Date/Time    Culture result: No growth (<1,000 CFU/ML) 11/19/2021 09:46 PM    Culture result: MRSA NOT PRESENT 11/19/2021 06:53 PM    Culture result:  11/19/2021 06:53 PM     Screening of patient nares for MRSA is for surveillance purposes and, if positive, to facilitate isolation considerations in high risk settings. It is not intended for automatic decolonization interventions per se as regimens are not sufficiently effective to warrant routine use. No results found for: TOXA1, RPR, HBCM, HBSAG, HAAB, HCAB1, HAAT, G6PD, CRYAC, HIVGT, HIVR, HIV1, HIV12, HIVPC, HIVRPI    No results found for: VANCT, CPK    Lab Results   Component Value Date/Time    Color YELLOW/STRAW 11/19/2021 09:46 PM    Appearance CLEAR 11/19/2021 09:46 PM    Specific gravity 1.015 11/19/2021 09:46 PM    pH (UA) 5.5 11/19/2021 09:46 PM    Protein 30 (A) 11/19/2021 09:46 PM    Glucose Negative 11/19/2021 09:46 PM    Ketone TRACE (A) 11/19/2021 09:46 PM    Bilirubin Negative 11/19/2021 09:46 PM    Blood Negative 11/19/2021 09:46 PM    Urobilinogen 0.2 11/19/2021 09:46 PM    Nitrites Negative 11/19/2021 09:46 PM    Leukocyte Esterase Negative 11/19/2021 09:46 PM    WBC 0-4 11/19/2021 09:46 PM    RBC 0-5 11/19/2021 09:46 PM    Bacteria Negative 11/19/2021 09:46 PM       IMPRESSION  · COVID-19 PNA  · Acute respiratory failure with hypoxia  · JESUS  · Severe sepsis    PLAN  · Goal sats 88% or higher, currently on HF.  Will get back on bipap this AM. · S/p Actemra 11/19  ·  high dose decadron BID  · Doxy/cetriaxone reasonable given the severity of his disease  · scheduled combivent  · Tessalon, tussionex  · Lovenox 40 mg q12h, follow-up d-dimer and increase to therapeutic if significantly elevated  · Trend d-dimer, inflammatory markers  · Low threshold for transfer to the ICU  · Self prone at night and OOB into chair as much as possible. Patient is critically ill in the setting of COVID-19 and acute respiratory failure with hypoxia requiring continuous HF. CC time 34 minutes.       Kush Meza MD

## 2021-11-23 NOTE — PROGRESS NOTES
1900- Bedside and Verbal shift change report received from Augustina Gonzáles, [facility] (offgoing nurse) by Mia Manriquez RN (oncoming nurse). Report included the following information SBAR, Kardex, ED Summary, Intake/Output, MAR, Recent Results, Med Rec Status, Cardiac Rhythm NSR/ST, Alarm Parameters  and Quality Measures. Primary Nurse Mia Manriquez RN and Augustina Gonzáles RN performed a dual skin assessment on this patient No impairment noted. 2000- Patient shift assessment performed and documented in flowsheet. Patient educated on call bell and patient safety measures. Call bell in reach, bed in low and locked position, and reminded patient to use call bell. Patient board updated and care plan discussed with patient. 0000- Reassessment performed. No changes noted. 0400- Reassessment performed. No changes noted. Labs drawn according to protocol and sent to lab.    0700- Bedside and Verbal shift change report given to Franki Greene RN(oncoming nurse) by Mia Manriquez RN (offgoing nurse). Report included the following information SBAR, Kardex, ED Summary, Intake/Output, MAR, Recent Results, Med Rec Status, Cardiac Rhythm NSR, Alarm Parameters  and Quality Measures.

## 2021-11-23 NOTE — PROGRESS NOTES
[address] [facility] [address] [address]   Office [phone]  Fax [phone]          Assessment and Plan     Chapito Valdez is a 39 y.o. male with a PMH of HTN, HLD, JESUS admitted for AHRF and severe sepsis 2/2 COVID PNA. Patient was admitted on 11/19/2021. Pt intermittently on BiPAP, high dose steroids, and abx to cover potential bacterial superinfection. Will need to monitor closely. Overnight Events: Pt intermittently on BiPAP. Otherwise, no acute events overnight. AHRF 2/2 COVID PNA: Unvaccinated. Symptom onset 11/12 with acute respiratory decompensation, O2 sats in high 60s prior to admission. CTA chest without evidence of definite/proximal PE, no aortic aneurysm/dissection, imaging consistent with moderate to severe COVID PNA and associated hilar/mediastinal lymphadenopathy. B/l lower extremity duplex without evidence of DVT. - Continue BiPAP QHS and as tolerated  - COVID labs (CRP, D-dimer, ferritin, LD), CBC with diff, CMP daily  - Procal added on  - Will eval fluid status with CXR  - Continue Decadron 10mg IV BID  - S/p Tocilizumab 800mg 11/19  - Lovenox increased to therapeutic - 140mg BID  - Tessalon 200mg TID + Tussionex 5mL BID  - COVID meds (Atorvastatin 20mg every day, Vit C 500mg BID, Zinc 220mg daily)  - Consulted pulm, appreciate recs  - Strongly suggest vaccination after discharge     Severe sepsis with 3/4 SIRS (HR, RR, WBC) 2/2 COVID PNA: POA , RR 26, WBC 15.4 (ANC 13.7). Given 3/4 SIRS and source, will eval for other underlying sources of infection. PNA labs negative, BCx NGTD x3d. UCx no growth. Likely severe sepsis on initial presentation is all related to COVID PNA. - Continue to follow BCx  - CTX 2g q24h + Doxycycline 100mg q12h started 11/19, will continue given severity of his disease, per pulm     HTN: Pt normotensive off meds. At home is on Amlodipine 10mg daily and HCTZ 12.5mg daily  - Will hold home meds     HLD: Chronic, stable.  Last lipids 07/2021 TC 174, HDL 54, LDL 98.6, . On Atorvastatin 10mg daily. - Will start Atorvastatin 20mg daily for benefit in COVID pts     T2DM (diet-controlled): Prior A1c 5.8% in 05/2021. No meds at present.   - SSI (resistant given morbid obesity)  - POC q6h POC BG checks.     JESUS: On CPAP at night. - Compliant with CPAP, now on BiPAP.     Obesity:  - The pt's Body mass index is 42.4 kg/m². - Encouraging lifestyle modifications and further follow up outpatient. At-risk JAMIR: RESOLVED. POA Cr 1.31, BL 0.8; continues to improve (0.95 this AM). Likely IVVD in setting of poor PO intake. - Cr downtrending, back at baseline, will continue half maintenance fluids.   - Daily CMP    Chest pain: RESOLVED. Description is pleuritic in nature, worse with cough. EKG without evidence of acute ischemia. Troponin of 9 makes CAD less concerning.   - Cardiac monitoring, will continue to monitor     Diarrhea: RESOLVED. Watery diarrhea x 2 days. Likely 2/2 COVID infection. Improved since presentation. Enteric bacteria panel negative.        FEN/GI - CLD while off BiPAP, no fluids  Activity - Ambulate with assistance  DVT prophylaxis - Lovenox  GI prophylaxis - Protonix  Fall prophylaxis - Not indicated at this time. Disposition - Admit to Stepdown. Plan to d/c to TBD. Consulting PT, OT and CM  Code Status - Full. Discussed with patient / caregivers. Next of [facility] [place] [address] Name and [address]      Lucia Ma MD  Family Medicine Resident         Subjective / Objective     Subjective   Pt without complaints this AM. States he has been doing well with BiPAP overnight. Respiratory: O2 Flow Rate (L/min): 70 l/min O2 Device: Heated, Hi flow nasal cannula   Visit Vitals  /70   Pulse 79   Temp 98 °F (36.7 °C)   Resp 28   Ht 5' 11\" (1.803 m)   Wt 304 lb (137.9 kg)   SpO2 90%   BMI 42.40 kg/m²       General: No acute distress. Respiratory: Diminished air movement bilateral lung fields.  No crackles appreciated on exam  Cardiovascular: Regular rate. GI: + bowel sounds. Nontender. Extremities:  Trace LE edema. Skin: Warm, dry.   Neuro: Awake and alert    I/O:  Date 11/22/21 0700 - 11/23/21 0659 11/23/21 0700 - 11/24/21 0659   Shift 5361-9473 9181-0842 24 Hour Total 0858-7079 0865-5761 24 Hour Total   INTAKE   I.V.(mL/kg/hr) 2800(1.7) 770(0.5) 3570(1.1)        Volume (0.9% sodium chloride infusion) 2100 750 2850        Volume (cefTRIAXone (ROCEPHIN) 2 g in sterile water (preservative free) 20 mL IV syringe)  20 20        Volume (doxycycline (VIBRAMYCIN) 100 mg in 0.9% sodium chloride (MBP/ADV) 100 mL MBP) 700  700      Shift Total(mL/kg) 2800(20.3) 770(5.6) 3570(25.9)      OUTPUT   Urine(mL/kg/hr) 1325(0.8) 1250(0.8) 2575(0.8) 800  800     Urine Voided 1325 1250 2575 800  800     Urine Occurrence(s) 3 x  3 x      Stool    1  1     Stool Occurrence(s) 1 x  1 x        Stool    1  1   Shift Total(mL/kg) 1325(9.6) 1250(9.1) 2575(18.7) 801(5.8)  801(5.8)   NET 6055 -557 995 -801  -801   Weight (kg) 137.9 137.9 137.9 137.9 137.9 137.9       CBC:  Recent Labs     11/23/21 0428 11/22/21  0612 11/21/21  0607   WBC 19.3* 18.5* 19.2*   HGB 13.7 12.7 14.2   HCT 41.1 39.0 42.7    332 502       Metabolic Panel:  Recent Labs     11/23/21 0428 11/22/21  0612 11/21/21  0607    141 138   K 4.3 3.9 4.0    112* 106   CO2 26 24 27   BUN 21* 21* 24*   CREA 0.81 0.82 0.95   * 132* 154*   CA 8.1* 7.4* 8.2*   ALB 2.7* 2.2* 2.6*   ALT 38 36 44          For Billing    Chief Complaint   Patient presents with   [facility] Problems  Date Reviewed: 4/30/2021          Codes Class Noted POA    Acute hypoxemic respiratory failure due to COVID-19 [facility]) ICD-10-CM: U07.1, J96.01  ICD-9-CM: 518.81, 079.89, 799.02  11/19/2021 Unknown

## 2021-11-24 NOTE — PROGRESS NOTES
[address] [facility] [address] [address]   Office [phone]  Fax [phone]          Assessment and Plan     Charles Yip is a 39 y.o. male with a PMH of HTN, HLD, JESUS admitted for AHRF and severe sepsis 2/2 COVID PNA. Patient was admitted on 11/19/2021. Pt intermittently on BiPAP, high dose steroids, and abx to cover potential bacterial superinfection. Will need to monitor closely. Overnight Events: Pt intermittently on BiPAP. Otherwise, no acute events overnight. AHRF 2/2 COVID PNA: Unvaccinated. Symptom onset 11/12 with acute respiratory decompensation, O2 sats in high 60s prior to admission. CTA chest without evidence of definite/proximal PE, no aortic aneurysm/dissection, imaging consistent with moderate to severe COVID PNA and associated hilar/mediastinal lymphadenopathy. B/l lower extremity duplex without evidence of DVT. CXR showing interval partial improvement of multilobular PNA. - Continue BiPAP QHS and as tolerated  - COVID labs (CRP, D-dimer, ferritin, LD), CBC with diff, CMP daily  - Continue Decadron 10mg IV BID  - S/p Tocilizumab 800mg 11/19  - Lovenox increased to therapeutic - 135mg BID  - Tessalon 200mg TID + Tussionex 5mL BID  - COVID meds (Atorvastatin 20mg every day, Vit C 500mg BID, Zinc 220mg daily)  - Consulted pulm, appreciate recs  - Strongly suggest vaccination after discharge     Severe sepsis with 3/4 SIRS (HR, RR, WBC) 2/2 COVID PNA: POA , RR 26, WBC 15.4 (ANC 13.7). Given 3/4 SIRS and source, will eval for other underlying sources of infection. PNA labs negative, BCx NGTD x3d. UCx no growth. Likely severe sepsis on initial presentation is all related to COVID PNA. CTX + Doxy x 5d (11/19-11/24). - BCx NGTD x 5d    Abdominal pain: Secondary to gas pains after starting CLD yesterday. - Started Simethicone    HTN: Pt normotensive off meds. At home is on Amlodipine 10mg daily and HCTZ 12.5mg daily  - Will hold home meds     HLD: Chronic, stable.  Last lipids 07/2021 , HDL 54, LDL 98.6, . On Atorvastatin 10mg daily. - Will start Atorvastatin 20mg daily for benefit in COVID pts     T2DM (diet-controlled): Prior A1c 5.8% in 05/2021. No meds at present.   - SSI (resistant given morbid obesity)  - POC q6h POC BG checks.     JESUS: On CPAP at night. - Compliant with CPAP, now on BiPAP.     Obesity:  - The pt's Body mass index is 42.4 kg/m². - Encouraging lifestyle modifications and further follow up outpatient. At-risk JAMIR: RESOLVED. POA Cr 1.31, BL 0.8; continues to improve (0.95 this AM). Likely IVVD in setting of poor PO intake. - Cr downtrending, back at baseline, will continue half maintenance fluids.   - Daily CMP    Chest pain: RESOLVED. Description is pleuritic in nature, worse with cough. EKG without evidence of acute ischemia. Troponin of 9 makes CAD less concerning.   - Cardiac monitoring, will continue to monitor     Diarrhea: RESOLVED. Watery diarrhea x 2 days. Likely 2/2 COVID infection. Improved since presentation. Enteric bacteria panel negative.        FEN/GI - CLD while off BiPAP  Activity - Ambulate with assistance  DVT prophylaxis - Lovenox  GI prophylaxis - Protonix  Fall prophylaxis - Not indicated at this time. Disposition - Admit to Stepdown. Plan to d/c to TBD. Consulting PT, OT and CM  Code Status - Full. Discussed with patient / caregivers. Next of [facility] [address] Name and [address] [facility],  Spouse - [phone]      Efraín Serrato MD  Family Medicine Resident         Subjective / Objective     Subjective   Pt without complaints this AM. States he did well with CLD yesterday, had some gas pains in his abdomen. Otherwise, no chest pain, palp, n/v, c/d.       Respiratory: O2 Flow Rate (L/min): 70 l/min O2 Device: BIPAP   Visit Vitals  /72 (BP 1 Location: Right arm, BP Patient Position: At rest)   Pulse 71   Temp 97.5 °F (36.4 °C)   Resp 24   Ht 5' 11\" (1.803 m)   Wt 304 lb (137.9 kg)   SpO2 95%   BMI 42.40 kg/m² General: No acute distress. Respiratory: Diminished air movement bilateral lung fields. No crackles appreciated on exam  Cardiovascular: Regular rate. GI: + bowel sounds. Nontender. Extremities:  No LE edema. Skin: Warm, dry.   Neuro: Awake and alert    I/O:  Date 11/23/21 0700 - 11/24/21 0659 11/24/21 0700 - 11/25/21 0659   Shift 2954-7596 3623-4673 24 Hour Total 9005-6482 4749-5867 24 Hour Total   INTAKE   Shift Total(mL/kg)         OUTPUT   Urine(mL/kg/hr) 1250(0.8) 495(0.3) 1745(0.5)        Urine Voided 6485 834 0277        Urine Occurrence(s)  1 x 1 x      Emesis/NG output           Emesis Occurrence(s)  0 x 0 x      Stool 1  1        Stool Occurrence(s)  0 x 0 x        Stool 1  1      Shift Total(mL/kg) 1251(9.1) 495(3.6) 1746(12.7)      NET -1251 -495 -1746      Weight (kg) 137.9 137.9 137.9 137.9 137.9 137.9       CBC:  Recent Labs     11/24/21  0348 11/23/21  0428 11/22/21  0612   WBC 21.1* 19.3* 18.5*   HGB 14.9 13.7 12.7   HCT 43.0 41.1 39.0    352 086       Metabolic Panel:  Recent Labs     11/24/21  0348 11/23/21  0428 11/22/21  0612   * 139 141   K 4.2 4.3 3.9    108 112*   CO2 26 26 24   BUN 18 21* 21*   CREA 0.76 0.81 0.82   * 153* 132*   CA 8.7 8.1* 7.4*   ALB 3.0* 2.7* 2.2*   ALT 37 38 36          For Billing    Chief Complaint   Patient presents with   [facility] Problems  Date Reviewed: 4/30/2021          Codes Class Noted POA    Acute hypoxemic respiratory failure due to COVID-19 [facility]) ICD-10-CM: U07.1, J96.01  ICD-9-CM: 518.81, 079.89, 799.02  11/19/2021 Unknown

## 2021-11-24 NOTE — PROGRESS NOTES
..Bedside shift change report given to Abdullahi Oliveira RN (oncoming nurse) by Jaja Barker RN (offgoing nurse). Report included the following information SBAR, Kardex, Intake/Output, MAR and Cardiac Rhythm NSR. Aníbal Rodriguez .Primary Nurse Yari Groves RN and Tequila Vides RN performed a dual skin assessment on this patient No impairment noted  Cameron score is see flowsheet     2000- Assessment complete. Pt up in chair resting comfortably. 65- Assisted pt back to bed and provided PRN Atarax. Respiratory in room to place pt on Bipap.     0400- Reassessment complete. Labs drawn. Aníbal Rodriguez .Bedside shift change report given to SAUL Jones (oncoming nurse) by Abdullahi Oliveira RN (offgoing nurse). Report included the following information SBAR, Kardex, Intake/Output, MAR and Cardiac Rhythm NSR.

## 2021-11-24 NOTE — PROGRESS NOTES
Problem: Risk for Spread of Infection  Goal: Prevent transmission of infectious organism to others  Description: Prevent the transmission of infectious organisms to other patients, staff members, and visitors. Outcome: Progressing Towards Goal     Problem: Patient Education:  Go to Education Activity  Goal: Patient/Family Education  Outcome: Progressing Towards Goal     Problem: Falls - Risk of  Goal: *Absence of Falls  Description: Document Romulo Sow Fall Risk and appropriate interventions in the flowsheet. Outcome: Progressing Towards Goal  Note: Fall Risk Interventions:  Mobility Interventions: Communicate number of staff needed for ambulation/transfer, Patient to call before getting OOB, Strengthening exercises (ROM-active/passive)         Medication Interventions: Evaluate medications/consider consulting pharmacy, Teach patient to arise slowly    Elimination Interventions: Patient to call for help with toileting needs, Toileting schedule/hourly rounds, Call light in reach              Problem: Patient Education: Go to Patient Education Activity  Goal: Patient/Family Education  Outcome: Progressing Towards Goal     Problem: Pressure Injury - Risk of  Goal: *Prevention of pressure injury  Description: Document Cameron Scale and appropriate interventions in the flowsheet. Outcome: Progressing Towards Goal  Note: Pressure Injury Interventions: Activity Interventions: Assess need for specialty bed, Pressure redistribution bed/mattress(bed type)    Mobility Interventions: Assess need for specialty bed, Pressure redistribution bed/mattress (bed type), Turn and reposition approx.  every two hours(pillow and wedges)    Nutrition Interventions: Discuss nutritional consult with provider    Friction and Shear Interventions: Minimize layers, Transferring/repositioning devices                Problem: Patient Education: Go to Patient Education Activity  Goal: Patient/Family Education  Outcome: Progressing Towards Goal     Problem: Patient Education: Go to Patient Education Activity  Goal: Patient/Family Education  Outcome: Progressing Towards Goal

## 2021-11-24 NOTE — PROGRESS NOTES
NUTRITION brief         Continue current diet and supplements. ANDREA RN. Pt could not reach phone when checked on him. Pt ate clear liquid tray this morning and was very excited to get solid foods. Glucerna is ordered with all meals to promote calorie/protein intake while off BiPap. Will continue to follow. Recs available for nutrition support in previous note should he be intubated or go back to NPO. Diet Order:   ADULT ORAL NUTRITION SUPPLEMENT Breakfast, Lunch, Dinner; Diabetic Supplement  ADULT DIET Regular; 3 carb choices (45 gm/meal); No Concentrated Sweets    Documented Meal intake:  Patient Vitals for the past 168 hrs:   % Diet Eaten   11/21/21 1800 0%   11/21/21 1200 0%     Documentation of supplement intake:  No data found.     Lab Results   Component Value Date/Time    Glucose 126 (H) 11/24/2021 03:48 AM    Glucose (POC) 166 (H) 11/24/2021 11:14 AM     Last 3 Recorded Weights in this Encounter    11/19/21 1259   Weight: 137.9 kg (304 lb)       Zoie Saleem RD, MS  Contact via [employer] or office [phone]

## 2021-11-24 NOTE — PROGRESS NOTES
Case Management follow-up:    Covid +  Acute respiratory failure with hypoxia  BiPaP/high flow oxygen  I am continuing to follow pt for potential future inpatient pulmonary rehab needs and /or home oxygen needs,    Aaron

## 2021-11-24 NOTE — PROGRESS NOTES
0700: Bedside and Verbal shift change report given to SAUL Jones (oncoming nurse) by Stanley Carrel, RN (offgoing nurse). Report included the following information SBAR, Intake/Output, MAR, Recent Results and Cardiac Rhythm NSR. Primary Nurse Julee Johns and Stanley Carrel, RN performed a dual skin assessment on this patient No impairment noted  Cameron score is see flow sheet. 0800: Assessment completed. Patient assisted up to recliner on Bipap then switched to hi flow 70L 100%. Desat to high 70s due to being off oxygen for a moment while switching over, quick to recover. Breakfast tray provided. Supplies for mouth care provided. 1015: Hi flow decreased to 60 L 100%. 1100: Patient up to bedside commode  1200: Reassessment completed. Lunch tray provided. 1345: Patient up to bedside commode. 1600: Reassessment completed. 1700: CHG bath provided, gown changed. 1830: Patient complaining of increased WOB and requests oxygen be turned up. Hi flow increased from 45 L to 60 L 100%. 1900: Bedside and Verbal shift change report given to Logan Millan RN (oncoming nurse) by Sienna Hood RN (offgoing nurse). Report included the following information SBAR, Intake/Output, MAR, Recent Results and Cardiac Rhythm Sinus Tach.

## 2021-11-24 NOTE — PROGRESS NOTES
[address] [facility] [address] [address]   Office [phone]  Fax [phone]          Assessment and Plan     Galilea Tony is a 39 y.o. male with a PMH of HTN, HLD, JESUS admitted for AHRF and severe sepsis 2/2 COVID PNA. Patient was admitted on 11/19/2021. Overnight Events: NAEO. AHRF 2/2 COVID PNA: Unvaccinated. Symptom onset 11/12 with acute respiratory decompensation, O2 sats in high 60s prior to admission. CTA chest without evidence of definite/proximal PE, no aortic aneurysm/dissection, imaging consistent with moderate to severe COVID PNA and associated hilar/mediastinal lymphadenopathy. B/l lower extremity duplex without evidence of DVT. CXR showing interval partial improvement of multilobular PNA. S/p Tocilizumab 800mg 11/19  - Continue BiPAP QHS and as tolerated  - COVID labs (CRP, D-dimer, ferritin, LD), CBC, CMP daily  - Continue Decadron 10mg IV BID  - Therapeutic Lovenox  - Tessalon 200mg TID + Tussionex 5mL BID  - COVID meds (Atorvastatin 20mg every day, Vit C 500mg BID, Zinc 220mg daily)  - Pulm following, appreciate recs  - Strongly suggest vaccination after discharge     Severe sepsis 2/2 COVID PNA: PNA labs negative, BCx/UCx no growth. Likely severe sepsis on initial presentation is all related to COVID PNA. CTX + Doxy x 5d (11/19-11/24). Abdominal pain: Secondary to gas pains after starting diet. - Continue Simethicone    HTN: Pt normotensive off meds. At home is on Amlodipine 10mg daily and HCTZ 12.5mg daily  - Will hold home meds     HLD: Chronic, stable. Last lipids 07/2021 , HDL 54, LDL 98.6, . On Atorvastatin 10mg daily. - Will start Atorvastatin 20mg daily for benefit in COVID pts     T2DM (diet-controlled): Prior A1c 5.8% in 05/2021. No meds at present.   - SSI (resistant given morbid obesity) w/ ACHS glucose checks     JESUS: On CPAP at night. - Compliant with CPAP, now on BiPAP.     Obesity: Body mass index is 42.4 kg/m².   - Encouraging lifestyle modifications and further follow up outpatient. At-risk JAMIR: RESOLVED. POA Cr 1.31, BL 0.8. Likely IVVD in setting of poor PO intake. - Daily CMP    Chest pain: RESOLVED. Description is pleuritic in nature, worse with cough. EKG without evidence of acute ischemia. Troponin of 9 makes CAD less concerning.   - Cardiac monitoring, will continue to monitor     Diarrhea: RESOLVED. Watery diarrhea x 2 days. Likely 2/2 COVID infection. Improved since presentation. Enteric bacteria panel negative.        FEN/GI - Regular diet while off BIPAP  Activity - Ambulate with assistance  DVT prophylaxis - Lovenox (therapeutic)  GI prophylaxis - Protonix  Disposition - Plan to d/c to TBD. Code Status - Full. Discussed with patient / caregivers. Next of Kin Name and [address],  Spouse - [address] , DO  Family Medicine Resident         Subjective / Objective     Subjective:  Symptoms:  No shortness of breath or chest pain. Diet:  No nausea or vomiting. Patient seen and assessed bedside. Has no complaints today besides some nasal congestion. .     Review of Systems   Constitutional: Negative for chills and fever. HENT: Positive for congestion. Respiratory: Negative for shortness of breath. Cardiovascular: Negative for chest pain and leg swelling. Gastrointestinal: Negative for abdominal pain, nausea and vomiting. Genitourinary: Negative for dysuria. Neurological: Negative for dizziness. Psychiatric/Behavioral: Negative for depression. Respiratory: O2 Flow Rate (L/min): 603 l/min O2 Device: Heated, Hi flow nasal cannula   Visit Vitals  BP (!) 142/84 (BP 1 Location: Right upper arm, BP Patient Position: At rest)   Pulse 91   Temp 97.6 °F (36.4 °C)   Resp 26   Ht 5' 11\" (1.803 m)   Wt 304 lb (137.9 kg)   SpO2 95%   BMI 42.40 kg/m²       General: No acute distress. Seated comfortably in chair with HF on.    Respiratory: Diminished air movement bilateral lung fields. Cardiovascular: Regular rate. GI: + bowel sounds. Nontender. Extremities:  No LE edema. Skin: Warm, dry.   Neuro: Awake and alert    I/O:  Date 11/24/21 0700 - 11/25/21 0659 11/25/21 0700 - 11/26/21 0659   Shift 0417-1286 7499-1050 24 Hour Total 8384-3502 7060-1127 24 Hour Total   INTAKE   Shift Total(mL/kg)         OUTPUT   Urine(mL/kg/hr) 1200(0.7) 600(0.4) 1800(0.5)        Urine Voided 6131 272 1953      Stool           Stool Occurrence(s) 3 x 1 x 4 x      Shift Total(mL/kg) 1200(8.7) 600(4.4) 1800(13.1)      NET -1200 -600 -1800      Weight (kg) 137.9 137.9 137.9 137.9 137.9 137.9       CBC:  Recent Labs     11/25/21  0525 11/24/21  0348 11/23/21  0428   WBC 25.7* 21.1* 19.3*   HGB 15.5 14.9 13.7   HCT 45.4 43.0 41.1    320 406       Metabolic Panel:  Recent Labs     11/25/21  0525 11/24/21  0348 11/23/21  0428   * 134* 139   K 4.2 4.2 4.3    103 108   CO2 26 26 26   BUN 18 18 21*   CREA 0.83 0.76 0.81   * 126* 153*   CA 8.6 8.7 8.1*   ALB 3.0* 3.0* 2.7*   ALT 38 37 38          For Billing    Chief Complaint   Patient presents with   [facility] Problems  Date Reviewed: 4/30/2021          Codes Class Noted POA    Diabetes mellitus type 2, [facility] ICD-10-CM: E11.9  ICD-9-CM: 250.00  11/25/2021 Unknown        Acute hypoxemic respiratory failure due to COVID-19 [facility]) ICD-10-CM: U07.1, J96.01  ICD-9-CM: 518.81, 079.89, 799.02  11/19/2021 Unknown        Obesity, morbid ([facility] ICD-10-CM: E66.01  ICD-9-CM: 278.01  4/10/2018 Yes        JESUS (obstructive sleep apnea) ICD-10-CM: G47.33  ICD-9-CM: 327.23  10/15/2014 Yes        Hypertension ICD-10-CM: I10  ICD-9-CM: 401.9  10/15/2014 Yes        Pure hypercholesterolemia ICD-10-CM: E78.00  ICD-9-CM: 272.0  Unknown Yes

## 2021-11-24 NOTE — PROGRESS NOTES
Name: Sánchez 87: [address]   : 1985 Admit Date: 2021   Phone: [phone]  Room: [facility]/   PCP: Danny Guerrero NP  MRN: [id number]   Date: 2021  Code: Full Code          Chart and notes reviewed. Data reviewed. I review the patient's current medications in the medical record at each encounter. I have evaluated and examined the patient. HPI:    5:11 PM       History was obtained from patient. I was asked by Norberto Frederick MD to see Lyssa Sadiamarco in consultation for a chief complaint of COVID-19 PNA with acute respiratory failure with hypoxia. History of Present Illness:  Mr. Yvon Rodriges is a 40 yo with a history of JESUS (on CPAP), childhood asthma (grew out of it), HTN, HLD, and obesity who is admitted with COVID-19 with acute respiratory failure with hypoxia. He began feeling poorly about a week ago and tested positive on Monday. He has had progressive shortness of breath, cough, and chest pain. He has had fever and is febrile to 101. 3. He was hypoxic to 68% on RA and requiring HF. He was satting in the mid 80's on 40L and 100%, increased to 50L with sats around 90%. Overnight events: On bipap overnight. Currently 96-99% on FiO2 100%. Feels better overall. CXR improving. Past Medical History:   Diagnosis Date    Hypercholesteremia     Hypertension 10/15/2014    Poison ivy 9/15/2015    Pure hypercholesterolemia     S/P vasectomy 2014       No past surgical history on file.     Family History   Problem Relation Age of Onset    Diabetes Mother     Heart Attack Father 43    Stroke Maternal Grandmother     Cancer Paternal Grandfather          age 45 - Hodgkin's   [facility] Cancer Maternal Grandfather          in 42's with melanoma       Social History     Tobacco Use    Smoking status: Never Smoker    Smokeless tobacco: Never Used   Substance Use Topics    Alcohol use: No     Alcohol/week: 0.0 standard drinks No Known Allergies    Current Facility-Administered Medications   Medication Dose Route Frequency    simethicone (MYLICON) tablet 80 mg  80 mg Oral QID PRN    enoxaparin (LOVENOX) injection 135 mg  135 mg SubCUTAneous Q12H    lidocaine 4 % patch 1 Patch  1 Patch TransDERmal Q24H    guaiFENesin ER (MUCINEX) tablet 600 mg  600 mg Oral Q12H    pantoprazole (PROTONIX) 40 mg in 0.9% sodium chloride 10 mL injection  40 mg IntraVENous DAILY    hydrOXYzine HCL (ATARAX) tablet 25 mg  25 mg Oral Q6H PRN    melatonin (rapid dissolve) tablet 5 mg  5 mg Oral QHS    phenol throat spray (CHLORASEPTIC) 2 Spray  2 Spray Oral Q6H PRN    sodium chloride (NS) flush 5-40 mL  5-40 mL IntraVENous Q8H    sodium chloride (NS) flush 5-40 mL  5-40 mL IntraVENous PRN    acetaminophen (TYLENOL) tablet 650 mg  650 mg Oral Q6H PRN    Or    acetaminophen (TYLENOL) suppository 650 mg  650 mg Rectal Q6H PRN    polyethylene glycol (MIRALAX) packet 17 g  17 g Oral DAILY PRN    ondansetron (ZOFRAN ODT) tablet 4 mg  4 mg Oral Q8H PRN    Or    ondansetron (ZOFRAN) injection 4 mg  4 mg IntraVENous Q6H PRN    ascorbic acid (vitamin C) (VITAMIN C) tablet 500 mg  500 mg Oral BID    zinc sulfate (ZINCATE) 50 mg zinc (220 mg) capsule 1 Capsule  1 Capsule Oral DAILY    atorvastatin (LIPITOR) tablet 20 mg  20 mg Oral DAILY    prochlorperazine (COMPAZINE) injection 10 mg  10 mg IntraVENous Q6H PRN    glucose chewable tablet 16 g  4 Tablet Oral PRN    dextrose (D50W) injection syrg 12.5-25 g  12.5-25 g IntraVENous PRN    glucagon (GLUCAGEN) injection 1 mg  1 mg IntraMUSCular PRN    dexamethasone (DECADRON) 10 mg/mL injection 10 mg  10 mg IntraVENous Q12H    benzonatate (TESSALON) capsule 200 mg  200 mg Oral TID    HYDROcodone-chlorpheniramine (TUSSIONEX) oral suspension 5 mL  5 mL Oral Q12H    insulin lispro (HUMALOG) injection   SubCUTAneous Q6H    albuterol-ipratropium (DUO-NEB) 2.5 MG-0.5 MG/3 ML  3 mL Nebulization Q4H RT    Or  ipratropium-albuterol (COMBIVENT RESPIMAT) 20 mcg-100 mcg inhalation spray  2 Puff Inhalation Q4H RT         REVIEW OF SYSTEMS   12 point ROS negative except as stated in the HPI. Physical Exam:   Visit Vitals  /72 (BP 1 Location: Right arm, BP Patient Position: At rest)   Pulse 71   Temp 97.5 °F (36.4 °C)   Resp 24   Ht 5' 11\" (1.803 m)   Wt 137.9 kg (304 lb)   SpO2 95%   BMI 42.40 kg/m²       General:  Alert, cooperative, no distress, appears stated age. Head:  Normocephalic, without obvious abnormality, atraumatic. Eyes:  Conjunctivae/corneas clear. Nose: Nares normal. Septum midline. Mucosa normal.    Throat: Lips, mucosa, and tongue normal.    Neck: Supple, symmetrical, trachea midline   Lungs:   Diminished   Chest wall:  No tenderness or deformity. Heart:  Regular rate and rhythm, S1, S2 normal, no murmur, click, rub or gallop. Abdomen:   Soft, non-tender. Bowel sounds normal.    Extremities: Extremities normal, atraumatic, no cyanosis or edema. Pulses: 2+ and symmetric all extremities. Skin: Skin color, texture, turgor normal. No rashes or lesions       Neurologic: Grossly nonfocal       Lab Results   Component Value Date/Time    Sodium 134 (L) 11/24/2021 03:48 AM    Potassium 4.2 11/24/2021 03:48 AM    Chloride 103 11/24/2021 03:48 AM    CO2 26 11/24/2021 03:48 AM    BUN 18 11/24/2021 03:48 AM    Creatinine 0.76 11/24/2021 03:48 AM    Glucose 126 (H) 11/24/2021 03:48 AM    Calcium 8.7 11/24/2021 03:48 AM    Lactic acid 1.1 11/19/2021 04:39 PM       Lab Results   Component Value Date/Time    WBC 21.1 (H) 11/24/2021 03:48 AM    HGB 14.9 11/24/2021 03:48 AM    PLATELET 161 65/92/6378 03:48 AM    MCV 88.8 11/24/2021 03:48 AM       Lab Results   Component Value Date/Time    Alk.  phosphatase 112 11/24/2021 03:48 AM    Protein, total 6.9 11/24/2021 03:48 AM    Albumin 3.0 (L) 11/24/2021 03:48 AM    Globulin 3.9 11/24/2021 03:48 AM       Lab Results   Component Value Date/Time Ferritin 992 (H) 11/23/2021 04:28 AM       Lab Results   Component Value Date/Time    C-Reactive protein 0.92 (H) 11/24/2021 03:48 AM    TSH 2.510 11/30/2018 09:47 AM    TSH 1.260 10/17/2013 09:40 AM        No results found for: PH, PHI, PCO2, PCO2I, PO2, PO2I, HCO3, HCO3I, FIO2, FIO2I    No results found for: CPK, RCK1, RCK2, RCK3, RCK4, CKNDX, CKND1, TROPT, TROIQ, BNPP, BNP     Lab Results   Component Value Date/Time    Culture result: No growth (<1,000 CFU/ML) 11/19/2021 09:46 PM    Culture result: MRSA NOT PRESENT 11/19/2021 06:53 PM    Culture result:  11/19/2021 06:53 PM     Screening of patient nares for MRSA is for surveillance purposes and, if positive, to facilitate isolation considerations in high risk settings. It is not intended for automatic decolonization interventions per se as regimens are not sufficiently effective to warrant routine use. No results found for: TOXA1, RPR, HBCM, HBSAG, HAAB, HCAB1, HAAT, G6PD, CRYAC, HIVGT, HIVR, HIV1, HIV12, HIVPC, HIVRPI    No results found for: VANCT, CPK    Lab Results   Component Value Date/Time    Color YELLOW/STRAW 11/19/2021 09:46 PM    Appearance CLEAR 11/19/2021 09:46 PM    Specific gravity 1.015 11/19/2021 09:46 PM    pH (UA) 5.5 11/19/2021 09:46 PM    Protein 30 (A) 11/19/2021 09:46 PM    Glucose Negative 11/19/2021 09:46 PM    Ketone TRACE (A) 11/19/2021 09:46 PM    Bilirubin Negative 11/19/2021 09:46 PM    Blood Negative 11/19/2021 09:46 PM    Urobilinogen 0.2 11/19/2021 09:46 PM    Nitrites Negative 11/19/2021 09:46 PM    Leukocyte Esterase Negative 11/19/2021 09:46 PM    WBC 0-4 11/19/2021 09:46 PM    RBC 0-5 11/19/2021 09:46 PM    Bacteria Negative 11/19/2021 09:46 PM       IMPRESSION  · COVID-19 PNA  · Acute respiratory failure with hypoxia  · JESUS  · Severe sepsis    PLAN  · Goal sats 88% or higher, currently on HF. Will get back on bipap this AM.   · S/p Actemra 11/19  ·  high dose decadron BID  · Doxy- last dose today.  Finished cetriaxone. · scheduled combivent  · Tessalon, tussionex  · Increase lovenox to therapeutic dose since d-dimer increasing markedly  · Self prone at night and OOB into chair as much as possible. Transfer pt to chair on bipap today before transitioning to HF. Discussed plan with nursing. Patient is critically ill in the setting of COVID-19 and acute respiratory failure with hypoxia requiring continuous HF/bipap. CC time 33 minutes.       She Loyd MD

## 2021-11-24 NOTE — PROGRESS NOTES
[facility] Dosing Services: IV to PO Conversion    The pharmacist has determined that this patient meets P & T approved criteria for conversion from IV to oral therapy for the following medication:Pantoprazole    The pharmacist has written the following order for the patient: Pantoprazole 40 mg ACB    The pharmacist will continue to monitor the patient's status and advise the physician if conversion back to IV therapy is recommended.     Signed ABBEY Chau Contact information:  [phone]

## 2021-11-25 PROBLEM — E11.9 DIABETES MELLITUS TYPE 2, CONTROLLED (HCC): Status: ACTIVE | Noted: 2021-01-01

## 2021-11-25 NOTE — PROGRESS NOTES
1425 Pt states he feels like he is short of breath and wants the oxygen increased. See Flowsheet. 1440 Pt states he feels like the oxygen is too much and wants it decreased. See flowsheet for adjustments.  Pt mildly anxious pt medicated for anxiety see MAR

## 2021-11-25 NOTE — PROGRESS NOTES
Problem: Risk for Spread of Infection  Goal: Prevent transmission of infectious organism to others  Description: Prevent the transmission of infectious organisms to other patients, staff members, and visitors. Outcome: Resolved/Met     Problem: Patient Education:  Go to Education Activity  Goal: Patient/Family Education  Outcome: Resolved/Met     Problem: Falls - Risk of  Goal: *Absence of Falls  Description: Document Nicole Fall Risk and appropriate interventions in the flowsheet. Outcome: Resolved/Met  Note: Fall Risk Interventions:  Mobility Interventions: Patient to call before getting OOB         Medication Interventions: Patient to call before getting OOB    Elimination Interventions: Call light in reach              Problem: Patient Education: Go to Patient Education Activity  Goal: Patient/Family Education  Outcome: Resolved/Met     Problem: Pressure Injury - Risk of  Goal: *Prevention of pressure injury  Description: Document Cameron Scale and appropriate interventions in the flowsheet. Outcome: Resolved/Met  Note: Pressure Injury Interventions:             Activity Interventions: Chair cushion, Increase time out of bed    Mobility Interventions: Chair cushion, Pressure redistribution bed/mattress (bed type)    Nutrition Interventions: Document food/fluid/supplement intake    Friction and Shear Interventions: HOB 30 degrees or less, Minimize layers, Transferring/repositioning devices                Problem: Patient Education: Go to Patient Education Activity  Goal: Patient/Family Education  Outcome: Resolved/Met     Problem: Patient Education: Go to Patient Education Activity  Goal: Patient/Family Education  Outcome: Resolved/Met

## 2021-11-25 NOTE — PROGRESS NOTES
Problem: Risk for Spread of Infection  Goal: Prevent transmission of infectious organism to others  Description: Prevent the transmission of infectious organisms to other patients, staff members, and visitors. Outcome: Progressing Towards Goal     Problem: Patient Education:  Go to Education Activity  Goal: Patient/Family Education  Outcome: Progressing Towards Goal     Problem: Falls - Risk of  Goal: *Absence of Falls  Description: Document Elvira Gong Fall Risk and appropriate interventions in the flowsheet. Outcome: Progressing Towards Goal  Note: Fall Risk Interventions:  Mobility Interventions: Bed/chair exit alarm, Communicate number of staff needed for ambulation/transfer, Patient to call before getting OOB, Strengthening exercises (ROM-active/passive)         Medication Interventions: Bed/chair exit alarm, Evaluate medications/consider consulting pharmacy, Patient to call before getting OOB    Elimination Interventions: Bed/chair exit alarm, Call light in reach, Toileting schedule/hourly rounds, Urinal in reach              Problem: Patient Education: Go to Patient Education Activity  Goal: Patient/Family Education  Outcome: Progressing Towards Goal     Problem: Pressure Injury - Risk of  Goal: *Prevention of pressure injury  Description: Document Cameron Scale and appropriate interventions in the flowsheet. Outcome: Progressing Towards Goal  Note: Pressure Injury Interventions: Activity Interventions: Assess need for specialty bed, Increase time out of bed, Pressure redistribution bed/mattress(bed type)    Mobility Interventions: Assess need for specialty bed, HOB 30 degrees or less, Pressure redistribution bed/mattress (bed type), Turn and reposition approx.  every two hours(pillow and wedges)    Nutrition Interventions: Document food/fluid/supplement intake    Friction and Shear Interventions: HOB 30 degrees or less, Minimize layers, Transferring/repositioning devices                Problem: Patient Education: Go to Patient Education Activity  Goal: Patient/Family Education  Outcome: Progressing Towards Goal     Problem: Patient Education: Go to Patient Education Activity  Goal: Patient/Family Education  Outcome: Progressing Towards Goal

## 2021-11-25 NOTE — PROGRESS NOTES
1900- Bedside and Verbal shift change report given to Giovanna Helton RN(oncoming nurse) by Josh De Leon RN (offgoing nurse). Report included the following information SBAR, Kardex, Intake/Output, MAR, Recent Results, Cardiac Rhythm NSR and ST and Alarm Parameters . Primary Nurse Giovanna Helton RN and SAUL Jones performed a dual skin assessment on this patient No impairment noted  Cameron score is 20    2000- Pt is resting up in chair at this time. He is currently on [place] 60L 100%. No complaints at this time . 0000- No changes on reassessment. 0400- No changes on reassessment. 0700- Bedside and Verbal shift [facility] (oncoming nurse) by Giovanna Helton RN (offgoing nurse). Report included the following information SBAR, Kardex, Intake/Output, MAR, Recent Results, Cardiac Rhythm NSR and ST and Alarm Parameters .

## 2021-11-25 NOTE — PROGRESS NOTES
Spiritual Care Assessment/Progress Note  [facility]      NAME: Alka Fan      MRN: [id number]  AGE: 39 y.o. SEX: male  Protestant Affiliation: Shinto   Language: English     11/25/2021     Total Time (in minutes): 24     Spiritual Assessment begun in [facility] INTENSIVE CARE through conversation with:         []Patient        [] Family    [] Friend(s)        Reason for Consult: Initial/Spiritual assessment, critical care     Spiritual beliefs: (Please include comment if needed)     [] Identifies with a elijah tradition:         [] Supported by a elijah community:            [] Claims no spiritual orientation:           [] Seeking spiritual identity:                [] Adheres to an individual form of spirituality:           [x] Not able to assess:                           Identified resources for coping:      [] Prayer                               [] Music                  [] Guided Imagery     [] Family/friends                 [] Pet visits     [] Devotional reading                         [x] Unknown     [] Other:                                               Interventions offered during this visit: (See comments for more details)    Patient Interventions: Other (comment) (Unable to assess)           Plan of Care:     [] Support spiritual and/or cultural needs    [] Support AMD and/or advance care planning process      [] Support grieving process   [] Coordinate Rites and/or Rituals    [] Coordination with community clergy   [] No spiritual needs identified at this time   [] Detailed Plan of Care below (See Comments)  [] Make referral to Music Therapy  [] Make referral to Pet Therapy     [] Make referral to Addiction services  [] Make referral to [facility]  [] Make referral to Spiritual Care Partner  [] No future visits requested        [x] Contact Spiritual Care for further referrals     Comments: After conducting chart review,  attempted to visit Mr. Blanco for an initial spiritual assessment in the ICU. Mr. Hawley Person is on Covid+ contact precautions, and appeared to be awake, alert, and sitting up in his recliner. Consulted with his nurse who shared that he may be able to have a conversation with the  via telephone, but gets short of breath with much conversation.  did not attempts to call into his room today as another provider entered into his room. 's are available for further support upon referral  Zohreh Mcneal. Logan Perez.      Paging Service: [phone])

## 2021-11-26 PROBLEM — E87.1 HYPONATREMIA: Status: ACTIVE | Noted: 2021-01-01

## 2021-11-26 PROBLEM — J80 ACUTE RESPIRATORY DISTRESS SYNDROME (ARDS) DUE TO COVID-19 VIRUS (HCC): Status: ACTIVE | Noted: 2021-01-01

## 2021-11-26 PROBLEM — J98.2 PNEUMOMEDIASTINUM (HCC): Status: ACTIVE | Noted: 2021-01-01

## 2021-11-26 PROBLEM — J93.83 OTHER PNEUMOTHORAX: Status: ACTIVE | Noted: 2021-01-01

## 2021-11-26 PROBLEM — A41.9 SEVERE SEPSIS (HCC): Status: ACTIVE | Noted: 2021-01-01

## 2021-11-26 PROBLEM — R65.20 SEVERE SEPSIS (HCC): Status: ACTIVE | Noted: 2021-01-01

## 2021-11-26 PROBLEM — U07.1 ACUTE RESPIRATORY DISTRESS SYNDROME (ARDS) DUE TO COVID-19 VIRUS (HCC): Status: ACTIVE | Noted: 2021-01-01

## 2021-11-26 NOTE — PROGRESS NOTES
0715 Bedside shift change report given to Kierra Figueroa RN. (oncoming nurse) by Aparna Zambrano RN. (offgoing nurse) at the beginning of night shift 1930. Report included the following information SBAR, Kardex, ED Summary, Procedure Summary, Intake/Output, MAR and Recent Results. Primary Nurse Sana Cutler, SAUL and SAUL Magallon performed a dual skin assessment on this patient No impairment noted. See assessment for Cameron scale. Pt is on High flow and switched to BIPAP. Suction set up at bedside. RT to bedside. Pt ST and Sat low 80's. Pt. Moved to room 15 at the beginning of shift. . [address], Dr. Romayne Smoke to room via monitor to evaluate and get update on patient. Pt. Sat on BIPAP mid 90's and patient sitting up in chair. Informed MD that patient anxious. Pt has medication ordered for anxiety that he did not want until he was going to bed. This was administered. Family Practice was at the nursing station and updated on situation. Pt. Continued to move from bed to chair and requesting to go from HF to BIPAP. HR remained elevated. Paged family practice and updated MD on situation. Ativan IV 2mg ordered and administered. Pt able to sleep and HR decreased to low 100's. Sat remained greater than 88%. Will continue to monitor.

## 2021-11-26 NOTE — PROGRESS NOTES
Problem: Falls - Risk of  Goal: *Absence of Falls  Description: Document Aure Galindo Fall Risk and appropriate interventions in the flowsheet.   Outcome: Progressing Towards Goal  Note: Fall Risk Interventions:  Mobility Interventions: Patient to call before getting OOB         Medication Interventions: Patient to call before getting OOB    Elimination Interventions: Call light in reach

## 2021-11-26 NOTE — PROGRESS NOTES
Name: Sánchez 87: [address]   : 1985 Admit Date: 2021   Phone: [phone]  Room: [facility]/   PCP: Elizabeth Felipe NP  MRN: [id number]   Date: 2021  Code: Full Code          Chart and notes reviewed. Data reviewed. I review the patient's current medications in the medical record at each encounter. I have evaluated and examined the patient. HPI:    5:11 PM       History was obtained from patient. I was asked by Gay Antunez MD to see Hannah Bark in consultation for a chief complaint of COVID-19 PNA with acute respiratory failure with hypoxia. History of Present Illness:  Mr. Stuart Bañuelos is a 40 yo with a history of JESUS (on CPAP), childhood asthma (grew out of it), HTN, HLD, and obesity who is admitted with COVID-19 with acute respiratory failure with hypoxia. He began feeling poorly about a week ago and tested positive on Monday. He has had progressive shortness of breath, cough, and chest pain. He has had fever and is febrile to 101. 3. He was hypoxic to 68% on RA and requiring HF. He was satting in the mid 80's on 40L and 100%, increased to 50L with sats around 90%. Overnight events:   Having difficulty maintaining sats on HF. New small PTX and pneumomediastinum. Currently on bipap 100% with sats of 98%  Pt feels the same as the last previous days. WBC increasing- neutrophil predominance. Procalcitonin normal    Past Medical History:   Diagnosis Date    Hypercholesteremia     Hypertension 10/15/2014    Poison ivy 9/15/2015    Pure hypercholesterolemia     S/P vasectomy 2014       No past surgical history on file.     Family History   Problem Relation Age of Onset    Diabetes Mother     Heart Attack Father 43    Stroke Maternal Grandmother     Cancer Paternal Grandfather          age 45 - Hodgkin's   [address] [facility] Maternal Grandfather          in 42's with melanoma       Social History     Tobacco Use    Smoking status: Never Smoker    Smokeless tobacco: Never Used   Substance Use Topics    Alcohol use: No     Alcohol/week: 0.0 standard drinks       No Known Allergies    Current Facility-Administered Medications   Medication Dose Route Frequency    sodium chloride (OCEAN) 0.65 % nasal squeeze bottle 2 Spray  2 Spray Both Nostrils Q2H PRN    fluticasone propionate (FLONASE) 50 mcg/actuation nasal spray 2 Spray  2 Spray Both Nostrils DAILY    albuterol-ipratropium (DUO-NEB) 2.5 MG-0.5 MG/3 ML  3 mL Nebulization Q6H RT    Or    ipratropium-albuterol (COMBIVENT RESPIMAT) 20 mcg-100 mcg inhalation spray  2 Puff Inhalation Q6H RT    simethicone (MYLICON) tablet 80 mg  80 mg Oral QID PRN    insulin lispro (HUMALOG) injection   SubCUTAneous AC&HS    pantoprazole (PROTONIX) tablet 40 mg  40 mg Oral ACB    enoxaparin (LOVENOX) injection 135 mg  135 mg SubCUTAneous Q12H    lidocaine 4 % patch 1 Patch  1 Patch TransDERmal Q24H    guaiFENesin ER (MUCINEX) tablet 600 mg  600 mg Oral Q12H    hydrOXYzine HCL (ATARAX) tablet 25 mg  25 mg Oral Q6H PRN    melatonin (rapid dissolve) tablet 5 mg  5 mg Oral QHS    phenol throat spray (CHLORASEPTIC) 2 Spray  2 Spray Oral Q6H PRN    sodium chloride (NS) flush 5-40 mL  5-40 mL IntraVENous Q8H    sodium chloride (NS) flush 5-40 mL  5-40 mL IntraVENous PRN    acetaminophen (TYLENOL) tablet 650 mg  650 mg Oral Q6H PRN    Or    acetaminophen (TYLENOL) suppository 650 mg  650 mg Rectal Q6H PRN    polyethylene glycol (MIRALAX) packet 17 g  17 g Oral DAILY PRN    ondansetron (ZOFRAN ODT) tablet 4 mg  4 mg Oral Q8H PRN    Or    ondansetron (ZOFRAN) injection 4 mg  4 mg IntraVENous Q6H PRN    ascorbic acid (vitamin C) (VITAMIN C) tablet 500 mg  500 mg Oral BID    zinc sulfate (ZINCATE) 50 mg zinc (220 mg) capsule 1 Capsule  1 Capsule Oral DAILY    atorvastatin (LIPITOR) tablet 20 mg  20 mg Oral DAILY    prochlorperazine (COMPAZINE) injection 10 mg  10 mg IntraVENous Q6H PRN    glucose chewable tablet 16 g  4 Tablet Oral PRN    dextrose (D50W) injection syrg 12.5-25 g  12.5-25 g IntraVENous PRN    glucagon (GLUCAGEN) injection 1 mg  1 mg IntraMUSCular PRN    dexamethasone (DECADRON) 10 mg/mL injection 10 mg  10 mg IntraVENous Q12H    benzonatate (TESSALON) capsule 200 mg  200 mg Oral TID    HYDROcodone-chlorpheniramine (TUSSIONEX) oral suspension 5 mL  5 mL Oral Q12H         REVIEW OF SYSTEMS   12 point ROS negative except as stated in the HPI. Physical Exam:   Visit Vitals  /70   Pulse (!) 118   Temp 97.9 °F (36.6 °C)   Resp 22   Ht 5' 11\" (1.803 m)   Wt 137.9 kg (304 lb)   SpO2 99%   BMI 42.40 kg/m²       General:  Alert, cooperative, on bipap, RR elevated   Head:  Normocephalic, without obvious abnormality, atraumatic. Eyes:  Conjunctivae/corneas clear. Nose: Nares normal. Septum midline. Mucosa normal.    Throat: Lips, mucosa, and tongue normal.    Neck: Supple, symmetrical, trachea midline   Lungs:   Diminished bilaterally   Chest wall:  No tenderness or deformity. Heart:  Regular rate and rhythm, S1, S2 normal, no murmur, click, rub or gallop. Abdomen:   Soft, non-tender. Bowel sounds normal.    Extremities: Extremities normal, atraumatic, no cyanosis or edema. Pulses: 2+ and symmetric all extremities.    Skin: Skin color, texture, turgor normal. No rashes or lesions       Neurologic: Grossly nonfocal       Lab Results   Component Value Date/Time    Sodium 134 (L) 11/26/2021 04:48 AM    Potassium 4.7 11/26/2021 04:48 AM    Chloride 102 11/26/2021 04:48 AM    CO2 26 11/26/2021 04:48 AM    BUN 20 11/26/2021 04:48 AM    Creatinine 0.85 11/26/2021 04:48 AM    Glucose 152 (H) 11/26/2021 04:48 AM    Calcium 8.8 11/26/2021 04:48 AM    Lactic acid 1.1 11/19/2021 04:39 PM       Lab Results   Component Value Date/Time    WBC 27.6 (H) 11/26/2021 04:48 AM    HGB 15.7 11/26/2021 04:48 AM    PLATELET 712 97/71/2841 04:48 AM    MCV 88.9 11/26/2021 04:48 AM       Lab Results   Component Value Date/Time    Alk. phosphatase 133 (H) 11/26/2021 04:48 AM    Protein, total 6.9 11/26/2021 04:48 AM    Albumin 3.2 (L) 11/26/2021 04:48 AM    Globulin 3.7 11/26/2021 04:48 AM       Lab Results   Component Value Date/Time    Ferritin 954 (H) 11/25/2021 05:25 AM       Lab Results   Component Value Date/Time    C-Reactive protein <0.29 11/26/2021 04:48 AM    TSH 2.510 11/30/2018 09:47 AM    TSH 1.260 10/17/2013 09:40 AM        No results found for: PH, PHI, PCO2, PCO2I, PO2, PO2I, HCO3, HCO3I, FIO2, FIO2I    No results found for: CPK, RCK1, RCK2, RCK3, RCK4, CKNDX, CKND1, TROPT, TROIQ, BNPP, BNP     Lab Results   Component Value Date/Time    Culture result: No growth (<1,000 CFU/ML) 11/19/2021 09:46 PM    Culture result: MRSA NOT PRESENT 11/19/2021 06:53 PM    Culture result:  11/19/2021 06:53 PM     Screening of patient nares for MRSA is for surveillance purposes and, if positive, to facilitate isolation considerations in high risk settings. It is not intended for automatic decolonization interventions per se as regimens are not sufficiently effective to warrant routine use.        No results found for: TOXA1, RPR, HBCM, HBSAG, HAAB, HCAB1, HAAT, G6PD, CRYAC, HIVGT, HIVR, HIV1, HIV12, HIVPC, HIVRPI    No results found for: VANCT, CPK    Lab Results   Component Value Date/Time    Color YELLOW/STRAW 11/19/2021 09:46 PM    Appearance CLEAR 11/19/2021 09:46 PM    Specific gravity 1.015 11/19/2021 09:46 PM    pH (UA) 5.5 11/19/2021 09:46 PM    Protein 30 (A) 11/19/2021 09:46 PM    Glucose Negative 11/19/2021 09:46 PM    Ketone TRACE (A) 11/19/2021 09:46 PM    Bilirubin Negative 11/19/2021 09:46 PM    Blood Negative 11/19/2021 09:46 PM    Urobilinogen 0.2 11/19/2021 09:46 PM    Nitrites Negative 11/19/2021 09:46 PM    Leukocyte Esterase Negative 11/19/2021 09:46 PM    WBC 0-4 11/19/2021 09:46 PM    RBC 0-5 11/19/2021 09:46 PM    Bacteria Negative 11/19/2021 09:46 PM       IMPRESSION  · COVID-19 PNA  · Acute respiratory failure with hypoxia  · JESUS  · Severe sepsis    PLAN  · Goal sats 88% or higher. Will try to transition to HF later this morning to try to avoid further extension of possible left apical PTX. Unsure if pt will tolerate HF. Discussed findings and plan with nursing. · CXR again tomorrow AM  · S/p Actemra 11/19  ·  high dose decadron BID  ·  Finished cetriaxone/doxy 11/24. · scheduled combivent  · Tessalon, tussionex  · lovenox therapeutic dose  Self prone at night and OOB into chair as much as possible. In chair currently and will hopefully be able to stay there for atleast a few hrs. Patient is critically ill in the setting of COVID-19 and acute respiratory failure with hypoxia requiring continuous HF/bipap. CC time 34 minutes.       Kentrell Burns MD

## 2021-11-26 NOTE — PROGRESS NOTES
Family practice MD notified of CXR findings, present at bedside for further evaluation. Pt remains on Bipap, will work to transition to [address] as pt can tolerate. RT aware.  No indications for chest tube placement at this time, will watch for now per MD.

## 2021-11-26 NOTE — PROGRESS NOTES
[address] [place] [address]   Office [phone]  Fax [phone]          Assessment and Plan     Manuel Ferreira is a 39 y.o. male with a PMH of HTN, HLD, JESUS admitted for AHRF and severe sepsis 2/2 COVID PNA. Patient was admitted on 11/19/2021. Overnight Events: Received Ativan for anxiety overnight. Otherwise, no acute events overnight. AHRF and ARDS 2/2 COVID PNA: Unvaccinated. Symptom onset 11/12 with acute respiratory decompensation, O2 sats in high 60s prior to admission. CTA chest without evidence of definite/proximal PE, no aortic aneurysm/dissection, imaging consistent with moderate to severe COVID PNA and associated hilar/mediastinal lymphadenopathy. B/l lower extremity duplex without evidence of DVT. CXR showing interval partial improvement of multilobular PNA. S/p Tocilizumab 800mg 11/19  - Continue BiPAP QHS and as tolerated  - COVID labs (CRP, D-dimer, ferritin, LD), CBC, CMP daily  - Continue Decadron 10mg IV BID  - Therapeutic Lovenox  - Tessalon 200mg TID + Tussionex 5mL BID  - COVID meds (Atorvastatin 20mg every day, Vit C 500mg BID, Zinc 220mg daily)  - Pulm following, appreciate recs  - Strongly suggest vaccination after discharge     Pneumomediastinum/Pneumothorax: Possibly barotrauma 2/2 high pressures needed for COVID ARDS PNA vs lung frailty. Will monitor for signs of worsening pneumothorax. Low threshold for repeat CXR (plan for AM if remains stable overnight). Severe sepsis 2/2 COVID PNA: PNA labs negative, BCx/UCx no growth. Likely severe sepsis on initial presentation is all related to COVID PNA. CTX + Doxy x 5d (11/19-11/24). Abdominal pain: Secondary to gas pains after starting diet. - Continue Simethicone    HTN: Pt normotensive off meds. At home is on Amlodipine 10mg daily and HCTZ 12.5mg daily  - Will hold home meds     HLD: Chronic, stable. Last lipids 07/2021 , HDL 54, LDL 98.6, . On Atorvastatin 10mg daily.   - Will start Atorvastatin 20mg daily for benefit in COVID pts     T2DM (diet-controlled): Prior A1c 5.8% in 05/2021. No meds at present.   - SSI (resistant given morbid obesity) w/ ACHS glucose checks     JESUS: On CPAP at night. - Compliant with CPAP, now on BiPAP.     Obesity: Body mass index is 42.4 kg/m². - Encouraging lifestyle modifications and further follow up outpatient. At-risk JAMIR: RESOLVED. POA Cr 1.31, BL 0.8. Likely IVVD in setting of poor PO intake. - Daily CMP    Chest pain: RESOLVED. Description is pleuritic in nature, worse with cough. EKG without evidence of acute ischemia. Troponin of 9 makes CAD less concerning.   - Cardiac monitoring, will continue to monitor     Diarrhea: RESOLVED. Watery diarrhea x 2 days. Likely 2/2 COVID infection. Improved since presentation. Enteric bacteria panel negative.        FEN/GI - Regular diet while off BIPAP  Activity - Ambulate with assistance  DVT prophylaxis - Lovenox (therapeutic)  GI prophylaxis - Protonix  Disposition - Plan to d/c to TBD. Code Status - Full. Discussed with patient / caregivers. Next of Kin Name and [address]      Gage Marcano MD  Family Medicine Resident      [address] Medicine Residency Attending Addendum:  I saw and evaluated the patient, performing the key elements of the service. I discussed the findings, assessment and plan with the resident and agree with the resident's findings and plan as documented in the resident's note. The patient was seen on 11/26/2021    O: Patient remains short of breath and sightly anxious. A: Seated at bedside with HFNC and Non-rebreather. Is tachycardic, but worsens with speaking. Improves to ~120 with rest.  Slightly diaphoretic. Assessment/Plan:  Severe sepsis and AHRF/ARDS 2/2 COVID PNA. Now with pneumomediastinum and small pneumothorax. Patient at high risk of decompensation. Continues to require ICU level monitoring. Continue HFNC and decadron. Low threshold for repeat imaging. Patient remains critically ill and is at high risk of decompensation. 35 minutes spent on unit directly related to the care of this patient, including discussions with family and nursing staff, time at bedside, in discussion with consultants and reviewing test/imaging results. This time is exclusive of all procedures. .     Electronic Signature:  oJmar Sanchez MD  11/26/2021 4:07 PM           Subjective / Objective   Subjective:   Pt states he continues to feel congested this morning. States anxiety improved with Ativan. No other complaints this morning. Specifically denies chest pain, palp, abdominal pain, n/v, c/d. Respiratory: O2 Flow Rate (L/min): 60 l/min O2 Device: Heated, Hi flow nasal cannula   Visit Vitals  /70   Pulse (!) 118   Temp 97.9 °F (36.6 °C)   Resp 22   Ht 5' 11\" (1.803 m)   Wt 304 lb (137.9 kg)   SpO2 99%   BMI 42.40 kg/m²       General: No acute distress. Seated comfortably in chair with BiPAP on - O2 sat %. Respiratory: Diminished air movement bilateral lung fields. Cardiovascular: Distant heart sounds. GI: + bowel sounds. Nontender. Extremities:  No LE edema. Skin: Warm, dry. Neuro: Awake and alert    I/O:  Date 11/25/21 0700 - 11/26/21 0659 11/26/21 0700 - 11/27/21 0659   Shift 2202-4216 9124-3101 24 Hour Total 8343-6643 2900-4304 24 Hour Total   INTAKE   P.O. 1000  1000        P. O. 1000  1000      Shift Total(mL/kg) 1000(7.3)  1000(7.3)      OUTPUT   Urine(mL/kg/hr) 1725(1)  1725(0.5)        Urine Voided 1725  1725      Shift Total(mL/kg) 1725(12.5)  1725(12.5)      NET -725  -725      Weight (kg) 137.9 137.9 137.9 137.9 137.9 137.9       CBC:  Recent Labs     11/26/21  0448 11/25/21  0525 11/24/21  0348   WBC 27.6* 25.7* 21.1*   HGB 15.7 15.5 14.9   HCT 45.8 45.4 43.0    334 417       Metabolic Panel:  Recent Labs     11/26/21  0448 11/25/21  0525 11/24/21  0348   * 134* 134*   K 4.7 4.2 4.2   CL 102 101 103   CO2 26 26 26   BUN 20 18 18   CREA 0.85 0.83 0.76   * 137* 126*   CA 8.8 8.6 8.7   ALB 3.2* 3.0* 3.0*   ALT 37 38 37          For Billing    Chief Complaint   Patient presents with   [facility] Problems  Date Reviewed: 4/30/2021          Codes Class Noted POA    * (Principal) Acute hypoxemic respiratory failure due to COVID-19 [facility]) ICD-10-CM: U07.1, J96.01  ICD-9-CM: 518.81, 079.89, 799.02  11/19/2021 Yes        Severe sepsis [facility]) ICD-10-CM: A41.9, R65.20  ICD-9-CM: 038.9, 995.92  11/26/2021 Yes        Acute respiratory distress syndrome (ARDS) due to COVID-19 virus [facility]) ICD-10-CM: U07.1, J80  ICD-9-CM: 518.82, 079.89  11/26/2021 Yes        Pneumomediastinum ([facility] ICD-10-CM: J98.2  ICD-9-CM: 518.1  11/26/2021 No        Other pneumothorax ICD-10-CM: J93.83  ICD-9-CM: 512.89  11/26/2021 No        Diabetes mellitus type 2, controlled ([facility].) ICD-10-CM: E11.9  ICD-9-CM: 250.00  11/25/2021 Yes        Obesity, morbid ([facility] ICD-10-CM: E66.01  ICD-9-CM: 278.01  4/10/2018 Yes        JESUS (obstructive sleep apnea) ICD-10-CM: G47.33  ICD-9-CM: 327.23  10/15/2014 Yes        Hypertension ICD-10-CM: I10  ICD-9-CM: 401.9  10/15/2014 Yes        Pure hypercholesterolemia ICD-10-CM: E78.00  ICD-9-CM: 272.0  Unknown Yes

## 2021-11-27 NOTE — PROGRESS NOTES
I called patient's wife, Romelia Ortega, to provide updates on Mr. Blanco's current O2 needs and updates on his imaging and labs. She expressed understanding. Answered all questions she had at this time.      MD Alisa Mckinney Family Medicine Resident

## 2021-11-27 NOTE — PROGRESS NOTES
[address] [facility] [address] [place] [address]   Office [phone]  Fax [phone]          Assessment and Plan     Ryan Marquez is a 39 y.o. male with a PMH of HTN, HLD, JESUS admitted for AHRF and severe sepsis 2/2 COVID PNA. Patient was admitted on 11/19/2021. Overnight Events: NAEO    AHRF and ARDS 2/2 COVID PNA: Unvaccinated. Symptom onset 11/12 with acute respiratory decompensation, O2 sats in high 60s prior to admission. CTA chest without evidence of definite/proximal PE, no aortic aneurysm/dissection, imaging consistent with moderate to severe COVID PNA and associated hilar/mediastinal lymphadenopathy. B/l lower extremity duplex without evidence of DVT. CXR 11/27 demonstrated no significant change in diffuse bilateral airspace disease. S/p Tocilizumab 800mg 11/19.  - Repeat CXR today  - Goal sats 88% or higher.  - COVID labs (CRP, D-dimer, ferritin, LD), CBC, CMP daily  - Continue Decadron 10mg IV BID  - Duo-neb or Combivent Respimat Q6H  - Therapeutic Lovenox  - Tessalon 200mg TID + Tussionex 5mL BID + Mucinex 600 mg BID  - COVID meds (Atorvastatin 20mg every day, Vit C 500mg BID, Zinc 220mg daily)  - Pulm following, appreciate recs     Pneumomediastinum/Pneumothorax: Possibly barotrauma 2/2 high pressures needed for COVID ARDS PNA vs lung frailty. Will monitor for signs of worsening pneumothorax. - Repeat CXR today    Severe sepsis 2/2 COVID PNA: PNA labs negative, BCx/UCx no growth. Likely severe sepsis on initial presentation is all related to COVID PNA. CTX + Doxy x 5d (11/19-11/24). Insomnia/anxiety: Pt with new insomnia and anxiety, specifically overnight.  - Scheduled Melatonin 5mg QHS  - Atarax 25mg q6h    HTN: Pt normotensive off meds. At home is on Amlodipine 10mg daily and HCTZ 12.5mg daily  - Will hold home meds     HLD: Chronic, stable. Last lipids 07/2021 , HDL 54, LDL 98.6, . On Atorvastatin 10mg daily.   - Will start Atorvastatin 20mg daily for benefit in COVID pts     T2DM (diet-controlled): Prior A1c 5.8% in 05/2021. No meds at present.   - SSI (resistant given morbid obesity) w/ ACHS glucose checks     JESUS: On CPAP at night. - Compliant with CPAP, now on BiPAP.     Obesity: Body mass index is 42.4 kg/m². - Encouraging lifestyle modifications and further follow up outpatient. At-risk JAMIR: RESOLVED. POA Cr 1.31, BL 0.8. Likely IVVD in setting of poor PO intake. - Daily CMP    Chest pain: RESOLVED. Description is pleuritic in nature, worse with cough. EKG without evidence of acute ischemia. Troponin of 9 makes CAD less concerning.   - Cardiac monitoring, will continue to monitor     Diarrhea: RESOLVED. Watery diarrhea x 2 days. Likely 2/2 COVID infection. Improved since presentation. Enteric bacteria panel negative. Abdominal pain: RESOLVED. Secondary to gas pains after starting diet. - Continue Simethicone       FEN/GI - Diabetic diet  Activity - Ambulate with assistance  DVT prophylaxis - Lovenox (therapeutic)  GI prophylaxis - Protonix  Disposition - Plan to d/c to TBD. Code Status - Full. Discussed with patient / caregivers. Next of [facility] Name and [address] [facility],  Spouse - [phone]      Ashly Archer [facility] Medicine Residency Attending Addendum:  I saw and evaluated the patient, performing the key elements of the service. I discussed the findings, assessment and plan with the resident and agree with the resident's findings and plan as documented in the resident's note. The patient was seen on 11/28/2021    Appears slightly more comfortable today. Remains on HFNC with some reduction in flow. Will continue to wean as tolerated and keep steroids at 10 BID today. Appreciate pulmonology recommendations.      Patient remains critically ill and is at high risk of decompensation.  30 minutes spent on floor directly related to the care of this patient, including discussions with family and nursing staff, time at bedside, in discussion with consultants and reviewing test/imaging results. This time is exclusive of all procedures.      Electronic Signature:  Kalyani Amin MD  11/28/2021 4:13 PM               Subjective / Objective   Subjective:   Pt seen and examined at bedside. He reports continued shortness of breath. No chest pain, abdominal pain, nausea, or vomiting. Respiratory: O2 Flow Rate (L/min): 45 l/min O2 Device: Heated, Hi flow nasal cannula, Non-rebreather mask   Visit Vitals  /75   Pulse (!) 104   Temp 98.5 °F (36.9 °C)   Resp 19   Ht 5' 11\" (1.803 m)   Wt 304 lb (137.9 kg)   SpO2 92%   BMI 42.40 kg/m²       General: No acute distress. Sitting upright in chair. Respiratory: On 50 L HFNC (100% FiO2). Not on re-breather currently. Diminished air movement bilaterally. Cardiovascular: tachycardic rate, regular rhythm. GI: + bowel sounds. Nontender. Extremities:  No LE edema. Skin: Warm, dry. Neuro: Awake and alert    I/O:  Date 11/27/21 0700 - 11/28/21 0659 11/28/21 0700 - 11/29/21 0659   Shift 8020-6828 3590-7095 24 Hour Total 3549-5326 9522-1300 24 Hour Total   INTAKE   P.O. 100 200 300 50  50     P. O. 100 200 300 50  50   I. V.(mL/kg/hr) 0(0)  0(0) 0  0     I.V. 0  0 0  0   Blood 0  0 0  0     Autotransfused 0  0 0  0   Other 0  0 0  0     Other 0  0 0  0   Shift Total(mL/kg) 100(0.7) 200(1.5) 300(2.2) 50(0.4)  50(0.4)   OUTPUT   Urine(mL/kg/hr) 1550(0.9) 1150(0.7) 2700(0.8) 500  500     Urine Voided 1550 1150 2700 500  500   Shift Total(mL/kg) 1550(11.2) 1150(8.3) 2700(19.6) 500(3.6)  500(3.6)   NET -1450 -950 -2400 -450  -450   Weight (kg) 137.9 137.9 137.9 137.9 137.9 137.9       CBC:  Recent Labs     11/28/21 0223 11/27/21 0345 11/26/21 0448   WBC 32.9* 27.0* 27.6*   HGB 16.6 15.5 15.7   HCT 48.1 45.9 45.8    326 471       Metabolic Panel:  Recent Labs     11/28/21 0223 11/27/21 0345 11/26/21 0448   * 134* 134*   K 4.8 4.5 4.7    100 102   CO2 26 28 26   BUN 24* 22* 20   CREA 0.82 0.84 0.85   * 137* 152*   CA 9.1 8.9 8.8   MG 2.5* 2.5* 2.2   ALB 3.4* 3.2* 3.2*   ALT 38 38 37          For Billing    Chief Complaint   Patient presents with   [facility] Problems  Date Reviewed: 4/30/2021          Codes Class Noted POA    Severe sepsis ([place] [address].) ICD-10-CM: A41.9, R65.20  ICD-9-CM: 038.9, 995.92  11/26/2021 Yes        Pneumomediastinum ([facility] ICD-10-CM: J98.2  ICD-9-CM: 518.1  11/26/2021 No        Other pneumothorax ICD-10-CM: J93.83  ICD-9-CM: 512.89  11/26/2021 No        Acute respiratory distress syndrome (ARDS) due to COVID-19 virus [facility]) ICD-10-CM: U07.1, J80  ICD-9-CM: 518.82, 079.89  11/26/2021 Yes        Hyponatremia ICD-10-CM: E87.1  ICD-9-CM: 276.1  11/26/2021 Unknown        Diabetes mellitus type 2, controlled ([place] [address].) ICD-10-CM: E11.9  ICD-9-CM: 250.00  11/25/2021 Yes        * (Principal) Acute hypoxemic respiratory failure due to COVID-19 [facility]) ICD-10-CM: U07.1, J96.01  ICD-9-CM: 518.81, 079.89, 799.02  11/19/2021 Yes        Obesity, morbid ([facility] ICD-10-CM: E66.01  ICD-9-CM: 278.01  4/10/2018 Yes        JESUS (obstructive sleep apnea) ICD-10-CM: G47.33  ICD-9-CM: 327.23  10/15/2014 Yes        Hypertension ICD-10-CM: I10  ICD-9-CM: 401.9  10/15/2014 Yes        Pure hypercholesterolemia ICD-10-CM: E78.00  ICD-9-CM: 272.0  Unknown Yes

## 2021-11-27 NOTE — PROGRESS NOTES
[facility] [address] MONTOYAB: 1985 Admit Date: 2021   Phone: [phone]  Room: [address] [place] [address]   PCP: Shawn Galeana NP  MRN: [id number]   Date: 2021  Code: Full Code          Chart and notes reviewed. Data reviewed. I review the patient's current medications in the medical record at each encounter. I have evaluated and examined the patient. HPI:    5:11 PM       History was obtained from patient. I was asked by Deloria Severance, MD to see Mikayla Lora in consultation for a chief complaint of COVID-19 PNA with acute respiratory failure with hypoxia. History of Present Illness:  Mr. Tim Dacosta is a 38 yo with a history of JESUS (on CPAP), childhood asthma (grew out of it), HTN, HLD, and obesity who is admitted with COVID-19 with acute respiratory failure with hypoxia. He began feeling poorly about a week ago and tested positive on Monday. He has had progressive shortness of breath, cough, and chest pain. He has had fever and is febrile to 101. 3. He was hypoxic to 68% on RA and requiring HF. He was satting in the mid 80's on 40L and 100%, increased to 50L with sats around 90%. Overnight events:   More dyspnic today. On 70L and 100% with NRB and sats 90%. CXR with persistent small PTX this morning. WBC stable    Past Medical History:   Diagnosis Date    Hypercholesteremia     Hypertension 10/15/2014    Poison ivy 9/15/2015    Pure hypercholesterolemia     S/P vasectomy 2014       No past surgical history on file.     Family History   Problem Relation Age of Onset    Diabetes Mother     Heart Attack Father 43    Stroke Maternal Grandmother     Cancer Paternal Grandfather          age 45 - Hodgkin's   Jannell Outhouse Cancer Maternal Grandfather          in 42's with melanoma       Social History     Tobacco Use    Smoking status: Never Smoker    Smokeless tobacco: Never Used   Substance Use Topics    Alcohol use: No     Alcohol/week: 0.0 standard drinks       No Known Allergies    Current Facility-Administered Medications   Medication Dose Route Frequency    sodium chloride (OCEAN) 0.65 % nasal squeeze bottle 2 Spray  2 Spray Both Nostrils Q4H    sodium chloride (OCEAN) 0.65 % nasal squeeze bottle 2 Spray  2 Spray Both Nostrils Q2H PRN    fluticasone propionate (FLONASE) 50 mcg/actuation nasal spray 2 Spray  2 Spray Both Nostrils DAILY    albuterol-ipratropium (DUO-NEB) 2.5 MG-0.5 MG/3 ML  3 mL Nebulization Q6H RT    Or    ipratropium-albuterol (COMBIVENT RESPIMAT) 20 mcg-100 mcg inhalation spray  2 Puff Inhalation Q6H RT    simethicone (MYLICON) tablet 80 mg  80 mg Oral QID PRN    insulin lispro (HUMALOG) injection   SubCUTAneous AC&HS    pantoprazole (PROTONIX) tablet 40 mg  40 mg Oral ACB    enoxaparin (LOVENOX) injection 135 mg  135 mg SubCUTAneous Q12H    lidocaine 4 % patch 1 Patch  1 Patch TransDERmal Q24H    guaiFENesin ER (MUCINEX) tablet 600 mg  600 mg Oral Q12H    hydrOXYzine HCL (ATARAX) tablet 25 mg  25 mg Oral Q6H PRN    melatonin (rapid dissolve) tablet 5 mg  5 mg Oral QHS    phenol throat spray (CHLORASEPTIC) 2 Spray  2 Spray Oral Q6H PRN    sodium chloride (NS) flush 5-40 mL  5-40 mL IntraVENous Q8H    sodium chloride (NS) flush 5-40 mL  5-40 mL IntraVENous PRN    acetaminophen (TYLENOL) tablet 650 mg  650 mg Oral Q6H PRN    Or    acetaminophen (TYLENOL) suppository 650 mg  650 mg Rectal Q6H PRN    polyethylene glycol (MIRALAX) packet 17 g  17 g Oral DAILY PRN    ondansetron (ZOFRAN ODT) tablet 4 mg  4 mg Oral Q8H PRN    Or    ondansetron (ZOFRAN) injection 4 mg  4 mg IntraVENous Q6H PRN    ascorbic acid (vitamin C) (VITAMIN C) tablet 500 mg  500 mg Oral BID    zinc sulfate (ZINCATE) 50 mg zinc (220 mg) capsule 1 Capsule  1 Capsule Oral DAILY    atorvastatin (LIPITOR) tablet 20 mg  20 mg Oral DAILY    prochlorperazine (COMPAZINE) injection 10 mg  10 mg IntraVENous Q6H PRN    glucose chewable tablet 16 g 4 Tablet Oral PRN    dextrose (D50W) injection syrg 12.5-25 g  12.5-25 g IntraVENous PRN    glucagon (GLUCAGEN) injection 1 mg  1 mg IntraMUSCular PRN    dexamethasone (DECADRON) 10 mg/mL injection 10 mg  10 mg IntraVENous Q12H    benzonatate (TESSALON) capsule 200 mg  200 mg Oral TID    HYDROcodone-chlorpheniramine (TUSSIONEX) oral suspension 5 mL  5 mL Oral Q12H         REVIEW OF SYSTEMS   12 point ROS negative except as stated in the HPI. Physical Exam:   Visit Vitals  /70   Pulse 97   Temp 98.5 °F (36.9 °C)   Resp 19   Ht 5' 11\" (1.803 m)   Wt 137.9 kg (304 lb)   SpO2 93%   BMI 42.40 kg/m²       General:  Alert, cooperative, on bipap, RR elevated   Head:  Normocephalic, without obvious abnormality, atraumatic. Eyes:  Conjunctivae/corneas clear. Nose: Nares normal. Septum midline. Mucosa normal.    Throat: Lips, mucosa, and tongue normal.    Neck: Supple, symmetrical, trachea midline   Lungs:   Diminished bilaterally   Chest wall:  No tenderness or deformity. Heart:  Regular rate and rhythm, S1, S2 normal, no murmur, click, rub or gallop. Abdomen:   Soft, non-tender. Bowel sounds normal.    Extremities: Extremities normal, atraumatic, no cyanosis or edema. Pulses: 2+ and symmetric all extremities.    Skin: Skin color, texture, turgor normal. No rashes or lesions       Neurologic: Grossly nonfocal       Lab Results   Component Value Date/Time    Sodium 134 (L) 11/27/2021 03:45 AM    Potassium 4.5 11/27/2021 03:45 AM    Chloride 100 11/27/2021 03:45 AM    CO2 28 11/27/2021 03:45 AM    BUN 22 (H) 11/27/2021 03:45 AM    Creatinine 0.84 11/27/2021 03:45 AM    Glucose 137 (H) 11/27/2021 03:45 AM    Calcium 8.9 11/27/2021 03:45 AM    Magnesium 2.5 (H) 11/27/2021 03:45 AM    Lactic acid 1.1 11/19/2021 04:39 PM       Lab Results   Component Value Date/Time    WBC 27.0 (H) 11/27/2021 03:45 AM    HGB 15.5 11/27/2021 03:45 AM    PLATELET 303 88/45/2215 03:45 AM    MCV 90.9 11/27/2021 03:45 AM Lab Results   Component Value Date/Time    Alk. phosphatase 125 (H) 11/27/2021 03:45 AM    Protein, total 6.7 11/27/2021 03:45 AM    Albumin 3.2 (L) 11/27/2021 03:45 AM    Globulin 3.5 11/27/2021 03:45 AM       Lab Results   Component Value Date/Time    Ferritin 848 (H) 11/27/2021 03:45 AM       Lab Results   Component Value Date/Time    C-Reactive protein <0.29 11/27/2021 03:45 AM    TSH 2.510 11/30/2018 09:47 AM    TSH 1.260 10/17/2013 09:40 AM        No results found for: PH, PHI, PCO2, PCO2I, PO2, PO2I, HCO3, HCO3I, FIO2, FIO2I    No results found for: CPK, RCK1, RCK2, RCK3, RCK4, CKNDX, CKND1, TROPT, TROIQ, BNPP, BNP     Lab Results   Component Value Date/Time    Culture result: No growth (<1,000 CFU/ML) 11/19/2021 09:46 PM    Culture result: MRSA NOT PRESENT 11/19/2021 06:53 PM    Culture result:  11/19/2021 06:53 PM     Screening of patient nares for MRSA is for surveillance purposes and, if positive, to facilitate isolation considerations in high risk settings. It is not intended for automatic decolonization interventions per se as regimens are not sufficiently effective to warrant routine use.        No results found for: TOXA1, RPR, HBCM, HBSAG, HAAB, HCAB1, HAAT, G6PD, CRYAC, HIVGT, HIVR, HIV1, HIV12, HIVPC, HIVRPI    No results found for: VANCT, CPK    Lab Results   Component Value Date/Time    Color YELLOW/STRAW 11/19/2021 09:46 PM    Appearance CLEAR 11/19/2021 09:46 PM    Specific gravity 1.015 11/19/2021 09:46 PM    pH (UA) 5.5 11/19/2021 09:46 PM    Protein 30 (A) 11/19/2021 09:46 PM    Glucose Negative 11/19/2021 09:46 PM    Ketone TRACE (A) 11/19/2021 09:46 PM    Bilirubin Negative 11/19/2021 09:46 PM    Blood Negative 11/19/2021 09:46 PM    Urobilinogen 0.2 11/19/2021 09:46 PM    Nitrites Negative 11/19/2021 09:46 PM    Leukocyte Esterase Negative 11/19/2021 09:46 PM    WBC 0-4 11/19/2021 09:46 PM    RBC 0-5 11/19/2021 09:46 PM    Bacteria Negative 11/19/2021 09:46 PM IMPRESSION  · COVID-19 PNA  · Acute respiratory failure with hypoxia  · JESUS  · Severe sepsis    PLAN  · Goal sats 88% or higher. Will try to maintain on HF  possible left apical PTX, however we may not be able to avoid bipap much longer. Clinically worsening. · CXR again tomorrow AM  · S/p Actemra 11/19  ·  high dose decadron BID  ·  Finished cetriaxone/doxy 11/24. · scheduled combivent  · Tessalon, tussionex  · lovenox therapeutic dose  Self prone at night and OOB into chair as much as possible. In chair currently and will hopefully be able to stay there for atleast a few hrs. Prognosis guareded     Patient is critically ill in the setting of COVID-19 and acute respiratory failure with hypoxia requiring continuous HF/bipap. CC time 36 minutes.       Kenneth Arellano MD

## 2021-11-27 NOTE — PROGRESS NOTES
[address] [facility] [address] [place] [address]   Office [phone]  Fax [phone]          Assessment and Plan     Chapito Valdez is a 39 y.o. male with a PMH of HTN, HLD, JESUS admitted for AHRF and severe sepsis 2/2 COVID PNA. Patient was admitted on 11/19/2021. Overnight Events: No acute events overnight. AHRF and ARDS 2/2 COVID PNA: Unvaccinated. Symptom onset 11/12 with acute respiratory decompensation, O2 sats in high 60s prior to admission. CTA chest without evidence of definite/proximal PE, no aortic aneurysm/dissection, imaging consistent with moderate to severe COVID PNA and associated hilar/mediastinal lymphadenopathy. B/l lower extremity duplex without evidence of DVT. CXR showing interval partial improvement of multilobular PNA. CXR from 11/26 showing stable diffuse b/l airspace dz. S/p Tocilizumab 800mg 11/19  - COVID labs (CRP, D-dimer, ferritin, LD), CBC, CMP daily  - Continue Decadron 10mg IV BID  - Therapeutic Lovenox  - Tessalon 200mg TID + Tussionex 5mL BID  - COVID meds (Atorvastatin 20mg every day, Vit C 500mg BID, Zinc 220mg daily)  - Pulm following, appreciate recs     Pneumomediastinum/Pneumothorax: Possibly barotrauma 2/2 high pressures needed for COVID ARDS PNA vs lung frailty. Will monitor for signs of worsening pneumothorax. - Repeat CXR ordered for this AM    Severe sepsis 2/2 COVID PNA: PNA labs negative, BCx/UCx no growth. Likely severe sepsis on initial presentation is all related to COVID PNA. CTX + Doxy x 5d (11/19-11/24). Insomnia/anxiety: Pt with new insomnia and anxiety, specifically overnight.  - Scheduled Melatonin 5mg QHS  - Atarax 25mg q6h    HTN: Pt normotensive off meds. At home is on Amlodipine 10mg daily and HCTZ 12.5mg daily  - Will hold home meds     HLD: Chronic, stable. Last lipids 07/2021 , HDL 54, LDL 98.6, . On Atorvastatin 10mg daily.   - Will start Atorvastatin 20mg daily for benefit in COVID pts     T2DM (diet-controlled): Prior A1c 5.8% in 05/2021. No meds at present.   - SSI (resistant given morbid obesity) w/ ACHS glucose checks     JESUS: On CPAP at night. - Compliant with CPAP, now on BiPAP.     Obesity: Body mass index is 42.4 kg/m². - Encouraging lifestyle modifications and further follow up outpatient. At-risk JAMIR: RESOLVED. POA Cr 1.31, BL 0.8. Likely IVVD in setting of poor PO intake. - Daily CMP    Chest pain: RESOLVED. Description is pleuritic in nature, worse with cough. EKG without evidence of acute ischemia. Troponin of 9 makes CAD less concerning.   - Cardiac monitoring, will continue to monitor     Diarrhea: RESOLVED. Watery diarrhea x 2 days. Likely 2/2 COVID infection. Improved since presentation. Enteric bacteria panel negative. Abdominal pain: RESOLVED. Secondary to gas pains after starting diet. - Continue Simethicone       FEN/GI - Diabetic diet  Activity - Ambulate with assistance  DVT prophylaxis - Lovenox (therapeutic)  GI prophylaxis - Protonix  Disposition - Plan to d/c to TBD. Code Status - Full. Discussed with patient / caregivers. Next of Kin Name and [address]      Miya Ridley MD  Family Medicine Resident    [address] Medicine Residency Attending Addendum:  I saw and evaluated the patient, performing the key elements of the service. I discussed the findings, assessment and plan with the resident and agree with the resident's findings and plan as documented in the resident's note. The patient was seen on 11/27/2021     O: Patient remains short of breath, with slight improvement in breathing. A: Seated at bedside with HFNC. He had taken off his non-rebreather with sats down into low 80s and tachycardic into 130s. When replaced, the Sats improved to low 90s and HR back to 110s. Assessment/Plan:  Severe sepsis and AHRF/ARDS 2/2 COVID PNA with pneumomediastinum and small pneumothorax. Patient at high risk of decompensation.    Continues to require ICU level monitoring. Slight improvement in oxygen requirements today. Continue HFNC and decadron. Low threshold for repeat imaging.      Patient remains critically ill and is at high risk of decompensation.  35 minutes spent on unit directly related to the care of this patient, including discussions with family and nursing staff, time at bedside, in discussion with consultants and reviewing test/imaging results. This time is exclusive of all procedures.    .      Electronic Signature:  Paul Lara MD  11/27/2021 7:52 PM           Subjective / Objective   Subjective:   Pt states congestion improving. No chest pain, palpitations, abdominal pain, n/v, c/d, lower extremity pain. Respiratory: O2 Flow Rate (L/min): 60 l/min O2 Device: Heated, Hi flow nasal cannula   Visit Vitals  /70   Pulse 100   Temp 98 °F (36.7 °C)   Resp 23   Ht 5' 11\" (1.803 m)   Wt 304 lb (137.9 kg)   SpO2 (!) 89%   BMI 42.40 kg/m²       General: No acute distress. Positioned comfortably. Oxygenating in the low 90s on 60L with non-rebreather mask on   Respiratory: Diminished air movement bilateral lung fields, unchanged. Cardiovascular: Distant heart sounds. Tachycardic, regular rhythm. GI: + bowel sounds. Nontender. Extremities:  No LE edema. Skin: Warm, dry. Neuro: Awake and alert    I/O:  Date 11/26/21 0700 - 11/27/21 0659 11/27/21 0700 - 11/28/21 0659   Shift 3817-5075 6351-5371 24 Hour Total 6018-0087 9043-7610 24 Hour Total   INTAKE   P.O. 600  600        P. O. 600  600      Shift Total(mL/kg) 600(4.4)  600(4.4)      OUTPUT   Urine(mL/kg/hr) 1300(0.8) 1900(1.1) 3200(1)        Urine Voided 1300 1900 3200      Shift Total(mL/kg) 1300(9.4) 1900(13.8) 3200(23.2)      NET -700 -1900 -2600      Weight (kg) 137.9 137.9 137.9 137.9 137.9 137.9       CBC:  Recent Labs     11/27/21  0345 11/26/21  0448 11/25/21  0525   WBC 27.0* 27.6* 25.7*   HGB 15.5 15.7 15.5   HCT 45.9 45.8 45.4    340 334 Metabolic Panel:  Recent Labs     11/27/21  0345 11/26/21  0448 11/25/21  0525   * 134* 134*   K 4.5 4.7 4.2    102 101   CO2 28 26 26   BUN 22* 20 18   CREA 0.84 0.85 0.83   * 152* 137*   CA 8.9 8.8 8.6   MG 2.5* 2.2  --    ALB 3.2* 3.2* 3.0*   ALT 38 37 38          For Billing    Chief Complaint   Patient presents with   [facility] Problems  Date Reviewed: 4/30/2021          Codes Class Noted POA    Severe sepsis ([facility] ICD-10-CM: A41.9, R65.20  ICD-9-CM: 038.9, 995.92  11/26/2021 Yes        Pneumomediastinum ([facility] ICD-10-CM: J98.2  ICD-9-CM: 518.1  11/26/2021 No        Other pneumothorax ICD-10-CM: J93.83  ICD-9-CM: 512.89  11/26/2021 No        Acute respiratory distress syndrome (ARDS) due to COVID-19 virus [facility]) ICD-10-CM: U07.1, J80  ICD-9-CM: 518.82, 079.89  11/26/2021 Yes        Hyponatremia ICD-10-CM: E87.1  ICD-9-CM: 276.1  11/26/2021 Unknown        Diabetes mellitus type 2, controlled ([place] [address].) ICD-10-CM: E11.9  ICD-9-CM: 250.00  11/25/2021 Yes        * (Principal) Acute hypoxemic respiratory failure due to COVID-19 [facility]) ICD-10-CM: U07.1, J96.01  ICD-9-CM: 518.81, 079.89, 799.02  11/19/2021 Yes        Obesity, morbid ([facility] ICD-10-CM: E66.01  ICD-9-CM: 278.01  4/10/2018 Yes        JESUS (obstructive sleep apnea) ICD-10-CM: G47.33  ICD-9-CM: 327.23  10/15/2014 Yes        Hypertension ICD-10-CM: I10  ICD-9-CM: 401.9  10/15/2014 Yes        Pure hypercholesterolemia ICD-10-CM: E78.00  ICD-9-CM: 272.0  Unknown Yes

## 2021-11-27 NOTE — ROUTINE PROCESS
0800 - Pt assisted to chair. Currently has on HFNC on 60L at 100% as well as non-rebreather on 15L. Sats dropped from 90% down to 85%. Pt had trouble recovering, but sats now at 88%. Given oral medications and initial assessment completed. Pt requested BP cuff be removed while eating breakfast. Will continue to monitor. 1045 - pt called out to nurse's station requesting O2 be decreased. Sats at 96%. RN decreased O2 to 50L HFNC at 100% with non-rebreather on. Pt still sitting up in chair, sats 93%. Will continue to monitor. 3135 - pt requested BP be removed while eating. Will continue to monitor.

## 2021-11-27 NOTE — PROGRESS NOTES
1900 Received report. Patient sitting in chair. 0012 Atarax 25 mg po given for anxiety. 0030 Patient assisted to bed.  0100 Patient resting in bed.  0715 Bedside shift change report given to SAUL Powers .  Report included the following information SBAR, Kardex, ED Summary, Intake/Output, MAR, Accordion, Recent Results, Med Rec Status and Cardiac Rhythm ST.

## 2021-11-28 NOTE — PROGRESS NOTES
1900 Bedside and Verbal shift change report given to Isaiah Giraldo (oncoming nurse) by Micah Leary (offgoing nurse). Report included the following information SBAR, Kardex, Intake/Output, MAR, Accordion, Recent Results and Cardiac Rhythm Sinus Tach.                 0700 Bedside and Verbal shift change report given to Nas (oncoming nurse) by Isaiah Giraldo (offgoing nurse). Report included the following information SBAR, Kardex, Intake/Output, MAR, Accordion, Recent Results and Cardiac Rhythm Sinus Tach.

## 2021-11-28 NOTE — PROGRESS NOTES
[address] [place] [address] with [address] [place] [address]     Resident Progress Note in Brief    Subjective  Patient seen and examined at the bedside. Currently 97% saturated on 50L O2. No complaints at this time. Visit Vitals  BP 99/76   Pulse (!) 120   Temp 98.8 °F (37.1 °C)   Resp 28   Ht 5' 11\" (1.803 m)   Wt 304 lb (137.9 kg)   SpO2 90%   BMI 42.40 kg/m²     Physical Examination:   General appearance - alert, well appearing, and in no distress. Chest - coarse breath sounds b/l. On HF  Heart - normal rate, regular rhythm, normal S1, S2, no murmurs, rubs, clicks or gallops,   Abdomen - soft, nontender, nondistended, no masses or organomegaly  Neurological - alert, oriented, normal speech, no focal findings  Extremities - peripheral pulses normal, no pedal edema, no clubbing or cyanosis    Assessment/Plan  Sasha Yusuf is a 39 y.o. male with a PMH of HTN, HLD, JESUS admitted for AHRF and severe sepsis 2/2 COVID PNA. Patient was admitted on 11/19/2021.     AHRF and ARDS 2/2 COVID PNA: Unvaccinated. Symptom onset 11/12 with acute respiratory decompensation, O2 sats in high 60s prior to admission.  CTA chest without evidence of definite/proximal PE, no aortic aneurysm/dissection, imaging consistent with moderate to severe COVID PNA and associated hilar/mediastinal lymphadenopathy. B/l lower extremity duplex without evidence of DVT. CXR 11/27 demonstrated no significant change in diffuse bilateral airspace disease.  S/p Tocilizumab 800mg 11/19.  - Goal sats 88% or higher.  - Continue Decadron 10mg IV BID  - Duo-neb or Combivent Respimat Q6H  - Therapeutic Lovenox  - Tessalon 200mg TID + Tussionex 5mL BID + Mucinex 600 mg BID  - COVID meds (Atorvastatin 20mg every day, Vit C 500mg BID, Zinc 220mg daily)      Anthony Shelton DO  Family Medicine Resident

## 2021-11-28 NOTE — PROGRESS NOTES
[facility]: [facility] 87[facility]   : 1985 Admit Date: 2021   Phone: [phone]  Room: [address] [place] [address]   PCP: Tavon Mitchell NP  MRN: [id number]   Date: 2021  Code: Full Code          Chart and notes reviewed. Data reviewed. I review the patient's current medications in the medical record at each encounter. I have evaluated and examined the patient. HPI:    5:11 PM       History was obtained from patient. I was asked by Rajeev Alberts MD to see Flavia Mckeon in consultation for a chief complaint of COVID-19 PNA with acute respiratory failure with hypoxia. History of Present Illness:  Mr. Lisa Nagy is a 40 yo with a history of JESUS (on CPAP), childhood asthma (grew out of it), HTN, HLD, and obesity who is admitted with COVID-19 with acute respiratory failure with hypoxia. He began feeling poorly about a week ago and tested positive on Monday. He has had progressive shortness of breath, cough, and chest pain. He has had fever and is febrile to 101. 3. He was hypoxic to 68% on RA and requiring HF. He was satting in the mid 80's on 40L and 100%, increased to 50L with sats around 90%. Overnight events:   States his breathing is about the same. Stayed in the armchair all night due to nasal congestion. Down to HF 40L and 100% and NRB this AM with sats of %. Weaned to 35L while I was in the room and tolerated this. WBC increasing  D-dimer decreasing  CXR today pending      Past Medical History:   Diagnosis Date    Hypercholesteremia     Hypertension 10/15/2014    Poison ivy 9/15/2015    Pure hypercholesterolemia     S/P vasectomy 2014       No past surgical history on file.     Family History   Problem Relation Age of Onset    Diabetes Mother     Heart Attack Father 43    Stroke Maternal Grandmother     Cancer Paternal Grandfather          age 45 - Hodgkin's   [de-identified] Maternal Grandfather          in 42's with melanoma Social History     Tobacco Use    Smoking status: Never Smoker    Smokeless tobacco: Never Used   Substance Use Topics    Alcohol use: No     Alcohol/week: 0.0 standard drinks       No Known Allergies    Current Facility-Administered Medications   Medication Dose Route Frequency    sodium chloride (OCEAN) 0.65 % nasal squeeze bottle 2 Spray  2 Spray Both Nostrils Q4H    sodium chloride (OCEAN) 0.65 % nasal squeeze bottle 2 Spray  2 Spray Both Nostrils Q2H PRN    fluticasone propionate (FLONASE) 50 mcg/actuation nasal spray 2 Spray  2 Spray Both Nostrils DAILY    albuterol-ipratropium (DUO-NEB) 2.5 MG-0.5 MG/3 ML  3 mL Nebulization Q6H RT    Or    ipratropium-albuterol (COMBIVENT RESPIMAT) 20 mcg-100 mcg inhalation spray  2 Puff Inhalation Q6H RT    simethicone (MYLICON) tablet 80 mg  80 mg Oral QID PRN    insulin lispro (HUMALOG) injection   SubCUTAneous AC&HS    pantoprazole (PROTONIX) tablet 40 mg  40 mg Oral ACB    enoxaparin (LOVENOX) injection 135 mg  135 mg SubCUTAneous Q12H    lidocaine 4 % patch 1 Patch  1 Patch TransDERmal Q24H    guaiFENesin ER (MUCINEX) tablet 600 mg  600 mg Oral Q12H    hydrOXYzine HCL (ATARAX) tablet 25 mg  25 mg Oral Q6H PRN    melatonin (rapid dissolve) tablet 5 mg  5 mg Oral QHS    phenol throat spray (CHLORASEPTIC) 2 Spray  2 Spray Oral Q6H PRN    sodium chloride (NS) flush 5-40 mL  5-40 mL IntraVENous Q8H    sodium chloride (NS) flush 5-40 mL  5-40 mL IntraVENous PRN    acetaminophen (TYLENOL) tablet 650 mg  650 mg Oral Q6H PRN    Or    acetaminophen (TYLENOL) suppository 650 mg  650 mg Rectal Q6H PRN    polyethylene glycol (MIRALAX) packet 17 g  17 g Oral DAILY PRN    ondansetron (ZOFRAN ODT) tablet 4 mg  4 mg Oral Q8H PRN    Or    ondansetron (ZOFRAN) injection 4 mg  4 mg IntraVENous Q6H PRN    ascorbic acid (vitamin C) (VITAMIN C) tablet 500 mg  500 mg Oral BID    zinc sulfate (ZINCATE) 50 mg zinc (220 mg) capsule 1 Capsule  1 Capsule Oral DAILY  atorvastatin (LIPITOR) tablet 20 mg  20 mg Oral DAILY    prochlorperazine (COMPAZINE) injection 10 mg  10 mg IntraVENous Q6H PRN    glucose chewable tablet 16 g  4 Tablet Oral PRN    dextrose (D50W) injection syrg 12.5-25 g  12.5-25 g IntraVENous PRN    glucagon (GLUCAGEN) injection 1 mg  1 mg IntraMUSCular PRN    dexamethasone (DECADRON) 10 mg/mL injection 10 mg  10 mg IntraVENous Q12H    benzonatate (TESSALON) capsule 200 mg  200 mg Oral TID    HYDROcodone-chlorpheniramine (TUSSIONEX) oral suspension 5 mL  5 mL Oral Q12H         REVIEW OF SYSTEMS   12 point ROS negative except as stated in the HPI. Physical Exam:   Visit Vitals  /60 (BP 1 Location: Right arm, BP Patient Position: Sitting)   Pulse (!) 125   Temp 97.8 °F (36.6 °C)   Resp 17   Ht 5' 11\" (1.803 m)   Wt 137.9 kg (304 lb)   SpO2 96%   BMI 42.40 kg/m²       General:  Alert, cooperative, on bipap, RR elevated   Head:  Normocephalic, without obvious abnormality, atraumatic. Eyes:  Conjunctivae/corneas clear. Nose: Nares normal. Septum midline. Mucosa normal.    Throat: Lips, mucosa, and tongue normal.    Neck: Supple, symmetrical, trachea midline   Lungs:   Diminished bilaterally   Chest wall:  No tenderness or deformity. Heart:  Regular rate and rhythm, S1, S2 normal, no murmur, click, rub or gallop. Abdomen:   Soft, non-tender. Bowel sounds normal.    Extremities: Extremities normal, atraumatic, no cyanosis or edema. Pulses: 2+ and symmetric all extremities.    Skin: Skin color, texture, turgor normal. No rashes or lesions       Neurologic: Grossly nonfocal       Lab Results   Component Value Date/Time    Sodium 132 (L) 11/28/2021 02:23 AM    Potassium 4.8 11/28/2021 02:23 AM    Chloride 100 11/28/2021 02:23 AM    CO2 26 11/28/2021 02:23 AM    BUN 24 (H) 11/28/2021 02:23 AM    Creatinine 0.82 11/28/2021 02:23 AM    Glucose 160 (H) 11/28/2021 02:23 AM    Calcium 9.1 11/28/2021 02:23 AM    Magnesium 2.5 (H) 11/28/2021 02:23 AM    Lactic acid 1.1 11/19/2021 04:39 PM       Lab Results   Component Value Date/Time    WBC 32.9 (H) 11/28/2021 02:23 AM    HGB 16.6 11/28/2021 02:23 AM    PLATELET 630 99/31/9579 02:23 AM    MCV 89.4 11/28/2021 02:23 AM       Lab Results   Component Value Date/Time    Alk. phosphatase 126 (H) 11/28/2021 02:23 AM    Protein, total 7.2 11/28/2021 02:23 AM    Albumin 3.4 (L) 11/28/2021 02:23 AM    Globulin 3.8 11/28/2021 02:23 AM       Lab Results   Component Value Date/Time    Ferritin 848 (H) 11/27/2021 03:45 AM       Lab Results   Component Value Date/Time    C-Reactive protein <0.29 11/28/2021 02:23 AM    TSH 2.510 11/30/2018 09:47 AM    TSH 1.260 10/17/2013 09:40 AM        No results found for: PH, PHI, PCO2, PCO2I, PO2, PO2I, HCO3, HCO3I, FIO2, FIO2I    No results found for: CPK, RCK1, RCK2, RCK3, RCK4, CKNDX, CKND1, TROPT, TROIQ, BNPP, BNP     Lab Results   Component Value Date/Time    Culture result: No growth (<1,000 CFU/ML) 11/19/2021 09:46 PM    Culture result: MRSA NOT PRESENT 11/19/2021 06:53 PM    Culture result:  11/19/2021 06:53 PM     Screening of patient nares for MRSA is for surveillance purposes and, if positive, to facilitate isolation considerations in high risk settings. It is not intended for automatic decolonization interventions per se as regimens are not sufficiently effective to warrant routine use.        No results found for: TOXA1, RPR, HBCM, HBSAG, HAAB, HCAB1, HAAT, G6PD, CRYAC, HIVGT, HIVR, HIV1, HIV12, HIVPC, HIVRPI    No results found for: VANCT, CPK    Lab Results   Component Value Date/Time    Color YELLOW/STRAW 11/19/2021 09:46 PM    Appearance CLEAR 11/19/2021 09:46 PM    Specific gravity 1.015 11/19/2021 09:46 PM    pH (UA) 5.5 11/19/2021 09:46 PM    Protein 30 (A) 11/19/2021 09:46 PM    Glucose Negative 11/19/2021 09:46 PM    Ketone TRACE (A) 11/19/2021 09:46 PM    Bilirubin Negative 11/19/2021 09:46 PM    Blood Negative 11/19/2021 09:46 PM    Urobilinogen 0.2 11/19/2021 09:46 PM    Nitrites Negative 11/19/2021 09:46 PM    Leukocyte Esterase Negative 11/19/2021 09:46 PM    WBC 0-4 11/19/2021 09:46 PM    RBC 0-5 11/19/2021 09:46 PM    Bacteria Negative 11/19/2021 09:46 PM       IMPRESSION  · COVID-19 PNA  · Acute respiratory failure with hypoxia  · JESUS  · Severe sepsis    PLAN  · Goal sats 88% or higher. Will try to maintain on HF due possible left apical PTX and avoid bipap. Today we have been able to wean flow some. · S/p Actemra 11/19  · continue high dose decadron BID- may be able to wean tomorrow  ·  Finished cetriaxone/doxy 11/24. En light of increasing WBC will start cefepime. Will see what CXR shows today. · scheduled combivent  · Tessalon, tussionex  · lovenox therapeutic dose  Self prone at night and OOB into chair as much as possible. In chair currently and will hopefully be able to stay there for atleast a few hrs. Prognosis guareded     Patient is critically ill in the setting of COVID-19 and acute respiratory failure with hypoxia requiring continuous HF/bipap. CC time 34 minutes.       Emma Ramirez MD

## 2021-11-28 NOTE — ROUTINE PROCESS
0800 - initial assessment completed. Pt sitting up in chair eating breakfast. O2 weaned down to 35L at 100%, pt still wearing non-rebreather on top of high flow sats at 92%. Will continue to monitor. 0930 - pt assisted to bedside commode. Unable to have BM at this time. RN offered PRN miralax, pt declined. Pt requesting suppository. 46 - Notified family medicine that pt wants a suppository. MD stated he will order enema. Will continue to monitor. 1100 - suppository given. Pt returned to bed. Call bell within reach. RN noticed pt removed BP cuff. BP cuff put back on. Pt educated the importance of VS with high O2 needs. Will continue to monitor.

## 2021-11-28 NOTE — ROUTINE PROCESS
Bedside shift change report given to Miguel Angel Kruse RN (oncoming nurse) by Alix Abernathy RN (offgoing nurse). Report included the following information SBAR, Kardex, ED Summary, Intake/Output, MAR, Accordion, Recent Results, Med Rec Status and Cardiac Rhythm sinus tach.

## 2021-11-29 NOTE — PROGRESS NOTES
Bedside shift change report given to Will (oncoming nurse) by Re Bergman (offgoing nurse). Report included the following information SBAR, Kardex, MAR, Recent Results and Cardiac Rhythm Sinus Tach. 0900 - assessment complete, pt medicated for anxiety, on HFNC with NRB over top, pt requested to sit up in chair, RN assisted, tolerated fairly with SATs dipping into 70's but recovered with time & O2, pt left in chair with call bell w/in reach    1100 - Pt requested to return to bed, RN asst pt to bed, pt noticeably dyspneic, tachycardic and anxious, pt requestiong meds for anxiety but nothing available, MD notified, MD suggested BiPap, on clarification Bipap is not an option yet r/t possible pneumo. 1200 - Assessment complete, pt resting comfortably on HFNC with NRB over top.    1245 - asst pt to bsc on 60L 100% HFNC with NRB, O2 sats dropped to 65%, frequent PVCs, pt is not tolerating position changes and/or exertion    1500 - Pt requests to get up to the chair, standby asst, pre-oxygenated sats > 80    1600 - Assessment complete    1700 - Dinner tray delivered, pt doing well in the chair, chose to remain up until bedtime    1900 - Bedside shift change report given to Nroi Vee (oncoming nurse) by Lance (offgoing nurse). Report included the following information SBAR, Intake/Output, MAR, Recent Results and Cardiac Rhythm Sinus Tach.

## 2021-11-29 NOTE — PROGRESS NOTES
[address] [facility] [address] [place] [address]   Office [phone]  Fax [phone]          Assessment and Plan     Manuel Ferreira is a 39 y.o. male with a PMH of HTN, HLD, JESUS admitted for AHRF and severe sepsis 2/2 COVID PNA. Patient was admitted on 11/19/2021. Overnight Events: NAEO    AHRF and ARDS 2/2 COVID PNA: Unvaccinated. Symptom onset 11/12 with acute respiratory decompensation, O2 sats in high 60s prior to admission. CTA chest without evidence of definite/proximal PE, no aortic aneurysm/dissection, imaging consistent with moderate to severe COVID PNA and associated hilar/mediastinal lymphadenopathy. B/l lower extremity duplex without evidence of DVT. CXR 11/27 demonstrated no significant change in diffuse bilateral airspace disease. S/p Tocilizumab 800mg 11/19.  - Goal sats 88% or higher.  - COVID labs (CRP, D-dimer, ferritin, LD), CBC, CMP daily  - Continue Decadron 10mg IV BID  - Duo-neb or Combivent Respimat Q6H  - Therapeutic Lovenox  - Tessalon 200mg TID + Tussionex 5mL BID + Mucinex 600 mg BID  - COVID meds (Atorvastatin 20mg every day, Vit C 500mg BID, Zinc 220mg daily)  - Pulm following, appreciate recs     Pneumomediastinum/Pneumothorax: Possibly barotrauma 2/2 high pressures needed for COVID ARDS PNA vs lung frailty. Will monitor for signs of worsening pneumothorax. - Repeat CXR today showing decreased pneumomediastinum and subcutaneous neck gas    Severe sepsis 2/2 COVID PNA: PNA labs negative, BCx/UCx no growth. Likely severe sepsis on initial presentation is all related to COVID PNA. CTX + Doxy x 5d (11/19-11/24). Insomnia/anxiety: Pt with new insomnia and anxiety, specifically overnight.  - Scheduled Melatonin 5mg QHS  - Atarax 25mg q6h    HTN: Pt normotensive off meds. At home is on Amlodipine 10mg daily and HCTZ 12.5mg daily  - Will hold home meds     HLD: Chronic, stable. Last lipids 07/2021 , HDL 54, LDL 98.6, . On Atorvastatin 10mg daily.   - Will start Atorvastatin 20mg daily for benefit in COVID pts     T2DM (diet-controlled): Prior A1c 5.8% in 05/2021. No meds at present.   - SSI (resistant given morbid obesity) w/ ACHS glucose checks     JESUS: On CPAP at night at home. - Now on HFNC, no BiPAP     Obesity: Body mass index is 42.4 kg/m². - Encouraging lifestyle modifications and further follow up outpatient. At-risk JAMIR: RESOLVED. POA Cr 1.31, BL 0.8. Likely IVVD in setting of poor PO intake. - Daily CMP    Chest pain: RESOLVED. Description is pleuritic in nature, worse with cough. EKG without evidence of acute ischemia. Troponin of 9 makes CAD less concerning.   - Cardiac monitoring, will continue to monitor     Diarrhea: RESOLVED. Watery diarrhea x 2 days. Likely 2/2 COVID infection. Improved since presentation. Enteric bacteria panel negative. Abdominal pain: RESOLVED. Secondary to gas pains after starting diet. - Continue Simethicone       FEN/GI - Diabetic diet  Activity - Ambulate with assistance  DVT prophylaxis - Lovenox (therapeutic)  GI prophylaxis - Protonix  Disposition - Plan to d/c to TBD. Code Status - Full. Discussed with patient / caregivers. Next of Kin Name and [address]      Lucia Ma MD  Family Medicine Resident    [address] Medicine Residency Attending Addendum:  I saw and evaluated the patient, performing the key elements of the service. I discussed the findings, assessment and plan with the resident and agree with the resident's findings and plan as documented in the resident's note. Patient had some increase in oxygen requirement overnight despite improvement yesterday. Remains tachycardic. Pt notes some symptomatic improvement. Will continue to wean HFNC as tolerated.       Total critical care time: Approximately 36 minutes    Due to a high probability of clinically significant, life threatening deterioration, the patient required my highest level of preparedness to intervene emergently and I personally spent this critical care time directly and personally managing the patient. This critical care time included obtaining a history; examining the patient; pulse oximetry; ordering and review of studies; arranging urgent treatment with development of a management plan; evaluation of patient's response to treatment; frequent reassessment; and, discussions with other providers. This critical care time was performed to assess and manage the high probability of imminent, life-threatening deterioration that could result in multi-organ failure. It was exclusive of separately billable procedures and treating other patients and teaching time. Please see MDM section and the rest of the note for further information on patient assessment and treatment. Electronic Signature:  Kierra Tran MD  11/29/2021 3:31 PM           Subjective / Objective   Subjective:   Pt seen and examined at bedside. States he has had some gas pains, has noted that he notices this more after seeing RT in the mornings when his O2 gets increased. No chest pain, palp, dyspnea, n/v, abd pain, c/d. Respiratory: O2 Flow Rate (L/min): 45 l/min O2 Device: Hi flow nasal cannula   Visit Vitals  BP (!) 164/87   Pulse (!) 130   Temp 98.3 °F (36.8 °C)   Resp 22   Ht 5' 11\" (1.803 m)   Wt 274 lb 7.6 oz (124.5 kg)   SpO2 90%   BMI 38.28 kg/m²       General: No acute distress. Sitting upright in chair. Respiratory: On 60 L HFNC (100% FiO2), O2 sat b/w 88-92%. Diminished air movement bilaterally. Cardiovascular: tachycardic rate, regular rhythm. GI: + bowel sounds. Nontender. Extremities:  No LE edema. Skin: Warm, dry. Neuro: Awake and alert    I/O:  Date 11/28/21 0700 - 11/29/21 0659 11/29/21 0700 - 11/30/21 0659   Shift [id number] 1900-0659 24 Hour Total 3266-0671 3300-3458 24 Hour Total   INTAKE   P.O. 290  290 500  500     P.O. 290  290 500  500   I. V.(mL/kg/hr) 0(0) 20(0) 20(0) 60  60     I.V. 0  0 Volume (cefepime (MAXIPIME) 2 g in sterile water (preservative free) 10 mL IV syringe)  20 20 10  10     Volume (magnesium sulfate 2 g/50 ml IVPB (premix or compounded))    50  50   Blood 0  0        Autotransfused 0  0      Other 0  0        Other 0  0      Shift Total(mL/kg) 290(2.1) 20(0.2) 310(2.5) 560(4.5)  560(4.5)   OUTPUT   Urine(mL/kg/hr) 500(0.3) 1000(0.7) 1500(0.5) 1200  1200     Urine Voided 500 1000 1500 1200  1200     Urine Occurrence(s) 1 x  1 x      Stool           Stool Occurrence(s) 1 x  1 x      Shift Total(mL/kg) 500(3.6) 1000(8) 1500(12) 1200(9.6)  1200(9.6)   NET -210 -980 -1190 -640  -640   Weight (kg) 137.9 124.5 124.5 124.5 124.5 124.5       CBC:  Recent Labs     11/29/21  0239 11/28/21 0223 11/27/21  0345   WBC 26.1* 32.9* 27.0*   HGB 16.8 16.6 15.5   HCT 48.8 48.1 45.9    352 134       Metabolic Panel:  Recent Labs     11/29/21  0606 11/29/21  0239 11/28/21 0223 11/27/21  0345 11/27/21  0345   * 130* 132*   < > 134*   K 4.4 4.7 4.8   < > 4.5   CL 99 100 100   < > 100   CO2 26 22 26   < > 28   BUN 26* 24* 24*   < > 22*   CREA 0.85 0.79 0.82   < > 0.84   * 130* 160*   < > 137*   CA 8.6 8.5 9.1   < > 8.9   MG 2.4 <0.3* 2.5*   < > 2.5*   ALB  --  3.2* 3.4*  --  3.2*   ALT  --  38 38  --  38    < > = values in this interval not displayed.           For Billing    Chief Complaint   Patient presents with   [facility] Problems  Date Reviewed: 4/30/2021          Codes Class Noted POA    Severe sepsis [facility]) ICD-10-CM: A41.9, R65.20  ICD-9-CM: 038.9, 995.92  11/26/2021 Yes        Pneumomediastinum ([address] ICD-10-CM: J98.2  ICD-9-CM: 518.1  11/26/2021 No        Other pneumothorax ICD-10-CM: J93.83  ICD-9-CM: 512.89  11/26/2021 No        Acute respiratory distress syndrome (ARDS) due to COVID-19 virus [facility]) ICD-10-CM: U07.1, J80  ICD-9-CM: 518.82, 079.89  11/26/2021 Yes        Hyponatremia ICD-10-CM: E87.1  ICD-9-CM: 276.1  11/26/2021 Unknown Diabetes mellitus type 2, controlled ([facility] ICD-10-CM: E11.9  ICD-9-CM: 250.00  11/25/2021 Yes        * (Principal) Acute hypoxemic respiratory failure due to COVID-19 [facility]) ICD-10-CM: U07.1, J96.01  ICD-9-CM: 518.81, 079.89, 799.02  11/19/2021 Yes        Obesity, morbid ([facility] ICD-10-CM: E66.01  ICD-9-CM: 278.01  4/10/2018 Yes        JESUS (obstructive sleep apnea) ICD-10-CM: G47.33  ICD-9-CM: 327.23  10/15/2014 Yes        Hypertension ICD-10-CM: I10  ICD-9-CM: 401.9  10/15/2014 Yes        Pure hypercholesterolemia ICD-10-CM: E78.00  ICD-9-CM: 272.0  Unknown Yes

## 2021-11-29 NOTE — PROGRESS NOTES
Case Management follow-up     RUR 9%(low readmission rate )    Covid +    I talked with pt.'s wife today who emailed me papers for her 's FMLA. I notified the FP team regarding papers and FP will complete necessary papers. .  Once completed,either the covering  tomorrow or myself will insure pt.'s wife receives them.     Chyna Miller

## 2021-11-29 NOTE — ROUTINE PROCESS
Bedside shift change report given to Zahida Ann RN (oncoming nurse) by Alix Abernathy RN (offgoing nurse). Report included the following information SBAR, Kardex, ED Summary, Intake/Output, MAR, Accordion, Recent Results, Med Rec Status and Cardiac Rhythm sinus tach.

## 2021-11-29 NOTE — PROGRESS NOTES
Change of Shift Report:    1900:  Bedside and Verbal shift change report given to Fifi Carreon RN (oncoming nurse) by Angelia Dickinson RN (offgoing nurse). Report included the following information SBAR, Kardex, ED Summary, Intake/Output, MAR, Accordion, Recent Results and Cardiac Rhythm Sinus Tach. Shift Summary:    2030:  Patient assessment completed. Patient AOx4, no complaints of pain, just gassy pain in upper part of abdomen. Patient's O2 sat 94% on 40L FiO2 95%. Patient's lung sounds diminished. Patient' Tachy in the 120s, BPs stable with MAPs>65.       2115:  Patient's . No coverage needed. End of Shift Report:    0700:  Bedside and Verbal shift change report given to SAUL Lucas (oncoming nurse) by Sergio Price RN (offgoing nurse). Report included the following information SBAR, Kardex, ED Summary, Intake/Output, MAR, Accordion, Recent Results and Cardiac Rhythm Sinus Tach.

## 2021-11-29 NOTE — PROGRESS NOTES
Name: Sánchez 87: [address]   : 1985 Admit Date: 2021   Phone: [phone]  Room: [address] [place] [address]   PCP: Raphael Ornelas NP  MRN: [id number]   Date: 2021  Code: Full Code          Chart and notes reviewed. Data reviewed. I review the patient's current medications in the medical record at each encounter. I have evaluated and examined the patient. HPI:    5:11 PM       History was obtained from patient. I was asked by Thong Ernst MD to see Mikaela Izaguirre in consultation for a chief complaint of COVID-19 PNA with acute respiratory failure with hypoxia. History of Present Illness:  Mr. Chana Clifford is a 38 yo with a history of JESUS (on CPAP), childhood asthma (grew out of it), HTN, HLD, and obesity who is admitted with COVID-19 with acute respiratory failure with hypoxia. He began feeling poorly about a week ago and tested positive on Monday. He has had progressive shortness of breath, cough, and chest pain. He has had fever and is febrile to 101. 3. He was hypoxic to 68% on RA and requiring HF. He was satting in the mid 80's on 40L and 100%, increased to 50L with sats around 90%. Overnight events:   His high flow has increased to 60L and 100% overnight. He does not like the heat coming from the HF. WB improved today. D-dimer essentially unchanged. Past Medical History:   Diagnosis Date    Hypercholesteremia     Hypertension 10/15/2014    Poison ivy 9/15/2015    Pure hypercholesterolemia     S/P vasectomy 2014       No past surgical history on file.     Family History   Problem Relation Age of Onset    Diabetes Mother     Heart Attack Father 43    Stroke Maternal Grandmother     Cancer Paternal Grandfather          age 45 - Hodgkin's   [de-identified] Maternal Grandfather          in 42's with melanoma       Social History     Tobacco Use    Smoking status: Never Smoker    Smokeless tobacco: Never Used   Substance Use Topics  Alcohol use: No     Alcohol/week: 0.0 standard drinks       No Known Allergies    Current Facility-Administered Medications   Medication Dose Route Frequency    white petrolatum (VASELINE) ointment   Topical PRN    oxymetazoline (AFRIN) 0.05 % nasal spray 2 Spray  2 Spray Both Nostrils DAILY PRN    cefepime (MAXIPIME) 2 g in sterile water (preservative free) 10 mL IV syringe  2 g IntraVENous Q8H    sodium chloride (OCEAN) 0.65 % nasal squeeze bottle 2 Spray  2 Spray Both Nostrils Q4H    sodium chloride (OCEAN) 0.65 % nasal squeeze bottle 2 Spray  2 Spray Both Nostrils Q2H PRN    fluticasone propionate (FLONASE) 50 mcg/actuation nasal spray 2 Spray  2 Spray Both Nostrils DAILY    albuterol-ipratropium (DUO-NEB) 2.5 MG-0.5 MG/3 ML  3 mL Nebulization Q6H RT    Or    ipratropium-albuterol (COMBIVENT RESPIMAT) 20 mcg-100 mcg inhalation spray  2 Puff Inhalation Q6H RT    simethicone (MYLICON) tablet 80 mg  80 mg Oral QID PRN    insulin lispro (HUMALOG) injection   SubCUTAneous AC&HS    pantoprazole (PROTONIX) tablet 40 mg  40 mg Oral ACB    enoxaparin (LOVENOX) injection 135 mg  135 mg SubCUTAneous Q12H    lidocaine 4 % patch 1 Patch  1 Patch TransDERmal Q24H    guaiFENesin ER (MUCINEX) tablet 600 mg  600 mg Oral Q12H    hydrOXYzine HCL (ATARAX) tablet 25 mg  25 mg Oral Q6H PRN    melatonin (rapid dissolve) tablet 5 mg  5 mg Oral QHS    phenol throat spray (CHLORASEPTIC) 2 Spray  2 Spray Oral Q6H PRN    sodium chloride (NS) flush 5-40 mL  5-40 mL IntraVENous Q8H    sodium chloride (NS) flush 5-40 mL  5-40 mL IntraVENous PRN    acetaminophen (TYLENOL) tablet 650 mg  650 mg Oral Q6H PRN    Or    acetaminophen (TYLENOL) suppository 650 mg  650 mg Rectal Q6H PRN    polyethylene glycol (MIRALAX) packet 17 g  17 g Oral DAILY PRN    ondansetron (ZOFRAN ODT) tablet 4 mg  4 mg Oral Q8H PRN    Or    ondansetron (ZOFRAN) injection 4 mg  4 mg IntraVENous Q6H PRN    ascorbic acid (vitamin C) (VITAMIN C) tablet 500 mg  500 mg Oral BID    zinc sulfate (ZINCATE) 50 mg zinc (220 mg) capsule 1 Capsule  1 Capsule Oral DAILY    atorvastatin (LIPITOR) tablet 20 mg  20 mg Oral DAILY    prochlorperazine (COMPAZINE) injection 10 mg  10 mg IntraVENous Q6H PRN    glucose chewable tablet 16 g  4 Tablet Oral PRN    dextrose (D50W) injection syrg 12.5-25 g  12.5-25 g IntraVENous PRN    glucagon (GLUCAGEN) injection 1 mg  1 mg IntraMUSCular PRN    dexamethasone (DECADRON) 10 mg/mL injection 10 mg  10 mg IntraVENous Q12H    benzonatate (TESSALON) capsule 200 mg  200 mg Oral TID    HYDROcodone-chlorpheniramine (TUSSIONEX) oral suspension 5 mL  5 mL Oral Q12H         REVIEW OF SYSTEMS   12 point ROS negative except as stated in the HPI. Physical Exam:   Visit Vitals  /64 (BP 1 Location: Right upper arm, BP Patient Position: At rest)   Pulse (!) 114   Temp 98.1 °F (36.7 °C)   Resp 24   Ht 5' 11\" (1.803 m)   Wt 124.5 kg (274 lb 7.6 oz)   SpO2 92%   BMI 38.28 kg/m²       General:  Alert, cooperative, on bipap, RR elevated   Head:  Normocephalic, without obvious abnormality, atraumatic. Eyes:  Conjunctivae/corneas clear. Nose: Nares normal. Septum midline. Mucosa normal.    Throat: Lips, mucosa, and tongue normal.    Neck: Supple, symmetrical, trachea midline   Lungs:   Diminished bilaterally   Chest wall:  No tenderness or deformity. Heart:  Regular rate and rhythm, S1, S2 normal, no murmur, click, rub or gallop. Abdomen:   Soft, non-tender. Bowel sounds normal.    Extremities: Extremities normal, atraumatic, no cyanosis or edema. Pulses: 2+ and symmetric all extremities.    Skin: Skin color, texture, turgor normal. No rashes or lesions       Neurologic: Grossly nonfocal       Lab Results   Component Value Date/Time    Sodium 134 (L) 11/29/2021 06:06 AM    Potassium 4.4 11/29/2021 06:06 AM    Chloride 99 11/29/2021 06:06 AM    CO2 26 11/29/2021 06:06 AM    BUN 26 (H) 11/29/2021 06:06 AM    Creatinine 0.85 11/29/2021 06:06 AM    Glucose 136 (H) 11/29/2021 06:06 AM    Calcium 8.6 11/29/2021 06:06 AM    Magnesium 2.4 11/29/2021 06:06 AM    Lactic acid 1.1 11/19/2021 04:39 PM       Lab Results   Component Value Date/Time    WBC 26.1 (H) 11/29/2021 02:39 AM    HGB 16.8 11/29/2021 02:39 AM    PLATELET 986 84/27/9396 02:39 AM    MCV 91.9 11/29/2021 02:39 AM       Lab Results   Component Value Date/Time    Alk. phosphatase 128 (H) 11/29/2021 02:39 AM    Protein, total 7.2 11/29/2021 02:39 AM    Albumin 3.2 (L) 11/29/2021 02:39 AM    Globulin 4.0 11/29/2021 02:39 AM       Lab Results   Component Value Date/Time    Ferritin 975 (H) 11/29/2021 02:39 AM       Lab Results   Component Value Date/Time    C-Reactive protein <0.29 11/29/2021 02:39 AM    TSH 2.510 11/30/2018 09:47 AM    TSH 1.260 10/17/2013 09:40 AM        No results found for: PH, PHI, PCO2, PCO2I, PO2, PO2I, HCO3, HCO3I, FIO2, FIO2I    No results found for: CPK, RCK1, RCK2, RCK3, RCK4, CKNDX, CKND1, TROPT, TROIQ, BNPP, BNP     Lab Results   Component Value Date/Time    Culture result: No growth (<1,000 CFU/ML) 11/19/2021 09:46 PM    Culture result: MRSA NOT PRESENT 11/19/2021 06:53 PM    Culture result:  11/19/2021 06:53 PM     Screening of patient nares for MRSA is for surveillance purposes and, if positive, to facilitate isolation considerations in high risk settings. It is not intended for automatic decolonization interventions per se as regimens are not sufficiently effective to warrant routine use.        No results found for: TOXA1, RPR, HBCM, HBSAG, HAAB, HCAB1, HAAT, G6PD, CRYAC, HIVGT, HIVR, HIV1, HIV12, HIVPC, HIVRPI    No results found for: VANCT, CPK    Lab Results   Component Value Date/Time    Color YELLOW/STRAW 11/19/2021 09:46 PM    Appearance CLEAR 11/19/2021 09:46 PM    Specific gravity 1.015 11/19/2021 09:46 PM    pH (UA) 5.5 11/19/2021 09:46 PM    Protein 30 (A) 11/19/2021 09:46 PM    Glucose Negative 11/19/2021 09:46 PM    Ketone TRACE (A) 11/19/2021 09:46 PM    Bilirubin Negative 11/19/2021 09:46 PM    Blood Negative 11/19/2021 09:46 PM    Urobilinogen 0.2 11/19/2021 09:46 PM    Nitrites Negative 11/19/2021 09:46 PM    Leukocyte Esterase Negative 11/19/2021 09:46 PM    WBC 0-4 11/19/2021 09:46 PM    RBC 0-5 11/19/2021 09:46 PM    Bacteria Negative 11/19/2021 09:46 PM       IMPRESSION  · COVID-19 PNA  · Acute respiratory failure with hypoxia  · JESUS  · Severe sepsis  · Pneumonthoraix    PLAN  · Goal sats 88% or higher. Will try to maintain on HF due left apical PTX and avoid bipap. · Repeat CXR today  · S/p Actemra 11/19  · continue high dose decadron BID, continue at current dose  · Finished cetriaxone/doxy 11/24. En light of increasing WBC, cefepime increased 11/28  · scheduled combivent  · Tessalon, tussionex  · lovenox therapeutic dose  Self prone at night and OOB into chair as much as possible. Prognosis guareded     Patient is critically ill in the setting of COVID-19 and acute respiratory failure with hypoxia requiring continuous HF/bipap. CC time 35 minutes.       Francisco Guerra MD

## 2021-11-30 PROBLEM — J93.9 PNEUMOTHORAX ON LEFT: Status: ACTIVE | Noted: 2021-01-01

## 2021-11-30 NOTE — PROGRESS NOTES
Spiritual Care Assessment/Progress Note  [address] [place] [address] Dr      NAME: Terrie Flores      MRN: [id number]  AGE: 39 y.o. SEX: male  Latter day Affiliation: Judaism   Language: English     11/30/2021     Total Time (in minutes): 25     Spiritual Assessment begun in [facility] INTENSIVE [facility] through conversation with:         []Patient        [] Family    [] Friend(s)        Reason for Consult: Interdisciplinary rounds, patient floor     Spiritual beliefs: (Please include comment if needed)     [] Identifies with a elijah tradition:         [] Supported by a elijah community:            [] Claims no spiritual orientation:           [] Seeking spiritual identity:                [] Adheres to an individual form of spirituality:           [x] Not able to assess:                           Identified resources for coping:      [] Prayer                               [] Music                  [] Guided Imagery     [] Family/friends                 [] Pet visits     [] Devotional reading                         [x] Unknown     [] Other:                                              Interventions offered during this visit: (See comments for more details)    Patient Interventions: Other (comment) (Unable to assess)           Plan of Care:     [] Support spiritual and/or cultural needs    [] Support AMD and/or advance care planning process      [] Support grieving process   [] Coordinate Rites and/or Rituals    [] Coordination with community clergy   [] No spiritual needs identified at this time   [] Detailed Plan of Care below (See Comments)  [] Make referral to Music Therapy  [] Make referral to Pet Therapy     [] Make referral to Addiction services  [] Make referral to [facility]  [] Make referral to Spiritual Care Partner  [] No future visits requested        [x] Contact Spiritual Care for further referrals     Comments:    attended interdisciplinary round for patient, Mr. Barnett Read on ICU.  Pt is under contact restrictions. Pt is sitting up in bed, awake and alert, on BiPaP and made eye contact with  who singled that we are praying for him.  consulted with attending nurse who gave a further update. Chaplains are available for further support as opportunity allows.      Chaplain Kavon Ying M.Div.  Jigna Willett ([id number])

## 2021-11-30 NOTE — PROGRESS NOTES
I spoke with patient about his O2 sats still being below goal (between 84-87% as I was in the room speaking with him). Discussed how our next steps would be to intubate and that overnight his O2 needs tend to increase and at present, we are providing the maximum amount of respiratory support we could provide without intubation. After discussing with patient that intubation appears to be likely in the near future and it would either be emergent or voluntary, he was willing to proceed with intubation. We called his wife, Ms. Blanco, who was tearful, but amenable to this decision as well. I discussed at length with both of them the risks, benefits, and alternatives to intubation. After this discussion, they were both amenable to voluntary intubation at this time. Classie Cockayne, MD  [address] [facility] Medicine Resident    3:49 PM  Patient successfully intubated. I called wife, Ms. Blanco to update her that patient is now intubated and sedated. She inquired about nutritional support and I informed her that OG tube was placed and we had measures in place to ensure nutritional status. Informed her that we will continue to provide her with daily updates.

## 2021-11-30 NOTE — PROGRESS NOTES
1900- Shift change report given to Hien Driscoll (oncoming nurse) by Jia Del Castillo (offgoing nurse). Report included the following information SBAR, Kardex, Cardiac Rhythm Sinus Tach and Alarm Parameters . 2000- Patient up in chair, with HFNC with NrB oxygen SAT's 90-91.    0130- Assisted patient back to bed, patient O2 sats dropped to 70s, patient only recovered to the mid 80\"s. Informed Family Practice; chest x-ray and ABG's ordered. No PTX found. Ok to place on bi-pap. Oxygen sat goal is >88%. 0700 Shift change report given to Felice Franco (oncoming nurse) by [employer] nurse). Report included the following information SBAR, Kardex and Cardiac Rhythm Sinus Tach.

## 2021-11-30 NOTE — PROGRESS NOTES
[address] [place] [address] with [address] [place] [address]       Resident Progress Note in Brief    S: call from nursing stating Pt's O2 sats dip down into the the mid to low 80's when he starts coughing or moves. Able to come back up to 88-89% . Patient seen and examined at bedside with Dr. Michelle Wilson (PGY 1). Pt states that he does not feel increased SOB and denies any chest pain. He states he feels better now than he did earlier and that his anxiety is well controlled. Notes that when he first received the IV Ativan it \"hit him hard\" and almost made his anxiety worse but then he calmed down. O:  Visit Vitals  /75 (BP 1 Location: Right upper arm)   Pulse (!) 129   Temp 98.5 °F (36.9 °C)   Resp 24   Ht 5' 11\" (1.803 m)   Wt 274 lb 7.6 oz (124.5 kg)   SpO2 91%   BMI 38.28 kg/m²     Physical Examination:   General appearance - alert, and in no distress  Chest - decreased breath sounds anterior fields. Symmetric air entry. Diffuse scattered rhonchi. Does not appear in respiratory distress. AT times appears to have increased work of breathing although he denies this to me. Heart - tachycardic. regular rhythm, normal S1, S2, no murmurs, rubs, clicks or gallops,   Abdomen - soft, nontender, nondistended, no masses or organomegaly  Neurological - alert, oriented, normal speech, no focal findings  Extremities - peripheral pulses normal, no pedal edema, no clubbing or cyanosis    A/P:     AHRF and ARDS 2/2 COVID PNA:   CXR from 11/27 showed Lt apical PTX. This appears to have resolved on 11/29. Now with decreased O2 sat that is worse with slight position change or coughing while maxed on high flow. Pulse Ox reads differently when placed on ear vs on finger so there is some concern that this may be skewed. -ABG now to ensure readings from pulse ox are consistent. -repeat CXR- if PTX is still resolved can try using Bipap if O2 saturations continue to drop and remain <88%. -control anxiety with prn Ativan   -continue Tessalon and Tussionex scheduled to help control cough   -Goal sats 88% or higher      Please see the remainder of the A&P in the resident daily progress note       Yoan Mast DO  Family Medicine Resident      3:06 AM  -CXR- Bilateral lung opacities are mildly decreased. There is no pleural effusion or pneumothorax. -ABG- pH 7.45, PCO2 32, PO2 55, O2 sat 90%. -If continues to max on high flow and O2 sats <88% advised nursing to try Bipap and give 0.5mg Ativan prior to ease anxiety as the PTX appears to have resolved.

## 2021-11-30 NOTE — PROGRESS NOTES
Physical Therapy Note    Chart reviewed. Spoke with RN, patient currently tachypneic on continuous BiPAP and not appropriate for therapy interventions at this time. Will follow up for PT weekly re-assess when appropriate.     Thank you,  Randal Guerrero, PT, DPT

## 2021-11-30 NOTE — PROGRESS NOTES
0700: Bedside and Verbal shift change report given to Yessica Ross RN (oncoming nurse) by Angela Godfrey RN (offgoing nurse). Report included the following information SBAR, Kardex, Intake/Output and Cardiac Rhythm sinus tachy. 0745: Family Medicine resident Dr. Gil Santos at bedside to assess patient and updated on overnight events. Per Dr. Gil Santos, patient okay to upgrade to ICU status. Will update intensivist.     0800: Assessment completed, see doc flowsheet. Patient A&Ox3, with some difficulty recalling year, very drowsy. On continuous BiPAP at 100% FiO2, able to tolerate 30 second break in order to take PO medications, but very tachypneic. Sinus tachy on telemetry monitor, S1S2 auscultated. Peripheral pulses palpable on all extremities. Bowel sounds active x4, patient currently NPO except meds while on BiPAP. Patient assisted to turn and reposition in bed. 1015: Dr. Dean Mohan on telehealth monitor to assess patient. Orders received to start precedex. 1034: Precedex started at 0.4 mcg/kg/hr. 1105: IDRs completed with team. Orders received draw procalcitonin, do not anticipate any other new orders. 1200: Reassessment completed, see doc flowsheet. Patient assisted to reposition in bed.    1215: Dr. Dean Mohan in room to discuss possible intubation with patient. Per Dr. Dean Mohan, patient ok to remain on BiPAP for now and we will reassess this afternoon. 1506: Updated by Family Medicine resident Dr. Gil Santos that patient and wife are electing intubation. Patient currently 86% on continuous BiPAP. Updating Dr. Dean Mohan. Dr. Dean Mohan to text anesthesia. 1530: Dr. Saba Palmer at bedside to intubate patient. Dr. Gil Santos currently at bedside helping patient FaceTime family. 1600: Reassessment completed, see doc flowsheet. 1713: TOF 4/4 at 40 mA. Starting Nimbex at 3 mcg/kg/min. 1745: TOF 1/4 at 40 mA. Nimbex at 3.5 mcg/kg/min. 1800: Oral care and endotracheal care completed.  Patient repositioned in bed.    1900: Bedside and Verbal shift change report [facility] ([facility]) by Florentino Chen RN (offgoing nurse). Report included the following information SBAR, Kardex and Cardiac Rhythm sinus tachy. 1910: Message sent to Dr. Cindy Engel regarding low blood pressures and low O2 sats.

## 2021-11-30 NOTE — PROGRESS NOTES
[address] [facility] [address] [place] [address]   Office [phone]  Fax [phone]          Assessment and Plan     Jaycob Aguila is a 39 y.o. male with a PMH of HTN, HLD, JESUS admitted for AHRF and severe sepsis 2/2 COVID PNA. Patient was admitted on 11/19/2021. Interval Events: Patient with worsening of respiratory status yesterday afternoon. Spoke with patient and his wife and recommended planned intubation, which was performed. Overnight, patient with fevers to 101.1 and low MAPs, central line placed and started on Sanjeev. AHRF and ARDS 2/2 COVID PNA: Unvaccinated. Symptom onset 11/12 with acute respiratory decompensation, O2 sats in high 60s prior to admission. CTA chest without evidence of definite/proximal PE, no aortic aneurysm/dissection, imaging consistent with moderate to severe COVID PNA and associated hilar/mediastinal lymphadenopathy. B/l lower extremity duplex without evidence of DVT. CXR 11/27 demonstrated no significant change in diffuse bilateral airspace disease. S/p Tocilizumab 800mg 11/19.   - Intubated on 11/30. Satting 96% on PEEP of 16 and RR 26  - Proned  - Goal sats 88% or higher  -  CBC, CMP daily. No need for further trending Covid labs  - Continue Decadron 10mg IV daily  - Duo-neb or Combivent Respimat Q6H  - Therapeutic Lovenox  - Holding oral PRN meds  - Holding COVID meds (Atorvastatin 20mg every day, Vit C 500mg BID, Zinc 220mg daily)  - Pulm following, appreciate recs     Pneumomediastinum/Pneumothorax: Possibly barotrauma 2/2 high pressures needed for COVID ARDS PNA vs lung frailty. Will monitor for signs of worsening pneumothorax. - Repeat CXR for line placement    Severe sepsis 2/2 COVID PNA: PNA labs negative, BCx/UCx no growth. Likely severe sepsis on initial presentation is all related to COVID PNA. CTX + Doxy x 5d (11/19-11/24).  Cefepime (11/28-)  - Discontinuing abx, but discuss with ICU after spiking fever  - Central line place, Sanjeev at 35, MAPs mid 60s    Insomnia/anxiety: Pt with new insomnia and anxiety, specifically overnight. - Intubated, sedated. Nutritional status: OG tube in place  - Nutrition consulted    HTN: Pt normotensive off meds. At home is on Amlodipine 10mg daily and HCTZ 12.5mg daily  - Will continue to monitor and trend BPs     HLD: Chronic, stable. Last lipids 07/2021 , HDL 54, LDL 98.6, . On Atorvastatin 10mg daily.  - Holding Atorvastatin 20mg daily     T2DM (diet-controlled): Prior A1c 5.8% in 05/2021. No meds at present.   - SSI (resistant given morbid obesity) w/ q6h glucose checks     JESUS: On CPAP at night. - Intubated, sedated.     Obesity: Body mass index is 42.4 kg/m². - Encouraging lifestyle modifications and further follow up outpatient. At-risk JAMIR: RESOLVED. POA Cr 1.31, BL 0.8. Likely IVVD in setting of poor PO intake. - Daily CMP    Chest pain: RESOLVED. Description is pleuritic in nature, worse with cough. EKG without evidence of acute ischemia. Troponin of 9 makes CAD less concerning.   - Cardiac monitoring, will continue to monitor     Diarrhea: RESOLVED. Watery diarrhea x 2 days. Likely 2/2 COVID infection. Improved since presentation. Enteric bacteria panel negative. Abdominal pain: RESOLVED. Secondary to gas pains after starting diet. - Continue Simethicone       FEN/GI - NPO. OG tube in place  Activity - Ambulate with assistance  DVT prophylaxis - Lovenox (therapeutic)  GI prophylaxis - Protonix  Disposition - Plan to d/c to TBD. Code Status - Full. Discussed with patient / caregivers. [facility] [address] and [address],  Spouse - [phone]      Javon Sykes MD  Family Medicine Resident           Subjective / Objective   Subjective:   Patient intubated and proned, on pressors. Sedated. No acute distress.      Respiratory: O2 Flow Rate (L/min): 60 l/min O2 Device: Endotracheal tube, Ventilator   Visit Vitals  BP 97/61   Pulse 95   Temp 98.9 °F (37.2 °C)   Resp 26   Ht 5' 11\" (1.803 m)   Wt 274 lb 7.6 oz (124.5 kg)   SpO2 (!) 81%   BMI 38.28 kg/m²     General: Intubated, sedated. Appears comfortable. Respiratory: Transmitted mechanical sounds appreciated bilaterally. Cardiovascular: regular rate, regular rhythm. GI: + bowel sounds. Nontender. Extremities:  No LE edema. Skin: Warm, dry. Neuro: Awake and alert    I/O:  Date 11/29/21 1900 - 11/30/21 0659 11/30/21 0700 - 12/01/21 0659   Shift 4560-0647 24 Hour Total 4307-7177 5284-7674 24 Hour Total   INTAKE   P.O.  860        P. O.  860      I. V.(mL/kg/hr) 20 80  311.7 311.7     Versed Volume    28.1 28.1     Nimbex Volume    35.5 35.5     Precedex Volume    59 59     Fentanyl Volume    12.3 12.3     Diprivan Volume    173.1 173.1     Phenylephrine Volume    3.8 3.8     Volume (cefepime (MAXIPIME) 2 g in sterile water (preservative free) 10 mL IV syringe) 20 30        Volume (magnesium sulfate 2 g/50 ml IVPB (premix or compounded))  50      Shift Total(mL/kg) 20(0.2) 940(7.6)  311.7(2.5) 311.7(2.5)   OUTPUT   Urine(mL/kg/hr) 1000 3050 1500(1) 525 2025     Urine Voided 1000 3050 500  500     Urine Output (mL) (Urinary Catheter 11/30/21 [facility] - Temperature)   1715 036 8920   Shift Total(mL/kg) 1000(8) 3183(95.1) 1500(12) 525(4.2) 9640(57.7)   NET -980 -2110 -1500 -213.4 -1713.4   Weight (kg) 124.5 124.5 124.5 124.5 124.5       CBC:  Recent Labs     11/30/21  0115 11/29/21  0239 11/28/21  0223   WBC 28.3* 26.1* 32.9*   HGB 17.1* 16.8 16.6   HCT 49.3 48.8 48.1    310 882       Metabolic Panel:  Recent Labs     11/30/21  1804 11/30/21  0115 11/29/21  0606 11/29/21  0239 11/29/21  0239 11/28/21 0223 11/28/21 0223    134* 134*   < > 130*   < > 132*   K 4.7 4.7 4.4   < > 4.7   < > 4.8    101 99   < > 100   < > 100   CO2 26 21 26   < > 22   < > 26   BUN 30* 26* 26*   < > 24*   < > 24*   CREA 0.94 0.85 0.85   < > 0.79   < > 0.82   * 159* 136*   < > 130*   < > 160*   CA 8.1* 8.5 8.6   < > 8.5   < > 9.1   MG 2.4 2.4 2.4   < > <0.3*   < > 2.5*   ALB  --  3.5  --   --  3.2*  --  3.4*   ALT  --  36  --   --  38  --  38    < > = values in this interval not displayed.           For Billing    Chief Complaint   Patient presents with   [address] Problems  Date Reviewed: 4/30/2021          Codes Class Noted POA    Severe sepsis [facility]) ICD-10-CM: A41.9, R65.20  ICD-9-CM: 038.9, 995.92  11/26/2021 Yes        Pneumomediastinum ([facility] ICD-10-CM: J98.2  ICD-9-CM: 518.1  11/26/2021 No        Pneumothorax on left ICD-10-CM: J93.9  ICD-9-CM: 512.89  11/26/2021 No        Hyponatremia ICD-10-CM: E87.1  ICD-9-CM: 276.1  11/26/2021 Yes        Diabetes mellitus type 2, controlled ([facility] ICD-10-CM: E11.9  ICD-9-CM: 250.00  11/25/2021 Yes        * (Principal) Acute hypoxemic respiratory failure due to COVID-19 [facility]) ICD-10-CM: U07.1, J96.01  ICD-9-CM: 518.81, 079.89, 799.02  11/19/2021 Yes        Obesity, morbid ([facility] ICD-10-CM: E66.01  ICD-9-CM: 278.01  4/10/2018 Yes        JESUS (obstructive sleep apnea) ICD-10-CM: G47.33  ICD-9-CM: 327.23  10/15/2014 Yes        Hypertension ICD-10-CM: I10  ICD-9-CM: 401.9  10/15/2014 Yes        Pure hypercholesterolemia ICD-10-CM: E78.00  ICD-9-CM: 272.0  Unknown Yes

## 2021-11-30 NOTE — PROGRESS NOTES
[address] [facility] [address] [facility]   Office [phone]  Fax [phone]          Assessment and Plan     Chapito Valdez is a 39 y.o. male with a PMH of HTN, HLD, JESUS admitted for AHRF and severe sepsis 2/2 COVID PNA. Patient was admitted on 11/19/2021. Overnight Events: Patient with desaturation down to low to mid 80s on HFNC at max settings. ABG ordered, showing 7.45/32/75 and CXR without evidence of pleural effusions or pneumothorax. Pt restarted on BiPAP with improvement of O2 sats.s     AHRF and ARDS 2/2 COVID PNA: Unvaccinated. Symptom onset 11/12 with acute respiratory decompensation, O2 sats in high 60s prior to admission. CTA chest without evidence of definite/proximal PE, no aortic aneurysm/dissection, imaging consistent with moderate to severe COVID PNA and associated hilar/mediastinal lymphadenopathy. B/l lower extremity duplex without evidence of DVT. CXR 11/27 demonstrated no significant change in diffuse bilateral airspace disease. S/p Tocilizumab 800mg 11/19. - Upgraded to ICU level care today given worsening respiratory status  - Now back on BiPAP, monitoring O2 status closely  - Goal sats 88% or higher.  - COVID labs (CRP, D-dimer, ferritin, LD), CBC, CMP daily  - Continue Decadron 10mg IV BID  - Duo-neb or Combivent Respimat Q6H  - Therapeutic Lovenox  - Holding oral PRN meds  - Holding COVID meds (Atorvastatin 20mg every day, Vit C 500mg BID, Zinc 220mg daily)  - Pulm following, appreciate recs     Pneumomediastinum/Pneumothorax: Possibly barotrauma 2/2 high pressures needed for COVID ARDS PNA vs lung frailty. Will monitor for signs of worsening pneumothorax. - Repeat CXR showing stable pneumomediastinum    Severe sepsis 2/2 COVID PNA: PNA labs negative, BCx/UCx no growth. Likely severe sepsis on initial presentation is all related to COVID PNA. CTX + Doxy x 5d (11/19-11/24).  Cefepime (11/28-)  - Continue Cefepime 2g q8hr  - Repeat procal ordered    Insomnia/anxiety: Pt with new insomnia and anxiety, specifically overnight.  - Holding Melatonin 5mg QHS  - Holding Atarax 25mg q6h    HTN: Pt normotensive off meds. At home is on Amlodipine 10mg daily and HCTZ 12.5mg daily  - Will hold home meds     HLD: Chronic, stable. Last lipids 07/2021 , HDL 54, LDL 98.6, . On Atorvastatin 10mg daily.  - Holding Atorvastatin 20mg daily     T2DM (diet-controlled): Prior A1c 5.8% in 05/2021. No meds at present.   - SSI (resistant given morbid obesity) w/ q6h glucose checks     JESUS: On CPAP at night.  - On BiPAP     Obesity: Body mass index is 42.4 kg/m². - Encouraging lifestyle modifications and further follow up outpatient. At-risk JAMIR: RESOLVED. POA Cr 1.31, BL 0.8. Likely IVVD in setting of poor PO intake. - Daily CMP    Chest pain: RESOLVED. Description is pleuritic in nature, worse with cough. EKG without evidence of acute ischemia. Troponin of 9 makes CAD less concerning.   - Cardiac monitoring, will continue to monitor     Diarrhea: RESOLVED. Watery diarrhea x 2 days. Likely 2/2 COVID infection. Improved since presentation. Enteric bacteria panel negative. Abdominal pain: RESOLVED. Secondary to gas pains after starting diet. - Continue Simethicone       FEN/GI - NPO while on BiPAP  Activity - Ambulate with assistance  DVT prophylaxis - Lovenox (therapeutic)  GI prophylaxis - Protonix  Disposition - Plan to d/c to TBD. Code Status - Full. Discussed with patient / caregivers. Next of Kin Name and [address]      Cynthia Webber MD  Family Medicine Resident           Subjective / Objective   Subjective:   Pt seen and examined at bedside. He appears tired today. He states he is feeling more exhausted but overall does not have any symptoms that are distressing to him.     Respiratory: O2 Flow Rate (L/min): 60 l/min O2 Device: BIPAP   Visit Vitals  /70   Pulse (!) 117   Temp 98.9 °F (37.2 °C)   Resp 23   Ht 5' 11\" (1.803 m)   Wt 274 lb 7.6 oz (124.5 kg)   SpO2 92%   BMI 38.28 kg/m²       General: Mild distress. Sitting upright in chair. HEENT: BiPAP mask in place with scant amount of blood splattered inside  Respiratory: On BiPAP. Diminished air movement bilaterally. Cardiovascular: tachycardic rate, regular rhythm. GI: + bowel sounds. Nontender. Extremities:  No LE edema. Skin: Warm, dry. Neuro: Awake and alert    I/O:  Date 11/29/21 0700 - 11/30/21 0659 11/30/21 0700 - 12/01/21 0659   Shift 1440-92401859 1900-0659 24 Hour Total 4701-9255 2075-2877 24 Hour Total   INTAKE   P.O. 860  860        P. O. 860  860      I. V.(mL/kg/hr) 60(0) 20(0) 80(0)        Volume (cefepime (MAXIPIME) 2 g in sterile water (preservative free) 10 mL IV syringe) 10 20 30        Volume (magnesium sulfate 2 g/50 ml IVPB (premix or compounded)) 50  50      Shift Total(mL/kg) 920(7.4) 20(0.2) 940(7.6)      OUTPUT   Urine(mL/kg/hr) 2050(1.4) 1000(0.7) 3050(1) 500  500     Urine Voided 2050 1000 3050 500  500   Shift Total(mL/kg) 9183(76.4) 1000(8) 3050(24.5) 500(4)  500(4)   NET -1130 -980 -2110 -500  -500   Weight (kg) 124.5 124.5 124.5 124.5 124.5 124.5       CBC:  Recent Labs     11/30/21  0115 11/29/21  0239 11/28/21 0223   WBC 28.3* 26.1* 32.9*   HGB 17.1* 16.8 16.6   HCT 49.3 48.8 48.1    310 844       Metabolic Panel:  Recent Labs     11/30/21  0115 11/29/21  0606 11/29/21  0239 11/28/21 0223 11/28/21 0223   * 134* 130*   < > 132*   K 4.7 4.4 4.7   < > 4.8    99 100   < > 100   CO2 21 26 22   < > 26   BUN 26* 26* 24*   < > 24*   CREA 0.85 0.85 0.79   < > 0.82   * 136* 130*   < > 160*   CA 8.5 8.6 8.5   < > 9.1   MG 2.4 2.4 <0.3*   < > 2.5*   ALB 3.5  --  3.2*  --  3.4*   ALT 36  --  38  --  38    < > = values in this interval not displayed.           For Billing    Chief Complaint   Patient presents with   [address] [facility] Problems  Date Reviewed: 4/30/2021          Codes Class Noted POA    Severe sepsis [facility]) ICD-10-CM: A41.9, R65.20  ICD-9-CM: 038.9, 995.92  11/26/2021 Yes        Pneumomediastinum ([facility] ICD-10-CM: J98.2  ICD-9-CM: 518.1  11/26/2021 No        Pneumothorax on left ICD-10-CM: J93.9  ICD-9-CM: 512.89  11/26/2021 No        Hyponatremia ICD-10-CM: E87.1  ICD-9-CM: 276.1  11/26/2021 Yes        Diabetes mellitus type 2, controlled ([place] [address].) ICD-10-CM: E11.9  ICD-9-CM: 250.00  11/25/2021 Yes        * (Principal) Acute hypoxemic respiratory failure due to COVID-19 [facility]) ICD-10-CM: U07.1, J96.01  ICD-9-CM: 518.81, 079.89, 799.02  11/19/2021 Yes        Obesity, morbid ([facility] ICD-10-CM: E66.01  ICD-9-CM: 278.01  4/10/2018 Yes        JESUS (obstructive sleep apnea) ICD-10-CM: G47.33  ICD-9-CM: 327.23  10/15/2014 Yes        Hypertension ICD-10-CM: I10  ICD-9-CM: 401.9  10/15/2014 Yes        Pure hypercholesterolemia ICD-10-CM: E78.00  ICD-9-CM: 272.0  Unknown Yes

## 2021-11-30 NOTE — PROGRESS NOTES
Intubation requested by ICU teleintensivist  Pt is COVID positive and hypoxic despite hi flow O2 and Bipap  Pt evaluated at bedside. Tachypneic, requesting intubation  Airway examined and discussed procedure with the patient  Chart and labs reviewed with nursing  Preoxygenated with 100% FiO2  Modified RSI. Easy Glidescope intubation with placement of #8ETT through cords. Cuff inflated. Positive ETCO2 color change and bilateral breath sounds. Secured by respiratory therapy  Desaturation to 66% after intubation but ambu bag ventilation with PEEP added able to bring sats back up to 85% , then put on ventilator.   Other vital signs stable throughout    Meds given: Propofol 200mg IV, Rocuronium 50mg IV    Chest X ray pending    Nish Crowell MD

## 2021-11-30 NOTE — PROGRESS NOTES
Patient has been upgraded to ICU status. Please reconsult pulmonary when he has transferred out of the ICU.

## 2021-11-30 NOTE — PROGRESS NOTES
[address] Medicine Residency  Resident Note in Brief  ----------------------------------------    S: Pt sedated, intubated. Nursing reports persistently low O2 sats in low-mid 80's. Requested they call immediately, as needed, for changes in pt status. O:  Visit Vitals  BP (!) 81/47   Pulse 100   Temp 98.9 °F (37.2 °C)   Resp 26   Ht 5' 11\" (1.803 m)   Wt 274 lb 7.6 oz (124.5 kg)   SpO2 (!) 82%   BMI 38.28 kg/m²         A/P  Freda Shelley is a 39 y.o. male with a PMH of HTN, HLD, JESUS admitted for AHRF and severe sepsis 2/2 COVID PNA. Patient was admitted on 11/19/2021. Pt currently intubated, struggling to maintain adequate O2 sats >88%. He is currently watcher status at very high risk of acute decompensation w/ multi-organ failure given respiratory and circulatory status. AHRF and ARDS 2/2 COVID PNA: Unvaccinated. Symptom onset 11/12 with acute respiratory decompensation. CXR 11/27 demonstrated no significant change in diffuse bilateral airspace disease seen on initial imaging. Upgraded to ICU level care on 11/30 given worsening respiratory status; intubated after failing BiPAP. - Cont intubation, monitoring O2 sats and Bps closely  - Goal O2 sats 88% or higher; goal MAP 65 or higher; sarah-synephrine ordered if needed  - COVID labs (CRP, D-dimer, ferritin, LD), CBC, CMP daily  - Decadron 10mg IV BID  - Duo-neb or Combivent Respimat Q6H  - Therapeutic Lovenox  - Pulm signed off given intubation and ICU status; may re-consult after extubation     Pneumomediastinum/Pneumothorax: Possibly barotrauma 2/2 high pressures needed for COVID ARDS PNA vs lung frailty. Repeat chest xray on 11/29 and 11/30 shows resolution of apical PTX and stable pneumomediastinum.  - Cont to monitor for si/sx of recurrent PTX and/or worsening pneumomediastinum, repeat CXR as needed     Severe sepsis 2/2 COVID PNA: PNA labs negative. BCx/UCx NGTD. Likely severe sepsis on initial presentation is all related to COVID PNA. CTX + Doxy x 5d (11/19-11/24). Cefepime (11/28-11/30). CXR shows stable disease.   - Pt no longer on abx therapy given lack of evidence of bacterial infxn      Please see full daily progress note for complete plan.   Hadley Shelley,   7:45 PM

## 2021-11-30 NOTE — PROGRESS NOTES
Orders placed for Propofol and Vimal. Push for intubation placed. 50 Vimal. And 200 Propofol, all pushed by [facility] Anesthesiology.

## 2021-11-30 NOTE — PROGRESS NOTES
WALI:   1) Pending medical stability   2) Risk of readmission- 10% LOW     Hospital Day 11:   5:03 PM- Pt remains on ICU- COVID-19 positive, pt now intubated due to worsening O2 needs. Pt's family aware- Floor CM will continue to follow and assist as needed.      Pebbles Macdonald, MSW, [address]

## 2021-11-30 NOTE — PROGRESS NOTES
Comprehensive Nutrition Assessment    Type and Reason for Visit: Reassessment    Nutrition Recommendations/Plan:   1. For times NOT on BiPap allow: Clear Liquid Diet with Glucerna Shakes with meals as tolerated    2. If pt is unable accept oral nutrition on their own, consider small bowel DHT placement or if intubated with OG placement:   Tube Feeding -   · trickle feed Standard with Fiber (Jevity 1.5 megan) @ 20mL for 12-24 hrs to establish tolerance,  · then begin to advance as tolerated Q4H to 65mL/hr,   · FWF 30mL QH    3. For Parenteral Nutrition support, pt would need more reliable IV access - midline or similar for PPN;  PICC or other central line for TPN. · PPN:   Day 1: D10/AA 4.25% @ 63 mL/h  Day 2: D10/AA 4.25% @ 83 ml/hr     · TPN:   Day 1: D20/AA5% @ 63 mL/h  Day 2: D20/AA5% @ 83 mL/h      · Lipids: check triglycerides and begin if < 450 -   250mL of 20% Lipids biweekly (due to national shortage)      Nutrition Assessment:        11/30: RD attended & discussed patient during interdisciplinary rounds on unit. Pt with worsening respiratory status today. Has been NPO except meds without adequate time or endurance to be off BiPap. Wt appears to be significant below stated admit/usual wt.        11/22: pt admitted for Acute hypoxemic respiratory failure due to COVID-19 [facility]) [U07.1, J96.01] who  has a past medical history of Hypercholesteremia, Hypertension (10/15/2014), and S/P vasectomy (8/6/2014). Pt was sitting up in chair in room. Pt has been switching between High Flow NC and Bipap for oxygen needs. He is asking to be able to eat. Call to MDs to request diet or supplements when safe. Pt is still accepting meds 2-3 times daily. Review of history notes blood glucose much improved from earlier this year. On admission, notes reviewed pt had nausea and diarrhea for 2 days prior, has had clear liquids & crackers. Pt has been NPO x4 days (since evening of admission date).  Morbidly obese male with high calorie/protein needs. Malnutrition Assessment:  Malnutrition Status:  Severe malnutrition    Context:  Acute illness     Findings of the 6 clinical characteristics of malnutrition:   Energy Intake:  7 - 50% or less of est energy requirements for 5 or more days  Weight Loss:  7 - Greater than 2% over 1 week  - wt down 9.7% from admit wt (doc wt from 4 months ago)   Body Fat Loss:  Unable to assess,     Muscle Mass Loss:  Unable to assess,    Fluid Accumulation:  No significant fluid accumulation,     Strength:  Not performed     Estimated Daily Nutrient Needs:  Energy (kcal): 2850 kcal/d (MSJ xAF 1.3); Weight Used for Energy Requirements: Current  Protein (g): 117 - 156g (1.5-2 g/kg of IBW); Weight Used for Protein Requirements: Ideal (78.2 kg)  Fluid (ml/day): 2850 mL; Method Used for Fluid Requirements: 1 ml/kcal    Nutrition Related Findings:     Last BM:     ABD: obese, intact, active bowel sounds  Edema: non-pitting - all extremities ()     Oxygen needs: BiPap & Hi Flow    Temp (24hrs), Av.7 °F (37.1 °C), Min:98.4 °F (36.9 °C), Max:98.9 °F (37.2 °C)    Nutr. Related Meds:   Vit C, Zinc, Lipitor, decadron, Protonix, Bumex, precedex  PRN: correction insulin, miralax      Nutr.  Related Labs:   Lab Results   Component Value Date/Time    Glucose 159 (H) 2021 01:15 AM    Glucose (POC) 152 (H) 2021 11:30 AM     Lab Results   Component Value Date/Time    Hemoglobin A1c 5.8 (H) 2021 10:25 AM    Hemoglobin A1c 11.1 (H) 2021 10:47 AM     Lab Results   Component Value Date/Time    Sodium 134 (L) 2021 01:15 AM    Potassium 4.7 2021 01:15 AM    Chloride 101 2021 01:15 AM    CO2 21 2021 01:15 AM     Wounds:    None        Current Nutrition Therapies:  ADULT ORAL NUTRITION SUPPLEMENT Breakfast, Lunch, Dinner; Diabetic Supplement  DIET NPO    Documented Meal intake:  Patient Vitals for the past 168 hrs:   % Diet Eaten   21 4580 26 - 50%   21 1155 51 - 75%   11/29/21 0930 51 - 75%   11/28/21 1200 26 - 50%   11/28/21 0800 1 - 25%   11/27/21 1043 76 - 100%     Documentation of supplement intake:  Patient Vitals for the past 168 hrs:   Supplement intake %   11/29/21 1750 76 - 100%   11/29/21 1155 0%   11/29/21 0930 0%   11/28/21 1200 0%   11/28/21 0800 0%   11/27/21 1043 0%       Anthropometric Measures:  · Height:  5' 11\" (180.3 cm)  · Current Body Wt:  124.5 kg (274 lb 7.6 oz)   · Admission Body Wt:  304 lb    · Usual Body Wt:  136.1 kg (300 lb)     · Ideal Body Wt:  172 lbs:  159.6 %   · Adjusted Body Weight:   ; Weight Adjustment for: No adjustment   · Adjusted BMI:       · BMI Category:  Obese class 2 (BMI 35.0-39. 9)     Body mass index is 38.28 kg/m². Last 3 Recorded Weights in this Encounter    11/19/21 1259 11/29/21 0355   Weight: 137.9 kg (304 lb) 124.5 kg (274 lb 7.6 oz)       Wt Readings from Last 6 Encounters:   11/29/21 124.5 kg (274 lb 7.6 oz)   07/30/21 137.9 kg (304 lb)   04/30/21 133.8 kg (295 lb)   04/07/20 145.2 kg (320 lb)   12/06/19 145.2 kg (320 lb)   10/04/19 142.4 kg (314 lb)        Nutrition Diagnosis:   · Inadequate protein-energy intake related to catabolic illness, impaired respiratory function as evidenced by intake 26-50%, weight loss greater than or equal to 2% in 1 week, unable to accept PO due to increased O2 needs. · Severe malnutrition related to catabolic illness, inadequate protein-energy intake, impaired respiratory function as evidenced by NPO or clear liquid status due to medical condition, weight loss greater than or equal to 2% in 1 week, intake 26-50%, 9.7% wt loss in the last 2 weeks to 4 months.        Nutrition Interventions:   Food and/or Nutrient Delivery: Continue current diet, Continue oral nutrition supplement  Nutrition Education and Counseling: No recommendations at this time  Coordination of Nutrition Care: Continue to monitor while inpatient, Interdisciplinary rounds    Goals:  provide and tolerate 50-75% of estimated needs within the next 3-4 days       Nutrition Monitoring and Evaluation:   Behavioral-Environmental Outcomes: None identified  Food/Nutrient Intake Outcomes: Diet advancement/tolerance, Enteral nutrition intake/tolerance  Physical Signs/Symptoms Outcomes: Biochemical data, Weight    Discharge Planning:     Too soon to determine     Electronically signed by Zoie Saleem RD, MS on 11/30/2021 at 4:10 PM  Contact via [employer] or office [phone]

## 2021-11-30 NOTE — CONSULTS
SOUND Tele  CRITICAL CARE    Tele ICU TEAM Progress Note    Name: Charles Yip   : 1985   MRN: [id number]   Date: 2021           ICU Assessment and Plan3   Clinical Summary:  Referral Source: Dr. Joshua Valente    43yo man admitted for [facility]. Had small apical Ptx.       Summary of Events:     - admitted   - pneumomediastinum and possible L apical PTX on CXR   - escalated to ICU for continuous BiPAP at 100%; pt declined intubated--happy hypoxic on BiPAP currently, tenuous however; called spouse Daniloildefonso Beasley to update--update done; precedex on and off; Cefepime dc'd    Active Hospital Problems    Diagnosis Date Noted    Acute hypoxemic respiratory failure due to COVID-19 [facility])  PTX apical on L  pneumomediastinum 2021     Priority: 1 - One  -was escalated to cefepime - but it has not made him any better  -recheck PCT - if negative would stop abx  -got decadron 20mg a day  -got toci  -reduce decadron to 10mg a day per protocol  -I am concerned he may be heading toward intubation  -will try to eliminate anxiety component of oxygen consumption with precedex since Ativan seemed to make him more anxious last night  -dc benzos  -remove facial hair and NPO for possible airway manipulation  -CRP, d-dimer, LD daily - not likely to change mgmt - will dc these labs - please order individually if needed for mgmt  -bumex today see if diuresis can help      JESUS (obstructive sleep apnea) 10/15/2014     Priority: 2 -   -may benefit from bipap      Diabetes mellitus type 2, controlled ([address] 2021     Priority: 3 - Three  -cont insulin support in the face of decadron  -reduced decadron dose  -glucoses have been generally <180      Hyponatremia 2021     Priority: 4 - Four  -mild - no active mgmt      Obesity, morbid ([address] 04/10/2018     Priority: 5 - Five  -complicates all aspects of care      Hypertension 10/15/2014     Priority: 5 - Five  -may drop once intubated/if intubated ICU Comprehensive Plan of Care:    NEURO: seems scared  CV: Keep MAP >65; no pressors  PULM: Continue mechanical ventilation, low TV, high peep strategy, serial ABG  GI: TF and BR; npo due to bipap  RENAL/: Monitor Cr and UOP; no lewis  ENDO: BG Goal 140-180; glycemic control  HEME/ONC: Tx Hgb < 7, Plt<10k; transfuse as needed  MICRO:  FU on Cx; cont broad abx coverage  Code Status: Full Code  LOS: 11  Prophylaxis: GI: SUP h2 b  DVT: lovenox     Discussed Care Plan with Bedside RN   Subjective:   Reason for ICU Admission: covid     Past Medical History:      has a past medical history of Hypercholesteremia, Hypertension (10/15/2014), Poison ivy (9/15/2015), Pure hypercholesterolemia, and S/P vasectomy (8/6/2014). He also has no past medical history of Abuse, Anemia NEC, Arrhythmia, Calculus of kidney, Chronic pain, Congestive heart failure, unspecified, COPD, Depression, GERD (gastroesophageal reflux disease), Headache(784.0), Psychotic disorder ([address] Thyroid disease, or Trauma. Past Surgical History:      has no past surgical history on file. Home Medications:     Prior to Admission medications    Medication Sig Start Date End Date Taking? Authorizing Provider   metaxalone (SKELAXIN) 800 mg tablet Take 800 mg by mouth three (3) times daily as needed for Muscle Spasm(s). Yes Provider, Historical   naproxen (NAPROSYN) 500 mg tablet Take 500 mg by mouth two (2) times daily as needed for Pain.    Yes Provider, Historical   hydroCHLOROthiazide (HYDRODIURIL) 12.5 mg tablet TAKE 1 TABLET BY MOUTH EVERY DAY 10/25/21  Yes David Suresh NP   atorvastatin (LIPITOR) 10 mg tablet TAKE 1 TABLET DAILY 9/7/21  Yes David Suresh NP   amLODIPine (NORVASC) 10 mg tablet TAKE 1 TABLET DAILY 4/28/03  Yes Rosa Paez MD   glucosamine HCl/chondroitin bone (GLUCOSAMINE-CHONDROITIN PO) Take  by mouth. 1,500mg/1,200mg/MGMSM 1,000mg   Yes Provider, Historical   Krill-OM3-DHA-EPA-OM6-Lip-Astx (KRILL OIL) 1000-130(40-80) mg cap Take 1 Cap by mouth once over twenty-four (24) hours. Yes Provider, Historical   FERROUS FUMARATE/VIT BCOMP&C (SUPER B COMPLEX PO) Take  by mouth.    Yes Provider, Historical     Allergies/Social/Family History:     No Known Allergies   Social History     Tobacco Use    Smoking status: Never Smoker    Smokeless tobacco: Never Used   Substance Use Topics    Alcohol use: No     Alcohol/week: 0.0 standard drinks      Family History   Problem Relation Age of Onset    Diabetes Mother     Heart Attack Father 43    Stroke Maternal Grandmother     Cancer Paternal Grandfather          age 45 - Hodgkin's   Gianna Solum Cancer Maternal Grandfather          in 42's with melanoma     Review of Systems:     unable to obtain due to sedate  Objective:   Vital Signs:  Visit Vitals  /86 (BP 1 Location: Right upper arm, BP Patient Position: At rest)   Pulse (!) 117   Temp 98.9 °F (37.2 °C)   Resp 22   Ht 5' 11\" (1.803 m)   Wt 124.5 kg (274 lb 7.6 oz)   SpO2 93%   BMI 38.28 kg/m²    O2 Flow Rate (L/min): 60 l/min O2 Device: BIPAP Temp (24hrs), Av.5 °F (36.9 °C), Min:98.3 °F (36.8 °C), Max:98.9 °F (37.2 °C)           Intake/Output:     Intake/Output Summary (Last 24 hours) at 2021 1111  Last data filed at 2021 0000  Gross per 24 hour   Intake 880 ml   Output 2650 ml   Net -1770 ml       Physical Exam:  GEN-lying in ICU bed, not intubated, appears stated age    HEENT-mmm, OP clear except for medical devices, nares appear atraumatic, head appears atraumatic    HEART-monitor shows following rhythm: sinus rhythm    LUNGS-there is appropriate chest excursion with ventilation, increased respiratory effort, unable to complete sentences    ABD-Visualization reveals: non-distended    EXT-no cyanosis    NEURO-Mental Status:  CAM ICU is: not done  CN2-12: awake and able to wave to me - very dyspneic so I did not try to make him talk, focus instead on bipap    LABS AND  DATA: Personally reviewed  Recent Labs     11/30/21 0115 11/29/21  0239   WBC 28.3* 26.1*   HGB 17.1* 16.8   HCT 49.3 48.8    310     Recent Labs     11/30/21  0115 11/29/21  0606   * 134*   K 4.7 4.4    99   CO2 21 26   BUN 26* 26*   CREA 0.85 0.85   * 136*   CA 8.5 8.6   MG 2.4 2.4     Recent Labs     11/30/21  0115 11/29/21  0239    128*   TP 7.2 7.2   ALB 3.5 3.2*   GLOB 3.7 4.0     No results for input(s): INR, PTP, APTT, INREXT in the last 72 hours. No results for input(s): PHI, PCO2I, PO2I, FIO2I in the last 72 hours. No results for input(s): CPK, CKMB, TROIQ, BNPP in the last 72 hours.     Hemodynamics:   PAP:   CO:     Wedge:   CI:     CVP:    SVR:       PVR:       Ventilator Settings:  Mode Rate Tidal Volume Pressure FiO2 PEEP            100 %       Peak airway pressure:      Minute ventilation: 27.1 l/min        MEDS:   Current Facility-Administered Medications:     dexmedeTOMidine in 0.9 % NaCl (PRECEDEX) 400 mcg/100 mL (4 mcg/mL) infusion soln, 0.1-1.5 mcg/kg/hr, IntraVENous, TITRATE, Blandon Im, MD, Last Rate: 12.5 mL/hr at 11/30/21 1034, 0.4 mcg/kg/hr at 11/30/21 1034    white petrolatum (VASELINE) ointment, , Topical, PRN, Maryse Vinson MD    oxymetazoline (AFRIN) 0.05 % nasal spray 2 Spray, 2 Spray, Both Nostrils, DAILY PRN, Maryse Vinson MD    LORazepam (ATIVAN) injection 1 mg, 1 mg, IntraVENous, Q6H PRN, Eleuterio Jones MD, 1 mg at 11/29/21 2311    dexamethasone (PF) (DECADRON) 10 mg/mL injection 10 mg, 10 mg, IntraVENous, Q12H, Eris Maddox MD, 10 mg at 11/30/21 0802    cefepime (MAXIPIME) 2 g in sterile water (preservative free) 10 mL IV syringe, 2 g, IntraVENous, Q8H, Donavon Rodríguez MD, Last Rate: 200 mL/hr at 11/30/21 0600, 2 g at 11/30/21 0600    sodium chloride (OCEAN) 0.65 % nasal squeeze bottle 2 Spray, 2 Spray, Both Nostrils, Q4H, Maryse Vinson MD, 2 [place] at 11/30/21 0808    sodium chloride (OCEAN) 0.65 % nasal squeeze bottle 2 Spray, 2 Spray, Both Nostrils, Q2H PRN, Nabeel Jones DO, 2 Spray at 11/25/21 1103    fluticasone propionate (FLONASE) 50 mcg/actuation nasal spray 2 Spray, 2 Spray, Both Nostrils, DAILY, Nabeel Jones DO, 2 Spray at 11/30/21 0808    albuterol-ipratropium (DUO-NEB) 2.5 MG-0.5 MG/3 ML, 3 mL, Nebulization, Q6H RT, 3 mL at 11/30/21 0828 **OR** ipratropium-albuterol (COMBIVENT RESPIMAT) 20 mcg-100 mcg inhalation spray, 2 Puff, Inhalation, Q6H RT, Fred Hennessy MD, 2 Puff at 11/28/21 2332    simethicone (MYLICON) tablet 80 mg, 80 mg, Oral, QID PRN, Aneta Nuñez MD, 80 mg at 11/29/21 1504    insulin lispro (HUMALOG) injection, , SubCUTAneous, AC&HS, Stab, Anna Jones MD, 3 Units at 11/29/21 1723    pantoprazole (PROTONIX) tablet 40 mg, 40 mg, Oral, ACB, AnneStepan MD, 40 mg at 11/30/21 0808    enoxaparin (LOVENOX) injection 135 mg, 135 mg, SubCUTAneous, Q12H, Aneta Nuñez MD, 135 mg at 11/30/21 0802    lidocaine 4 % patch 1 Patch, 1 Patch, TransDERmal, Q24H, Shira Tay MD, 1 Patch at 11/29/21 2300    guaiFENesin ER (MUCINEX) tablet 600 mg, 600 mg, Oral, Q12H, Carmen Christianson MD, 600 mg at 11/30/21 0808    melatonin (rapid dissolve) tablet 5 mg, 5 mg, Oral, QHS, Nabeel Jones DO, 5 mg at 11/29/21 2311    phenol throat spray (CHLORASEPTIC) 2 Spray, 2 Spray, Oral, Q6H PRN, Laveda Collet, MD, 2 Spray at 11/20/21 0213    sodium chloride (NS) flush 5-40 mL, 5-40 mL, IntraVENous, Q8H, Carmen Christianson MD, 10 mL at 11/30/21 0601    sodium chloride (NS) flush 5-40 mL, 5-40 mL, IntraVENous, PRN, Carmen Christianson MD    acetaminophen (TYLENOL) tablet 650 mg, 650 mg, Oral, Q6H PRN, 650 mg at 11/20/21 2202 **OR** acetaminophen (TYLENOL) suppository 650 mg, 650 mg, Rectal, Q6H PRN, Carmen Christianson MD    polyethylene glycol (MIRALAX) packet 17 g, 17 g, Oral, DAILY PRN, Carmen Christianson MD, 17 g at 11/25/21 1832    ondansetron (ZOFRAN ODT) tablet 4 mg, 4 mg, Oral, Q8H PRN **OR** ondansetron (ZOFRAN) injection 4 mg, 4 mg, IntraVENous, Q6H PRN, Carmen Christianson MD, 4 mg at 11/21/21 0912    ascorbic acid (vitamin C) (VITAMIN C) tablet 500 mg, 500 mg, Oral, BID, Carmen Christianson MD, 500 mg at 11/30/21 0807    zinc sulfate (ZINCATE) 50 mg zinc (220 mg) capsule 1 Capsule, 1 Capsule, Oral, DAILY, Carmen Christianson MD, 1 Capsule at 11/30/21 0807    atorvastatin (LIPITOR) tablet 20 mg, 20 mg, Oral, DAILY, Carmen Christianson MD, 20 mg at 11/30/21 0807    prochlorperazine (COMPAZINE) injection 10 mg, 10 mg, IntraVENous, Q6H PRN, Carmen Christianson MD, 10 mg at 11/29/21 2104    glucose chewable tablet 16 g, 4 Tablet, Oral, PRN, Carmen Christianson MD    dextrose (D50W) injection syrg 12.5-25 g, 12.5-25 g, IntraVENous, PRN, Carmen Christianson MD    glucagon (GLUCAGEN) injection 1 mg, 1 mg, IntraMUSCular, PRN, Carmen Christianson MD    benzonatate (TESSALON) capsule 200 mg, 200 mg, Oral, TID, Ishaan Wick MD, 200 mg at 11/30/21 0807    HYDROcodone-chlorpheniramine (TUSSIONEX) oral suspension 5 mL, 5 mL, Oral, Q12H, Ishaan Wick MD, 5 mL at 11/30/21 9202    Criticality: This patient very sick, is at high risk of imminent life-threatening deterioration of multiple organ systems in the next 24 hours and is, in fact, exhibiting signs of failure in some of them. The patient requires care in the critical care setting. Tele-medicine:  I performed all aspects of the physical examination via Telemedicine associated with two way audio and video communication and with the on-site assistance of Nurse. I am located in [place], [place] and the patient is located in [place] [address]. Patient is critically ill in the ICU. I  personally  reviewed the pertinent medical records, laboratory/ pathology data and radiographic images.   The decision making regarding this patient is as documented above, which was generated  following  discussion  with the multidisciplinary team and creation of a treatment plan for  the patient. We discussed the patient's interval history and future coordination of care and  plans. The patient's medications  were reviewed and changes made as stipulated above. Time Spent: 43    The time I spent in the provision of [place] to this patient did not overlap with any other provider.     Rogelio Andre MD  [address] [place] [address]

## 2021-12-01 PROBLEM — U07.1 ACUTE RESPIRATORY DISTRESS SYNDROME (ARDS) DUE TO COVID-19 VIRUS (HCC): Status: ACTIVE | Noted: 2021-01-01

## 2021-12-01 PROBLEM — J80 ACUTE RESPIRATORY DISTRESS SYNDROME (ARDS) DUE TO COVID-19 VIRUS (HCC): Status: ACTIVE | Noted: 2021-01-01

## 2021-12-01 PROBLEM — U07.1 ACUTE RESPIRATORY DISTRESS SYNDROME (ARDS) DUE TO COVID-19 VIRUS (HCC): Status: RESOLVED | Noted: 2021-01-01 | Resolved: 2021-01-01

## 2021-12-01 PROBLEM — J80 ACUTE RESPIRATORY DISTRESS SYNDROME (ARDS) DUE TO COVID-19 VIRUS (HCC): Status: RESOLVED | Noted: 2021-01-01 | Resolved: 2021-01-01

## 2021-12-01 PROBLEM — J80 ARDS (ADULT RESPIRATORY DISTRESS SYNDROME) (HCC): Status: ACTIVE | Noted: 2021-01-01

## 2021-12-01 PROBLEM — R65.21 SEPTIC SHOCK (HCC): Status: ACTIVE | Noted: 2021-01-01

## 2021-12-01 NOTE — PROGRESS NOTES
Central Line Procedure Note    Name:  Shilo Kate  Age:  39 y.o. MRN:  [id number]  CSN:  [id number]  :  1985    Referring physician: Nura Muhammad, *     Indication: Need for vasopressors in setting of COVID PNA     Unable to reach wife via phone for consent, line placed due to urgent need for vasopressors per intensivist.    Patient positioned in slight Trendelenburg, unable to tolerate steep Trendelenburg. 7-Step Sterility Protocol followed. (cap, mask sterile gown, sterile gloves, large sterile sheet, hand hygiene, 2% chlorhexidine for cutaneous antisepsis)       Left internal jugular cannulated x 1 attempt(s) using ultrasound guidance. Catheter secured & Biopatch applied. Sterile Tegaderm placed. CXR pending.       Nilson Jean MD

## 2021-12-01 NOTE — PROGRESS NOTES
Discussed pt's case w/ intensivist Dr. Allen Trujillo who thinks that the pt is an appropriate candidate for ECMO. In light of pt's age, hx of pneumomediastinum, increasing plateau pressures while intubated, poor oxygenation on 100% FiO2, and inability to tolerate supine positioning our team is in agreement with this plan. We have initiated the transfer process for the pt to a facility that will be able to provide ECMO. Per the transfer center [facility] does not currently have any ICU beds available but [facility] does. Final discussion must occur between the intensivist team at [facility] and the thoracic surgery team who will be assisting with the transition to ECMO. As long as they are in agreement the transfer will proceed. Further updates to follow. 11:42 AM  Per the thoracic surgery team at [facility] they are concerned that there will not be enough support staff at their facility to safely monitor an ECMO pt and strongly recommend trying to transfer the pt to a facility that already has ECMO pts on there service. The thoracic team at [facility] will call the thoracic team at [place] [address] to advocate to have the pt transferred to their facility at this time. 11:54 AM  Spoke w/ intensivist at [facility] who has agreed to accept pt to their ICU pending approval from [address] [place] [address] team.    2:15 PM  Pt rejected from [place] [address] 2/2 his BMI, CT surg concerned that pt would not be a good candidate and recommended transferring pt to a facility that can provide a higher level of care, such as [facility] or [facility]. 2:34 PM  Reached back out to transfer center who checked with [facility] about transfer. Their BMI cutoff for accepting pt's is 35 and they are unable to accept him. Will try at [facility] and if that is not an option can attempt [facility].      3:55 PM  Initiated transfer to [place], per the transfer center at their facility they are willing to consider reviewing this pt (regardless of his BMI) however they do not currently have capacity to accept any new pts. We will continue to periodically check in with [place] through the transfer center as to whether any space has become available.      Miguel Montalvo MD

## 2021-12-01 NOTE — PROGRESS NOTES
Spoke with patient's wife and updated her on central line placement overnight, pressors, and plan to transfer to another facility for ecmo. All of her questions were answered at this time and she is agreeable to the plan.      Justin Kerr, [address] [place] [address] Medicine Resident

## 2021-12-01 NOTE — PROGRESS NOTES
Charge nurse informed me that pt has been accepted by [facility] pending a bed being available which currently ,there are no beds.     Kelsey Garcia

## 2021-12-01 NOTE — PROGRESS NOTES
[address] [facility]    Office [phone]  Fax [phone]          Assessment and Plan     Johnny Morelos is a 39 y.o. male with a PMH of HTN, HLD, JESUS admitted for AHRF and severe sepsis 2/2 COVID PNA. Patient was admitted on 11/19/2021. Interval Events: Patient intubated for worsening respiratory status. Patient has been febrile, but abx stopped on 11/30 and repeat cultures are pending. AHRF and ARDS 2/2 COVID PNA: Unvaccinated. Symptom onset 11/12 with acute respiratory decompensation, O2 sats in high 60s prior to admission. CTA chest without evidence of definite/proximal PE, no aortic aneurysm/dissection, imaging consistent with moderate to severe COVID PNA and associated hilar/mediastinal lymphadenopathy. B/l lower extremity duplex without evidence of DVT. CXR 11/27 demonstrated no significant change in diffuse bilateral airspace disease. S/p Tocilizumab 800mg 11/19.   - Intubated on 11/30. Satting 96% on PEEP of 16 and RR 26  - Proned; plan to supinate tomorrow AM  - Goal sats 88% or higher  -  CBC, CMP daily. No need for further trending Covid labs  - Continue Decadron 10mg IV daily  - Duo-neb or Combivent Respimat Q6H  - Therapeutic Lovenox  - Holding oral PRN meds  - Holding COVID meds (Atorvastatin 20mg every day, Vit C 500mg BID, Zinc 220mg daily)  - Pulm following, appreciate recs  - Pursuing transfer pt for ECMO, denied from [facility] [place] and [place]; [place] to review case     Pneumomediastinum/Pneumothorax: Possibly barotrauma 2/2 high pressures needed for COVID ARDS PNA vs lung frailty. Will monitor for signs of worsening pneumothorax. - Repeat CXR for line placement    Severe sepsis 2/2 COVID PNA: PNA labs negative, BCx/UCx no growth. Likely severe sepsis on initial presentation is all related to COVID PNA. CTX + Doxy x 5d (11/19-11/24).  Cefepime (11/28-11/30)  - Abx discontinued, but discuss with ICU after spiking fever  - Central line place, Sanjeev at 35, MAPs mid 60s    Insomnia/anxiety: Pt with new insomnia and anxiety, specifically overnight. - Intubated, sedated. Nutritional status: OG tube in place  - Nutrition consulted    HTN: Pt hypotensive requiring pressors. At home is on Amlodipine 10mg daily and HCTZ 12.5mg daily  - Will continue to monitor and trend BPs     HLD: Chronic, stable. Last lipids 07/2021 , HDL 54, LDL 98.6, . On Atorvastatin 10mg daily.  - Holding Atorvastatin 20mg daily     T2DM (diet-controlled): Prior A1c 5.8% in 05/2021. No meds at present.   - SSI (resistant given morbid obesity) w/ q6h glucose checks     JESUS: On CPAP at night. - Intubated, sedated.     Obesity: Body mass index is 42.4 kg/m². - Encouraging lifestyle modifications and further follow up outpatient. At-risk JAMIR: RESOLVED. POA Cr 1.31, BL 0.8. Likely IVVD in setting of poor PO intake. - Daily CMP    Chest pain: RESOLVED. Description is pleuritic in nature, worse with cough. EKG without evidence of acute ischemia. Troponin of 9 makes CAD less concerning.   - Cardiac monitoring, will continue to monitor     Diarrhea: RESOLVED. Watery diarrhea x 2 days. Likely 2/2 COVID infection. Improved since presentation. Enteric bacteria panel negative. Abdominal pain: RESOLVED. Secondary to gas pains after starting diet. - Continue Simethicone       FEN/GI - NPO. OG tube in place  Activity - Ambulate with assistance  DVT prophylaxis - Lovenox (therapeutic)  GI prophylaxis - Protonix  Disposition - Pursuing transfer pt for ECMO, denied from [facility] and [place]; [place] to review case  Code Status - Full. Discussed with patient / caregivers. Next of Kin Name and [address],  Spouse - [phone]      Timothy Rolon MD  Family Medicine Resident           Subjective / Objective   Subjective:   Patient intubated and proned, on pressors. Sedated. No acute distress.      Respiratory: O2 Flow Rate (L/min): 60 l/min O2 Device: Endotracheal tube, Ventilator   Visit Vitals  /66   Pulse 77   Temp (!) 101.7 °F (38.7 °C)   Resp 28   Ht 5' 11\" (1.803 m)   Wt 274 lb 7.6 oz (124.5 kg)   SpO2 97%   BMI 38.28 kg/m²     General: Intubated, sedated. Appears comfortable. Respiratory: Transmitted mechanical sounds appreciated bilaterally. Cardiovascular: regular rate, regular rhythm. GI: + bowel sounds. Nontender. Extremities:  No LE edema. Skin: Warm, dry. Neuro: Awake and alert    I/O:  Date 11/30/21 0700 - 12/01/21 0659 12/01/21 0700 - 12/02/21 0659   Shift [id number] 1900-0659 24 Hour Total 1696-9565 8520-3943 24 Hour Total   INTAKE   I.V.(mL/kg/hr)  1215.3(0.8) 1215.3(0.4) 1111  1111     Versed Volume  98.1 98.1 84  84     Nimbex Volume  166.5 166.5 157.2  157.2     Precedex Volume  184 184 150  150     Fentanyl Volume  42.3 42.3 36  36     Diprivan Volume  547.1 547.1 448.8  448. 8     Phenylephrine Volume  177.4 177.4 235  235   Other    50  50     Irrigation Volume Input (mL) (Urinary Catheter 11/30/21 [place] - Temperature)    50  50   NG/GT    120  120     Water Flush Volume (mL) (Orogastric Tube 11/30/21)    30  30     Medication Volume (Orogastric Tube 11/30/21)    90  90   Shift Total(mL/kg)  1215.3(9.8) 1215.3(9.8) 1281(10.3)  1281(10.3)   OUTPUT   Urine(mL/kg/hr) 1500(1) 1636(1.1) 3136(1) 1875 1875     Urine Voided 500  500        Urine Output (mL) (Urinary Catheter 11/30/21 [place] - Temperature) 1000 1636 2636 1875  1875   Shift Total(mL/kg) 1500(12) 0136(06.6) 2910(46.4) 1875(15.1)  1875(15.1)   NET -1500 -420.7 -1920.7 -594  -594   Weight (kg) 124.5 124.5 124.5 124.5 124.5 124.5       CBC:  Recent Labs     12/01/21  0205 11/30/21  0115 11/29/21  0239   WBC 31.6* 28.3* 26.1*   HGB 15.8 17.1* 16.8   HCT 46.2 49.3 48.8    321 127       Metabolic Panel:  Recent Labs     12/01/21  0205 11/30/21  1804 11/30/21  0115 11/29/21  0606 11/29/21  0239   * 136 134*   < > 130*   K 4.4 4.7 4.7   < > 4.7    101 101   < > 100   CO2 30 26 21   < > 22   BUN 26* 30* 26*   < > 24*   CREA 0.85 0.94 0.85   < > 0.79   * 170* 159*   < > 130*   CA 8.3* 8.1* 8.5   < > 8.5   MG 2.8* 2.4 2.4   < > <0.3*   ALB 3.3*  --  3.5  --  3.2*   ALT 31  --  36  --  38    < > = values in this interval not displayed.           For Billing    Chief Complaint   Patient presents with   [facility] Problems  Date Reviewed: 4/30/2021          Codes Class Noted POA    Severe sepsis [facility]) ICD-10-CM: A41.9, R65.20  ICD-9-CM: 038.9, 995.92  11/26/2021 Yes        Pneumomediastinum ([facility] ICD-10-CM: J98.2  ICD-9-CM: 518.1  11/26/2021 No        Pneumothorax on left ICD-10-CM: J93.9  ICD-9-CM: 512.89  11/26/2021 No        Hyponatremia ICD-10-CM: E87.1  ICD-9-CM: 276.1  11/26/2021 Yes        Diabetes mellitus type 2, controlled ([facility] ICD-10-CM: E11.9  ICD-9-CM: 250.00  11/25/2021 Yes        * (Principal) Acute hypoxemic respiratory failure due to COVID-19 [facility]) ICD-10-CM: U07.1, J96.01  ICD-9-CM: 518.81, 079.89, 799.02  11/19/2021 Yes        Obesity, morbid ([facility] ICD-10-CM: E66.01  ICD-9-CM: 278.01  4/10/2018 Yes        JESUS (obstructive sleep apnea) ICD-10-CM: G47.33  ICD-9-CM: 327.23  10/15/2014 Yes        Hypertension ICD-10-CM: I10  ICD-9-CM: 401.9  10/15/2014 Yes        Pure hypercholesterolemia ICD-10-CM: E78.00  ICD-9-CM: 272.0  Unknown Yes

## 2021-12-01 NOTE — PROGRESS NOTES
Comprehensive Nutrition Assessment    Type and Reason for Visit: Reassessment    Nutrition Recommendations/Plan:   1. If MD continues to use 2CalHN - RD recommends do NOT advance beyond 20mL/hr while on max propofol & Add 8 packets of protein daily    2. Consider consult for RD to manage tube feeding     3. Consider changing Tube Feeding:   · Vital HP @ 35 mL x20hrs/d  · FWF 10mL QH     4. Add Protein Supplements:   · ProSource 6 packets daily (can give 2 packets at a time Q8H), FWF 15mL before and after to clear tube of tube feeding formula    5. Check triglycerides today & bi-weekly    As recommended provides: 700ml of tf, 940 kcal (1925 kcal/d including propofol), 127 g protein, 78 g carb, ~875 ml of free water from TF & flushes. 15 kcal/kg & 1g of pro/kg. Nutrition Assessment:        12/1: follow up. Pt intubated, sedated and paralyzed. Propofol at max mcg/kg/min provides ~ 985 kcal/d from lipids. 2CalHN formula is a high fat formula. MD ordered TF as below & analysis of feeding is provided below. If no need for permissive underfeeding (~65-70% of calorie needs), current orders will meet or exceed current estimated calorie needs, but is grossly inadequate for protein needs. Adding 6 packets of ProSource daily would help meet protein needs and add 240 kcal/day to provide ~2825 kcal/d while on propofol & 133 g protein (meeting ~23 kcal/kg & 1 g pro/kg). Bowel regimen has been adjusted & Reglan added. Pt made NPO & RD stopped ONS orders per protocol. Current Tube Feeding Orders: 2Cal HN @ 20mL/hr, advance to 40mL/hr, FWF 30mL QH  as MD ordered provides:   @20ml/h: 400ml of TF, 800 kcal/d (1785 kcal/d including propofol), 33 g pro, 87 g carb, 880 ml of free water from TF & flushes. 16 kcal/kg & 0.26 g pro/kg. @40ml/h: 800 ml of TF, 1600 kcal/d (2585 kcal/d including propofol), 67 g pro, 175 g carb, 1182 mL of free water from TF & flushes. 20.7 kcal/kg & 0.5 g pro/kg.        11/30: RD attended & discussed patient during interdisciplinary rounds on unit. Pt with worsening respiratory status today. Has been NPO except meds without adequate time or endurance to be off BiPap. Wt appears to be significant below stated admit/usual wt.      11/22: pt admitted for Acute hypoxemic respiratory failure due to COVID-19 [facility]) [U07.1, J96.01] who  has a past medical history of Hypercholesteremia, Hypertension (10/15/2014), and S/P vasectomy (8/6/2014). Pt was sitting up in chair in room. Pt has been switching between High Flow NC and Bipap for oxygen needs. He is asking to be able to eat. Call to MDs to request diet or supplements when safe. Pt is still accepting meds 2-3 times daily. Review of history notes blood glucose much improved from earlier this year. On admission, notes reviewed pt had nausea and diarrhea for 2 days prior, has had clear liquids & crackers. Pt has been NPO x4 days (since evening of admission date). Morbidly obese male with high calorie/protein needs. Malnutrition Assessment:  Malnutrition Status:  Severe malnutrition    Context:  Acute illness     Findings of the 6 clinical characteristics of malnutrition:   Energy Intake:  7 - 50% or less of est energy requirements for 5 or more days  Weight Loss:  7 - Greater than 2% over 1 week  - wt down 9.7% from admit wt (doc wt from 4 months ago)   Body Fat Loss:  Unable to assess,     Muscle Mass Loss:  Unable to assess,    Fluid Accumulation:  No significant fluid accumulation,     Strength:  Not performed     Estimated Daily Nutrient Needs:  Energy (kcal): 1730 kcal/d (65% of [id number]) 2660 kcal/d   Weight Used for Energy Requirements: Current  Protein (g): 117 - 156g (1.5-2 g/kg of IBW);    Weight Used for Protein Requirements: Ideal (78.2 kg)  Fluid (ml/day): 1730 mL+;   Method Used for Fluid Requirements: 1 ml/kcal    Nutrition Related Findings:     Last BM: 11/29    ABD: obese, intact, active bowel sounds    Edema: non-pitting - all extremities ()                 None - R/LUE, trace - R/LLE ()     Oxygen Needs:  Vent Measures: 11.5 l/min    Temp (24hrs), Av.1 °F (37.8 °C), Min:98.9 °F (37.2 °C), Max:101.1 °F (38.4 °C)    Nutr. Related Meds:   Vit C, Zinc, Lipitor, decadron, Protonix, Bumex, Nimbex, Fentanyl, reglan, Versed, Sanjeev-synephrine, Propofol, pericolace     PRN: correction insulin, miralax      Nutr. Related Labs:   Lab Results   Component Value Date/Time    Glucose 115 (H) 2021 02:05 AM    Glucose (POC) 107 2021 07:50 AM     Lab Results   Component Value Date/Time    Hemoglobin A1c 5.8 (H) 2021 10:25 AM    Hemoglobin A1c 11.1 (H) 2021 10:47 AM     Lab Results   Component Value Date/Time    Sodium 135 (L) 2021 02:05 AM    Potassium 4.4 2021 02:05 AM    Chloride 103 2021 02:05 AM    CO2 30 2021 02:05 AM     Lab Results   Component Value Date/Time    Cholesterol, total 174 2021 10:25 AM    HDL Cholesterol 54 2021 10:25 AM    LDL, calculated 98.6 2021 10:25 AM    VLDL, calculated 21.4 2021 10:25 AM    Triglyceride 107 2021 10:25 AM    CHOL/HDL Ratio 3.2 2021 10:25 AM     Wounds:    None        Current Nutrition Therapies:  ADULT ORAL NUTRITION SUPPLEMENT Breakfast, Lunch, Dinner; Diabetic Supplement  DIET NPO  ADULT TUBE FEEDING Nasogastric; 2.0 Calorie; Delivery Method: Continuous; Continuous Initial Rate (mL/hr): 20; Continuous Advance Tube Feeding: Yes; Advancement Volume (mL/hr): 10; Advancement Frequency: Q 4 hours; Continuous Goal Rate (mL/hr): 40. ..     Documented Meal intake:  Patient Vitals for the past 168 hrs:   % Diet Eaten   21 1750 26 - 50%   21 1155 51 - 75%   21 0930 51 - 75%   21 1200 26 - 50%   21 0800 1 - 25%   21 1043 76 - 100%     Documentation of supplement intake:  Patient Vitals for the past 168 hrs:   Supplement intake %   21 1750 76 - 100%   21 1155 0%   21 0930 0% 11/28/21 1200 0%   11/28/21 0800 0%   11/27/21 1043 0%       Anthropometric Measures:  · Height:  5' 11\" (180.3 cm)  · Current Body Wt:  124.5 kg (274 lb 7.6 oz)   · Admission Body Wt:  304 lb    · Usual Body Wt:  136.1 kg (300 lb)     · Ideal Body Wt:  172 lbs:  159.6 %   · Adjusted Body Weight:   ; Weight Adjustment for: No adjustment   · Adjusted BMI:       · BMI Category:  Obese class 2 (BMI 35.0-39. 9)     Body mass index is 38.28 kg/m². Last 3 Recorded Weights in this Encounter    11/19/21 1259 11/29/21 0355   Weight: 137.9 kg (304 lb) 124.5 kg (274 lb 7.6 oz)     Wt Readings from Last 6 Encounters:   11/29/21 124.5 kg (274 lb 7.6 oz)   07/30/21 137.9 kg (304 lb)   04/30/21 133.8 kg (295 lb)   04/07/20 145.2 kg (320 lb)   12/06/19 145.2 kg (320 lb)   10/04/19 142.4 kg (314 lb)      Nutrition Diagnosis:   · Inadequate protein-energy intake related to catabolic illness, impaired respiratory function as evidenced by intake 26-50%, weight loss greater than or equal to 2% in 1 week, unable to accept PO due to increased O2 needs. · Severe malnutrition related to catabolic illness, inadequate protein-energy intake, impaired respiratory function as evidenced by NPO or clear liquid status due to medical condition, weight loss greater than or equal to 2% in 1 week, intake 26-50%, 9.7% wt loss in the last 2 weeks to 4 months.        Nutrition Interventions:   Food and/or Nutrient Delivery: Modify tube feeding  Nutrition Education and Counseling: No recommendations at this time  Coordination of Nutrition Care: Continue to monitor while inpatient, Interdisciplinary rounds    Goals:  provide and tolerate 50-75% of estimated needs within the next 3-4 days       Nutrition Monitoring and Evaluation:   Behavioral-Environmental Outcomes: None identified  Food/Nutrient Intake Outcomes: Enteral nutrition intake/tolerance  Physical Signs/Symptoms Outcomes: Biochemical data, Weight, Hemodynamic status    Discharge Planning: Too soon to determine     Electronically signed by Orlando Donovan RD, MS on 12/1/2021 at 4:10 PM  Contact via [address] [place] [address] or office [phone]

## 2021-12-01 NOTE — PROGRESS NOTES
0715: Bedside and Verbal shift change report given to Destini Akhtar RN (oncoming nurse) by Allegra Valles RN (offgoing nurse). Report included the following information SBAR, Kardex, Intake/Output and Cardiac Rhythm SR. Primary Nurse Te Sweet RN and Richmond Odonnell RN performed a dual skin assessment on this patient No impairment noted  Cameron score is see flowsheet. Drips rate verified with offgoing nurse. Nimbex @ 3.5 mcg/kg/min  Precedex @ 0.4 mcg/kg/hr  Fentanyl @ 150 mcg/hr  Versed @ 7 mg/hr  Sanjeev-Synephrine @ 35 mcg/min  Propofol @ 50 mcg/kg/min    0800: Assessment completed, see doc flowsheet. Patient intubated, sedated, and paralyzed. Pupils 2mm, round and sluggish. TOF 1/4 twitches at 40 mA. ETT #8, 27 at lips, vent settings PRVC/26/450/16/100%. Sinus rhythm on monitor. Oral care and endotracheal care completed. 0805: Dr. Ko Cobb in room via telehealth monitor to assess patient. RT Claretha Mcardle at bedside making vent changes (PRVC/28/400/14/100) per Dr. Ko Cobb. Plan to obtain ABG at 0930 and supinate patient at 1000. Patient with high gastric residuals (~250 mL) this morning prior to start tube feeding, plan to hold tube feeding now prior to supinating and obtain KUB when patient is supine. 1000: Patient supinated with RT and 3 RNs. Large amount of bloody drainage found around patient's nose, neck, and chest, likely from central line. Patient cleaned up and dressing changed. Patient's O2 saturations dropping to low 70s. Dr. Ko Cobb notified and orders received to re-prone patient. Oral care and endotracheal care completed. 1035: Patient successfully re-proned. O2 sats gradually increasing to 80s. Vent settings remain the same. 1045: Sanjeev-synephrine increased to 40 mcg/min. 1130: IDRs completed with team. Tube feed changed to Vital HP at 20 mL/hr, okay to begin feeding patient. Plan to transfer patient to [facility] if accepted by intensivist and bed availability.      1200: Reassessment completed, see doc flowsheet. No changes from previous assessment. Vent settings remain the same. TOF 1/4 twitches at 40 mA, pupils still 2 mm, sluggish and round. Gastric residuals ~175 mL, tube feedings started at trickle rate of 20 mL/hr.     1400: Patient's arms repositioned in bed. Oral care and endotracheal care completed. TOF 1/4 twitches, 40mA. 1515: Sanjeev-synephrine increased to 45 mcg/min d/t patient not achieving MAP greater than 65.     1600: Reassessment completed, see doc flowsheet. No changes from previous assessment. Vent settings remain the same. TOF 1/4 twitches at 40mA, pupils still 2 mm, sluggish and round. Oral care and endotracheal care completed. 1800: Oral care and endotracheal care completed. 1900: Bedside and Verbal shift change report given to Delon Wright, [facility] RN (oncoming nurse) by Joe Mehta RN (offgoing nurse).  Report included the following information SBAR, Kardex, Intake/Output and Cardiac Rhythm SR.

## 2021-12-01 NOTE — PROGRESS NOTES
1900: Bedside and Verbal shift change report given to SAUL Nickerson (oncoming nurse) by Joanna Porras RN (offgoing nurse). Report included the following information SBAR, Kardex, MAR, Recent Results and Cardiac Rhythm , ST.     2000: Assessment completed, see doc flow sheet. VAP bundle completed. Mouth care done. T.O.F baseline 40 mA, 1/4 twitches. Neuro: Pt is pharm paralyzed, bilateral pupils are round and sluggish @ 2 mm. T.O.F baseline is 40 mA. 1/4 twitches. Cardio: S1S2 present, tachycardic and NSR. Respiratory: Intubated on 11/30/21, ETT 8, , Rate 26, PEEP 12, FIO2 100, bilateral lung sounds-crackles R>L, suctioned ETT w/scant white thick secretions. GI/: Abdomen is obese, intact, and soft. BS hypoactive x4, lewis catheter draining yellow urine w/some pink sediment. On assessment, lewis had 75 ml. Skin: Scattered freckles through out body. 2056: Tele-intensivist aware of sustained O2 sats @ 82. Orders to change PEEP to 16, prone pt, to start Veletri, and place a central line. Since initial assessment, Sanjeev started @ 30 d/t MAP <65.    2200: Pt turned and repositioned. VAP bundle completed. Mouth care done. T.O.F baseline 40 mA, 1/4 twitches. 2235: Anesthesia at the bedside w/nurse placing LTLIJ.    2350: Nurses and R. T proning pt sats @ 90.    0000: Assessment completed, see doc flow sheet. VAP bundle completed. T.O.F baseline 40 mA, 1/4 twitches. 0200: Pt's arms repositioned. VAP bundle completed. Mouth care done. T.O.F baseline 40 mA, 1/4 twitches. 0400: Assessment completed, see doc flow sheet. VAP bundle completed. Mouth care done. T.O.F baseline 40 mA, 1/4 twitches.    0600: Mouth care done. VAP bundle completed. 0700: Bedside and Verbal shift change report given to Joanna Porras RN (oncoming nurse) by Luis Miguel Hall RN (offgoing nurse).  Report included the following information SBAR, Kardex, MAR, Recent Results and Cardiac Rhythm SR, ST.

## 2021-12-01 NOTE — PROGRESS NOTES
2157-Attempted to call wife, Celina Griffin, for consent to place central line due to pressor needs, no answer, left voicemail for her to call us back. 2300- Attempted to call wife, Celina Griffin, to give update/get consent, no answer, left voicemail to call back for an update.

## 2021-12-01 NOTE — PROGRESS NOTES
Physical Therapy  Patient now intubated and sedated due to medical complexity. We will complete the order. Thank you.   Varghese Robertson PT,DPT,NCS

## 2021-12-01 NOTE — PROGRESS NOTES
[address] [place] [address] with [facility] and [facility]       Resident Transition of Care Note    Patient presented with AHRF d/t COVID pneumonia. Course complicated by development of pneumomediastinum and pneumothorax likely 2/2 barotrauma from BiPAP, which was initially held in favor of HFNC, but later restarted. On night of 11/29, pt with increasing O2 needs, was restarted on BiPAP. Later on 11/30, had worsening O2 needs. Patient underwent planned intubation on 11/30. Continuing to treat patient for COVID PNA.     Lucia Ma MD  Family Medicine Resident

## 2021-12-01 NOTE — PROGRESS NOTES
PCS prepared for critical care transport team including vent settings,MAR,H&P,and demographics with insurance(all pending acceptance by the cardiothoracic surgeon @ [facility]). PCS placed in the left side of pt.'s bedside chart in preparation of pt being discharged to [facility]. Per FP resident's note,the intensivist has accepted pt pending cardiothoracic surgery's acceptance.     Florinda Coy Serve

## 2021-12-01 NOTE — WOUND CARE
Wound Consult:  New consult Visit. Chart reviewed. Consulted for intubated, ICU, skin care. patient prone at time of assessment  Spoke with patients nurse, Kyle Gonzalez RN at bedside for assessment. Patient is resting on a [place] in touch bed with supportive mattress and hercules sheet. Patient is intubated, sedated, prone position; unable to turn at this time. lewis   Cameron score 9  Assessment:  Buttocks/sacrum- no redness and skin intact. Bilateral ears- no redness  Nose- small brown dry scab.no redness  Bilateral heels and bottom of feet- pink/red, blanching. Skin intact. Skin Care / PI Prevention Recommendations:  1. Minimize friction/shear: minimize layers of linen/pads under patient. 2. Off load pressure/reposition:  turn and reposition approximately every 2 hours; float heels with pillows or use off loading heel boots; waffle cushion for sitting; position wedge. 3. Manage Moisture - keep skin folds dry; incontinence skin care with incontinence wipes; lewis in use to help contain urine. 4. Continue to monitor nutrition, pain, and skin risk scale, and skin assessment. Plan:. We will continue to reassess and as needed. Please re-consult should concerns arise despite continued skin/PI prevention measures.   [address], Wound / [address] [facility] Healing Office [phone]

## 2021-12-01 NOTE — CONSULTS
SOUND Tele  CRITICAL CARE    Tele ICU TEAM Progress Note    Name: Td Lpóez   : 1985   MRN: [id number]   Date: 2021           ICU Assessment and Plan3   Clinical Summary:  Referral Source: Dr. Farnaz Thakur        Acute hypoxic resp failure  HTN  Obesity   JESUS  DM            ICU Comprehensive Plan of Care:    NEURO: sedation per protocol, paralytics, wean off precedex as able  CV: Keep MAP >65; pressors as needed, check echo    PULM: Continue mechanical ventilation, low TV, high peep strategy, serial ABG, adjusted vent settings to decr plat pressure given prior pnuemomed. Reduce decadron to 6 mg daily s/p toci. Consider ecmo referral   GI: trickle tube feeds  RENAL/: Monitor Cr and UOP; continue diuresis   ENDO: BG Goal 140-180; glycemic control  HEME/ONC: Tx Hgb < 7, Plt<10k; transfuse as needed  MICRO:  FU on Cx; cefepime dc yday after procal neg, recheck cultures given high fevers and leukocytosis, low threshold for fungal culture and coverage  Code Status: Full Code  LOS: 12  Prophylaxis: GI: SUP h2 b  DVT: therapeutic lovenox empirically      Discussed Care Plan with Bedside RN   Subjective:   Reason for ICU Admission: covid       43yo man admitted for COVID. Had small apical Ptx. Summary of Events:     - admitted   - pneumomediastinum and possible L apical PTX on CXR   - escalated to ICU for continuous BiPAP at 100%; pt declined intubated--happy hypoxic on BiPAP currently, tenuous however; called spouse Virgilio Linger to update--update done; precedex on and off; Cefepime dc'd.  : intubated yday, sedated, paralyzed and proned. Ongoing fevers      Past Medical History:      has a past medical history of Hypercholesteremia, Hypertension (10/15/2014), Poison ivy (9/15/2015), Pure hypercholesterolemia, and S/P vasectomy (2014).  He also has no past medical history of Abuse, Anemia NEC, Arrhythmia, Calculus of kidney, Chronic pain, Congestive heart failure, unspecified, COPD, Depression, GERD (gastroesophageal reflux disease), Headache(784.0), Psychotic disorder ([address] Thyroid disease, or Trauma. Past Surgical History:      has no past surgical history on file. Home Medications:     Prior to Admission medications    Medication Sig Start Date End Date Taking? Authorizing Provider   metaxalone (SKELAXIN) 800 mg tablet Take 800 mg by mouth three (3) times daily as needed for Muscle Spasm(s). Yes Provider, Historical   naproxen (NAPROSYN) 500 mg tablet Take 500 mg by mouth two (2) times daily as needed for Pain. Yes Provider, Historical   hydroCHLOROthiazide (HYDRODIURIL) 12.5 mg tablet TAKE 1 TABLET BY MOUTH EVERY DAY 10/25/21  Yes Amberly Suresh NP   atorvastatin (LIPITOR) 10 mg tablet TAKE 1 TABLET DAILY 21  Yes Amberly Suresh NP   amLODIPine (NORVASC) 10 mg tablet TAKE 1 TABLET DAILY 3/80/56  Yes Palomo Blevins MD   glucosamine HCl/chondroitin bone (GLUCOSAMINE-CHONDROITIN PO) Take  by mouth. 1,500mg/1,200mg/MGMSM 1,000mg   Yes Provider, Historical   Krill-OM3-DHA-EPA-OM6-Lip-Astx (KRILL OIL) 1000-130(40-80) mg cap Take 1 Cap by mouth once over twenty-four (24) hours. Yes Provider, Historical   FERROUS FUMARATE/VIT BCOMP&C (SUPER B COMPLEX PO) Take  by mouth.    Yes Provider, Historical     Allergies/Social/Family History:     No Known Allergies   Social History     Tobacco Use    Smoking status: Never Smoker    Smokeless tobacco: Never Used   Substance Use Topics    Alcohol use: No     Alcohol/week: 0.0 standard drinks      Family History   Problem Relation Age of Onset    Diabetes Mother     Heart Attack Father 43    Stroke Maternal Grandmother     Cancer Paternal Grandfather          age 45 - Hodgkin's   [employer] Cancer Maternal Grandfather          in 42's with melanoma     Review of Systems:     unable to obtain due to sedate  Objective:   Vital Signs:  Visit Vitals  /69   Pulse (!) 101   Temp (!) 101.6 °F (38.7 °C)   Resp 28   Ht 5' 11\" (1.803 m)   Wt 124.5 kg (274 lb 7.6 oz)   SpO2 (!) 75% Comment: pt turned from prone   BMI 38.28 kg/m²    O2 Flow Rate (L/min): 60 l/min O2 Device: Ventilator Temp (24hrs), Av.4 °F (38 °C), Min:98.9 °F (37.2 °C), Max:101.6 °F (38.7 °C)           Intake/Output:     Intake/Output Summary (Last 24 hours) at 2021 1026  Last data filed at 2021 0900  Gross per 24 hour   Intake 1606.82 ml   Output 2686 ml   Net -1079.18 ml       Physical Exam:  Gen: intubated, sedated , paralyzed  HEENT: PERRL  NC, AT  Neck: no JVD, midline trach  Mouth: oral ETT   CV: NSR  Pulm: symmetric chest rise  Abd: NT ND soft  Ext: no edema, cyanosis, clubbing  MSK: no deformities, no synovitis  Neuro: sedated, paralyzed    LABS AND  DATA: Personally reviewed  Recent Labs     215 21  0115   WBC 31.6* 28.3*   HGB 15.8 17.1*   HCT 46.2 49.3    321     Recent Labs     21  1804   * 136   K 4.4 4.7    101   CO2 30 26   BUN 26* 30*   CREA 0.85 0.94   * 170*   CA 8.3* 8.1*   MG 2.8* 2.4     Recent Labs     21  0115    117   TP 6.6 7.2   ALB 3.3* 3.5   GLOB 3.3 3.7     No results for input(s): INR, PTP, APTT, INREXT, INREXT in the last 72 hours. No results for input(s): PHI, PCO2I, PO2I, FIO2I in the last 72 hours. No results for input(s): CPK, CKMB, TROIQ, BNPP in the last 72 hours.     Hemodynamics:   PAP:   CO:     Wedge:   CI:     CVP:    SVR:       PVR:       Ventilator Settings:  Mode Rate Tidal Volume Pressure FiO2 PEEP   PRVC   400 ml    100 % 14 cm H20     Peak airway pressure: 32 cm H2O    Minute ventilation: 11.6 l/min        MEDS:   Current Facility-Administered Medications:     metoclopramide HCl (REGLAN) injection 5 mg, 5 mg, IntraVENous, BID, Jf Schmitt MD, 5 mg at 21 0946    [START ON 2021] dexamethasone (PF) (DECADRON) 10 mg/mL injection 6 mg, 6 mg, IntraVENous, Q24H, Flaca Daniel MD    white petrolatum-mineral oiL (AKWA TEARS) 83-15 % ophthalmic ointment, , Both Eyes, BID, Archana Nagel MD, Given at 12/01/21 1000    dexmedeTOMidine in 0.9 % NaCl (PRECEDEX) 400 mcg/100 mL (4 mcg/mL) infusion soln, 0.1-1.5 mcg/kg/hr, IntraVENous, TITRATE, Kanwal Mercado MD, Last Rate: 12.5 mL/hr at 11/30/21 2114, 0.4 mcg/kg/hr at 11/30/21 2114    bumetanide (BUMEX) injection 0.5 mg, 0.5 mg, IntraVENous, BID, Kanwal Mercado MD, 0.5 mg at 12/01/21 7627    pantoprazole (PROTONIX) 40 mg in 0.9% sodium chloride 10 mL injection, 40 mg, IntraVENous, DAILY, Kanwal Mercado MD, 40 mg at 12/01/21 0831    insulin lispro (HUMALOG) injection, , SubCUTAneous, Q6H, Kala Jaramillo MD, 3 Units at 11/30/21 1815    chlorhexidine (PERIDEX) 0.12 % mouthwash 15 mL, 15 mL, Oral, Q12H, Kanwal Mercado MD, 15 mL at 12/01/21 5394    propofol (DIPRIVAN) 10 mg/mL infusion, 0-50 mcg/kg/min, IntraVENous, TITRATE, Kanwal Mercado MD, Last Rate: 37.4 mL/hr at 12/01/21 0927, 50 mcg/kg/min at 12/01/21 0927    HYDROmorphone (DILAUDID) syringe 0.5 mg, 0.5 mg, IntraVENous, Q1H PRN, Kanwal Mercado MD, 0.5 mg at 11/30/21 1630    senna-docusate (PERICOLACE) 8.6-50 mg per tablet 1 Tablet, 1 Tablet, Per G Tube, BID, Kanwal Mercado MD, 1 Tablet at 12/01/21 7163    midazolam in normal saline (VERSED) 1 mg/mL infusion, 0-10 mg/hr, IntraVENous, TITRATE, Kanwal Mercado MD, Last Rate: 7 mL/hr at 12/01/21 0000, 7 mg/hr at 12/01/21 0000    fentaNYL (PF) 1,500 mcg/30 mL (50 mcg/mL) infusion, 0-200 mcg/hr, IntraVENous, TITRATE, Kanwal Mercado MD, Last Rate: 3 mL/hr at 12/01/21 0337, 150 mcg/hr at 12/01/21 0337    cisatracurium (NIMBEX) 200 mg in 0.9% sodium chloride 100 mL (2 mg/mL) infusion, 0-10 mcg/kg/min, IntraVENous, TITRATE, Kanwal Mercado MD, Last Rate: 13.1 mL/hr at 12/01/21 0717, 3.5 mcg/kg/min at 12/01/21 0717    ascorbic acid (vitamin C) (VITAMIN C) tablet 500 mg, 500 mg, Per G Tube, BID, Kanwal Mercado MD, 500 mg at 12/01/21 0993    atorvastatin (LIPITOR) tablet 20 mg, 20 mg, Per Warnell Adri, DAILY, Freddie Glez MD, 20 mg at 12/01/21 9690    benzonatate (TESSALON) capsule 200 mg, 200 mg, Oral, TID, Freddie Glez MD, 200 mg at 12/01/21 4634    melatonin (rapid dissolve) tablet 5 mg, 5 mg, Oral, QHS, Freddie Glez MD    acetaminophen (TYLENOL) tablet 1,000 mg, 1,000 mg, Per G Tube, Q6H PRN **OR** acetaminophen (TYLENOL) suppository 975 mg, 975 mg, Rectal, Q6H PRN, Freddie Glez MD, 975 mg at 12/01/21 0946    glucose chewable tablet 16 g, 4 Tablet, Per G Tube, PRN, Freddie Glez MD    PHENYLephrine (DINO-SYNEPHRINE) 30 mg in 0.9% sodium chloride 250 mL infusion,  mcg/min, IntraVENous, TITRATE, Miles hSaikh, DO, Last Rate: 17.5 mL/hr at 12/01/21 0944, 35 mcg/min at 12/01/21 0944    vasopressin (VASOSTRICT) 20 Units in 0.9% sodium chloride 100 mL infusion, 0.03 Units/min, IntraVENous, CONTINUOUS, Jean Sage MD, Transfusion Held at 11/30/21 2100    white petrolatum (VASELINE) ointment, , Topical, PRN, Elfredia Hatchet, MD    oxymetazoline (AFRIN) 0.05 % nasal spray 2 Spray, 2 Spray, Both Nostrils, DAILY PRN, Elfredia Hatchet, MD    sodium chloride (OCEAN) 0.65 % nasal squeeze bottle 2 Spray, 2 Spray, Both Nostrils, Q4H, Elfredia Hatchet, MD, 2 [place] at 11/30/21 2144    sodium chloride (OCEAN) 0.65 % nasal squeeze bottle 2 Spray, 2 Spray, Both Nostrils, Q2H PRN, Savage Brine, DO, 2 [place] at 11/25/21 1103    fluticasone propionate (FLONASE) 50 mcg/actuation nasal spray 2 Spray, 2 Spray, Both Nostrils, DAILY, Savage Brine, DO, 2 [place] at 11/30/21 0808    albuterol-ipratropium (DUO-NEB) 2.5 MG-0.5 MG/3 ML, 3 mL, Nebulization, Q6H RT, 3 mL at 12/01/21 0805 **OR** ipratropium-albuterol (COMBIVENT RESPIMAT) 20 mcg-100 mcg inhalation spray, 2 Puff, Inhalation, Q6H RT, Deborah Glass MD, 2 Puff at 11/28/21 2833    [Held by provider] simethicone (MYLICON) tablet 80 mg, 80 mg, Oral, QID PRN, Elfredia Hatchet, MD, 80 mg at 11/29/21 0663   enoxaparin (LOVENOX) injection 135 mg, 135 mg, SubCUTAneous, Q12H, Mala Rose MD, 135 mg at 12/01/21 0831    lidocaine 4 % patch 1 Patch, 1 Patch, TransDERmal, Q24H, Cathy Nicole MD, 1 Patch at 11/30/21 2143    [Held by provider] guaiFENesin ER (MUCINEX) tablet 600 mg, 600 mg, Oral, Q12H, Carmen Christianson MD, 600 mg at 11/30/21 0808    phenol throat spray (CHLORASEPTIC) 2 Spray, 2 Spray, Oral, Q6H PRN, Kaia Garcia MD, 2 [place] at 11/20/21 0213    sodium chloride (NS) flush 5-40 mL, 5-40 mL, IntraVENous, Q8H, Carmen Christianson MD, 10 mL at 12/01/21 0753    sodium chloride (NS) flush 5-40 mL, 5-40 mL, IntraVENous, PRN, Carmen Christianson MD    [Held by provider] polyethylene glycol (MIRALAX) packet 17 g, 17 g, Oral, DAILY PRN, Carmen Christianson MD, 17 g at 11/25/21 1832    ondansetron (ZOFRAN ODT) tablet 4 mg, 4 mg, Oral, Q8H PRN **OR** ondansetron (ZOFRAN) injection 4 mg, 4 mg, IntraVENous, Q6H PRN, Carmen Christianson MD, 4 mg at 11/21/21 0912    zinc sulfate (ZINCATE) 50 mg zinc (220 mg) capsule 1 Capsule, 1 Capsule, Oral, DAILY, Carmen Christianson MD, 1 Capsule at 12/01/21 0946    prochlorperazine (COMPAZINE) injection 10 mg, 10 mg, IntraVENous, Q6H PRN, Carmen Christianson MD, 10 mg at 11/29/21 0937    dextrose (D50W) injection syrg 12.5-25 g, 12.5-25 g, IntraVENous, PRN, Carmen Christianson MD    glucagon (GLUCAGEN) injection 1 mg, 1 mg, IntraMUSCular, PRN, Carmen Christianson MD    [Held by provider] HYDROcodone-chlorpheniramine (TUSSIONEX) oral suspension 5 mL, 5 mL, Oral, Q12H, Price Sommer MD, 5 mL at 11/30/21 5648        I performed all aspects of the physical examination via Telemedicine associated with two way audio and video communication and with the on-site assistance of Nurse. I am located in [place] and the patient is located in [place]. Patient is critically ill in the ICU.    I  personally  reviewed the pertinent medical records, laboratory/ pathology data and radiographic images. The decision making regarding this patient is as documented above, which was generated  following  discussion  with the multidisciplinary team and creation of a treatment plan for  the patient. We discussed the patient's interval history and future coordination of care and  plans. The patient's medications  were reviewed and changes made as stipulated above. Due to  critical illness impairing one or more vital organs of this patient resulting in life threatening clinical situation  I have provided direct, frequent personal  assessment and manipulation in management plan and spent 35  minutes  of  critical care time excluding the time spent on procedures and teaching. Greater than 50% of this time  in patient's care was  employed  in counseling and coordination of care and engaged in face to face discussion of case management issues, addressing questions, and outlining a plan of  therapy.

## 2021-12-02 NOTE — PROGRESS NOTES
[address] [address] [facility] Progress Note: Antimicrobial Stewardship    Consult for antibiotic dosing of Vancomycin by Dr. Mayo Boykin  Indication: HAP  Day of Therapy: 1    Plan:  Vancomycin:   Start with loading dose of vancomycin 2500 mg IV (25 mg/kg, max 2.5 gm)   Follow with maintenance dose of vancomycin 1500 mg IV every 12 hours    Dose calculated to approximate a   Target AUC/TESFAYE of 400-600     Plan:  Check level within 48 hrs. Scr stable. Pharmacy to follow daily and will make changes to dose and/or frequency based on clinical status. Other Antimicrobial  (not dosed by pharmacist)   Meropenem 500 mg IV Q6h  Anidulafungin 200 mg x1 followed by 100 mg daily    Cultures       12/1 - Urine : ip   12/1 - Blood : ip     Serum Creatinine     Lab Results   Component Value Date/Time    Creatinine 0.82 12/02/2021 01:19 AM       Creatinine Clearance Estimated Creatinine Clearance: 161.5 mL/min (based on SCr of 0.82 mg/dL). Procalcitonin    Lab Results   Component Value Date/Time    Procalcitonin 0.10 12/01/2021 02:44 PM        Temp   (!) 102.1 °F (38.9 °C)    WBC   Lab Results   Component Value Date/Time    WBC 22.3 (H) 12/02/2021 01:19 AM       For Antifungals, Metronidazole and Nafcillin: Lab Results   Component Value Date/Time    ALT (SGPT) 34 12/02/2021 01:19 AM    AST (SGOT) 49 (H) 12/02/2021 01:19 AM    Alk.  phosphatase 89 12/02/2021 01:19 AM    Bilirubin, total 0.8 12/02/2021 01:19 AM         Pharmacist: Signed Elias Sanford

## 2021-12-02 NOTE — PROGRESS NOTES
[address] [facility] [address] [place] [address]   Office [phone]  Fax [phone]          Assessment and Plan     Td López is a 39 y.o. male with a PMH of HTN, HLD, JESUS admitted for AHRF and severe sepsis 2/2 COVID PNA. Patient was admitted on 11/19/2021. Interval Events: Pursuing transfer to [facility] for ECMO treatment, currently pending bed availability. AHRF and ARDS 2/2 COVID PNA: Unvaccinated. Symptom onset 11/12 with acute respiratory decompensation, O2 sats in high 60s prior to admission. CTA chest without evidence of definite/proximal PE, no aortic aneurysm/dissection, imaging consistent with moderate to severe COVID PNA and associated hilar/mediastinal lymphadenopathy. B/l lower extremity duplex without evidence of DVT. CXR 11/27 demonstrated no significant change in diffuse bilateral airspace disease. S/p Tocilizumab 800mg 11/19. Given patient's age, hx of pneumomediastinum, increasing plateau pressures while intubated, poor oxygenation on 100% FiO2, and inability to tolerate supine positioning  - Pursue transfer to [facility] for bed availability  - unable to transfer locally for  ECMO due to BMI, required to be less than 35. Currently at 39. Daily weights. - Intubated on 11/30. Satting 100% on PEEP of 14 and RR 28  - Proned currently   - Goal sats 88% or higher  -  CBC, CMP daily. No need for further trending Covid labs  - Continue Decadron 10mg IV daily  - Duo-neb or Combivent Respimat Q6H  - Therapeutic Lovenox  - Holding oral PRN meds  - Holding COVID meds (Atorvastatin 20mg every day, Vit C 500mg BID, Zinc 220mg daily)  - Pulm following, appreciate recs     Pneumomediastinum/Pneumothorax: Possibly barotrauma 2/2 high pressures needed for COVID ARDS PNA vs lung frailty. Will monitor for signs of worsening pneumothorax. - Repeat CXR showing stable pneumomediastinum, and resolved pneumothorax. Worsening airspace disease.      Severe sepsis 2/2 COVID PNA: PNA labs negative, BCx/UCx no growth. Likely severe sepsis on initial presentation is all related to COVID PNA. CTX + Doxy x 5d (11/19-11/24). Cefepime (11/28-)   - Given recent fevers after d/c abx, addition of abx per ICU team. Meropenam, Vanc, and Eraxis. - Central line place, Sanjeev at 50 and Vaso 0.3, MAPs lower 70s. Insomnia/anxiety: Pt with new insomnia and anxiety, specifically overnight. - Intubated, sedated. Nutritional status: OG tube in place  - Nutrition consulted    HTN: Pt normotensive off meds. At home is on Amlodipine 10mg daily and HCTZ 12.5mg daily  - Will continue to monitor and trend BPs     HLD: Chronic, stable. Last lipids 07/2021 , HDL 54, LDL 98.6, . On Atorvastatin 10mg daily.  - Holding Atorvastatin 20mg daily     T2DM (diet-controlled): Prior A1c 5.8% in 05/2021. No meds at present.   - SSI (resistant given morbid obesity) w/ q6h glucose checks     JESUS: On CPAP at night. - Intubated, sedated.     Obesity: Body mass index is 42.4 kg/m². - Encouraging lifestyle modifications and further follow up outpatient. At-risk JAMIR: RESOLVED. POA Cr 1.31, BL 0.8. Likely IVVD in setting of poor PO intake. - Daily CMP    Chest pain: RESOLVED. Description is pleuritic in nature, worse with cough. EKG without evidence of acute ischemia. Troponin of 9 makes CAD less concerning.   - Cardiac monitoring, will continue to monitor     Diarrhea: RESOLVED. Watery diarrhea x 2 days. Likely 2/2 COVID infection. Improved since presentation. Enteric bacteria panel negative. Abdominal pain: RESOLVED. Secondary to gas pains after starting diet. - Continue Simethicone       FEN/GI - NPO. OG tube in place  Activity - Ambulate with assistance  DVT prophylaxis - Lovenox (therapeutic)  GI prophylaxis - Protonix  Disposition - Plan to d/c to TBD. Code Status - Full. Discussed with patient / caregivers.   Next of Kim 68 Name and Milagros Wei,  Spouse - [phone]      Isabel Small MD  Family Medicine Resident           Subjective / Objective   Subjective:   Patient intubated and proned, on pressors. Sedated. No acute distress. Respiratory: O2 Flow Rate (L/min): 60 l/min O2 Device: Endotracheal tube, Ventilator   Visit Vitals  BP (!) 97/58   Pulse 80   Temp (!) 102 °F (38.9 °C)   Resp 28   Ht 5' 11\" (1.803 m)   Wt 274 lb 7.6 oz (124.5 kg)   SpO2 100%   BMI 38.28 kg/m²     General: Intubated, sedated. Appears comfortable. Respiratory: Transmitted mechanical sounds appreciated bilaterally. Cardiovascular: regular rate, regular rhythm. GI: + bowel sounds. Nontender. Extremities:  No LE edema. Skin: Warm, dry. Neuro: Awake and alert    I/O:  Date 12/01/21 0700 - 12/02/21 0659 12/02/21 0700 - 12/03/21 0659   Shift 8071-1380 0407-5689 24 Hour Total 5674-1650 6277-2767 24 Hour Total   INTAKE   I.V.(mL/kg/hr) 1111(0.7) 878.9 1989. 8        Versed Volume 84 63 147        Nimbex Volume 157.2 117.9 275.1        Precedex Volume 150 112.5 262.5        Fentanyl Volume 36 27 63        Diprivan Volume 448.8 336.6 785. 4        Phenylephrine Volume 235 221.9 456.8      Other 50  50        Irrigation Volume Input (mL) (Urinary Catheter 11/30/21 [place] - Temperature) 50  50      NG/ 50 170        Water Flush Volume (mL) (Orogastric Tube 11/30/21) 30 20 50        Medication Volume (Orogastric Tube 11/30/21) 90 30 120      Shift Total(mL/kg) 1281(10.3) 928.9(7.5) 2209. 8(17.7)      OUTPUT   Urine(mL/kg/hr) 1875(1.3) 850 2725        Urine Output (mL) (Urinary Catheter 11/30/21 [place] - Temperature) 6694 870 7269      Shift Total(mL/kg) 1875(15.1) 850(6.8) 2725(21.9)      NET -594 78.9 -515.2      Weight (kg) 124.5 124.5 124.5 124.5 124.5 124.5       CBC:  Recent Labs     12/02/21  0119 12/01/21  0205 11/30/21  0115   WBC 22.3* 31.6* 28.3*   HGB 15.6 15.8 17.1*   HCT 45.9 46.2 49.3    308 524       Metabolic Panel:  Recent Labs     12/02/21  0119 12/01/21  0205 11/30/21  1804 11/30/21  0115 11/30/21  0115    135* 136   < > 134*   K 4.4 4.4 4.7   < > 4.7    103 101   < > 101   CO2 32 30 26   < > 21   BUN 22* 26* 30*   < > 26*   CREA 0.82 0.85 0.94   < > 0.85   * 115* 170*   < > 159*   CA 8.1* 8.3* 8.1*   < > 8.5   MG 2.7* 2.8* 2.4   < > 2.4   ALB 3.1* 3.3*  --   --  3.5   ALT 34 31  --   --  36    < > = values in this interval not displayed.           For Billing    Chief Complaint   Patient presents with   [facility] Problems  Date Reviewed: 12/1/2021          Codes Class Noted POA    ARDS (adult respiratory distress syndrome) ([facility] ICD-10-CM: A40  ICD-9-CM: 518.52  12/1/2021 No        Septic shock ([facility] ICD-10-CM: A41.9, R65.21  ICD-9-CM: 038.9, 785.52, 995.92  11/26/2021 Yes        Pneumomediastinum ([facility] ICD-10-CM: J98.2  ICD-9-CM: 518.1  11/26/2021 No        Pneumothorax on left ICD-10-CM: J93.9  ICD-9-CM: 512.89  11/26/2021 No        Hyponatremia ICD-10-CM: E87.1  ICD-9-CM: 276.1  11/26/2021 Yes        Diabetes mellitus type 2, controlled ([facility] ICD-10-CM: E11.9  ICD-9-CM: 250.00  11/25/2021 Yes        * (Principal) Acute hypoxemic respiratory failure due to COVID-19 [facility]) ICD-10-CM: U07.1, J96.01  ICD-9-CM: 518.81, 079.89, 799.02  11/19/2021 Yes        Obesity, morbid ([facility] ICD-10-CM: E66.01  ICD-9-CM: 278.01  4/10/2018 Yes        JESSU (obstructive sleep apnea) ICD-10-CM: G47.33  ICD-9-CM: 327.23  10/15/2014 Yes        Hypertension ICD-10-CM: I10  ICD-9-CM: 401.9  10/15/2014 Yes        Pure hypercholesterolemia ICD-10-CM: E78.00  ICD-9-CM: 272.0  Unknown Yes

## 2021-12-02 NOTE — PROGRESS NOTES
[facility] Management follow-up:    RUR 11%(low readmission risk)    LOS 12 days,GLOS 4.8    Pt remains intubated,sedated,and paralyzed. Proning is being conducted per ICU protocol. Peep 18,100% Fio2  Pt remains critically. If pt does not improve,please consider Palliative Medicine consultation.     Autumn Coy Serve

## 2021-12-02 NOTE — PROGRESS NOTES
0700: Bedside and Verbal shift change report given to Kyle Gonzalez RN (oncoming nurse) by Shira Rudolph RN (offgoing nurse). Report included the following information SBAR, Kardex, Intake/Output and Cardiac Rhythm SR. Primary Nurse Aaliyah Meredith RN and Shakira Ford RN performed a dual skin assessment on this patient No impairment noted  Caemron score is see flowsheet. Drips rate verified. Nimbex @ 3.5 mcg/kg/min  Precedex @ 0.4 mcg/kg/hr  Fentanyl @ 150 mcg/hr  Versed @ 7 mg/hr  Sanjeev @ 50 mcg/min  Propofol @ 50 mcg/kg/min  Vasopressin @ 0.03 units/min    0800: Assessment completed, see doc flowsheet. Intubated, sedated, and pharmacologically paralyzed. Pupils round and sluggish, 2mm. TOF 1/4 twiches, baseline 40mA. NSR on monitor, S1S2 auscultated. BP WDL on sanjeev and vasopressin. PRVC on ventilator 28/400/14/100%. Tube feed held overnight d/t high residuals (~500 mL). Unable to assess bowel sounds as patient is currently proned, plan to supinate patient around 1000. Central line dressing saturated with blood, and blood appears to be leaking around dressing. Plan to change dressing once patient is supine. 0830: Dr. Omero Goodrich in room via telehealth monitor to assess patient. Orders received to start Eraxis and vancomycin, obtain DIC profile, CRP, and ferritin, and for PICC line d/t bleeding from central line. Vent changes made to PRVC/28/400/16/100% with plan to increase PEEP to 18 prior to supinating. 0900: Tube feeds remain held, OGT to suction. 0940: Ronit Hugo took phone call from vascular access regarding PICC line order. Per VAT, patient's fever, WBC count, and blood cultures prohibit patient from getting PICC line at this time. VAT will continue to monitor. 1015: Patient flipped to supine position. PEEP increased to 18 prior to proning per Dr. Omero Goodrich. Large amount of blood and what appears to be tube feed or emesis found all over front of patient.      1100: IDRs completed with team. Plan to start TPN tomorrow if still unable to feed patient. Orders received to stop precedex. 1200: Reassessment completed, see doc flowsheet. Patient turned and repositioned in bed. Oral care and endotracheal care completed. Vent settings remain the same. TOF 1/4 twitches at 40 mA, pupils round and sluggish. Bowel sounds hypoactive x4. Precedex stopped per Dr. Isis Royal, Versed increased to 8 mg/hr. 1400: Oral care and endotracheal care completed. Patient turned and repositioned in bed.     1600: Reassessment completed, see doc flowhseet. Patient turned and repositioned in bed. Oral care and endotracheal care completed. TOF 1/4 twitches at 40 mA.     1715: Patient back to prone position. Bloody nose noted once proned. PEEP back down to 16 per Dr. Isis Royal. 1800: Oral care and endotracheal care completed. 1900: Verbal shift change report given to Delmi [address] RN (oncoming nurse) by Isac Gupta RN (offgoing nurse).  Report included the following information SBAR, Kardex, Intake/Output and Cardiac Rhythm SR.

## 2021-12-02 NOTE — PROGRESS NOTES
1900: Bedside and Verbal shift change report given to Serenity/David RN (oncoming nurse) by Jaime Olmos RN (offgoing nurse). Report included the following information SBAR, Kardex, Recent Results and Cardiac Rhythm SR.     1930: Assessment completed, see doc flow sheet. Pt is in th prone position. Nurse repositioned arms. Neuro: Pt is sedated and pharm paralyzed. T.O.F baseline is 40 mA, 1/4 twitches, bilateral pupils round and sluggish @ 2 mm. No cough w/suction d/t paralytic medication. Cardio: S1S2 present, Pt is hypotensive on Sanjeev @ 45. Titrated to 50. BP now 95/57 MAP 66. Respiratory: ETT 8, , Rate 28. PEEP 14, FIO2 100. SATS @ 96. Bilateral lung sounds are diminished, R>L. GI/: On assessment lewis @ 450 ml of clear yellow urine. Lewis catheter is patent and draining. Tube feed running @ 20 ml/hr w/ 10 ml flush Q1H. Skin: Peripheral extremities are cold. Nurse found pulses w/doppler at bilateral lower dorsalis pedal pulses and upper radial pulses. Toe on bilateral feet are now colored dark. Nose is bleeding and so is his LIJ TL catheter site. Site dressing is saturated w/new blood. 2000: VAP bundle completed. Mouth care done. 2200: VAP bundle completed. Mouth care done. Ice packs placed on pt.    0000: Assessment completed, see doc flow sheet. Pt's arms turned and repositioned. Tylenol given, temp 102.    0200: VAP bundle completed. Mouth care done. Nurse checked gastric residual. G.R 500. Nurse stopped tube feed. 36: Nurse in room troubleshooting high peak pressure, resistance of inline suction and extra water in the ventilator tubing. During suctioning, pt went tachycardic 140's, Temp is suddenly coming down, now at 101.7, then pt became hypotensive. Nurse started Vaso, see MAR.    0400: Assessment completed, see doc flow sheet. Pt turned and repositioned. VAP bundle completed. Mouth care provided. 0600: VAP bundle completed. Mouth care provided.  Dark yellow and Mertha Jesus secretions coming from pt's mouth noted. Bilateral pupils are brisk and round 2 mm. 5: Family Practice at the bedside. Aware of nurse stopping tube feeds and pt's new BMI.    0700: Bedside and Verbal shift change report given to Priyanka Perales RN (oncoming nurse) by Betsy Bautista RN (offgoing nurse).  Report included the following information SBAR, Kardex, Med Rec Status and Cardiac Rhythm ST, SR.

## 2021-12-02 NOTE — PROGRESS NOTES
Vascular Access Team:  Order acknowledged to place PICC line. Patient has 102.1 fever, WBC 22.3, blood cultures were drawn, and are showing no growth after 15 hours. Spoke with [employer], who will relay to intensivist that we will monitor, however, cultures need to be at least 24-36 hours with no growth to safely place PICC line. Patient has a functioning triple lumen catheter.

## 2021-12-02 NOTE — PROGRESS NOTES
[address] Medicine Residency  Resident Note in Brief  ----------------------------------------    S: Pt sedated, intubated with O2 sats in mid-high 90's. Pt remains febrile. Requested they call immediately, as needed, for changes in pt status. O:  Visit Vitals  /62   Pulse 81   Temp (!) 102.1 °F (38.9 °C)   Resp 28   Ht 5' 11\" (1.803 m)   Wt 274 lb 7.6 oz (124.5 kg)   SpO2 97%   BMI 38.28 kg/m²         A/P  Anna Casper is a 39 y.o. male with a PMH of HTN, HLD, JESUS admitted for AHRF and severe sepsis 2/2 COVID PNA. Patient was admitted on 11/19/2021. Currently waiting transfer for ECMO, pending acceptance by [facility]. AHRF and ARDS 2/2 COVID PNA: Unvaccinated. Symptom onset 11/12 with acute respiratory decompensation, O2 sats in high 60s prior to admission.  CTA chest without evidence of definite/proximal PE, no aortic aneurysm/dissection, imaging consistent with moderate to severe COVID PNA and associated hilar/mediastinal lymphadenopathy. B/l lower extremity duplex without evidence of DVT. CXR 11/27 demonstrated no significant change in diffuse bilateral airspace disease. S/p Tocilizumab 800mg 11/19.   - Intubated on 11/30. Satting 96% on PEEP of 16 and RR 26  - Proned; plan to supinate tomorrow AM  - Goal sats 88% or higher  -  CBC, CMP daily. No need for further trending Covid labs  - Continue Decadron 6mg IV daily  - Duo-neb or Combivent Respimat Q6H  - Therapeutic Lovenox  - Holding oral PRN meds  - Holding COVID meds (Atorvastatin 20mg every day, Vit C 500mg BID, Zinc 220mg daily)  - Pulm following, appreciate recs  - Per intensivist recs, pursuing transfer pt for ECMO, denied from [facility] and [place]; [facility] to review case     Pneumomediastinum/Pneumothorax: Possibly barotrauma 2/2 high pressures needed for COVID ARDS PNA vs lung frailty. Will monitor for signs of worsening pneumothorax.    - Repeat CXR for line placement     Severe sepsis 2/2 COVID PNA: PNA labs negative, BCx/UCx no growth. Likely severe sepsis on initial presentation is all related to COVID PNA. CTX + Doxy x 5d (11/19-11/24). Cefepime (11/28-11/30)  - Abx discontinued after procal neg, repeat cultures pending, low threshold for fungal culture and coverage  - Central line place, Sanjeev at 35, MAPs mid 60s      Please see full daily progress note for complete plan.   Liliana Reyes MD  8:00 PM

## 2021-12-02 NOTE — CONSULTS
[facility]  CRITICAL CARE    Tele ICU TEAM Progress Note    Name: Maricruz Toro   : 1985   MRN: [id number]   Date: 2021           ICU Assessment and Plan3   Clinical Summary:  Referral Source: Dr. Angus Hummel    Acute hypoxic resp failure  HTN  Obesity   JESUS  DM            ICU Comprehensive Plan of Care:    NEURO: sedation per protocol, paralytics, wean off precedex as able  CV: Keep MAP >65; pressors as needed, check echo    PULM: Continue mechanical ventilation, low TV, high peep strategy, serial ABG, supination with higher peep was able to maiintain recruitment,  decadron to 6 mg giovani,y s/p toci. On waitlist for ecmo at [facility]   GI: holding tube feeds due to increased residuals, consider TPN tomorrow before the weekend if possible  RENAL/: Monitor Cr and UOP; continue diuresis   ENDO: BG Goal 140-180; glycemic control  HEME/ONC: Tx Hgb < 7, Plt<10k; transfuse as needed, monitor need for transfusion given oozing check dic panel, place picc shar and remove tlc   MICRO:  FU on Cx; fu cultures, abx and antifungals  Code Status: Full Code  LOS: 13  Prophylaxis: GI: SUP h2 b  DVT: therapeutic lovenox empirically      Discussed Care Plan with Bedside RN   Subjective:   Reason for ICU Admission: covid       45yo man admitted for [facility]. Had small apical Ptx. Summary of Events:     - admitted   - pneumomediastinum and possible L apical PTX on CXR   - escalated to ICU for continuous BiPAP at 100%; pt declined intubated--happy hypoxic on BiPAP currently, tenuous however; called spouse Jase Sizer to update--update done; precedex on and off; Cefepime dc'd.  : intubated yday, sedated, paralyzed and proned. Ongoing fevers   unable to tolerate being supine ydya at lower peep, fevers, oozing from tlc site.      Past Medical History:      has a past medical history of Hypercholesteremia, Hypertension (10/15/2014), Poison ivy (9/15/2015), Pure hypercholesterolemia, and S/P vasectomy (2014). He also has no past medical history of Abuse, Anemia NEC, Arrhythmia, Calculus of kidney, Chronic pain, Congestive heart failure, unspecified, COPD, Depression, GERD (gastroesophageal reflux disease), Headache(784.0), Psychotic disorder ([address] Thyroid disease, or Trauma. Past Surgical History:      has no past surgical history on file. Home Medications:     Prior to Admission medications    Medication Sig Start Date End Date Taking? Authorizing Provider   metaxalone (SKELAXIN) 800 mg tablet Take 800 mg by mouth three (3) times daily as needed for Muscle Spasm(s). Yes Provider, Historical   naproxen (NAPROSYN) 500 mg tablet Take 500 mg by mouth two (2) times daily as needed for Pain. Yes Provider, Historical   hydroCHLOROthiazide (HYDRODIURIL) 12.5 mg tablet TAKE 1 TABLET BY MOUTH EVERY DAY 10/25/21  Yes Milagros Suresh NP   amLODIPine (NORVASC) 10 mg tablet TAKE 1 TABLET DAILY 78  Yes Tangela Arango MD   glucosamine HCl/chondroitin bone (GLUCOSAMINE-CHONDROITIN PO) Take  by mouth. 1,500mg/1,200mg/MGMSM 1,000mg   Yes Provider, Historical   Krill-OM3-DHA-EPA-OM6-Lip-Astx (KRILL OIL) 1000-130(40-80) mg cap Take 1 Cap by mouth once over twenty-four (24) hours. Yes Provider, Historical   FERROUS FUMARATE/VIT BCOMP&C (SUPER B COMPLEX PO) Take  by mouth.    Yes Provider, Historical   atorvastatin (LIPITOR) 10 mg tablet TAKE 1 TABLET DAILY 21   Noel Soler NP     Allergies/Social/Family History:     No Known Allergies   Social History     Tobacco Use    Smoking status: Never Smoker    Smokeless tobacco: Never Used   Substance Use Topics    Alcohol use: No     Alcohol/week: 0.0 standard drinks      Family History   Problem Relation Age of Onset    Diabetes Mother     Heart Attack Father 43    Stroke Maternal Grandmother     Cancer Paternal Grandfather          age 45 - Hodgkin's   [de-identified] Maternal Grandfather          in 42's with melanoma     Review of Systems: unable to obtain due to sedate  Objective:   Vital Signs:  Visit Vitals  /63   Pulse 81   Temp (!) 101 °F (38.3 °C)   Resp 28   Ht 5' 11\" (1.803 m)   Wt 118.4 kg (261 lb)   SpO2 95%   BMI 36.40 kg/m²    O2 Flow Rate (L/min): 60 l/min O2 Device: Endotracheal tube Temp (24hrs), Av.8 °F (38.8 °C), Min:101 °F (38.3 °C), Max:102.1 °F (38.9 °C)           Intake/Output:     Intake/Output Summary (Last 24 hours) at 2021 1638  Last data filed at 2021 1500  Gross per 24 hour   Intake 2429.4 ml   Output 2895 ml   Net -465.6 ml       Physical Exam:  Gen: intubated, sedated , paralyzed  HEENT: PERRL  NC, AT  Neck: no JVD, midline trach  Mouth: oral ETT   CV: NSR  Pulm: symmetric chest rise  Abd: NT ND soft  Ext: no edema, cyanosis, clubbing  MSK: no deformities, no synovitis  Neuro: sedated, paralyzed    LABS AND  DATA: Personally reviewed  Recent Labs     21  0119 21  0205   WBC 22.3* 31.6*   HGB 15.6 15.8   HCT 45.9 46.2    308     Recent Labs     21  0119 21  0205    135*   K 4.4 4.4    103   CO2 32 30   BUN 22* 26*   CREA 0.82 0.85   * 115*   CA 8.1* 8.3*   MG 2.7* 2.8*     Recent Labs     21  0119 21  0205   AP 89 106   TP 6.5 6.6   ALB 3.1* 3.3*   GLOB 3.4 3.3     Recent Labs     21  1105   INR 1.2*   PTP 12.2*   APTT 24.2      Recent Labs     21  0936   PHI 7.36   PCO2I 59.3*   PO2I 75*   FIO2I 100     No results for input(s): CPK, CKMB, TROIQ, BNPP in the last 72 hours.     Hemodynamics:   PAP:   CO:     Wedge:   CI:     CVP:    SVR:       PVR:       Ventilator Settings:  Mode Rate Tidal Volume Pressure FiO2 PEEP   PRVC   400 ml    100 % 18 cm H20     Peak airway pressure: 36 cm H2O    Minute ventilation: 9.6 l/min        MEDS:   Current Facility-Administered Medications:     [START ON 12/3/2021] anidulafungin (ERAXIS) 100 mg in 0.9% sodium chloride 130 mL IVPB, 100 mg, IntraVENous, Q24H, Em Serna MD    meropenem (MERREM) 500 mg in sterile water (preservative free) 10 mL IV syringe, 0.5 g, IntraVENous, Q6H, Jf Schmitt MD, 500 mg at 12/02/21 1533    vancomycin (VANCOCIN) 1500 mg in  ml infusion, 1,500 mg, IntraVENous, Q12H, Jf Schmitt MD    enoxaparin (LOVENOX) injection 120 mg, 1 mg/kg, SubCUTAneous, Q12H, Kash Robb MD    [START ON 12/3/2021] polyethylene glycol (MIRALAX) packet 17 g, 17 g, Oral, DAILY, Jf Schmitt MD    metoclopramide HCl (REGLAN) injection 5 mg, 5 mg, IntraVENous, BID, Jf Schmitt MD, 5 mg at 12/02/21 0839    dexamethasone (PF) (DECADRON) 10 mg/mL injection 6 mg, 6 mg, IntraVENous, Q24H, Jf Schmitt MD, 6 mg at 12/02/21 0839    white petrolatum-mineral oiL (AKWA TEARS) 83-15 % ophthalmic ointment, , Both Eyes, BID, Jf Schmitt MD, Given at 12/02/21 0841    dexmedeTOMidine in 0.9 % NaCl (PRECEDEX) 400 mcg/100 mL (4 mcg/mL) infusion soln, 0.1-1.5 mcg/kg/hr, IntraVENous, TITRATE, Diane Rust MD, Stopped at 12/02/21 1206    bumetanide (BUMEX) injection 0.5 mg, 0.5 mg, IntraVENous, BID, Diane Rust MD, 0.5 mg at 12/02/21 0839    pantoprazole (PROTONIX) 40 mg in 0.9% sodium chloride 10 mL injection, 40 mg, IntraVENous, DAILY, Diane Rust MD, 40 mg at 12/02/21 0839    insulin lispro (HUMALOG) injection, , SubCUTAneous, Q6H, Kash Robb MD, 3 Units at 12/02/21 1208    chlorhexidine (PERIDEX) 0.12 % mouthwash 15 mL, 15 mL, Oral, Q12H, Diane Rust MD, 15 mL at 12/02/21 0840    propofol (DIPRIVAN) 10 mg/mL infusion, 0-50 mcg/kg/min, IntraVENous, TITRATE, Diane Rust MD, Last Rate: 37.4 mL/hr at 12/02/21 1533, 50 mcg/kg/min at 12/02/21 1533    HYDROmorphone (DILAUDID) syringe 0.5 mg, 0.5 mg, IntraVENous, Q1H PRN, Diane Rust MD, 0.5 mg at 11/30/21 1630    senna-docusate (PERICOLACE) 8.6-50 mg per tablet 1 Tablet, 1 Tablet, Per G Tube, BID, Diane Rust MD, 1 Tablet at 12/02/21 0711    midazolam in normal saline (VERSED) 1 mg/mL infusion, 0-10 mg/hr, IntraVENous, TITRATE, Narendra Benjamin MD, Last Rate: 8 mL/hr at 12/02/21 1206, 8 mg/hr at 12/02/21 1206    fentaNYL (PF) 1,500 mcg/30 mL (50 mcg/mL) infusion, 0-200 mcg/hr, IntraVENous, TITRATE, Narendra Benjamin MD, Last Rate: 3 mL/hr at 12/02/21 0956, 150 mcg/hr at 12/02/21 0956    cisatracurium (NIMBEX) 200 mg in 0.9% sodium chloride 100 mL (2 mg/mL) infusion, 0-10 mcg/kg/min, IntraVENous, TITRATE, Narendra Benjamin MD, Last Rate: 13.1 mL/hr at 12/02/21 1551, 3.5 mcg/kg/min at 12/02/21 1551    ascorbic acid (vitamin C) (VITAMIN C) tablet 500 mg, 500 mg, Per G Tube, BID, Narendra Benjamin MD, 500 mg at 12/02/21 0841    atorvastatin (LIPITOR) tablet 20 mg, 20 mg, Per Wolcott Gazella, DAILY, Narendra Benjamin MD, 20 mg at 12/02/21 4490    benzonatate (TESSALON) capsule 200 mg, 200 mg, Oral, TID, Narendra Benjamin MD, 200 mg at 12/02/21 1533    melatonin (rapid dissolve) tablet 5 mg, 5 mg, Oral, QHS, Narendra Benjamin MD    acetaminophen (TYLENOL) tablet 1,000 mg, 1,000 mg, Per G Tube, Q6H PRN, 1,000 mg at 12/01/21 1754 **OR** acetaminophen (TYLENOL) suppository 975 mg, 975 mg, Rectal, Q6H PRN, Narendra Bnejamin MD, 975 mg at 12/02/21 1945    glucose chewable tablet 16 g, 4 Tablet, Per G Tube, PRN, Narendra Benjamin MD    PHENYLephrine (DINO-SYNEPHRINE) 30 mg in 0.9% sodium chloride 250 mL infusion,  mcg/min, IntraVENous, TITRATE, Amaris Shaw DO, Last Rate: 25 mL/hr at 12/02/21 0958, 50 mcg/min at 12/02/21 0958    vasopressin (VASOSTRICT) 20 Units in 0.9% sodium chloride 100 mL infusion, 0.03 Units/min, IntraVENous, CONTINUOUS, Naveed Donald MD, Last Rate: 9 mL/hr at 12/02/21 1212, 0.03 Units/min at 12/02/21 1212    white petrolatum (VASELINE) ointment, , Topical, PRN, Claudia More MD    oxymetazoline (AFRIN) 0.05 % nasal spray 2 Spray, 2 Spray, Both Nostrils, DAILY PRN, Claudia More MD    sodium chloride (OCEAN) 0.65 % nasal squeeze bottle 2 Spray, 2 Spray, Both Nostrils, Q4H, Ying Mcdowell MD, 2 Spray at 12/02/21 1084    sodium chloride (OCEAN) 0.65 % nasal squeeze bottle 2 Spray, 2 Spray, Both Nostrils, Q2H PRN, Shelli Mtz DO, 2 [place] at 11/25/21 1103    fluticasone propionate (FLONASE) 50 mcg/actuation nasal spray 2 Spray, 2 Spray, Both Nostrils, DAILY, Shelli Mtz DO, 2 [place] at 11/30/21 0808    albuterol-ipratropium (DUO-NEB) 2.5 MG-0.5 MG/3 ML, 3 mL, Nebulization, Q6H RT, 3 mL at 12/02/21 1505 **OR** ipratropium-albuterol (COMBIVENT RESPIMAT) 20 mcg-100 mcg inhalation spray, 2 Puff, Inhalation, Q6H RT, Betsy Whelan MD, 2 Puff at 11/28/21 2332    [Held by provider] simethicone (MYLICON) tablet 80 mg, 80 mg, Oral, QID PRN, Ying Mcdowell MD, 80 mg at 11/29/21 1504    lidocaine 4 % patch 1 Patch, 1 Patch, TransDERmal, Q24H, Luz Marina Kahn MD, 1 Patch at 12/01/21 2112    [Held by provider] guaiFENesin ER (MUCINEX) tablet 600 mg, 600 mg, Oral, Q12H, Carmen Christianson MD, 600 mg at 11/30/21 0808    phenol throat spray (CHLORASEPTIC) 2 Spray, 2 Spray, Oral, Q6H PRN, Jo-Ann Jenkins MD, 2 Spray at 11/20/21 0213    sodium chloride (NS) flush 5-40 mL, 5-40 mL, IntraVENous, Q8H, Carmen Christianson MD, 10 mL at 12/02/21 1445    sodium chloride (NS) flush 5-40 mL, 5-40 mL, IntraVENous, PRN, Carmen Christianson MD    ondansetron (ZOFRAN ODT) tablet 4 mg, 4 mg, Oral, Q8H PRN **OR** ondansetron (ZOFRAN) injection 4 mg, 4 mg, IntraVENous, Q6H PRN, Carmen Christianson MD, 4 mg at 11/21/21 0912    zinc sulfate (ZINCATE) 50 mg zinc (220 mg) capsule 1 Capsule, 1 Capsule, Oral, DAILY, Carmen Christianson MD, 1 Capsule at 12/02/21 0839    prochlorperazine (COMPAZINE) injection 10 mg, 10 mg, IntraVENous, Q6H PRN, Carmen Christianson MD, 10 mg at 11/29/21 0937    dextrose (D50W) injection syrg 12.5-25 g, 12.5-25 g, IntraVENous, PRN, Carmen Christianson MD    glucagon (GLUCAGEN) injection 1 mg, 1 mg, IntraMUSCular, PRN, Harley Hidalgo MD    [Held by provider] HYDROcodone-chlorpheniramine (TUSSIONEX) oral suspension 5 mL, 5 mL, Oral, Q12H, Paulina Dean MD, 5 mL at 11/30/21 5031        I performed all aspects of the physical examination via Telemedicine associated with two way audio and video communication and with the on-site assistance of Nurse. I am located in [place] and the patient is located in [address] [place] [address]. Patient is critically ill in the ICU. I  personally  reviewed the pertinent medical records, laboratory/ pathology data and radiographic images. The decision making regarding this patient is as documented above, which was generated  following  discussion  with the multidisciplinary team and creation of a treatment plan for  the patient. We discussed the patient's interval history and future coordination of care and  plans. The patient's medications  were reviewed and changes made as stipulated above. Due to  critical illness impairing one or more vital organs of this patient resulting in life threatening clinical situation  I have provided direct, frequent personal  assessment and manipulation in management plan and spent 35  minutes  of  critical care time excluding the time spent on procedures and teaching. Greater than 50% of this time  in patient's care was  employed  in counseling and coordination of care and engaged in face to face discussion of case management issues, addressing questions, and outlining a plan of  therapy.

## 2021-12-03 PROBLEM — L08.9: Status: ACTIVE | Noted: 2021-01-01

## 2021-12-03 PROBLEM — E78.49 OTHER HYPERLIPIDEMIA: Status: ACTIVE | Noted: 2021-01-01

## 2021-12-03 PROBLEM — R79.89 POSITIVE D DIMER: Status: ACTIVE | Noted: 2021-01-01

## 2021-12-03 PROBLEM — R79.89 ELEVATED SERUM CREATININE: Status: ACTIVE | Noted: 2021-01-01

## 2021-12-03 PROBLEM — J18.9 COMMUNITY ACQUIRED PNEUMONIA: Status: ACTIVE | Noted: 2021-01-01

## 2021-12-03 NOTE — PROGRESS NOTES
Progress Note  Date:12/3/2021       Room:[address] [place] [address]  Patient Name:Pan Blanco     YOB: 1985     Age:36 y.o. Subjective    Subjective remains intubated, sedated, paralyzed, and proned  Review of Systems aníbal given ams  Objective         Vitals Last 24 Hours:  TEMPERATURE:  Temp  Av.9 °F (37.7 °C)  Min: 98.1 °F (36.7 °C)  Max: 101.7 °F (38.7 °C)  RESPIRATIONS RANGE: Resp  Av  Min: 28  Max: 31  PULSE OXIMETRY RANGE: SpO2  Av.8 %  Min: 92 %  Max: 98 %  PULSE RANGE: Pulse  Av.9  Min: 65  Max: 103  BLOOD PRESSURE RANGE: Systolic (95LOS), ELS:091 , Min:90 , NVM:536   ; Diastolic (02XJG), AIT:59, Min:53, Max:74    I/O (24Hr): Intake/Output Summary (Last 24 hours) at 12/3/2021 0915  Last data filed at 12/3/2021 0500  Gross per 24 hour   Intake 1960.87 ml   Output 2162 ml   Net -201.13 ml     Objective     Exam facilitated by use of telemedicine platform and with assistance from in house team  Gen - intubated and sedated; proned; paralyzed  Head - nc/at where visible  Eyes - periorbital edema on left (could not see right side)  Ent - ett without secretions or hemorrhage; draining from mouth (secretions/drool)  Cvs - sinus rhythm without external evidence of hypoperfusion  Pulm - concordant with vent; Ppeak in mid 40s  GI-no evidence of tenderness with passive movement  Ext - edema present  Msk - normal bulk  Neuro - intubated, sedated; no spont motor activity or response to stimuli    Labs/Imaging/Diagnostics    Labs:  CBC:  Recent Labs     21  0354 21  1105 21  0119 21  0205 21  0205   WBC 24.3*  --  22.3*  --  31.6*   RBC 4.48  --  5.12  --  5.18   HGB 13.7  --  15.6  --  15.8   HCT 40.9  --  45.9  --  46.2   MCV 91.3  --  89.6  --  89.2   RDW 13.2  --  13.1  --  13.2    253 258   < > 308    < > = values in this interval not displayed.      CHEMISTRIES:  Recent Labs     21  0354 21  0119 21  0205  137 135*   K 4.1 4.4 4.4   CL 99 102 103   CO2 34* 32 30   BUN 25* 22* 26*   CA 7.9* 8.1* 8.3*   MG 2.6* 2.7* 2.8*   PT/INR:  Recent Labs     12/03/21  0835 12/02/21  1105   INR 1.1 1.2  1.2*     APTT:  Recent Labs     12/03/21  0835 12/02/21  1105   APTT 23.9 25  24.2     LIVER PROFILE:  Recent Labs     12/03/21  0354 12/02/21  0119 12/01/21  0205   AST 48* 49* 38*   ALT 30 34 31     Lab Results   Component Value Date/Time    ALT (SGPT) 30 12/03/2021 03:54 AM    AST (SGOT) 48 (H) 12/03/2021 03:54 AM    Alk. phosphatase 70 12/03/2021 03:54 AM    Bilirubin, direct 0.14 08/11/2016 03:50 PM    Bilirubin, total 0.7 12/03/2021 03:54 AM       Imaging Last 24 Hours:  ECHO ADULT COMPLETE    Result Date: 12/2/2021  · LV: Estimated LVEF is 50 - 55%. Visually measured ejection fraction. Normal cavity size. Mildly increased wall thickness. Low normal systolic function.  · Image quality for this study was suboptimal.      Assessment//Plan   Principal Problem:    Acute hypoxemic respiratory failure due to COVID-19 [facility]) (11/19/2021)    Active Problems:    Pure hypercholesterolemia ()      JESUS (obstructive sleep apnea) (10/15/2014)      Hypertension (10/15/2014)      Obesity, morbid ([address] (4/10/2018)      Diabetes mellitus type 2, controlled ([address] (11/25/2021)      Septic shock ([address] (11/26/2021)      Pneumomediastinum ([address] (11/26/2021)      Pneumothorax on left (11/26/2021)      Hyponatremia (11/26/2021)      ARDS (adult respiratory distress syndrome) ([address] (12/1/2021)      Assessment & Plan    Acute hypoxic resp failure  HTN  Obesity   JESUS  DM     NEURO - remains intubated and sedated with more negative goal rass given severity of hypoxia and the need for proning/paralysis    CVS - continue vasopressors to ensure MAPs at goal; no evidence of hypoperfusion and suspect degree of hypotension related to depth of sedation  -Echo demonstrated EF 50-55%    Pulm -ongoing ARDs secondary to multifocal pneumonia from covid; he has required prone positioning with improvement in Pa: FiO2 (although remains in mod-severe ARDs range); will give supine trial this am and obtain ABG at end of such to demonstrate that/if he still procures benefit from proning  - his IBW is 75kg and 8,7,6ml/kg translates to 600,525, and 450 - will reassess airway pressures and ABG prior to making changes to settings   -remains on potential regimen for covid and s/p tocilizumab earlier in course  - Notes suggest that on waitlist for ecmo at [facility] and that BMI precludes local ECMO center  - no extension of pneumomediastinum or prior ptx     GI-contined residuasl/draining from OGT do npo except meds  - Remains on MADELYN proph    -remains at high risk for JAMIR with sepsis, hypotension, hypoxia, and covid infection    HEME -  Given negative VTE work-up previously and worsening hemorrhage, will stop the empiric Lovenox and then re-assess; if therapeutic dosing resumed, would consider heparin  - no immediate need for transfusion of blood products    ID - empiric abx and antifungals given uptrending wbc and increased vasopressor requirements    ENDO - DM (diet-controlled) and prior A1c 5.8% in 05/2021  - SSI (resistant given morbid obesity) w/ q6h glucose checks    FEN - goal negative as possible; monitor and correct electrolytes as needed    CCT 60 minutres excluding teaching and procedures  I performed all aspects of the physical examination via Telemedicine associated with two way audio and video communication and with the on-site assistance of the bedside nurse. I am located in [place] and the patient is located in [place] at [address] [place] [address]   The patient is critically ill in the ICU. I  personally  reviewed the pertinent medical records, laboratory/ pathology data and radiographic images.   The decision making regarding this patient is as documented above, which was generated  following  discussion  with the multidisciplinary ICU team and creation of a treatment plan for  the patient. We discussed the patient's interval history and future coordination of care and  plans. The patient's medications  were reviewed and changes made as stipulated above. Due to  critical illness impairing one or more vital organs of this patient resulting in life threatening clinical situation  I have provided direct, frequent personal  assessment and manipulation in management plan.       Electronically signed by Leanne Valle MD on 12/3/2021 at 9:15 AM

## 2021-12-03 NOTE — PROGRESS NOTES
Comprehensive Nutrition Assessment    Type and Reason for Visit: Reassessment    Nutrition Recommendations/Plan:   1. NPO while NG set to suction    2. When able, resume Tube Feeding below:   · Peptide based High protein (Vital HP) @ 15 mL x20 hrs    · Advance as tolerated by 10mL Q6H to goal 35mL/hr  · FWF 10mL QH     3. Add Protein Supplements:   · ProSource 6 packets daily (can give 2 packets at a time Q8H), FWF 15mL before and after to clear tube of tube feeding formula    4. Check triglycerides today & bi-weekly - ?propofol being reduced    5. If unable to resume EN feedings, consider TPN when medically appropriate:   · Day 1: D20/AA5% @ 42 mL/h  · Day 2: D20/AA5% @ 63 mL/h    · ONLY if propofol is reduced or stopped TPN can be advanced to goal 83mL/h  · No lipids      Nutrition Assessment:        12/3: RD attended & discussed patient during interdisciplinary rounds on unit. TF off with NG in place to low suction per RN. Pt with bleeding noted. If unable to resume PO, once more fluid stable consider TPN as rec'd above. No lipids. Awaiting bed available fro ECMO. Wt is down from admit - BMI = 36.28.      TF @35mL provides: 770ml of tf, 1010 kcal (1995 kcal/d including protein & propofol), 133 g protein, 85 g carb, ~955 ml of free water from TF & flushes. 17 kcal/kg & 1.1g of pro/kg. TPN @ 63mL/h provides[de-identified] ~1.5L, 1330 kcal (2315 kcal including propofol) & 76 g protein. (~11 kcal/kg or 20 kcal/kg including propofol, 0.6 g pro/kg)     12/1: follow up. Pt intubated, sedated and paralyzed. Propofol at max mcg/kg/min provides ~ 985 kcal/d from lipids. 2CalHN formula is a high fat formula. MD ordered TF as below & analysis of feeding is provided below. If no need for permissive underfeeding (~65-70% of calorie needs), current orders will meet or exceed current estimated calorie needs, but is grossly inadequate for protein needs.  Adding 6 packets of ProSource daily would help meet protein needs and add 240 kcal/day to provide ~2825 kcal/d while on propofol & 133 g protein (meeting ~23 kcal/kg & 1 g pro/kg). Bowel regimen has been adjusted & Reglan added. Pt made NPO & RD stopped ONS orders per protocol. Current Tube Feeding Orders: 2Cal HN @ 20mL/hr, advance to 40mL/hr, FWF 30mL QH  as MD ordered provides:   @20ml/h: 400ml of TF, 800 kcal/d (1785 kcal/d including propofol), 33 g pro, 87 g carb, 880 ml of free water from TF & flushes. 16 kcal/kg & 0.26 g pro/kg. @40ml/h: 800 ml of TF, 1600 kcal/d (2585 kcal/d including propofol), 67 g pro, 175 g carb, 1182 mL of free water from TF & flushes. 20.7 kcal/kg & 0.5 g pro/kg. 11/30: RD attended & discussed patient during interdisciplinary rounds on unit. Pt with worsening respiratory status today. Has been NPO except meds without adequate time or endurance to be off BiPap. Wt appears to be significant below stated admit/usual wt.      11/22: pt admitted for Acute hypoxemic respiratory failure due to COVID-19 [facility]) [U07.1, J96.01] who  has a past medical history of Hypercholesteremia, Hypertension (10/15/2014), and S/P vasectomy (8/6/2014). Pt was sitting up in chair in room. Pt has been switching between High Flow NC and Bipap for oxygen needs. He is asking to be able to eat. Call to MDs to request diet or supplements when safe. Pt is still accepting meds 2-3 times daily. Review of history notes blood glucose much improved from earlier this year. On admission, notes reviewed pt had nausea and diarrhea for 2 days prior, has had clear liquids & crackers. Pt has been NPO x4 days (since evening of admission date). Morbidly obese male with high calorie/protein needs.       Malnutrition Assessment:  Malnutrition Status:  Severe malnutrition    Context:  Acute illness     Findings of the 6 clinical characteristics of malnutrition:   Energy Intake:  7 - 50% or less of est energy requirements for 5 or more days  Weight Loss:  7 - Greater than 2% over 1 week  - wt down 9.7% from admit wt (doc wt from 4 months ago)   Body Fat Loss:  Unable to assess,     Muscle Mass Loss:  Unable to assess,    Fluid Accumulation:  No significant fluid accumulation,     Strength:  Not performed     Estimated Daily Nutrient Needs:  Energy (kcal): 1730 kcal/d (65% of [id number]) 2660 kcal/d   Weight Used for Energy Requirements: Current  Protein (g): 117 - 156g (1.5-2 g/kg of IBW); Weight Used for Protein Requirements: Ideal (78.2 kg)  Fluid (ml/day): 1730 mL+;   Method Used for Fluid Requirements: 1 ml/kcal    Nutrition Related Findings:     Last BM:     ABD: obese, intact, active bowel sounds    Edema: none (12/3)     non-pitting - all extremities ()                 None - R/LUE, trace - R/LLE ()     Oxygen Needs:  Vent Measures: 11.4 l/min    Temp (24hrs), Av.2 °F (37.3 °C), Min:97.6 °F (36.4 °C), Max:100.7 °F (38.2 °C)    Nutr. Related Meds:   Vit C, Zinc, Lipitor, decadron, Protonix, Bumex, Nimbex, Fentanyl, reglan, Versed, Sanjeev-synephrine, Propofol, merrem    PRN: correction insulin, miralax      Nutr. Related Labs:   Lab Results   Component Value Date/Time    Glucose 121 (H) 2021 03:54 AM    Glucose (POC) 149 (H) 2021 11:55 AM     Lab Results   Component Value Date/Time    Hemoglobin A1c 5.8 (H) 2021 10:25 AM    Hemoglobin A1c 11.1 (H) 2021 10:47 AM     Lab Results   Component Value Date/Time    Sodium 138 2021 03:54 AM    Potassium 4.1 2021 03:54 AM    Chloride 99 2021 03:54 AM    CO2 34 (H) 2021 03:54 AM     Lab Results   Component Value Date/Time    Triglyceride 390 (H) 2021 03:54 AM     Wounds:    None        Current Nutrition Therapies:  DIET NPO  ADULT TUBE FEEDING Orogastric; Peptide Based High Protein; Delivery Method: Continuous; Continuous Initial Rate (mL/hr): 20; Continuous Advance Tube Feeding: No; Water Flush Volume (mL): 10; Water Flush Frequency: Q 1 hour; Modulars/Additives: Pro. ..     Documented Meal intake:  Patient Vitals for the past 168 hrs:   % Diet Eaten   12/03/21 1400 0%   12/03/21 0800 0%   11/29/21 1750 26 - 50%   11/29/21 1155 51 - 75%   11/29/21 0930 51 - 75%   11/28/21 1200 26 - 50%   11/28/21 0800 1 - 25%   11/27/21 1043 76 - 100%     Documentation of supplement intake:  Patient Vitals for the past 168 hrs:   Supplement intake %   11/29/21 1750 76 - 100%   11/29/21 1155 0%   11/29/21 0930 0%   11/28/21 1200 0%   11/28/21 0800 0%   11/27/21 1043 0%       Anthropometric Measures:  · Height:  5' 11\" (180.3 cm)  · Current Body Wt:  118 kg (260 lb 2.3 oz)   · Admission Body Wt:  304 lb    · Usual Body Wt:  136.1 kg (300 lb)     · Ideal Body Wt:  172 lbs:  151.2 %   · Adjusted Body Weight:   ; Weight Adjustment for: No adjustment   · Adjusted BMI:       · BMI Category:  Obese class 2 (BMI 35.0-39. 9)     Body mass index is 36.28 kg/m². Last 3 Recorded Weights in this Encounter    12/02/21 0643 12/02/21 1419 12/03/21 1115   Weight: 118.6 kg (261 lb 7.5 oz) 118.4 kg (261 lb) 118 kg (260 lb 2.3 oz)     Wt Readings from Last 6 Encounters:   12/03/21 118 kg (260 lb 2.3 oz)   07/30/21 137.9 kg (304 lb)   04/30/21 133.8 kg (295 lb)   04/07/20 145.2 kg (320 lb)   12/06/19 145.2 kg (320 lb)   10/04/19 142.4 kg (314 lb)      Nutrition Diagnosis:   · Inadequate protein-energy intake related to catabolic illness, impaired respiratory function as evidenced by intake 26-50%, weight loss greater than or equal to 2% in 1 week, unable to accept PO due to increased O2 needs. · Severe malnutrition related to catabolic illness, inadequate protein-energy intake, impaired respiratory function as evidenced by NPO or clear liquid status due to medical condition, weight loss greater than or equal to 2% in 1 week, intake 26-50%, 9.7% wt loss in the last 2 weeks to 4 months.        Nutrition Interventions:   Food and/or Nutrient Delivery: Modify tube feeding  Nutrition Education and Counseling: No recommendations at this time  Coordination of Nutrition Care: Continue to monitor while inpatient, Interdisciplinary rounds    Goals:  provide and tolerate 50-75% of estimated needs within the next 3-4 days       Nutrition Monitoring and Evaluation:   Behavioral-Environmental Outcomes: None identified  Food/Nutrient Intake Outcomes: Enteral nutrition intake/tolerance  Physical Signs/Symptoms Outcomes: Biochemical data, Weight, Hemodynamic status    Discharge Planning:     Too soon to determine     Electronically signed by Bryan Gonzales RD, MS on 12/3/2021 at 4:10 PM  Contact via [address] [place] [address] or office [phone]

## 2021-12-03 NOTE — PROGRESS NOTES
0700  .. Bedside and Verbal shift change report given to Shirley Sandoval RN (oncoming nurse) by Brandie Niak RN (offgoing nurse). Report included the following information SBAR, Kardex, Intake/Output, MAR, Recent Results, Cardiac Rhythm NSR, Alarm Parameters , Quality Measures and Dual Neuro Assessment. 0800 - ASSESSMENT COMPLETED; PT IN PRONE POSITION. COPIOUS BLEEDING FROM TLC SITE AND ALSO BLEEDING FROM MOUTH AND NOSE. SUCTIONED WITH SCANT SECRETIONS FROM ORAL AND ETT. ROUNDING WITH MD COMPLETED AND WILL SUPINATE AT 9:30. PT BMI CONTINUES TO BE TOO HIGH TO TRANSFER FOR ECMO. 1000 - PT SUPINATED WITH TEAM.  TLC DRESSING CHANGED. CHG BATH COMPLETED; MULLIGAN CARE AND MOUTH CARE COMPLETED. PEEP INCREASED TO 18 PER ORDER.    1100 - RIGHT PICC PLACED; KEEPING TLC FOR NOW PER INTENSIVIST. 1200 - PT TURNED; ETT CARE; ORAL CARE; MULLIGAN CARE. TOF 1/4 AT 40AMPS. Lu Cooper. PT WEIGHED - REMAINS 118 KG.    1400 - PT TURNED; ETT CARE; ORAL CARE; MULLIGAN CARE. TOF 1/4 AT 40AMPS. MULLIGAN BAG EMPTIED. BLOOD CULTURES AND LABS DRAWN. TLC REMOVED PER ORDER; QUICK CLOT AND DRESSING APPLIED.    1600 -  PT PRONED; ETT CARE; ORAL CARE; MULLIGAN CARE. TOF 1/4 AT 40AMPS. 1800 - PT TURNED; ETT CARE; ORAL CARE; MULLIGAN CARE. TOF 1/4 AT 40AMPS. Alma Jerez .Bedside and Verbal shift change report given to SAUL JIMENEZ (oncoming nurse) by Shirley Sandoval RN (offgoing nurse). Report included the following information SBAR, Kardex, Intake/Output, MAR, Recent Results, Cardiac Rhythm NSR, Alarm Parameters  and Dual Neuro Assessment.

## 2021-12-03 NOTE — PROGRESS NOTES
PICC Placement Note    PRE-PROCEDURE VERIFICATION  Correct Procedure: yes  Correct Site:  yes  Temperature: Temp: 98.1 °F (36.7 °C), Temperature Source: Temp Source: Bladder  Recent Labs     12/03/21  0835 12/03/21  0354 12/02/21  1105   BUN  --  25*  --    CREA  --  0.56*  --    PLT  --  230  --    INR 1.1  --    < >   WBC  --  24.3*  --     < > = values in this interval not displayed. Allergies: Patient has no known allergies. Education materials for PICC Care given: yes. See Patient Education activity for further details. PICC Booklet placed at bedside: yes    Closed Ended PICC Catheters:  Flush Lumens as Follows:  Intermittent Medication:   Flush before and after each medication with 10 ml NS. Unused Ports:  Flush every 8 hours with 10 ml NS.  TPN Ports:  Flush every 24 hours with 20 ml NS prior to hanging new bag. Blood Draws: Stop infusion, draw off and waste 10 ml of blood. Draw sample with 10cc syringe or greater. DO NOT USE VACUTAINER . Transfer with appropriate device to lab  tubes. Flush with 20 ml NS. Dressing Change:  Every 7 days, and PRN using sterile technique if integrity of dressing is compromised. Initial dressing change for central line 24-48 hours post insertion if gauze is used. Apply new dressing per policy. PROCEDURE DETAIL  Consent was obtained and all questions were answered related to risks and benefits. A triple lumen PICC line was inserted, as a sterile procedure using ultrasound and modified Seldinger technique for vascular access. The following documentation is in addition to the PICC properties in the lines/airways flowsheet :  Lot #: [id number]  Lidocaine 1% administered intradermally :yes  Internal Catheter Total Length: 43 (cm) 1 cm out   Vein Selection for PICC:right basilic  Central Line Bundle followed yes  Complication Related to Insertion:yes    The placement was verified by EKG, MAX P WAVE @ 43 (cm). PER EKG PICC TIP @ C/A junction.       X-ray: no.     Line is okay to use: yes    Keysha Rodas RN

## 2021-12-03 NOTE — PROGRESS NOTES
[address] [facility] [address] [address]   Office [phone]  Fax [phone]          Assessment and Plan     Maricruz Toro is a 39 y.o. male with a PMH of HTN, HLD, JESUS admitted for AHRF and severe sepsis 2/2 COVID PNA. Patient was admitted on 11/19/2021. Interval Events: No acute events overnight. AHRF and ARDS 2/2 COVID PNA: Unvaccinated, symptom onset 11/12/21. CTA chest no PE with moderate to severe COVID PNA, and pneumomediastinum/pneumothorax. S/p Tocilizumab 800mg 11/19. Requiring intubation and sedation 11/30. Given patient's age and condition working towards transfer for ECMO at [facility], unable to transfer locally secondary to BMI > 35.    - Daily weights.   - Prone as tolerated  - Goal sats 88% or higher  - CBC, CMP daily. No need for further trending Covid labs  - Continue Decadron 6mg IV daily  - Duo-neb or Combivent Respimat Q6H  - Holding COVID meds (Atorvastatin 20mg every day, Vit C 500mg BID, Zinc 220mg daily)  - Pulm following, appreciate recs    Severe sepsis 2/2 COVID PNA: PNA labs negative, BCx/UCx no growth. Likely severe sepsis on initial presentation is all related to COVID PNA. CTX + Doxy x 5d (11/19-11/24). Cefepime (11/28-12/1). Fevers again to 102, given negative culture concern for possible fungal etiology. - Meropenam, Vanc, and Eraxis. - Discuss possible fungalculture with ICU  - Central line place, Sanjeev at 60 and Vaso 0.3, MAPs lower 70s. Oozing: Blood oozing from central line placement as well as around endotrach tube. Patient on therapeutic Lovenox. INR 1.2, PT 12.2, Fibrinogen 239.  - Plan to remove central line and place pic    Pneumomediastinum/Pneumothorax: Possibly barotrauma 2/2 high pressures needed for COVID ARDS PNA vs lung frailty. Will monitor for signs of worsening pneumothorax. Repeat CXR 12/1 showing stable pneumomediastinum, and resolved pneumothorax.    - Monitor for sings of worsening barotrauma     Insomnia/anxiety: Pt with new insomnia and anxiety, specifically overnight. - Intubated, sedated. Nutritional status: OG tube in place  - Nutrition consulted  - possible TPN initiation current OG feeds stopped    HTN: Pt normotensive off meds. Currently hypotensive. At home is on Amlodipine 10mg daily and HCTZ 12.5mg daily  - Will continue to monitor      HLD: Chronic, stable. Last lipids 07/2021 , HDL 54, LDL 98.6, . On Atorvastatin 10mg daily.  - Holding Atorvastatin 20mg daily     T2DM (diet-controlled): Prior A1c 5.8% in 05/2021. No meds at present.   - SSI (resistant given morbid obesity) w/ q6h glucose checks     JESUS: On CPAP at night. - Intubated, sedated.     Obesity: Body mass index is 42.4 kg/m² on presentation.  - Encouraging lifestyle modifications and further follow up outpatient. At-risk JAMIR: RESOLVED. POA Cr 1.31, BL 0.8. Likely IVVD in setting of poor PO intake. - Daily CMP    Chest pain: RESOLVED. Description is pleuritic in nature, worse with cough. EKG without evidence of acute ischemia. Troponin of 9 makes CAD less concerning.   - Cardiac monitoring, will continue to monitor     Diarrhea: RESOLVED. Watery diarrhea x 2 days. Likely 2/2 COVID infection. Improved since presentation. Enteric bacteria panel negative. Abdominal pain: RESOLVED. Secondary to gas pains after starting diet. - Continue Simethicone       FEN/GI - NPO. OG tube in place  Activity - Ambulate with assistance  DVT prophylaxis - Lovenox (therapeutic)  GI prophylaxis - Protonix  Disposition - Plan to d/c to TBD. Code Status - Full. Discussed with patient / caregivers. [facility] and [address] [facility],  Spouse - [phone]      Alyssa Chandler MD  Family Medicine Resident    Patient discussed with Family Medicine Attending: Dr. Elías Macnilla / Objective   Subjective:   Patient intubated and proned, on pressors. Sedated. No acute distress.      Respiratory: O2 Flow Rate (L/min): 60 l/min O2 Device: Endotracheal tube, Ventilator   Visit Vitals  BP (!) 111/55   Pulse 87   Temp 98.5 °F (36.9 °C)   Resp 28   Ht 5' 11\" (1.803 m)   Wt 261 lb (118.4 kg)   SpO2 98%   BMI 36.40 kg/m²     General: Intubated, sedated. Appears comfortable. Respiratory: Transmitted mechanical sounds appreciated bilaterally, unchanged. Cardiovascular: regular rate, regular rhythm. GI: + bowel sounds. Nontender. Extremities:  No LE edema. LE cool but with cap refill ~3s  Skin: Warm, dry. Oozing around central line, improved since yesterday   Neuro: Awake and alert  Mitchell in place     I/O:  Date 12/02/21 0700 - 12/03/21 0659 12/03/21 0700 - 12/04/21 0659   Shift 4547-9379 9299-9745 24 Hour Total 3288-8662 3836-8755 24 Hour Total   INTAKE   I.V.(mL/kg/hr) 1116(0.8) 1153.8(0.8) 2269.9(0.8)        Versed Volume 81.9 96 177.9        Nimbex Volume 144. 1 157.2 301.3        Precedex Volume 71.6 0 71.6        PitRESSIN Volume 99 108 207        Fentanyl Volume 33 36 69        Diprivan Volume 411.4 448.8 860.2        Phenylephrine Volume 275 307.8 582.8      NG/GT 30  30        Intake (ml) (Orogastric Tube 11/30/21) 30  30      Shift Total(mL/kg) 1146(9.7) 1153.8(9.7) 2299. 9(19.4)      OUTPUT   Urine(mL/kg/hr) 965(0.7) 1097(0.8) 2062(0.7)        Urine Output (mL) (Urinary Catheter 11/30/21 Mitchell - Temperature) 965 1097 2062      Emesis/NG output 750  750        Output (ml) (Orogastric Tube 11/30/21) 750  750      Shift Total(mL/kg) 1715(14.5) 1097(9.3) 1085(14.5)      NET -569 56.8 -512.1      Weight (kg) 118.4 118.4 118.4 118.4 118.4 118.4       CBC:  Recent Labs     12/03/21  0354 12/02/21  1105 12/02/21  0119 12/01/21  0205 12/01/21  0205   WBC 24.3*  --  22.3*  --  31.6*   HGB 13.7  --  15.6  --  15.8   HCT 40.9  --  45.9  --  46.2    253 258   < > 308    < > = values in this interval not displayed.        Metabolic Panel:  Recent Labs     12/03/21  0354 12/02/21  1105 12/02/21  0119 12/01/21  0205     --  137 135*   K 4.1  -- 4.4 4.4   CL 99  --  102 103   CO2 34*  --  32 30   BUN 25*  --  22* 26*   CREA 0.56*  --  0.82 0.85   *  --  132* 115*   CA 7.9*  --  8.1* 8.3*   MG 2.6*  --  2.7* 2.8*   ALB 2.8*  --  3.1* 3.3*   ALT 30  --  34 31   INR  --  1.2  1.2*  --   --           For Billing    Chief Complaint   Patient presents with   [address] [facility] Problems  Date Reviewed: 12/1/2021          Codes Class Noted POA    ARDS (adult respiratory distress syndrome) ([facility] ICD-10-CM: I38  ICD-9-CM: 518.52  12/1/2021 No        Septic shock (HCC) ICD-10-CM: A41.9, R65.21  ICD-9-CM: 038.9, 785.52, 995.92  11/26/2021 Yes        Pneumomediastinum ([facility] ICD-10-CM: J98.2  ICD-9-CM: 518.1  11/26/2021 No        Pneumothorax on left ICD-10-CM: J93.9  ICD-9-CM: 512.89  11/26/2021 No        Hyponatremia ICD-10-CM: E87.1  ICD-9-CM: 276.1  11/26/2021 Yes        Diabetes mellitus type 2, controlled ([facility] ICD-10-CM: E11.9  ICD-9-CM: 250.00  11/25/2021 Yes        * (Principal) Acute hypoxemic respiratory failure due to COVID-19 [facility]) ICD-10-CM: U07.1, J96.01  ICD-9-CM: 518.81, 079.89, 799.02  11/19/2021 Yes        Obesity, morbid ([facility] ICD-10-CM: E66.01  ICD-9-CM: 278.01  4/10/2018 Yes        JESUS (obstructive sleep apnea) ICD-10-CM: G47.33  ICD-9-CM: 327.23  10/15/2014 Yes        Hypertension ICD-10-CM: I10  ICD-9-CM: 401.9  10/15/2014 Yes        Pure hypercholesterolemia ICD-10-CM: E78.00  ICD-9-CM: 272.0  Unknown Yes

## 2021-12-03 NOTE — PROGRESS NOTES
Problem: Falls - Risk of  Goal: *Absence of Falls  Description: Document Earma Shaheed Fall Risk and appropriate interventions in the flowsheet. Outcome: Progressing Towards Goal  Note: Fall Risk Interventions:  Mobility Interventions: Communicate number of staff needed for ambulation/transfer, Bed/chair exit alarm    Mentation Interventions: Bed/chair exit alarm, Evaluate medications/consider consulting pharmacy, More frequent rounding, Toileting rounds    Medication Interventions: Bed/chair exit alarm, Evaluate medications/consider consulting pharmacy    Elimination Interventions:  Toileting schedule/hourly rounds              Problem: Patient Education: Go to Patient Education Activity  Goal: Patient/Family Education  Outcome: Progressing Towards Goal     Problem: Ventilator Management  Goal: *Adequate oxygenation and ventilation  Outcome: Progressing Towards Goal  Goal: *Patient maintains clear airway/free of aspiration  Outcome: Progressing Towards Goal  Goal: *Absence of infection signs and symptoms  Outcome: Progressing Towards Goal  Goal: *Normal spontaneous ventilation  Outcome: Progressing Towards Goal     Problem: Patient Education: Go to Patient Education Activity  Goal: Patient/Family Education  Outcome: Progressing Towards Goal

## 2021-12-03 NOTE — PROGRESS NOTES
[address] Medicine Residency  Resident Note in Brief  ----------------------------------------    S: Pt sedated, intubated with O2 sats in mid-high 90's. Pt afebrile. Requested they call immediately, as needed, for changes in pt status. O:  Visit Vitals  /60   Pulse 78   Temp 99.6 °F (37.6 °C)   Resp 28   Ht 5' 11\" (1.803 m)   Wt 261 lb (118.4 kg)   SpO2 96%   BMI 36.40 kg/m²         A/P  Merced Collet is a 39 y.o. male with a PMH of HTN, HLD, JESUS admitted for AHRF and severe sepsis 2/2 COVID PNA. Patient was admitted on 11/19/2021. Currently waiting transfer for ECMO, pending bed availability by [place]. AHRF and ARDS 2/2 COVID PNA: Unvaccinated. Symptom onset 11/12 with acute respiratory decompensation, O2 sats in high 60s prior to admission.  CTA chest without evidence of definite/proximal PE, no aortic aneurysm/dissection, imaging consistent with moderate to severe COVID PNA and associated hilar/mediastinal lymphadenopathy. B/l lower extremity duplex without evidence of DVT. CXR 11/27 demonstrated no significant change in diffuse bilateral airspace disease. S/p Tocilizumab 800mg 11/19. Given patient's age, hx of pneumomediastinum, increasing plateau pressures while intubated, poor oxygenation on 100% FiO2, and inability to tolerate supine positioning  - Pursue transfer to [facility] for bed availability  - unable to transfer locally for  ECMO due to BMI, required to be less than 35. Currently at 39. Daily weights. - Intubated on 11/30. Satting 100% on PEEP of 14 and RR 28  - Proned currently   - Goal sats 88% or higher  -  CBC, CMP daily.  No need for further trending Covid labs  - Continue Decadron 10mg IV daily  - Duo-neb or Combivent Respimat Q6H  - Therapeutic Lovenox  - Holding oral PRN meds  - Holding COVID meds (Atorvastatin 20mg every day, Vit C 500mg BID, Zinc 220mg daily)  - Pulm following, appreciate recs     Pneumomediastinum/Pneumothorax: Possibly barotrauma 2/2 high pressures needed for COVID ARDS PNA vs lung frailty. Will monitor for signs of worsening pneumothorax. - Repeat CXR showing stable pneumomediastinum, and resolved pneumothorax. Worsening airspace disease.      Severe sepsis 2/2 COVID PNA: PNA labs negative, BCx/UCx no growth. Likely severe sepsis on initial presentation is all related to COVID PNA. CTX + Doxy x 5d (11/19-11/24). Cefepime (11/28-)   - Given recent fevers after d/c abx, addition of abx per ICU team. Meropenam, Vanc, and Eraxis. - Central line place, Sanjeev at 50 and Vaso 0.3, MAPs lower 70s. Please see full daily progress note for complete plan.   Jean Carlos Tapia MD  8:00 PM

## 2021-12-03 NOTE — PROGRESS NOTES
1900: Bedside and Verbal shift change report given to SAUL Nickerson (oncoming nurse) by Nakul Valenzuela RN (offgoing nurse). Report included the following information SBAR, Kardex, Recent Results and Cardiac Rhythm SR.     1930: Assessment completed, see doc flow sheet. Pt turned and repositioned. Neuro: Pt is sedated and pharm paralyzed. T.O.F baseline is 40 mA, 1/4 twitches, bilateral pupils round and brisk @ 2 mm. No cough w/suction d/t paralytic medication.     Cardio: S1S2 present, BP now 95/57 MAP 66.     Respiratory: ETT 8, , Rate 28. PEEP 16, FIO2 100. SATS @ 95. Bilateral lung sounds are coarse and diminished, R>L.     GI/: On assessment lewis @ 450 ml of cloudy satya urine. Lewis catheter is patent and draining. MATTHEW abdomen d/t prone positioning.     Skin: Peripheral extremities are cool, uppers are warmer than lowers. Nurse found pulses w/doppler at bilateral lower posterior tibialis pulses but upper radial pulses were palpable. Toes on bilateral feet are acrocyanotic. Nose is bleeding and so is his L IJ TL catheter site. Site dressing is saturated w/new blood. Edema noted of right eyelid. 1936: During assessment, Sanjeev titrated, see MAR. BP 92/53 MAP 62.    2200: VAP bundle completed. Mouth care done. 0000: Assessment completed, see doc flow sheet. T.O.F baseline 40 mA 1/2 twitches. Pt turned and repositioned. Bilateral lung sounds are no longer coarse and are clear on auscultation. Right lower lobe is more diminished than left. T.O.F baseline is 40 mA, 1/4 twitches. 0200: VAP bundle completed. Mouth care done. 0400: VAP bundle completed. Mouth care done. T.O.F baseline is 40 mA, 1/4 twitches.    0600: VAP bundle completed. Mouth care done. 0700: Bedside and Verbal shift change report given to Yan Pozo RN (oncoming nurse) by Alexandre Schroeder RN (offgoing nurse).  Report included the following information SBAR, Intake/Output, Recent Results and Cardiac Rhythm SR.

## 2021-12-03 NOTE — PROGRESS NOTES
Transition of Care Plan - RUR 12%:  1. Patient continues to require medical management. Patient remains intubated and sedated. 2. Reportedly patient has been accepted to [facility] for ECMO pending bed availability; currently there are no beds.   3. CM will continue to follow    Brigette Carter LCSW

## 2021-12-04 NOTE — PROGRESS NOTES
[facility] Dosing Services:12/4/2021    Consult for antibiotic dosing of vancomycin by Dr. Carie Flores  Indication: HAP  Day of Therapy: 3    Vancomycin therapy:  Current maintenance dose: vancomycin 1500 mg IV every 12 hours   Last level: 2.9 mcg/mL drawn 13 hours post last dose    Dose calculated to approximate a          Target AUC/TESFAYE of 400-600       Adjustment to therapy: 1500 mg every 8 hours for     Pharmacy to follow daily.   Pharmacist Sergio Neff                      [phone]

## 2021-12-04 NOTE — PROGRESS NOTES
Veda Coley with Dr. Marielle Gregg who wanted tube feeds restarted and stated that she still wanted the water flushes at 10ml every hour even with patient on bumex.  No other changes at this time

## 2021-12-04 NOTE — PROGRESS NOTES
[address] [facility] [address] [address]   Office [phone]  Fax [phone]          Assessment and Plan     Naveen Mederos is a 39 y.o. male with a PMH of HTN, HLD, JESUS admitted for AHRF and severe sepsis 2/2 COVID PNA. Patient was admitted on 11/19/2021. Interval Events: No acute events overnight. AHRF and ARDS 2/2 COVID PNA: Unvaccinated, symptom onset 11/12/21. CTA chest no PE with moderate to severe COVID PNA, and pneumomediastinum/pneumothorax. S/p Tocilizumab 800mg 11/19. Requiring intubation and sedation 11/30. Given patient's age and condition working towards transfer for ECMO at [facility], unable to transfer locally secondary to BMI > 35.    - Daily weights.   - Prone as tolerated  - Goal sats 88% or higher  - CBC, CMP daily. No need for further trending Covid labs  - Continue Decadron 6mg IV daily  - Duo-neb or Combivent Respimat Q6H  - Holding COVID meds (Atorvastatin 20mg every day, Vit C 500mg BID, Zinc 220mg daily)  - Pulm following, appreciate recs    Severe sepsis 2/2 COVID PNA: PNA labs negative, BCx/UCx no growth. Likely severe sepsis on initial presentation is all related to COVID PNA. CTX + Doxy x 5d (11/19-11/24). Cefepime (11/28-12/1). Fevers again to 102, given negative culture concern for possible fungal etiology. - Meropenam, Vanc, and Eraxis. - F/u fungal culture  - PIC line place, Sanjeev at 60 and Vaso 0.3, MAPs lower 70s. Oozing: Blood oozing from central line placement as well as around endotrach tube. INR 1.2, PT 12.2, Fibrinogen 239. Pic line in place. Off lovenox  - Discuss need for Heparin with ICU  - Trend D Dimers    Pneumomediastinum/Pneumothorax: Possibly barotrauma 2/2 high pressures needed for COVID ARDS PNA vs lung frailty. Will monitor for signs of worsening pneumothorax. Repeat CXR 12/1 showing stable pneumomediastinum, and resolved pneumothorax.    - Monitor for signs of worsening barotrauma     Insomnia/anxiety: Pt with new insomnia and anxiety, specifically overnight. - Intubated, sedated. Nutritional status: TPN through [facility] starting 12/3/21  - Nutrition consulted  - TPN initiation current OG feeds stopped    HTN: Pt normotensive off meds. Currently hypotensive. At home is on Amlodipine 10mg daily and HCTZ 12.5mg daily  - Will continue to monitor      HLD: Chronic, stable. Last lipids 07/2021 , HDL 54, LDL 98.6, . On Atorvastatin 10mg daily.  - Holding Atorvastatin 20mg daily     T2DM (diet-controlled): Prior A1c 5.8% in 05/2021. No meds at present.   - SSI (resistant given morbid obesity) w/ q6h glucose checks     JESUS: On CPAP at night. - Intubated, sedated.     Obesity: Body mass index is 42.4 kg/m² on presentation.  - Encouraging lifestyle modifications and further follow up outpatient. At-risk JAMIR: RESOLVED. POA Cr 1.31, BL 0.8. Likely IVVD in setting of poor PO intake. - Daily CMP    Chest pain: RESOLVED. Description is pleuritic in nature, worse with cough. EKG without evidence of acute ischemia. Troponin of 9 makes CAD less concerning.   - Cardiac monitoring, will continue to monitor     Diarrhea: RESOLVED. Watery diarrhea x 2 days. Likely 2/2 COVID infection. Improved since presentation. Enteric bacteria panel negative. Abdominal pain: RESOLVED. Secondary to gas pains after starting diet. - Continue Simethicone       FEN/GI - TPN  Activity - Ambulate with assistance  DVT prophylaxis - Lovenox (therapeutic)  GI prophylaxis - Protonix  Disposition - Plan to d/c to TBD. Code Status - Full. Discussed with patient / caregivers. Next of Kim 69 Name and [address] [facility],  Spouse - [phone]      Kelley Rodas MD  Family Medicine Resident    Patient discussed with Family Medicine Attending: Dr. Raúl Johnson / Objective   Subjective:   Patient intubated and proned, on pressors. Sedated. No acute distress.      Respiratory: O2 Flow Rate (L/min): 60 l/min O2 Device: Ventilator   Visit Vitals  /66   Pulse 84   Temp 99.1 °F (37.3 °C)   Resp 28   Ht 5' 11\" (1.803 m)   Wt 260 lb 2.3 oz (118 kg)   SpO2 98%   BMI 36.28 kg/m²     General: Intubated, sedated. Appears comfortable. Respiratory: Transmitted mechanical sounds appreciated bilaterally, unchanged. Cardiovascular: regular rate, regular rhythm. GI: + bowel sounds. Nontender. Extremities:  No LE edema. LE cool but with cap refill ~3s  Skin: Warm, dry. PIC line in place  Neuro: Intubated sedated  Mitchell in place     I/O:  Date 12/03/21 0700 - 12/04/21 0659 12/04/21 0700 - 12/05/21 0659   Shift 3289-7531 6377-1620 24 Hour Total 6636-6339 9550-0251 24 Hour Total   INTAKE   P.O. 0  0        P. O. 0  0      I. V.(mL/kg/hr) 2376.5(1.7) 1107.1 3483. 6        Versed Volume 104 96 200        Nimbex Volume 170.3 115 285.3        Precedex Volume 0  0        PitRESSIN Volume 117 81 198        Fentanyl Volume 39 36 75        Diprivan Volume 486.2 448.8 935        Phenylephrine Volume 300 330.3 630.3        Volume (anidulafungin (ERAXIS) 100 mg in 0.9% sodium chloride 130 mL IVPB) 100  100        Volume (meropenem (MERREM) 500 mg in sterile water (preservative free) 10 mL IV syringe) 60  60        Volume (vancomycin (VANCOCIN) 1500 mg in  ml infusion) 1000  1000      Other 50  50        Irrigation Volume Input (mL) (Urinary Catheter 11/30/21 Mitchell - Temperature) 50  50      NG/GT 0  0        Water Flush Volume (mL) (Orogastric Tube 11/30/21) 0  0        Medication Volume (Orogastric Tube 11/30/21) 0  0        Intake (ml) (Orogastric Tube 11/30/21) 0  0      Shift Total(mL/kg) 2426. 5(20.6) 1107. 1(9.4) 3533.6(29.9)      OUTPUT   Urine(mL/kg/hr) 1293(4) 1780 4580        Urine Output (mL) (Urinary Catheter 11/30/21 Mitchell - Temperature) 2800 1780 4580      Emesis/NG output  0 0        Output (ml) (Orogastric Tube 11/30/21)  0 0      Shift Total(mL/kg) 2825(05.7) 1780(15.1) 2892(17.4)      NET -373.5 -672.9 -1046. 4      Weight (kg) 118 118 118 118 118 118       CBC:  Recent Labs     12/04/21  0334 12/03/21  0354 12/02/21  1105 12/02/21  0119 12/02/21 0119   WBC 17.2* 24.3*  --   --  22.3*   HGB 13.4 13.7  --   --  15.6   HCT 39.8 40.9  --   --  45.9    230 253   < > 258    < > = values in this interval not displayed. Metabolic Panel:  Recent Labs     12/04/21  0334 12/03/21  1428 12/03/21  0835 12/03/21  0354 12/02/21  1105 12/02/21  0119 12/02/21 0119    138  --  138  --    < > 137   K 3.8 4.1  --  4.1  --    < > 4.4   CL 96* 95*  --  99  --    < > 102   CO2 37* 36*  --  34*  --    < > 32   BUN 23* 23*  --  25*  --    < > 22*   CREA 0.53* 0.61*  --  0.56*  --    < > 0.82   * 164*  --  121*  --    < > 132*   CA 8.1* 7.9*  --  7.9*  --    < > 8.1*   MG 2.2  --   --  2.6*  --   --  2.7*   ALB 2.8*  --   --  2.8*  --   --  3.1*   ALT 31  --   --  30  --   --  34   INR  --   --  1.1  --  1.2  1.2*  --   --     < > = values in this interval not displayed.           For Billing    Chief Complaint   Patient presents with   [facility] Problems  Date Reviewed: 12/3/2021          Codes Class Noted POA    Oozing skin inflammation ICD-10-CM: L08.9  ICD-9-CM: 686.9  12/3/2021 Unknown        Positive D dimer ICD-10-CM: R79.89  ICD-9-CM: 790.92  12/3/2021 Unknown        Other hyperlipidemia ICD-10-CM: E78.49  ICD-9-CM: 272.4  12/3/2021 Unknown        Elevated serum creatinine ICD-10-CM: R79.89  ICD-9-CM: 790.99  12/3/2021 Unknown        Community acquired pneumonia ICD-10-CM: J18.9  ICD-9-CM: 809  12/3/2021 Unknown        ARDS (adult respiratory distress syndrome) ([facility] ICD-10-CM: Raquel Room  ICD-9-CM: 518.52  12/1/2021 No        Septic shock ([facility] ICD-10-CM: A41.9, R65.21  ICD-9-CM: 038.9, 785.52, 995.92  11/26/2021 Yes        Pneumomediastinum ([facility] ICD-10-CM: J98.2  ICD-9-CM: 518.1  11/26/2021 No        Pneumothorax on left ICD-10-CM: J93.9  ICD-9-CM: 512.89  11/26/2021 No        Hyponatremia ICD-10-CM: E87.1  ICD-9-CM: 276.1  11/26/2021 Yes Diabetes mellitus type 2, controlled ([facility] ICD-10-CM: E11.9  ICD-9-CM: 250.00  11/25/2021 Yes        * (Principal) Acute hypoxemic respiratory failure due to COVID-19 [facility]) ICD-10-CM: U07.1, J96.01  ICD-9-CM: 518.81, 079.89, 799.02  11/19/2021 Yes        Obesity, morbid ([facility] ICD-10-CM: E66.01  ICD-9-CM: 278.01  4/10/2018 Yes        Elevated liver enzymes ICD-10-CM: R74.8  ICD-9-CM: 790.5  8/5/2016 Yes        JESUS (obstructive sleep apnea) ICD-10-CM: G47.33  ICD-9-CM: 327.23  10/15/2014 Yes        Hypertension ICD-10-CM: I10  ICD-9-CM: 401.9  10/15/2014 Yes        Pure hypercholesterolemia ICD-10-CM: E78.00  ICD-9-CM: 272.0  Unknown Yes

## 2021-12-04 NOTE — PROGRESS NOTES
[address] [facility] [address] [address]   Office [phone]  Fax [phone]          Assessment and Plan     Manuel Ferreira is a 39 y.o. male with a PMH of HTN, HLD, JESUS admitted for AHRF and severe sepsis 2/2 COVID PNA. Patient was admitted on 11/19/2021. Interval Events: No acute events overnight. AHRF and ARDS 2/2 COVID PNA: Continues intubated, sedated, paralyzed, and proned on Vent support RR 28/PEEP 16/ FIO2 100%/ . Pending approval ECMO at [facility] still no availability.  - Daily weights.   - Prone as tolerated  - Goal sats 88% or higher  - CBC, CMP daily. No need for further trending Covid labs  - Continue Decadron 6mg IV daily  - Duo-neb or Combivent Respimat Q6H  - Holding COVID meds (Atorvastatin 20mg every day, Vit C 500mg BID, Zinc 220mg    daily)  - Pulm following, appreciate recs    Severe sepsis 2/2 COVID PNA: Off  Phenylephrine, however continues on Vasopressin. Leukocytosis improving. Fungal Cx NGTD. - Meropenam, Vanc, and Eraxis. - F/u fungal culture  - Intensivist following    Nutritional status: Continues with residual on Tube feedings. 300 ml's last reported per nurse. - Will continue holding TF.  - Will obtain KUB today and start TPN tomorrow if status continues unchaged. - Nutrition consulted    Oozing: Mild bleeding with suctioning otherwise no other findings while prone.     - On prophylactic Lovenox 40 mg BID  - Trend D Dimers    Pneumomediastinum/Pneumothorax: Possibly barotrauma 2/2 high pressures needed for COVID ARDS PNA vs lung frailty. Repeat CXR 12/1 showing stable pneumomediastinum, and resolved pneumothorax. - Monitor for signs of worsening barotrauma     Insomnia/anxiety: Pt with new insomnia and anxiety, specifically overnight. - Intubated, sedated    HTN: Pt normotensive off meds. Currently hypotensive. At home is on Amlodipine 10mg daily and HCTZ 12.5mg daily  - Will continue to monitor      HLD: Chronic, stable.  Last lipids 07/2021 , HDL 54, LDL 98.6, . On Atorvastatin 10mg daily.  - Holding Atorvastatin 20mg daily     T2DM (diet-controlled): Prior A1c 5.8% in 05/2021. No meds at present.   - SSI (resistant given morbid obesity) w/ q6h glucose checks     JESUS: On CPAP at night. - Intubated, sedated.     Obesity: Body mass index is 42.4 kg/m² on presentation.  - Encouraging lifestyle modifications and further follow up outpatient. At-risk JAMIR: RESOLVED. POA Cr 1.31, BL 0.8. Likely IVVD in setting of poor PO intake. - Daily CMP    Chest pain: RESOLVED. Description is pleuritic in nature, worse with cough. EKG without evidence of acute ischemia. Troponin of 9 makes CAD less concerning.   - Cardiac monitoring, will continue to monitor     Diarrhea: RESOLVED. Watery diarrhea x 2 days. Likely 2/2 COVID infection. Improved since presentation. Enteric bacteria panel negative. Abdominal pain: RESOLVED. Secondary to gas pains after starting diet. - Continue Simethicone       FEN/GI - TF as tolerated  Activity - Ambulate with assistance  DVT prophylaxis - Lovenox (ppx)  GI prophylaxis - Protonix  Disposition - Plan to d/c to TBD. Code Status - Full. Discussed with patient / caregivers. Next of [place] [address] Name and Contact - Yanna Ying,  Yahaira - [phone]      Jennifer Bai MD  Family Medicine Resident    Patient discussed with Family Medicine Attending: Dr. Mani Staley / Objective   Subjective:   Patient intubated and proned, on pressors. Sedated. No acute distress. Respiratory: O2 Flow Rate (L/min): 60 l/min O2 Device: Ventilator   Visit Vitals  /64   Pulse 71   Temp 98.3 °F (36.8 °C)   Resp 28   Ht 5' 11\" (1.803 m)   Wt 260 lb 14.4 oz (118.3 kg)   SpO2 97%   BMI 36.39 kg/m²     General: Intubated, sedated. Appears comfortable. Respiratory: Transmitted mechanical sounds appreciated bilaterally, unchanged. Cardiovascular: regular rate, regular rhythm. GI: + bowel sounds. Nontender. Extremities:  No LE edema. LE cool but with cap refill ~3s  Skin: Warm, dry. PICC line in place  Neuro: Intubated sedated  Mitchell in place     I/O:  Date 12/04/21 0700 - 12/05/21 0659 12/05/21 0700 - 12/06/21 0659   Shift 5880-8088 7604-5372 24 Hour Total 0582-8703 9880-5828 24 Hour Total   INTAKE   I.V.(mL/kg/hr) 1858.8(1.3) 2046.1(1.4) 3904.9(1.4)        Versed Volume 80 112 192        Nimbex Volume 60 120 180        PitRESSIN Volume 117 126 243        Fentanyl Volume 30 42 72        Diprivan Volume 374 523.6 897.6        Phenylephrine Volume 157.8 102.5 260.3        Volume (meropenem (MERREM) 500 mg in sterile water (preservative free) 10 mL IV syringe) 40 20 60        Volume (vancomycin (VANCOCIN) 1500 mg in  ml infusion) 1000  1000        Volume (vancomycin (VANCOCIN) 1500 mg in  ml infusion) 0 1000 1000      NG/ 440 740 175  175     Water Flush Volume (mL) (Orogastric Tube 11/30/21) 130 100 230 60  60     Medication Volume (Orogastric Tube 11/30/21) 170 20 190 115  115     Intake (ml) (Orogastric Tube 11/30/21)  320 320      Shift Total(mL/kg) 2158. 8(18.2) 7093.9(83) 3854. 9(39.3) 175(1.5)  175(1.5)   OUTPUT   Urine(mL/kg/hr) 1505(1.1) 1210(0.9) 2715(1) 175  175     Urine Output (mL) (Urinary Catheter 11/30/21 Mitchell - Temperature) 1505 1210 2715 175  175   Emesis/NG output  0 0        Output (ml) (Orogastric Tube 11/30/21)  0 0      Shift Total(mL/kg) 1505(12.7) 1210(10.2) 2715(22.9) 175(1.5)  175(1.5)   .8 1276.1 1929.9 0  0   Weight (kg) 118.3 118.3 118.3 118.3 118.3 118.3       CBC:  Recent Labs     12/05/21  0347 12/04/21  0334 12/03/21  0354   WBC 13.1* 17.2* 24.3*   HGB 11.8* 13.4 13.7   HCT 36.5* 39.8 40.9    239 459       Metabolic Panel:  Recent Labs     12/05/21  0347 12/04/21  0611 12/04/21  0334 12/03/21  1428 12/03/21  0835 12/03/21  0354 12/03/21  0354 12/02/21  1105     --  138 138  --    < > 138  --    K 4.1  --  3.8 4.1  --    < > 4.1  --    CL 97  -- 96* 95*  --    < > 99  --    CO2 38*  --  37* 36*  --    < > 34*  --    BUN 26*  --  23* 23*  --    < > 25*  --    CREA 0.43*  --  0.53* 0.61*  --    < > 0.56*  --    *  --  129* 164*  --    < > 121*  --    CA 8.2*  --  8.1* 7.9*  --    < > 7.9*  --    MG 2.6*  --  2.2  --   --   --  2.6*  --    ALB 2.6*  --  2.8*  --   --   --  2.8*  --    ALT 29  --  31  --   --   --  30  --    INR 1.0 1.0  --   --  1.1  --   --    < >    < > = values in this interval not displayed.           For Billing    Chief Complaint   Patient presents with   [facility] Problems  Date Reviewed: 12/3/2021          Codes Class Noted POA    Oozing skin inflammation ICD-10-CM: L08.9  ICD-9-CM: 686.9  12/3/2021 Unknown        Positive D dimer ICD-10-CM: R79.89  ICD-9-CM: 790.92  12/3/2021 Unknown        Other hyperlipidemia ICD-10-CM: E78.49  ICD-9-CM: 272.4  12/3/2021 Unknown        Elevated serum creatinine ICD-10-CM: R79.89  ICD-9-CM: 790.99  12/3/2021 Unknown        Community acquired pneumonia ICD-10-CM: J18.9  ICD-9-CM: 707  12/3/2021 Unknown        ARDS (adult respiratory distress syndrome) ([facility] ICD-10-CM: Corie Daniel  ICD-9-CM: 518.52  12/1/2021 No        Septic shock ([facility] ICD-10-CM: A41.9, R65.21  ICD-9-CM: 038.9, 785.52, 995.92  11/26/2021 Yes        Pneumomediastinum ([facility] ICD-10-CM: J98.2  ICD-9-CM: 518.1  11/26/2021 No        Pneumothorax on left ICD-10-CM: J93.9  ICD-9-CM: 512.89  11/26/2021 No        Hyponatremia ICD-10-CM: E87.1  ICD-9-CM: 276.1  11/26/2021 Yes        Diabetes mellitus type 2, controlled ([facility] ICD-10-CM: E11.9  ICD-9-CM: 250.00  11/25/2021 Yes        * (Principal) Acute hypoxemic respiratory failure due to COVID-19 [facility]) ICD-10-CM: U07.1, J96.01  ICD-9-CM: 518.81, 079.89, 799.02  11/19/2021 Yes        Obesity, morbid ([facility] ICD-10-CM: E66.01  ICD-9-CM: 278.01  4/10/2018 Yes        Elevated liver enzymes ICD-10-CM: R74.8  ICD-9-CM: 790.5  8/5/2016 Yes        JESUS (obstructive sleep apnea) ICD-10-CM: G47.33  ICD-9-CM: 327.23  10/15/2014 Yes        Hypertension ICD-10-CM: I10  ICD-9-CM: 401.9  10/15/2014 Yes        Pure hypercholesterolemia ICD-10-CM: E78.00  ICD-9-CM: 272.0  Unknown Yes

## 2021-12-04 NOTE — PROGRESS NOTES
[address] [place] [address] with [address] [place] [address]       Resident Progress Note in Brief    S: Pt intubated and sedated. Nursing staff preparing to flip pt to prone position. O:  Visit Vitals  /69   Pulse 90   Temp 99.1 °F (37.3 °C)   Resp 28   Ht 5' 11\" (1.803 m)   Wt 260 lb 14.4 oz (118.3 kg)   SpO2 93%   BMI 36.39 kg/m²     Physical Examination:   General appearance - Intubated, sedated. Heart - rate 90,  Respiratory - ET tube in place. Ventilator setting: Vt 417, RR 28, PEEP 15, FiO2 100%  Neurological - intubated, sedated. A/P:     Diana Dang is a 39 y.o. male with a PMH of HTN, HLD, JESUS admitted for AHRF and ARDS 2/2 COVID PNA. Currently intubated and sedated. Working towards transfer for ECMO. Patient was admitted on 11/19/2021. AHRF and ARDS 2/2 COVID PNA: Unvaccinated. CTA chest on 11/19/21 demonstrated moderate to severe COVID PNA. S/p tocilizumab on 11/19. Intubated and sedated on 11/30. Working towards transfer for ECMO. Potential availability at [facility], but BMI must be < 35.  - Intubated, sedated. Intensivist following.  - goald sats 88% or higher  - Prone as tolerated  - CBC, CMP daily. - Continue Decadron 6mg IV daily  - Duo-neb or Combivent Respimat Q6H  - Atorvastatin 20mg every day, Vit C 500mg BID, Zinc 220mg daily  - Pulm following, appreciate recs    Sepsis 2/2 COVID PNA: Last febrile on 12/2/21 at 1600 (100.7 F). - Continue Meropenem, vancomycin, Eraxis  - F/u blood cultures  - Sanjeev-synephrine, Vasopressin. Intensivist following    Nutrition:  - Tube feeds      Please see the primary team's daily progress note for the patient's full plan.     Cameron Grover MD  Family Medicine Resident, PGY-1

## 2021-12-04 NOTE — PROGRESS NOTES
Critical Care     Progress Note  Date:2021       Room:[address] [place] [address]  Patient Name:Pan Blanco     YOB: 1985     Age:36 y.o. Subjective    Subjective remains intubated, sedated, paralyzed, and proned onVent support 20/400/100/16. Currently prone plan for supine letter today. PF ratio 168 BBC is down to 17.2, T-max 99.3 On Phenylephrine and vasopressin. Review of Systems aníbal given ams  Objective         Vitals Last 24 Hours:  TEMPERATURE:  Temp  Av.1 °F (37.3 °C)  Min: 98.7 °F (37.1 °C)  Max: 99.3 °F (37.4 °C)  RESPIRATIONS RANGE: Resp  Av.6  Min: 12  Max: 28  PULSE OXIMETRY RANGE: SpO2  Av.8 %  Min: 94 %  Max: 100 %  PULSE RANGE: Pulse  Av.3  Min: 70  Max: 112  BLOOD PRESSURE RANGE: Systolic (93LJZ), VYX:717 , Min:100 , CXX:295   ; Diastolic (91ZWM), KWW:14, Min:49, Max:76    I/O (24Hr): Intake/Output Summary (Last 24 hours) at 2021 1305  Last data filed at 2021 1300  Gross per 24 hour   Intake 3571.12 ml   Output 4625 ml   Net -1053.88 ml     Objective:  Vital signs: (most recent): Blood pressure 119/64, pulse 84, temperature 99.3 °F (37.4 °C), resp. rate 28, height 5' 11\" (1.803 m), weight 121.4 kg (267 lb 9.6 oz), SpO2 98 %.          Exam facilitated by use of telemedicine platform and with assistance from in house team  Gen - intubated and sedated; proned; paralyzed  Head - nc/at where visible  Eyes - periorbital edema on left (could not see right side)  Ent - ett without secretions or hemorrhage; draining from mouth (secretions/drool)  Cvs - sinus rhythm without external evidence of hypoperfusion  Pulm - concordant with vent; Ppeak in mid 40s  GI-no evidence of tenderness with passive movement  Ext - edema present  Msk - normal bulk  Neuro - intubated, sedated; no spont motor activity or response to stimuli    Labs/Imaging/Diagnostics    Labs:  CBC:  Recent Labs     21  0334 21  0354 21  1105 21  0119 21  0119   WBC 17.2* 24.3*  --   --  22.3*   RBC 4.28 4.48  --   --  5.12   HGB 13.4 13.7  --   --  15.6   HCT 39.8 40.9  --   --  45.9   MCV 93.0 91.3  --   --  89.6   RDW 13.4 13.2  --   --  13.1    230 253   < > 258    < > = values in this interval not displayed. CHEMISTRIES:  Recent Labs     12/04/21  0334 12/03/21  1428 12/03/21  0354 12/02/21  0119 12/02/21  0119    138 138   < > 137   K 3.8 4.1 4.1   < > 4.4   CL 96* 95* 99   < > 102   CO2 37* 36* 34*   < > 32   BUN 23* 23* 25*   < > 22*   CA 8.1* 7.9* 7.9*   < > 8.1*   MG 2.2  --  2.6*  --  2.7*    < > = values in this interval not displayed. PT/INR:  Recent Labs     12/04/21  0611 12/03/21  0835 12/02/21  1105   INR 1.0 1.1 1.2  1.2*     APTT:  Recent Labs     12/04/21  0611 12/03/21  0835 12/02/21  1105   APTT <20.0* 23.9 25  24.2     LIVER PROFILE:  Recent Labs     12/04/21  0334 12/03/21  0354 12/02/21  0119   AST 42* 48* 49*   ALT 31 30 34     Lab Results   Component Value Date/Time    ALT (SGPT) 31 12/04/2021 03:34 AM    AST (SGOT) 42 (H) 12/04/2021 03:34 AM    Alk. phosphatase 59 12/04/2021 03:34 AM    Bilirubin, direct 0.14 08/11/2016 03:50 PM    Bilirubin, total 0.7 12/04/2021 03:34 AM       Imaging Last 24 Hours:  No results found.   Assessment//Plan   Principal Problem:    Acute hypoxemic respiratory failure due to COVID-19 [facility]) (11/19/2021)    Active Problems:    Pure hypercholesterolemia ()      JESUS (obstructive sleep apnea) (10/15/2014)      Hypertension (10/15/2014)      Elevated liver enzymes (8/5/2016)      Obesity, morbid ([address] (4/10/2018)      Diabetes mellitus type 2, controlled ([address] (11/25/2021)      Septic shock ([address] (11/26/2021)      Pneumomediastinum ([address] (11/26/2021)      Pneumothorax on left (11/26/2021)      Hyponatremia (11/26/2021)      ARDS (adult respiratory distress syndrome) ([address] (12/1/2021)      Oozing skin inflammation (12/3/2021)      Positive D dimer (12/3/2021)      Other hyperlipidemia (12/3/2021)      Elevated serum creatinine (12/3/2021)      Community acquired pneumonia (12/3/2021)      Assessment & Plan    Acute hypoxic resp failure  HTN  Obesity   JESUS  DM     NEURO - remains intubated and sedated with more negative goal rass given severity of hypoxia and the need for proning/paralysis    CVS - continue vasopressors to ensure MAPs at goal; no evidence of hypoperfusion and suspect degree of hypotension related to depth of sedation  -Echo demonstrated EF 50-55%    Pulm -ongoing ARDs secondary to multifocal pneumonia from covid; he has required prone positioning with improvement in Pa: FiO2 (although remains in mod-severe ARDs range); will give supine trial this am and obtain ABG at end of such to demonstrate that/if he still procures benefit from proning  - his IBW is 75kg and 8,7,6ml/kg translates to 600,525, and 450 - will reassess airway pressures and ABG prior to making changes to settings   -remains on potential regimen for covid and s/p tocilizumab earlier in course  - Notes suggest that on waitlist for ecmo at [facility] and that BMI precludes local ECMO center  - no extension of pneumomediastinum or prior ptx     GI-contined residuasl/draining from OGT do npo except meds  - Remains on MADELYN proph    -remains at high risk for JAMIR with sepsis, hypotension, hypoxia, and covid infection    HEME -  Given negative VTE work-up previously and worsening hemorrhage, will stop the empiric Lovenox and then re-assess; if therapeutic dosing resumed, would consider heparin  Resume prophylaxis  Lovenox 12/03   - no immediate need for transfusion of blood products    ID - empiric abx and antifungals given uptrending wbc and increased vasopressor requirements    ENDO - DM (diet-controlled) and prior A1c 5.8% in 05/2021  - SSI (resistant given morbid obesity) w/ q6h glucose checks    FEN - goal negative as possible; monitor and correct electrolytes as needed    CCT 45  minutres excluding teaching and procedures    I performed all aspects of the physical examination via Telemedicine associated with two way audio and video communication and with the on-site assistance of the bedside nurse. I am located in [place], [place]  and the patient is located in [place] at [address] [place] [address]   The patient is critically ill in the ICU. I  personally  reviewed the pertinent medical records, laboratory/ pathology data and radiographic images. The decision making regarding this patient is as documented above, which was generated  following  discussion  with the multidisciplinary ICU team and creation of a treatment plan for  the patient. We discussed the patient's interval history and future coordination of care and  plans. The patient's medications  were reviewed and changes made as stipulated above. Due to  critical illness impairing one or more vital organs of this patient resulting in life threatening clinical situation  I have provided direct, frequent personal  assessment and manipulation in management plan.       Electronically signed by Erick Amezcua MD on 12/4/2021 at 9:15 AM

## 2021-12-04 NOTE — ROUTINE PROCESS
0800 - initial assessment completed. Pt TOF is 0/4 twitches. Currently on 2mcg/kg/min. Order states if diffiuclt to achieve TOF, okay to titrate to vent synchrony. Pt synchronous with vent, will leave nimbex drip at 2mcg//kg/min and continue to monitor. 0900 - OGT clamped for medications. 1000 -  Pt un-proned. Turned on L side. Pt suctioned and provided oral care. OGT placed back on low intermittent suction. 1200 - pt turned on R side. Pt suctioned and provided oral care. 1400 - pt turned on L side. Suctioned and provided oral care. 1900 - tube feed resumed. 1920 - Bedside shift change report given to Chucky Moctezuma RN (oncoming nurse) by Tara Wise RN (offgoing nurse). Report included the following information SBAR, Kardex, ED Summary, Intake/Output, MAR, Accordion, Recent Results, Med Rec Status and Cardiac Rhythm NSR.     1928 - RN attempted to call pt wife for update. Wife did not answer phone, RN left voicemail.

## 2021-12-04 NOTE — PROGRESS NOTES
Problem: Falls - Risk of  Goal: *Absence of Falls  Description: Document Tressa Ambrosio Fall Risk and appropriate interventions in the flowsheet. Outcome: Not Progressing Towards Goal  Note: Fall Risk Interventions:  Mobility Interventions: Communicate number of staff needed for ambulation/transfer    Mentation Interventions: Bed/chair exit alarm, More frequent rounding, Room close to nurse's station    Medication Interventions: Evaluate medications/consider consulting pharmacy    Elimination Interventions:  Toileting schedule/hourly rounds              Problem: Patient Education: Go to Patient Education Activity  Goal: Patient/Family Education  Outcome: Not Progressing Towards Goal     Problem: Ventilator Management  Goal: *Adequate oxygenation and ventilation  Outcome: Not Progressing Towards Goal  Goal: *Patient maintains clear airway/free of aspiration  Outcome: Not Progressing Towards Goal  Goal: *Absence of infection signs and symptoms  Outcome: Not Progressing Towards Goal  Goal: *Normal spontaneous ventilation  Outcome: Not Progressing Towards Goal     Problem: Patient Education: Go to Patient Education Activity  Goal: Patient/Family Education  Outcome: Not Progressing Towards Goal

## 2021-12-04 NOTE — PROGRESS NOTES
Bedside shift change report given to Piedad (oncoming nurse) by Sallie(offgoing nurse). Report included the following information SBAR, Kardex and Cardiac Rhythm SR. Primary Nurse Chuck Jiang RN and Niya Kinney RN performed a dual skin assessment on this patient No impairment noted  Cameron score is 9    Bedside shift change report given to Nisha (oncoming nurse) by Piedad (offgoing nurse). Report included the following information SBAR, Kardex, Recent Results and Cardiac Rhythm sr.

## 2021-12-04 NOTE — PROGRESS NOTES
[address] Medicine Residency  Resident Note in Brief  ----------------------------------------    S: Pt sedated, intubated with O2 sats in mid-high 90's. Pt afebrile. Requested they call immediately, as needed, for changes in pt status. O:  Visit Vitals  /68   Pulse 82   Temp 99.2 °F (37.3 °C)   Resp 28   Ht 5' 11\" (1.803 m)   Wt 260 lb 2.3 oz (118 kg)   SpO2 98%   BMI 36.28 kg/m²         A/P  Anna Casper is a 39 y.o. male with a PMH of HTN, HLD, JESUS admitted for AHRF and severe sepsis 2/2 COVID PNA. Patient was admitted on 11/19/2021. AHRF and ARDS 2/2 COVID PNA: Unvaccinated, symptom onset 11/12/21. CTA chest no PE with moderate to severe COVID PNA, and pneumomediastinum/pneumothorax. S/p Tocilizumab 800mg 11/19. Requiring intubation and sedation 11/30. Given patient's age and condition working towards transfer for ECM[facility], unable to transfer locally secondary to BMI > 35.    - Daily weights.   - Prone as tolerated  - Goal sats 88% or higher  - CBC, CMP daily. No need for further trending Covid labs  - Continue Decadron 6mg IV daily  - Duo-neb or Combivent Respimat Q6H  - Holding COVID meds (Atorvastatin 20mg every day, Vit C 500mg BID, Zinc 220mg daily)  - Pulm following, appreciate recs     Severe sepsis 2/2 COVID PNA: PNA labs negative, BCx/UCx no growth. Likely severe sepsis on initial presentation is all related to COVID PNA. CTX + Doxy x 5d (11/19-11/24). Cefepime (11/28-12/1). Fevers again to 102, given negative culture concern for possible fungal etiology. - Meropenam, Vanc, and Eraxis. - Fungal culture   - Central line place, Sanjeev at 60 and Vaso 0.03, MAPs lower 80s.      Oozing: Blood oozing from central line placement as well as around endotrach tube. Patient on therapeutic Lovenox.  INR 1.2, PT 12.2, Fibrinogen 239.  - Central line removed and PIC placed     Pneumomediastinum/Pneumothorax: Possibly barotrauma 2/2 high pressures needed for COVID ARDS PNA vs lung frailty. Will monitor for signs of worsening pneumothorax. Repeat CXR 12/1 showing stable pneumomediastinum, and resolved pneumothorax. - Monitor for sings of worsening barotrauma       Please see full daily progress note for complete plan.   Andre Rodriguez MD  8:00 PM

## 2021-12-05 NOTE — PROGRESS NOTES
[address] [facility] [address] [place] [address]   Office [phone]  Fax [phone]          Assessment and Plan     Freda Shelley is a 39 y.o. male with a PMH of HTN, HLD, JESUS admitted for AHRF and severe sepsis 2/2 COVID PNA. Patient was admitted on 11/19/2021. Interval Events: No acute events overnight. AHRF and ARDS 2/2 COVID PNA: Continues intubated, sedated, paralyzed, and proned on Vent support RR 28/PEEP 16/ FIO2 100%/ . Pending approval ECMO at [facility] still no availability.  - Daily weights.   - Prone as tolerated  - Goal sats 88% or higher  - CBC, CMP daily. No need for further trending Covid labs  - Continue Decadron 6mg IV daily  - Duo-neb or Combivent Respimat Q6H  - Holding COVID meds (Atorvastatin 20mg every day, Vit C 500mg BID, Zinc 220mg    daily)  - Pulm following, appreciate recs    Severe sepsis 2/2 COVID PNA: Off  Phenylephrine, however continues on Vasopressin. Leukocytosis improving. Fungal Cx NGTD. - Meropenam, Vanc, and Eraxis. - F/u fungal culture  - Intensivist following  - MAPs improving, continue to wean pressors as able per ICU    Nutritional status:  Continues with residual on Tube feedings, not tolerating well. 300 ml's last reported per nurse. - Will continue holding TF.  - Plan for likelyTPN today  - Nutrition consulted    Oozing: Mild bleeding with suctioning otherwise no other findings while prone.     - On prophylactic Lovenox 40 mg BID  - Trend D Dimers    Pneumomediastinum/Pneumothorax: Possibly barotrauma 2/2 high pressures needed for COVID ARDS PNA vs lung frailty. Repeat CXR 12/1 showing stable pneumomediastinum, and resolved pneumothorax. - Monitor for signs of worsening barotrauma     Insomnia/anxiety: Pt with new insomnia and anxiety, specifically overnight. - Intubated, sedated    HTN: Pt normotensive off meds. Currently hypotensive.  At home is on Amlodipine 10mg daily and HCTZ 12.5mg daily  - Will continue to monitor    HLD: Chronic, stable. Last lipids 07/2021 , HDL 54, LDL 98.6, . On Atorvastatin 10mg daily.  - Holding Atorvastatin 20mg daily     T2DM (diet-controlled): Prior A1c 5.8% in 05/2021. No meds at present.   - SSI (resistant given morbid obesity) w/ q6h glucose checks     JESUS: On CPAP at night. - Intubated, sedated.     Obesity: Body mass index is 42.4 kg/m² on presentation.  - Encouraging lifestyle modifications and further follow up outpatient. At-risk JAMIR: RESOLVED. POA Cr 1.31, BL 0.8. Likely IVVD in setting of poor PO intake. - Daily CMP    Chest pain: RESOLVED. Description is pleuritic in nature, worse with cough. EKG without evidence of acute ischemia. Troponin of 9 makes CAD less concerning.   - Cardiac monitoring, will continue to monitor     Diarrhea: RESOLVED. Watery diarrhea x 2 days. Likely 2/2 COVID infection. Improved since presentation. Enteric bacteria panel negative. Abdominal pain: RESOLVED. Secondary to gas pains after starting diet. - Continue Simethicone       FEN/GI - TF as tolerated currently, progress to TPN  Activity - Ambulate with assistance  DVT prophylaxis - Lovenox (ppx)  GI prophylaxis - Protonix  Disposition - Plan to d/c to TBD. Code Status - Full. Discussed with patient / caregivers. Next of [facility] Name and [facility],  Spouse - [phone]      Roseanne Woo MD  Family Medicine Resident    Patient discussed with Family Medicine Attending: Dr. Johnathan Gomez / Objective   Subjective:   Patient intubated and proned, on pressors. Sedated. No acute distress. Respiratory: O2 Flow Rate (L/min): 60 l/min O2 Device: Endotracheal tube, Ventilator   Visit Vitals  /62   Pulse 60   Temp 98.9 °F (37.2 °C)   Resp 28   Ht 5' 11\" (1.803 m)   Wt 259 lb 0.7 oz (117.5 kg)   SpO2 99%   BMI 36.13 kg/m²     General: Intubated, sedated. Appears comfortable. Respiratory: Transmitted mechanical sounds appreciated bilaterally, unchanged. Cardiovascular: regular rate, regular rhythm. GI: + bowel sounds. Nontender. Extremities:  No LE edema. LE cool but with cap refill ~3s  Skin: Warm, dry. PICC line in place  Neuro: Intubated sedated  Mitchell in place     I/O:  Date 12/05/21 0700 - 12/06/21 0659 12/06/21 0700 - 12/07/21 0659   Shift 9820-7581 9902-0026 24 Hour Total 8761-6446 6951-0985 24 Hour Total   INTAKE   I.V.(mL/kg/hr) 1429.8(1) 1798.8(1.3) 3228.6(1.1)        Versed Volume 96 96 192        Nimbex Volume 90 90 180        PitRESSIN Volume 108 108 216        Fentanyl Volume 36 36 72        Diprivan Volume 448.8 448.8 897.6        Phenylephrine Volume 0  0        Volume (anidulafungin (ERAXIS) 100 mg in 0.9% sodium chloride 130 mL IVPB) 131  131        Volume (meropenem (MERREM) 500 mg in sterile water (preservative free) 10 mL IV syringe) 20 20 40        Volume (vancomycin (VANCOCIN) 1500 mg in  ml infusion) 500  500        Volume (vancomycin (VANCOCIN) 1750 mg in  ml infusion)  1000 1000      NG/ 300 785        Water Flush Volume (mL) (Orogastric Tube 11/30/21) 260 70 330        Medication Volume (Orogastric Tube 11/30/21) 170 10 180        Intake (ml) (Orogastric Tube 11/30/21) 55 220 275      Shift Total(mL/kg) 0428.5(88.4) 3257.7(93.0) 4013. 6(34.2)      OUTPUT   Urine(mL/kg/hr) 1523(1.1) 6827(7.9) 4018(1.4)        Urine Output (mL) (Urinary Catheter 11/30/21 Mitchell - Temperature) 1523 2495 4018      Emesis/NG output 300  300        Output (ml) (Orogastric Tube 11/30/21) 300  300      Shift Total(mL/kg) 8996(96.0) 8256(01.7) 0598(43.3)      NET 91.8 -396.2 -304.4      Weight (kg) 118.3 117.5 117.5 117.5 117.5 117.5       CBC:  Recent Labs     12/06/21  0411 12/05/21  0347 12/04/21  0334   WBC 12.9* 13.1* 17.2*   HGB 11.4* 11.8* 13.4   HCT 35.4* 36.5* 39.8    230 189       Metabolic Panel:  Recent Labs     12/06/21  0411 12/05/21 0347 12/04/21  0611 12/04/21  0334 12/03/21  0835    136  --  138  --    K 3.9 4.1  --  3.8  --    CL 98 97  --  96*  --    CO2 38* 38*  --  37*  --    BUN 25* 26*  --  23*  --    CREA 0.40* 0.43*  --  0.53*  --    * 153*  --  129*  --    CA 8.2* 8.2*  --  8.1*  --    MG 2.5* 2.6*  --  2.2  --    ALB 2.6* 2.6*  --  2.8*  --    ALT 35 29  --  31  --    INR 1.0 1.0 1.0  --    < >    < > = values in this interval not displayed.           For Billing    Chief Complaint   Patient presents with   [facility] Problems  Date Reviewed: 12/3/2021          Codes Class Noted POA    Oozing skin inflammation ICD-10-CM: L08.9  ICD-9-CM: 686.9  12/3/2021 Unknown        Positive D dimer ICD-10-CM: R79.89  ICD-9-CM: 790.92  12/3/2021 Unknown        Other hyperlipidemia ICD-10-CM: E78.49  ICD-9-CM: 272.4  12/3/2021 Unknown        Elevated serum creatinine ICD-10-CM: R79.89  ICD-9-CM: 790.99  12/3/2021 Unknown        Community acquired pneumonia ICD-10-CM: J18.9  ICD-9-CM: 665  12/3/2021 Unknown        ARDS (adult respiratory distress syndrome) ([facility] ICD-10-CM: Brad Ditch  ICD-9-CM: 518.52  12/1/2021 No        Septic shock ([facility] ICD-10-CM: A41.9, R65.21  ICD-9-CM: 038.9, 785.52, 995.92  11/26/2021 Yes        Pneumomediastinum ([facility] ICD-10-CM: J98.2  ICD-9-CM: 518.1  11/26/2021 No        Pneumothorax on left ICD-10-CM: J93.9  ICD-9-CM: 512.89  11/26/2021 No        Hyponatremia ICD-10-CM: E87.1  ICD-9-CM: 276.1  11/26/2021 Yes        Diabetes mellitus type 2, controlled ([facility] ICD-10-CM: E11.9  ICD-9-CM: 250.00  11/25/2021 Yes        * (Principal) Acute hypoxemic respiratory failure due to COVID-[facility]) ICD-10-CM: U07.1, J96.01  ICD-9-CM: 518.81, 079.89, 799.02  11/19/2021 Yes        Obesity, morbid ([facility] ICD-10-CM: E66.01  ICD-9-CM: 278.01  4/10/2018 Yes        Elevated liver enzymes ICD-10-CM: R74.8  ICD-9-CM: 790.5  8/5/2016 Yes        JESUS (obstructive sleep apnea) ICD-10-CM: G47.33  ICD-9-CM: 327.23  10/15/2014 Yes        Hypertension ICD-10-CM: I10  ICD-9-CM: 401.9  10/15/2014 Yes Pure hypercholesterolemia ICD-10-CM: E78.00  ICD-9-CM: 272.0  Unknown Yes

## 2021-12-05 NOTE — PROGRESS NOTES
[facility]  CRITICAL CARE    Tele ICU TEAM Progress Note    Name: Marlys Narvaez   : 1985   MRN: [id number]   Date: 2021           ICU Assessment and Plan3   Clinical Summary:  Referral Source: Dr. Elian Lawrence    Acute hypoxic resp failure  HTN  Obesity   JESUS  DM            ICU Comprehensive Plan of Care:    NEURO: sedation per protocol, paralytics   CV: Keep MAP >65; pressors as needed, noted echo    PULM: Continue mechanical ventilation, low TV, high peep strategy, serial ABG, supination with higher peep was able to maiintain recruitment,  decadron to 6 mg giovani,y s/p toci. On waitlist for ecmo at [facility]   GI: given increased residuals,will increase reglan dosing as KUB is normal, but consider tpn tomorrow. RENAL/: Monitor Cr and UOP; incr diuresis   ENDO: BG Goal 140-180; glycemic control  HEME/ONC: Tx Hgb < 7, Plt<10k; transfuse as needed,    MICRO:  FU on Cx; fu cultures, leukocytosis improved on abx and antifungals  Code Status: Full Code  LOS: 16  Prophylaxis: GI: SUP h2 b  DVT: therapeutic lovenox empirically      Discussed Care Plan with Bedside RN   Subjective:   Reason for ICU Admission: covid       45yo man admitted for COVID. Had small apical Ptx. Summary of Events:     - admitted   - pneumomediastinum and possible L apical PTX on CXR   - escalated to ICU for continuous BiPAP at 100%; pt declined intubated--happy hypoxic on BiPAP currently, tenuous however; called spouse Mendel Kayser to update--update done; precedex on and off; Cefepime dc'd.  : intubated yday, sedated, paralyzed and proned. Ongoing fevers   unable to tolerate being supine ydya at lower peep, fevers, oozing from tlc site.      : difficulty tolerating tube feeds, leukocytosis improving    Past Medical History:      has a past medical history of History of vascular access device (12/87664), Hypercholesteremia, Hypertension (10/15/2014), Poison ivy (9/15/2015), Pure hypercholesterolemia, and S/P vasectomy (2014). He also has no past medical history of Abuse, Anemia NEC, Arrhythmia, Calculus of kidney, Chronic pain, Congestive heart failure, unspecified, COPD, Depression, GERD (gastroesophageal reflux disease), Headache(784.0), Psychotic disorder ([address] Thyroid disease, or Trauma. Past Surgical History:      has no past surgical history on file. Home Medications:     Prior to Admission medications    Medication Sig Start Date End Date Taking? Authorizing Provider   metaxalone (SKELAXIN) 800 mg tablet Take 800 mg by mouth three (3) times daily as needed for Muscle Spasm(s). Yes Provider, Historical   naproxen (NAPROSYN) 500 mg tablet Take 500 mg by mouth two (2) times daily as needed for Pain. Yes Provider, Historical   hydroCHLOROthiazide (HYDRODIURIL) 12.5 mg tablet TAKE 1 TABLET BY MOUTH EVERY DAY 10/25/21  Yes Ricky Suresh NP   amLODIPine (NORVASC) 10 mg tablet TAKE 1 TABLET DAILY   Yes Koko Centeno MD   glucosamine HCl/chondroitin bone (GLUCOSAMINE-CHONDROITIN PO) Take  by mouth. 1,500mg/1,200mg/MGMSM 1,000mg   Yes Provider, Historical   Krill-OM3-DHA-EPA-OM6-Lip-Astx (KRILL OIL) 1000-130(40-80) mg cap Take 1 Cap by mouth once over twenty-four (24) hours. Yes Provider, Historical   FERROUS FUMARATE/VIT BCOMP&C (SUPER B COMPLEX PO) Take  by mouth.    Yes Provider, Historical   atorvastatin (LIPITOR) 10 mg tablet TAKE 1 TABLET DAILY 21   Danny Guerrero NP     Allergies/Social/Family History:     No Known Allergies   Social History     Tobacco Use    Smoking status: Never Smoker    Smokeless tobacco: Never Used   Substance Use Topics    Alcohol use: No     Alcohol/week: 0.0 standard drinks      Family History   Problem Relation Age of Onset    Diabetes Mother     Heart Attack Father 43    Stroke Maternal Grandmother     Cancer Paternal Grandfather          age 45 - Hodgkin's   Camillia Ao Maternal Grandfather          in 42's with melanoma     Review of Systems:     unable to obtain due to sedate  Objective:   Vital Signs:  Visit Vitals  /61   Pulse (!) 59   Temp 97.6 °F (36.4 °C)   Resp 28   Ht 5' 11\" (1.803 m)   Wt 118.3 kg (260 lb 14.4 oz)   SpO2 95%   BMI 36.39 kg/m²    O2 Flow Rate (L/min): 60 l/min O2 Device: Endotracheal tube, Ventilator Temp (24hrs), Av.2 °F (36.8 °C), Min:97.6 °F (36.4 °C), Max:98.7 °F (37.1 °C)           Intake/Output:     Intake/Output Summary (Last 24 hours) at 2021 1754  Last data filed at 2021 1714  Gross per 24 hour   Intake 3901.4 ml   Output 2993 ml   Net 908.4 ml       Physical Exam:  Gen: intubated, sedated , paralyzed  HEENT: PERRL  NC, AT  Neck: no JVD, midline trach  Mouth: oral ETT   CV: NSR  Pulm: symmetric chest rise  Abd: NT ND soft  Ext: no edema, cyanosis, clubbing  MSK: no deformities, no synovitis  Neuro: sedated, paralyzed    LABS AND  DATA: Personally reviewed  Recent Labs     21   WBC 13.1* 17.2*   HGB 11.8* 13.4   HCT 36.5* 39.8    239     Recent Labs     21  033    138   K 4.1 3.8   CL 97 96*   CO2 38* 37*   BUN 26* 23*   CREA 0.43* 0.53*   * 129*   CA 8.2* 8.1*   MG 2.6* 2.2     Recent Labs     21  033   AP 54 59   TP 5.9* 5.9*   ALB 2.6* 2.8*   GLOB 3.3 3.1     Recent Labs     21  0611   INR 1.0 1.0   PTP 10.7 10.8   APTT 20.6* <20.0*      Recent Labs     21  0514   PHI 7.41 7.36   PCO2I 61.0* 66.1*   PO2I 168* 115*   FIO2I 100 100     No results for input(s): CPK, CKMB, TROIQ, BNPP in the last 72 hours.     Hemodynamics:   PAP:   CO:     Wedge:   CI:     CVP:    SVR:       PVR:       Ventilator Settings:  Mode Rate Tidal Volume Pressure FiO2 PEEP   PRVC   400 ml    100 % 16 cm H20     Peak airway pressure: 41 cm H2O    Minute ventilation: 11.9 l/min        MEDS:   Current Facility-Administered Medications:     dexamethasone (PF) (DECADRON) 10 mg/mL injection 6 mg, 6 mg, IntraVENous, Q24H, Nicolás Johnson MD    Vancomycin RANDOM level @6pm  tonight, , Other, ONCE, Ying Zepeda MD    metoclopramide HCl (REGLAN) injection 5 mg, 5 mg, IntraVENous, Q6H, Jf Schmitt MD    propofol (DIPRIVAN) 10 mg/mL infusion, 0-50 mcg/kg/min, IntraVENous, TITRATE, Stanley Rascon MD, Last Rate: 37.4 mL/hr at 12/05/21 1647, 50 mcg/kg/min at 12/05/21 1647    midazolam in normal saline (VERSED) 1 mg/mL infusion, 0-10 mg/hr, IntraVENous, TITRATE, Stanley Rascon MD, Last Rate: 8 mL/hr at 12/05/21 1641, 8 mg/hr at 12/05/21 1641    vancomycin (VANCOCIN) 1500 mg in  ml infusion, 1,500 mg, IntraVENous, Q8H, Jf Schmitt MD, Last Rate: 250 mL/hr at 12/05/21 1227, 1,500 mg at 12/05/21 1227    enoxaparin (LOVENOX) injection 40 mg, 40 mg, SubCUTAneous, Q12H, Stanley Rascon MD, 40 mg at 12/05/21 0745    bumetanide (BUMEX) injection 1 mg, 1 mg, IntraVENous, Q12H, Angela Mckeon MD, 1 mg at 12/05/21 4946    alteplase (CATHFLO) 1 mg in sterile water (preservative free) 1 mL injection, 1 mg, InterCATHeter, PRN, Malia Bar MD    anidulafungin (ERAXIS) 100 mg in 0.9% sodium chloride 130 mL IVPB, 100 mg, IntraVENous, Q24H, Jf Schmitt MD, Last Rate: 83 mL/hr at 12/05/21 1640, 100 mg at 12/05/21 1640    meropenem (MERREM) 500 mg in sterile water (preservative free) 10 mL IV syringe, 0.5 g, IntraVENous, Q6H, Jf Schmitt MD, 500 mg at 12/05/21 1542    polyethylene glycol (MIRALAX) packet 17 g, 17 g, Oral, DAILY, Jf Schmitt MD, 17 g at 12/05/21 0802    white petrolatum-mineral oiL (AKWA TEARS) 83-15 % ophthalmic ointment, , Both Eyes, BID, Jf Schmitt MD, Given at 12/05/21 0801    dexmedeTOMidine in 0.9 % NaCl (PRECEDEX) 400 mcg/100 mL (4 mcg/mL) infusion soln, 0.1-1.5 mcg/kg/hr, IntraVENous, TITRATE, Vahid Vaughan MD, Stopped at 12/02/21 1206    pantoprazole (PROTONIX) 40 mg in 0.9% sodium chloride 10 mL injection, 40 mg, IntraVENous, DAILY, Render Grass, Benji Tsang MD, 40 mg at 12/05/21 0801    insulin lispro (HUMALOG) injection, , SubCUTAneous, Q6H, Evelin Lang MD, 3 Units at 12/05/21 0614    chlorhexidine (PERIDEX) 0.12 % mouthwash 15 mL, 15 mL, Oral, Q12H, Stephen López MD, 15 mL at 12/05/21 0801    HYDROmorphone (DILAUDID) syringe 0.5 mg, 0.5 mg, IntraVENous, Q1H PRN, Stephen López MD, 0.5 mg at 11/30/21 1630    senna-docusate (PERICOLACE) 8.6-50 mg per tablet 1 Tablet, 1 Tablet, Per G Tube, BID, Stephen López MD, 1 Tablet at 12/05/21 0802    fentaNYL (PF) 1,500 mcg/30 mL (50 mcg/mL) infusion, 0-200 mcg/hr, IntraVENous, TITRATE, Stanley Rascon MD, Last Rate: 3 mL/hr at 12/05/21 1716, 150 mcg/hr at 12/05/21 1716    cisatracurium (NIMBEX) 200 mg in 0.9% sodium chloride 100 mL (2 mg/mL) infusion, 0-10 mcg/kg/min, IntraVENous, TITRATE, Stephen López MD, Last Rate: 7.5 mL/hr at 12/05/21 1605, 2 mcg/kg/min at 12/05/21 1605    ascorbic acid (vitamin C) (VITAMIN C) tablet 500 mg, 500 mg, Per G Tube, BID, Stephen López MD, 500 mg at 12/05/21 0801    atorvastatin (LIPITOR) tablet 20 mg, 20 mg, Per Monty Klippel, DAILY, Stephen López MD, 20 mg at 12/05/21 0801    melatonin (rapid dissolve) tablet 5 mg, 5 mg, Oral, QHS, Stephen López MD, 5 mg at 12/04/21 2038    acetaminophen (TYLENOL) tablet 1,000 mg, 1,000 mg, Per G Tube, Q6H PRN, 1,000 mg at 12/01/21 1754 **OR** acetaminophen (TYLENOL) suppository 975 mg, 975 mg, Rectal, Q6H PRN, Stephen López MD, 975 mg at 12/02/21 9337    glucose chewable tablet 16 g, 4 Tablet, Per G Tube, PRN, Stephen López MD    PHENYLephrine (DINO-SYNEPHRINE) 30 mg in 0.9% sodium chloride 250 mL infusion,  mcg/min, IntraVENous, TITRATE, Ina Prakash, DO, Stopped at 12/05/21 0532    vasopressin (VASOSTRICT) 20 Units in 0.9% sodium chloride 100 mL infusion, 0.03 Units/min, IntraVENous, CONTINUOUS, Gonsalo Saldivar MD, Last Rate: 9 mL/hr at 12/05/21 1038, 0.03 Units/min at 12/05/21 1038    white petrolatum (VASELINE) ointment, , Topical, PRN, Crispin Nolan MD    oxymetazoline (AFRIN) 0.05 % nasal spray 2 Spray, 2 Spray, Both Nostrils, DAILY PRN, Crispin Nolan MD    sodium chloride (OCEAN) 0.65 % nasal squeeze bottle 2 Spray, 2 Spray, Both Nostrils, Q4H, Crispin Nolan MD, [address] [place] at 12/05/21 1546    sodium chloride (OCEAN) 0.65 % nasal squeeze bottle 2 Spray, 2 Spray, Both Nostrils, Q2H PRN, Anup Canas DO, 2 Spray at 12/04/21 2037    albuterol-ipratropium (DUO-NEB) 2.5 MG-0.5 MG/3 ML, 3 mL, Nebulization, Q6H RT, 3 mL at 12/05/21 1427 **OR** ipratropium-albuterol (COMBIVENT RESPIMAT) 20 mcg-100 mcg inhalation spray, 2 Puff, Inhalation, Q6H RT, Geo Farah MD, 2 Puff at 11/28/21 2332    [Held by provider] simethicone (MYLICON) tablet 80 mg, 80 mg, Oral, QID PRN, Crispin Nolan MD, 80 mg at 11/29/21 1504    lidocaine 4 % patch 1 Patch, 1 Patch, TransDERmal, Q24H, Carmelo Hickman MD, 1 Patch at 12/04/21 2037    [Held by provider] guaiFENesin ER (MUCINEX) tablet 600 mg, 600 mg, Oral, Q12H, Carmen Christianson MD, 600 mg at 11/30/21 0808    phenol throat spray (CHLORASEPTIC) 2 Spray, 2 Spray, Oral, Q6H PRN, Kimberly Gonzalez MD, [address] [place] at 11/20/21 0213    sodium chloride (NS) flush 5-40 mL, 5-40 mL, IntraVENous, Q8H, Carmen Christianson MD, 10 mL at 12/05/21 1320    sodium chloride (NS) flush 5-40 mL, 5-40 mL, IntraVENous, PRN, Carmen Christianson MD    ondansetron (ZOFRAN ODT) tablet 4 mg, 4 mg, Oral, Q8H PRN **OR** ondansetron (ZOFRAN) injection 4 mg, 4 mg, IntraVENous, Q6H PRN, Carmen Christianson MD, 4 mg at 11/21/21 0912    zinc sulfate (ZINCATE) 50 mg zinc (220 mg) capsule 1 Capsule, 1 Capsule, Oral, DAILY, Carmen Christianson MD, 1 Capsule at 12/05/21 0801    prochlorperazine (COMPAZINE) injection 10 mg, 10 mg, IntraVENous, Q6H PRN, Carmen Christianson MD, 10 mg at 11/29/21 0937    dextrose (D50W) injection syrg 12.5-25 g, 12.5-25 g, IntraVENous, PRN, Ernesto Samaniego MD  [address] [place] [address] Francisco glucagon (GLUCAGEN) injection 1 mg, 1 mg, IntraMUSCular, PRN, Carmen Christianson MD    [Held by provider] HYDROcodone-chlorpheniramine (TUSSIONEX) oral suspension 5 mL, 5 mL, Oral, Q12H, Doni Kerns MD, 5 mL at 11/30/21 6497        I performed all aspects of the physical examination via Telemedicine associated with two way audio and video communication and with the on-site assistance of Nurse. I am located in [place] and the patient is located in [address] [place] [address]. Patient is critically ill in the ICU. I  personally  reviewed the pertinent medical records, laboratory/ pathology data and radiographic images. The decision making regarding this patient is as documented above, which was generated  following  discussion  with the multidisciplinary team and creation of a treatment plan for  the patient. We discussed the patient's interval history and future coordination of care and  plans. The patient's medications  were reviewed and changes made as stipulated above. Due to  critical illness impairing one or more vital organs of this patient resulting in life threatening clinical situation  I have provided direct, frequent personal  assessment and manipulation in management plan and spent 35  minutes  of  critical care time excluding the time spent on procedures and teaching. Greater than 50% of this time  in patient's care was  employed  in counseling and coordination of care and engaged in face to face discussion of case management issues, addressing questions, and outlining a plan of  therapy.

## 2021-12-05 NOTE — ROUTINE PROCESS
0800 - Initial assessment completed. Pt arms turned. Provided suction and oral care. 1000 -  Pt arms turned. Provided suction and oral care. Unable to un-prone at this time due to low staffing. Will turn pt when RT and other nursing staff available. 200 - RN provided wife update. Best number to reach wife is at [phone] which is her cell phone. Pt wife is asking about potentially checking blood type of patient. Family would like to know blood type incase pt needs transplant and want to know if they are eligible. Will notify MD. Wife also wishes to be notified if pt gets a bed at [facility] today. Informed wife that he could potentially have bed later today, but that is not yet confirmed. RN will call wife if bed becomes available. 46 - RN spoke to family medicine and requested type & screen. MD was able to look at previous admission and pt blood type is O positive. 1200 - pt un-proned and placed in supine position. Suctioned and provided oral care. [address] Technician at bedside to obtain KUB. 1400 - Notified intensivist that last 3 gastric residuals have been 300 ml. KUB was ordered and came back normal. Tube feeds have been stopped since 0400 on night shift. Intensivist stated to change reglan to Q6H and suction out the current feed that is in stomach. Intensivist stated to start continuous tube feed again at 20 ml/hour after suctioning. Intensivist stated if residual is greater than 200 ml after tube feeds restarted, to hold feeds and notify physician. 1430 - low intermittent suction started. 1545 - Suctioned turned off. Tube feeding restarted at 20ml/hour. [address] has shortage of Vital HP tube feed. Unit does not have it nor the kitchen. Last tube feed and tubing changed yesterday on 12/4 at 1900. Will continue to use that feeding set since it is almost full.

## 2021-12-05 NOTE — PROGRESS NOTES
Bedside and Verbal shift change report given to Latonia Ta RN (oncoming nurse) by Shannon Dotson and Catarino Betts RN (offgoing nurse). Report included the following information SBAR, Intake/Output, MAR, Recent Results, Cardiac Rhythm NSR and Alarm Parameters . Primary Nurse Deepak Castro RN and SAUL Meza performed a dual skin assessment on this patient No impairment noted  Cameron score is See Flowsheets    See flowsheets for all assessments. See MAR for all medication administrations. 0400  Tube Feeds stopped due to GR > 300ml. Bedside and Verbal shift change report given to SAUL Meza and Gerri Hernandez RN (oncoming nurse) by Latonia Ta (offgoing nurse). Report included the following information SBAR, Intake/Output, MAR, Recent Results, Cardiac Rhythm NSR and Alarm Parameters .

## 2021-12-05 NOTE — PROGRESS NOTES
Problem: Falls - Risk of  Goal: *Absence of Falls  Description: Document Faviola Reyes Fall Risk and appropriate interventions in the flowsheet.   Outcome: Not Progressing Towards Goal  Note: Fall Risk Interventions:  Mobility Interventions: Bed/chair exit alarm, Communicate number of staff needed for ambulation/transfer, Patient to call before getting OOB, PT Consult for mobility concerns, PT Consult for assist device competence, Strengthening exercises (ROM-active/passive), Utilize walker, cane, or other assistive device    Mentation Interventions: Bed/chair exit alarm, Adequate sleep, hydration, pain control, Evaluate medications/consider consulting pharmacy, More frequent rounding, Reorient patient, Room close to nurse's station    Medication Interventions: Bed/chair exit alarm, Evaluate medications/consider consulting pharmacy, Patient to call before getting OOB, Teach patient to arise slowly    Elimination Interventions: Bed/chair exit alarm, Call light in reach, Patient to call for help with toileting needs, Toileting schedule/hourly rounds              Problem: Patient Education: Go to Patient Education Activity  Goal: Patient/Family Education  Outcome: Not Progressing Towards Goal     Problem: Ventilator Management  Goal: *Adequate oxygenation and ventilation  Outcome: Not Progressing Towards Goal  Goal: *Patient maintains clear airway/free of aspiration  Outcome: Not Progressing Towards Goal  Goal: *Absence of infection signs and symptoms  Outcome: Not Progressing Towards Goal  Goal: *Normal spontaneous ventilation  Outcome: Not Progressing Towards Goal     Problem: Patient Education: Go to Patient Education Activity  Goal: Patient/Family Education  Outcome: Not Progressing Towards Goal

## 2021-12-05 NOTE — PROGRESS NOTES
[address] [place] [address] with [address] [place] [address]       Resident Progress Note in Brief    S: Pt intubated and sedated. No concerns from nursing staff. O:  Visit Vitals  /66   Pulse 67   Temp 98.6 °F (37 °C)   Resp 28   Ht 5' 11\" (1.803 m)   Wt 260 lb 14.4 oz (118.3 kg)   SpO2 94%   BMI 36.39 kg/m²     Physical Examination:   General appearance - Intubated, sedated, prone. Heart - rate 67  Respiratory - ET tube in place. Ventilatory settings: Vt 412, RR 28, PEEP 16, FiO2 100%. Neurological - intubated, sedated. A/P:     Joesph Blanco is a 39 y. o. male with a PMH of HTN, HLD, JESUS admitted for AHRF and ARDS 2/2 COVID PNA. Currently intubated and sedated. Working towards transfer for ECMO. Patient was admitted on 11/19/2021. AHRF and ARDS 2/2 COVID PNA: Unvaccinated. CTA chest on 11/19/21 demonstrated moderate to severe COVID PNA. S/p tocilizumab on 11/19. Intubated and sedated on 11/30. Working towards transfer for ECMO. Potential availability at [facility], but BMI must be < 35. No availability currently at [facility]. - Intubated, sedated on propofol, Versed, and fentanyl. Intensivist following.  - Goal sats 88% or higher  - Prone as tolerated  - CBC, CMP daily. - Continue Decadron 6mg IV daily  - Lovenox 40 mg subq Q12H  - Duo-neb or Combivent Respimat Q6H  - Atorvastatin 20mg every day, Vit C 500mg BID, Zinc 220mg daily  - Pulm following, appreciate recs    Sepsis 2/2 COVID PNA: Last febrile on 12/2/21 at 1600 (100.7 F). - Continue Meropenem, vancomycin, Eraxis  - F/u blood cultures  - On vasopressin (sarah-synephrine stopped). Intensivist following     Nutrition:  - Consider TPN tomorrow    Please see the primary team's daily progress note for the patient's full plan.     Heydi Campbell MD  Family Medicine Resident, PGY-1

## 2021-12-05 NOTE — PROGRESS NOTES
I called Ms. Blanco (spouse) to update her on Mr. Blanco treatment course. All questions were answered. Will reach out tomorrow or sooner if there are any sudden changes.      Cyn Fraser MD

## 2021-12-05 NOTE — PROGRESS NOTES
2298- Spoke to Maribel Sherman from transfer center, provided with information for residents and ICU physician Dr. Carla Muniz to begin transfer process to [facility].

## 2021-12-06 NOTE — PROGRESS NOTES
Bedside and Verbal shift change report given to Ishmael Teixeira RN (oncoming nurse) by Jalen Pedroza and Anastasia Weber RN (offgoing nurse). Report included the following information SBAR, Intake/Output, MAR, Recent Results, Cardiac Rhythm NSR and Alarm Parameters . Primary Nurse Sidra Storm RN and SAUL Meza performed a dual skin assessment on this patient No impairment noted  Cameron score is See Flowsheets    See flowsheets for all assessments. See MAR for all medication administrations. 2300  Pt proned per Doctor Humberto Umaña order using hospital policy and protocol. Pt tolerated proning well. Will continue to monitor. 0400  Gastric Residual 260ml. TF held per Intensivists order. Bedside and Verbal shift change report given to Linda Lerner RN and Chris Fortune RN (oncoming nurse) by Ishmael Teixeiar RN (offgoing nurse). Report included the following information SBAR, Intake/Output, MAR, Recent Results, Cardiac Rhythm SB, NSR and Alarm Parameters .

## 2021-12-06 NOTE — PROGRESS NOTES
1130: Patient flipped to supine position. O2 saturations 100% and VSS. Plan for ABG around 1500 to determine if patient needs to be re-proned this evening.

## 2021-12-06 NOTE — PROGRESS NOTES
[facility]  CRITICAL CARE    Tele ICU TEAM Progress Note    Name: Loretta Stubbs   : 1985   MRN: [id number]   Date: 2021           ICU Assessment and Plan3   Clinical Summary:  Referral Source: Dr. Kate Gibbs    Acute hypoxic resp failure  HTN  Obesity   JESUS  DM            ICU Comprehensive Plan of Care:    NEURO: sedation per protocol, paralytics   CV: Keep MAP >65; pressors as needed, noted echo stop vasopressin    PULM: Continue mechanical ventilation, low TV, high peep strategy, serial ABG, supination during the day and prone at night,  decadron to 6 mg giovani,y s/p toci. On waitlist for ecmo at [facility]. Improving supplemental o2, will recheck abg prior to reproning  GI: place picc line and start tpn   RENAL/: Monitor Cr and UOP; incr diuresis   ENDO: BG Goal 140-180; glycemic control  HEME/ONC: Tx Hgb < 7, Plt<10k; transfuse as needed,    MICRO:  FU on Cx; fu cultures, leukocytosis improved on abx and antifungals  Code Status: Full Code  LOS: 17  Prophylaxis: GI: SUP h2 b  DVT: therapeutic lovenox empirically      Discussed Care Plan with Bedside RN   Subjective:   Reason for ICU Admission: covid       45yo man admitted for COVID. Had small apical Ptx. Summary of Events:     - admitted   - pneumomediastinum and possible L apical PTX on CXR   - escalated to ICU for continuous BiPAP at 100%; pt declined intubated--happy hypoxic on BiPAP currently, tenuous however; called spouse Dub Mins to update--update done; precedex on and off; Cefepime dc'd.  : intubated yday, sedated, paralyzed and proned. Ongoing fevers   unable to tolerate being supine ydya at lower peep, fevers, oozing from tlc site.      : difficulty tolerating tube feeds, leukocytosis improving   intubated sedated tolerating supination better    Past Medical History:      has a past medical history of History of vascular access device (), Hypercholesteremia, Hypertension (10/15/2014), Poison ivy (9/15/2015), Pure hypercholesterolemia, and S/P vasectomy (2014). He also has no past medical history of Abuse, Anemia NEC, Arrhythmia, Calculus of kidney, Chronic pain, Congestive heart failure, unspecified, COPD, Depression, GERD (gastroesophageal reflux disease), Headache(784.0), Psychotic disorder ([address] Thyroid disease, or Trauma. Past Surgical History:      has no past surgical history on file. Home Medications:     Prior to Admission medications    Medication Sig Start Date End Date Taking? Authorizing Provider   metaxalone (SKELAXIN) 800 mg tablet Take 800 mg by mouth three (3) times daily as needed for Muscle Spasm(s). Yes Provider, Historical   naproxen (NAPROSYN) 500 mg tablet Take 500 mg by mouth two (2) times daily as needed for Pain. Yes Provider, Historical   hydroCHLOROthiazide (HYDRODIURIL) 12.5 mg tablet TAKE 1 TABLET BY MOUTH EVERY DAY 10/25/21  Yes Mary Anne Suresh NP   amLODIPine (NORVASC) 10 mg tablet TAKE 1 TABLET DAILY   Yes Jasvir Alegria MD   glucosamine HCl/chondroitin bone (GLUCOSAMINE-CHONDROITIN PO) Take  by mouth. 1,500mg/1,200mg/MGMSM 1,000mg   Yes Provider, Historical   Krill-OM3-DHA-EPA-OM6-Lip-Astx (KRILL OIL) 1000-130(40-80) mg cap Take 1 Cap by mouth once over twenty-four (24) hours. Yes Provider, Historical   FERROUS FUMARATE/VIT BCOMP&C (SUPER B COMPLEX PO) Take  by mouth.    Yes Provider, Historical   atorvastatin (LIPITOR) 10 mg tablet TAKE 1 TABLET DAILY 21   Dawson Diaz NP     Allergies/Social/Family History:     No Known Allergies   Social History     Tobacco Use    Smoking status: Never Smoker    Smokeless tobacco: Never Used   Substance Use Topics    Alcohol use: No     Alcohol/week: 0.0 standard drinks      Family History   Problem Relation Age of Onset    Diabetes Mother     Heart Attack Father 43    Stroke Maternal Grandmother     Cancer Paternal Grandfather          age 45 - Hodgkin's   [facility] Cancer Maternal Grandfather  in 42's with melanoma     Review of Systems:     unable to obtain due to sedate  Objective:   Vital Signs:  Visit Vitals  /67   Pulse 61   Temp 97.5 °F (36.4 °C)   Resp 28   Ht 5' 11\" (1.803 m)   Wt 117.5 kg (259 lb 0.7 oz)   SpO2 100%   BMI 36.13 kg/m²    O2 Flow Rate (L/min): 60 l/min O2 Device: Endotracheal tube, Ventilator Temp (24hrs), Av.3 °F (36.8 °C), Min:97.5 °F (36.4 °C), Max:99 °F (37.2 °C)           Intake/Output:     Intake/Output Summary (Last 24 hours) at 2021 1530  Last data filed at 2021 1200  Gross per 24 hour   Intake 3132.9 ml   Output 3475 ml   Net -342.1 ml       Physical Exam:  Gen: intubated, sedated , paralyzed  HEENT: PERRL  NC, AT  Neck: no JVD, midline trach  Mouth: oral ETT   CV: NSR  Pulm: symmetric chest rise  Abd: NT ND soft  Ext: no edema, cyanosis, clubbing  MSK: no deformities, no synovitis  Neuro: sedated, paralyzed    LABS AND  DATA: Personally reviewed  Recent Labs     21   WBC 12.9* 13.1*   HGB 11.4* 11.8*   HCT 35.4* 36.5*    230     Recent Labs     21    136   K 3.9 4.1   CL 98 97   CO2 38* 38*   BUN 25* 26*   CREA 0.40* 0.43*   * 153*   CA 8.2* 8.2*   MG 2.5* 2.6*     Recent Labs     21   AP 58 54   TP 5.9* 5.9*   ALB 2.6* 2.6*   GLOB 3.3 3.3     Recent Labs     21   INR 1.0 1.0   PTP 10.4 10.7   APTT 21.0* 20.6*      Recent Labs     21  0527   PHI 7.39 7.41   PCO2I 64.1* 61.0*   PO2I 163* 168*   FIO2I 100 100     No results for input(s): CPK, CKMB, TROIQ, BNPP in the last 72 hours.     Hemodynamics:   PAP:   CO:     Wedge:   CI:     CVP:    SVR:       PVR:       Ventilator Settings:  Mode Rate Tidal Volume Pressure FiO2 PEEP   PRVC   400 ml    100 % 16 cm H20     Peak airway pressure: 35 cm H2O    Minute ventilation: 13.5 l/min        MEDS:   Current Facility-Administered Medications:    metOLazone (ZAROXOLYN) tablet 5 mg, 5 mg, Oral, BID, Davey Johnson MD, 5 mg at 12/06/21 1200    lactulose (CHRONULAC) 10 gram/15 mL solution 30 mL, 20 g, Oral, BID, Jf Mccormick MD, 30 mL at 12/06/21 1200    TPN ADULT - CENTRAL, , IntraVENous, CONTINUOUS, Davey Johnson MD    [START ON 12/7/2021] Vancomycin level 12/7 prior to 0500 dose, , Other, ONCE **AND** [START ON 12/7/2021] VANCOMYCIN, TROUGH, , , TOMORROW AM, Davey Johnson MD    [START ON 12/7/2021] lansoprazole (PREVACID) 3 mg/mL oral suspension 30 mg, 30 mg, Oral, ACB, Shante Ellis MD    dexamethasone (PF) (DECADRON) 10 mg/mL injection 6 mg, 6 mg, IntraVENous, Q24H, Davey Johnson MD, 6 mg at 12/05/21 2029    bumetanide (BUMEX) injection 1 mg, 1 mg, IntraVENous, Q8H, Jf Schmitt MD, 1 mg at 12/06/21 1353    vancomycin (VANCOCIN) 1750 mg in  ml infusion, 1,750 mg, IntraVENous, Q8H, Jf Schmitt MD, Last Rate: 250 mL/hr at 12/06/21 1212, 1,750 mg at 12/06/21 1212    propofol (DIPRIVAN) 10 mg/mL infusion, 0-50 mcg/kg/min, IntraVENous, TITRATE, Stanley Rascon MD, Last Rate: 37.4 mL/hr at 12/06/21 1520, 50 mcg/kg/min at 12/06/21 1520    midazolam in normal saline (VERSED) 1 mg/mL infusion, 0-10 mg/hr, IntraVENous, TITRATE, Stanley Rascon MD, Last Rate: 8 mL/hr at 12/06/21 0644, 8 mg/hr at 12/06/21 0644    enoxaparin (LOVENOX) injection 40 mg, 40 mg, SubCUTAneous, Q12H, Stanley Rascon MD, 40 mg at 12/06/21 0745    alteplase (CATHFLO) 1 mg in sterile water (preservative free) 1 mL injection, 1 mg, InterCATHeter, PRN, Sherri Bartholomew MD    anidulafungin (ERAXIS) 100 mg in 0.9% sodium chloride 130 mL IVPB, 100 mg, IntraVENous, Q24H, Davey Johnson MD, Last Rate: 83 mL/hr at 12/05/21 1640, 100 mg at 12/05/21 1640    meropenem (MERREM) 500 mg in sterile water (preservative free) 10 mL IV syringe, 0.5 g, IntraVENous, Q6H, Jf Schmitt MD, 500 mg at 12/06/21 0939    polyethylene glycol (MIRALAX) packet 17 g, 17 g, Oral, DAILY, Edison Lopez MD, 17 g at 12/06/21 0939    white petrolatum-mineral oiL (AKWA TEARS) 83-15 % ophthalmic ointment, , Both Eyes, BID, Edison Lopez MD, Given at 12/06/21 0939    insulin lispro (HUMALOG) injection, , SubCUTAneous, Q6H, Mala Rose MD, 3 Units at 12/06/21 0100    chlorhexidine (PERIDEX) 0.12 % mouthwash 15 mL, 15 mL, Oral, Q12H, Orville Barthel, MD, 15 mL at 12/06/21 1042    HYDROmorphone (DILAUDID) syringe 0.5 mg, 0.5 mg, IntraVENous, Q1H PRN, Orville Barthel, MD, 0.5 mg at 11/30/21 1630    senna-docusate (PERICOLACE) 8.6-50 mg per tablet 1 Tablet, 1 Tablet, Per G Tube, BID, Orville Barthel, MD, 1 Tablet at 12/06/21 0940    fentaNYL (PF) 1,500 mcg/30 mL (50 mcg/mL) infusion, 0-200 mcg/hr, IntraVENous, TITRATE, Stanley Rascon MD, Last Rate: 3 mL/hr at 12/06/21 1243, 150 mcg/hr at 12/06/21 1243    cisatracurium (NIMBEX) 200 mg in 0.9% sodium chloride 100 mL (2 mg/mL) infusion, 0-10 mcg/kg/min, IntraVENous, TITRATE, Orville Barthel, MD, Last Rate: 5.6 mL/hr at 12/06/21 1242, 1.5 mcg/kg/min at 12/06/21 1242    ascorbic acid (vitamin C) (VITAMIN C) tablet 500 mg, 500 mg, Per G Tube, BID, Orville Barthel, MD, 500 mg at 12/06/21 0940    atorvastatin (LIPITOR) tablet 20 mg, 20 mg, Per Charliene Heal, DAILY, Orville Barthel, MD, 20 mg at 12/06/21 0939    melatonin (rapid dissolve) tablet 5 mg, 5 mg, Oral, QHS, Orville Barthel, MD, 5 mg at 12/05/21 2029    acetaminophen (TYLENOL) tablet 1,000 mg, 1,000 mg, Per G Tube, Q6H PRN, 1,000 mg at 12/01/21 1754 **OR** acetaminophen (TYLENOL) suppository 975 mg, 975 mg, Rectal, Q6H PRN, Orville Barthel, MD, 975 mg at 12/02/21 9029    glucose chewable tablet 16 g, 4 Tablet, Per G Tube, PRN, Orville Barthel, MD    PHENYLephrine (DINO-SYNEPHRINE) 30 mg in 0.9% sodium chloride 250 mL infusion,  mcg/min, IntraVENous, TITRATE, Brittney Bell DO, Stopped at 12/05/21 0532    white petrolatum (VASELINE) ointment, , Topical, PRN, Mala Rose MD    sodium chloride (OCEAN) 0.65 % nasal squeeze bottle 2 Spray, 2 Spray, Both Nostrils, Q2H PRN, Chong Thornton DO, 2 Spray at 12/04/21 2037    albuterol-ipratropium (DUO-NEB) 2.5 MG-0.5 MG/3 ML, 3 mL, Nebulization, Q6H RT, 3 mL at 12/06/21 1424 **OR** ipratropium-albuterol (COMBIVENT RESPIMAT) 20 mcg-100 mcg inhalation spray, 2 Puff, Inhalation, Q6H RT, Zuleyma Jeronimo MD, 2 Puff at 11/28/21 2332    [Held by provider] simethicone (MYLICON) tablet 80 mg, 80 mg, Oral, QID PRN, Tatianna Maher MD, 80 mg at 11/29/21 1504    lidocaine 4 % patch 1 Patch, 1 Patch, TransDERmal, Q24H, Linette Buck MD, 1 Patch at 12/05/21 2029    phenol throat spray (CHLORASEPTIC) 2 Spray, 2 Spray, Oral, Q6H PRN, Scarlett Mckeon MD, 2 [place] at 11/20/21 0213    sodium chloride (NS) flush 5-40 mL, 5-40 mL, IntraVENous, Q8H, Carmen Christianson MD, 10 mL at 12/06/21 0626    sodium chloride (NS) flush 5-40 mL, 5-40 mL, IntraVENous, PRN, Carmen Christianson MD, 10 mL at 12/06/21 1359    ondansetron (ZOFRAN ODT) tablet 4 mg, 4 mg, Oral, Q8H PRN **OR** ondansetron (ZOFRAN) injection 4 mg, 4 mg, IntraVENous, Q6H PRN, Carmen Christianson MD, 4 mg at 11/21/21 0912    zinc sulfate (ZINCATE) 50 mg zinc (220 mg) capsule 1 Capsule, 1 Capsule, Oral, DAILY, Carmen Christianson MD, 1 Capsule at 12/06/21 0939    prochlorperazine (COMPAZINE) injection 10 mg, 10 mg, IntraVENous, Q6H PRN, Sammy, Zulfau, MD, 10 mg at 11/29/21 0937    dextrose (D50W) injection syrg 12.5-25 g, 12.5-25 g, IntraVENous, Sammy HU Zulfau, MD    glucagon (GLUCAGEN) injection 1 mg, 1 mg, IntraMUSCular, PRSammy COLLINS Zulfau, MD        I performed all aspects of the physical examination via Telemedicine associated with two way audio and video communication and with the on-site assistance of Nurse. I am located in [place] and the patient is located in [address] [place] [address]. Patient is critically ill in the ICU.    I  personally  reviewed the pertinent medical records, laboratory/ pathology data and radiographic images. The decision making regarding this patient is as documented above, which was generated  following  discussion  with the multidisciplinary team and creation of a treatment plan for  the patient. We discussed the patient's interval history and future coordination of care and  plans. The patient's medications  were reviewed and changes made as stipulated above. Due to  critical illness impairing one or more vital organs of this patient resulting in life threatening clinical situation  I have provided direct, frequent personal  assessment and manipulation in management plan and spent 35  minutes  of  critical care time excluding the time spent on procedures and teaching. Greater than 50% of this time  in patient's care was  employed  in counseling and coordination of care and engaged in face to face discussion of case management issues, addressing questions, and outlining a plan of  therapy.

## 2021-12-06 NOTE — PROGRESS NOTES
[address] Dosing Services: Vancomycin Level Evaluation 12/5/21  Consult for antibiotic dosing of Vancomycin by Dr. Nia Morejon  Indication: HAP  Day of Therapy: 4    Random level: 8.2mcg/mL drawn 1hr early. Level is on the low side of therapeutic. ~430 AUC/TESFAYE. Increase dose to 1750mg ivpb q8h to predict ~500 AUC/TESFAYE. Target: 400-600 AUC/TESFAYE. Pharmacy to continue to follow daily. Thank you,    Sana Perkins. Vega, Pharm D.         Contact: [phone]

## 2021-12-06 NOTE — PROGRESS NOTES
Comprehensive Nutrition Assessment    Type and Reason for Visit: Reassessment & New     Nutrition Recommendations/Plan:   1. NPO while NG set to suction    2. When able, resume Tube Feeding below:   · Peptide based High protein (Vital HP) @ 15 mL x20 hrs    · Advance as tolerated by 10mL Q6H to goal 35mL/hr  · FWF 10mL QH       3. Check triglycerides bi-weekly - improved on 12/6    4. If unable to resume EN feedings, consider TPN when medically appropriate:   · Day 1: D15/AA7% @ 42 mL/h  · Day 2: D15/AA7% @ 63 mL/h (goal w/ propofol)    TPN @ 63mL/h provides: 1194 kcal (2180 kcal/d including propofol), 106 g protein, 227 g dextrose. Providing ~18.5 kcal/kg & 0.9 g pro/kg    Nutrition Assessment:        12/6: pt remains intubated, sedated and paralyzed. Not tolerating TF, placed prone today, getting propofol at max dose (adds ~ 985 kcal/d from lipids), TGL rechecked today & improved, no plans to reduce propofol, TPN started today. If pt is able to handle higher volume in a couple days, increasing TPN to 75 mL/h. This would more closely meet protein needs but exceed initial calorie needs for critically ill patient. Pt is awaiting possible transfer to a higher level of care facility once bed is available. Appreciate additional bowel regimen. 12/3: RD attended & discussed patient during interdisciplinary rounds on unit. TF off with NG in place to low suction per RN. Pt with bleeding noted. If unable to resume PO, once more fluid stable consider TPN as rec'd above. No lipids. Awaiting bed available fro ECMO. Wt is down from admit - BMI = 36.28.      TF @35mL provides: 770ml of tf, 1010 kcal (1995 kcal/d including protein & propofol), 133 g protein, 85 g carb, ~955 ml of free water from TF & flushes. 17 kcal/kg & 1.1g of pro/kg. TPN @ 63mL/h provides[de-identified] ~1.5L, 1330 kcal (2315 kcal including propofol) & 76 g protein.  (~11 kcal/kg or 20 kcal/kg including propofol, 0.6 g pro/kg)       11/22: pt admitted for Acute hypoxemic respiratory failure due to COVID-19 ([facility].) [U07.1, J96.01] who  has a past medical history of Hypercholesteremia, Hypertension (10/15/2014), and S/P vasectomy (2014). Pt was sitting up in chair in room. Pt has been switching between High Flow NC and Bipap for oxygen needs. He is asking to be able to eat. Call to MDs to request diet or supplements when safe. Pt is still accepting meds 2-3 times daily. Review of history notes blood glucose much improved from earlier this year. On admission, notes reviewed pt had nausea and diarrhea for 2 days prior, has had clear liquids & crackers. Pt has been NPO x4 days (since evening of admission date). Morbidly obese male with high calorie/protein needs. Malnutrition Assessment:  Malnutrition Status:  Severe malnutrition    Context:  Acute illness     Findings of the 6 clinical characteristics of malnutrition:   Energy Intake:  7 - 50% or less of est energy requirements for 5 or more days  Weight Loss:  7 - Greater than 2% over 1 week  - wt down 9.7% from admit wt (doc wt from 4 months ago)   Body Fat Loss:  Unable to assess,     Muscle Mass Loss:  Unable to assess,    Fluid Accumulation:  No significant fluid accumulation,     Strength:  Not performed     Estimated Daily Nutrient Needs:  Energy (kcal): 1723 kcal/d (65% est needs) 2650 kcal/d ([id number] xAF 1.1)  Weight Used for Energy Requirements: Current 117.2 kg  Protein (g): 117 - 156g (1.5-2 g/kg of IBW);    Weight Used for Protein Requirements: Ideal (78.2 kg)  Fluid (ml/day): 1730 mL+;   Method Used for Fluid Requirements: 1 ml/kcal    Nutrition Related Findings:     Last BM:     ABD: obese, intact, active bowel sounds    Edema: none (12/3)     non-pitting - all extremities ()                 None - R/LUE, trace - R/LLE ()     Trace - all extremities ()    Oxygen Needs:  Vent Measures: 13.5 l/min    Temp (24hrs), Av.3 °F (36.8 °C), Min:97.5 °F (36.4 °C), Max:99 °F (37.2 °C)      Nutr. Related Meds:   Vit C, Zinc, Lipitor, decadron, Protonix, Bumex, Nimbex, Fentanyl, reglan, Versed, lactulose BID, Propofol, merrem, metolazone (diuretic), Miralax daily, pericolace, vanc    PRN: correction insulin, miralax      Nutr. Related Labs:   Lab Results   Component Value Date/Time    Glucose 140 (H) 12/06/2021 04:11 AM    Glucose (POC) 129 (H) 12/06/2021 11:46 AM     Lab Results   Component Value Date/Time    Hemoglobin A1c 5.8 (H) 07/30/2021 10:25 AM    Hemoglobin A1c 11.1 (H) 04/16/2021 10:47 AM     Lab Results   Component Value Date/Time    Sodium 139 12/06/2021 04:11 AM    Potassium 3.9 12/06/2021 04:11 AM    Chloride 98 12/06/2021 04:11 AM    CO2 38 (H) 12/06/2021 04:11 AM     Lab Results   Component Value Date/Time    Triglyceride 390 (H) 12/03/2021 03:54 AM     Lab Results   Component Value Date/Time    Triglyceride 299 (H) 12/06/2021 04:11 AM     Wounds:    None        Current Nutrition Therapies:  DIET NPO  DIET ONE TIME MESSAGE  TPN ADULT - CENTRAL    Documented Meal intake:  Patient Vitals for the past 168 hrs:   % Diet Eaten   12/03/21 1600 0%   12/03/21 1400 0%   12/03/21 0800 0%   11/29/21 1750 26 - 50%     Documentation of supplement intake:  Patient Vitals for the past 168 hrs:   Supplement intake %   11/29/21 1750 76 - 100%       Anthropometric Measures:  · Height:  5' 11\" (180.3 cm)  · Current Body Wt:  117.5 kg (259 lb 0.7 oz)   · Admission Body Wt:  304 lb    · Usual Body Wt:  136.1 kg (300 lb)     · Ideal Body Wt:  172 lbs:  150.6 %   · Adjusted Body Weight:   ; Weight Adjustment for: No adjustment   · Adjusted BMI:       · BMI Category:  Obese class 2 (BMI 35.0-39. 9)     Body mass index is 36.13 kg/m².     Last 3 Recorded Weights in this Encounter    12/03/21 1115 12/04/21 1241 12/06/21 0639   Weight: 118 kg (260 lb 2.3 oz) 118.3 kg (260 lb 14.4 oz) 117.5 kg (259 lb 0.7 oz)     Wt Readings from Last 6 Encounters:   12/06/21 117.5 kg (259 lb 0.7 oz)   07/30/21 137.9 kg (304 lb)   04/30/21 133.8 kg (295 lb)   04/07/20 145.2 kg (320 lb)   12/06/19 145.2 kg (320 lb)   10/04/19 142.4 kg (314 lb)      Nutrition Diagnosis:   · Inadequate protein-energy intake related to catabolic illness, impaired respiratory function as evidenced by intake 26-50%, weight loss greater than or equal to 2% in 1 week, unable to accept PO due to increased O2 needs. · Severe malnutrition related to catabolic illness, inadequate protein-energy intake, impaired respiratory function as evidenced by NPO or clear liquid status due to medical condition, weight loss greater than or equal to 2% in 1 week, intake 26-50%, 9.7% wt loss in the last 2 weeks to 4 months. Nutrition Interventions:   Food and/or Nutrient Delivery: Discontinue tube feeding, Start parenteral nutrition  Nutrition Education and Counseling: No recommendations at this time  Coordination of Nutrition Care: Continue to monitor while inpatient, Interdisciplinary rounds    Goals:  provide & tolerate 80% of protein needs within 2-3 days       Nutrition Monitoring and Evaluation:   Behavioral-Environmental Outcomes: None identified  Food/Nutrient Intake Outcomes: Parenteral nutrition intake/tolerance  Physical Signs/Symptoms Outcomes: Biochemical data, Fluid status or edema, Hemodynamic status, Weight    Discharge Planning:     Too soon to determine     Electronically signed by Zoie Saleem RD, MS on 12/6/2021 at 4:10 PM  Contact via [facility] or office [phone]

## 2021-12-06 NOTE — PROGRESS NOTES
[facility] Dosing Services: IV to PO Conversion    The pharmacist has determined that this patient meets P & T approved criteria for conversion from IV to oral therapy for the following medication:Pantoprazole    The pharmacist has written the following order for the patient: Prevacid 30 mg per G tube daily    The pharmacist will continue to monitor the patient's status and advise the physician if conversion back to IV therapy is recommended.     Signed ABBEY Merida Contact information:  [phone]

## 2021-12-06 NOTE — PROGRESS NOTES
Case Management follow-up:    RUR 14%(low readmission risk )    Pt remains intubated ,sedated and paralyzed. Peep 16,fio2 100%    Covid +,not vaccinated    On fentanyl,propofol,versed,nimbex gtts  Off sarah and vasopressin gtts      I have reached out to attending to find out any updates regarding pt going to [facility] for ECMO. Last week,[facility] did not have any beds. [facility] transfer center is calling [facility] daily regarding bed placement. I will call transfer center in am for an update.   Jared Aviles

## 2021-12-06 NOTE — PROGRESS NOTES
Problem: Falls - Risk of  Goal: *Absence of Falls  Description: Document Aaron Wolff Fall Risk and appropriate interventions in the flowsheet.   Outcome: Not Progressing Towards Goal  Note: Fall Risk Interventions:  Mobility Interventions: Bed/chair exit alarm, Communicate number of staff needed for ambulation/transfer, Patient to call before getting OOB, PT Consult for mobility concerns, PT Consult for assist device competence, Strengthening exercises (ROM-active/passive), Utilize walker, cane, or other assistive device    Mentation Interventions: Bed/chair exit alarm, Adequate sleep, hydration, pain control, Door open when patient unattended, Evaluate medications/consider consulting pharmacy, More frequent rounding, Reorient patient, Room close to nurse's station    Medication Interventions: Bed/chair exit alarm, Evaluate medications/consider consulting pharmacy, Patient to call before getting OOB, Teach patient to arise slowly    Elimination Interventions: Bed/chair exit alarm, Call light in reach, Patient to call for help with toileting needs, Toileting schedule/hourly rounds, Toilet paper/wipes in reach              Problem: Patient Education: Go to Patient Education Activity  Goal: Patient/Family Education  Outcome: Not Progressing Towards Goal     Problem: Ventilator Management  Goal: *Adequate oxygenation and ventilation  Outcome: Not Progressing Towards Goal  Goal: *Patient maintains clear airway/free of aspiration  Outcome: Not Progressing Towards Goal  Goal: *Absence of infection signs and symptoms  Outcome: Not Progressing Towards Goal  Goal: *Normal spontaneous ventilation  Outcome: Not Progressing Towards Goal     Problem: Patient Education: Go to Patient Education Activity  Goal: Patient/Family Education  Outcome: Not Progressing Towards Goal

## 2021-12-06 NOTE — PROGRESS NOTES
[address] Dosing Services: Antimicrobial Stewardship Daily Doc 12/6/2021  Consult for antibiotic dosing of Vancomycin by  [address] [facility]  Indication: HAP  Day of Therapy: 5    Vancomycin therapy:  Current maintenance dose: Vancomycin 1750 mg IV every 8 hours   Dose calculated to approximate a          Target AUC/TESFAYE of 400-600  Last level: 8.2  Plan: Continue vancomycin 1750 mg Q8 hrs for anticipated therapeutic AUC. Will order level 12/7/21 as a steady state trough to ensure efficacy given subtherapeutic dosing despite predicted therapeutic levels per [employer] Rx software. Date Dose & Interval Measured (mcg/mL) Extrapolated (mcg/mL)   12/7/21 1750 mg Q8 hrs  Css trough   ? 12/5/21 1500 mg Q8 hrs? ?8.2 AUC ~430?   ?12/4/21 ? 1500 mg Q12 hrs 2. 9? AUC ~345     Other Antimicrobial   (not dosed by pharmacist) Merrem 500 mg IV q6hr -d5  Eraxis 100mg IV daily-d5   Cultures 12/3 Blood (+ fungal blood): NGTD, Prelim    12/1 Blood: NG, F  12/1 Urine: NG final  11/19 Urine: NG final  11/19 MRSA: neg final  11/19 Blood: NG x5 days pending   Serum Creatinine Lab Results   Component Value Date/Time    Creatinine 0.40 (L) 12/06/2021 04:11 AM         Creatinine Clearance Estimated Creatinine Clearance: 188.4 mL/min (A) (by C-G formula based on SCr of 0.4 mg/dL (L)). Temp Temp: 98 °F (36.7 °C)       WBC Lab Results   Component Value Date/Time    WBC 12.9 (H) 12/06/2021 04:11 AM        Procalcitonin Lab Results   Component Value Date/Time    Procalcitonin 0.10 12/01/2021 02:44 PM        For Antifungals, Metronidazole and Nafcillin: Lab Results   Component Value Date/Time    ALT (SGPT) 35 12/06/2021 04:11 AM    AST (SGOT) 40 (H) 12/06/2021 04:11 AM    Alk.  phosphatase 58 12/06/2021 04:11 AM    Bilirubin, total 0.6 12/06/2021 04:11 AM        Thanks,  Pharmacist Mandi Salazar, MAKENZIED

## 2021-12-06 NOTE — PROGRESS NOTES
Spiritual Care Assessment/Progress Note  [address]      NAME: Freda Shelley      MRN: [id number]  AGE: 39 y.o. SEX: male  Latter-day Affiliation: Orthodox   Language: English     12/6/2021     Total Time (in minutes): 30     Spiritual Assessment begun in [facility] INTENSIVE CARE through conversation with:         []Patient        [x] Family    [] Friend(s)        Reason for Consult: Initial/Spiritual assessment, critical care     Spiritual beliefs: (Please include comment if needed)     [x] Identifies with a elijah tradition:         [x] Supported by a elijah community:            [] Claims no spiritual orientation:           [] Seeking spiritual identity:                [] Adheres to an individual form of spirituality:           [] Not able to assess:                           Identified resources for coping:      [x] Prayer                               [] Music                  [] Guided Imagery     [x] Family/friends                 [] Pet visits     [] Devotional reading                         [] Unknown     [] Other:                                        Interventions offered during this visit: (See comments for more details)    Patient Interventions:  Other (comment) (Unable to assess)     Family/Friend(s): Catharsis/review of pertinent events in supportive environment, Normalization of emotional/spiritual concerns     Plan of Care:     [] Support spiritual and/or cultural needs    [] Support AMD and/or advance care planning process      [] Support grieving process   [x] Coordinate Rites and/or Rituals    [] Coordination with community clergy   [] No spiritual needs identified at this time   [] Detailed Plan of Care below (See Comments)  [] Make referral to Music Therapy  [] Make referral to Pet Therapy     [] Make referral to Addiction services  [] Make referral to [facility]  [] Make referral to Spiritual Care Partner  [] No future visits requested        [x] Contact Spiritual Care for further referrals     Comments:  attempted to visit Mr. Blanco for an initial spiritual assessment in the ICU. Mr. Pradip Jefferson remains on Covid+ contact precautions and is intubated. Consulted with his nurse. While on the unit  reached out to Mr. Blanco's wife, Elaine Smallwood, via telephone, introduced herself and extended support. Elaine Cl was tearful over the phone, sharing that she has great support around her and lots of prayers being spoken on their behalf.  inquired how she could best support them and Elaine Smallwood inquired if their  could come and give Mr. Blanco a blessing. Consulted with the Nursing Director who agreed for Mr. Blanco's  to come provide this blessing. Consulted with his nurse. Elaine Smallwood thanked the  for her phone call and is aware of the 's availability. 's are available for further support upon referral  Zohreh Mcneal. Antonina Hanks.      Paging Service: [phone])

## 2021-12-06 NOTE — ROUTINE PROCESS
Bedside shift change report given to Ruth Hayward RN (oncoming nurse) by Alix Abernathy RN (offgoing nurse). Report included the following information SBAR, Kardex, ED Summary, Intake/Output, MAR, Accordion, Recent Results, Med Rec Status and Cardiac Rhythm NSR, sinus steve.

## 2021-12-06 NOTE — PROGRESS NOTES
Called patient's wife, verified with birth date. Provided daily updates and answered all questions.      Connie Monge MD  Family Medicine Residency PGY1

## 2021-12-07 NOTE — PROGRESS NOTES
[address] [facility] [address] [facility]   Office [phone]  Fax [phone]          Assessment and Plan     Liane Skinner is a 39 y.o. male with a PMH of HTN, HLD, JESUS admitted for AHRF and severe sepsis 2/2 COVID PNA. Patient was admitted on 11/19/2021. Interval Events: No acute events overnight. Remains intubated and sedated. AHRF and ARDS 2/2 COVID PNA: Continues intubated, sedated, paralyzed, and proned on Vent support, decreased FIO2 from 100 to 80, , PEEP 16. Pending approval ECMO at [facility] still no availability.  - Daily weights.   - Prone as tolerated  - Goal sats 88% or higher  - CBC, CMP daily. No need for further trending Covid labs  - Continue Decadron 6mg IV daily  - Duo-neb or Combivent Respimat Q6H  - Holding COVID meds (Atorvastatin 20mg every day, Vit C 500mg BID, Zinc 220mg    daily)  - Pulm following, appreciate recs    Severe sepsis 2/2 COVID PNA: Now off pressors. Leukocytosis stable. Fungal Cx NGTD. - Meropenam, Vanc, and Eraxis, now Day 6    - F/u fungal culture  - Intensivist following  - MAPs improving, now off pressors    Nutritional status:  Nutrition consulted for TPN, appreciate recs. - continue TPN per nutrition recs     Oozing (improved): Mild bleeding with suctioning otherwise no other findings while prone.     - On prophylactic Lovenox 40 mg BID    Pneumomediastinum/Pneumothorax: Possibly barotrauma 2/2 high pressures needed for COVID ARDS PNA vs lung frailty. Repeat CXR 12/1 showing stable pneumomediastinum, and resolved pneumothorax. - Monitor for signs of worsening barotrauma     Insomnia/anxiety: Pt with new insomnia and anxiety, specifically overnight. - Intubated, sedated    HTN: Pt normotensive off meds. Currently hypotensive. At home is on Amlodipine 10mg daily and HCTZ 12.5mg daily  - Will continue to monitor      HLD: Chronic, stable. Last lipids 07/2021 , HDL 54, LDL 98.6, . On Atorvastatin 10mg daily.   - Holding Atorvastatin 20mg daily     T2DM (diet-controlled): Prior A1c 5.8% in 05/2021. No meds at present.   - SSI (resistant given morbid obesity) w/ q6h glucose checks     JESUS: On CPAP at night. - Intubated, sedated.     Obesity: Body mass index is 42.4 kg/m² on presentation.  - Encouraging lifestyle modifications and further follow up outpatient. At-risk JAMIR: RESOLVED. POA Cr 1.31, BL 0.8. Likely IVVD in setting of poor PO intake. - Daily CMP    Chest pain: RESOLVED. Description is pleuritic in nature, worse with cough. EKG without evidence of acute ischemia. Troponin of 9 makes CAD less concerning.   - Cardiac monitoring, will continue to monitor     Diarrhea: RESOLVED. Watery diarrhea x 2 days. Likely 2/2 COVID infection. Improved since presentation. Enteric bacteria panel negative. Abdominal pain: RESOLVED. Secondary to gas pains after starting diet. - Continue Simethicone       FEN/GI - TPN  Activity - Ambulate with assistance  DVT prophylaxis - Lovenox (ppx)  GI prophylaxis - Protonix  Disposition - Plan to d/c to TBD. Code Status - Full. Discussed with patient / caregivers. Next of [facility] Name and [facility],  Spouse - [phone]      Sandra Claire MD  Family Medicine Resident    Patient discussed with Family Medicine Attending: Dr. Vanita Arana / Objective   Subjective:   Patient intubated and proned, on pressors. Sedated. No acute distress. Respiratory: O2 Flow Rate (L/min): 60 l/min O2 Device: Endotracheal tube, Ventilator   Visit Vitals  /66   Pulse 88   Temp 99 °F (37.2 °C)   Resp 28   Ht 5' 11\" (1.803 m)   Wt 259 lb 0.7 oz (117.5 kg)   SpO2 96%   BMI 36.13 kg/m²     General: Intubated, sedated. Appears comfortable. Respiratory: Transmitted mechanical sounds appreciated bilaterally, unchanged. Cardiovascular: regular rate, regular rhythm. GI: + bowel sounds. Nontender. Extremities:  No LE edema.   Extremities warm and well perfused  Skin: Warm, dry. PICC line in place  Neuro: Intubated sedated  Mitchell in place     I/O:  Date 12/06/21 0700 - 12/07/21 0659 12/07/21 0700 - 12/08/21 0659   Shift [id number] 1900-0659 24 Hour Total 1664-3447 2565-6861 24 Hour Total   INTAKE   I.V.(mL/kg/hr) 399.4(0.3) 2993.3(2.1) 3392.7(1.2)        Versed Volume 48 144 192        Nimbex Volume 45 124 169        PitRESSIN Volume 54  54        Fentanyl Volume 18 54 72        Diprivan Volume 224.4 673.2 897.6        Volume (meropenem (MERREM) 500 mg in sterile water (preservative free) 10 mL IV syringe) 10 20 30        Volume (vancomycin (VANCOCIN) 1750 mg in  ml infusion)  1500 1500        Volume (TPN ADULT - CENTRAL)  478.1 478.1      NG/GT 50 0 50        Water Flush Volume (mL) (Orogastric Tube 11/30/21) 0  0        Medication Volume (Orogastric Tube 11/30/21) 50  50        Intake (ml) (Orogastric Tube 11/30/21) 0 0 0      Shift Total(mL/kg) 449.4(3.8) 8777.1(17.5) 3442.7(29.3)      OUTPUT   Urine(mL/kg/hr) 1500(1.1) 5638(8.4) 4175(1.5)        Urine Output (mL) (Urinary Catheter 11/30/21 Mitchell - Temperature) 1500 2675 4175      Shift Total(mL/kg) 1500(12.8) 8633(14.9) 4175(35.5)      NET -1050.6 318.3 -732.3      Weight (kg) 117.5 117.5 117.5 117.5 117.5 117.5       CBC:  Recent Labs     12/07/21  0430 12/06/21  0411 12/05/21  0347   WBC 13.5* 12.9* 13.1*   HGB 11.9* 11.4* 11.8*   HCT 37.0 35.4* 36.5*    256 635       Metabolic Panel:  Recent Labs     12/07/21  0430 12/06/21  0411 12/05/21  0347    139 136   K 3.2* 3.9 4.1   CL 98 98 97   CO2 38* 38* 38*   BUN 20 25* 26*   CREA 0.48* 0.40* 0.43*   * 140* 153*   CA 9.0 8.2* 8.2*   MG 2.5* 2.5* 2.6*   PHOS 3.5  --   --    ALB 2.8* 2.6* 2.6*   ALT 46 35 29   INR 1.0 1.0 1.0          For Billing    Chief Complaint   Patient presents with   [facility] Problems  Date Reviewed: 12/3/2021          Codes Class Noted POA    Oozing skin inflammation ICD-10-CM: L08.9  ICD-9-CM: 686.9 12/3/2021 Unknown        Positive D dimer ICD-10-CM: R79.89  ICD-9-CM: 790.92  12/3/2021 Unknown        Other hyperlipidemia ICD-10-CM: E78.49  ICD-9-CM: 272.4  12/3/2021 Unknown        Elevated serum creatinine ICD-10-CM: R79.89  ICD-9-CM: 790.99  12/3/2021 Unknown        Community acquired pneumonia ICD-10-CM: J18.9  ICD-9-CM: 486  12/3/2021 Unknown        ARDS (adult respiratory distress syndrome) ([facility] ICD-10-CM: J80  ICD-9-CM: 518.52  12/1/2021 No        Septic shock (HCC) ICD-10-CM: A41.9, R65.21  ICD-9-CM: 038.9, 785.52, 995.92  11/26/2021 Yes        Pneumomediastinum ([facility].) ICD-10-CM: J98.2  ICD-9-CM: 518.1  11/26/2021 No        Pneumothorax on left ICD-10-CM: J93.9  ICD-9-CM: 512.89  11/26/2021 No        Hyponatremia ICD-10-CM: E87.1  ICD-9-CM: 276.1  11/26/2021 Yes        Diabetes mellitus type 2, controlled ([place] [address].) ICD-10-CM: E11.9  ICD-9-CM: 250.00  11/25/2021 Yes        * (Principal) Acute hypoxemic respiratory failure due to COVID-19 [facility]) ICD-10-CM: U07.1, J96.01  ICD-9-CM: 518.81, 079.89, 799.02  11/19/2021 Yes        Obesity, morbid ([facility] ICD-10-CM: E66.01  ICD-9-CM: 278.01  4/10/2018 Yes        Elevated liver enzymes ICD-10-CM: R74.8  ICD-9-CM: 790.5  8/5/2016 Yes        JESUS (obstructive sleep apnea) ICD-10-CM: G47.33  ICD-9-CM: 327.23  10/15/2014 Yes        Hypertension ICD-10-CM: I10  ICD-9-CM: 401.9  10/15/2014 Yes        Pure hypercholesterolemia ICD-10-CM: E78.00  ICD-9-CM: 272.0  Unknown Yes

## 2021-12-07 NOTE — PROGRESS NOTES
0700- Bedside and Verbal shift change report received from Anca Guzmán, [facility] (offgoing nurse) byChavez HUGHES (oncoming nurse). Report included the following information SBAR, Kardex, Intake/Output, Cardiac Rhythm SR and Alarm Parameters . Primary Nurse Chavez HUGHES and Joanna Porras RN performed a dual skin assessment on this patient No impairment noted. All drips verified. 0800- Patient shift assessment performed. Patient still waiting for bed at [facility] for ecmo. Plan bacilio wean Fio2 and pressors during shift. Order to dc vaso see MAR.     1130 Patient turned supine with nursing staff and RT bedside. Reassessment performed. No changes noted. Plan ti begin TPN during shift abd dc tube feeding. 1900- Bedside and Verbal shift change report given to SAUL Mera(oncoming nurse) by Jairo Vila RN (offgoing nurse).  Report included the following information SBAR, Kardex, ED Summary, Intake/Output, MAR and Cardiac Rhythm SR.

## 2021-12-07 NOTE — PROGRESS NOTES
[address] [place] [address] with [address] [place] [address]       Resident Progress Note in Brief    S: Pt intubated and sedated. No concerns from nursing staff. O:  Visit Vitals  BP (!) 105/57   Pulse 86   Temp 99 °F (37.2 °C)   Resp 28   Ht 5' 11\" (1.803 m)   Wt 259 lb 0.7 oz (117.5 kg)   SpO2 91%   BMI 36.13 kg/m²     Physical Examination:   General appearance - Intubated, sedated, supine. Heart - rate 99  Respiratory - ET tube in place. Neurological - intubated, sedated. A/P:     Brianna Blanco is a 39 y. o. male with a PMH of HTN, HLD, JESUS admitted for AHRF and ARDS 2/2 COVID PNA. Currently intubated and sedated. Working towards transfer for ECMO. Patient was admitted on 11/19/2021. AHRF and ARDS 2/2 COVID PNA: Unvaccinated. CTA chest on 11/19/21 demonstrated moderate to severe COVID PNA. S/p tocilizumab on 11/19. Intubated and sedated on 11/30. Working towards transfer for ECMO. Potential availability at [facility], but BMI must be < 35. No availability currently at [facility]. - Intubated, sedated on propofol, Versed, and fentanyl. Intensivist following.  - Goal sats 88% or higher  - Prone as tolerated  - CBC, CMP daily. - Continue Decadron 6mg IV daily  - Lovenox 40 mg subq Q12H  - Duo-neb or Combivent Respimat Q6H  - Atorvastatin 20mg every day, Vit C 500mg BID, Zinc 220mg daily  - Pulm following, appreciate recs    Sepsis 2/2 COVID PNA: Last febrile on 12/2/21 at 1600 (100.7 F). - Continue Meropenem, vancomycin, Eraxis  - F/u blood cultures  - On vasopressin (sarah-synephrine stopped). Intensivist following     Nutrition:  - HoldingTPN for now    Please see the primary team's daily progress note for the patient's full plan.     Jayde Busby MD  Family Medicine Resident, PGY-1

## 2021-12-07 NOTE — PROGRESS NOTES
Endotracheal tube taped and patient placed in proned position. Ventilator settings remain the same and saturation stable.

## 2021-12-07 NOTE — PROGRESS NOTES
SOUND Tele  CRITICAL CARE    Tele ICU TEAM Progress Note    Name: Hipolito Salazar   : 1985   MRN: [id number]   Date: 2021           ICU Assessment and Plan3   Clinical Summary:  Referral Source: Dr. Mani Seals    Acute hypoxic resp failure  HTN  Obesity   JESUS  DM            ICU Comprehensive Plan of Care:    NEURO: sedation per protocol, paralytics   CV: Keep MAP >65; pressors as needed, noted echo   PULM: Continue mechanical ventilation, low TV, high peep strategy, serial ABG,  decadron to 6 mg giovani,y s/p toci. On waitlist for ecmo at [facility]. Will reprone today. Check us venous, cxr noted  GI: cont tpn, incr bowel regimen, once he has a bowel movement, will retrial tube feeds   RENAL/: Monitor Cr and UOP; cont diuresis   ENDO: BG Goal 140-180; glycemic control  HEME/ONC: Tx Hgb < 7, Plt<10k; transfuse as needed,    MICRO:  FU on Cx; fu cultures, leukocytosis improved on abx and antifungals  Code Status: Full Code  LOS: 18  Prophylaxis: GI: SUP h2 b  DVT: therapeutic lovenox empirically      Discussed Care Plan with Bedside RN   Subjective:   Reason for ICU Admission: covid       45yo man admitted for COVID. Had small apical Ptx. Summary of Events:     - admitted   - pneumomediastinum and possible L apical PTX on CXR   - escalated to ICU for continuous BiPAP at 100%; pt declined intubated--happy hypoxic on BiPAP currently, tenuous however; called spouse Cindy Carter to update--update done; precedex on and off; Cefepime dc'd.  : intubated yday, sedated, paralyzed and proned. Ongoing fevers   unable to tolerate being supine ydya at lower peep, fevers, oozing from tlc site.      : difficulty tolerating tube feeds, leukocytosis improving   intubated sedated tolerating supination better   worsening oxygenation after holding proning yday     Past Medical History:      has a past medical history of History of vascular access device (05341), Hypercholesteremia, Hypertension (10/15/2014), Poison ivy (9/15/2015), Pure hypercholesterolemia, and S/P vasectomy (2014). He also has no past medical history of Abuse, Anemia NEC, Arrhythmia, Calculus of kidney, Chronic pain, Congestive heart failure, unspecified, COPD, Depression, GERD (gastroesophageal reflux disease), Headache(784.0), Psychotic disorder ([address] Thyroid disease, or Trauma. Past Surgical History:      has no past surgical history on file. Home Medications:     Prior to Admission medications    Medication Sig Start Date End Date Taking? Authorizing Provider   metaxalone (SKELAXIN) 800 mg tablet Take 800 mg by mouth three (3) times daily as needed for Muscle Spasm(s). Yes Provider, Historical   naproxen (NAPROSYN) 500 mg tablet Take 500 mg by mouth two (2) times daily as needed for Pain. Yes Provider, Historical   hydroCHLOROthiazide (HYDRODIURIL) 12.5 mg tablet TAKE 1 TABLET BY MOUTH EVERY DAY 10/25/21  Yes Sunil Suresh NP   amLODIPine (NORVASC) 10 mg tablet TAKE 1 TABLET DAILY   Yes Nikolay Pinon MD   glucosamine HCl/chondroitin bone (GLUCOSAMINE-CHONDROITIN PO) Take  by mouth. 1,500mg/1,200mg/MGMSM 1,000mg   Yes Provider, Historical   Krill-OM3-DHA-EPA-OM6-Lip-Astx (KRILL OIL) 1000-130(40-80) mg cap Take 1 Cap by mouth once over twenty-four (24) hours. Yes Provider, Historical   FERROUS FUMARATE/VIT BCOMP&C (SUPER B COMPLEX PO) Take  by mouth.    Yes Provider, Historical   atorvastatin (LIPITOR) 10 mg tablet TAKE 1 TABLET DAILY 21   Eugenia Bonds NP     Allergies/Social/Family History:     No Known Allergies   Social History     Tobacco Use    Smoking status: Never Smoker    Smokeless tobacco: Never Used   Substance Use Topics    Alcohol use: No     Alcohol/week: 0.0 standard drinks      Family History   Problem Relation Age of Onset    Diabetes Mother     Heart Attack Father 43    Stroke Maternal Grandmother     Cancer Paternal Grandfather          age 45 - Hodgkin's  Cancer Maternal Grandfather          in 42's with melanoma     Review of Systems:     unable to obtain due to sedate  Objective:   Vital Signs:  Visit Vitals  BP (!) 96/56   Pulse (!) 102   Temp (!) 100.6 °F (38.1 °C)   Resp 28   Ht 5' 11\" (1.803 m)   Wt 117.5 kg (259 lb 0.7 oz)   SpO2 91%   BMI 36.13 kg/m²    O2 Flow Rate (L/min): 60 l/min O2 Device: Endotracheal tube, Ventilator Temp (24hrs), Av.1 °F (37.3 °C), Min:97.5 °F (36.4 °C), Max:100.6 °F (38.1 °C)           Intake/Output:     Intake/Output Summary (Last 24 hours) at 2021 1600  Last data filed at 2021 1400  Gross per 24 hour   Intake 4000.68 ml   Output 3445 ml   Net 555.68 ml       Physical Exam:  Gen: intubated, sedated , paralyzed  HEENT: PERRL  NC, AT  Neck: no JVD, midline trach  Mouth: oral ETT   CV: NSR  Pulm: symmetric chest rise  Abd: NT ND soft  Ext: no edema, cyanosis, clubbing  MSK: no deformities, no synovitis  Neuro: sedated, paralyzed    LABS AND  DATA: Personally reviewed  Recent Labs     21   WBC 13.5* 12.9*   HGB 11.9* 11.4*   HCT 37.0 35.4*    256     Recent Labs     21    139   K 3.2* 3.9   CL 98 98   CO2 38* 38*   BUN 20 25*   CREA 0.48* 0.40*   * 140*   CA 9.0 8.2*   MG 2.5* 2.5*   PHOS 3.5  --      Recent Labs     21   AP 62 58   TP 6.4 5.9*   ALB 2.8* 2.6*   GLOB 3.6 3.3     Recent Labs     21   INR 1.0 1.0   PTP 10.2 10.4   APTT 20.8* 21.0*      Recent Labs     21  0442   PHI 7.39   PCO2I 64.1*   PO2I 163*   FIO2I 100     No results for input(s): CPK, CKMB, TROIQ, BNPP in the last 72 hours.     Hemodynamics:   PAP:   CO:     Wedge:   CI:     CVP:    SVR:       PVR:       Ventilator Settings:  Mode Rate Tidal Volume Pressure FiO2 PEEP   PRVC   400 ml    75 % 16 cm H20     Peak airway pressure: 34 cm H2O    Minute ventilation: 10.5 l/min        MEDS:   Current Facility-Administered Medications:     lactulose (CHRONULAC) 10 gram/15 mL solution 30 mL, 20 g, Oral, TID, [place] SprJf carvajal MD, 30 mL at 12/07/21 1505    TPN ADULT - CENTRAL, , IntraVENous, CONTINUOUS, Jf Schmitt MD    metOLazone (ZAROXOLYN) tablet 5 mg, 5 mg, Oral, BID, Meet Jerez MD, 5 mg at 12/07/21 0827    TPN ADULT - CENTRAL, , IntraVENous, CONTINUOUS, Meet Jerez MD, Last Rate: 42 mL/hr at 12/06/21 1837, New Bag at 12/06/21 1837    lansoprazole (PREVACID) 3 mg/mL oral suspension 30 mg, 30 mg, Oral, ACB, Clottess Caal MD, 30 mg at 12/07/21 0552    dexamethasone (PF) (DECADRON) 10 mg/mL injection 6 mg, 6 mg, IntraVENous, Q24H, Meet Jerez MD, 6 mg at 12/06/21 2000    bumetanide (BUMEX) injection 1 mg, 1 mg, IntraVENous, Q8H, Jf Schmitt MD, 1 mg at 12/07/21 1357    vancomycin (VANCOCIN) 1750 mg in  ml infusion, 1,750 mg, IntraVENous, Q8H, Jf Schmitt MD, Last Rate: 250 mL/hr at 12/07/21 1357, 1,750 mg at 12/07/21 1357    propofol (DIPRIVAN) 10 mg/mL infusion, 0-50 mcg/kg/min, IntraVENous, TITRATE, Stanley Rascon MD, Last Rate: 37.4 mL/hr at 12/07/21 1413, 50 mcg/kg/min at 12/07/21 1413    midazolam in normal saline (VERSED) 1 mg/mL infusion, 0-10 mg/hr, IntraVENous, TITRATE, Stanley Rascon MD, Last Rate: 8 mL/hr at 12/06/21 2056, 8 mg/hr at 12/06/21 2056    enoxaparin (LOVENOX) injection 40 mg, 40 mg, SubCUTAneous, Q12H, Stanley Rascon MD, 40 mg at 12/07/21 0827    alteplase (CATHFLO) 1 mg in sterile water (preservative free) 1 mL injection, 1 mg, InterCATHeter, PRN, Herve Fontana MD    anidulafungin (ERAXIS) 100 mg in 0.9% sodium chloride 130 mL IVPB, 100 mg, IntraVENous, Q24H, Jf Schmitt MD, Last Rate: 83 mL/hr at 12/06/21 1810, 100 mg at 12/06/21 1810    meropenem (MERREM) 500 mg in sterile water (preservative free) 10 mL IV syringe, 0.5 g, IntraVENous, Q6H, Jf Schmitt MD, 500 mg at 12/07/21 1505    polyethylene glycol (MIRALAX) packet 17 g, 17 g, Oral, DAILY, Oskar, Ryne Moya MD, 17 g at 12/07/21 0827    white petrolatum-mineral oiL (AKWA TEARS) 83-15 % ophthalmic ointment, , Both Eyes, BID, Ciro Cheatham MD, Given at 12/07/21 0827    insulin lispro (HUMALOG) injection, , SubCUTAneous, Q6H, Pili Harrison MD, 3 Units at 12/07/21 0551    chlorhexidine (PERIDEX) 0.12 % mouthwash 15 mL, 15 mL, Oral, Q12H, Bereket Olsen MD, 15 mL at 12/07/21 0829    HYDROmorphone (DILAUDID) syringe 0.5 mg, 0.5 mg, IntraVENous, Q1H PRN, Bereket Olsen MD, 0.5 mg at 11/30/21 1630    senna-docusate (PERICOLACE) 8.6-50 mg per tablet 1 Tablet, 1 Tablet, Per G Tube, BID, Bereket Olsen MD, 1 Tablet at 12/07/21 4264    fentaNYL (PF) 1,500 mcg/30 mL (50 mcg/mL) infusion, 0-200 mcg/hr, IntraVENous, TITRATE, Stanley Rascon MD, Last Rate: 3 mL/hr at 12/07/21 0855, 150 mcg/hr at 12/07/21 0855    cisatracurium (NIMBEX) 200 mg in 0.9% sodium chloride 100 mL (2 mg/mL) infusion, 0-10 mcg/kg/min, IntraVENous, TITRATE, Bereket Olsen MD, Last Rate: 7.5 mL/hr at 12/07/21 1225, 2 mcg/kg/min at 12/07/21 1225    ascorbic acid (vitamin C) (VITAMIN C) tablet 500 mg, 500 mg, Per G Tube, BID, Bereket Olsen MD, 500 mg at 12/07/21 1781    atorvastatin (LIPITOR) tablet 20 mg, 20 mg, Per Karla Gale, DAILY, Bereket Olsen MD, 20 mg at 12/07/21 6392    melatonin (rapid dissolve) tablet 5 mg, 5 mg, Oral, QHS, Bereket Olsen MD, 5 mg at 12/05/21 2029    acetaminophen (TYLENOL) tablet 1,000 mg, 1,000 mg, Per G Tube, Q6H PRN, 1,000 mg at 12/07/21 1402 **OR** acetaminophen (TYLENOL) suppository 975 mg, 975 mg, Rectal, Q6H PRN, Bereket Olsen MD, 975 mg at 12/02/21 3202    glucose chewable tablet 16 g, 4 Tablet, Per G Tube, PRN, Bereket Olsen MD    PHENYLephrine (DINO-SYNEPHRINE) 30 mg in 0.9% sodium chloride 250 mL infusion,  mcg/min, IntraVENous, TITRATE, Nelilan Lucille, DO, Stopped at 12/05/21 0532    white petrolatum (VASELINE) ointment, , Topical, PRN, Pili Harrison MD    sodium chloride (OCEAN) 0.65 % nasal squeeze bottle 2 Spray, 2 Spray, Both Nostrils, Q2H PRN, Nirmala Bartholomew DO, 2 Spray at 12/04/21 2037    albuterol-ipratropium (DUO-NEB) 2.5 MG-0.5 MG/3 ML, 3 mL, Nebulization, Q6H RT, 3 mL at 12/07/21 1422 **OR** ipratropium-albuterol (COMBIVENT RESPIMAT) 20 mcg-100 mcg inhalation spray, 2 Puff, Inhalation, Q6H RT, Kya Palencia MD, 2 Puff at 11/28/21 2332    [Held by provider] simethicone (MYLICON) tablet 80 mg, 80 mg, Oral, QID PRN, Pili Harrison MD, 80 mg at 11/29/21 1504    lidocaine 4 % patch 1 Patch, 1 Patch, TransDERmal, Q24H, Mc Mcpherson MD, 1 Patch at 12/05/21 2029    phenol throat spray (CHLORASEPTIC) 2 Spray, 2 Spray, Oral, Q6H PRN, Fausto Chandler MD, 2 [place] at 11/20/21 0213    sodium chloride (NS) flush 5-40 mL, 5-40 mL, IntraVENous, Q8H, Carmen Christianson MD, 10 mL at 12/07/21 1359    sodium chloride (NS) flush 5-40 mL, 5-40 mL, IntraVENous, PRN, Carmen Christianson MD, 10 mL at 12/06/21 1359    ondansetron (ZOFRAN ODT) tablet 4 mg, 4 mg, Oral, Q8H PRN **OR** ondansetron (ZOFRAN) injection 4 mg, 4 mg, IntraVENous, Q6H PRN, Carmen Christianson MD, 4 mg at 11/21/21 0912    zinc sulfate (ZINCATE) 50 mg zinc (220 mg) capsule 1 Capsule, 1 Capsule, Oral, DAILY, Carmen Christianson MD, 1 Capsule at 12/07/21 2250    prochlorperazine (COMPAZINE) injection 10 mg, 10 mg, IntraVENous, Q6H PRN, Sammy Zulfau, MD, 10 mg at 11/29/21 0937    dextrose (D50W) injection syrg 12.5-25 g, 12.5-25 g, IntraVENous, Sammy HU Zulfau, MD    glucagon (GLUCAGEN) injection 1 mg, 1 mg, IntraMUSCular, PRSammy COLLINS Zulfau, MD        I performed all aspects of the physical examination via Telemedicine associated with two way audio and video communication and with the on-site assistance of Nurse. I am located in [place] and the patient is located in [address] [place] [address]. Patient is critically ill in the ICU.    I  personally  reviewed the pertinent medical records, laboratory/ pathology data and radiographic images. The decision making regarding this patient is as documented above, which was generated  following  discussion  with the multidisciplinary team and creation of a treatment plan for  the patient. We discussed the patient's interval history and future coordination of care and  plans. The patient's medications  were reviewed and changes made as stipulated above. Due to  critical illness impairing one or more vital organs of this patient resulting in life threatening clinical situation  I have provided direct, frequent personal  assessment and manipulation in management plan and spent 35  minutes  of  critical care time excluding the time spent on procedures and teaching. Greater than 50% of this time  in patient's care was  employed  in counseling and coordination of care and engaged in face to face discussion of case management issues, addressing questions, and outlining a plan of  therapy.

## 2021-12-07 NOTE — PROGRESS NOTES
1900: Bedside and Verbal shift change report given to Samaria RDZ RN (oncoming nurse) by Constantino Lopez RN (offgoing nurse). Report included the following information SBAR, Kardex, Intake/Output, MAR, Recent Results, Cardiac Rhythm SR and Alarm Parameters . Primary Nurse [id number] [address], RN and Constantino Lopez, RN performed a dual skin assessment on this patient No impairment noted  2000: Shift assessment completed. Pt intubated, sedated, and paralyzed. VAP bundle completed, pt repositioned. 2200:  VAP bundle completed, pt repositioned. 0000: Reassessment completed. VAP bundle completed, pt repositioned. Full CHG bath with lewis cath care completed. 0200:  VAP bundle completed, pt repositioned. 0400: Reassessment completed. VAP bundle completed, pt repositioned. 0600:  VAP bundle completed, pt repositioned. 0700: Bedside and Verbal shift change report given to 2520 Sara Sandoval (oncoming nurse) by Austin Church RN (offgoing nurse). Report included the following information SBAR, Kardex, Intake/Output, MAR, Recent Results, Cardiac Rhythm SR and Alarm Parameters .

## 2021-12-07 NOTE — PROGRESS NOTES
[address] Dosing Services: Antimicrobial Stewardship Daily Doc 12/7/2021  Consult for antibiotic dosing of Vancomycin by Dr. Alton Irwin  Indication: HAP  Day of Therapy: 6    Vancomycin therapy:  Current maintenance dose: Vancomycin 1750 mg IV every 8 hours   Dose calculated to approximate a          Target AUC/TESFAYE of 400-600  Last level: 12.6 ()  Plan: Vancomycin level is stable and therapeutic on current dose. Will continue regimen and plan for repeat level as clinically indicated. Scr unchanged from previous. Patient may accumulate drug over time given BMI 36 kg/m2 and Q8 hrs dosing regimen. Date Dose & Interval Measured (mcg/mL) Extrapolated (mcg/mL)   12/7/21 1750 mg Q8 hrs 12.6    ?12/5/21 1500 mg Q8 hrs? ?8.2 AUC ~430?   ?12/4/21 ? 1500 mg Q12 hrs 2. 9? AUC ~345     Other Antimicrobial   (not dosed by pharmacist) Merrem 500 mg IV q6hr -d6  Eraxis 100mg IV daily-d6   Cultures 12/3 Blood (+ fungal blood): NGTD, Prelim    12/1 Blood: NG, F  12/1 Urine: NG final  11/19 Urine: NG final  11/19 MRSA: neg final  11/19 Blood: NG x5 days pending   Serum Creatinine Lab Results   Component Value Date/Time    Creatinine 0.48 (L) 12/07/2021 04:30 AM         Creatinine Clearance Estimated Creatinine Clearance: 188.4 mL/min (A) (by C-G formula based on SCr of 0.48 mg/dL (L)). Temp Temp: 99 °F (37.2 °C)       WBC Lab Results   Component Value Date/Time    WBC 13.5 (H) 12/07/2021 04:30 AM        Procalcitonin Lab Results   Component Value Date/Time    Procalcitonin 0.10 12/01/2021 02:44 PM        For Antifungals, Metronidazole and Nafcillin: Lab Results   Component Value Date/Time    ALT (SGPT) 46 12/07/2021 04:30 AM    AST (SGOT) 42 (H) 12/07/2021 04:30 AM    Alk.  phosphatase 62 12/07/2021 04:30 AM    Bilirubin, total 0.5 12/07/2021 04:30 AM        Thanks,  Pharmacist Chandana Gaming, PHARMD

## 2021-12-07 NOTE — PROGRESS NOTES
Case Management follow-up:    RUR 14%    LOS 17 days,GLOS 4.8    Pt remains intubated,sedated and paralyzed on nimbex. Peep 16,80% Fio2    I discussed pt with pt.'s nurse. Pt may be proned later today. I checked with the [facility] today. [address] remains @ full capacity with no available critical care beds.     Yuri Coy Serve

## 2021-12-07 NOTE — PROGRESS NOTES
0700- Bedside and Verbal shift change report given to Guerda (oncoming nurse) by Wicho Shelton (offgoing nurse). Report included the following information SBAR, ED Summary, Intake/Output, Recent Results, Cardiac Rhythm sinus rhyhtm, Alarm Parameters  and Pre Procedure Checklist. Verified drips with off going nurse  Nimbex 2  Fent 150   Versed 8  Prop 50  TPN 42 ml/hr      0800- Shift assessment complete. Pt is intubated and medically sedated and paralyzed. Eyes do not open to any stimulus, pupils equal and reactive. ET tube 25 cm@ the lip, tidal volume 400, rate 28, peep 16, fi02 80%. No cough with suction. Abdomen semi soft with hypoactive bowel sounds, OGT @ 69 cm, clamped. Mitchell in place, patent and draining. Pt turned and repositioned, vap bundle complete. 1000- Pt turned and repositioned, vap bundle complete     1200- Re-assessment complete, no changes noted. Pt turned and repositioned, vap bundle complete     1700- Pt placed into prone position    1900- Bedside and Verbal shift change report given to Samaria (oncoming nurse) by Stu Keene (offgoing nurse). Report included the following information SBAR, ED Summary, Procedure Summary, Accordion, Cardiac Rhythm sinus rhyhtm and Alarm Parameters .

## 2021-12-08 NOTE — PROGRESS NOTES
[address] [facility] [address] [place] [address]   Office [phone]  Fax [phone]          Assessment and Plan     Ryan Marquez is a 39 y.o. male with a PMH of HTN, HLD, JESUS admitted for AHRF and severe sepsis 2/2 COVID PNA. Patient was admitted on 11/19/2021. Interval Events: No acute events overnight. Low grade fevers (100.6) now resolved. AHRF and ARDS 2/2 COVID PNA: Continues intubated, sedated, paralyzed, and proned on Vent support, FIO2 remains 80%, , PEEP 16. Pending approval ECMO at [facility] still no availability.  - Daily weights.   - Prone as tolerated, decreased sats yesterday with supination  - Goal sats 88% or higher  - CBC, CMP daily. No need for further trending Covid labs  - Continue Decadron 6mg IV daily  - Duo-neb or Combivent Respimat Q6H  - Holding COVID meds (Atorvastatin 20mg every day, Vit C 500mg BID, Zinc 220mg    daily)  - Pulm following, appreciate recs    Severe sepsis 2/2 COVID PNA: Now off pressors. Leukocytosis stable. Fungal Cx, BCx, UCx NGTD. - Meropenam, Vanc, and Eraxis. Today is final day of 7 day course. Discuss with ICU benefit of re-culture vs extending abx. - F/u fungal culture, NGTD  - Intensivist following  - MAPs improving, now off pressors.  - LE Duplex for possible cool lower extremities     Nutritional status:  Nutrition consulted for TPN, appreciate recs. - continue TPN per nutrition recs   - Retrial tube feeds after bowel movement     Oozing (improved): Mild bleeding with suctioning otherwise no other findings while prone.     - On prophylactic Lovenox 40 mg BID    Pneumomediastinum/Pneumothorax: Possibly barotrauma 2/2 high pressures needed for COVID ARDS PNA vs lung frailty. Repeat CXR 12/1 showing stable pneumomediastinum, and resolved pneumothorax. - Monitor for signs of worsening barotrauma     Insomnia/anxiety: Pt with new insomnia and anxiety, specifically overnight.   - Intubated, sedated    HTN: Pt normotensive off meds. Currently hypotensive. At home is on Amlodipine 10mg daily and HCTZ 12.5mg daily  - Will continue to monitor      HLD: Chronic, stable. Last lipids 07/2021 , HDL 54, LDL 98.6, . On Atorvastatin 10mg daily.  - Holding Atorvastatin 20mg daily     T2DM (diet-controlled): Prior A1c 5.8% in 05/2021. No meds at present.   - SSI (resistant given morbid obesity) w/ q6h glucose checks     JESUS: On CPAP at night. - Intubated, sedated.     Obesity: Body mass index is 42.4 kg/m² on presentation.  - Encouraging lifestyle modifications and further follow up outpatient. At-risk JAMIR: RESOLVED. POA Cr 1.31, BL 0.8. Likely IVVD in setting of poor PO intake. - Daily CMP    Chest pain: RESOLVED. Description is pleuritic in nature, worse with cough. EKG without evidence of acute ischemia. Troponin of 9 makes CAD less concerning.   - Cardiac monitoring, will continue to monitor     Diarrhea: RESOLVED. Watery diarrhea x 2 days. Likely 2/2 COVID infection. Improved since presentation. Enteric bacteria panel negative. Abdominal pain: RESOLVED. Secondary to gas pains after starting diet. - Continue Simethicone       FEN/GI - TPN  Activity - Ambulate with assistance  DVT prophylaxis - Lovenox (ppx)  GI prophylaxis - Protonix  Disposition - Plan to d/c to TBD. Code Status - Full. Discussed with patient / caregivers. Next of [facility] Name and [address] [facility],  [facility] [phone]      Jasmin Williamson MD  Family Medicine Resident    Patient discussed with Family Medicine Attending: Dr. Gregorio Nagy / Objective   Subjective:   Patient intubated and proned, on pressors. Sedated. No acute distress. Respiratory: O2 Flow Rate (L/min): 60 l/min O2 Device: Endotracheal tube, Ventilator   Visit Vitals  /74   Pulse 74   Temp 98.3 °F (36.8 °C)   Resp 28   Ht 5' 11\" (1.803 m)   Wt 259 lb 0.7 oz (117.5 kg)   SpO2 95%   BMI 36.13 kg/m²     General: Intubated, sedated. Appears comfortable.   Respiratory: Transmitted mechanical sounds appreciated bilaterally, unchanged. Cardiovascular: regular rate, regular rhythm. GI: + bowel sounds. Nontender. Extremities:  No LE edema. Extremities well perfused  Skin: Warm, dry. PICC line in place  Neuro: Intubated sedated  Mitchell in place     I/O:  Date 12/07/21 0700 - 12/08/21 0659 12/08/21 0700 - 12/09/21 0659   Shift 0700-1859 1900-0659 24 Hour Total 4708-9088 1814-0966 24 Hour Total   INTAKE   I.V.(mL/kg/hr) 1007.4(0.7) 3187.4(2.3) 4194.8(1.5)        Versed Volume 48 128 176        Nimbex Volume 45 118.6 163.6        Fentanyl Volume 18 48 66        Diprivan Volume 224.4 598.4 822.8        Volume (meropenem (MERREM) 500 mg in sterile water (preservative free) 10 mL IV syringe) 20 30 50        Volume (vancomycin (VANCOCIN) 1750 mg in  ml infusion)  1500 1500        Volume (potassium chloride 10 mEq in 100 ml IVPB) 400  400        Volume (TPN ADULT - CENTRAL) 252 336 588        Volume (TPN ADULT - CENTRAL)  428.4 428.4      NG/GT  20 20        Water Flush Volume (mL) (Orogastric Tube 11/30/21)  20 20      Shift Total(mL/kg) 1007. 4(8.6) 9038.4(06.9) 4214. 8(35.9)      OUTPUT   Urine(mL/kg/hr) 770(0.5) 3050(2.2) 3820(1.4)        Urine Output (mL) (Urinary Catheter 11/30/21 Mitchell - Temperature) 770 3050 3820      Shift Total(mL/kg) 770(6.6) 3050(26) 3820(32.5)      .4 157.4 394.8      Weight (kg) 117.5 117.5 117.5 117.5 117.5 117.5       CBC:  Recent Labs     12/08/21  0329 12/07/21  0430 12/06/21  0411   WBC 13.5* 13.5* 12.9*   HGB 13.1 11.9* 11.4*   HCT 40.1 37.0 35.4*    240 816       Metabolic Panel:  Recent Labs     12/08/21  0329 12/07/21  0430 12/06/21  0411    139 139   K 3.1* 3.2* 3.9   CL 94* 98 98   CO2 36* 38* 38*   BUN 27* 20 25*   CREA 0.56* 0.48* 0.40*   * 162* 140*   CA 9.0 9.0 8.2*   MG 2.5* 2.5* 2.5*   PHOS 5.1* 3.5  --    ALB 3.0* 2.8* 2.6*   ALT 82* 46 35   INR 1.0 1.0 1.0          For Billing    Chief Complaint   Patient presents with   [facility] Problems  Date Reviewed: 12/3/2021          Codes Class Noted POA    Oozing skin inflammation ICD-10-CM: L08.9  ICD-9-CM: 686.9  12/3/2021 Unknown        Positive D dimer ICD-10-CM: R79.89  ICD-9-CM: 790.92  12/3/2021 Unknown        Other hyperlipidemia ICD-10-CM: E78.49  ICD-9-CM: 272.4  12/3/2021 Unknown        Elevated serum creatinine ICD-10-CM: R79.89  ICD-9-CM: 790.99  12/3/2021 Unknown        Community acquired pneumonia ICD-10-CM: J18.9  ICD-9-CM: 037  12/3/2021 Unknown        ARDS (adult respiratory distress syndrome) ([facility] ICD-10-CM: Emeka Hutching  ICD-9-CM: 518.52  12/1/2021 No        Septic shock (HCC) ICD-10-CM: A41.9, R65.21  ICD-9-CM: 038.9, 785.52, 995.92  11/26/2021 Yes        Pneumomediastinum ([facility] ICD-10-CM: J98.2  ICD-9-CM: 518.1  11/26/2021 No        Pneumothorax on left ICD-10-CM: J93.9  ICD-9-CM: 512.89  11/26/2021 No        Hyponatremia ICD-10-CM: E87.1  ICD-9-CM: 276.1  11/26/2021 Yes        Diabetes mellitus type 2, controlled ([facility] ICD-10-CM: E11.9  ICD-9-CM: 250.00  11/25/2021 Yes        * (Principal) Acute hypoxemic respiratory failure due to COVID-19 [facility]) ICD-10-CM: U07.1, J96.01  ICD-9-CM: 518.81, 079.89, 799.02  11/19/2021 Yes        Obesity, morbid ([facility] ICD-10-CM: E66.01  ICD-9-CM: 278.01  4/10/2018 Yes        Elevated liver enzymes ICD-10-CM: R74.8  ICD-9-CM: 790.5  8/5/2016 Yes        JESUS (obstructive sleep apnea) ICD-10-CM: G47.33  ICD-9-CM: 327.23  10/15/2014 Yes        Hypertension ICD-10-CM: I10  ICD-9-CM: 401.9  10/15/2014 Yes        Pure hypercholesterolemia ICD-10-CM: E78.00  ICD-9-CM: 272.0  Unknown Yes

## 2021-12-08 NOTE — PROGRESS NOTES
[facility]    ICU TEAM Progress Note    Name: Karel Perez   : 1985   MRN: [id number]   Date: 2021           ICU Assessment     1. Acute respiratory failure with hypoxia  2. ARDS  3. COVID 19  4. Obesity  5. DM2  6. JESUS  7. H/o HTN         ICU Comprehensive Plan of Care:   -Replace K+  -prone again today  -D/C lactulose  -TPN  -Check trig level  -D/C vanco.  MRSA screen negative  -Will finish course of eraxis and merrem (finishes today)  -Lovenox ppx  -decadron  -nimbex and sedation  -Awaiting bed at [facility] for ECMO    1. Discussed Care Plan with Bedside RN    2. Documentation of Current Medications    ICU Issues:  D- Delirium assessement CAM-ICU: Assessments ordered  E- Early Mobility/ PT: Will order when appropriate  F- Feeding: TPN  Peptic Ulcer Disease Prophylaxis: PPI  DVT Prophylaxis: Lovenox  Glycemic Control (140-180 mg/dL): SSI  Catheter Discontinuation (CVC, arterial, urinary, gastric, rectal): Keep all  Antibiotics: Eraxis, merrem  Steroids: Decadron  MAR Review (pain, anxiety, constipation . Cherl Spinner Gital Spinner ): Completed  Code Status: FULL CODE    Subjective:   Progress Note: 2021      Reason for ICU Admission: acute respiratory failure     HPI:  43yo man admitted for COVID. Had small apical Ptx. Overnight Events:   2021  JHOANA overnight. Proned. Vitals stable off pressors. Afebrile. Good UOP.     POD:  * No surgery found *    S/P:       Active Problem List:     Problem List  Date Reviewed: 12/3/2021          Codes Class    Oozing skin inflammation ICD-10-CM: L08.9  ICD-9-CM: 686.9         Positive D dimer ICD-10-CM: R79.89  ICD-9-CM: 790.92         Other hyperlipidemia ICD-10-CM: E78.49  ICD-9-CM: 272.4         Elevated serum creatinine ICD-10-CM: R79.89  ICD-9-CM: 790.99         Community acquired pneumonia ICD-10-CM: J18.9  ICD-9-CM: 859         ARDS (adult respiratory distress syndrome) ([facility] ICD-10-CM: I25  ICD-9-CM: 518.52         Septic shock ([facility] ICD-10-CM: A41.9, R65.21  ICD-9-CM: 038.9, 785.52, 995.92         Pneumomediastinum (HCC) ICD-10-CM: J98.2  ICD-9-CM: 518.1         Pneumothorax on left ICD-10-CM: J93.9  ICD-9-CM: 512.89         Hyponatremia ICD-10-CM: E87.1  ICD-9-CM: 276.1         Diabetes mellitus type 2, controlled (HCC) ICD-10-CM: E11.9  ICD-9-CM: 250.00         * (Principal) Acute hypoxemic respiratory failure due to COVID-19 [facility]) ICD-10-CM: U07.1, J96.01  ICD-9-CM: 518.81, 079.89, 799.02         Obesity, morbid ([address] ICD-10-CM: E66.01  ICD-9-CM: 278.01         Decreased hearing of left ear ICD-10-CM: H91.92  ICD-9-CM: 389.9         Skin lesion of scalp ICD-10-CM: L98.9  ICD-9-CM: 709.9         Acute right-sided thoracic back pain ICD-10-CM: M54.6  ICD-9-CM: 724.1         Elevated liver enzymes ICD-10-CM: R74.8  ICD-9-CM: 790.5         Strep throat exposure ICD-10-CM: Z20.818  ICD-9-CM: V01.89         JESUS (obstructive sleep apnea) ICD-10-CM: G47.33  ICD-9-CM: 327.23         Hypertension ICD-10-CM: I10  ICD-9-CM: 401.9         S/P vasectomy ICD-10-CM: Z98.52  ICD-9-CM: V26.52         Pure hypercholesterolemia ICD-10-CM: E78.00  ICD-9-CM: 272.0               Past Medical History:      has a past medical history of History of vascular access device (12/32021), Hypercholesteremia, Hypertension (10/15/2014), Poison ivy (9/15/2015), Pure hypercholesterolemia, and S/P vasectomy (8/6/2014). He also has no past medical history of Abuse, Anemia NEC, Arrhythmia, Calculus of kidney, Chronic pain, Congestive heart failure, unspecified, COPD, Depression, GERD (gastroesophageal reflux disease), Headache(784.0), Psychotic disorder ([facility] Thyroid disease, or Trauma. Past Surgical History:      has no past surgical history on file. Home Medications:     Prior to Admission medications    Medication Sig Start Date End Date Taking? Authorizing Provider   metaxalone (SKELAXIN) 800 mg tablet Take 800 mg by mouth three (3) times daily as needed for Muscle Spasm(s).    Yes Provider, Historical   naproxen (NAPROSYN) 500 mg tablet Take 500 mg by mouth two (2) times daily as needed for Pain. Yes Provider, Historical   hydroCHLOROthiazide (HYDRODIURIL) 12.5 mg tablet TAKE 1 TABLET BY MOUTH EVERY DAY 10/25/21  Yes Milagros Suresh NP   amLODIPine (NORVASC) 10 mg tablet TAKE 1 TABLET DAILY   Yes Tangela Arango MD   glucosamine HCl/chondroitin bone (GLUCOSAMINE-CHONDROITIN PO) Take  by mouth. 1,500mg/1,200mg/MGMSM 1,000mg   Yes Provider, Historical   Krill-OM3-DHA-EPA-OM6-Lip-Astx (KRILL OIL) 1000-130(40-80) mg cap Take 1 Cap by mouth once over twenty-four (24) hours. Yes Provider, Historical   FERROUS FUMARATE/VIT BCOMP&C (SUPER B COMPLEX PO) Take  by mouth. Yes Provider, Historical   atorvastatin (LIPITOR) 10 mg tablet TAKE 1 TABLET DAILY 21   Noel Soler NP       Allergies/Social/Family History:     No Known Allergies   Social History     Tobacco Use    Smoking status: Never Smoker    Smokeless tobacco: Never Used   Substance Use Topics    Alcohol use: No     Alcohol/week: 0.0 standard drinks      Family History   Problem Relation Age of Onset    Diabetes Mother     Heart Attack Father 43    Stroke Maternal Grandmother     Cancer Paternal Grandfather          age 45 - Hodgkin's   Willodean Kwame Maternal Grandfather          in 42's with melanoma       Review of Systems:     ROS is unobtainable as the patient is intubated.     Objective:   Vital Signs:  Visit Vitals  /71   Pulse 73   Temp 97.9 °F (36.6 °C)   Resp 28   Ht 5' 11\" (1.803 m)   Wt 115.5 kg (254 lb 10.1 oz)   SpO2 95%   BMI 35.51 kg/m²    O2 Flow Rate (L/min): 60 l/min O2 Device: Ventilator Temp (24hrs), Av.8 °F (37.1 °C), Min:97.7 °F (36.5 °C), Max:100.6 °F (38.1 °C)           Intake/Output:     Intake/Output Summary (Last 24 hours) at 2021 1442  Last data filed at 2021 1400  Gross per 24 hour   Intake 5050.18 ml   Output 4275 ml   Net 775.18 ml       Physical Exam:  Performed via video assessment. Gen: Patient is intubated, sedated, no acute distress  HEENT: ETT and OGT present  Chest: Chest movement is equal bilaterally  Cardiac: Cardiac monitor reveals SR  Extremities: Extremities appear well perfused with no obvious edema  Neuro: Patient is sedated    LABS AND  DATA: Personally reviewed  Recent Labs     12/08/21 0329 12/07/21  0430   WBC 13.5* 13.5*   HGB 13.1 11.9*   HCT 40.1 37.0    240     Recent Labs     12/08/21 0329 12/07/21  0430    139   K 3.1* 3.2*   CL 94* 98   CO2 36* 38*   BUN 27* 20   CREA 0.56* 0.48*   * 162*   CA 9.0 9.0   MG 2.5* 2.5*   PHOS 5.1* 3.5     Recent Labs     12/08/21 0329 12/07/21  0430   AP 80 62   TP 7.2 6.4   ALB 3.0* 2.8*   GLOB 4.2* 3.6     Recent Labs     12/08/21 0329 12/07/21  0430   INR 1.0 1.0   PTP 10.3 10.2   APTT 22.4 20.8*      Recent Labs     12/08/21  0553 12/06/21  0442   PHI 7.40 7.39   PCO2I 63.3* 64.1*   PO2I 98 163*   FIO2I 80 100     No results for input(s): CPK, CKMB, TROIQ, BNPP in the last 72 hours. Hemodynamics:   PAP:   CO:     Wedge:   CI:     CVP:    SVR:       PVR:       Ventilator Settings:  Mode Rate Tidal Volume Pressure FiO2 PEEP   PRVC   400 ml    80 % 16 cm H20     Peak airway pressure: 30 cm H2O    Minute ventilation: 13.1 l/min        MEDS: Reviewed    Chest X-Ray:  CXR Results  (Last 48 hours)               12/07/21 0951  XR CHEST PORT Final result    Impression:  1. Continued evidence of bilateral infiltration. 2. Advancement of the endotracheal tube as described above. 3. No change in the position of the nasogastric tube. Narrative:  EXAM: XR CHEST PORT       INDICATION: Shortness of breath/acute hypoxemic breast of failure due to Covid       COMPARISON: Chest dated 12/5/2021       TECHNIQUE: Single frontal view       FINDINGS: It is difficult to accurately assess the size of the cardiac slight. There continues to be evidence of bilateral infiltration.  There has been   advancement of the endotracheal tube such that its tip is situated 2.7 cm above   the jassi. The nasogastric tube is again noted. Multidisciplinary Rounds Completed: Yes    ABCDEF Bundle/Checklist Completed:  Yes    DISPOSITION  ICU    Critical Care Time  The patient is critically ill with acute respiratory failure. If I do not acutely intervene upon these illnesses, the patient's life is in danger. These illnesses have required me to: (1) perform high complexity decision making for assessment and support; (2) assess the patient via video; (3) personally review the medical record and laboratory and diagnostic imaging results; (4) actively titrate high-alert medications; (5) manage the ventilator and actively titrate oxygen; (6) discuss this patient's case management with other healthcare providers; and (7) apply and interpret advanced monitoring techniques. As a result of this, I personally spent 35 minutes providing critical care services exclusively to this patient. This was in exclusion of the time spent performing procedures or teaching.     Marilee Pritchett DO, [facility]  12/8/2021

## 2021-12-08 NOTE — PROGRESS NOTES
Spoke to the patient's spouse to provide updates on Mr. Blanco's medical condition. All questions were answered.     Keiko Smart MD

## 2021-12-08 NOTE — ADT AUTH CERT NOTES
Viral Illness, Acute - Care Day 17 (12/5/2021) by Zack Li       Review Status Review Entered   Completed 12/6/2021 10:44      Criteria Review      Care Day: 17 Care Date: 12/5/2021 Level of Care: Step Down    Guideline Day 3    Clinical Status    ( ) * Hemodynamic stability    12/6/2021 10:44:58 EST by Zack Li      98.6 °F (37 °C) 72 118/68 85 -- At rest 28 93 % Endotracheal tube; Ventilator    ( ) * Afebrile or temperature acceptable for next level of care    12/6/2021 10:44:58 EST by Zack Li      no fevers noted    ( ) * Tachypnea absent    12/6/2021 10:44:58 EST by Paz Henriquez rr 28 on the vent    ( ) * Hypoxemia absent    12/6/2021 10:44:58 EST by Paz Henriquez remains on the vent    (X) * Mental status at baseline    ( ) * Renal function at baseline, or stable and acceptable for next level of care    12/6/2021 10:44:58 EST by Johanne Angela*  CREA0.43*    ( ) * Discharge plans and education understood    Activity    ( ) * Ambulatory or acceptable for next level of care    12/6/2021 10:44:58 EST by Paz Henriquez intubated, sedated    Routes    ( ) * Oral hydration    ( ) * Oral medications or regimen acceptable for next level of care    ( ) * Oral diet or acceptable for next level of care    12/6/2021 10:44:58 EST by Zack Li      npo    Interventions    (X) * Isolation not indicated, or is performable at next level of care    12/6/2021 10:44:58 EST by Zack Li      droplet plus isolation for covid 19    Medications    (X) * Antimicrobial medication absent or regimen established for next level of care    (X) Possible DVT prophylaxis    12/6/2021 10:44:58 EST by Zack Li      lovenox 40 mg sc q12h    * Milestone   Additional Notes   12/05   /61   Pulse (!) 59   Temp 97.6 °F (36.4 °C)   Resp 28   SpO2 95%         WBC 13.1*    HGB 11.8*   HCT 36.5*      K 4.1   CL 97   CO2 38*   BUN 26*   CREA 0.43*   *   CA 8.2*   MG 2.6*      12/05/21   0347   AP 54   TP 5.9*   ALB 2.6*      Duo neb x4, anidalafungin 100 mg iv q24h, Lipitor 20 mg daily, bumex 1 mg iv q8h, nimbex gtt, decadrone 6 mg iv q24h, lovenox 40 mg sc q12h, fentanyl gtt, Humalog ss sc q6h, melatonin 5 mg po hs, merrem 0.5 g iv q6h, versed gtt, protonix 40 mg iv daily, phenylephrine gtt, Propofol gtt, vanco 1750 mg iv q8h, zinc sulfate 1 cap po daily, bumex 1 mg iv q12h         12/5: difficulty tolerating tube feeds, leukocytosis improving       ICU Comprehensive Plan of Care:    NEURO: sedation per protocol, paralytics    CV: Keep MAP >65; pressors as needed, noted echo     PULM: Continue mechanical ventilation, low TV, high peep strategy, serial ABG, supination with higher peep was able to maiintain recruitment,  decadron to 6 mg giovani,y s/p toci. On waitlist for ecmo at [facility]    GI: given increased residuals,will increase reglan dosing as KUB is normal, but consider tpn tomorrow.     RENAL/: Monitor Cr and UOP; incr diuresis    ENDO: BG Goal 140-180; glycemic control   HEME/ONC: Tx Hgb < 7, Plt<10k; transfuse as needed,     MICRO:  FU on Cx; fu cultures, leukocytosis improved on abx and antifungals   Code Status: Full Code   LOS: 16   Prophylaxis: GI: SUP h2 b   DVT: therapeutic lovenox empirically                   Viral Illness, Acute - Care Day 14 (12/2/2021) by Leoncio Castellanos       Review Status Review Entered   Completed 12/3/2021 15:53      Criteria Review      Care Day: 14 Care Date: 12/2/2021 Level of Care: Step Down    Guideline Day 3    Clinical Status    ( ) * Hemodynamic stability    12/3/2021 15:53:18 EST by Leoncio Castellanos      hr 102-137    ( ) * Afebrile or temperature acceptable for next level of care    12/3/2021 15:53:18 EST by Leoncio Castellanos      temp 102    ( ) * Tachypnea absent    12/3/2021 15:53:18 EST by Ruth Yarbrough rr 28    ( ) * Hypoxemia absent    12/3/2021 15:53:18 EST by Vi Giron 95% ventilator    (X) * Mental status at baseline    12/3/2021 15:53:18 EST by Leoncio Castellanos   intubated, sedated    ( ) * Renal function at baseline, or stable and acceptable for next level of care    ( ) * Discharge plans and education understood    Activity    ( ) * Ambulatory or acceptable for next level of care    12/3/2021 15:53:18 EST by Salina James      bedrest, on the ventilator    Routes    ( ) * Oral hydration    ( ) * Oral medications or regimen acceptable for next level of care    ( ) * Oral diet or acceptable for next level of care    12/3/2021 15:53:31 [facility]      npo    Interventions    ( ) * Isolation not indicated, or is performable at next level of care    12/3/2021 15:53:18 EST [facility]      droplet plus isolation    Medications    (X) * Antimicrobial medication absent or regimen established for next level of care    12/3/2021 15:53:18 EST by [facility]      vanco 2500 mg iv x1    (X) Possible DVT prophylaxis    12/3/2021 15:53:18 EST by [facility]       scd    * Milestone   Additional Notes   12/02   BP (!) 97/58   Pulse 80   Temp (!) 102 °F (38.9 °C)   Resp 28   SpO2 100%      12/02/21   0119   WBC 22.3*      12/02/21   0119      K 4.4      CO2 32   BUN 22*   CREA 0.82   *   CA 8.1*   MG 2.7*   ALB 3.1*      Meds: tylenol 975 mg po , duo neb x3, Lipitor 20 mg daily, precede gtt, fentanyl gtt, humalog ss sc q6h, merrem 0.5 g iv q6h, reglan 5 mg iv bid, protonix 40 mg iv daily, phenylephrine gtt, Propofol gtt, vasopressin gtt, vanco 2500 mg iv x1, bumex 0.5 mg iv bid, lovenox 135 mg sc q12h   39 y.o. male with a PMH of HTN, HLD, JESUS admitted for AHRF and severe sepsis 2/2 COVID PNA. Patient was admitted on 11/19/2021.       Interval Events: Pursuing transfer to [facility] for ECMO treatment, currently pending bed availability.         AHRF and ARDS 2/2 COVID PNA: Unvaccinated. Symptom onset 11/12 with acute respiratory decompensation, O2 sats in high 60s prior to admission.   CTA chest without evidence of definite/proximal PE, no aortic aneurysm/dissection, imaging consistent with moderate to severe COVID PNA and associated hilar/mediastinal lymphadenopathy. B/l lower extremity duplex without evidence of DVT. CXR 11/27 demonstrated no significant change in diffuse bilateral airspace disease. S/p Tocilizumab 800mg 11/19. Given patient's age, hx of pneumomediastinum, increasing plateau pressures while intubated, poor oxygenation on 100% FiO2, and inability to tolerate supine positioning   - Pursue transfer to [facility] for bed availability   - unable to transfer locally for [facility] due to BMI, required to be less than 35. Currently at 39. Daily weights. - Intubated on 11/30. Satting 100% on PEEP of 14 and RR 28   - Proned currently    - Goal sats 88% or higher   -  CBC, CMP daily. No need for further trending Covid labs   - Continue Decadron 10mg IV daily   - Duo-neb or Combivent Respimat Q6H   - Therapeutic Lovenox   - Holding oral PRN meds   - Holding COVID meds (Atorvastatin 20mg every day, Vit C 500mg BID, Zinc 220mg daily)   - Pulm following, appreciate recs       Pneumomediastinum/Pneumothorax: Possibly barotrauma 2/2 high pressures needed for COVID ARDS PNA vs lung frailty.  Will monitor for signs of worsening pneumothorax. - Repeat CXR showing stable pneumomediastinum, and resolved pneumothorax. Worsening airspace disease.        Severe sepsis 2/2 COVID PNA: PNA labs negative, BCx/UCx no growth. Likely severe sepsis on initial presentation is all related to COVID PNA. CTX + Doxy x 5d (11/19-11/24). Cefepime (11/28-)    - Given recent fevers after d/c abx, addition of abx per ICU team. Meropenam, Vanc, and Eraxis.     - Central line place, Sanjeev at 50 and Vaso 0.3, MAPs lower 70s.     Insomnia/anxiety: Pt with new insomnia and anxiety, specifically overnight. - Intubated, sedated.       Nutritional status: OG tube in place   - Nutrition consulted       HTN: Pt normotensive off meds.  At home is on Amlodipine 10mg daily and HCTZ 12.5mg daily - Will continue to monitor and trend BPs       HLD: Chronic, stable. Last lipids 07/2021 , HDL 54, LDL 98.6, . On Atorvastatin 10mg daily.   - Holding Atorvastatin 20mg daily       T2DM (diet-controlled): Prior A1c 5.8% in 05/2021. No meds at present.    - SSI (resistant given morbid obesity) w/ q6h glucose checks       JESUS: On CPAP at night. - Intubated, sedated.       Obesity: Body mass index is 42.4 kg/m². - Encouraging lifestyle modifications and further follow up outpatient.       At-risk JAMIR: RESOLVED. POA Cr 1.31, BL 0.8. Likely IVVD in setting of poor PO intake. - Daily CMP       Chest pain: RESOLVED. Description is pleuritic in nature, worse with cough. EKG without evidence of acute ischemia. Troponin of 9 makes CAD less concerning.    - Cardiac monitoring, will continue to monitor       Diarrhea: RESOLVED. Watery diarrhea x 2 days. Likely 2/2 COVID infection. Improved since presentation. Enteric bacteria panel negative.       Abdominal pain: RESOLVED. Secondary to gas pains after starting diet. - Continue Simethicone           FEN/GI - NPO.  OG tube in place   Activity - Ambulate with assistance   DVT prophylaxis - Lovenox (therapeutic)   GI prophylaxis - Protonix           Viral Illness, Acute - Care Day 11 (11/29/2021) by Jairo Martinez RN       Review Status Review Entered   Completed 11/29/2021 14:26      Criteria Review      Care Day: 11 Care Date: 11/29/2021 Level of Care: Step Down    Guideline Day 3    Clinical Status    ( ) * Hemodynamic stability    11/29/2021 14:26:35 EST by Jairo Martinez      98.3;130;164/87;22;88% Heated high flow NC 70 l/min    (X) * Afebrile or temperature acceptable for next level of care    11/29/2021 14:26:35 EST by Jairo Martinez      98.3    ( ) * Tachypnea absent    11/29/2021 14:26:35 EST by Jairo Martinez      130    ( ) * Hypoxemia absent    11/29/2021 14:26:35 EST by Jairo Martinez      ;88% Heated high flow NC 70 l/min    (X) * Mental status at baseline    11/29/2021 14:26:35 EST by Salo Pedro      A&O    ( ) * Renal function at baseline, or stable and acceptable for next level of care    11/29/2021 14:26:35 EST by Salo Pedro      BUN24 (H)  Creatinine0.79  BUN/Creatinine ratio30 (H)    ( ) * Discharge plans and education understood    Activity    (X) * Ambulatory or acceptable for next level of care    11/29/2021 14:26:35 EST by Salo Pedro      up as needed with assistance    Routes    (X) * Oral hydration    11/29/2021 14:26:35 EST by Salo Pedro      po diet    (X) * Oral medications or regimen acceptable for next level of care    11/29/2021 14:26:35 EST by Salo Pedro      po meds/po diet    (X) * Oral diet or acceptable for next level of care    Interventions    ( ) * Isolation not indicated, or is performable at next level of care    11/29/2021 14:26:35 EST by Salo Pedro      droplet    (X) Pulse oximetry    11/29/2021 14:26:35 EST by Salo Pedro      continuous    Medications    (X) * Antimicrobial medication absent or regimen established for next level of care    11/29/2021 14:26:35 EST by Salo Pedro      cefepime (MAXIPIME) 2 g in sterile water (preservative free) 10 mL IV syringe  Dose: 2 g  Freq: EVERY 8 HOURS Route: IV X1    (X) Possible DVT prophylaxis    11/29/2021 14:26:35 EST by Salo Pedro      lovenox    * Milestone   Additional Notes   11/29/21   In pt step down      Medications   albuterol-ipratropium (DUO-NEB) 2.5 MG-0.5 MG/3 ML   Dose: 3 mL   Freq: EVERY 6 HOURS RESP Route: NEBULIZATION X 1      ascorbic acid (vitamin C) (VITAMIN C) tablet 500 mg   Dose: 500 mg   Freq: 2 TIMES DAILY Route: PO      benzonatate (TESSALON) capsule 200 mg   Dose: 200 mg   Freq: 3 TIMES DAILY Route: PO X 1      cefepime (MAXIPIME) 2 g in sterile water (preservative free) 10 mL IV syringe   Dose: 2 g   Freq: EVERY 8 HOURS Route: IV X1      dexamethasone (DECADRON) 10 mg/mL injection 10 mg   Dose: 10 mg   Freq: EVERY 12 HOURS Route: IV X 1      enoxaparin (LOVENOX) injection 135 mg   Dose: 135 mg   Freq: EVERY 12 HOURS Route: SC X 1      guaiFENesin ER (MUCINEX) tablet 600 mg   Dose: 600 mg   Freq: EVERY 12 HOURS Route: PO X 1      HYDROcodone-chlorpheniramine (TUSSIONEX) oral suspension 5 mL   Dose: 5 mL   Freq: EVERY 12 HOURS Route: PO X 1      hydrOXYzine HCL (ATARAX) tablet 25 mg   Dose: 25 mg   Freq: EVERY 6 HOURS AS NEEDED Route: PO X 1      pantoprazole (PROTONIX) tablet 40 mg   Dose: 40 mg   Freq: DAILY BEFORE BREAKFAST Route: PO      prochlorperazine (COMPAZINE) injection 10 mg   Dose: 10 mg   Freq: EVERY 6 HOURS AS NEEDED Route: IV X 1      zinc sulfate (ZINCATE) 50 mg zinc (220 mg) capsule 1 Capsule   Dose: 1 Capsule   Freq: DAILY Route: PO      Vitals   98.3;130;164/87;22;88% Heated high flow NC 70 l/min      Labs   WBC-26.1      D-dimer 6.46 (H)   Sodium 130 (L)   Potassium 4.7   Chloride 100   CO2 22   Anion gap 8   Glucose 130 (H)   BUN 24 (H)   Creatinine 0.79   BUN/Creatinine ratio 30 (H)   Calcium 8.5   Magnesium <0.3 (LL)   GFR est non-AA >60   GFR est AA >60   Bilirubin, total 1.6 (H)      Chest X ray   IMPRESSION   1. Decreased pneumomediastinum and subcutaneous neck gas. 2. COVID-19 pneumonia. Attending   Assessment and Plan       Chapito Valdez is a 39 y.o. male with a PMH of HTN, HLD, JESUS admitted for AHRF and severe sepsis 2/2 COVID PNA. Patient was admitted on 11/19/2021.       Overnight Events: NAEO       AHRF and ARDS 2/2 COVID PNA: Unvaccinated. Symptom onset 11/12 with acute respiratory decompensation, O2 sats in high 60s prior to admission.  CTA chest without evidence of definite/proximal PE, no aortic aneurysm/dissection, imaging consistent with moderate to severe COVID PNA and associated hilar/mediastinal lymphadenopathy. B/l lower extremity duplex without evidence of DVT. CXR 11/27 demonstrated no significant change in diffuse bilateral airspace disease.  S/p Tocilizumab 800mg 11/19.   - Goal sats 88% or higher.   - COVID labs (CRP, D-dimer, ferritin, LD), CBC, CMP daily   - Continue Decadron 10mg IV BID   - Duo-neb or Combivent Respimat Q6H   - Therapeutic Lovenox   - Tessalon 200mg TID + Tussionex 5mL BID + Mucinex 600 mg BID   - COVID meds (Atorvastatin 20mg every day, Vit C 500mg BID, Zinc 220mg daily)   - Pulm following, appreciate recs       Pneumomediastinum/Pneumothorax: Possibly barotrauma 2/2 high pressures needed for COVID ARDS PNA vs lung frailty.  Will monitor for signs of worsening pneumothorax. - Repeat CXR today showing decreased pneumomediastinum and subcutaneous neck gas       Severe sepsis 2/2 COVID PNA: PNA labs negative, BCx/UCx no growth. Likely severe sepsis on initial presentation is all related to COVID PNA. CTX + Doxy x 5d (11/19-11/24).     Insomnia/anxiety: Pt with new insomnia and anxiety, specifically overnight.   - Scheduled Melatonin 5mg QHS   - Atarax 25mg q6h       HTN: Pt normotensive off meds. At home is on Amlodipine 10mg daily and HCTZ 12.5mg daily   - Will hold home meds       HLD: Chronic, stable. Last lipids 07/2021 , HDL 54, LDL 98.6, . On Atorvastatin 10mg daily. - Will start Atorvastatin 20mg daily for benefit in COVID pts       T2DM (diet-controlled): Prior A1c 5.8% in 05/2021. No meds at present.    - SSI (resistant given morbid obesity) w/ ACHS glucose checks       JESUS: On CPAP at night at home. - Now on HFNC, no BiPAP       Obesity: Body mass index is 42.4 kg/m². - Encouraging lifestyle modifications and further follow up outpatient.       At-risk JAMIR: RESOLVED. POA Cr 1.31, BL 0.8. Likely IVVD in setting of poor PO intake. - Daily CMP       Chest pain: RESOLVED. Description is pleuritic in nature, worse with cough. EKG without evidence of acute ischemia. Troponin of 9 makes CAD less concerning.    - Cardiac monitoring, will continue to monitor       Diarrhea: RESOLVED. Watery diarrhea x 2 days. Likely 2/2 COVID infection.  Improved since presentation. Enteric bacteria panel negative.       Abdominal pain: RESOLVED. Secondary to gas pains after starting diet. - Continue Simethicone           FEN/GI - Diabetic diet   Activity - Ambulate with assistance   DVT prophylaxis - Lovenox (therapeutic)   GI prophylaxis - Protonix   Disposition - Plan to d/c to [facility]. Code Status - Full. Discussed with patient / caregivers. Pulmonary   IMPRESSION   COVID-19 PNA   Acute respiratory failure with hypoxia   JESUS   Severe sepsis   Pneumonthoraix       PLAN   Goal sats 88% or higher. Will try to maintain on HF due left apical PTX and avoid bipap. Repeat CXR today   S/p Actemra 11/19   continue high dose decadron BID, continue at current dose   Finished cetriaxone/doxy 11/24. En light of increasing WBC, cefepime increased 11/28   scheduled combivent   Tessalon, tussionex   lovenox therapeutic dose   Self prone at night and OOB into chair as much as possible.     Prognosis guareded

## 2021-12-08 NOTE — WOUND CARE
Wound follow up:  Patient COVID, intubated, supine, sedated, lewis. Will prone this evening until tomorrow, will follow up tomorrow to assess posterior. Assessment:  Tip of nose- abrasion-0.3x0.2x0.1cm dry scab, no drainage, no redness. Bilateral heels- no redness, skin intact. Ears, abdomin, knees, hips,shoulders- no redness, skin intact.     Yara Fish   Wound

## 2021-12-08 NOTE — PROGRESS NOTES
[address] [place] [address] with [address] [place] [address]       Resident Progress Note in Brief    S: Pt intubated and sedated. No concerns from nursing staff. O:  Visit Vitals  /63   Pulse (!) 114   Temp (!) 100.6 °F (38.1 °C)   Resp 28   Ht 5' 11\" (1.803 m)   Wt 259 lb 0.7 oz (117.5 kg)   SpO2 97%   BMI 36.13 kg/m²     Physical Examination:   General appearance - Intubated, sedated, prone. Heart - rate 114  Respiratory - ET tube in place. Neurological - intubated, sedated. A/P:     Abdiel Blanco is a 39 y. o. male with a PMH of HTN, HLD, JESUS admitted for AHRF and ARDS 2/2 COVID PNA. Currently intubated and sedated. Working towards transfer for ECMO. Patient was admitted on 11/19/2021. AHRF and ARDS 2/2 COVID PNA: Unvaccinated. CTA chest on 11/19/21 demonstrated moderate to severe COVID PNA. S/p tocilizumab on 11/19. Intubated and sedated on 11/30. Working towards transfer for ECMO. Potential availability at [facility], but BMI must be < 35. No availability currently at [facility]. - Daily weights.   - Prone as tolerated  - Goal sats 88% or higher  - CBC, CMP daily. No need for further trending Covid labs  - Continue Decadron 6mg IV daily  - Duo-neb or Combivent Respimat Q6H  - Holding COVID meds (Atorvastatin 20mg every day, Vit C 500mg BID, Zinc 220mg    daily)  - Pulm following, appreciate recs     Severe sepsis 2/2 COVID PNA: Now off pressors. Leukocytosis stable. Fungal Cx NGTD. - Meropenam, Vanc, and Eraxis, now Day 6    - F/u fungal culture  - Intensivist following  - MAPs improving, now off pressors     Nutritional status:  Nutrition consulted for TPN, appreciate recs. - continue TPN per nutrition recs     Please see the primary team's daily progress note for the patient's full plan.     Mikel De La Torre MD  Family Medicine Resident, PGY-1

## 2021-12-08 NOTE — PROGRESS NOTES
1900: Bedside and Verbal shift change report given to Samaria RDZ RN (oncoming nurse) by Itzel Bueno RN (offgoing nurse). Report included the following information SBAR, Kardex, Intake/Output, MAR, Recent Results, Cardiac Rhythm ST and Alarm Parameters . Primary Nurse Kris Restrepo RN and Itzel Bueno RN performed a dual skin assessment on this patient No impairment noted  2000: Shift assessment completed. VAP bundle completed, pt repositioned. 2200:  VAP bundle completed, pt repositioned. 0000: Reassessment completed. VAP bundle completed, pt repositioned. 0200:  VAP bundle completed, pt repositioned. 0400: Reassessment completed. VAP bundle completed, pt repositioned. 0600:  VAP bundle completed, pt repositioned. 0700: Bedside and Verbal shift change report given to Melody Oneill RN (oncoming nurse) by Britt Conn RN (offgoing nurse). Report included the following information SBAR, Kardex, Intake/Output, MAR, Recent Results, Cardiac Rhythm SR and Alarm Parameters .

## 2021-12-08 NOTE — PROGRESS NOTES
Bedside and Verbal shift change report given to Chary Skinner RN (oncoming nurse) by Jesusita Sandoval RN (offgoing nurse). Report included the following information SBAR, Kardex, Procedure Summary, Intake/Output, MAR, Accordion, Recent Results, Med Rec Status and Cardiac Rhythm Sinus rhythm to sinus tachy. Bedside and Verbal shift change report given to [address] [place] [address] Avenue (oncoming nurse) by Chary Skinner RN (offgoing nurse). Report included the following information SBAR, Kardex, Procedure Summary, Intake/Output, MAR, Accordion, Recent Results, Med Rec Status and Cardiac Rhythm sinus rhythm.

## 2021-12-09 NOTE — PROGRESS NOTES
[address] [place] [address] with [address] [place] [address]       Resident Progress Note in Brief     S: Pt intubated and sedated. No concerns from nursing staff. O:  Visit Vitals  BP (!) 102/49 (BP 1 Location: Left lower arm, BP Patient Position: At rest)   Pulse 79   Temp 99.8 °F (37.7 °C)   Resp 28   Ht 5' 11\" (1.803 m)   Wt 254 lb 10.1 oz (115.5 kg)   SpO2 98%   BMI 35.51 kg/m²     Physical Examination:   General appearance - Intubated, sedated, prone. Appears comfortable. Heart - rate 79  Respiratory - ET tube in place. Neurological - intubated, sedated. Mitchell in place    A/P:     Jennifer Blanco is a 39 y. o. male with a PMH of HTN, HLD, JESUS admitted for AHRF and ARDS 2/2 COVID PNA. Currently intubated and sedated. Working towards transfer for ECMO. Patient was admitted on 11/19/2021. AHRF and ARDS 2/2 COVID PNA: Continues intubated, sedated, paralyzed, and proned on Vent support, FIO2 remains 80% with  and PEEP 16. Pending approval ECMO at [place] [address] still no availability.  - Daily weights, possible local ECMO transfer with BMI <35  - Prone as tolerated  - Goal sats 88% or higher  - CBC, CMP daily. No need for further trending Covid labs  - Continue Decadron 6mg IV daily  - Duo-neb or Combivent Respimat Q6H  - Holding COVID meds (Atorvastatin 20mg every day, Vit C 500mg BID, Zinc 220mg daily)  - Pulm following, appreciate recs     Severe sepsis 2/2 COVID PNA: Now off pressors. Leukocytosis stable. Fungal Cx, BCx, UCx NGTD. S/p x7 course Maria Fernanda/Vanc/Eraxis, now off abx.    - Intensivist following  - On sarah with goal of MAP >65; wean as tolerated     Nutritional status:  Nutrition consulted for TPN, appreciate recs. - continue TPN per nutrition recs     Please see the primary team's daily progress note for the patient's full plan.     Shawn Hyde MD  Family Medicine Resident, PGY-1

## 2021-12-09 NOTE — PROGRESS NOTES
Comprehensive Nutrition Assessment    Type and Reason for Visit: Reassessment & New     Nutrition Recommendations/Plan:   1. If able, resume Tube Feeding below:   · Peptide based High protein (Vital HP) @ 15 mL x 20 hrs    · Advance as tolerated by 10mL Q6H to goal 35mL/hr  · FWF 10mL QH       2. Check triglycerides bi-weekly - improved on 12/6    3. If unable to resume EN feedings, continue TPN: consider increasing to 75 mL/hr to help meet protein needs. · Continue D15/AA7% @ 63 mL/h (goal w/ propofol)    TPN @ 63mL/h provides: 1194 kcal (2180 kcal/d including propofol), 106 g protein, 227 g dextrose. Providing ~18.5 kcal/kg & 0.9 g pro/kg    Nutrition Assessment:    12/9: TF remains off. Discussed restarting for trial at slow rate. Check tolerance. Continue TPN today to monitor TF tolerance. If pt tolerates TF, wean/d/c TPN. Propofol at 37.4 mL/hr providing 987 kcal per day. Noted: \"possible local ECMO transfer with BMI <35\" Currently BMI 35.5. Still awaiting availability at [place] [address]. No beds available. Triglycerides down to 258 from 299. BG mid to high 100's.     12/6: pt remains intubated, sedated and paralyzed. Not tolerating TF, placed prone today, getting propofol at max dose (adds ~ 985 kcal/d from lipids), TGL rechecked today & improved, no plans to reduce propofol, TPN started today. If pt is able to handle higher volume in a couple days, increasing TPN to 75 mL/h. This would more closely meet protein needs but exceed initial calorie needs for critically ill patient. Pt is awaiting possible transfer to a higher level of care facility once bed is available. Appreciate additional bowel regimen. 12/3: RD attended & discussed patient during interdisciplinary rounds on unit. TF off with NG in place to low suction per RN. Pt with bleeding noted. If unable to resume PO, once more fluid stable consider TPN as rec'd above. No lipids. Awaiting bed available fro ECMO.  Wt is down from admit - BMI = 36.28.      TF @35mL provides: 770ml of tf, 1010 kcal (1995 kcal/d including protein & propofol), 133 g protein, 85 g carb, ~955 ml of free water from TF & flushes. 17 kcal/kg & 1.1g of pro/kg. TPN @ 63mL/h provides[de-identified] ~1.5L, 1330 kcal (2315 kcal including propofol) & 76 g protein. (~11 kcal/kg or 20 kcal/kg including propofol, 0.6 g pro/kg)       11/22: pt admitted for Acute hypoxemic respiratory failure due to COVID-19 ([facility] [address] [U07.1, J96.01] who  has a past medical history of Hypercholesteremia, Hypertension (10/15/2014), and S/P vasectomy (8/6/2014). Pt was sitting up in chair in room. Pt has been switching between High Flow NC and Bipap for oxygen needs. He is asking to be able to eat. Call to MDs to request diet or supplements when safe. Pt is still accepting meds 2-3 times daily. Review of history notes blood glucose much improved from earlier this year. On admission, notes reviewed pt had nausea and diarrhea for 2 days prior, has had clear liquids & crackers. Pt has been NPO x4 days (since evening of admission date). Morbidly obese male with high calorie/protein needs. Malnutrition Assessment:  Malnutrition Status:  Severe malnutrition    Context:  Acute illness     Findings of the 6 clinical characteristics of malnutrition:   Energy Intake:  7 - 50% or less of est energy requirements for 5 or more days  Weight Loss:  7 - Greater than 2% over 1 week  - wt down 9.7% from admit wt (doc wt from 4 months ago)   Body Fat Loss:  Unable to assess,     Muscle Mass Loss:  Unable to assess,    Fluid Accumulation:  No significant fluid accumulation,     Strength:  Not performed     Estimated Daily Nutrient Needs:  Energy (kcal): 1723 kcal/d (65% est needs)  ([id number])  Weight Used for Energy Requirements: Current 117.2 kg  Protein (g): 117 - 156g (1.5-2 g/kg of IBW);    Weight Used for Protein Requirements: Ideal (78.2 kg)  Fluid (ml/day): 1730 mL+;   Method Used for Fluid Requirements: 1 ml/kcal    Nutrition Related Findings:     Last BM:     ABD: obese, intact, active bowel sounds    Edema: none (12/3)     non-pitting - all extremities ()                 None - R/LUE, trace - R/LLE ()     Trace - all extremities ()     1+ - all extremities ()  Oxygen Needs:  Vent Measures: 12.8 l/min    Temp (24hrs), Av.5 °F (36.9 °C), Min:97.8 °F (36.6 °C), Max:99 °F (37.2 °C)      Nutr. Related Meds:   Vit Cc, lipitor, Bumex, Decadron, Lovenox, Humalog, Miralax,     PRN: correction insulin, miralax      Nutr. Related Labs:   Lab Results   Component Value Date/Time    Glucose 195 (H) 2021 03:37 AM    Glucose (POC) 159 (H) 2021 11:26 AM     Lab Results   Component Value Date/Time    Hemoglobin A1c 5.8 (H) 2021 10:25 AM    Hemoglobin A1c 11.1 (H) 2021 10:47 AM     Lab Results   Component Value Date/Time    Sodium 134 (L) 2021 03:37 AM    Potassium 3.3 (L) 2021 03:37 AM    Chloride 90 (L) 2021 03:37 AM    CO2 38 (H) 2021 03:37 AM     Lab Results   Component Value Date/Time    Triglyceride 390 (H) 2021 03:54 AM     Lab Results   Component Value Date/Time    Triglyceride 299 (H) 2021 04:11 AM     Wounds:    None        Current Nutrition Therapies:  DIET NPO  TPN ADULT - CENTRAL  TPN ADULT - CENTRAL    Documented Meal intake: NPO    Anthropometric Measures:  · Height:  5' 11\" (180.3 cm)  · Current Body Wt:  117.5 kg (259 lb 0.7 oz)   · Admission Body Wt:  304 lb    · Usual Body Wt:  136.1 kg (300 lb)     · Ideal Body Wt:  172 lbs:  150.6 %   · BMI Category:  Obese class 2 (BMI 35.0-39. 9)     Body mass index is 35.51 kg/m².     Last 3 Recorded Weights in this Encounter    21 1241 21 0639 21 0927   Weight: 118.3 kg (260 lb 14.4 oz) 117.5 kg (259 lb 0.7 oz) 115.5 kg (254 lb 10.1 oz)     Wt Readings from Last 6 Encounters:   21 115.5 kg (254 lb 10.1 oz)   21 137.9 kg (304 lb)   21 133.8 kg (295 lb) 04/07/20 145.2 kg (320 lb)   12/06/19 145.2 kg (320 lb)   10/04/19 142.4 kg (314 lb)      Nutrition Diagnosis:   · Inadequate protein-energy intake related to catabolic illness, impaired respiratory function as evidenced by intake 26-50%, weight loss greater than or equal to 2% in 1 week, unable to accept PO due to increased O2 needs. · Severe malnutrition related to catabolic illness, inadequate protein-energy intake, impaired respiratory function as evidenced by NPO or clear liquid status due to medical condition, weight loss greater than or equal to 2% in 1 week, intake 26-50%, 9.7% wt loss in the last 2 weeks to 4 months. Nutrition Interventions:   Food and/or Nutrient Delivery: Discontinue tube feeding, Start parenteral nutrition  Nutrition Education and Counseling: No recommendations at this time  Coordination of Nutrition Care: Continue to monitor while inpatient, Interdisciplinary rounds    Goals:  provide & tolerate 80% of protein needs within 2-3 days       Nutrition Monitoring and Evaluation:   Behavioral-Environmental Outcomes: None identified  Food/Nutrient Intake Outcomes: Parenteral nutrition intake/tolerance  Physical Signs/Symptoms Outcomes: Biochemical data, Fluid status or edema, Hemodynamic status, Weight    Discharge Planning:     Too soon to determine     Electronically signed by Nena Hdz RD, on 12/9/2021   Contact via [facility] or office [phone]

## 2021-12-09 NOTE — PROGRESS NOTES
[address] [facility] [address] [place] [address]   Office [phone]  Fax [phone]          Assessment and Plan     Fredrick Arriola is a 39 y.o. male with a PMH of HTN, HLD, JESUS admitted for AHRF and severe sepsis 2/2 COVID PNA. Patient was admitted on 11/19/2021. Interval Events: No acute events overnight. AHRF and ARDS 2/2 COVID PNA: Continues intubated, sedated, paralyzed, and proned on Vent support, FIO2 remains 80% with  and PEEP 16. Pending approval ECMO at [facility] still no availability.  - Daily weights, possible local ECMO transfer with BMI <35  - Prone as tolerated  - Goal sats 88% or higher  - CBC, CMP daily. No need for further trending Covid labs  - Continue Decadron 6mg IV daily  - Duo-neb or Combivent Respimat Q6H  - Holding COVID meds (Atorvastatin 20mg every day, Vit C 500mg BID, Zinc 220mg daily)  - Pulm following, appreciate recs    Severe sepsis 2/2 COVID PNA: Now off pressors. Leukocytosis stable. Fungal Cx, BCx, UCx NGTD. S/p x7 course Maria Fernanda/Vanc/Eraxis, now off abx.    - Intensivist following  - MAPs improving, now off pressors. Nutritional status:  Nutrition consulted for TPN, appreciate recs. - continue TPN per nutrition recs     Oozing (improved): Mild bleeding with suctioning otherwise no other findings while prone.     - On prophylactic Lovenox 40 mg BID    Pneumomediastinum/Pneumothorax: Possibly barotrauma 2/2 high pressures needed for COVID ARDS PNA vs lung frailty. Repeat CXR 12/1 showing stable pneumomediastinum, and resolved pneumothorax. - Monitor for signs of worsening barotrauma     Insomnia/anxiety: Pt with new insomnia and anxiety, specifically overnight. - Intubated, sedated    HTN: Pt normotensive off meds. Currently hypotensive. At home is on Amlodipine 10mg daily and HCTZ 12.5mg daily  - Will continue to monitor      HLD: Chronic, stable. Last lipids 07/2021 , HDL 54, LDL 98.6, .  On Atorvastatin 10mg daily.  - Holding Atorvastatin 20mg daily     T2DM (diet-controlled): Prior A1c 5.8% in 05/2021. No meds at present.   - SSI (resistant given morbid obesity) w/ q6h glucose checks     JESUS: On CPAP at night. - Intubated, sedated.     Obesity: Body mass index is 42.4 kg/m² on presentation.  - Encouraging lifestyle modifications and further follow up outpatient. At-risk JAMIR: RESOLVED. POA Cr 1.31, BL 0.8. Likely IVVD in setting of poor PO intake. - Daily CMP    Chest pain: RESOLVED. Description is pleuritic in nature, worse with cough. EKG without evidence of acute ischemia. Troponin of 9 makes CAD less concerning.   - Cardiac monitoring, will continue to monitor     Diarrhea: RESOLVED. Watery diarrhea x 2 days. Likely 2/2 COVID infection. Improved since presentation. Enteric bacteria panel negative. Abdominal pain: RESOLVED. Secondary to gas pains after starting diet. - Continue Simethicone       FEN/GI - TPN  Activity - Ambulate with assistance  DVT prophylaxis - Lovenox (ppx)  GI prophylaxis - Protonix  Disposition - Plan to d/c to TBD. Code Status - Full. Discussed with patient / caregivers. Next of [facility] [address] and [address],  Spouse - [phone]      Dwight Cherry MD  Family Medicine Resident    Patient discussed with Family Medicine Attending: Dr. Sagrario Starkey / Objective   Subjective:   Patient intubated and proned, on pressors. Sedated. No acute distress. Respiratory: O2 Flow Rate (L/min): 60 l/min O2 Device: Ventilator   Visit Vitals  /73   Pulse 88   Temp 98.3 °F (36.8 °C)   Resp 28   Ht 5' 11\" (1.803 m)   Wt 254 lb 10.1 oz (115.5 kg)   SpO2 99%   BMI 35.51 kg/m²     General: Intubated, sedated. Appears comfortable. Respiratory: Transmitted mechanical sounds appreciated bilaterally, unchanged. Cardiovascular: regular rate, regular rhythm. GI: + bowel sounds. Nontender. Extremities:  No LE edema. Extremities well perfused  Skin: Warm, dry.  PICC line in place  Neuro: Intubated sedated  Mitchell in place     I/O:  Date 12/07/21 1900 - 12/08/21 0659 12/08/21 0700 - 12/09/21 0659   Shift 3675-6935 24 Hour Total 2050-8065 6218-1398 24 Hour Total   INTAKE   P.O.   0 0 0     P. O.   0 0 0   I. V.(mL/kg/hr) 3291.6 4299 2216(1.6) 347.9 2563.9     I.V.   10 0 10     Versed Volume 144 192 80 24 104     Nimbex Volume 126 171 37 11.1 48.1     Fentanyl Volume 54 72 27 10.6 37.6     Diprivan Volume 673.2 897.6 374 112.2 486. 2     Phenylephrine Volume   0 0 0     Volume (anidulafungin (ERAXIS) 100 mg in 0.9% sodium chloride 130 mL IVPB)   260 0 260     Volume (meropenem (MERREM) 500 mg in sterile water (preservative free) 10 mL IV syringe) 30 50 20 0 20     Volume (vancomycin (VANCOCIN) 1750 mg in  ml infusion) 1500 1500 0  0     Volume (potassium chloride 10 mEq in 100 ml IVPB)  400        Volume (potassium chloride 10 mEq in 100 ml IVPB)   400  400     Volume (potassium chloride 10 mEq in 100 ml IVPB)   0  0     Volume (TPN ADULT - CENTRAL) 336 588        Volume (TPN ADULT - CENTRAL) 428.4 428. 4 1008 64.1 1072.1     Volume (TPN ADULT - CENTRAL)   0  0     Volume (TPN ADULT - CENTRAL)   0 126 126   Blood   0 0 0     Autotransfused   0 0 0   Other   0 0 0     Other   0 0 0     Irrigation Volume Input (mL) (Urinary Catheter 11/30/21 Mitchell - Temperature)   0  0   NG/GT 20 20 150 0 150     Water Flush Volume (mL) (Orogastric Tube 11/30/21) 20 20 50 0 50     Medication Volume (Orogastric Tube 11/30/21)   50 0 50     Intake (ml) (Orogastric Tube 11/30/21)   50 0 50   Shift Total(mL/kg) 9742.8(10.1) 6179(89.0) 4572(48.5) 347. 9(3) 3029.3(85.1)   OUTPUT   Urine(mL/kg/hr) 3450 4220 1700(1.2)  1700     Urine Output (mL) (Urinary Catheter 11/30/21 Mitchell - Temperature) 3450 4220 1700  1700   Emesis/NG output   0 0 0     Output (ml) (Orogastric Tube 11/30/21)   0 0 0   Shift Total(mL/kg) 3450(29.4) 4220(35.9) 1700(14.7) 0(0) 1700(14.7)   NET -138.4 99 666 347.9 1013.9   Weight (kg) 117.5 117.5 115.5 115.5 115.5       CBC:  Recent Labs     12/08/21  0329 12/07/21  0430 12/06/21  0411   WBC 13.5* 13.5* 12.9*   HGB 13.1 11.9* 11.4*   HCT 40.1 37.0 35.4*    240 419       Metabolic Panel:  Recent Labs     12/08/21  0329 12/07/21  0430 12/06/21  0411    139 139   K 3.1* 3.2* 3.9   CL 94* 98 98   CO2 36* 38* 38*   BUN 27* 20 25*   CREA 0.56* 0.48* 0.40*   * 162* 140*   CA 9.0 9.0 8.2*   MG 2.5* 2.5* 2.5*   PHOS 5.1* 3.5  --    ALB 3.0* 2.8* 2.6*   ALT 82* 46 35   INR 1.0 1.0 1.0          For Billing    Chief Complaint   Patient presents with   [address] [place] [address] Problems  Date Reviewed: 12/3/2021          Codes Class Noted POA    Oozing skin inflammation ICD-10-CM: L08.9  ICD-9-CM: 686.9  12/3/2021 Unknown        Positive D dimer ICD-10-CM: R79.89  ICD-9-CM: 790.92  12/3/2021 Unknown        Other hyperlipidemia ICD-10-CM: E78.49  ICD-9-CM: 272.4  12/3/2021 Unknown        Elevated serum creatinine ICD-10-CM: R79.89  ICD-9-CM: 790.99  12/3/2021 Unknown        Community acquired pneumonia ICD-10-CM: J18.9  ICD-9-CM: 273  12/3/2021 Unknown        ARDS (adult respiratory distress syndrome) ([facility] ICD-10-CM: J80  ICD-9-CM: 518.52  12/1/2021 No        Septic shock (HCC) ICD-10-CM: A41.9, R65.21  ICD-9-CM: 038.9, 785.52, 995.92  11/26/2021 Yes        Pneumomediastinum ([facility] ICD-10-CM: J98.2  ICD-9-CM: 518.1  11/26/2021 No        Pneumothorax on left ICD-10-CM: J93.9  ICD-9-CM: 512.89  11/26/2021 No        Hyponatremia ICD-10-CM: E87.1  ICD-9-CM: 276.1  11/26/2021 Yes        Diabetes mellitus type 2, controlled ([facility] ICD-10-CM: E11.9  ICD-9-CM: 250.00  11/25/2021 Yes        * (Principal) Acute hypoxemic respiratory failure due to COVID-19 [facility]) ICD-10-CM: U07.1, J96.01  ICD-9-CM: 518.81, 079.89, 799.02  11/19/2021 Yes        Obesity, morbid ([facility] ICD-10-CM: E66.01  ICD-9-CM: 278.01  4/10/2018 Yes        Elevated liver enzymes ICD-10-CM: R74.8  ICD-9-CM: 790.5  8/5/2016 Yes        JESUS (obstructive sleep apnea) ICD-10-CM: G47.33  ICD-9-CM: 327.23  10/15/2014 Yes        Hypertension ICD-10-CM: I10  ICD-9-CM: 401.9  10/15/2014 Yes        Pure hypercholesterolemia ICD-10-CM: E78.00  ICD-9-CM: 272.0  Unknown Yes

## 2021-12-09 NOTE — PROGRESS NOTES
12/9/21  1:20 PM    Care Management Transition of Care:    RUR: 12%  LOS: 20D    1. Pt remains intubated,sedated and paralyzed  2. Checked today with [facility] patient access, patient declined from [facility] at this time due to full capacity and no available beds. They continue to check on status daily.   3. CM continuing to follow    [facility]

## 2021-12-09 NOTE — WOUND CARE
Wound Consult: Follow Up Visit. Chart reviewed. Consulted for high risk for skin breakdown; COVID + pneumonia/respiratory failure, in prone position. On Progressa bed. Cameron score 8. Assessment:  No redness to sacrum, buttocks, heels, dorsal feet, elbows. Perianal area with some redness/moisture related. Examined supine yesterday. Treatment:  Mepilex sacral dressing in use to sacrum/buttocks. Skin Care / PI Prevention Recommendations:  1. Minimize friction/shear: minimize layers of linen/pads under patient. 2. Off load pressure/reposition:  turn and reposition approximately every 2 hours; float heels with pillows or use off loading heel boots; pillows / position wedges. 3. Manage Moisture - keep skin folds dry; incontinence skin care with incontinence wipes; use barrier ointment if necessary to perianal area;lewis. 4. Continue to monitor nutrition, pain, and skin risk scale, and skin assessment. Plan:  Re-consult WOC nurse to reassess for any concerns including but not limited to: continued intubation beyond 72 hours; SEPSIS/SHOCK; MODS; Vasopressor use with discoloration to fingers or toes; any new wound; cardiac arrest or MI during stay; fecal incontinence requiring frequent linen/chux changes.     John Guerrier, MSN, RN, [facility] / [address] Office [phone]

## 2021-12-09 NOTE — PROGRESS NOTES
[address] [facility] [address] [facility] [place] [address]   Office [phone]  Fax [phone]          Assessment and Plan     Chapito Valdez is a 39 y.o. male with a PMH of HTN, HLD, JESUS admitted for AHRF and severe sepsis 2/2 COVID PNA. Patient was admitted on 11/19/2021. Interval Events: reduced BP yesterday, restarted on Sanjeev. No acute events overnight. AHRF and ARDS 2/2 COVID PNA: Continues intubated, sedated, paralyzed, and proned on Vent support, FIO2 remains 80% with  and PEEP 16. Pending approval ECMO at [place] [address] still no availability.  - Daily weights, possible local ECMO transfer with BMI <35  - Prone as tolerated  - Goal sats 88% or higher  - CBC, CMP daily. No need for further trending Covid labs  - Continue Decadron 6mg IV daily  - Duo-neb or Combivent Respimat Q6H  - Holding COVID meds (Atorvastatin 20mg every day, Vit C 500mg BID, Zinc 220mg daily)  - Pulm following, appreciate recs    Severe sepsis 2/2 COVID PNA: Now off pressors. Leukocytosis stable. Fungal Cx, BCx, UCx NGTD. S/p x7 course Maria Fernanda/Vanc/Eraxis, now off abx.    - Intensivist following  - Restarted Sanjeev to maintain MAP > 65     Nutritional status:  Nutrition consulted for TPN, appreciate recs. - continue TPN per nutrition recs   - trial trickle feeds as tolerated    Oozing (improved): Mild bleeding with suctioning otherwise no other findings while prone.     - On prophylactic Lovenox 40 mg BID    Pneumomediastinum/Pneumothorax: Possibly barotrauma 2/2 high pressures needed for COVID ARDS PNA vs lung frailty. Repeat CXR 12/1 showing stable pneumomediastinum, and resolved pneumothorax. - Monitor for signs of worsening barotrauma     Insomnia/anxiety: Pt with new insomnia and anxiety, specifically overnight. - Intubated, sedated    HTN: Pt normotensive off meds. Currently hypotensive. At home is on Amlodipine 10mg daily and HCTZ 12.5mg daily  - Will continue to monitor      HLD: Chronic, stable.  Last lipids 07/2021 , HDL 54, LDL 98.6, . On Atorvastatin 10mg daily.  - Holding Atorvastatin 20mg daily     T2DM (diet-controlled): Prior A1c 5.8% in 05/2021. No meds at present.   - SSI (resistant given morbid obesity) w/ q6h glucose checks     JESUS: On CPAP at night. - Intubated, sedated.     Obesity: Body mass index is 42.4 kg/m² on presentation.  - Encouraging lifestyle modifications and further follow up outpatient. At-risk JAMIR: RESOLVED. POA Cr 1.31, BL 0.8. Likely IVVD in setting of poor PO intake. - Daily CMP    Chest pain: RESOLVED. Description is pleuritic in nature, worse with cough. EKG without evidence of acute ischemia. Troponin of 9 makes CAD less concerning.   - Cardiac monitoring, will continue to monitor     Diarrhea: RESOLVED. Watery diarrhea x 2 days. Likely 2/2 COVID infection. Improved since presentation. Enteric bacteria panel negative. Abdominal pain: RESOLVED. Secondary to gas pains after starting diet. - Continue Simethicone       FEN/GI - TPN  Activity - Ambulate with assistance  DVT prophylaxis - Lovenox (ppx)  GI prophylaxis - Protonix  Disposition - Plan to d/c to TBD. Code Status - Full. Discussed with patient / caregivers. Next of [facility] [address] Name and [address] [facility],  Spouse - [phone]      Dwight Cherry MD  Family Medicine Resident    Patient discussed with Family Medicine Attending: Dr. Meza  / Objective   Subjective:   Patient intubated and proned, on pressors. Sedated. No acute distress. Respiratory: O2 Flow Rate (L/min): 60 l/min O2 Device: Endotracheal tube, Ventilator   Visit Vitals  /80   Pulse 82   Temp 99 °F (37.2 °C)   Resp 28   Ht 5' 11\" (1.803 m)   Wt 254 lb 10.1 oz (115.5 kg)   SpO2 100%   BMI 35.51 kg/m²     General: Intubated, sedated. Appears comfortable. Respiratory: Transmitted mechanical sounds appreciated bilaterally, unchanged. Cardiovascular: regular rate, regular rhythm. GI: + bowel sounds. Nontender. Extremities:  No LE edema. Extremities well perfused and warm. Skin: Warm, dry. PICC line in place  Neuro: Intubated sedated  Mitchell in place     I/O:  Date 12/08/21 0700 - 12/09/21 0659 12/09/21 0700 - 12/10/21 0659   Shift 3143-7802 7255-1569 24 Hour Total 1166-5912 7590-6667 24 Hour Total   INTAKE   P.O. 0 0 0        P. O. 0 0 0      I. V.(mL/kg/hr) 2216(1.6) 1153.6(0.8) 3369.6(1.2) 1715.2  1715.2     I.V. 10 0 10        Versed Volume 80 80 160 80  80     Nimbex Volume 37 37 74 46.2  46.2     Fentanyl Volume 27 31.6 58.6 30  30     Diprivan Volume 374 374 748 374  374     Phenylephrine Volume 0 0 0 5  5     Volume (anidulafungin (ERAXIS) 100 mg in 0.9% sodium chloride 130 mL IVPB) 260 0 260 130  130     Volume (meropenem (MERREM) 500 mg in sterile water (preservative free) 10 mL IV syringe) 20 0 20 20  20     Volume (vancomycin (VANCOCIN) 1750 mg in  ml infusion) 0  0        Volume (potassium chloride 10 mEq in 100 ml IVPB) 400  400        Volume (potassium chloride 10 mEq in 100 ml IVPB) 0  0        Volume (potassium chloride 10 mEq in 100 ml IVPB)    300  300     Volume (potassium chloride 10 mEq in 100 ml IVPB)    100  100     Volume (TPN ADULT - CENTRAL) 1008 64.1 1072.1        Volume (TPN ADULT - CENTRAL) 0  0        Volume (TPN ADULT - CENTRAL) 0 567 567 630  630     Volume (TPN ADULT - CENTRAL)    0  0   Blood 0 0 0        Autotransfused 0 0 0      Other 0 0 0 0  0     Other 0 0 0        Irrigation Volume Input (mL) (Urinary Catheter 11/30/21 Mitchell - Temperature) 0  0 0  0   NG/ 0 150        Water Flush Volume (mL) (Orogastric Tube 11/30/21) 50 0 50        Medication Volume (Orogastric Tube 11/30/21) 50 0 50        Intake (ml) (Orogastric Tube 11/30/21) 50 0 50      Shift Total(mL/kg) 0380(35.8) 1153.6(10) 3519. 6(30.5) 1715. 2(14.8)  1715. 2(14.8)   OUTPUT   Urine(mL/kg/hr) 1700(1.2) 650(0.5) 2350(0.8) 915  915     Urine Output (mL) (Urinary Catheter 11/30/21 Mitchell - Temperature) 0759 870 4892 915  915   Emesis/NG output 0 0 0        Output (ml) (Orogastric Tube 11/30/21) 0 0 0      Shift Total(mL/kg) 1700(14.7) 650(5.6) 2350(20.3) 915(7.9)  915(7.9)    503.6 1169.6 800.2  800.2   Weight (kg) 115.5 115.5 115.5 115.5 115.5 115.5       CBC:  Recent Labs     12/09/21  0337 12/08/21  0329 12/07/21  0430   WBC 12.4* 13.5* 13.5*   HGB 12.7 13.1 11.9*   HCT 38.6 40.1 37.0    240 439       Metabolic Panel:  Recent Labs     12/09/21 0337 12/08/21 0329 12/07/21  0430   * 136 139   K 3.3* 3.1* 3.2*   CL 90* 94* 98   CO2 38* 36* 38*   BUN 34* 27* 20   CREA 0.68* 0.56* 0.48*   * 191* 162*   CA 9.3 9.0 9.0   MG 2.3 2.5* 2.5*   PHOS 4.0 5.1* 3.5   ALB 2.9* 3.0* 2.8*   ALT 70 82* 46   INR 1.0 1.0 1.0          For Billing    Chief Complaint   Patient presents with   [address] [facility] Problems  Date Reviewed: 12/3/2021          Codes Class Noted POA    Oozing skin inflammation ICD-10-CM: L08.9  ICD-9-CM: 686.9  12/3/2021 Unknown        Positive D dimer ICD-10-CM: R79.89  ICD-9-CM: 790.92  12/3/2021 Unknown        Other hyperlipidemia ICD-10-CM: E78.49  ICD-9-CM: 272.4  12/3/2021 Unknown        Elevated serum creatinine ICD-10-CM: R79.89  ICD-9-CM: 790.99  12/3/2021 Unknown        Community acquired pneumonia ICD-10-CM: J18.9  ICD-9-CM: 816  12/3/2021 Unknown        ARDS (adult respiratory distress syndrome) ([facility] ICD-10-CM: P44  ICD-9-CM: 518.52  12/1/2021 No        Septic shock (HCC) ICD-10-CM: A41.9, R65.21  ICD-9-CM: 038.9, 785.52, 995.92  11/26/2021 Yes        Pneumomediastinum ([facility] ICD-10-CM: J98.2  ICD-9-CM: 518.1  11/26/2021 No        Pneumothorax on left ICD-10-CM: J93.9  ICD-9-CM: 512.89  11/26/2021 No        Hyponatremia ICD-10-CM: E87.1  ICD-9-CM: 276.1  11/26/2021 Yes        Diabetes mellitus type 2, controlled ([facility] ICD-10-CM: E11.9  ICD-9-CM: 250.00  11/25/2021 Yes        * (Principal) Acute hypoxemic respiratory failure due to COVID-19 [facility]) ICD-10-CM: U07.1, J96.01  ICD-9-CM: 518.81, 079.89, 799.02  11/19/2021 Yes        Obesity, morbid ([address] ICD-10-CM: E66.01  ICD-9-CM: 278.01  4/10/2018 Yes        Elevated liver enzymes ICD-10-CM: R74.8  ICD-9-CM: 790.5  8/5/2016 Yes        JESUS (obstructive sleep apnea) ICD-10-CM: G47.33  ICD-9-CM: 327.23  10/15/2014 Yes        Hypertension ICD-10-CM: I10  ICD-9-CM: 401.9  10/15/2014 Yes        Pure hypercholesterolemia ICD-10-CM: E78.00  ICD-9-CM: 272.0  Unknown Yes

## 2021-12-09 NOTE — PROGRESS NOTES
0700- Bedside shift change report given to Marisa Rivera (oncoming nurse) by Arthur Reis (offgoing nurse). Report included the following information SBAR, Kardex, ED Summary, Intake/Output, MAR, Recent Results, Cardiac Rhythm NSR and Alarm Parameters . Primary Nurse Alisson Machado RN and Arthur Reis RN performed a dual skin assessment on this patient Impairment noted- see wound doc flow sheet  Cameron score is 8.  0800- Assessment complete, see doc flowsheets. ETT and mouth care done. Pt turned and repositioned at this time. 7515- Pt turned into supine position at this time. RT and RNs at bedside. Pt noted to be coughing. Paralytic increased. 1000- ETT and mouth care done. Pt turned and repositioned at this time. 1200- Reassessment complete, see doc flowsheets. ETT and mouth care done. Pt turned and repositioned at this time. 1400- ETT and mouth care done. Pt turned and repositioned at this time. 1600- Reassessment complete, see doc flowsheets. ETT and mouth care done. Pt turned and repositioned at this time. 1800- Pt turned into prone position at this time. RT and RNs at bedside. ETT and mouth care done. 1900- Bedside shift change report given to Arthur Reis (oncoming nurse) by Marisa Rivera (offgoing nurse). Report included the following information SBAR, Kardex, ED Summary, Intake/Output, MAR, Recent Results, Cardiac Rhythm NSR and Alarm Parameters .

## 2021-12-09 NOTE — PROGRESS NOTES
Called patient's wife, confirmed with birth date. Provided daily updates, all questions were answered.      Victorino Maciel MD  Family Medicine Resident

## 2021-12-09 NOTE — PROGRESS NOTES
1900: Verbal shift change report given to Viet (oncoming nurse) by Santiago Moreno (offgoing nurse). Report included the following information SBAR, ED Summary, Intake/Output, MAR, Recent Results, Cardiac Rhythm JNSR and Quality Measures. GTT verified with Santiago Moreno, RN  Nimbex @ 1 mcg  Versed @ 8 mg  Prop @ 50 mcg  TPN @ 63 mL/hr  Fent @ 150       2000: Shift Assessment   Pt Pharm paralyzed and sedated, pupils round, reactive, 3. Pulses in all extremities. Resp clear, diminished at bases, S1S2 heard. TF stopped due to high residuals. Aspirated 240 brown, murky residual.  Pt on SCD. Meds administered     0000: Reassessment, no changes noted     0300: Labs drawn and sent     0400: Reassessment, no changes noted     0700: Verbal shift change report given to Hiro Randall (oncoming nurse) by Viet (offgoing nurse). Report included the following information SBAR, ED Summary, Intake/Output, MAR, Cardiac Rhythm ST and Quality Measures.

## 2021-12-09 NOTE — PROGRESS NOTES
[facility]    ICU TEAM Progress Note    Name: Indy Lancaster   : 1985   MRN: [id number]   Date: 2021           ICU Assessment     1. Acute respiratory failure with hypoxia  2. ARDS  3. COVID 19  4. Obesity  5. DM2  6. JESUS  7. H/o HTN         ICU Comprehensive Plan of Care:   -Replace K+  -prone again today  -D/C metolazone  -TPN  -trickle TF  -Monitor off ABX  -Lovenox ppx  -decadron  -nimbex and sedation  -Awaiting bed at [facility] for ECMO    1. Discussed Care Plan with Bedside RN    2. Documentation of Current Medications    ICU Issues:  D- Delirium assessement CAM-ICU: Assessments ordered  E- Early Mobility/ PT: Will order when appropriate  F- Feeding: TPN and trickle TF  Peptic Ulcer Disease Prophylaxis: PPI  DVT Prophylaxis: Lovenox  Glycemic Control (140-180 mg/dL): SSI  Catheter Discontinuation (CVC, arterial, urinary, gastric, rectal): Keep all  Antibiotics: None  Steroids: Decadron  MAR Review (pain, anxiety, constipation . Marnell Solitario Marnell Solitario ): Completed  Code Status: FULL CODE    Subjective:   Progress Note: 2021      Reason for ICU Admission: acute respiratory failure     HPI:  43yo man admitted for COVID. Had small apical Ptx. Overnight Events:   2021  JHOANA overnight. Proned. Good UOP. No pressors.     POD:  * No surgery found *    S/P:       Active Problem List:     Problem List  Date Reviewed: 12/3/2021          Codes Class    Oozing skin inflammation ICD-10-CM: L08.9  ICD-9-CM: 686.9         Positive D dimer ICD-10-CM: R79.89  ICD-9-CM: 790.92         Other hyperlipidemia ICD-10-CM: E78.49  ICD-9-CM: 272.4         Elevated serum creatinine ICD-10-CM: R79.89  ICD-9-CM: 790.99         Community acquired pneumonia ICD-10-CM: J18.9  ICD-9-CM: 945         ARDS (adult respiratory distress syndrome) ([facility] ICD-10-CM: J80  ICD-9-CM: 518.52         Septic shock ([facility] ICD-10-CM: A41.9, R65.21  ICD-9-CM: 038.9, 785.52, 995.92         Pneumomediastinum ([facility] ICD-10-CM: J98.2  ICD-9-CM: 518.1         Pneumothorax on left ICD-10-CM: J93.9  ICD-9-CM: 512.89         Hyponatremia ICD-10-CM: E87.1  ICD-9-CM: 276.1         Diabetes mellitus type 2, controlled (HCC) ICD-10-CM: E11.9  ICD-9-CM: 250.00         * (Principal) Acute hypoxemic respiratory failure due to COVID-19 [facility]) ICD-10-CM: U07.1, J96.01  ICD-9-CM: 518.81, 079.89, 799.02         Obesity, morbid (HCC) ICD-10-CM: E66.01  ICD-9-CM: 278.01         Decreased hearing of left ear ICD-10-CM: H91.92  ICD-9-CM: 389.9         Skin lesion of scalp ICD-10-CM: L98.9  ICD-9-CM: 709.9         Acute right-sided thoracic back pain ICD-10-CM: M54.6  ICD-9-CM: 724.1         Elevated liver enzymes ICD-10-CM: R74.8  ICD-9-CM: 790.5         Strep throat exposure ICD-10-CM: Z20.818  ICD-9-CM: V01.89         JESUS (obstructive sleep apnea) ICD-10-CM: G47.33  ICD-9-CM: 327.23         Hypertension ICD-10-CM: I10  ICD-9-CM: 401.9         S/P vasectomy ICD-10-CM: Z98.52  ICD-9-CM: V26.52         Pure hypercholesterolemia ICD-10-CM: E78.00  ICD-9-CM: 272.0               Past Medical History:      has a past medical history of History of vascular access device (12/32021), Hypercholesteremia, Hypertension (10/15/2014), Poison ivy (9/15/2015), Pure hypercholesterolemia, and S/P vasectomy (8/6/2014). He also has no past medical history of Abuse, Anemia NEC, Arrhythmia, Calculus of kidney, Chronic pain, Congestive heart failure, unspecified, COPD, Depression, GERD (gastroesophageal reflux disease), Headache(784.0), Psychotic disorder ([facility] Thyroid disease, or Trauma. Past Surgical History:      has no past surgical history on file. Home Medications:     Prior to Admission medications    Medication Sig Start Date End Date Taking? Authorizing Provider   metaxalone (SKELAXIN) 800 mg tablet Take 800 mg by mouth three (3) times daily as needed for Muscle Spasm(s).    Yes Provider, Historical   naproxen (NAPROSYN) 500 mg tablet Take 500 mg by mouth two (2) times daily as needed for Pain. Yes Provider, Historical   hydroCHLOROthiazide (HYDRODIURIL) 12.5 mg tablet TAKE 1 TABLET BY MOUTH EVERY DAY 10/25/21  Yes Michelle Suresh NP   amLODIPine (NORVASC) 10 mg tablet TAKE 1 TABLET DAILY   Yes Susan Whelan MD   glucosamine HCl/chondroitin bone (GLUCOSAMINE-CHONDROITIN PO) Take  by mouth. 1,500mg/1,200mg/MGMSM 1,000mg   Yes Provider, Historical   Krill-OM3-DHA-EPA-OM6-Lip-Astx (KRILL OIL) 1000-130(40-80) mg cap Take 1 Cap by mouth once over twenty-four (24) hours. Yes Provider, Historical   FERROUS FUMARATE/VIT BCOMP&C (SUPER B COMPLEX PO) Take  by mouth. Yes Provider, Historical   atorvastatin (LIPITOR) 10 mg tablet TAKE 1 TABLET DAILY 21   Silvio St NP       Allergies/Social/Family History:     No Known Allergies   Social History     Tobacco Use    Smoking status: Never Smoker    Smokeless tobacco: Never Used   Substance Use Topics    Alcohol use: No     Alcohol/week: 0.0 standard drinks      Family History   Problem Relation Age of Onset    Diabetes Mother     Heart Attack Father 43    Stroke Maternal Grandmother     Cancer Paternal Grandfather          age 45 - Hodgkin's   Yoselin Tyler Maternal Grandfather          in 42's with melanoma       Review of Systems:     ROS is unobtainable as the patient is intubated. Objective:   Vital Signs:  Visit Vitals  /80   Pulse 82   Temp 99 °F (37.2 °C)   Resp 28   Ht 5' 11\" (1.803 m)   Wt 115.5 kg (254 lb 10.1 oz)   SpO2 100%   BMI 35.51 kg/m²    O2 Flow Rate (L/min): 60 l/min O2 Device: Endotracheal tube, Ventilator Temp (24hrs), Av.5 °F (36.9 °C), Min:97.8 °F (36.6 °C), Max:99 °F (37.2 °C)           Intake/Output:     Intake/Output Summary (Last 24 hours) at 2021 1405  Last data filed at 2021 1200  Gross per 24 hour   Intake 3496.15 ml   Output 2440 ml   Net 1056.15 ml       Physical Exam:  Performed via video assessment.   Gen: Patient is intubated, sedated, no acute distress  HEENT: ETT and OGT present  Chest: Chest movement is equal bilaterally  Cardiac: Cardiac monitor reveals SR  Extremities: Extremities appear well perfused with no obvious edema  Neuro: Patient is sedated    LABS AND  DATA: Personally reviewed  Recent Labs     12/09/21 0337 12/08/21 0329   WBC 12.4* 13.5*   HGB 12.7 13.1   HCT 38.6 40.1    240     Recent Labs     12/09/21 0337 12/08/21 0329   * 136   K 3.3* 3.1*   CL 90* 94*   CO2 38* 36*   BUN 34* 27*   CREA 0.68* 0.56*   * 191*   CA 9.3 9.0   MG 2.3 2.5*   PHOS 4.0 5.1*     Recent Labs     12/09/21 0337 12/08/21 0329   AP 81 80   TP 7.3 7.2   ALB 2.9* 3.0*   GLOB 4.4* 4.2*     Recent Labs     12/09/21 0337 12/08/21 0329   INR 1.0 1.0   PTP 10.4 10.3   APTT 22.1 22.4      Recent Labs     12/09/21  0553 12/08/21  0553   PHI 7.44 7.40   PCO2I 59.4* 63.3*   PO2I 147* 98   FIO2I 80 80     No results for input(s): CPK, CKMB, TROIQ, BNPP in the last 72 hours. Hemodynamics:   PAP:   CO:     Wedge:   CI:     CVP:    SVR:       PVR:       Ventilator Settings:  Mode Rate Tidal Volume Pressure FiO2 PEEP   PRVC   400 ml    80 % 16 cm H20     Peak airway pressure: 30 cm H2O    Minute ventilation: 12.8 l/min        MEDS: Reviewed    Chest X-Ray:  CXR Results  (Last 48 hours)    None          Multidisciplinary Rounds Completed: Yes    ABCDEF Bundle/Checklist Completed:  Yes    DISPOSITION  ICU    Critical Care Time  The patient is critically ill with acute respiratory failure. If I do not acutely intervene upon these illnesses, the patient's life is in danger.   These illnesses have required me to: (1) perform high complexity decision making for assessment and support; (2) assess the patient via video; (3) personally review the medical record and laboratory and diagnostic imaging results; (4) actively titrate high-alert medications; (5) manage the ventilator and actively titrate oxygen; (6) discuss this patient's case management with other healthcare providers; and (7) apply and interpret advanced monitoring techniques. As a result of this, I personally spent 35 minutes providing critical care services exclusively to this patient. This was in exclusion of the time spent performing procedures or teaching.     Yaa Holder DO, [address] [facility] Critical Care  12/9/2021

## 2021-12-10 NOTE — PROGRESS NOTES
RUR 12%    Covid +    LOS 20 days,GLOS 12.4    Pt remains on fentanyl,propofol,versedmneo,vasopressin,and nimbex gtts. Pt remains on the waiting list @ [facility] for ECMO but no ICU beds @ [place] @ this time. If bed becomes available,will need to arrange transport with nurse supervisor.     Sherie Jensen

## 2021-12-10 NOTE — PROGRESS NOTES
0700- Bedside shift change report given to Marisa Rivera (oncoming nurse) by Viet (offgoing nurse). Report included the following information SBAR, Kardex, ED Summary, Intake/Output, MAR, Recent Results, Cardiac Rhythm NSR and Alarm Parameters . Primary Nurse Mabel Dent RN and Viet RN performed a dual skin assessment on this patient Impairment noted- see wound doc flow sheet  Cameron score is 8.  0800- Shift assessment complete, see doc flowsheets. ETT and mouth care complete. Pts head and arms repositioned d/t pt being in prone position. 5862- Orders from Dr. Jeffry Maurice on tele machine for ABG after supining pt and xray. Plan is to prone again this evening. Mabel Dent RN. 1000- ETT and mouth care complete. Pts head and arms repositioned d/t pt being in prone position. FiO2 increased to 100% at this time to prepare for supining. 1020- Pt turned in supine position at this time. RT and RNs at bedside. 1200- Reassessment complete, see doc flowsheets. ETT and mouth care complete. Pt turned and repositioned. 1400- ETT and mouth care complete. Pt turned and repositioned. 1600- Reassessment complete, see doc flowsheets. ETT and mouth care complete. Pt turned and repositioned. 1800- ETT and mouth care complete. Pt turned and repositioned. 1900- Bedside shift change report given to [address] [place] [address] (oncoming nurse) by Marisa Rivera (offgoing nurse). Report included the following information SBAR, Kardex, ED Summary, Intake/Output, MAR, Recent Results, Cardiac Rhythm NSR to ST and Alarm Parameters .

## 2021-12-10 NOTE — PROGRESS NOTES
1900: Verbal shift change report given to Nolan Coleman (oncoming nurse) by Reuben Maldonado (offgoing nurse). Report included the following information SBAR, ED Summary, Intake/Output, MAR, Cardiac Rhythm ST to NSR and Quality Measures. Primary Nurse Allan Doyle and Reuben Maldonado RN performed a dual skin assessment on this patient Impairment noted- see wound doc flow sheet  Cameron score, see flow sheet. Gtt verified with Reuben Maldonado RN  Nimbex @ 3 mcg  Fent @ 150 mcg  Versed @ 8 mg  Sanjeev @ 45 mcg  Prop @ 50 mcg  TPN @ 63 mL    2000: Shift Assessment   Pt ruiz paralyzed and sedated. Pupils round, reactive, 3. Pt prone. Mouth care performed. Meds administered. Residual of 150.     2100: Meds administered     2200: Mouth care performed    0000: Reassessment, no changes noted. Residual 200 aspirated. Mouth care performed     0200: Mouth care performed     0400: Reassessment, no changes noted. Mouth care performed. Labs drawna nd sent     0700: Verbal shift change report given to Robles (oncoming nurse) by Nolan Coleman   (offgoing nurse). Report included the following information SBAR, ED Summary, Intake/Output, MAR, Recent Results, Cardiac Rhythm NSR , ST and Quality Measures.

## 2021-12-10 NOTE — PROGRESS NOTES
[facility]    ICU TEAM Progress Note    Name: Sonam Cobb   : 1985   MRN: [id number]   Date: 12/10/2021           ICU Assessment     1. Acute respiratory failure with hypoxia  2. ARDS  3. COVID 19  4. Obesity  5. DM2  6. JESUS  7. H/o HTN         ICU Comprehensive Plan of Care:   -Replace K+  -prone again today  -Continue bumex  -TPN  -ABG and CXR  -Monitor off ABX  -Lovenox ppx  -decadron  -nimbex and sedation  -Awaiting bed at [facility] for ECMO    1. Discussed Care Plan with Bedside RN    2. Documentation of Current Medications    ICU Issues:  D- Delirium assessement CAM-ICU: Assessments ordered  E- Early Mobility/ PT: Will order when appropriate  F- Feeding: TPN  Peptic Ulcer Disease Prophylaxis: PPI  DVT Prophylaxis: Lovenox  Glycemic Control (140-180 mg/dL): SSI  Catheter Discontinuation (CVC, arterial, urinary, gastric, rectal): Keep all  Antibiotics: None  Steroids: Decadron  MAR Review (pain, anxiety, constipation . Aleksey Joyner ): Completed  Code Status: FULL CODE    Subjective:   Progress Note: 12/10/2021      Reason for ICU Admission: acute respiratory failure     HPI:  45yo man admitted for COVID. Had small apical Ptx. Overnight Events:   12/10/2021  JHOANA overnight. Proned. Good UOP. No pressors. Not tolerating TF.   On 80% FiO2    POD:  * No surgery found *    S/P:       Active Problem List:     Problem List  Date Reviewed: 12/3/2021          Codes Class    Oozing skin inflammation ICD-10-CM: L08.9  ICD-9-CM: 686.9         Positive D dimer ICD-10-CM: R79.89  ICD-9-CM: 790.92         Other hyperlipidemia ICD-10-CM: E78.49  ICD-9-CM: 272.4         Elevated serum creatinine ICD-10-CM: R79.89  ICD-9-CM: 790.99         Community acquired pneumonia ICD-10-CM: J18.9  ICD-9-CM: 935         ARDS (adult respiratory distress syndrome) ([facility] [address] ICD-10-CM: J80  ICD-9-CM: 518.52         Septic shock ([facility] [address] ICD-10-CM: A41.9, R65.21  ICD-9-CM: 038.9, 785.52, 995.92         Pneumomediastinum ([facility] [address] ICD-10-CM: J98.2  ICD-9-CM: 518.1         Pneumothorax on left ICD-10-CM: J93.9  ICD-9-CM: 512.89         Hyponatremia ICD-10-CM: E87.1  ICD-9-CM: 276.1         Diabetes mellitus type 2, controlled (HCC) ICD-10-CM: E11.9  ICD-9-CM: 250.00         * (Principal) Acute hypoxemic respiratory failure due to COVID-19 [facility]) ICD-10-CM: U07.1, J96.01  ICD-9-CM: 518.81, 079.89, 799.02         Obesity, morbid (HCC) ICD-10-CM: E66.01  ICD-9-CM: 278.01         Decreased hearing of left ear ICD-10-CM: H91.92  ICD-9-CM: 389.9         Skin lesion of scalp ICD-10-CM: L98.9  ICD-9-CM: 709.9         Acute right-sided thoracic back pain ICD-10-CM: M54.6  ICD-9-CM: 724.1         Elevated liver enzymes ICD-10-CM: R74.8  ICD-9-CM: 790.5         Strep throat exposure ICD-10-CM: Z20.818  ICD-9-CM: V01.89         JESUS (obstructive sleep apnea) ICD-10-CM: G47.33  ICD-9-CM: 327.23         Hypertension ICD-10-CM: I10  ICD-9-CM: 401.9         S/P vasectomy ICD-10-CM: Z98.52  ICD-9-CM: V26.52         Pure hypercholesterolemia ICD-10-CM: E78.00  ICD-9-CM: 272.0               Past Medical History:      has a past medical history of History of vascular access device (12/32021), Hypercholesteremia, Hypertension (10/15/2014), Poison ivy (9/15/2015), Pure hypercholesterolemia, and S/P vasectomy (8/6/2014). He also has no past medical history of Abuse, Anemia NEC, Arrhythmia, Calculus of kidney, Chronic pain, Congestive heart failure, unspecified, COPD, Depression, GERD (gastroesophageal reflux disease), Headache(784.0), Psychotic disorder ([facility] [address] Thyroid disease, or Trauma. Past Surgical History:      has no past surgical history on file. Home Medications:     Prior to Admission medications    Medication Sig Start Date End Date Taking? Authorizing Provider   metaxalone (SKELAXIN) 800 mg tablet Take 800 mg by mouth three (3) times daily as needed for Muscle Spasm(s).    Yes Provider, Historical   naproxen (NAPROSYN) 500 mg tablet Take 500 mg by mouth two (2) times daily as needed for Pain. Yes Provider, Historical   hydroCHLOROthiazide (HYDRODIURIL) 12.5 mg tablet TAKE 1 TABLET BY MOUTH EVERY DAY 10/25/21  Yes Waleska Suresh NP   amLODIPine (NORVASC) 10 mg tablet TAKE 1 TABLET DAILY   Yes Amadou Dixon MD   glucosamine HCl/chondroitin bone (GLUCOSAMINE-CHONDROITIN PO) Take  by mouth. 1,500mg/1,200mg/MGMSM 1,000mg   Yes Provider, Historical   Krill-OM3-DHA-EPA-OM6-Lip-Astx (KRILL OIL) 1000-130(40-80) mg cap Take 1 Cap by mouth once over twenty-four (24) hours. Yes Provider, Historical   FERROUS FUMARATE/VIT BCOMP&C (SUPER B COMPLEX PO) Take  by mouth. Yes Provider, Historical   atorvastatin (LIPITOR) 10 mg tablet TAKE 1 TABLET DAILY 21   Debbie Ponce NP       Allergies/Social/Family History:     No Known Allergies   Social History     Tobacco Use    Smoking status: Never Smoker    Smokeless tobacco: Never Used   Substance Use Topics    Alcohol use: No     Alcohol/week: 0.0 standard drinks      Family History   Problem Relation Age of Onset    Diabetes Mother     Heart Attack Father 43    Stroke Maternal Grandmother     Cancer Paternal Grandfather          age 45 - Hodgkin's   [de-identified] Maternal Grandfather          in 42's with melanoma       Review of Systems:     ROS is unobtainable as the patient is intubated. Objective:   Vital Signs:  Visit Vitals  /66   Pulse 87   Temp 99.9 °F (37.7 °C)   Resp 28   Ht 5' 11\" (1.803 m)   Wt 115.5 kg (254 lb 10.1 oz)   SpO2 95%   BMI 35.51 kg/m²    O2 Flow Rate (L/min): 60 l/min O2 Device: Endotracheal tube, Ventilator Temp (24hrs), Av.8 °F (37.7 °C), Min:98.5 °F (36.9 °C), Max:100.9 °F (38.3 °C)           Intake/Output:     Intake/Output Summary (Last 24 hours) at 12/10/2021 1345  Last data filed at 12/10/2021 1200  Gross per 24 hour   Intake 4057.02 ml   Output 4320 ml   Net -262.98 ml       Physical Exam:  Performed via video assessment.   Gen: Patient is intubated, sedated, no acute distress  HEENT: ETT and OGT present  Chest: Chest movement is equal bilaterally  Cardiac: Cardiac monitor reveals SR  Extremities: Extremities appear well perfused with no obvious edema  Neuro: Patient is sedated    LABS AND  DATA: Personally reviewed  Recent Labs     12/10/21  0424 12/09/21  0337   WBC 15.5* 12.4*   HGB 14.0 12.7   HCT 42.3 38.6    235     Recent Labs     12/10/21  0424 12/09/21  0337 12/08/21  0329 12/08/21 0329   * 134*   < > 136   K 3.4* 3.3*   < > 3.1*   CL 91* 90*   < > 94*   CO2 38* 38*   < > 36*   BUN 40* 34*   < > 27*   CREA 0.71 0.68*   < > 0.56*   * 195*   < > 191*   CA 9.5 9.3   < > 9.0   MG 2.3 2.3   < > 2.5*   PHOS  --  4.0  --  5.1*    < > = values in this interval not displayed. Recent Labs     12/10/21  0424 12/09/21  0337   AP 90 81   TP 7.7 7.3   ALB 3.0* 2.9*   GLOB 4.7* 4.4*     Recent Labs     12/10/21  0424 12/09/21  0337   INR 1.0 1.0   PTP 10.6 10.4   APTT 22.8 22.1      Recent Labs     12/10/21  1219 12/09/21  0553   PHI 7.39 7.44   PCO2I 63.8* 59.4*   PO2I 83 147*   FIO2I 80 80     No results for input(s): CPK, CKMB, TROIQ, BNPP in the last 72 hours. Hemodynamics:   PAP:   CO:     Wedge:   CI:     CVP:    SVR:       PVR:       Ventilator Settings:  Mode Rate Tidal Volume Pressure FiO2 PEEP   PRVC   400 ml    80 % 16 cm H20     Peak airway pressure: 30 cm H2O    Minute ventilation: 12.2 l/min        MEDS: Reviewed    Chest X-Ray:  CXR Results  (Last 48 hours)               12/10/21 1117  XR CHEST PORT Final result    Impression:  1. Decreasing bilateral pneumonia       Narrative:  EXAM: XR CHEST PORT       INDICATION: respiratory failure       COMPARISON: 12/7/2021       FINDINGS: A portable AP radiograph of the chest was obtained at 1109 hours. The   patient is on a cardiac monitor. The ET tube is in satisfactory position. NG   tube courses into the stomach. The distal tip is not seen.  Right-sided PICC line   overlies the cavoatrial junction. The lungs demonstrate some improved aeration the bilateral interstitial and   airspace infiltrates have decreased. Multidisciplinary Rounds Completed: Yes    ABCDEF Bundle/Checklist Completed:  Yes    DISPOSITION  ICU    Critical Care Time  The patient is critically ill with acute respiratory failure. If I do not acutely intervene upon these illnesses, the patient's life is in danger. These illnesses have required me to: (1) perform high complexity decision making for assessment and support; (2) assess the patient via video; (3) personally review the medical record and laboratory and diagnostic imaging results; (4) actively titrate high-alert medications; (5) manage the ventilator and actively titrate oxygen; (6) discuss this patient's case management with other healthcare providers; and (7) apply and interpret advanced monitoring techniques. As a result of this, I personally spent 35 minutes providing critical care services exclusively to this patient. This was in exclusion of the time spent performing procedures or teaching.     Holden Elmore DO, [facility] Critical Care  12/10/2021

## 2021-12-10 NOTE — PROGRESS NOTES
Our team discussed pt's case this morning with intensivist Dr. Amy Garces who agreed that the pt is still an appropriate candidate for ECMO. Dr. Amy Garces mentioned that in most cases it is ideal to have a pt transitioned to ECMO within 7 days of intubation, however in light of pt's young age thought that it was still worthwhile to pursue this option. Additionally, as pt has been intolerant of tube feeds and only intermittently on TPN over the course of the last week his BMI has decreased to the point that it is only slightly above the cutoff (35) that other local facilities [facility] and [facility] would use to determine whether or not a pt was eligible for ECMO. Called and spoke [facility] [place] regarding pt's status for possible transfer to [facility] for ECMO, and per their latest update [facility] continues to be on full diversion and is unable to even consider this pt's clinical case for possible transfer. The transfer center also mentioned that [facility] is on full diversion at this time as well. They did reach out to [facility] to discuss this patients case and the admitting intensivist at that facility, Dr. Erick Vasquez, is amenable to transfer. However, as was the case with previous transfer attempts, the cardiothoracic surgery team must also be in agreement with accepting the pt as they would be the ones to perform the procedure transitioning the pt to ECMO. A page has been sent to Valorie Andino and we are awaiting word at this time as to whether they are willing to accept the pt at [facility].      3:36 PM  Per Dr. Doris Andino, while the pt is a potentially good candidate for [address] would not be an appropriate facility as they are not a transplant center. In Dr. 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  He recommended pursuing transfer to a medical center that would be able to not only manage him on ECMO but then also be able to plan for a lung transplant when it becomes appropriate. For now we will continue on the wait list at [facility] to see if any beds become available. The transfer center will also reach back out to [facility] to determine whether this may be an option as well.      4:22PM  Via the transfer center spoke w/ an intensivist at [facility] who explained that he thought that the patient was not a good candidate for ECMO at this time. He stated that the pt had only been hospitalized for 20 days and intubated for 10, and that in his experience he has seen pts who improve and rebound after periods even as long as 5-6wks without ECMO. While it is likely the pt has an advanced-stage covid infection it is too soon to say that the pt's lungs are fibrosed to the point that he would need a lung transplant. The intensivist recommended continuing to monitor the pt under the current treatment guidelines and plan to obtain a repeat CT scan of the lungs in 2-3 weeks. If at that time significant fibrosis was observed then he would be more of an appropriate candidate for ECMO and (significantly further down the road) lung transplant. For now we will continue to keep the pt on the waiting list at [facility] in the hope that he may be accepted there if a bed were to become available.     Jocelyn Polanco MD

## 2021-12-11 NOTE — PROGRESS NOTES
Critical Care    Progress Note  Date:2021       Room:[address] [place] [address]  Patient Name:Pan Blanco     YOB: 1985     Age:36 y.o. Subjective    Subjective   : Sedated on fentanyl Versed and propofol. Paralyzed on Nimbex. On phenylephrine, tachycardic. On vent support 20/400/100/16. ABG reviewed. PF ratio 208   In prone position since yesterday afternoon. T-max 101.6 WBCs 14. 7. Creatinine stable 0.78. I/O=4/4.6=-0.6 L   Review of Systems   Unable to obtain given clinical condition   Objective         Vitals Last 24 Hours:  TEMPERATURE:  Temp  Av.9 °F (38.3 °C)  Min: 99.9 °F (37.7 °C)  Max: 101.6 °F (38.7 °C)  RESPIRATIONS RANGE: Resp  Av.2  Min: 28  Max: 36  PULSE OXIMETRY RANGE: SpO2  Av.5 %  Min: 93 %  Max: 98 %  PULSE RANGE: Pulse  Av.9  Min: 82  Max: 121  BLOOD PRESSURE RANGE: Systolic (38VUM), CIR:270 , Min:90 , ZFI:441   ; Diastolic (53ONR), FGI:19, Min:55, Max:75    I/O (24Hr): Intake/Output Summary (Last 24 hours) at 2021 1146  Last data filed at 2021 1000  Gross per 24 hour   Intake 3725.19 ml   Output 4310 ml   Net -584.81 ml     I examined the patient via telemedicine, with its associated limitations.   I beamed in and examined the patient with assistance of house staff     On exam:    Gen: sedated   HEENT:  Intubated   Chest: symmetrical chest rise  CV:S1,S2  Abd:    Ext: warm   Neuro: paralyzed   Objective  Labs/Imaging/Diagnostics    Labs:  CBC:  Recent Labs     21  0337 12/10/21  0424 21  0337   WBC 14.7* 15.5* 12.4*   RBC 4.49 4.48 4.16   HGB 13.9 14.0 12.7   HCT 41.9 42.3 38.6   MCV 93.3 94.4 92.8   RDW 13.8 13.6 13.4    310 235     CHEMISTRIES:  Recent Labs     21  0337 12/10/21  0424 21    135* 134*   K 3.6 3.4* 3.3*   CL 97 91* 90*   CO2 38* 38* 38*   BUN 46* 40* 34*   CA 9.5 9.5 9.3   PHOS  --   --  4.0   MG 2.4 2.3 2.3   PT/INR:  Recent Labs     21  0337 12/10/21  0424 12/09/21  0337   INR 1.0 1.0 1.0     APTT:  Recent Labs     12/11/21  0337 12/10/21  0424 12/09/21  0337   APTT 23.0 22.8 22.1     LIVER PROFILE:  Recent Labs     12/11/21  0337 12/10/21  0424 12/09/21  0337   AST 58* 44* 41*   * 78 70     Lab Results   Component Value Date/Time    ALT (SGPT) 101 (H) 12/11/2021 03:37 AM    AST (SGOT) 58 (H) 12/11/2021 03:37 AM    Alk. phosphatase 101 12/11/2021 03:37 AM    Bilirubin, direct 0.14 08/11/2016 03:50 PM    Bilirubin, total 0.9 12/11/2021 03:37 AM       Imaging Last 24 Hours:  No results found. Assessment//Plan   Principal Problem:    Acute hypoxemic respiratory failure due to COVID-19 [facility]) (11/19/2021)    Active Problems:    Pure hypercholesterolemia ()      JESUS (obstructive sleep apnea) (10/15/2014)      Hypertension (10/15/2014)      Elevated liver enzymes (8/5/2016)      Obesity, morbid ([address] (4/10/2018)      Diabetes mellitus type 2, controlled ([address] (11/25/2021)      Septic shock ([address] (11/26/2021)      Pneumomediastinum ([address] (11/26/2021)      Pneumothorax on left (11/26/2021)      Hyponatremia (11/26/2021)      ARDS (adult respiratory distress syndrome) ([address] (12/1/2021)      Oozing skin inflammation (12/3/2021)      Positive D dimer (12/3/2021)      Other hyperlipidemia (12/3/2021)      Elevated serum creatinine (12/3/2021)      Community acquired pneumonia (12/3/2021)      Assessment & Plan    1. Acute respiratory failure with hypoxia  2. ARDS  3. COVID 19  4. Obesity  5. DM2  6. JESUS  7. H/o HTN    Plan:  Analgesia and sedation to RASS goal of -5. Continue fentanyl Versed and propofol. Continue paralytics. Phenylephrine to keep map above 65. Continue vent support. Wean FiO2 and PEEP as tolerated to keep sats above 92%. Follow PF ratio. Adjust RR and TV based on ABG results. Plan to supine later this Morning. Check ABG . will consider Proning if PF ratio <150   Continue dexamethasone. Continue diuresis. Goal of negative fluid balance.   Monitor urine output and renal indicis. Continue TPN. Check D-Dimer   Monitor WBCs and temperature curve. Cultures have been ordered. Check procalcitonin. Strict I's/O. Protonix/SQ Lovenox twice daily    Prognosis is guarded    Discussed with bedside RN     I reviewed the electronic medical record, the x-rays, labs, progress notes, previous history and physicals and consultation notes that were available in the electronic medical record. I performed all aspects of the physical examination via Telemedicine associated with two way audio and video communication and with the on-site assistance of  the bedside nurse. I am located in [place], [place]  and the patient is located in [place] at [facility].   The patient is critically ill in the ICU. I  personally  reviewed the pertinent medical records, laboratory/ pathology data and radiographic images. The decision making regarding this patient is as documented above, which was generated  following  discussion  with the multidisciplinary ICU team and creation of a treatment plan for  the patient. We discussed the patient's interval history and future coordination of care and  plans. The patient's medications  were reviewed and changes made as stipulated above. Due to  critical illness impairing one or more vital organs of this patient resulting in life threatening clinical situation  I have provided direct, frequent personal  assessment and manipulation in management plan and spent 40 minutes  of  critical care time excluding the time spent on procedures and teaching. Greater than 50% of this time  in patients care was  employed  in counseling and coordination of care and engaged in face to face discussion of case management issues, addressing questions, and outlining a plan of  therapy. Pt at risk of life threatening deterioration from acute resp failure    Pt seen and evaluated via tele encounter. Audio and Video were used for this interaction.         ATTENTION:  This medical record was transcribed using an electronic medical records/speech recognition system. Although proofread, it may and can contain electronic, spelling and other errors. Corrections may be executed at a later time. Please feel free to contact us for any clarifications as needed.     Electronically signed by Wendy Landis MD on 12/11/2021 at 11:46 AM

## 2021-12-11 NOTE — PROGRESS NOTES
[address] [place] [address] with [address] [place] [address]       Resident Progress Note in Brief     S: Pt intubated and sedated. No concerns from nursing staff. O:  Visit Vitals  /75   Pulse (!) 103   Temp (!) 101.3 °F (38.5 °C)   Resp 28   Ht 5' 11\" (1.803 m)   Wt 254 lb 10.1 oz (115.5 kg)   SpO2 97%   BMI 35.51 kg/m²     Physical Examination:   General appearance - Intubated, sedated, prone. Appears comfortable. Heart - rate 103  Respiratory - ET tube in place. Neurological - intubated, sedated. Mitchell in place    A/P:     Melody Blanco is a 39 y. o. male with a PMH of HTN, HLD, JESUS admitted for AHRF and ARDS 2/2 COVID PNA. Currently intubated and sedated. Patient was admitted on 11/19/2021. AHRF and ARDS 2/2 COVID PNA: Continues intubated, sedated, paralyzed, and proned on Vent support, FIO2 remains 80% with  and PEEP 16. Pending approval ECMO at [facility] still no availability.  - Daily weights, possible local ECMO transfer with BMI <35  - Prone as tolerated  - Goal sats 88% or higher  - CBC, CMP daily. No need for further trending Covid labs  - Continue Decadron 6mg IV daily  - Duo-neb or Combivent Respimat Q6H  - Holding COVID meds (Atorvastatin 20mg every day, Vit C 500mg BID, Zinc 220mg daily)  - Pulm following, appreciate recs     Severe sepsis 2/2 COVID PNA: Now off pressors. Leukocytosis stable. Fungal Cx, BCx, UCx NGTD. S/p x7 course Maria Fernanda/Vanc/Eraxis, now off abx.    - Intensivist following  - On sarah with goal of MAP >65; wean as tolerated     Nutritional status:  Nutrition consulted for TPN, appreciate recs. - continue TPN per nutrition recs     Please see the primary team's daily progress note for the patient's full plan.     Rudi Faith MD  Family Medicine Resident, PGY-1

## 2021-12-11 NOTE — PROGRESS NOTES
0700- Bedside and Verbal shift change report received from [facility] (offgoing nurse) by Carmen Summers RN (oncoming nurse). Report included the following information SBAR, Kardex, ED Summary, Procedure Summary, Intake/Output, MAR, Recent Results, Cardiac Rhythm ST and Alarm Parameters . Primary Nurse Carmen Summers RN and SAUL Eng performed a dual skin assessment on this patient No impairment noted. All drips verified. 0800- Patient shift assessment performed All IVs patent, flushes well, with blood return. All pump settings verified. Patient turned and resting comfortably. Call bell in reach, bed in low and locked positionPatient board updated . 0830:given due medication . 0900:tele rounds done . 0945:pt supine  and tolerated well .     1000: VAP bundle performed. Pt repositioned . Resting in bed . Send blood culture . Pt still febrile and put ice pack and given cold bath and will monitor pt .     1200- Reassessment performed. No changes noted. VAP bundle performed. Patient turned and resting comfortably. 1300:per Dr Rekha Amaya to send UA and already send blood culture x 1  Because pt febrile . Per dr Brandon Da Silva  no ABX  For now . Will exchange the lewis  For UA . 1400- VAP bundle performed. Patient turned and resting comfortably. exchange the lewis and send UA . Pt tolerated well .         1600- Reassessment performed. No changes noted. VAP bundle performed. Patient turned and resting comfortably. 1730: ABG result notified to Dr Steve Mcneill and NO prone today . Am ABG ordered . Pt febrile  Still . Given tylenol suppo . 1800- VAP bundle performed. Patient turned and resting comfortably. 1900- Bedside and Verbal shift change report given to [address] , RN(oncoming nurse) by Carmen Summers RN (offgoing nurse). Report included the following information SBAR, Kardex, ED Summary, Procedure Summary, Intake/Output, MAR, Recent Results, Cardiac Rhythm ST and Alarm Parameters .

## 2021-12-11 NOTE — PROGRESS NOTES
Bedside and Verbal shift change report given to Rayshawn (oncoming nurse) by Palmer Cazares (offgoing nurse). Report included the following information SBAR, Kardex and Recent Results.

## 2021-12-11 NOTE — PROGRESS NOTES
[address] [facility] [address] [place] [address]   Office [phone]  Fax [phone]          Assessment and Plan     Galilea Tony is a 39 y.o. male with a PMH of HTN, HLD, JESUS admitted for AHRF and severe sepsis 2/2 COVID PNA. Patient was admitted on 11/19/2021. Interval Events: Fevers overnight to 101. 6. back on pressors, Sanjeev at 50, and Vent settings with FiO2 increased to 100%. AHRF and ARDS 2/2 COVID PNA: Continues intubated, sedated, paralyzed, and proned on Vent support, FIO2 remains 100% with  and PEEP 16. Pending approval ECMO at [facility] 191 still no availability. Discussion w/ [facility], considering lung transplant but not until x5-6wks s/p symptom onset, will need CT ~ 12/24/21 prior to progress towards transplant to confirm end stage fibrosis. - Daily weights  - Prone as tolerated  - Goal sats 88% or higher  - CBC, CMP daily. No need for further trending Covid labs  - Continue Decadron 6mg IV daily  - Duo-neb or Combivent Respimat Q6H  - Holding COVID meds (Atorvastatin 20mg every day, Vit C 500mg BID, Zinc 220mg daily)  - Pulm following, appreciate recs    Severe sepsis 2/2 COVID PNA:. Leukocytosis stable. Fungal Cx, BCx, UCx NGTD. S/p x7 course Maria Fernanda/Vanc/Eraxis, now off abx 12/8. Now with fevers overnight to 101.6. Likely 2/2 COVID, possibly UTI vs bacteremia given lines. Low suspicion for PE given Lovenox.   - No CXR, last on 12/10   - UA, UCx, BCx*  - Intensivist following, discuss possibe restart abx  - Restarted Sanjeev to maintain MAP > 65     Nutritional status:  Nutrition consulted for TPN, appreciate recs. - continue TPN per nutrition recs   - trial trickle feeds as tolerated    Oozing (improved): Mild bleeding with suctioning otherwise no other findings while prone.     - On prophylactic Lovenox 40 mg BID    Pneumomediastinum/Pneumothorax: Possibly barotrauma 2/2 high pressures needed for COVID ARDS PNA vs lung frailty.   Repeat CXR 12/1 showing stable pneumomediastinum, and resolved pneumothorax. - Monitor for signs of worsening barotrauma     Insomnia/anxiety: Pt with new insomnia and anxiety, specifically overnight. - Intubated, sedated    HTN: Pt normotensive off meds. Currently hypotensive. At home is on Amlodipine 10mg daily and HCTZ 12.5mg daily  - Will continue to monitor      HLD: Chronic, stable. Last lipids 07/2021 , HDL 54, LDL 98.6, . On Atorvastatin 10mg daily.  - Holding Atorvastatin 20mg daily     T2DM (diet-controlled): Prior A1c 5.8% in 05/2021. No meds at present.   - SSI (resistant given morbid obesity) w/ q6h glucose checks     JESUS: On CPAP at night. - Intubated, sedated.     Obesity: Body mass index is 42.4 kg/m² on presentation.  - Encouraging lifestyle modifications and further follow up outpatient. At-risk JAMIR: RESOLVED. POA Cr 1.31, BL 0.8. Likely IVVD in setting of poor PO intake. - Daily CMP    Chest pain: RESOLVED. Description is pleuritic in nature, worse with cough. EKG without evidence of acute ischemia. Troponin of 9 makes CAD less concerning.   - Cardiac monitoring, will continue to monitor     Diarrhea: RESOLVED. Watery diarrhea prior to admission. Likely 2/2 COVID infection. Improved since presentation. Enteric bacteria panel negative. Abdominal pain: RESOLVED. Secondary to gas pains after starting diet. - Continue Simethicone       FEN/GI - TPN  Activity - Ambulate with assistance  DVT prophylaxis - Lovenox (ppx)  GI prophylaxis - Protonix  Disposition - Plan to d/c to TBD. Code Status - Full. Discussed with patient / caregivers. Next of Kim 68 Name and [address] [facility],  Spouse - [phone]      Mia Gillis MD  Family Medicine Resident    Patient discussed with Family Medicine Attending: Dr. Eugenia Merlos / Objective   Subjective:   Patient intubated and proned, on pressors. Sedated. No acute distress.      Respiratory: O2 Flow Rate (L/min): 60 l/min O2 Device: Ventilator   Visit Vitals  /68   Pulse (!) 112   Temp (!) 101.3 °F (38.5 °C)   Resp 28   Ht 5' 11\" (1.803 m)   Wt 254 lb 10.1 oz (115.5 kg)   SpO2 95%   BMI 35.51 kg/m²     General: Intubated, sedated. Appears comfortable. Respiratory: Transmitted mechanical sounds appreciated bilaterally, unchanged. Cardiovascular: regular rate, regular rhythm. GI: + bowel sounds. Nontender. Extremities:  No LE edema. Extremities well perfused and warm. Skin: Warm, dry. PICC line in place  Neuro: Intubated sedated  Mitchell in place     I/O:  Date 12/10/21 0700 - 12/11/21 0659 12/11/21 0700 - 12/12/21 0659   Shift 5255-4233 5869-0810 24 Hour Total 5172-3513 6751-0812 24 Hour Total   INTAKE   I.V.(mL/kg/hr) 2731.1(2) 1170.1(0.8) 3901.2(1.4)        Versed Volume 112 112.7 224.7        Nimbex Volume 156.8 134.4 291.2        Fentanyl Volume 42 44.4 86.4        Diprivan Volume 523.6 448.8 972.4        Phenylephrine Volume 600 429.8 1029.8        Volume (potassium chloride 10 mEq in 100 ml IVPB) 400  400        Volume (TPN ADULT - CENTRAL) 882  882        Volume (TPN ADULT - CENTRAL) 14.7  14.7      Other 0  0        Irrigation Volume Input (mL) (Urinary Catheter 11/30/21 Mitchell - Temperature) 0  0      NG/GT 75  75        Medication Volume (Orogastric Tube 11/30/21) 75  75      Shift Total(mL/kg) 2806. 1(24.3) 1170. 1(10.1) 4972.2(86.8)      OUTPUT   Urine(mL/kg/hr) 9550(2.6) 1960(1.4) 4630(1.7)        Urine Output (mL) (Urinary Catheter 11/30/21 Mitchell - Temperature) 2670 1960 4630      Shift Total(mL/kg) 2211(89.8) 2896(92) 4630(40.1)      .1 -789.9 -653.8      Weight (kg) 115.5 115.5 115.5 115.5 115.5 115.5       CBC:  Recent Labs     12/11/21  0337 12/10/21  0424 12/09/21  0337   WBC 14.7* 15.5* 12.4*   HGB 13.9 14.0 12.7   HCT 41.9 42.3 38.6    310 982       Metabolic Panel:  Recent Labs     12/11/21  0337 12/10/21  0424 12/09/21  0337    135* 134*   K 3.6 3.4* 3.3*   CL 97 91* 90*   CO2 38* 38* 38* BUN 46* 40* 34*   CREA 0.78 0.71 0.68*   * 220* 195*   CA 9.5 9.5 9.3   MG 2.4 2.3 2.3   PHOS  --   --  4.0   ALB 2.9* 3.0* 2.9*   * 78 70   INR 1.0 1.0 1.0          For Billing    Chief Complaint   Patient presents with   [facility] Problems  Date Reviewed: 12/3/2021          Codes Class Noted POA    Oozing skin inflammation ICD-10-CM: L08.9  ICD-9-CM: 686.9  12/3/2021 Unknown        Positive D dimer ICD-10-CM: R79.89  ICD-9-CM: 790.92  12/3/2021 Unknown        Other hyperlipidemia ICD-10-CM: E78.49  ICD-9-CM: 272.4  12/3/2021 Unknown        Elevated serum creatinine ICD-10-CM: R79.89  ICD-9-CM: 790.99  12/3/2021 Unknown        Community acquired pneumonia ICD-10-CM: J18.9  ICD-9-CM: 326  12/3/2021 Unknown        ARDS (adult respiratory distress syndrome) ([facility] ICD-10-CM: J80  ICD-9-CM: 518.52  12/1/2021 No        Septic shock (HCC) ICD-10-CM: A41.9, R65.21  ICD-9-CM: 038.9, 785.52, 995.92  11/26/2021 Yes        Pneumomediastinum ([facility] ICD-10-CM: J98.2  ICD-9-CM: 518.1  11/26/2021 No        Pneumothorax on left ICD-10-CM: J93.9  ICD-9-CM: 512.89  11/26/2021 No        Hyponatremia ICD-10-CM: E87.1  ICD-9-CM: 276.1  11/26/2021 Yes        Diabetes mellitus type 2, controlled ([facility] ICD-10-CM: E11.9  ICD-9-CM: 250.00  11/25/2021 Yes        * (Principal) Acute hypoxemic respiratory failure due to COVID-19 [facility]) ICD-10-CM: U07.1, J96.01  ICD-9-CM: 518.81, 079.89, 799.02  11/19/2021 Yes        Obesity, morbid ([address] ICD-10-CM: E66.01  ICD-9-CM: 278.01  4/10/2018 Yes        Elevated liver enzymes ICD-10-CM: R74.8  ICD-9-CM: 790.5  8/5/2016 Yes        JESUS (obstructive sleep apnea) ICD-10-CM: G47.33  ICD-9-CM: 327.23  10/15/2014 Yes        Hypertension ICD-10-CM: I10  ICD-9-CM: 401.9  10/15/2014 Yes        Pure hypercholesterolemia ICD-10-CM: E78.00  ICD-9-CM: 272.0  Unknown Yes

## 2021-12-12 NOTE — PROGRESS NOTES
[facility]. [facility] Res[facility]       Resident Progress Note in Brief    S: Patient seen and examined at bedside. Pt intubated and sedated. No concerns from Nursing. O:  Visit Vitals  /64   Pulse 100   Temp (!) 102.4 °F (39.1 °C)   Resp 28   Ht 5' 11\" (1.803 m)   Wt 254 lb 10.1 oz (115.5 kg)   SpO2 99%   BMI 35.51 kg/m²     Physical Examination:   General appearance - Intubated, sedated, supinated   Chest - clear anteriorly BL, difficult to assess transmitted mechanical sounds   Heart - normal rate, regular rhythm, normal S1, S2, no murmurs, rubs, clicks or gallops,   Abdomen - soft, nontender, nondistended, no masses or organomegaly  Neurological - alert, oriented, normal speech, no focal findings  Extremities - peripheral pulses normal, no pedal edema, no clubbing or cyanosis    A/P: 39 y. o. male with a PMH of HTN, HLD, JESUS admitted for AHRF and ARDS 2/2 COVID PNA. Currently intubated and sedated. Patient was admitted on 11/19/2021. AHRF and ARDS 2/2 COVID PNA: Continues intubated, sedated w/ Versed, fentanyl, propofol and Nimbex. Vent settings: FIO2 remains 80% with , rate 28 and PEEP 16. Pending approval ECMO at [facility] 191 still no availability. - cont intubation prn w/ Goal sats 88% or higher  - Cont Decadron 6mg IV daily  - Duo-neb or Combivent Respimat Q6H  - Daily weights, possible local ECMO transfer with BMI <35  - Prone as tolerated  - CBC, CMP daily. No longer trending COVID labs   - HOLD COVID meds (lipitor/VitC/Zinc)  - Pulm following, appreciate recs     Septic Shock 2/2 COVID PNA: Pt continues to spike fevers and is on Sanjeev. Initial Fungal Cx, BCx, UCx NGTD. Pt was re-cultured given fevers.   S/p x7 course Maria Fernanda/Vanc/Eraxis, now off abx.    - cont Sanjeev prn: currently at 70 mcg/min - goal of MAP >65; wean as tolerated  - F/u repeat Bcx, Ucx - consider starting Abx pending results   - Intensivist following  - Daily CBC, s I/Os     Nutritional status:    - Nutrition following   - continue TPN per nutrition recs     Please see the primary team's daily progress note for the patient's full plan.       Monty White MD  Family Medicine Resident

## 2021-12-12 NOTE — PROGRESS NOTES
[address] [facility] [address] [place] [address]   Office [phone]  Fax [phone]          Assessment and Plan     Galilea Tony is a 39 y.o. male with a PMH of HTN, HLD, JESUS admitted for AHRF and severe sepsis 2/2 COVID PNA. Patient was admitted on 11/19/2021. Interval Events: Continued fevers throughout the day and overnight. AHRF and ARDS 2/2 COVID PNA: Intubated and sedated. Pending bed availability for possible ECMO at [facility]arius[facility] Oconnell. Discussion w/ [facility], considering lung transplant but not until x5-6wks s/p symptom onset, will need CT ~ 12/24/21 prior to progress towards transplant to confirm end stage fibrosis. - Daily weights  - Prone as tolerated  - Goal sats 88% or higher  - CBC, CMP daily. No need for further trending Covid labs  - Continue Decadron 6mg IV daily  - Duo-neb or Combivent Respimat Q6H  - Holding COVID meds (Atorvastatin 20mg every day, Vit C 500mg BID, Zinc 220mg daily)  - Pulm following, appreciate recs    Severe sepsis 2/2 COVID PNA:. Leukocytosis stable. Fungal Cx, BCx, UCx NGTD. S/p x7 course Maria Fernanda/Vanc/Eraxis, now off abx 12/8. Continues to be febrile. Likely 2/2 COVID, possibly UTI vs bacteremia given lines. Low suspicion for PE given Lovenox. - Repeat BCx from yesterday w/ NGT  - Restarted Sanjeev to maintain MAP > 65 - will touch base w/ intensivist regarding whether pt needs to be transitioned to levophed    Nutritional status:  Nutrition consulted for TPN, appreciate recs. - continue TPN per nutrition recs   - trial trickle feeds as tolerated    Oozing (improved): Mild bleeding with suctioning otherwise no other findings while prone.     - On prophylactic Lovenox 40 mg BID    Pneumomediastinum/Pneumothorax: Possibly barotrauma 2/2 high pressures needed for COVID ARDS PNA vs lung frailty. Repeat CXR 12/1 showing stable pneumomediastinum, and resolved pneumothorax.    - Monitor for signs of worsening barotrauma     Insomnia/anxiety: Pt with new insomnia and anxiety, specifically overnight. - Intubated, sedated    HTN: Pt normotensive off meds. Currently hypotensive. At home is on Amlodipine 10mg daily and HCTZ 12.5mg daily  - Will continue to monitor      HLD: Chronic, stable. Last lipids 07/2021 , HDL 54, LDL 98.6, . On Atorvastatin 10mg daily.  - Holding Atorvastatin 20mg daily     T2DM (diet-controlled): Prior A1c 5.8% in 05/2021. No meds at present.   - SSI (resistant given morbid obesity) w/ q6h glucose checks     JESUS: On CPAP at night. - Intubated, sedated.     Obesity: Body mass index is 42.4 kg/m² on presentation.  - Encouraging lifestyle modifications and further follow up outpatient. At-risk JAMIR: RESOLVED. POA Cr 1.31, BL 0.8. Likely IVVD in setting of poor PO intake. - Daily CMP    Chest pain: RESOLVED. Description is pleuritic in nature, worse with cough. EKG without evidence of acute ischemia. Troponin of 9 makes CAD less concerning.   - Cardiac monitoring, will continue to monitor     Diarrhea: RESOLVED. Watery diarrhea prior to admission. Likely 2/2 COVID infection. Improved since presentation. Enteric bacteria panel negative. Abdominal pain: RESOLVED. Secondary to gas pains after starting diet. - Continue Simethicone       FEN/GI - TPN  Activity - Ambulate with assistance  DVT prophylaxis - Lovenox (ppx)  GI prophylaxis - Protonix  Disposition - Plan to d/c to TBD. Code Status - Full. Discussed with patient / caregivers. Next of [facility] [address] Name and [address] [facility],  [facility] [phone]      Aby Asencio MD  Family Medicine Resident    Patient discussed with Family Medicine Attending: Dr. Leslie Anthony / Objective   Subjective:   Patient intubated and proned, on pressors. Sedated. No acute distress.      Respiratory: O2 Flow Rate (L/min): 60 l/min O2 Device: Ventilator   Visit Vitals  /67   Pulse 94   Temp (!) 101 °F (38.3 °C)   Resp 28   Ht 5' 11\" (1.803 m)   Wt 254 lb 10.1 oz (115.5 kg)   SpO2 98%   BMI 35.51 kg/m²     General: Intubated, sedated. Appears comfortable. Respiratory: Transmitted mechanical sounds appreciated bilaterally, unchanged. Good air movement. Cardiovascular: regular rate, regular rhythm. GI: + bowel sounds. Nontender. Extremities:  No LE edema. Extremities well perfused and warm. Skin: Warm, dry. PICC line in place  Neuro: Intubated sedated  Mitchell in place     I/O:  Date 12/11/21 0700 - 12/12/21 0659 12/12/21 0700 - 12/13/21 0659   Shift 6413-37811859 1900-0659 24 Hour Total 2827-7083 0299-8507 24 Hour Total   INTAKE   P.O. 0  0 0  0     P. O. 0  0 0  0   I. V.(mL/kg/hr) 1770.5(1.3)  1770.5(0.6) 270.3  270.3     Versed Volume 120  120 48.7  48.7     Nimbex Volume 134.4  134.4 40  40     Fentanyl Volume 48  48 4.9  4.9     Diprivan Volume 448.8  448.8 81.3  81.3     Phenylephrine Volume 300  300 32.5  32.5     Volume (TPN ADULT - CENTRAL) 661.5  661.5        Volume (TPN ADULT - CENTRAL) 57.8  57.8 63  63   Shift Total(mL/kg) 1770.5(15.3)  1770.5(15.3) 270.3(2.3)  270.3(2.3)   OUTPUT   Urine(mL/kg/hr) 1550(1.1) 1800(1.3) 3350(1.2) 500  500     Urine Output (mL) ([REMOVED] Urinary Catheter 11/30/21 Mitchell - Temperature) 1100  1100        Urine Output (mL) (Urinary Catheter 12/11/21 Mitchell) 450 1800 2250 500  500   Shift Total(mL/kg) 1550(13.4) 1800(15.6) 3350(29) 500(4.3)  500(4.3)   .5 -1800 -1579.6 -229.7  -229.7   Weight (kg) 115.5 115.5 115.5 115.5 115.5 115.5       CBC:  Recent Labs     12/12/21  0613 12/11/21  0337 12/10/21  0424   WBC PLEASE DISREGARD RESULTS 14.7* 15.5*   HGB PLEASE DISREGARD RESULTS 13.9 14.0   HCT PLEASE DISREGARD RESULTS 41.9 42.3   PLT PLEASE DISREGARD RESULTS 291 779       Metabolic Panel:  Recent Labs     12/12/21  0613 12/11/21  0337 12/10/21  0424   NA  --  139 135*   K  --  3.6 3.4*   CL  --  97 91*   CO2  --  38* 38*   BUN  --  46* 40*   CREA  --  0.78 0.71   GLU  --  232* 220*   CA  --  9.5 9.5   MG  --  2.4 2.3   ALB  -- 2. 9* 3.0*   ALT  --  101* 78   INR PLEASE DISREGARD RESULTS 1.0 1.0          For Billing    Chief Complaint   Patient presents with   [address] Problems  Date Reviewed: 12/3/2021          Codes Class Noted POA    Oozing skin inflammation ICD-10-CM: L08.9  ICD-9-CM: 686.9  12/3/2021 Unknown        Positive D dimer ICD-10-CM: R79.89  ICD-9-CM: 790.92  12/3/2021 Unknown        Other hyperlipidemia ICD-10-CM: E78.49  ICD-9-CM: 272.4  12/3/2021 Unknown        Elevated serum creatinine ICD-10-CM: R79.89  ICD-9-CM: 790.99  12/3/2021 Unknown        Community acquired pneumonia ICD-10-CM: J18.9  ICD-9-CM: 236  12/3/2021 Unknown        ARDS (adult respiratory distress syndrome) ([facility] ICD-10-CM: Paloma Ethanan  ICD-9-CM: 518.52  12/1/2021 No        Septic shock ([facility] ICD-10-CM: A41.9, R65.21  ICD-9-CM: 038.9, 785.52, 995.92  11/26/2021 Yes        Pneumomediastinum ([facility] ICD-10-CM: J98.2  ICD-9-CM: 518.1  11/26/2021 No        Pneumothorax on left ICD-10-CM: J93.9  ICD-9-CM: 512.89  11/26/2021 No        Hyponatremia ICD-10-CM: E87.1  ICD-9-CM: 276.1  11/26/2021 Yes        Diabetes mellitus type 2, controlled ([facility] ICD-10-CM: E11.9  ICD-9-CM: 250.00  11/25/2021 Yes        * (Principal) Acute hypoxemic respiratory failure due to COVID-19 [facility]) ICD-10-CM: U07.1, J96.01  ICD-9-CM: 518.81, 079.89, 799.02  11/19/2021 Yes        Obesity, morbid ([address] ICD-10-CM: E66.01  ICD-9-CM: 278.01  4/10/2018 Yes        Elevated liver enzymes ICD-10-CM: R74.8  ICD-9-CM: 790.5  8/5/2016 Yes        JESUS (obstructive sleep apnea) ICD-10-CM: G47.33  ICD-9-CM: 327.23  10/15/2014 Yes        Hypertension ICD-10-CM: I10  ICD-9-CM: 401.9  10/15/2014 Yes        Pure hypercholesterolemia ICD-10-CM: E78.00  ICD-9-CM: 272.0  Unknown Yes

## 2021-12-12 NOTE — PROGRESS NOTES
0700- Bedside and Verbal shift change report received from Muna Abebe [facility] (offgoing nurse) by Angelita Moss RN (oncoming nurse). Report included the following information SBAR, Kardex, ED Summary, Procedure Summary, Intake/Output, MAR, Recent Results, Cardiac Rhythm SR,ST and Alarm Parameters . Primary Nurse Angelita Moss RN and Muna Abebe RN performed a dual skin assessment on this patient Impairment noted- see wound doc flow sheet. All drips verified. 0800- Patient shift assessment performed. Patient turned and resting comfortably. Call bell in reach, bed in low and locked position Patient board updated . 1000: VAP bundle performed. Patient turned and resting comfortably. Pt febrile and cold ice sponge done and ice pack applied on the body. 1200- Reassessment performed. Maximemohsen Rojaske VAP bundle performed. Patient turned and resting comfortably. bathed . 1400- VAP bundle performed. Patient turned and resting comfortably. 1600- Reassessment performed. VAP bundle performed. Patient turned and resting comfortably. 1630:no prone to day per Dr Hemphill Escort. 1800- VAP bundle performed. Patient turned and resting comfortably. 1900- Bedside and Verbal shift change report given to Muna Abebe RN(oncoming nurse) by Angelita Moss RN (offgoing nurse). Report included the following information SBAR, Kardex, ED Summary, Procedure Summary, Intake/Output, MAR, Recent Results, Cardiac Rhythm SR,ST and Alarm Parameters .

## 2021-12-12 NOTE — PROGRESS NOTES
Critical Care    Progress Note  Date:2021       Room:[address] [place] [address]  Patient Name:Pan Blanco     YOB: 1985     Age:36 y.o. Subjective    Subjective   : Sedated on fentanyl Versed and propofol. Paralyzed on Nimbex. On phenylephrine. On vent support 20/400/80/16. Decrease FiO2 to 70% and PEEP 14 during rounds. ABG reviewed. PF ratio 175. In supine position since yeserday. .T-max 102.4  WBCs down 14.7-->11.6 . BMP pending. I/O=1.7/3.3=-1.5 L   : Sedated on fentanyl Versed and propofol. Paralyzed on Nimbex. On phenylephrine, tachycardic. On vent support 20/400/100/16. ABG reviewed. PF ratio 208   In prone position since yesterday afternoon. T-max 101.6 WBCs 14. 7. Creatinine stable 0.78. I/O=4/4.6=-0.6 L   Review of Systems   Unable to obtain given clinical condition   Objective         Vitals Last 24 Hours:  TEMPERATURE:  Temp  Av.5 °F (38.6 °C)  Min: 101 °F (38.3 °C)  Max: 102.4 °F (39.1 °C)  RESPIRATIONS RANGE: Resp  Av.9  Min: 20  Max: 28  PULSE OXIMETRY RANGE: SpO2  Av %  Min: 95 %  Max: 100 %  PULSE RANGE: Pulse  Av  Min: 82  Max: 118  BLOOD PRESSURE RANGE: Systolic (87GBA), FZT:003 , Min:83 , FLORIAN:899   ; Diastolic (93PHO), EUS:82, Min:54, Max:80    I/O (24Hr): Intake/Output Summary (Last 24 hours) at 2021 1101  Last data filed at 2021 1000  Gross per 24 hour   Intake 1817.58 ml   Output 3050 ml   Net -1232.42 ml     I examined the patient via telemedicine, with its associated limitations. I beamed in and examined the patient with assistance of house staff     On exam:    Gen: sedated   HEENT:  Intubated   Chest: symmetrical chest rise  CV:S1,S2  Abd:  soft  Ext: warm   Neuro: paralyzed   Objective:  Vital signs: (most recent): Blood pressure 110/73, pulse 94, temperature (!) 101 °F (38.3 °C), resp. rate 28, height 5' 11\" (1.803 m), weight 115.5 kg (254 lb 10.1 oz), SpO2 98 %.       Labs/Imaging/Diagnostics    Labs:  CBC:  Recent Labs 12/12/21  0916 12/12/21 0613 12/11/21 0337   WBC 11.6* PLEASE DISREGARD RESULTS 14.7*   RBC 3.32* PLEASE DISREGARD RESULTS 4.49   HGB 11.8* PLEASE DISREGARD RESULTS 13.9   HCT 32.2* PLEASE DISREGARD RESULTS 41.9   MCV 97.0 Cannot be calculated 93.3   RDW 13.6 PLEASE DISREGARD RESULTS 13.8    PLEASE DISREGARD RESULTS 291     CHEMISTRIES:  Recent Labs     12/11/21  0337 12/10/21  0424    135*   K 3.6 3.4*   CL 97 91*   CO2 38* 38*   BUN 46* 40*   CA 9.5 9.5   MG 2.4 2.3   PT/INR:  Recent Labs     12/12/21  0613 12/11/21  0337 12/10/21  0424   INR PLEASE DISREGARD RESULTS 1.0 1.0     APTT:  Recent Labs     12/12/21  0613 12/11/21  0337 12/10/21  0424   APTT PLEASE DISREGARD RESULTS 23.0 22.8     LIVER PROFILE:  Recent Labs     12/11/21  0337 12/10/21  0424   AST 58* 44*   * 78     Lab Results   Component Value Date/Time    ALT (SGPT) 101 (H) 12/11/2021 03:37 AM    AST (SGOT) 58 (H) 12/11/2021 03:37 AM    Alk. phosphatase 101 12/11/2021 03:37 AM    Bilirubin, direct 0.14 08/11/2016 03:50 PM    Bilirubin, total 0.9 12/11/2021 03:37 AM       Imaging Last 24 Hours:  XR CHEST PORT    Result Date: 12/11/2021  INDICATION:  fever EXAM: Portable chest 1313 . Comparison 12/10/2021. FINDINGS: There is no significant change in appearance the lungs. Cardiomediastinal silhouette is unchanged. No pneumothorax. Lines and tubes are unchanged in position.      1. No interval change     Assessment//Plan   Principal Problem:    Acute hypoxemic respiratory failure due to COVID-19 ([address] (11/19/2021)    Active Problems:    Pure hypercholesterolemia ()      JESUS (obstructive sleep apnea) (10/15/2014)      Hypertension (10/15/2014)      Elevated liver enzymes (8/5/2016)      Obesity, morbid ([facility] (4/10/2018)      Diabetes mellitus type 2, controlled ([address] (11/25/2021)      Septic shock ([address] (11/26/2021)      Pneumomediastinum ([address] (11/26/2021)      Pneumothorax on left (11/26/2021)      Hyponatremia (11/26/2021)      ARDS (adult respiratory distress syndrome) ([facility] (12/1/2021)      Oozing skin inflammation (12/3/2021)      Positive D dimer (12/3/2021)      Other hyperlipidemia (12/3/2021)      Elevated serum creatinine (12/3/2021)      Community acquired pneumonia (12/3/2021)      Assessment & Plan    1. Acute respiratory failure with hypoxia  2. ARDS  3. COVID 19  4. Obesity  5. DM2  6. JESUS  7. H/o HTN    Plan:  Analgesia and sedation to RASS goal of -5. Continue fentanyl Versed and propofol. Continue Nimbex   Phenylephrine to keep map above 65. Continue vent support. Wean FiO2 and PEEP as tolerated to keep sats above 92%. Follow PF ratio. Adjust RR and TV based on ABG results. Continue supine position. Check ABG . will consider Proning if PF ratio <150   Continue dexamethasone. Decrease bumex given contraction alkalosis. Goal of even fluid balance. Monitor urine output and renal indicis. Continue TPN. Monitor WBCs and temperature curve. Follow Cultures. Cooling blankets to keep normothermia. Trend procalcitonin. Strict I's/O. Protonix/SQ Lovenox twice daily    Prognosis is guarded    Discussed with bedside RN     I reviewed the electronic medical record, the x-rays, labs, progress notes, previous history and physicals and consultation notes that were available in the electronic medical record. I performed all aspects of the physical examination via Telemedicine associated with two way audio and video communication and with the on-site assistance of  the bedside nurse. I am located in [place], [place]  and the patient is located in [place] at [facility].   The patient is critically ill in the ICU. I  personally  reviewed the pertinent medical records, laboratory/ pathology data and radiographic images. The decision making regarding this patient is as documented above, which was generated  following  discussion  with the multidisciplinary ICU team and creation of a treatment plan for  the patient.  We discussed the patient's interval history and future coordination of care and  plans. The patient's medications  were reviewed and changes made as stipulated above. Due to  critical illness impairing one or more vital organs of this patient resulting in life threatening clinical situation  I have provided direct, frequent personal  assessment and manipulation in management plan and spent 35 minutes  of  critical care time excluding the time spent on procedures and teaching. Greater than 50% of this time  in patients care was  employed  in counseling and coordination of care and engaged in face to face discussion of case management issues, addressing questions, and outlining a plan of  therapy. Pt at risk of life threatening deterioration from acute resp failure    Pt seen and evaluated via tele encounter. Audio and Video were used for this interaction. ATTENTION:  This medical record was transcribed using an electronic medical records/speech recognition system. Although proofread, it may and can contain electronic, spelling and other errors. Corrections may be executed at a later time. Please feel free to contact us for any clarifications as needed.     Electronically signed by Yari Granda MD on 12/12/2021 at 11:46 AM

## 2021-12-13 NOTE — PROGRESS NOTES
Case Management:    RUR 13%    LOS 24 days,GLOS 12.4    Problems:  Covid+,not vaccinated  ARDS  Severe sepsis  pneumomediastinum/pneumothorax      Per nurse ,BMI today is 33.42 . Pt remains intubated (11/30/21)  Peep 16,Fo2 80%  Pt remains on propofol,fentanyl,versed,sarah and nimbex gtts  Pt is diuresing on bumex. Next of Kin Name and Herbie 23- [phone]     Today I discussed the transfer of pt again with attending  For ECMO. Attending  informed me that now if pt eventually goes for ECMO ,it will need to be @ a transplant center such as [facility]. The physicians @ the other facilities have informed the [facility]  team that the other hospitals will not accept pt until closer to [place] and that pt.'s CT will need to demonstrate complete fibrosis or his li=ungs so fibrosed that he would not be able to come off of ECMO and he would definitely need a lung transplant. Please read Dr Tavo Diaz detailed note on 12/10/21 15:10 pm that explains the above succinctly.       Mel Silverio

## 2021-12-13 NOTE — PROGRESS NOTES
[facility]. Medicine Residency Program       Resident Progress Note in Brief    S: Patient seen and examined at bedside. Pt intubated and sedated. No concerns from Nursing. O:  Visit Vitals  /62   Pulse 87   Temp (!) 100.7 °F (38.2 °C)   Resp 28   Ht 5' 10.98\" (1.803 m)   Wt 239 lb 8 oz (108.6 kg)   SpO2 98%   BMI 33.42 kg/m²     Physical Examination:   General: Intubated, sedated. Appears comfortable. Respiratory: Transmitted mechanical sounds appreciated bilaterally, unchanged. Good air movement. Cardiovascular: regular rate, regular rhythm. Extremities:  No LE edema. Extremities well perfused and warm. Skin: Warm, dry. PICC line in place  Neuro: Intubated sedated  Mitchell in place     A/P: 39 y. o. male with a PMH of HTN, HLD, JESUS admitted for AHRF and ARDS 2/2 COVID PNA. Currently intubated and sedated. Patient was admitted on 11/19/2021. AHRF and ARDS 2/2 COVID PNA: Intubated and sedated. Pending bed availability for possible ECMO at [facility]. Discussion w/ [facility], considering lung transplant but not until x5-6wks s/p symptom onset, will need CT ~ 12/24/21 prior to progress towards transplant to confirm end stage fibrosis. - Daily weights  - Prone as tolerated  - Goal sats 88% or higher  - CBC, CMP daily. No need for further trending Covid labs  - Continue Decadron 6mg IV daily  - Duo-neb or Combivent Respimat Q6H  - Holding COVID meds (Atorvastatin 20mg every day, Vit C 500mg BID, Zinc 220mg daily)  - Pulm following, appreciate recs     Severe sepsis 2/2 COVID PNA:. Leukocytosis stable. Fungal Cx, BCx, UCx NGTD. S/p x7 course Maria Fernanda/Vanc/Eraxis, now off abx 12/8. Continues to be febrile. Likely 2/2 COVID, possibly UTI vs bacteremia given lines. - Patient continues febrile. Cultures continue to be negative. - Restarted Sanjeev to maintain MAP > 65 - wean off as tolerated      Nutritional status:  Nutrition consulted for TPN, appreciate recs.    - continue TPN per nutrition recs   - trial trickle feeds as tolerated    Please see the primary team's daily progress note for the patient's full plan.       Rudi Faith MD  Family Medicine Resident

## 2021-12-13 NOTE — PROGRESS NOTES
Critical Care    Progress Note  Date:2021       Room:[address] [place] [address]  Patient Name:Pan Blanco     YOB: 1985     Age:36 y.o. Subjective    Subjective   : Sedated on fentanyl Versed and propofol. Paralyzed on Nimbex. On phenylephrine. On vent support 28/400/70/14. ABG reviewed. PF wauxn568. O2 increased to 80% and PEEP to 16. In supine position. .T-max 101.4 WBCs stable 11.6  Cr stalbe . I/O=- 0.6  L CXR table     : Sedated on fentanyl Versed and propofol. Paralyzed on Nimbex. On phenylephrine. On vent support 28/400/80/16. Decrease FiO2 to 70% and PEEP 14 during rounds. ABG reviewed. PF ratio 175. In supine position since yeserday. .T-max 102.4  WBCs down 14.7-->11.6 . BMP pending. I/O=1.7/3.3=-1.5 L     : Sedated on fentanyl Versed and propofol. Paralyzed on Nimbex. On phenylephrine, tachycardic. On vent support 28/400/100/16. ABG reviewed. PF ratio 208   In prone position since yesterday afternoon. T-max 101.6 WBCs 14. 7. Creatinine stable 0.78. I/O=4/4.6=-0.6 L   Review of Systems   Unable to obtain given clinical condition   Objective         Vitals Last 24 Hours:  TEMPERATURE:  Temp  Av.8 °F (38.2 °C)  Min: 100.1 °F (37.8 °C)  Max: 101.4 °F (38.6 °C)  RESPIRATIONS RANGE: Resp  Av.9  Min: 24  Max: 28  PULSE OXIMETRY RANGE: SpO2  Av.5 %  Min: 90 %  Max: 98 %  PULSE RANGE: Pulse  Av.7  Min: 83  Max: 121  BLOOD PRESSURE RANGE: Systolic (37JPZ), GOT:361 , Min:95 , YQ   ; Diastolic (70LPA), KBM:30, Min:56, Max:69    I/O (24Hr): Intake/Output Summary (Last 24 hours) at 2021 1424  Last data filed at 2021 1200  Gross per 24 hour   Intake 3616.83 ml   Output 3250 ml   Net 366.83 ml     I examined the patient via telemedicine, with its associated limitations.   I beamed in and examined the patient with assistance of house staff     On exam:    Gen: sedated   HEENT:  Intubated   Chest: symmetrical chest rise  CV:S1,S2  Abd:  soft  Ext: warm   Neuro: paralyzed   Objective:  Vital signs: (most recent): Blood pressure 110/64, pulse 93, temperature (!) 100.5 °F (38.1 °C), resp. rate 28, height 5' 11\" (1.803 m), weight 108.6 kg (239 lb 8 oz), SpO2 97 %. Labs/Imaging/Diagnostics    Labs:  CBC:  Recent Labs     12/13/21  0340 12/12/21  0916 12/12/21  0613   WBC 11.6* 11.6* PLEASE DISREGARD RESULTS   RBC 4.04* 3.32* PLEASE DISREGARD RESULTS   HGB 12.3 11.8* PLEASE DISREGARD RESULTS   HCT 39.0 32.2* PLEASE DISREGARD RESULTS   MCV 96.5 97.0 Cannot be calculated   RDW 13.9 13.6 PLEASE DISREGARD RESULTS    225 PLEASE DISREGARD RESULTS     CHEMISTRIES:  Recent Labs     12/13/21  0340 12/12/21  1145 12/11/21  0337    144 139   K 4.2 3.4* 3.6    103 97   CO2 32 37* 38*   BUN 49* 47* 46*   CA 9.6 9.5 9.5   PHOS  --  3.4  --    MG 2.0 2.3 2.4   PT/INR:  Recent Labs     12/13/21  0340 12/12/21  0916 12/12/21  0613   INR 1.1 1.1 PLEASE DISREGARD RESULTS     APTT:  Recent Labs     12/13/21  0340 12/12/21  0916 12/12/21  0613   APTT 21.6* 22.8 PLEASE DISREGARD RESULTS     LIVER PROFILE:  Recent Labs     12/13/21  0340 12/12/21  1145 12/11/21  0337   * 71* 58*   * 149* 101*     Lab Results   Component Value Date/Time    ALT (SGPT) 200 (H) 12/13/2021 03:40 AM    AST (SGOT) 114 (H) 12/13/2021 03:40 AM    Alk. phosphatase 94 12/13/2021 03:40 AM    Bilirubin, direct 0.14 08/11/2016 03:50 PM    Bilirubin, total 0.6 12/13/2021 03:40 AM       Imaging Last 24 Hours:  XR CHEST PORT    Result Date: 12/13/2021  INDICATION: Fever COMPARISON: 12/11/2021 FINDINGS: Single AP portable view of the chest obtained at 3:44 AM demonstrates no change in position of the lines and tubes. The cardiomediastinal silhouette is stable. Mild bilateral interstitial and alveolar opacities are unchanged. No pneumothorax is seen. There is no evidence of pleural effusion.      Stable appearance of the chest.     Assessment//Plan   Principal Problem:    Acute hypoxemic respiratory failure due to COVID-19 [facility]) (11/19/2021)    Active Problems:    Pure hypercholesterolemia ()      JESUS (obstructive sleep apnea) (10/15/2014)      Hypertension (10/15/2014)      Elevated liver enzymes (8/5/2016)      Obesity, morbid ([address] (4/10/2018)      Diabetes mellitus type 2, controlled ([address] (11/25/2021)      Septic shock ([address] (11/26/2021)      Pneumomediastinum ([address] (11/26/2021)      Pneumothorax on left (11/26/2021)      Hyponatremia (11/26/2021)      ARDS (adult respiratory distress syndrome) ([address] (12/1/2021)      Oozing skin inflammation (12/3/2021)      Positive D dimer (12/3/2021)      Other hyperlipidemia (12/3/2021)      Elevated serum creatinine (12/3/2021)      Community acquired pneumonia (12/3/2021)      Assessment & Plan    1. Acute respiratory failure with hypoxia  2. ARDS  3. COVID 19  4. Obesity  5. DM2  6. JESUS  7. H/o HTN    Plan:  Analgesia and sedation to RASS goal of -5. Continue fentanyl Versed and propofol. Continue Nimbex   Phenylephrine to keep map above 65. Continue vent support. Wean FiO2 and PEEP as tolerated to keep sats above 92%. Follow PF ratio. Adjust RR and TV based on ABG results. Check ABG this afternoon. Consider Proning if PF ratio <150   Continue dexamethasone. Continue bumex, may need  To hold given contraction alkalosis. Goal of even fluid balance. Monitor urine output and renal indicis. D-Dimer in AM   Continue TPN. Monitor WBCs and temperature curve. Follow Cultures. Cooling blankets to keep normothermia. Monitor OFF ABx. MTrend procalcitonin. Strict I's/O. Protonix/SQ Lovenox twice daily    Prognosis is guarded    Discussed with bedside RN     I reviewed the electronic medical record, the x-rays, labs, progress notes, previous history and physicals and consultation notes that were available in the electronic medical record.       I performed all aspects of the physical examination via Telemedicine associated with two way audio and video communication and with the on-site assistance of  the bedside nurse. I am located in [place], [place]  and the patient is located in [place] at [facility].   The patient is critically ill in the ICU. I  personally  reviewed the pertinent medical records, laboratory/ pathology data and radiographic images. The decision making regarding this patient is as documented above, which was generated  following  discussion  with the multidisciplinary ICU team and creation of a treatment plan for  the patient. We discussed the patient's interval history and future coordination of care and  plans. The patient's medications  were reviewed and changes made as stipulated above. Due to  critical illness impairing one or more vital organs of this patient resulting in life threatening clinical situation  I have provided direct, frequent personal  assessment and manipulation in management plan and spent 35 minutes  of  critical care time excluding the time spent on procedures and teaching. Greater than 50% of this time  in patients care was  employed  in counseling and coordination of care and engaged in face to face discussion of case management issues, addressing questions, and outlining a plan of  therapy. Pt at risk of life threatening deterioration from acute resp failure    Pt seen and evaluated via tele encounter. Audio and Video were used for this interaction. ATTENTION:  This medical record was transcribed using an electronic medical records/speech recognition system. Although proofread, it may and can contain electronic, spelling and other errors. Corrections may be executed at a later time. Please feel free to contact us for any clarifications as needed.     Electronically signed by Ever Taylor MD on 12/13/2021 at 11:46 AM

## 2021-12-13 NOTE — WOUND CARE
Wound Consult: Follow Up Visit. Chart reviewed. Consulted for left side of face injury with ETT salgado removal - frequent removal due to prone positioning. Spoke with patients nurse,  Demetrice Adame who noted when ETT salgado removed and ETT taped prior to returning prone this evening. Patient is resting on a [employer] In Touch bed with hercules/air support mattress. Heels off loaded with boots. Patient is intubated, sedated, paralyzed; COVID pneumonia/respiratory failure; requires two person assist to move side to side in bed. Cameron score 9. Assessment:  Left face/cheek - small bright pink partial thickness injury within 2 x 2 cm area, mixed intact/open skin from skin stripping. Sacral area - pink/red blanching area of intact skin noted. No redness or injury to heels, elbows, ears, nose, buttocks, back. Treatment:  Small piece of Mepilex Transfer dressing placed over skin between tape and injury. Sacral foam dressing placed to sacrum. Wound Recommendations:  Protect skin on face with no sting when possible, remove tape/hydrocolloid as gently as possible; can use adhesive remover to remove. Sacral foam dressing for protection. Skin Care / PI Prevention Recommendations:  1. Minimize friction/shear: minimize layers of linen/pads under patient. 2. Off load pressure/reposition:  turn and reposition approximately every 2 hours while supine; float heels with pillows or use off loading heel boots; position wedges/pillows. 3. Manage Moisture - keep skin folds dry; incontinence skin care with incontinence wipes; lewis in use, barriers as needed. 4. Continue to monitor nutrition, pain, and skin risk scale, and skin assessment. Plan: We will continue to reassess routinely and as needed. Please re-consult should concerns arise despite continued skin/PI prevention measures.     Krys Palmer, MSN, RN, [facility], Wound / [address] [facility] Office [phone]

## 2021-12-13 NOTE — PROGRESS NOTES
I talked to [facility]  Pt is not a candidate for ECMO given BMI and prolonged mechanical ventilation. We will continue current treatment. No plan for transfer    Wayne Rodriguez MD

## 2021-12-13 NOTE — PROGRESS NOTES
[address] [facility] [address] [facility]   Office [phone]  Fax [phone]          Assessment and Plan     Flavia Mckeon is a 39 y.o. male with a PMH of HTN, HLD, JESUS admitted for AHRF and severe sepsis 2/2 COVID PNA. Patient was admitted on 11/19/2021. Interval Events:  No acute events, patient remains afebrile. AHRF and ARDS 2/2 COVID PNA: Intubated and sedated. Pending bed availability for possible ECMO at [facility]. Discussion w/ [facility], considering lung transplant but not until x5-6wks s/p symptom onset, will need CT ~ 12/24/21 prior to progress towards transplant to confirm end stage fibrosis. - Daily weights  - Prone as tolerated  - Goal sats 88% or higher  - CBC, CMP daily. No need for further trending Covid labs  - Continue Decadron 6mg IV daily  - Duo-neb or Combivent Respimat Q6H  - Holding COVID meds (Atorvastatin 20mg every day, Vit C 500mg BID, Zinc 220mg daily)  - Pulm following, appreciate recs    Severe sepsis 2/2 COVID PNA:. Leukocytosis stable. Fungal Cx, BCx, UCx NGTD. S/p x7 course Maria Fernanda/Vanc/Eraxis, now off abx 12/8. Continues to be febrile. Likely 2/2 COVID, possibly UTI vs bacteremia given lines. - Patient continues febrile. Cultures continue to be negative. - Will discuss with ICU, possible concern for PE given fever and tachycardia worsening. Unlikely given improved vent settings, on lovenox, and recent LE duplex wnl  - Restarted Sanjeev to maintain MAP > 65 - -Running at 40 this morning. Nutritional status:  Nutrition consulted for TPN, appreciate recs. - continue TPN per nutrition recs   - trial trickle feeds as tolerated    Oozing (improved): Mild bleeding with suctioning otherwise no other findings while prone.     - On prophylactic Lovenox 40 mg BID    Pneumomediastinum/Pneumothorax: Possibly barotrauma 2/2 high pressures needed for COVID ARDS PNA vs lung frailty.   Repeat CXR 12/1 showing stable pneumomediastinum, and resolved pneumothorax. - Monitor for signs of worsening barotrauma     Insomnia/anxiety: Pt with new insomnia and anxiety, specifically overnight. - Intubated, sedated    HTN: Pt normotensive off meds. Currently hypotensive. At home is on Amlodipine 10mg daily and HCTZ 12.5mg daily  - Will continue to monitor      HLD: Chronic, stable. Last lipids 07/2021 , HDL 54, LDL 98.6, . On Atorvastatin 10mg daily.  - Holding Atorvastatin 20mg daily     T2DM (diet-controlled): Prior A1c 5.8% in 05/2021. No meds outpatient. On steroids now for COVID.  - SSI (resistant given morbid obesity) w/ q6h glucose checks  - Start Lantus 8u nightly given elevated sugars requiring 14u lispro yesterday.      JESUS: On CPAP at night. - Intubated, sedated.     Obesity: Body mass index is 42.4 kg/m² on presentation.  - Encouraging lifestyle modifications and further follow up outpatient. At-risk JAMIR: RESOLVED. POA Cr 1.31, BL 0.8. Likely IVVD in setting of poor PO intake. - Daily CMP    Chest pain: RESOLVED. Description is pleuritic in nature, worse with cough. EKG without evidence of acute ischemia. Troponin of 9 makes CAD less concerning.   - Cardiac monitoring, will continue to monitor     Diarrhea: RESOLVED. Watery diarrhea prior to admission. Likely 2/2 COVID infection. Improved since presentation. Enteric bacteria panel negative. Abdominal pain: RESOLVED. Secondary to gas pains after starting diet. - Continue Simethicone       FEN/GI - TPN  Activity - Ambulate with assistance  DVT prophylaxis - Lovenox (ppx)  GI prophylaxis - Protonix  Disposition - Plan to d/c to TBD. Code Status - Full. Discussed with patient / caregivers. Next of Bryanthoward 68 [address],  Spouse - [phone]      Audrey Camarillo MD  Family Medicine Resident    Patient discussed with Family Medicine Attending: Dr. Lazarus Cedar / Objective   Subjective:   Patient intubated and proned, on pressors. Sedated.  No acute distress. Respiratory: O2 Flow Rate (L/min): 60 l/min O2 Device: Endotracheal tube, Ventilator   Visit Vitals  /67 (BP 1 Location: Left upper arm, BP Patient Position: Lying)   Pulse (!) 102   Temp (!) 101.4 °F (38.6 °C)   Resp 28   Ht 5' 11\" (1.803 m)   Wt 239 lb 8 oz (108.6 kg)   SpO2 93%   BMI 33.40 kg/m²     General: Intubated, sedated. Appears comfortable. Respiratory: Transmitted mechanical sounds appreciated bilaterally, unchanged. Good air movement. Cardiovascular: regular rate, regular rhythm. GI: + bowel sounds. Nontender. Extremities:  No LE edema. Extremities well perfused and warm. Skin: Warm, dry. PICC line in place  Neuro: Intubated sedated  Mitchell in place     I/O:  Date 12/12/21 0700 - 12/13/21 0659 12/13/21 0700 - 12/14/21 0659   Shift 9777-9221 7297-6680 24 Hour Total 5533-6523 8533-6441 24 Hour Total   INTAKE   P.O. 0  0        P. O. 0  0      I. V.(mL/kg/hr) 1801.9(1.3) 956.9(0.7) 2758.7(1)        Versed Volume 146.2 62.5 208.7        Nimbex Volume 149.2 70 219.2        Fentanyl Volume 71 25 96        Diprivan Volume 446 233.8 679.7        Phenylephrine Volume 330.5 171.9 502.4        Volume (potassium chloride 20 mEq in 50 ml IVPB) 50  50        Volume (TPN ADULT - CENTRAL) 567  567        Volume (TPN ADULT - CENTRAL) 42 393.8 435.8      NG/ 130 260        Water Flush Volume (mL) (Orogastric Tube 11/30/21)  0 0        Medication Volume (Orogastric Tube 11/30/21) 80 80 160        Intake (ml) (Orogastric Tube 11/30/21) 50 50 100      Shift Total(mL/kg) 1931.9(16.7) 1086. 9(9.4) 5976.1(67.5)      OUTPUT   Urine(mL/kg/hr) 1825(1.3) 1750(1.3) 3575(1.3)        Urine Output (mL) (Urinary Catheter 12/11/21 Mitchell) 1825 1750 3575      Emesis/NG output  0 0        Output (ml) (Orogastric Tube 11/30/21)  0 0      Shift Total(mL/kg) 1825(15.8) 1750(15.2) 3575(31)      .9 -663.1 -556. 3      Weight (kg) 115.5 115.5 115.5 108.6 108.6 108.6       CBC:  Recent Labs     12/13/21  0340 12/12/21  0916 12/12/21  0613   WBC 11.6* 11.6* PLEASE DISREGARD RESULTS   HGB 12.3 11.8* PLEASE DISREGARD RESULTS   HCT 39.0 32.2* PLEASE DISREGARD RESULTS    225 PLEASE DISREGARD RESULTS       Metabolic Panel:  Recent Labs     12/13/21  0340 12/12/21  1145 12/12/21  0916 12/12/21  0613 12/11/21  0337 12/11/21  0337    144  --   --   --  139   K 4.2 3.4*  --   --   --  3.6    103  --   --   --  97   CO2 32 37*  --   --   --  38*   BUN 49* 47*  --   --   --  46*   CREA 0.84 0.69*  --   --   --  0.78   * 219*  --   --   --  232*   CA 9.6 9.5  --   --   --  9.5   MG 2.0 2.3  --   --   --  2.4   PHOS  --  3.4  --   --   --   --    ALB 2.5* 2.6*  --   --   --  2.9*   * 149*  --   --   --  101*   INR 1.1  --  1.1 PLEASE DISREGARD RESULTS   < > 1.0    < > = values in this interval not displayed.           For Billing    Chief Complaint   Patient presents with   [address] [facility] Problems  Date Reviewed: 12/3/2021          Codes Class Noted POA    Oozing skin inflammation ICD-10-CM: L08.9  ICD-9-CM: 686.9  12/3/2021 Unknown        Positive D dimer ICD-10-CM: R79.89  ICD-9-CM: 790.92  12/3/2021 Unknown        Other hyperlipidemia ICD-10-CM: E78.49  ICD-9-CM: 272.4  12/3/2021 Unknown        Elevated serum creatinine ICD-10-CM: R79.89  ICD-9-CM: 790.99  12/3/2021 Unknown        Community acquired pneumonia ICD-10-CM: J18.9  ICD-9-CM: 076  12/3/2021 Unknown        ARDS (adult respiratory distress syndrome) ([facility] ICD-10-CM: Rohan Cheese  ICD-9-CM: 518.52  12/1/2021 No        Septic shock ([facility] ICD-10-CM: A41.9, R65.21  ICD-9-CM: 038.9, 785.52, 995.92  11/26/2021 Yes        Pneumomediastinum ([facility] ICD-10-CM: J98.2  ICD-9-CM: 518.1  11/26/2021 No        Pneumothorax on left ICD-10-CM: J93.9  ICD-9-CM: 512.89  11/26/2021 No        Hyponatremia ICD-10-CM: E87.1  ICD-9-CM: 276.1  11/26/2021 Yes        Diabetes mellitus type 2, controlled ([facility] ICD-10-CM: E11.9  ICD-9-CM: 250.00  11/25/2021 Yes        * (Principal) Acute hypoxemic respiratory failure due to COVID-19 [facility]) ICD-10-CM: U07.1, J96.01  ICD-9-CM: 518.81, 079.89, 799.02  11/19/2021 Yes        Obesity, morbid ([address] ICD-10-CM: E66.01  ICD-9-CM: 278.01  4/10/2018 Yes        Elevated liver enzymes ICD-10-CM: R74.8  ICD-9-CM: 790.5  8/5/2016 Yes        JESUS (obstructive sleep apnea) ICD-10-CM: G47.33  ICD-9-CM: 327.23  10/15/2014 Yes        Hypertension ICD-10-CM: I10  ICD-9-CM: 401.9  10/15/2014 Yes        Pure hypercholesterolemia ICD-10-CM: E78.00  ICD-9-CM: 272.0  Unknown Yes

## 2021-12-13 NOTE — PROGRESS NOTES
0700- Bedside shift change report given to Marisa Rivera (oncoming nurse) by Claribel (offgoing nurse). Report included the following information SBAR, Kardex, ED Summary, Intake/Output, MAR, Recent Results, Cardiac Rhythm NSR to ST and Alarm Parameters . Primary Nurse Kaylyn Mahan RN and Claribel RN performed a dual skin assessment on this patient Impairment noted- see wound doc flow sheet  Cameron score is 10.  0800- Shift assessment complete, see doc flowsheets. ETT and mouth care complete. Pt turned and repositioned. 0830- Morning rounds with Dr. Marquez Jackson at this time. Orders to increase PEEP to 16 and get an ABG at 3pm to determine if proning will take place today. Kaylyn Mahan RN. 1000- ETT and mouth care complete. Pt turned and repositioned. 1100- IDRs at this time. No new orders. 1200- Reassessment complete, see doc flowsheets. ETT and mouth care complete. Pt turned and repositioned. 1400- ETT and mouth care complete. Pt turned and repositioned. 18- Wife of pt given update at this time. Kaylyn Mahan RN. 1600- Reassessment complete, see doc flowsheets. ETT and mouth care complete. Pt turned and repositioned. 1730- Pt proned at this time with RT and RNs at the bedside. FiO2 increased to 100% 30 min before proning. Kaylyn Mahan RN. 1800- ETT and mouth care complete. 1900- Bedside shift change report given to Tete (oncoming nurse) by Claribel HUGHES (offgoing nurse). Report included the following information SBAR, Kardex, ED Summary, Intake/Output, MAR, Recent Results, Cardiac Rhythm NSR and ST and Alarm Parameters .

## 2021-12-13 NOTE — PROGRESS NOTES
Comprehensive Nutrition Assessment    Type and Reason for Visit: Reassess    Nutrition Recommendations/Plan:   1. If able, resume Tube Feeding below:   · Peptide based High protein (Vital HP) @ 15 mL x 20 hrs    · Advance as tolerated by 10mL Q6H to goal 35mL/hr  · FWF 10mL QH        2. If unable to resume EN feedings, continue TPN: consider increasing to 75 mL/hr to help meet protein needs. · Continue D15/AA7% @ 63 mL/h (goal w/ propofol)     TPN @ 63mL/h provides: 1194 kcal (2180 kcal/d including propofol), 106 g protein, 227 g dextrose. Providing ~18.5 kcal/kg & 0.9 g pro/kg    Nutrition Assessment:    12/13: Follow up. TPN continues at 63 mL/hour. No BM since last RD encounter. Propofol at 37.4 mL/hour provides 987 kcal/day. Updated weight - BMI now 33.40, possible local ECMO transfer due to BMI <35. Needs recalculated. Meeting 100% kcal and 91% protein needs at this time. 12/9: TF remains off. Discussed restarting for trial at slow rate. Check tolerance. Continue TPN today to monitor TF tolerance. If pt tolerates TF, wean/d/c TPN. Propofol at 37.4 mL/hr providing 987 kcal per day. Noted: \"possible local ECMO transfer with BMI <35\" Currently BMI 35.5. Still awaiting availability at [place] [address]. No beds available. Triglycerides down to 258 from 299. BG mid to high 100's.      12/6: pt remains intubated, sedated and paralyzed. Not tolerating TF, placed prone today, getting propofol at max dose (adds ~ 985 kcal/d from lipids), TGL rechecked today & improved, no plans to reduce propofol, TPN started today. If pt is able to handle higher volume in a couple days, increasing TPN to 75 mL/h. This would more closely meet protein needs but exceed initial calorie needs for critically ill patient. Pt is awaiting possible transfer to a higher level of care facility once bed is available. Appreciate additional bowel regimen.       12/3: RD attended & discussed patient during interdisciplinary rounds on unit.  TF off with NG in place to low suction per RN. Pt with bleeding noted. If unable to resume PO, once more fluid stable consider TPN as rec'd above. No lipids. Awaiting bed available fro ECMO. Wt is down from admit - BMI = 36.28.     TF @35mL provides: 770ml of tf, 1010 kcal (1995 kcal/d including protein & propofol), 133 g protein, 85 g carb, ~955 ml of free water from TF & flushes. 17 kcal/kg & 1.1g of pro/kg. TPN @ 63mL/h provides[de-identified] ~1.5L, 1330 kcal (2315 kcal including propofol) & 76 g protein. (~11 kcal/kg or 20 kcal/kg including propofol, 0.6 g pro/kg)      11/22: pt admitted for Acute hypoxemic respiratory failure due to COVID-19 ([facility] [U07.1, J96.01] who  has a past medical history of Hypercholesteremia, Hypertension (10/15/2014), and S/P vasectomy (8/6/2014). Pt was sitting up in chair in room. Pt has been switching between High Flow NC and Bipap for oxygen needs. He is asking to be able to eat. Call to MDs to request diet or supplements when safe. Pt is still accepting meds 2-3 times daily. Review of history notes blood glucose much improved from earlier this year. On admission, notes reviewed pt had nausea and diarrhea for 2 days prior, has had clear liquids & crackers. Pt has been NPO x4 days (since evening of admission date). Morbidly obese male with high calorie/protein needs. Malnutrition Assessment:  Malnutrition Status: Moderate malnutrition    Context:  Acute illness     Findings of the 6 clinical characteristics of malnutrition:   Energy Intake:  1 - 75% or less of est energy req for 7 or more days  Weight Loss:  7 - Greater than 2% over 1 week     Body Fat Loss:  Unable to assess,     Muscle Mass Loss:  Unable to assess,    Fluid Accumulation:  No significant fluid accumulation,     Strength:  Not performed     Estimated Daily Nutrient Needs:  Energy (kcal): 1705 (PSU3b x .7); Weight Used for Energy Requirements: Current  Protein (g): 117-156 (1.5-2.0 g/kg IBW);  Weight Used for Protein Requirements: Ideal (78.2 kg)  Fluid (ml/day): 1705; Method Used for Fluid Requirements: 1 ml/kcal     Nutrition Related Findings:    ABD: WNL with hypoactive bowel sounds 12/13  Last BM: 12/9, loose  Edema: +1 generalized; +1 non-pitting BUE noted; trace BLE noted 12/13    Nutr. Labs:  Lab Results   Component Value Date/Time    GFR est AA >60 12/13/2021 03:40 AM    GFR est non-AA >60 12/13/2021 03:40 AM    Creatinine 0.84 12/13/2021 03:40 AM    BUN 49 (H) 12/13/2021 03:40 AM    Sodium 143 12/13/2021 03:40 AM    Potassium 4.2 12/13/2021 03:40 AM    Chloride 104 12/13/2021 03:40 AM    CO2 32 12/13/2021 03:40 AM     Lab Results   Component Value Date/Time    Glucose 299 (H) 12/13/2021 03:40 AM    Glucose (POC) 238 (H) 12/13/2021 11:16 AM     Lab Results   Component Value Date/Time    Hemoglobin A1c 5.8 (H) 07/30/2021 10:25 AM       Nutr. Meds:  Vit C, Lipitor, Bumex, Nimbex, Decadron, Lovenox, Fentanyl, Lantus, Humalog, Versed*, Protonix, Sanjeev-syn*, MIralax, Pericolace, Zinc Gluconate     Wounds:    None       Current Nutrition Therapies:  DIET NPO  DIET ONE TIME MESSAGE  TPN ADULT - CENTRAL  TPN ADULT - CENTRAL    Anthropometric Measures:  · Height:  5' 10.98\" (180.3 cm)  · Current Body Wt:  108.6 kg (239 lb 6.7 oz)   · Admission Body Wt:  304 lb    · Usual Body Wt:  136.1 kg (300 lb)     · Ideal Body Wt:  172 lbs:  139.2 %   Body mass index is 33.42 kg/m². · BMI Category:  Obese class 1 (BMI 30.0-34. 9)       Nutrition Diagnosis:   · Inadequate protein-energy intake related to catabolic illness, impaired respiratory function as evidenced by NPO or clear liquid status due to medical condition, intubation, nutrition support-parenteral nutrition    Nutrition Interventions:   Food and/or Nutrient Delivery: Continue parenteral nutrition  Nutrition Education and Counseling: No recommendations at this time  Coordination of Nutrition Care: Continue to monitor while inpatient, Interdisciplinary rounds    Goals:  Provide >/= 80% estimated protein needs within 2 - 3 days       Nutrition Monitoring and Evaluation:   Behavioral-Environmental Outcomes: None identified  Food/Nutrient Intake Outcomes: Parenteral nutrition intake/tolerance, Enteral nutrition intake/tolerance  Physical Signs/Symptoms Outcomes: Biochemical data, Weight    Discharge Planning:     Too soon to determine     Electronically signed by Cha Fish RD on 82/49/3740  Contact: [phone] or via [employer]

## 2021-12-13 NOTE — PROGRESS NOTES
ET tube salgado removed and tube secured with tape. Patient placed in prone position. Redness noticed on cheek, Wound care notified and placed skin barrier in place.

## 2021-12-14 NOTE — PROGRESS NOTES
1900: Bedside and Verbal shift change report given to Damaris Silverio RN (oncoming nurse) by Wilber Jensen RN (offgoing nurse). Report included the following information SBAR, Kardex, Intake/Output and Cardiac Rhythm SR.     2000: Assessment completed, see doc flow sheet. Pt turned and repositioned. Neuro: Pt is pharm paralyzed, PERRLA B-3mm, no cough w/suction d/t paralytic medication. TOF baseline 40 mA with 0/4 twitches--MD aware. Cardio: S1S2 present    Respiratory: ETT 8, 25 @ the lips, , PEEP 16, Rate 28, FIO2 80 sats @ 100. Bilateral lung sounds are diminished L>R. GI/: MATTHEW abdominal assessment d/t prone positioning. Mitchell catheter in place draining clear yellow urine. Skin: Minimal bruising on inner right arm, swelling BUE, BLE.    2200: Pt turned and repositioned. VAP bundle completed. Mouth care done. 0000: Assessment completed, see doc flow sheet. Pt turned and repositioned. VAP bundle completed. Mouth care done. TOF baseline 40 mA with 0/4 twitches--MD aware. 0200:  Pt turned and repositioned. VAP bundle completed. Mouth care done. 0400: Assessment completed, see doc flow sheet. Pt turned and repositioned. TOF baseline 40 mA with 0/4 twitches--MD aware. 0600: Pt turned and repositioned. VAP bundle completed. Mouth care done. 200: Family Practice at the bedside. Nurse informed MD of increase in need for insulin since starting TPN.    0700: Bedside and Verbal shift change report given to Lobo Bronson RN (oncoming nurse) by Damaris Silverio RN (offgoing nurse).  Report included the following information SBAR, Kardex, Intake/Output and Cardiac Rhythm SR.

## 2021-12-14 NOTE — PROGRESS NOTES
Critical Care    Progress Note  Date:2021       Room:[address] [place] [address]  Patient Name:Pan Blanco     YOB: 1985     Age:36 y.o. Subjective    Subjective   : Sedated on fentanyl Versed and propofol. Paralyzed on Nimbex. On phenylephrine. On vent support 28/400/80/16. Was prone overnight, supine this am. T-max 99.5 WBCs 9.9 Cr 0.5  . I/O=+2.8   L     : Sedated on fentanyl Versed and propofol. Paralyzed on Nimbex. On phenylephrine. On vent support 28/400/70/14. ABG reviewed. PF [id number]. O2 increased to 80% and PEEP to 16. In supine position. .T-max 101.4 WBCs stable 11.6  Cr stalbe . I/O=- 0.6  L CXR table     : Sedated on fentanyl Versed and propofol. Paralyzed on Nimbex. On phenylephrine. On vent support 28/400/80/16. Decrease FiO2 to 70% and PEEP 14 during rounds. ABG reviewed. PF ratio 175. In supine position since yeserday. .T-max 102.4  WBCs down 14.7-->11.6 . BMP pending. I/O=1.7/3.3=-1.5 L     : Sedated on fentanyl Versed and propofol. Paralyzed on Nimbex. On phenylephrine, tachycardic. On vent support 28/400/100/16. ABG reviewed. PF ratio 208   In prone position since yesterday afternoon. T-max 101.6 WBCs 14. 7. Creatinine stable 0.78. I/O=4/4.6=-0.6 L   Review of Systems   Unable to obtain given clinical condition   Objective         Vitals Last 24 Hours:  TEMPERATURE:  Temp  Av.4 °F (36.9 °C)  Min: 96.9 °F (36.1 °C)  Max: 99.5 °F (37.5 °C)  RESPIRATIONS RANGE: Resp  Av.9  Min: 21  Max: 28  PULSE OXIMETRY RANGE: SpO2  Av.8 %  Min: 91 %  Max: 100 %  PULSE RANGE: Pulse  Av.9  Min: 49  Max: 121  BLOOD PRESSURE RANGE: Systolic (80KJD), PXD:372 , Min:91 , WVJ:693   ; Diastolic (68GCZ), WXT:20, Min:55, Max:83    I/O (24Hr):     Intake/Output Summary (Last 24 hours) at 2021 1636  Last data filed at 2021 1600  Gross per 24 hour   Intake 3995.5 ml   Output 2730 ml   Net 1265.5 ml     I examined the patient via telemedicine, with its associated limitations. I beamed in and examined the patient with assistance of house staff     On exam:    Gen: sedated   HEENT:  Intubated   Chest: symmetrical chest rise  CV:S1,S2  Abd:  soft  Ext: warm   Neuro: paralyzed   Objective:  Vital signs: (most recent): Blood pressure (!) 99/57, pulse (!) 112, temperature 99 °F (37.2 °C), resp. rate 28, height 5' 10.98\" (1.803 m), weight 108.6 kg (239 lb 8 oz), SpO2 96 %. Labs/Imaging/Diagnostics    Labs:  CBC:  Recent Labs     12/14/21  0401 12/13/21  0340 12/12/21  0916   WBC 9.9 11.6* 11.6*   RBC 3.46* 4.04* 3.32*   HGB 11.1* 12.3 11.8*   HCT 34.0* 39.0 32.2*   MCV 98.3 96.5 97.0   RDW 13.9 13.9 13.6    255 225     CHEMISTRIES:  Recent Labs     12/14/21  0401 12/13/21  0340 12/12/21  1145    143 144   K 3.6 4.2 3.4*    104 103   CO2 28 32 37*   BUN 40* 49* 47*   CA UNABLE TO OBTAIN ACCURATE RESULTS DUE TO GROSS LIPEMIA 9.6 9.5   PHOS  --   --  3.4   MG 1.6 2.0 2.3   PT/INR:  Recent Labs     12/14/21  0401 12/13/21  0340 12/12/21  0916   INR 1.1 1.1 1.1     APTT:  Recent Labs     12/14/21  0401 12/13/21  0340 12/12/21  0916   APTT 21.7* 21.6* 22.8     LIVER PROFILE:  Recent Labs     12/14/21  0401 12/13/21  0340 12/12/21  1145   AST 91* 114* 71*   * 200* 149*     Lab Results   Component Value Date/Time    ALT (SGPT) 207 (H) 12/14/2021 04:01 AM    AST (SGOT) 91 (H) 12/14/2021 04:01 AM    Alk. phosphatase 82 12/14/2021 04:01 AM    Bilirubin, direct 0.14 08/11/2016 03:50 PM    Bilirubin, total 1.0 12/14/2021 04:01 AM       Imaging Last 24 Hours:  No results found.   Assessment//Plan   Principal Problem:    Acute hypoxemic respiratory failure due to COVID-19 [facility]) (11/19/2021)    Active Problems:    Pure hypercholesterolemia ()      JESUS (obstructive sleep apnea) (10/15/2014)      Hypertension (10/15/2014)      Elevated liver enzymes (8/5/2016)      Obesity, morbid ([address] (4/10/2018)      Diabetes mellitus type 2, controlled ([address] (11/25/2021) Septic shock ([facility] (11/26/2021)      Pneumomediastinum ([facility] (11/26/2021)      Pneumothorax on left (11/26/2021)      Hyponatremia (11/26/2021)      ARDS (adult respiratory distress syndrome) ([facility] (12/1/2021)      Oozing skin inflammation (12/3/2021)      Positive D dimer (12/3/2021)      Other hyperlipidemia (12/3/2021)      Elevated serum creatinine (12/3/2021)      Community acquired pneumonia (12/3/2021)      Assessment & Plan    1. Acute respiratory failure with hypoxia  2. ARDS  3. COVID 19  4. Obesity  5. DM2  6. JESUS  7. H/o HTN    Plan:  Analgesia and sedation to RASS goal of -5. Continue fentanyl Versed and propofol. Continue Nimbex   Phenylephrine to keep map above 65. Continue vent support. Wean FiO2 and PEEP as tolerated to keep sats above 92%. Follow PF ratio. Adjust RR and TV based on ABG results. Not a candidate to transfer to [facility] for ECMO after discussed with ECMO stafff at [facility] 12/13    Check ABG this afternoon. Proning if PF ratio <150   Continue dexamethasone. increase bumex to keep I/O even to negative  . Monitor urine output and renal indicis. Continue TPN. Monitor WBCs and temperature curve. Follow Cultures. Cooling blankets to keep normothermia. Monitor OFF ABx. Trend procalcitonin as needed   Strict I's/O. Glycemic control Lantus added   Protonix/SQ Lovenox twice daily    Prognosis is guarded    Discussed with bedside RN     I reviewed the electronic medical record, the x-rays, labs, progress notes, previous history and physicals and consultation notes that were available in the electronic medical record. I performed all aspects of the physical examination via Telemedicine associated with two way audio and video communication and with the on-site assistance of  the bedside nurse. I am located in [place], [place]  and the patient is located in [place] at [facility].   The patient is critically ill in the ICU.    I  personally  reviewed the pertinent medical records, laboratory/ pathology data and radiographic images. The decision making regarding this patient is as documented above, which was generated  following  discussion  with the multidisciplinary ICU team and creation of a treatment plan for  the patient. We discussed the patient's interval history and future coordination of care and  plans. The patient's medications  were reviewed and changes made as stipulated above. Due to  critical illness impairing one or more vital organs of this patient resulting in life threatening clinical situation  I have provided direct, frequent personal  assessment and manipulation in management plan and spent 35 minutes  of  critical care time excluding the time spent on procedures and teaching. Greater than 50% of this time  in patients care was  employed  in counseling and coordination of care and engaged in face to face discussion of case management issues, addressing questions, and outlining a plan of  therapy. Pt at risk of life threatening deterioration from acute resp failure    Pt seen and evaluated via tele encounter. Audio and Video were used for this interaction. ATTENTION:  This medical record was transcribed using an electronic medical records/speech recognition system. Although proofread, it may and can contain electronic, spelling and other errors. Corrections may be executed at a later time. Please feel free to contact us for any clarifications as needed.     Electronically signed by Wendy Landis MD on 12/14/2021 at 11:46 AM

## 2021-12-14 NOTE — PROGRESS NOTES
0700- Bedside shift change report given to Marisa Rivera (oncoming nurse) by Jami Kelsey (offgoing nurse). Report included the following information SBAR, Kardex, ED Summary, Intake/Output, MAR, Recent Results, Cardiac Rhythm NSR to SB and Alarm Parameters . Primary Nurse Yulia Emerson RN and Jami Kelsey RN performed a dual skin assessment on this patient Impairment noted- see wound doc flow sheet  Cameron score is 9.  0800- Shift assessment complete, see doc flowsheets. ETT and mouth care complete. Pt repositioned. 1000- ETT and mouth care complete. Pt turned into supine position with RT and RNs at bedside. Pt turned to right side. Yulia Emerson RN. 1200- Reassessment complete, see doc flowsheets. ETT and mouth care complete. Pt turned and repositioned. [address] given update on pts current status. Yulia Emerson RN. 1435-Wife given update on pts condition. 1400- ETT and mouth care complete. Pt turned and repositioned. 1600- Reassessment complete, see doc flowsheets. ETT and mouth care complete. Pt turned and repositioned. 1800- ETT and mouth care complete. Pt placed on 100% FiO2 prior to proning. RT and RNs present at bedside. Yulia Emerson RN. 1810- Pt FiO2 decreased to 80%. 1900- Bedside shift change report given to Jami Kelsey (oncoming nurse) by Marisa Rivera (offgoing nurse). Report included the following information SBAR, Kardex, ED Summary, Intake/Output, MAR, Recent Results, Cardiac Rhythm NSR to ST and Alarm Parameters .

## 2021-12-14 NOTE — PROGRESS NOTES
Called patient's wife, Royal Ball, to discuss daily updates. Confirmed by birth date. Discussed recommendations for a Palliative Care consult to start the discussion for advance care planning given patient's condition has not improved. Mrs. Blanco voiced understanding and agreed to have a discussion with the Palliative team. Will place consult.      Crispin Day MD   Family Medicine Resident

## 2021-12-14 NOTE — PROGRESS NOTES
12/14/2021  1:26 PM    Care Management Progress Note      ICD-10-CM ICD-9-CM    1. COVID-19  U07.1 079.89    2. Hypoxia  R09.02 799.02    3. Acute hypoxemic respiratory failure due to COVID-19 (HCC)  U07.1 518.81     J96.01 079.89      799.02    4. Elevated liver enzymes  R74.8 790.5    5. Obesity, morbid ([facility]  E66.01 278.01    6. JESUS (obstructive sleep apnea)  G47.33 327.23    7. Primary hypertension  I10 401.9    8. Decreased hearing of left ear  H91.92 389.9    9. Pure hypercholesterolemia  E78.00 272.0    10. Acute respiratory distress syndrome (ARDS) due to COVID-19 virus (HCC)  U07.1 518.82     J80 079.89    11. Severe sepsis ([facility])  A41.9 038.9     R65.20 995.92    12. Other pneumothorax  J93.83 512.89    13. Pneumomediastinum ([facility])  J98.2 518.1    14. Controlled type 2 diabetes mellitus without complication, unspecified whether long term insulin use ([facility])  E11.9 250.00    15. Hyponatremia  E87.1 276.1    16. Pneumothorax on left  J93.9 512.89    17. ARDS (adult respiratory distress syndrome) ([facility])  J80 518.52    18. Septic shock ([facility])  A41.9 038.9     R65.21 785.52      995.92    19. Oozing skin inflammation  L08.9 686.9    20. On total parenteral nutrition (TPN)  Z78.9 V49.89    21. Other hyperlipidemia  E78.49 272.4    22. On tube feeding diet  Z78.9 V49.89        RUR:  14%  Risk Level: [x]Low []Moderate []High  Value-based purchasing: [] Yes [x] No  Bundle patient: [] Yes [x] No   Specify:     Transition of care plan:  1. Intubated, FiO2 60%, PEEP 16; on TPN  2. Possible transfer to [facility] for ECMO and potential lung transplant; would not transfer until after another CT is done around 12/24 showing end stage fibrosis   3.  CM to continue to follow    Claus Garcia RN

## 2021-12-14 NOTE — PROGRESS NOTES
[address] [facility] [address] [facility]   Office [phone]  Fax [phone]          Assessment and Plan     Hipolito Salazar is a 39 y.o. male with a PMH of HTN, HLD, JESUS admitted for AHRF and severe sepsis 2/2 COVID PNA. Patient was admitted on 11/19/2021. Interval Events:  No acute events, patient afebrile now. AHRF and ARDS 2/2 COVID PNA: Intubated and sedated since 11/30/21. Discussion w/ [facility], considering lung transplant but not until x5-6wks s/p symptom onset, will need CT ~ 12/24/21 prior to progress towards transplant to confirm end stage fibrosis. - Daily weights  - Prone as tolerated  - Goal sats 88% or higher  - CBC, CMP daily. No need for further trending Covid labs  - Continue Decadron 6mg IV daily  - Duo-neb or Combivent Respimat Q6H  - Holding COVID meds (Atorvastatin 20mg every day, Vit C 500mg BID, Zinc 220mg daily)  - Pulm following, appreciate recs  - Will discuss with family, possible Palliative Consult to discuss goals of care and code status    Severe sepsis 2/2 COVID PNA:. Leukocytosis stable. Fungal Cx, BCx, UCx NGTD. S/p x7 course Maria Fernanda/Vanc/Eraxis, now off abx 12/8. Now afebrile. - Continued on Sanjeev to maintain MAP > 65 - -Running at 40 this morning. Nutritional status:  Nutrition consulted for TPN, appreciate recs. - continue TPN per nutrition recs   - trial trickle feeds as tolerated    Oozing (improved): Mild bleeding with suctioning otherwise no other findings while prone.     - On prophylactic Lovenox 40 mg BID    Pneumomediastinum/Pneumothorax: Possibly barotrauma 2/2 high pressures needed for COVID ARDS PNA vs lung frailty. Repeat CXR 12/1 showing stable pneumomediastinum, and resolved pneumothorax. - Monitor for signs of worsening barotrauma     Insomnia/anxiety: Pt with new insomnia and anxiety, specifically overnight. - Intubated, sedated    HTN: Pt normotensive off meds. Currently hypotensive.  At home is on Amlodipine 10mg daily and HCTZ 12.5mg daily  - Will continue to monitor      HLD: Chronic, stable. Last lipids 07/2021 , HDL 54, LDL 98.6, . On Atorvastatin 10mg daily.  - Holding Atorvastatin 20mg daily     T2DM (diet-controlled): Prior A1c 5.8% in 05/2021. No meds outpatient. On steroids now for COVID.  - SSI (resistant given morbid obesity) w/ q6h glucose checks  - increase Lantus 14u nightly given elevated sugars. Will discuss plan with pharmacy to coordinate with insulin being given through TPN.      JESUS: On CPAP at night. - Intubated, sedated.     Obesity: Body mass index is 42.4 kg/m² on presentation.  - Encouraging lifestyle modifications and further follow up outpatient. At-risk JAMIR: RESOLVED. POA Cr 1.31, BL 0.8. Likely IVVD in setting of poor PO intake. - Daily CMP    Chest pain: RESOLVED. Description is pleuritic in nature, worse with cough. EKG without evidence of acute ischemia. Troponin of 9 makes CAD less concerning.   - Cardiac monitoring, will continue to monitor     Diarrhea: RESOLVED. Watery diarrhea prior to admission. Likely 2/2 COVID infection. Improved since presentation. Enteric bacteria panel negative. Abdominal pain: RESOLVED. Secondary to gas pains after starting diet. - Continue Simethicone       FEN/GI - TPN  Activity - Ambulate with assistance  DVT prophylaxis - Lovenox (ppx)  GI prophylaxis - Protonix  Disposition - Plan to d/c to TBD. Code Status - Full. Discussed with patient / caregivers. Next of [facility] [address] and [address] [facility],  Spouse - [phone]      Romie Fleischer, MD  Family Medicine Resident    Patient discussed with Family Medicine Attending: Dr. Marco Huang / Objective   Subjective:   Patient intubated and proned, on pressors. Sedated. No acute distress.      Respiratory: O2 Flow Rate (L/min): 60 l/min O2 Device: Endotracheal tube, Ventilator   Visit Vitals  /70   Pulse 63   Temp 98.5 °F (36.9 °C)   Resp 28   Ht 5' 10.98\" (1.803 m)   Wt 239 lb 8 oz (108.6 kg)   SpO2 98%   BMI 33.42 kg/m²     General: Intubated, sedated. Appears comfortable. Respiratory: Transmitted mechanical sounds appreciated bilaterally, unchanged. Good air movement. Cardiovascular: regular rate, regular rhythm. GI: + bowel sounds. Nontender. Extremities:  No LE edema. Extremities well perfused and warm. Skin: Warm, dry. PICC line in place  Neuro: Intubated sedated  Mitchell in place     I/O:  Date 12/13/21 0700 - 12/14/21 0659 12/14/21 0700 - 12/15/21 0659   Shift 0071-5962 8081-3134 24 Hour Total 6045-8796 5087-9565 24 Hour Total   INTAKE   I.V.(mL/kg/hr) 0701.9(5.2) 2148. 3 4516.1        Versed Volume 160 120 280        Nimbex Volume 179.2 134.4 313.6        Fentanyl Volume 64 48 112        Diprivan Volume 598.4 448. 8 1047.2        Phenylephrine Volume 358.2 240 598.2        Volume (TPN ADULT - CENTRAL) 7134 846 9736        Volume (TPN ADULT - CENTRAL)  590.1 590.1      NG/  150        Medication Volume (Orogastric Tube 11/30/21) 150  150      Shift Total(mL/kg) 3901.8(40.1) 3236.9(79.2) 4666.1(43)      OUTPUT   Urine(mL/kg/hr) 1450(1.1) 570 2020        Urine Output (mL) (Urinary Catheter 12/11/21 Mitchell) 2438 417 5150      Shift Total(mL/kg) 8001(97.7) 570(5.2) 0451(20.9)      NET 1067.8 1578.3 2646. 1      Weight (kg) 108.6 108.6 108.6 108.6 108.6 108.6       CBC:  Recent Labs     12/13/21  0340 12/12/21  0916 12/12/21  0613   WBC 11.6* 11.6* PLEASE DISREGARD RESULTS   HGB 12.3 11.8* PLEASE DISREGARD RESULTS   HCT 39.0 32.2* PLEASE DISREGARD RESULTS    225 PLEASE DISREGARD RESULTS       Metabolic Panel:  Recent Labs     12/14/21  0401 12/13/21  0340 12/12/21  1145 12/12/21  0916   NA  --  143 144  --    K  --  4.2 3.4*  --    CL  --  104 103  --    CO2  --  32 37*  --    BUN  --  49* 47*  --    CREA  --  0.84 0.69*  --    GLU  --  299* 219*  --    CA  --  9.6 9.5  --    MG  --  2.0 2.3  --    PHOS  --   --  3.4  --    ALB  --  2.5* 2.6*  --    ALT --  200* 149*  --    INR 1.1 1.1  --  1.1          For Billing    Chief Complaint   Patient presents with   [address] [facility] Problems  Date Reviewed: 12/3/2021          Codes Class Noted POA    Oozing skin inflammation ICD-10-CM: L08.9  ICD-9-CM: 686.9  12/3/2021 Unknown        Positive D dimer ICD-10-CM: R79.89  ICD-9-CM: 790.92  12/3/2021 Unknown        Other hyperlipidemia ICD-10-CM: E78.49  ICD-9-CM: 272.4  12/3/2021 Unknown        Elevated serum creatinine ICD-10-CM: R79.89  ICD-9-CM: 790.99  12/3/2021 Unknown        Community acquired pneumonia ICD-10-CM: J18.9  ICD-9-CM: 951  12/3/2021 Unknown        ARDS (adult respiratory distress syndrome) ([facility] ICD-10-CM: Kathy Sneddon  ICD-9-CM: 518.52  12/1/2021 No        Septic shock ([facility] ICD-10-CM: A41.9, R65.21  ICD-9-CM: 038.9, 785.52, 995.92  11/26/2021 Yes        Pneumomediastinum ([facility] ICD-10-CM: J98.2  ICD-9-CM: 518.1  11/26/2021 No        Pneumothorax on left ICD-10-CM: J93.9  ICD-9-CM: 512.89  11/26/2021 No        Hyponatremia ICD-10-CM: E87.1  ICD-9-CM: 276.1  11/26/2021 Yes        Diabetes mellitus type 2, controlled ([facility] ICD-10-CM: E11.9  ICD-9-CM: 250.00  11/25/2021 Yes        * (Principal) Acute hypoxemic respiratory failure due to COVID-19 [facility]) ICD-10-CM: U07.1, J96.01  ICD-9-CM: 518.81, 079.89, 799.02  11/19/2021 Yes        Obesity, morbid ([facility] ICD-10-CM: E66.01  ICD-9-CM: 278.01  4/10/2018 Yes        Elevated liver enzymes ICD-10-CM: R74.8  ICD-9-CM: 790.5  8/5/2016 Yes        JESUS (obstructive sleep apnea) ICD-10-CM: G47.33  ICD-9-CM: 327.23  10/15/2014 Yes        Hypertension ICD-10-CM: I10  ICD-9-CM: 401.9  10/15/2014 Yes        Pure hypercholesterolemia ICD-10-CM: E78.00  ICD-9-CM: 272.0  Unknown Yes

## 2021-12-15 NOTE — ACP (ADVANCE CARE PLANNING)
Primary Decision MakerPatrick Gonzalez - [phone]  Advance Care Planning 11/19/2021   Patient's Healthcare Decision Maker is: Legal Next of Kin   Confirm Advance Directive None     Pt does not have AMD on file, is intubated/sedated, unable to complete at this time. Wife Angel Jerez is legal NOK and surrogate decision maker.

## 2021-12-15 NOTE — WOUND CARE
Wound visit: follow up visit this a.m while prone to reassess sacrum/buttocks/heels. Assessment:  Mepilex sacral dressing in place for prevention; lifted to assess - no redness to sacrum/buttocks - reseated dressing. Heels / feet without redness. Recommendations/Plan: We will continue to follow.   Alberto Kahn RN,CWON

## 2021-12-15 NOTE — PROGRESS NOTES
Consult received from [facility] team.  Chart reviewed and discussed with Dr. Bea Carranza. Family meeting with wife planned for tomorrow.      Dr. Erasmo Farias

## 2021-12-15 NOTE — PROGRESS NOTES
Progress Note  Date:12/15/2021       Room:[address] [place] [address]  Patient Name:Pan Blanco     YOB: 1985     Age:36 y.o. Subjective    Subjective   Review of Systems  Objective         Vitals Last 24 Hours:  TEMPERATURE:  Temp  Av.7 °F (37.6 °C)  Min: 99 °F (37.2 °C)  Max: 100.2 °F (37.9 °C)  RESPIRATIONS RANGE: Resp  Av.5  Min: 12  Max: 28  PULSE OXIMETRY RANGE: SpO2  Av.5 %  Min: 91 %  Max: 100 %  PULSE RANGE: Pulse  Av.7  Min: 80  Max: 121  BLOOD PRESSURE RANGE: Systolic (96RTO), GQD:493 , Min:97 , JIMMIE:973   ; Diastolic (54QSL), UDF:57, Min:53, Max:78    I/O (24Hr):     Intake/Output Summary (Last 24 hours) at 12/15/2021 1307  Last data filed at 12/15/2021 1200  Gross per 24 hour   Intake 3903.1 ml   Output 4313 ml   Net -409.9 ml     Objective   Exam facilitated by use of telemedicine platform and with assistance from in house team  Gen - intubated and sedated; nad but in prone position; backside intact  Head - nc/at  Eyes - periorbital edema on left (could not visualize right eye)  Ent - ett circuit without secretions or hemorrhage  Cvs - sinus rhythm without external evidence of hypoperfusion  Pulm - symmetric air rise with ventilation  Gi-obscured as prone  Ext - no c/c; edema present  Msk - normal bulk  Neuro - intubated, sedated; paralyzed; no spont motor activity    Labs/Imaging/Diagnostics    Labs:  CBC:  Recent Labs     12/15/21  0621  0401 21  0340   WBC 11.2* 9.9 11.6*   RBC 4.26 3.46* 4.04*   HGB 13.0 11.1* 12.3   HCT 41.1 34.0* 39.0   MCV 96.5 98.3 96.5   RDW 13.8 13.9 13.9    202 255     CHEMISTRIES:  Recent Labs     12/15/21  0621  0401 21  0340   * 137 143   K 4.4 3.6 4.2    103 104   CO2 35* 28 32   BUN 41* 40* 49*   CA 9.3 UNABLE TO OBTAIN ACCURATE RESULTS DUE TO GROSS LIPEMIA 9.6   PHOS 4.5  --   --    MG 1.9 1.6 2.0   PT/INR:  Recent Labs     12/15/21  0625 21  9215 12/13/21  0340   INR 1.1 1.1 1.1     APTT:  Recent Labs     12/15/21  0625 12/14/21  0401 12/13/21  0340   APTT <20.0* 21.7* 21.6*     LIVER PROFILE:  Recent Labs     12/15/21  0625 12/14/21  0401 12/13/21  0340   * 91* 114*   * 207* 200*     Lab Results   Component Value Date/Time    ALT (SGPT) 352 (H) 12/15/2021 06:25 AM    AST (SGOT) 122 (H) 12/15/2021 06:25 AM    Alk. phosphatase 112 12/15/2021 06:25 AM    Bilirubin, direct 0.14 08/11/2016 03:50 PM    Bilirubin, total 0.6 12/15/2021 06:25 AM       Imaging Last 24 Hours:  XR CHEST PORT    Result Date: 12/15/2021  PORTABLE CHEST RADIOGRAPH/S: 12/15/2021 10:32 AM INDICATION: ET tube. COMPARISON: 12/13/2021, 12/11/2021, 12/10/2021, 12/5/2021. TECHNIQUE: Portable frontal semiupright radiograph/s of the chest. FINDINGS: An ET tube, NG tube, and right PICC are in appropriate position. The lungs are hypoinflated and there is bibasilar atelectasis. Alternatively, this could represent hazy COVID-19 pneumonia. The central airways are patent. No pneumothorax and no large pleural effusion. Lines and tubes in appropriate position. Shallow volumes and basilar atelectasis, versus COVID-19 pneumonia.     Assessment//Plan   Principal Problem:    Acute hypoxemic respiratory failure due to COVID-19 [facility]) (11/19/2021)    Active Problems:    Pure hypercholesterolemia ()      JESUS (obstructive sleep apnea) (10/15/2014)      Hypertension (10/15/2014)      Elevated liver enzymes (8/5/2016)      Obesity, morbid ([address] (4/10/2018)      Diabetes mellitus type 2, controlled ([address] (11/25/2021)      Septic shock ([address] (11/26/2021)      Pneumomediastinum ([address] (11/26/2021)      Pneumothorax on left (11/26/2021)      Hyponatremia (11/26/2021)      ARDS (adult respiratory distress syndrome) ([address] (12/1/2021)      Oozing skin inflammation (12/3/2021)      Positive D dimer (12/3/2021)      Other hyperlipidemia (12/3/2021)      Elevated serum creatinine (12/3/2021)      Community acquired pneumonia (12/3/2021)      Assessment & Plan   Acute hypoxic resp failure  ARDS  Covid  DM  JESUS       NEURO - remains intubated and sedated with more negative goal rass given severity of hypoxia and the need for NMBs  -LFTs slowly climbing as such will increase ceiling for fentanyl and versed and endeavor to wean the propofol    CVS - continued vasopressors to ensure MAPs at goal; no evidence of hypoperfusion and suspect degree of hypotension related to depth of sedation  -Echo demonstrated EF 50-55%  -h/o HLD - czn resume statin once LFTs permit      Pulm -ongoing ARDs secondary to multifocal pneumonia from covid; he had responded to prone posititioning with variable success but Pa: FiO2 remains in mod-severe ARDs range; presently supine and will follow on afternoon ABG to determine if re-proning warranted; that said, the mortality benefit to proning has passed and now being used as a rescue maneuver  - Pt is not a candidate for ECMO given BMI and prolonged mechanical ventilation    - his IBW is 75kg and 8,7,6ml/kg translates to 600,525, and 450 - will reassess airway pressures and ABG prior to making changes to settings   -s/p potential regimen for covid and s/p tocilizumab earlier in course  - no extension of/resolution of pneumomediastinum and prior pneumothorax, respectively  - h/o jesus    GI-contined residuals/draining from OGT - remains npo  - Remains on MADELYN proph     -remains at high risk for JAMIR with sepsis, hypotension, hypoxia, and covid infection; that said, would run pt on drier side given limited pulm reserve     HEME -  Protonix/SQ Lovenox twice daily  - no immediate need for transfusion of blood products     ID - s/p prior course of empiric abx and antifungals given uptrending wbc and increased vasopressor requirements; presently being monitored off abx     ENDO - DM (diet-controlled prior to ad mission) and A1c 5.8% in 05/2021 but hyperglycemic in setting of steroids and critical illness; long acting insulin adjusted  - follow accu checks and make additional adjustments in regimen as needed      FEN - goal negative as possible; monitor and correct electrolytes as needed  Remains on tpn     CCT 50 minutres excluding teaching and procedures  I performed all aspects of the physical examination via Telemedicine associated with two way audio and video communication and with the on-site assistance of the bedside nurse. I am located in [place] and the patient is located in [place] at [address] [place] [address]   The patient is critically ill in the ICU. I  personally  reviewed the pertinent medical records, laboratory/ pathology data and radiographic images. The decision making regarding this patient is as documented above, which was generated  following  discussion  with the multidisciplinary ICU team and creation of a treatment plan for  the patient. We discussed the patient's interval history and future coordination of care and  plans. The patient's medications  were reviewed and changes made as stipulated above. Due to  critical illness impairing one or more vital organs of this patient resulting in life threatening clinical situation  I have provided direct, frequent personal  assessment and manipulation in management plan.     Electronically signed by Nell Garcia MD on 12/15/2021 at 1:07 PM

## 2021-12-15 NOTE — PROGRESS NOTES
1900: Bedside and Verbal shift change report given to Damaris Silverio RN (oncoming nurse) by Lobo Bronson RN (offgoing nurse). Report included the following information SBAR, Intake/Output and Cardiac Rhythm SR.     2000: Assessment completed, see doc flow sheet. Pt turned and repositioned. VAP bundle completed. Mouth care done. Neuro: Pt is sedated and pharm paralyzed. TOF baseline 40 mA with 0/4 twitches. R-pupil is round and sluggish 3 mm, L-pupil is round and brisk 3 mm. No cough w/suction. Cardio: S1S2 present, NSR    Respiratory: ETT 8, 25 @ the lips, , PEEP 16, Rate 28, FIO2 80 sats @ 100. Bilateral lung sounds are diminished. Note diminished base of right lung. GI/: MATTHEW to assess abdomen/BS d/t prone positioning. Mitchell catheter in place draining clear yellow urine. 300 ml of urine in chamber. Skin: Skin tear on left cheek d/t donning/doffing cheek cushion for proning. Preventative mepilex on shoulders, knees, and buttocks. Minimal bruising on right inner arm. Tattoos noted. Swelling BUE, BLE.    2200: Pt's arms turned and repositioned. VAP bundle completed. Mouth care done. 0000: Assessment completed, see doc flow sheet. Pt turned and repositioned. VAP bundle completed. Mouth care done. TOF baseline 40 mA with 0/4 twitches. 0200: Pt's arms turned and repositioned. VAP bundle completed. Mouth care done. 0400: Assessment completed, see doc flow sheet. Pt turned and repositioned. VAP bundle completed. Mouth care done. TOF baseline 40 mA with 0/4 twitches.     0600: Pt turned and repositioned. VAP bundle completed. Mouth care done. 0700: Bedside and Verbal shift change report given to Lobo Bronson RN (oncoming nurse) by Damaris Silverio RN (offgoing nurse).  Report included the following information SBAR, Intake/Output and Cardiac Rhythm SR.

## 2021-12-15 NOTE — PROGRESS NOTES
0700- Bedside shift change report given to Marisa Rivera (oncoming nurse) by Laury Gaucher (offgoing nurse). Report included the following information SBAR, Kardex, ED Summary, Intake/Output, MAR, Recent Results, Cardiac Rhythm NSR ST and Alarm Parameters . Primary Nurse Carolyn Andrews RN and Laury Gaucher, RN performed a dual skin assessment on this patient Impairment noted- see wound doc flow sheet  Cameron score is 9.  0800- Shift assessment complete, see doc flowsheets. ETT and mouth care complete. 1000- Pt supined at this time. With RT and RNs at bedside. FiO2 on ventilator increased to 100% prior to turning. ETT and mouth care complete. Dressing onL cheek and ETT tape changed at this time. 1010- FiO2 decreased to 80%. 1100- IDRs completed at this time. 1200- Reassessment complete, see doc flowsheets. ETT and mouth care complete. Pt turned and repositioned. 1400- ETT and mouth care complete. Pt turned and repositioned. 1600- ETT and mouth care complete. Pt turned and repositioned. 1800- ETT and mouth care complete. Pt FiO2 increased to 100% prior to proning. Pt proned with RNs and RT at bedside. Carolyn Andrews RN. 1900- Bedside shift change report given to Laury Gaucher (oncoming nurse) by Marisa Rivera (offgoing nurse). Report included the following information SBAR, Kardex, ED Summary, Intake/Output, MAR, Recent Results, Cardiac Rhythm NSR & ST and Alarm Parameters .

## 2021-12-15 NOTE — PROGRESS NOTES
called the wife of Mr Chandan Myers in room 3015 to offer support. Wife, Wanda Gonzales, stated that the family was doing as well as they could given the circumstances. She sounded tearful as she shared that the children were aware of how sick the patient was. Wanda Gonzales stated that the family had excellent support from family and friends. Acknowledged her feelings and concerns and offered words of support. Reassured her of ongoing  availability for support and of prayers on their behalf. She expressed appreciation for the support. : Rev. Angelica Henry.  Luis Ibanez; [facility], to contact [phone] [address] call: [phone]

## 2021-12-15 NOTE — PROGRESS NOTES
Problem: Falls - Risk of  Goal: *Absence of Falls  Description: Document Scooter Sol Fall Risk and appropriate interventions in the flowsheet.   Outcome: Progressing Towards Goal  Note: Fall Risk Interventions:  Mobility Interventions: Assess mobility with egress test,Bed/chair exit alarm,Strengthening exercises (ROM-active/passive)    Mentation Interventions: Adequate sleep, hydration, pain control,Bed/chair exit alarm,Door open when patient unattended,Evaluate medications/consider consulting pharmacy,More frequent rounding,Room close to nurse's station,Toileting rounds,Update white board    Medication Interventions: Assess postural VS orthostatic hypotension,Bed/chair exit alarm,Evaluate medications/consider consulting pharmacy    Elimination Interventions: Bed/chair exit alarm,Toileting schedule/hourly rounds              Problem: Patient Education: Go to Patient Education Activity  Goal: Patient/Family Education  Outcome: Progressing Towards Goal     Problem: Ventilator Management  Goal: *Adequate oxygenation and ventilation  Outcome: Progressing Towards Goal  Goal: *Patient maintains clear airway/free of aspiration  Outcome: Progressing Towards Goal  Goal: *Absence of infection signs and symptoms  Outcome: Progressing Towards Goal  Goal: *Normal spontaneous ventilation  Outcome: Progressing Towards Goal     Problem: Patient Education: Go to Patient Education Activity  Goal: Patient/Family Education  Outcome: Progressing Towards Goal     Problem: Nutrition Deficit  Goal: *Optimize nutritional status  Outcome: Progressing Towards Goal

## 2021-12-15 NOTE — PROGRESS NOTES
[facility] Residency Program       Resident Progress Note in Brief    S: Patient seen and examined at bedside. Pt intubated and sedated. No concerns from Nursing. O:  Visit Vitals  BP (!) 99/57   Pulse (!) 115   Temp 99 °F (37.2 °C)   Resp 28   Ht 5' 10.98\" (1.803 m)   Wt 239 lb 8 oz (108.6 kg)   SpO2 96%   BMI 33.42 kg/m²     Physical Examination:   General: Intubated, sedated. Appears comfortable. Respiratory: Transmitted mechanical sounds appreciated bilaterally, unchanged. Good air movement. Cardiovascular: regular rate, regular rhythm. Extremities:  No LE edema. Extremities well perfused and warm. Skin: Warm, dry. PICC line in place  Neuro: Intubated sedated  Mitchell in place     A/P: 39 y. o. male with a PMH of HTN, HLD, JESUS admitted for AHRF and ARDS 2/2 COVID PNA. Currently intubated and sedated. Patient was admitted on 11/19/2021. AHRF and ARDS 2/2 COVID PNA: Intubated and sedated. Pending bed availability for [facility] ECM[facility] at [facility]. Discussion w/ [facility], considering lung transplant but not until x5-6wks s/p symptom onset, will need CT ~ 12/24/21 prior to progress towards transplant to confirm end stage fibrosis. - Daily weights  - Prone as tolerated  - Goal sats 88% or higher  - CBC, CMP daily. No need for further trending Covid labs  - Continue Decadron 6mg IV daily  - Duo-neb or Combivent Respimat Q6H  - Holding COVID meds (Atorvastatin 20mg every day, Vit C 500mg BID, Zinc 220mg daily)  - Pulm following, appreciate recs     Severe sepsis 2/2 COVID PNA:. Leukocytosis stable. Fungal Cx, BCx, UCx NGTD. S/p x7 course Maria Fernanda/Vanc/Eraxis, now off abx 12/8. Continues to be febrile. Likely 2/2 COVID, possibly UTI vs bacteremia given lines. - Patient continues febrile. Cultures continue to be negative. - Restarted Sanjeev to maintain MAP > 65 - wean off as tolerated      Nutritional status:  Nutrition consulted for TPN, appreciate recs.    - continue TPN per nutrition recs   - trial trickle feeds as tolerated    Please see the primary team's daily progress note for the patient's full plan.       Greta Renteria MD  Family Medicine Resident

## 2021-12-15 NOTE — PROGRESS NOTES
RUR 13%  LOS 26 days,GLOS 12.4    Pt remains intubated ,sedated and paralyzed. Pt remains on fentanyl,propofol,versed,nimbex gtts,  Peep 16,Fio2 70%    Wife,Nona is HAILEE @ [phone]. I discussed pt with FP resident who informed me pt.'s PCR was negative so second PCR  has been sent to the lab.     Jade Nicholas

## 2021-12-15 NOTE — PROGRESS NOTES
[facility] Residency [facility]       Resident Progress Note in Brief    S: Patient seen and examined at bedside. Pt intubated and sedated. No concerns from Nursing. O:  Visit Vitals  BP (!) 99/57   Pulse (!) 115   Temp 99 °F (37.2 °C)   Resp 28   Ht 5' 10.98\" (1.803 m)   Wt 239 lb 8 oz (108.6 kg)   SpO2 96%   BMI 33.42 kg/m²     Physical Examination:   General: Intubated, sedated. Appears comfortable. Respiratory: Transmitted mechanical sounds appreciated bilaterally, unchanged. Good air movement. Cardiovascular: regular rate, regular rhythm. Extremities:  No LE edema. Extremities well perfused and warm. Skin: Warm, dry. PICC line in place  Neuro: Intubated sedated  Mitchell in place     A/P: 39 y. o. male with a PMH of HTN, HLD, JESUS admitted for AHRF and ARDS 2/2 COVID PNA. Currently intubated and sedated. Patient was admitted on 11/19/2021. AHRF and ARDS 2/2 COVID PNA: Intubated and sedated. Pending bed availability for possible ECMO at [facility]. Discussion w/ [facility], considering lung transplant but not until x5-6wks s/p symptom onset, will need CT ~ 12/24/21 prior to progress towards transplant to confirm end stage fibrosis. - Daily weights  - Prone as tolerated  - Goal sats 88% or higher  - CBC, CMP daily. No need for further trending Covid labs  - Continue Decadron 6mg IV daily  - Duo-neb or Combivent Respimat Q6H  - Holding COVID meds (Atorvastatin 20mg every day, Vit C 500mg BID, Zinc 220mg daily)  - Pulm following, appreciate recs     Severe sepsis 2/2 COVID PNA:. Leukocytosis stable. Fungal Cx, BCx, UCx NGTD. S/p x7 course Maria Fernanda/Vanc/Eraxis, now off abx 12/8. Continues to be febrile. Likely 2/2 COVID, possibly UTI vs bacteremia given lines. - Patient continues febrile. Cultures continue to be negative. - Restarted Sanjeev to maintain MAP > 65 - wean off as tolerated      Nutritional status:  Nutrition consulted for TPN, appreciate recs.    - continue TPN per nutrition recs   - trial trickle feeds as tolerated    Please see the primary team's daily progress note for the patient's full plan.       Dean Up MD  Family Medicine Resident

## 2021-12-15 NOTE — PROGRESS NOTES
SW reached out to pt's wife Tarsha Tovar, scheduled family meeting for 1pm on Thursday 12/16/2021. Wife will have friend present for support.

## 2021-12-15 NOTE — PROGRESS NOTES
[address] [facility] [address] [place] [address]   Office [phone]  Fax [phone]          Assessment and Plan     Naveen Mederos is a 39 y.o. male with a PMH of HTN, HLD, JESUS admitted for AHRF and severe sepsis 2/2 COVID PNA. Patient was admitted on 11/19/2021. Interval Events:  No acute events, patient remains afebrile. Remains on Sanjeev. AHRF and ARDS 2/2 COVID PNA: Intubated and sedated since 11/30/21. Discussion w/ [facility], considering lung transplant but not until x5-6wks s/p symptom onset, will need CT ~ 12/24/21 prior to progress towards transplant to confirm end stage fibrosis. - Daily weights  - Prone as tolerated  - Goal sats 88% or higher  - CBC, CMP daily. No need for further trending Covid labs  - Continue Decadron 6mg IV daily  - Duo-neb or Combivent Respimat Q6H  - Holding COVID meds (Atorvastatin 20mg every day, Vit C 500mg BID, Zinc 220mg daily)  - Pulm following, appreciate recs  - Discussion with wife yesterday, she agrees that a Palliative Care conversation would be of benefit at this time to discuss ACP. She is remaining prayerful and hopeful, working towards possible transplant in the future. Severe sepsis 2/2 COVID PNA:. Leukocytosis stable. Fungal Cx, BCx, UCx NGTD. S/p x7 course Maria Fernanda/Vanc/Eraxis, now off abx 12/8. Now afebrile. - Continued on Sanjeev to maintain MAP > 65 - -Running at 40 this morning. Nutritional status:  Nutrition consulted for TPN, appreciate recs. - continue TPN per nutrition recs   - discuss possible transition to tube feeds as able. Elevated LFTs: ALT of 352 and AST of 122. Suspect possible etiology of TPN. - will discuss with ICU and nutrition. Transition to tube feeds as able.      Oozing (improved): Mild bleeding with suctioning otherwise no other findings while prone.     - On prophylactic Lovenox 40 mg BID    Pneumomediastinum/Pneumothorax: Possibly barotrauma 2/2 high pressures needed for COVID ARDS PNA vs lung frailty. Repeat CXR 12/1 showing stable pneumomediastinum, and resolved pneumothorax. - Monitor for signs of worsening barotrauma     Insomnia/anxiety: Pt with new insomnia and anxiety, specifically overnight. - Intubated, sedated    HTN: Pt normotensive off meds. Currently hypotensive. At home is on Amlodipine 10mg daily and HCTZ 12.5mg daily  - Will continue to monitor      HLD: Chronic, stable. Last lipids 07/2021 , HDL 54, LDL 98.6, . On Atorvastatin 10mg daily.  - Holding Atorvastatin 20mg daily     T2DM (diet-controlled): Prior A1c 5.8% in 05/2021. No meds outpatient. On steroids now for COVID.  - SSI (resistant given morbid obesity) w/ q6h glucose checks  - Lantus 14u given this am. Will discuss plan with pharmacy to coordinate with insulin being given through TPN.      JESUS: On CPAP at night. - Intubated, sedated.     Obesity: Body mass index is 42.4 kg/m² on presentation.  - Encouraging lifestyle modifications and further follow up outpatient. At-risk JAMIR: RESOLVED. POA Cr 1.31, BL 0.8. Likely IVVD in setting of poor PO intake. - Daily CMP    Chest pain: RESOLVED. Description is pleuritic in nature, worse with cough. EKG without evidence of acute ischemia. Troponin of 9 makes CAD less concerning.   - Cardiac monitoring, will continue to monitor     Diarrhea: RESOLVED. Watery diarrhea prior to admission. Likely 2/2 COVID infection. Improved since presentation. Enteric bacteria panel negative. Abdominal pain: RESOLVED. Secondary to gas pains after starting diet. - Continue Simethicone       FEN/GI - TPN  Activity - Ambulate with assistance  DVT prophylaxis - Lovenox (ppx)  GI prophylaxis - Protonix  Disposition - Plan to d/c to TBD. Code Status - Full. Discussed with patient / caregivers.   Next of Kim 68 Name and [address],  Spouse - [phone]      Crispin Day MD  Family Medicine Resident    Patient discussed with Family Medicine Attending: Dr. Lenore Perkins Subjective / Objective   Subjective:   Patient intubated and proned, on pressors. Sedated. No acute distress. Respiratory: O2 Flow Rate (L/min): 60 l/min O2 Device: Endotracheal tube   Visit Vitals  /64   Pulse 94   Temp 99.6 °F (37.6 °C)   Resp 28   Ht 5' 10.98\" (1.803 m)   Wt 240 lb (108.9 kg)   SpO2 99%   BMI 33.49 kg/m²     General: Intubated, sedated. Appears comfortable. Respiratory: Transmitted mechanical sounds appreciated bilaterally, unchanged. Good air movement. Cardiovascular: regular rate, regular rhythm. GI: + bowel sounds. Nontender. Extremities:  No LE edema. Extremities well perfused and warm. Skin: Warm, dry. PICC line in place  Neuro: Intubated sedated  Mitchell in place     I/O:  Date 12/14/21 0700 - 12/15/21 0659 12/15/21 0700 - 12/16/21 0659   Shift 6765-3589 9488-0433 24 Hour Total 5133-5068 4190-5082 24 Hour Total   INTAKE   I.V.(mL/kg/hr) 1456(1.1) 2347.1(1.8) 3803.1(1.5)        Versed Volume 100 140 240        Nimbex Volume 112 156.8 268.8        Fentanyl Volume 40 56 96        Diprivan Volume 374 523.6 897.6        Phenylephrine Volume 200 280 480        Volume (TPN ADULT - CENTRAL)         Volume (TPN ADULT - CENTRAL)  749.7 749.7      NG/  100        Medication Volume (Orogastric Tube 11/30/21) 100  100      Shift Total(mL/kg) 4266(40.8) 2347. 1(21.6) 3903. 1(35.9)      OUTPUT   Urine(mL/kg/hr) 2210(1.7) 2043(1.6) 4253(1.6) 325  325     Urine Output (mL) (Urinary Catheter 12/11/21 Mitchell) 2210 2043 4253 325  325   Shift Total(mL/kg) 1130(92.2) 4553(10.7) 4253(39.1) 325(3)  325(3)   NET -654 304.1 -349.9 -325  -325   Weight (kg) 108.6 108.6 108.6 108.9 108.9 108.9       CBC:  Recent Labs     12/15/21  0625 12/14/21  0401 12/13/21  0340   WBC 11.2* 9.9 11.6*   HGB 13.0 11.1* 12.3   HCT 41.1 34.0* 39.0    202 249       Metabolic Panel:  Recent Labs     12/15/21  0625 12/14/21  0401 12/13/21  0340 12/12/21  1145 12/12/21  1145   * 137 143 < > 144   K 4.4 3.6 4.2   < > 3.4*    103 104   < > 103   CO2 35* 28 32   < > 37*   BUN 41* 40* 49*   < > 47*   CREA 0.59* 0.47* 0.84   < > 0.69*   * 310* 299*   < > 219*   CA 9.3 UNABLE TO OBTAIN ACCURATE RESULTS DUE TO GROSS LIPEMIA 9.6   < > 9.5   MG 1.9 1.6 2.0   < > 2.3   PHOS 4.5  --   --   --  3.4   ALB 2.7* 1.2* 2.5*   < > 2.6*   * 207* 200*   < > 149*   INR 1.1 1.1 1.1  --   --     < > = values in this interval not displayed.           For Billing    Chief Complaint   Patient presents with   [facility] Problems  Date Reviewed: 12/3/2021          Codes Class Noted POA    Oozing skin inflammation ICD-10-CM: L08.9  ICD-9-CM: 686.9  12/3/2021 Unknown        Positive D dimer ICD-10-CM: R79.89  ICD-9-CM: 790.92  12/3/2021 Unknown        Other hyperlipidemia ICD-10-CM: E78.49  ICD-9-CM: 272.4  12/3/2021 Unknown        Elevated serum creatinine ICD-10-CM: R79.89  ICD-9-CM: 790.99  12/3/2021 Unknown        Community acquired pneumonia ICD-10-CM: J18.9  ICD-9-CM: 278  12/3/2021 Unknown        ARDS (adult respiratory distress syndrome) ([facility] ICD-10-CM: [place]  ICD-9-CM: 518.52  12/1/2021 No        Septic shock ([facility] ICD-10-CM: A41.9, R65.21  ICD-9-CM: 038.9, 785.52, 995.92  11/26/2021 Yes        Pneumomediastinum ([facility] ICD-10-CM: J98.2  ICD-9-CM: 518.1  11/26/2021 No        Pneumothorax on left ICD-10-CM: J93.9  ICD-9-CM: 512.89  11/26/2021 No        Hyponatremia ICD-10-CM: E87.1  ICD-9-CM: 276.1  11/26/2021 Yes        Diabetes mellitus type 2, controlled ([facility] ICD-10-CM: E11.9  ICD-9-CM: 250.00  11/25/2021 Yes        * (Principal) Acute hypoxemic respiratory failure due to COVID-19 [facility]) ICD-10-CM: U07.1, J96.01  ICD-9-CM: 518.81, 079.89, 799.02  11/19/2021 Yes        Obesity, morbid ([facility] ICD-10-CM: E66.01  ICD-9-CM: 278.01  4/10/2018 Yes        Elevated liver enzymes ICD-10-CM: R74.8  ICD-9-CM: 790.5  8/5/2016 Yes        JESUS (obstructive sleep apnea) ICD-10-CM: G47.33  ICD-9-CM: 327.23  10/15/2014 Yes        Hypertension ICD-10-CM: I10  ICD-9-CM: 401.9  10/15/2014 Yes        Pure hypercholesterolemia ICD-10-CM: E78.00  ICD-9-CM: 272.0  Unknown Yes

## 2021-12-16 NOTE — PROGRESS NOTES
[facility]. [facility] Residency Program       Resident Progress Note in Brief    S: Patient seen and examined at bedside. Pt intubated and sedated. No concerns from Nursing. O:  Visit Vitals  BP (!) 98/57   Pulse (!) 104   Temp 99 °F (37.2 °C)   Resp 28   Ht 5' 10.98\" (1.803 m)   Wt 240 lb (108.9 kg)   SpO2 94%   BMI 33.49 kg/m²     Physical Examination:   General: Intubated, sedated. Appears comfortable. Supine. Respiratory: Transmitted mechanical sounds appreciated bilaterally, unchanged. Good air movement. Cardiovascular: regular rate, regular rhythm. Extremities:  No LE edema. Extremities well perfused and warm. Skin: Warm, dry. PICC line in place  Neuro: Intubated sedated  Mitchell in place     A/P: 39 y. o. male with a PMH of HTN, HLD, JESUS admitted for AHRF and ARDS 2/2 COVID PNA. Currently intubated and sedated. Patient was admitted on 11/19/2021. AHRF and ARDS 2/2 COVID PNA: Intubated and sedated since 11/30/21. Discussion w/ [facility], considering lung transplant but not until x5-6wks s/p symptom onset, will need CT ~ 12/24/21 prior to progress towards transplant to confirm end stage fibrosis. - Daily weights  - Prone as tolerated  - Goal sats 88% or higher  - CBC, CMP daily. No need for further trending Covid labs  - Continue Decadron 6mg IV daily  - Duo-neb or Combivent Respimat Q6H  - Holding COVID meds (Atorvastatin 20mg every day, Vit C 500mg BID, Zinc 220mg daily)  - Pulm following, appreciate recs  - Family meeting today at 1pm with Palliative Care to discuss ACP. - Covid test negx2; family able to visit     Severe sepsis 2/2 COVID PNA:. Leukocytosis stable. Fungal Cx, BCx, UCx NGTD. S/p x7 course Maria Fernanda/Vanc/Eraxis, now off abx 12/8. Fever overnight to 100.7.   - Continued on Sanjeev to maintain MAP > 65 - -Running at 40 this morning.   - Will not pursue workup for fever at this time, likely secondary to [place].  Will continue to trend, consider possible repeat Cx, UA, and CXR if fever worsens     Nutritional status:  Nutrition consulted for TPN, appreciate recs. - continue TPN per nutrition recs. Please see the primary team's daily progress note for the patient's full plan. ADDENDUM: 9:30pm  Received a call from nursing, stating pt is tachycardic to mid 140s and febrile to 101. Requested an EKG which showed Sinus tachycardia. Evaluated pt at bedside, reviewing telemetry, which also showed sinus tachycardia. Gave pt a 250ml NS bolus and tylenol suppository with no improvement. Spoke to intensivist, who recommended giving pt another 500ml bolus and albumin. Will continue to monitor.      Andre Rodriguez MD  Family Medicine Resident

## 2021-12-16 NOTE — PROGRESS NOTES
0700- Bedside and Verbal shift change report received from Shawn Douglass RN (offgoing nurse) by Merlene Alcantar RN (oncoming nurse). Report included the following information SBAR, Kardex, ED Summary, OR Summary, Procedure Summary, Intake/Output, MAR, Accordion, Recent Results, Med Rec Status, Cardiac Rhythm SR,ST and Alarm Parameters . Primary Nurse Merlene Alcantar RN and SAUL Wade performed a dual skin assessment on this patient Impairment noted- see wound doc flow sheet. All drips verified. 0800- Patient shift assessment performed. Patient turned and resting comfortably. Call bell in reach, bed in low and locked position,Patient board updated  0900  :given due medication . Dr Erin Jones made rounds. 1000: VAP bundle performed. Patient supine . Tolerated well       1200- Reassessment performed. No changes noted. VAP bundle performed. Patient turned and resting comfortably. 1400- VAP bundle performed. Patient turned and resting comfortably. 1600- Reassessment performed. VAP bundle performed. Patient turned and resting comfortably. 1800- VAP bundle performed. Patient turned and resting comfortably. no prone today for him per dr James Drivers . ABG result notified . 1900- Bedside and Verbal shift change report given to [address] Jessi Bucio RN(oncoming nurse) by Merlene Alcantar RN (offgoing nurse). Report included the following information SBAR, Kardex, ED Summary, Procedure Summary, Intake/Output, MAR, Recent Results, Cardiac Rhythm SR,ST and Alarm Parameters .

## 2021-12-16 NOTE — PROGRESS NOTES
[address] [facility] [address] [place] [address]   Office [phone]  Fax [phone]          Assessment and Plan     Johnny Morelos is a 39 y.o. male with a PMH of HTN, HLD, JESUS admitted for AHRF and severe sepsis 2/2 COVID PNA. Patient was admitted on 11/19/2021. Interval Events:  No acute events, fevers to 100.7 overnight. AHRF and ARDS 2/2 COVID PNA: Intubated and sedated since 11/30/21. Discussion w/ [facility], considering lung transplant but not until x5-6wks s/p symptom onset, will need CT ~ 12/24/21 prior to progress towards transplant to confirm end stage fibrosis. - Daily weights  - Prone as tolerated  - Goal sats 88% or higher  - CBC, CMP daily. No need for further trending Covid labs  - Continue Decadron 6mg IV daily  - Duo-neb or Combivent Respimat Q6H  - Holding COVID meds (Atorvastatin 20mg every day, Vit C 500mg BID, Zinc 220mg daily)  - Pulm following, appreciate recs  - Family meeting today at 1pm with Palliative Care to discuss ACP. - Covid retest yesterday negative, follow up second Covid test for negative result prior to visitation. Visitors will also require negative Covid test.    Severe sepsis 2/2 COVID PNA:. Leukocytosis stable. Fungal Cx, BCx, UCx NGTD. S/p x7 course Maria Fernanda/Vanc/Eraxis, now off abx 12/8. Fever overnight to 100.7.   - Continued on Sanjeev to maintain MAP > 65 - -Running at 40 this morning.   - Will not pursue workup for fever at this time, likely [facility] to [facility]. Will continue to trend, consider possible repeat Cx, UA, and CXR if fever worsens    Nutritional status:  Nutrition consulted for TPN, appreciate recs. - continue TPN per nutrition recs. Elevated LFTs: ALT of 352 and AST of 122. Suspect possible etiology of TPN. - will discuss with ICU and nutrition. Per ICU Propofol possibly contributing.     Oozing (improved): Mild bleeding with suctioning otherwise no other findings while prone.     - On prophylactic Lovenox 40 mg BID    Pneumomediastinum/Pneumothorax: Possibly barotrauma 2/2 high pressures needed for COVID ARDS PNA vs lung frailty. Repeat CXR 12/1 showing stable pneumomediastinum, and resolved pneumothorax. - Monitor for signs of worsening barotrauma     Insomnia/anxiety: Pt with new insomnia and anxiety, specifically overnight. - Intubated, sedated    HTN: Pt normotensive off meds. Currently hypotensive. At home is on Amlodipine 10mg daily and HCTZ 12.5mg daily  - Will continue to monitor      HLD: Chronic, stable. Last lipids 07/2021 , HDL 54, LDL 98.6, . On Atorvastatin 10mg daily.  - Holding Atorvastatin 20mg daily     T2DM (diet-controlled): Prior A1c 5.8% in 05/2021. No meds outpatient. On steroids now for COVID.  - SSI (resistant given morbid obesity) w/ q6h glucose checks  - Lantus 14u given this am. Will discuss plan with pharmacy to coordinate with insulin being given through TPN.      JESUS: On CPAP at night. - Intubated, sedated.     Obesity: Body mass index is 42.4 kg/m² on presentation.  - Encouraging lifestyle modifications and further follow up outpatient. At-risk JAMIR: RESOLVED. POA Cr 1.31, BL 0.8. Likely IVVD in setting of poor PO intake. - Daily CMP    Chest pain: RESOLVED. Description is pleuritic in nature, worse with cough. EKG without evidence of acute ischemia. Troponin of 9 makes CAD less concerning.   - Cardiac monitoring, will continue to monitor     Diarrhea: RESOLVED. Watery diarrhea prior to admission. Likely 2/2 COVID infection. Improved since presentation. Enteric bacteria panel negative. Abdominal pain: RESOLVED. Secondary to gas pains after starting diet. - Continue Simethicone       FEN/GI - TPN  Activity - Ambulate with assistance  DVT prophylaxis - Lovenox (ppx)  GI prophylaxis - Protonix  Disposition - Plan to d/c to TBD. Code Status - Full. Discussed with patient / caregivers.   Next of Kin Name and [address],  Spouse - [address] Frank Lozano MD  Family Medicine Resident    Patient discussed with Family Medicine Attending: Dr. Janet Angela / Objective   Subjective:   Patient intubated and proned, on pressors. Sedated. No acute distress. Respiratory: O2 Flow Rate (L/min): 60 l/min O2 Device: Ventilator   Visit Vitals  BP (!) 97/55   Pulse (!) 108   Temp (!) 101 °F (38.3 °C)   Resp 28   Ht 5' 10.98\" (1.803 m)   Wt 240 lb (108.9 kg)   SpO2 96%   BMI 33.49 kg/m²     General: Intubated, sedated. Appears comfortable. Respiratory: Transmitted mechanical sounds appreciated bilaterally, unchanged. Good air movement. Cardiovascular: regular rate, regular rhythm. GI: + bowel sounds. Nontender. Extremities:  No LE edema. Extremities well perfused and warm. Skin: Warm, dry. PICC line in place  Neuro: Intubated sedated  Mitchell in place     I/O:  Date 12/15/21 0700 - 12/16/21 0659 12/16/21 0700 - 12/17/21 0659   Shift 7407-9214 6050-0397 24 Hour Total 6215-6125 9699-3847 24 Hour Total   INTAKE   I.V.(mL/kg/hr) 1424.7(1.1) 2444.8(1.9) 3869.5(1.5)        Versed Volume 105.8 181.2 287        Nimbex Volume 112.6 207.1 319.7        Fentanyl Volume 42.9 79.9 122.8        Diprivan Volume 353.3 392.6 745. 9        Phenylephrine Volume 180 300 480        Volume (TPN ADULT - CENTRAL)         Volume (TPN ADULT - CENTRAL) 0  0        Volume (TPN ADULT - CENTRAL) 0 780.2 780.2      NG/  100        Medication Volume (Orogastric Tube 11/30/21) 100  100      Shift Total(mL/kg) 1524. 7(14) 2444. 8(22.5) 5785.6(26.8)      OUTPUT   Urine(mL/kg/hr) 1795(1.4) 1680(1.3) 3475(1.3) 450  450     Urine Output (mL) (Urinary Catheter 12/11/21 Mitchell) 1795 1680 3475 450  450   Shift Total(mL/kg) 1795(16.5) 8910(96.3) 3475(31.9) 450(4.1)  450(4.1)   NET -270.3 764.8 494.5 -450  -450   Weight (kg) 108.9 108.9 108.9 108.9 108.9 108.9       CBC:  Recent Labs     12/16/21  0311 12/15/21  0625 12/14/21  0401   WBC 12.6* 11.2* 9.9   HGB 13.0 13.0 11.1*   HCT 41.0 41.1 34.0*    282 046       Metabolic Panel:  Recent Labs     12/16/21  0311 12/15/21  0625 12/14/21  0401   * 146* 137   K 4.4 4.4 3.6    105 103   CO2 36* 35* 28   BUN 43* 41* 40*   CREA 0.60* 0.59* 0.47*   * 297* 310*   CA 9.3 9.3 UNABLE TO OBTAIN ACCURATE RESULTS DUE TO GROSS LIPEMIA   MG 2.1 1.9 1.6   PHOS 5.0* 4.5  --    ALB 2.7* 2.7* 1.2*   * 352* 207*   INR 1.1 1.1 1.1          For Billing    Chief Complaint   Patient presents with   [facility] Problems  Date Reviewed: 12/3/2021          Codes Class Noted POA    Oozing skin inflammation ICD-10-CM: L08.9  ICD-9-CM: 686.9  12/3/2021 Unknown        Positive D dimer ICD-10-CM: R79.89  ICD-9-CM: 790.92  12/3/2021 Unknown        Other hyperlipidemia ICD-10-CM: E78.49  ICD-9-CM: 272.4  12/3/2021 Unknown        Elevated serum creatinine ICD-10-CM: R79.89  ICD-9-CM: 790.99  12/3/2021 Unknown        Community acquired pneumonia ICD-10-CM: J18.9  ICD-9-CM: 666  12/3/2021 Unknown        ARDS (adult respiratory distress syndrome) ([facility] ICD-10-CM: J80  ICD-9-CM: 518.52  12/1/2021 No        Septic shock ([facility]) ICD-10-CM: A41.9, R65.21  ICD-9-CM: 038.9, 785.52, 995.92  11/26/2021 Yes        Pneumomediastinum ([facility] ICD-10-CM: J98.2  ICD-9-CM: 518.1  11/26/2021 No        Pneumothorax on left ICD-10-CM: J93.9  ICD-9-CM: 512.89  11/26/2021 No        Hyponatremia ICD-10-CM: E87.1  ICD-9-CM: 276.1  11/26/2021 Yes        Diabetes mellitus type 2, controlled ([facility] ICD-10-CM: E11.9  ICD-9-CM: 250.00  11/25/2021 Yes        * (Principal) Acute hypoxemic respiratory failure due to COVID-19 [facility]) ICD-10-CM: U07.1, J96.01  ICD-9-CM: 518.81, 079.89, 799.02  11/19/2021 Yes        Obesity, morbid ([facility] ICD-10-CM: E66.01  ICD-9-CM: 278.01  4/10/2018 Yes        Elevated liver enzymes ICD-10-CM: R74.8  ICD-9-CM: 790.5  8/5/2016 Yes        JESUS (obstructive sleep apnea) ICD-10-CM: G47.33  ICD-9-CM: 327.23  10/15/2014 Yes        Hypertension ICD-10-CM: I10  ICD-9-CM: 401.9  10/15/2014 Yes        Pure hypercholesterolemia ICD-10-CM: E78.00  ICD-9-CM: 272.0  Unknown Yes

## 2021-12-16 NOTE — PROGRESS NOTES
Problem: Falls - Risk of  Goal: *Absence of Falls  Description: Document Antionette Long Fall Risk and appropriate interventions in the flowsheet.   Outcome: Progressing Towards Goal  Note: Fall Risk Interventions:  Mobility Interventions: Assess mobility with egress test,Bed/chair exit alarm,Strengthening exercises (ROM-active/passive)    Mentation Interventions: Adequate sleep, hydration, pain control,Bed/chair exit alarm,Door open when patient unattended,Evaluate medications/consider consulting pharmacy,More frequent rounding,Room close to nurse's station,Toileting rounds,Update white board    Medication Interventions: Assess postural VS orthostatic hypotension,Bed/chair exit alarm,Evaluate medications/consider consulting pharmacy    Elimination Interventions: Bed/chair exit alarm,Toileting schedule/hourly rounds              Problem: Patient Education: Go to Patient Education Activity  Goal: Patient/Family Education  Outcome: Progressing Towards Goal     Problem: Ventilator Management  Goal: *Adequate oxygenation and ventilation  Outcome: Progressing Towards Goal  Goal: *Patient maintains clear airway/free of aspiration  Outcome: Progressing Towards Goal  Goal: *Absence of infection signs and symptoms  Outcome: Progressing Towards Goal  Goal: *Normal spontaneous ventilation  Outcome: Progressing Towards Goal     Problem: Patient Education: Go to Patient Education Activity  Goal: Patient/Family Education  Outcome: Progressing Towards Goal     Problem: Nutrition Deficit  Goal: *Optimize nutritional status  Outcome: Progressing Towards Goal

## 2021-12-16 NOTE — PROGRESS NOTES
Progress Note  Date:2021       Room:[address] [place] [address]  Patient Name:Pan Blanco     YOB: 1985     Age:36 y.o. Subjective    Subjective remains intubated, sedated, paralyzed, and proned    Review of Systems MATTHEW given AMS  Objective         Vitals Last 24 Hours:  TEMPERATURE:  Temp  Av.4 °F (38 °C)  Min: 99.9 °F (37.7 °C)  Max: 101 °F (38.3 °C)  RESPIRATIONS RANGE: Resp  Av  Min: 25  Max: 28  PULSE OXIMETRY RANGE: SpO2  Av.5 %  Min: 94 %  Max: 100 %  PULSE RANGE: Pulse  Av.1  Min: 71  Max: 133  BLOOD PRESSURE RANGE: Systolic (60NNZ), CQV:049 , Min:93 , RAH:247   ; Diastolic (82DCA), EMC:84, Min:48, Max:79    I/O (24Hr):     Intake/Output Summary (Last 24 hours) at 2021 1008  Last data filed at 2021 0800  Gross per 24 hour   Intake 3839.3 ml   Output 3405 ml   Net 434.3 ml     Objective   Exam facilitated by use of telemedicine platform and with assistance from in house team  Gen - intubated and sedated; nad but in prone position; backside intact  Head - nc/at  Eyes - could not visualize face to assess for continued edema  Ent - ett circuit without secretions or hemorrhage  Cvs - sinus rhythm without external evidence of hypoperfusion  Pulm - symmetric air rise with ventilation  Gi-obscured as prone  Ext - no c/c; edema present  Msk - normal bulk  Neuro - intubated, sedated; paralyzed; no spont motor activity  Labs/Imaging/Diagnostics    Labs:  CBC:  Recent Labs     12/16/21  0311 12/15/21  0621  0401   WBC 12.6* 11.2* 9.9   RBC 4.16 4.26 3.46*   HGB 13.0 13.0 11.1*   HCT 41.0 41.1 34.0*   MCV 98.6 96.5 98.3   RDW 13.7 13.8 13.9    282 202     CHEMISTRIES:  Recent Labs     12/16/21  0311 12/15/21  0625 21  0401   * 146* 137   K 4.4 4.4 3.6    105 103   CO2 36* 35* 28   BUN 43* 41* 40*   CA 9.3 9.3 UNABLE TO OBTAIN ACCURATE RESULTS DUE TO GROSS LIPEMIA   PHOS 5.0* 4.5  --    MG 2.1 1.9 1.6 PT/INR:  Recent Labs     12/16/21  0311 12/15/21  0625 12/14/21  0401   INR 1.1 1.1 1.1     APTT:  Recent Labs     12/16/21  0311 12/15/21  0625 12/14/21  0401   APTT 21.7* <20.0* 21.7*     LIVER PROFILE:  Recent Labs     12/16/21  0311 12/15/21  0625 12/14/21  0401   AST 98* 122* 91*   * 352* 207*     Lab Results   Component Value Date/Time    ALT (SGPT) 365 (H) 12/16/2021 03:11 AM    AST (SGOT) 98 (H) 12/16/2021 03:11 AM    Alk. phosphatase 122 (H) 12/16/2021 03:11 AM    Bilirubin, direct 0.3 (H) 12/16/2021 03:11 AM    Bilirubin, total 0.6 12/16/2021 03:11 AM       Imaging Last 24 Hours:  XR CHEST PORT    Result Date: 12/15/2021  PORTABLE CHEST RADIOGRAPH/S: 12/15/2021 10:32 AM INDICATION: ET tube. COMPARISON: 12/13/2021, 12/11/2021, 12/10/2021, 12/5/2021. TECHNIQUE: Portable frontal semiupright radiograph/s of the chest. FINDINGS: An ET tube, NG tube, and right PICC are in appropriate position. The lungs are hypoinflated and there is bibasilar atelectasis. Alternatively, this could represent hazy COVID-19 pneumonia. The central airways are patent. No pneumothorax and no large pleural effusion. Lines and tubes in appropriate position. Shallow volumes and basilar atelectasis, versus COVID-19 pneumonia.     Assessment//Plan     Assessment & Plan     Acute hypoxic resp failure  ARDS  Covid  DM  JESUS        NEURO - remains intubated and sedated with more negative goal rass given severity of hypoxia and the need for NMBs/proning  -LFTs slowly climbing as such we increased ceiling for fentanyl and versed in an effort to wean/dc the propofol - will discuss next step on MDRs     CVS - continued vasopressors to ensure MAPs at goal; no evidence of hypoperfusion and suspect degree of hypotension related to depth of sedation   -Echo demonstrated EF 50-55%  -h/o HLD - can resume statin once LFTs permit      Pulm -ongoing ARDs secondary to multifocal pneumonia from covid; he had responded to prone posititioning with variable success but Pa: FiO2 remains in mod-severe ARDs range; will supine this morning and then reassess Pa: FiO2 around 4pm to determine need for re-proning  - the mortality benefit to proning has passed and now being used as a rescue maneuver  - Pt is not a candidate for ECMO given BMI and prolonged mechanical ventilation     - his IBW is 75kg and 8,7,6ml/kg translates to 600,525, and 450 - acceptable supine Pplat and ventilation yesterday - will reassess this afternoon   -s/p potential regimen for covid and s/p tocilizumab earlier in course  - no extension of/resolution of pneumomediastinum and prior pneumothorax, respectively  - h/o reji     GI-contined residuals/draining from OGT - remains npo  - Remains on MADELYN proph     -remains at high risk for JAMIR with sepsis, hypotension, hypoxia, and covid infection; that said, would run pt on drier side given limited pulm reserve     HEME -  Protonix/SQ Lovenox twice daily  - no immediate need for transfusion of blood products     ID - s/p prior course of empiric abx and antifungals given uptrending wbc and increased vasopressor requirements; presently being monitored off abx     ENDO - DM (diet-controlled prior to ad mission) and A1c 5.8% in 05/2021 but hyperglycemic in setting of steroids and critical illness; long acting insulin adjusted again today  - follow accu checks and make additional adjustments in regimen as needed      FEN - goal negative as possible; monitor and correct electrolytes as needed  - Remains on tpn     CCT 50 minutres excluding teaching and procedures  I performed all aspects of the physical examination via Telemedicine associated with two way audio and video communication and with the on-site assistance of the bedside nurse. Ellen Fournier am located in [place] and the patient is located in [place] at [address] [place] [address]   The patient is critically ill in the ICU. Kingsley López the pertinent medical records, laboratory/ pathology data and radiographic images.  The decision making regarding this patient is as documented above, which was generated  following  discussion  with the multidisciplinary ICU team and creation of a treatment plan for  the patient. We discussed the patient's interval history and future coordination of care and Shannan Cluster patient's medications  were reviewed and changes made as stipulated above.  Due to  critical illness impairing one or more vital organs of this patient resulting in life threatening clinical situation  I have provided direct, frequent personal  assessment and manipulation in management plan.     Electronically signed by Danilo Peña MD on 12/16/2021 at 10:08 AM

## 2021-12-16 NOTE — PROGRESS NOTES
Palliative Medicine      Code Status: Full Code    Advance Care Planning:  Advance Care Planning 11/19/2021   Patient's Healthcare Decision Maker is: Legal Next of Kin   Confirm Advance Directive None       Patient / Family Encounter Documentation    Participants (names): Wife Filomena Evans, Dr. Christiano Pugh [facility]), Palliative Medicine (Dr. Ken Blandon, [address] [facility])    Narrative:  Met with wife and pt's Aunt outside of ICU; Aunt is a nurse at [employer]. Pt and wife have two children, aged 8 (soon to be 6) and 5, both of whom are home schooled by wife and wife's mother. Pt's parents are both living, father has advanced cancer. Pt works as a manager at [employer], is an animal lover, breeds dogs, has numerous pets in and outside the home. Family members were updated re: pt's current condition, wife asked multiple questions in attempt to gain clarity re: disease as well as transplant process, as family is hopeful pt will be a candidate for lung transplant, Aunt inquired about family members serving as living donors. Code status was addressed, with education provided re: poor chance for survival if pt were to suffer cardiac arrest despite current interventions. Wife verbalized understanding but wishes for pt to remain full code at this time, remains hopeful for recovery. Wife shared that a meaningful quality of life for pt would mean being active, shared that pt would not want to spend the rest of his life in a bed. Psychosocial Issues Identified/ Resilience Factors: Wife was very composed when discussing factual/objective matters, became tearful when asked to share a bit about pt as a person. Wife shared that the children are having a difficult time focusing on their school work, wife works full time (primarily remotely) for a dental practice in addition to home schooling the kids, managing the household, caring for the animals, and worrying about pt.   No spiritual concerns expressed, Chaplains have been following for support. Caregiver Killeen:  Wife was not serving as a caregiver for pt prior to hospitalization, as pt was completely independent, worked, drove, etc.  Wife does now have the burden of managing the house/family essentially on her own as detailed above, with some support from family. Does the caregiver feel confident administering medication? n/a  Does the caregiver need any help connecting with community resources? No resources requested at this time  Does the caregiver feel confident assisting with activities of daily living? n/a    Goals of Care / Plan: Family wishes to pursue lung transplant if pt is a candidate, verbalized understanding that further testing will be needed next week before pt would be considered for transfer to a transplant center. Family wishes for pt to remain a full code at this time, though wife shared that pt would not want to be kept alive on machines without hope for recovery. Wife is aware of option for other family members to visit virtually via Zoom but stated she is uncertain that pt's parents would want to see pt in current condition. Palliative team will follow for ongoing support and goals of care conversations as indicated. Thank you for including Palliative Medicine in the care of Mr. Blanco.      Karen Pendleton LCSW, [facility]  [phone])

## 2021-12-16 NOTE — PROGRESS NOTES
Comprehensive Nutrition Assessment    Type and Reason for Visit: Reassess    Nutrition Recommendations/Plan:   1. If able, resume Tube Feeding below:   · Peptide based High protein (Vital HP) @ 15 mL x 20 hrs    · Advance as tolerated by 10mL Q6H to goal 35mL/hr  · FWF 10mL QH        2. If unable to resume EN feedings/propofol weaned, continue TPN: consider increasing to 75 mL/hr to help meet protein needs. · Continue D15/AA7% @ 63 mL/h (goal w/ propofol)     TPN @ 63mL/h provides: 1194 kcal (2081 kcal/d including propofol), 106 g protein, 227 g dextrose. Providing ~18.5 kcal/kg & 0.9 g pro/kg    Nutrition Assessment:    12/16: Follow up. TPN @ goal 63. Propofol currently at 33.6 mL/hour, providing 887 kcal/day. Receiving 100% kcal and 91% protein needs at this time. Na and Phos elevated. Per MD notes, palliative meeting today with wife. 12/13: Follow up. TPN continues at 63 mL/hour. No BM since last RD encounter. Propofol at 37.4 mL/hour provides 987 kcal/day. Updated weight - BMI now 33.40, possible local ECMO transfer due to BMI <35. Needs recalculated. Meeting 100% kcal and 91% protein needs at this time. 12/9: TF remains off. Discussed restarting for trial at slow rate. Check tolerance. Continue TPN today to monitor TF tolerance. If pt tolerates TF, wean/d/c TPN. Propofol at 37.4 mL/hr providing 987 kcal per day. Noted: \"possible local ECMO transfer with BMI <35\" Currently BMI 35.5. Still awaiting availability at [place] [address]. No beds available. Triglycerides down to 258 from 299. BG mid to high 100's.      12/6: pt remains intubated, sedated and paralyzed. Not tolerating TF, placed prone today, getting propofol at max dose (adds ~ 985 kcal/d from lipids), TGL rechecked today & improved, no plans to reduce propofol, TPN started today. If pt is able to handle higher volume in a couple days, increasing TPN to 75 mL/h.  This would more closely meet protein needs but exceed initial calorie needs for critically ill patient. Pt is awaiting possible transfer to a higher level of care facility once bed is available. Appreciate additional bowel regimen.       12/3: RD attended & discussed patient during interdisciplinary rounds on unit. TF off with NG in place to low suction per RN. Pt with bleeding noted. If unable to resume PO, once more fluid stable consider TPN as rec'd above. No lipids. Awaiting bed available fro ECMO. Wt is down from admit - BMI = 36.28.     TF @35mL provides: 770ml of tf, 1010 kcal (1995 kcal/d including protein & propofol), 133 g protein, 85 g carb, ~955 ml of free water from TF & flushes. 17 kcal/kg & 1.1g of pro/kg. TPN @ 63mL/h provides[de-identified] ~1.5L, 1330 kcal (2315 kcal including propofol) & 76 g protein. (~11 kcal/kg or 20 kcal/kg including propofol, 0.6 g pro/kg)      11/22: pt admitted for Acute hypoxemic respiratory failure due to COVID-19 ([facility] [address] [U07.1, J96.01] who  has a past medical history of Hypercholesteremia, Hypertension (10/15/2014), and S/P vasectomy (8/6/2014). Pt was sitting up in chair in room. Pt has been switching between High Flow NC and Bipap for oxygen needs. He is asking to be able to eat. Call to MDs to request diet or supplements when safe. Pt is still accepting meds 2-3 times daily. Review of history notes blood glucose much improved from earlier this year. On admission, notes reviewed pt had nausea and diarrhea for 2 days prior, has had clear liquids & crackers. Pt has been NPO x4 days (since evening of admission date). Morbidly obese male with high calorie/protein needs. Malnutrition Assessment:  Malnutrition Status:   Moderate malnutrition    Context:  Acute illness     Findings of the 6 clinical characteristics of malnutrition:   Energy Intake:  1 - 75% or less of est energy req for 7 or more days  Weight Loss:  7 - Greater than 2% over 1 week     Body Fat Loss:  Unable to assess,     Muscle Mass Loss:  Unable to assess,    Fluid Accumulation:  No significant fluid accumulation,     Strength:  Not performed     Estimated Daily Nutrient Needs:  Energy (kcal): 1633 (PSU3b x .7); Weight Used for Energy Requirements: Current  Protein (g): 117-156 (1.5-2.0 g/kg IBW); Weight Used for Protein Requirements: Ideal  Fluid (ml/day): 1633; Method Used for Fluid Requirements: 1 ml/kcal     Nutrition Related Findings:    ABD:  MATTHEW d/t prone 12/15  Last BM: 12/9, loose  Edema: +1 generalized; scleral facial; +1 all extremities noted 12/15    Nutr. Labs:  Lab Results   Component Value Date/Time    GFR est AA >60 12/16/2021 03:11 AM    GFR est non-AA >60 12/16/2021 03:11 AM    Creatinine 0.60 (L) 12/16/2021 03:11 AM    BUN 43 (H) 12/16/2021 03:11 AM    Sodium 147 (H) 12/16/2021 03:11 AM    Potassium 4.4 12/16/2021 03:11 AM    Chloride 106 12/16/2021 03:11 AM    CO2 36 (H) 12/16/2021 03:11 AM     Lab Results   Component Value Date/Time    Glucose 271 (H) 12/16/2021 03:11 AM    Glucose (POC) 186 (H) 12/16/2021 12:28 PM     Lab Results   Component Value Date/Time    Hemoglobin A1c 5.8 (H) 07/30/2021 10:25 AM       Nutr. Meds:  Lipitor, Bumex, Nimbex, Decadron, Lovenox, Fentanyl, Lantus, Humalog, Versed*, Protonix, Sanjeev-syn*, Miralax, Propofol, Pericolace, Zinc Gluconate     Wounds:    None       Current Nutrition Therapies:  DIET NPO  DIET ONE TIME MESSAGE  TPN ADULT - CENTRAL    Anthropometric Measures:  · Height:  5' 10.98\" (180.3 cm)  · Current Body Wt:  108.9 kg (240 lb)   · Admission Body Wt:  304 lb    · Usual Body Wt:  136.1 kg (300 lb)     · Ideal Body Wt:  172 lbs:  139.5 %   Body mass index is 33.49 kg/m². · BMI Category:  Obese class 1 (BMI 30.0-34. 9)       Nutrition Diagnosis:   · Inadequate protein-energy intake related to catabolic illness,impaired respiratory function as evidenced by NPO or clear liquid status due to medical condition,intubation,nutrition support-enteral nutrition   · Moderate malnutrition related to catabolic illness as evidenced by intake 26-50%; weight loss greater than or equal to 2% in 1 week    Nutrition Interventions:   Food and/or Nutrient Delivery: Continue parenteral nutrition  Nutrition Education and Counseling: No recommendations at this time  Coordination of Nutrition Care: Continue to monitor while inpatient,Interdisciplinary rounds    Goals:  Provide >/= 80% estimated protein needs within 3 - 5 days       Nutrition Monitoring and Evaluation:   Behavioral-Environmental Outcomes: None identified  Food/Nutrient Intake Outcomes: Parenteral nutrition intake/tolerance  Physical Signs/Symptoms Outcomes: Biochemical data,Weight,Hemodynamic status    Discharge Planning:     Too soon to determine     Electronically signed by Brent Ma RD on 08/89/4243  Contact: [phone] or via [employer] [place]

## 2021-12-16 NOTE — ROUTINE PROCESS
Chart accessed to assist primary RN with patient care. Results for second COVID test negative, 12/14 Negative and 12/15 Negative. Per Dr. Hernandez Sender orders, discontinue isolation precautions. New orders placed in [facility].

## 2021-12-16 NOTE — PROGRESS NOTES
Case Management :    RUR 13%  LOS 26 days,GLOS 12.4    Pt remains intubated,sedated and paralyzed. Peep 13,Fio2 60%  Pt remains on propofol,fentanyl.,versed,nimbex and pressor support. Proning/supination per protocol. Pt has had two negative Covid tests by PCR. Covid isolation precautions removed.     Florinda Coronado

## 2021-12-16 NOTE — PROGRESS NOTES
1900: Bedside and Verbal shift change report given to Lisa Baird RN (oncoming nurse) by Delmi Mendoza RN (offgoing nurse). Report included the following information SBAR, Kardex, Intake/Output and Cardiac Rhythm ST, SR.     2000: Assessment completed, see doc flow sheet. Pt turned and repositioned. Neuro: Pt is sedated and pharm paralyzed. TOF baseline 40 mA with 0/4 twitches. R-pupil is round and brisk 4 mm, L-pupil is round and brisk 4 mm. No cough w/suction.     Cardio: S1S2 present, SR, ST    Respiratory: ETT 8, 25 @ the lips, TV 400, PEEP 16, Rate 28, FIO2 80 sats @ 100. Bilateral lung sounds are diminished.      GI/: MATTHEW to assess abdomen/BS d/t prone positioning. Mitchell catheter in place draining clear yellow urine. 220 ml of urine in chamber.      Skin: Skin tear on left cheek d/t donning/doffing cheek cushion for proning. Preventative mepilex on shoulders, knees, and buttocks. Tattoos noted. Swelling BUE, BLE.    2200: VAP bundle completed. Mouth care done. Lavaged pt's ETT, scant tan thick tenacious secretions followed. 0000: Assessment completed, see doc flow sheet. Pt turned and repositioned. TOF baseline 40 mA with 0/4 twitches. VAP bundle completed. Mouth care done. 0200: Pt turned and repositioned. VAP bundle completed. Mouth care done. 0400: Assessment completed, see doc flow sheet. Pt turned and repositioned. VAP bundle completed. Mouth care done. TOF baseline 40 mA with 0/4 twitches.    0600: Pt turned and repositioned. VAP bundle completed. Mouth care done. 0700: Bedside and Verbal shift change report given to Stacey Romo RN (oncoming nurse) by Lisa Baird RN (offgoing nurse).  Report included the following information SBAR, Kardex, Intake/Output and Cardiac Rhythm ST.

## 2021-12-16 NOTE — CONSULTS
Palliative Medicine Consult  [place]: [phone] ([id number])    Patient Name: Alka Fan  YOB: 1985    Date of Initial Consult: 12/16/21  Reason for Consult: care decisions  Requesting Provider: Dr. Soledad Valero  Primary Care Physician: Silvio St NP     SUMMARY:   Alka Fan is a 39 y.o. unvaccinated male with a medical history of JESUS on cpap, class III obesity, HTN and HLD who was admitted on 11/19/2021 with a diagnosis of acute hypoxic respiratory failure due to covid-19 pneumonia. Case has been complicated by pneumomediastinum (resolved) and development of ARDS, LFT abnormalities. He required intubation on 11/30. Current medical issues leading to Palliative Medicine involvement include: poor prognosis, goals of care discussion. SH:   to She. They have two children ages 5 and 6. He works as a manager at [employer] and enjoys raising many animals at home as well. PALLIATIVE DIAGNOSES:   1. Encounter for palliative care  2. DNR discussion   3. Acute hypoxemic respiratory failure due to covid-19 pneumonia  4. ARDS  5. JESUS  6. Elevated LFTs  7. Class III Obesity       PLAN:   1. Mr. Marilyn Crook has been hospitalized nearing 4 weeks and on the vent day #17. He is requiring a paralytic and vasopressor support and with high PEEP he is not stable yet for consideration of tracheotomy. 2. Discussion has been had about potential for transfer to a lung translant center. Family Practice team has been in communication with [facility]. Plan is for repeat imaging to assess for fibrosis at a future point. 3. Today myself, ALEX Blunt and Dr. Soledad Valero met together in person with his spouse and his [de-identified] (an employee of [employer]). We reviewed his current condition and answered many questions about his ventilator needs and current status. Mrs. Blanco does have very good insight about her 's condition and asked many excellent questions.   She remains very hopeful that her  will eventually be a candidate for a lung transplant should he fail to improve on his own. Goals are clear for continuation of full restorative efforts at this time. 4. Code status was also reviewed and I made the medical recommendation for DNR status knowing that the need to perform cardiac resuscitation at this juncture would prove to be an even poorer prognostic sign. Wife would like us to perform CPR at end of life at this time. She knows to reach out to myself or Family Practice team should she desire this to change. 5. Thank you for the opportunity to participate in the care of [place]  6. Communicated plan of care with: Palliative ID[facility] Team      GOALS OF CARE / TREATMENT PREFERENCES:     GOALS OF CARE:  Patient/Health Care Proxy Stated Goals: Rehabilitation    TREATMENT PREFERENCES:   Code Status: Full Code    Patient and family's personal goals include: per wife- to remain active and able to do for himself    Important upcoming milestones or family events: son's birthday this weekend    The patient identifies the following as important for living well: being around animals, his family       Advance Care Planning:  [x] [facility] Interdisciplinary Team has updated the ACP Navigator with [address] and Patient Capacity      Primary Decision Maker (Active): Nona Blanco - Spouse - [phone]  Advance Care Planning 11/19/2021   Patient's Healthcare Decision Maker is: Legal Next of Kin   Confirm Advance Directive None       Medical Interventions: Full interventions       Other:    As far as possible, the palliative care team has discussed with patient / health care proxy about goals of care / treatment preferences for patient.      HISTORY:     History obtained from:  EMR, bedside RN    CHIEF COMPLAINT: n/a    HPI/SUBJECTIVE:    The patient is:   [] Verbal and participatory  [x] Non-participatory due to:  Critically ill, on vent, paralyzed    Clinical Pain Assessment (nonverbal scale for severity on nonverbal patients):   Clinical Pain Assessment  Severity: 0     Activity (Movement): Lying quietly, normal position    Duration: for how long has pt been experiencing pain (e.g., 2 days, 1 month, years)  Frequency: how often pain is an issue (e.g., several times per day, once every few days, constant)     FUNCTIONAL ASSESSMENT:     Palliative Performance Scale (PPS):  PPS: 10       PSYCHOSOCIAL/SPIRITUAL SCREENING:     Palliative IDT has assessed this patient for cultural preferences / practices and a referral made as appropriate to needs (Cultural Services, Patient Advocacy, Ethics, etc.)    Any spiritual / Sikhism concerns:  [] Yes /  [x] No   If \"Yes\" to discuss with pastoral care during IDT     Does caregiver feel burdened by caring for their loved one:   [] Yes /  [x] No /  [] No Caregiver Present    If \"Yes\" to discuss with social work during IDT    Anticipatory grief assessment:   [x] Normal  / [] Maladaptive     If \"Maladaptive\" to discuss with social work during IDT    ESAS Anxiety:      ESAS Depression:          REVIEW OF SYSTEMS:     Positive and pertinent negative findings in ROS are noted above in HPI. The following systems were [x] reviewed / [] unable to be reviewed as noted in HPI  Other findings are noted below. Systems: constitutional, ears/nose/mouth/throat, respiratory, gastrointestinal, genitourinary, musculoskeletal, integumentary, neurologic, psychiatric, endocrine. Positive findings noted below. Modified ESAS Completed by: provider           Pain: 0           Dyspnea: 0           Stool Occurrence(s): 1        PHYSICAL EXAM:     From RN flowsheet:  Wt Readings from Last 3 Encounters:   12/15/21 240 lb (108.9 kg)   07/30/21 304 lb (137.9 kg)   04/30/21 295 lb (133.8 kg)     Blood pressure (!) 94/53, pulse (!) 105, temperature 99 °F (37.2 °C), resp.  rate 28, height 5' 10.98\" (1.803 m), weight 240 lb (108.9 kg), SpO2 91 %. Pain Scale 1: Adult Nonverbal Pain Scale  Pain Intensity 1: 0                 Last bowel movement, if known:     Ill appearing white male  On vent with a PEEP 16, 80% FiO2  Sedated and on Nimbex       HISTORY:     Principal Problem:    Acute hypoxemic respiratory failure due to COVID-19 ([address] (2021)    Active Problems:    Pure hypercholesterolemia ()      JESUS (obstructive sleep apnea) (10/15/2014)      Hypertension (10/15/2014)      Elevated liver enzymes (2016)      Obesity, morbid ([address] (4/10/2018)      Diabetes mellitus type 2, controlled ([address] (2021)      Septic shock ([address] (2021)      Pneumomediastinum ([address] (2021)      Pneumothorax on left (2021)      Hyponatremia (2021)      ARDS (adult respiratory distress syndrome) ([address] (2021)      Oozing skin inflammation (12/3/2021)      Positive D dimer (12/3/2021)      Other hyperlipidemia (12/3/2021)      Elevated serum creatinine (12/3/2021)      Community acquired pneumonia (12/3/2021)      Past Medical History:   Diagnosis Date    History of vascular access device     [facility] VAT placed a RT upper basilic 5 FR triple PICC at 43cm with 1 cm out. Arm cir 34cm     Hypercholesteremia     Hypertension 10/15/2014    Poison ivy 9/15/2015    Pure hypercholesterolemia     S/P vasectomy 2014      No past surgical history on file. Family History   Problem Relation Age of Onset    Diabetes Mother     Heart Attack Father 43    Stroke Maternal Grandmother     Cancer Paternal Grandfather          age 45 - Hodgkin's   [de-identified] Maternal Grandfather          in 42's with melanoma      History reviewed, no pertinent family history.   Social History     Tobacco Use    Smoking status: Never Smoker    Smokeless tobacco: Never Used   Substance Use Topics    Alcohol use: No     Alcohol/week: 0.0 standard drinks     No Known Allergies   Current Facility-Administered Medications   Medication Dose Route Frequency    insulin glargine (LANTUS) injection 18 Units  18 Units SubCUTAneous DAILY    TPN ADULT - CENTRAL   IntraVENous CONTINUOUS    TPN ADULT - CENTRAL   IntraVENous CONTINUOUS    bumetanide (BUMEX) injection 1 mg  1 mg IntraVENous Q8H    zinc gluconate tablet 50 mg  1 Tablet Oral DAILY    pantoprazole (PROTONIX) 40 mg in 0.9% sodium chloride 10 mL injection  40 mg IntraVENous DAILY    fentaNYL (PF) 1,500 mcg/30 mL (50 mcg/mL) infusion  0-300 mcg/hr IntraVENous TITRATE    dexamethasone (PF) (DECADRON) 10 mg/mL injection 6 mg  6 mg IntraVENous Q24H    propofol (DIPRIVAN) 10 mg/mL infusion  0-50 mcg/kg/min IntraVENous TITRATE    midazolam in normal saline (VERSED) 1 mg/mL infusion  0-20 mg/hr IntraVENous TITRATE    enoxaparin (LOVENOX) injection 40 mg  40 mg SubCUTAneous Q12H    alteplase (CATHFLO) 1 mg in sterile water (preservative free) 1 mL injection  1 mg InterCATHeter PRN    polyethylene glycol (MIRALAX) packet 17 g  17 g Oral DAILY    white petrolatum-mineral oiL (AKWA TEARS) 83-15 % ophthalmic ointment   Both Eyes BID    insulin lispro (HUMALOG) injection   SubCUTAneous Q6H    chlorhexidine (PERIDEX) 0.12 % mouthwash 15 mL  15 mL Oral Q12H    senna-docusate (PERICOLACE) 8.6-50 mg per tablet 1 Tablet  1 Tablet Per G Tube BID    cisatracurium (NIMBEX) 200 mg in 0.9% sodium chloride 100 mL (2 mg/mL) infusion  0-10 mcg/kg/min IntraVENous TITRATE    atorvastatin (LIPITOR) tablet 20 mg  20 mg Per G Tube DAILY    acetaminophen (TYLENOL) tablet 1,000 mg  1,000 mg Per G Tube Q6H PRN    Or    acetaminophen (TYLENOL) suppository 975 mg  975 mg Rectal Q6H PRN    glucose chewable tablet 16 g  4 Tablet Per G Tube PRN    PHENYLephrine (DINO-SYNEPHRINE) 30 mg in 0.9% sodium chloride 250 mL infusion   mcg/min IntraVENous TITRATE    white petrolatum (VASELINE) ointment   Topical PRN    sodium chloride (OCEAN) 0.65 % nasal squeeze bottle 2 Spray  2 Spray Both Nostrils Q2H PRN    albuterol-ipratropium (DUO-NEB) 2.5 MG-0.5 MG/3 ML  3 mL Nebulization Q6H RT    Or    ipratropium-albuterol (COMBIVENT RESPIMAT) 20 mcg-100 mcg inhalation spray  2 Puff Inhalation Q6H RT    [Held by provider] simethicone (MYLICON) tablet 80 mg  80 mg Oral QID PRN    phenol throat spray (CHLORASEPTIC) 2 Spray  2 Spray Oral Q6H PRN    sodium chloride (NS) flush 5-40 mL  5-40 mL IntraVENous Q8H    sodium chloride (NS) flush 5-40 mL  5-40 mL IntraVENous PRN    ondansetron (ZOFRAN ODT) tablet 4 mg  4 mg Oral Q8H PRN    Or    ondansetron (ZOFRAN) injection 4 mg  4 mg IntraVENous Q6H PRN    prochlorperazine (COMPAZINE) injection 10 mg  10 mg IntraVENous Q6H PRN    dextrose (D50W) injection syrg 12.5-25 g  12.5-25 g IntraVENous PRN    glucagon (GLUCAGEN) injection 1 mg  1 mg IntraMUSCular PRN          LAB AND IMAGING FINDINGS:     Lab Results   Component Value Date/Time    WBC 12.6 (H) 12/16/2021 03:11 AM    HGB 13.0 12/16/2021 03:11 AM    PLATELET 848 82/72/8891 03:11 AM     Lab Results   Component Value Date/Time    Sodium 147 (H) 12/16/2021 03:11 AM    Potassium 4.4 12/16/2021 03:11 AM    Chloride 106 12/16/2021 03:11 AM    CO2 36 (H) 12/16/2021 03:11 AM    BUN 43 (H) 12/16/2021 03:11 AM    Creatinine 0.60 (L) 12/16/2021 03:11 AM    Calcium 9.3 12/16/2021 03:11 AM    Magnesium 2.1 12/16/2021 03:11 AM    Phosphorus 5.0 (H) 12/16/2021 03:11 AM      Lab Results   Component Value Date/Time    Alk.  phosphatase 122 (H) 12/16/2021 03:11 AM    Protein, total 6.9 12/16/2021 03:11 AM    Albumin 2.7 (L) 12/16/2021 03:11 AM    Globulin 4.2 (H) 12/16/2021 03:11 AM     Lab Results   Component Value Date/Time    INR 1.1 12/16/2021 03:11 AM    Prothrombin time 11.7 (H) 12/16/2021 03:11 AM    aPTT 21.7 (L) 12/16/2021 03:11 AM      Lab Results   Component Value Date/Time    Ferritin 1,036 (H) 12/02/2021 11:05 AM      Lab Results   Component Value Date/Time    pH 7.44 12/15/2021 03:15 PM    PCO2 56 (H) 12/15/2021 03:15 PM    PO2 68 (L) 12/15/2021 03:15 PM     No components found for: GLPOC   No results found for: CPK, CKMB             Total time: 70m  Counseling / coordination time, spent as noted above: 50m  > 50% counseling / coordination?: y    Prolonged service was provided for  []30 min   []75 min in face to face time in the presence of the patient, spent as noted above. Time Start:   Time End:   Note: this can only be billed with 99812 (initial) or 03787 (follow up). If multiple start / stop times, list each separately.

## 2021-12-16 NOTE — PROGRESS NOTES
[facility] Res[facility]       Resident Progress Note in Brief    S: Patient seen and examined at bedside. Pt intubated and sedated. No concerns from Nursing. O:  Visit Vitals  /65   Pulse (!) 126   Temp 99.9 °F (37.7 °C)   Resp 28   Ht 5' 10.98\" (1.803 m)   Wt 240 lb (108.9 kg)   SpO2 98%   BMI 33.49 kg/m²     Physical Examination:   General: Intubated, sedated. Appears comfortable. Respiratory: Transmitted mechanical sounds appreciated bilaterally, unchanged. Good air movement. Cardiovascular: regular rate, regular rhythm. Extremities:  No LE edema. Extremities well perfused and warm. Skin: Warm, dry. PICC line in place  Neuro: Intubated sedated  Mitchell in place     A/P: 39 y. o. male with a PMH of HTN, HLD, JESUS admitted for AHRF and ARDS 2/2 COVID PNA. Currently intubated and sedated. Patient was admitted on 11/19/2021. AHRF and ARDS 2/2 COVID PNA: Intubated and sedated since 11/30/21. Discussion w/ [facility], considering lung transplant but not until x5-6wks s/p symptom onset, will need CT ~ 12/24/21 prior to progress towards transplant to confirm end stage fibrosis. - Daily weights  - Prone as tolerated  - Goal sats 88% or higher  - CBC, CMP daily. No need for further trending Covid labs  - Continue Decadron 6mg IV daily  - Duo-neb or Combivent Respimat Q6H  - Holding COVID meds (Atorvastatin 20mg every day, Vit C 500mg BID, Zinc 220mg daily)  - Pulm following, appreciate recs  - Discussion with wife yesterday, she agrees that a Palliative Care conversation would be of benefit at this time to discuss ACP. She is remaining prayerful and hopeful, working towards possible transplant in the future.      Severe sepsis 2/2 COVID PNA:. Leukocytosis stable. Fungal Cx, BCx, UCx NGTD. S/p x7 course Maria Fernanda/Vanc/Eraxis, now off abx 12/8. Now afebrile.    - Continued on Sanjeev to maintain MAP > 65 -Running at 40 this morning.      Nutritional status: Nutrition consulted for TPN, appreciate recs. - continue TPN per nutrition recs   - discuss possible transition to tube feeds as able. Please see the primary team's daily progress note for the patient's full plan.       Shala Paul MD  Family Medicine Resident

## 2021-12-17 NOTE — PROGRESS NOTES
Spoke w/ intensivist Dr. Raul Benitez regarding pt's recurrent fevers, persistent tachycardia, and increasing O2 requirement on the vent. He recommends re-drawing blood cultures and getting a UA. He also recommends holding off on abx at this time. He does not want to order a CTA and does not think he would tolerate being taken for imaging at this time.     Blair Wren MD

## 2021-12-17 NOTE — PROGRESS NOTES
0700- Bedside and Verbal shift change report received from [facility] (offgoing nurse) by Kayden Oviedo RN (oncoming nurse). Report included the following information SBAR, Kardex, ED Summary, Procedure Summary, Intake/Output, MAR, Recent Results, Cardiac Rhythm ST and Alarm Parameters . Primary Nurse Kayden Oviedo RN and SAUL Eng performed a dual skin assessment on this patient No impairment noted. All drips verified. 0800- Patient shift assessment performed. Cheyanne Money VAP bundle performed. Patient turned and resting comfortablyCall bell in reach, bed in low and locked position,Patient board updated. 0900;blood culture x2 send to lab . Dr Lorene Nelson done tele rounds and discontinued the urine culture order. Pt afebrile . MD aware . 1000: VAP bundle performed. Patient turned and resting comfortably. 1200- Reassessment performed. No changes noted. VAP bundle performed. Patient turned and resting comfortably. pt had NO BM with laxatives  And given Dulcolax  Suppository . 1215:given Tylenol suppository  c/o fever . 1400- VAP bundle performed. Patient turned and resting comfortably. NO BM from the suppository . 1515:soap suds enema done and pt had BM . Pt tolerated well .       1600- Reassessment performed. VAP bundle performed. Patient turned and resting comfortably. 1800- VAP bundle performed. Patient turned and resting comfortably. 1900- Bedside and Verbal shift change report given to SAUL Eng(oncoming nurse) by Kayden Oviedo RN (offgoing nurse). Report included the following information SBAR, Kardex, ED Summary, Procedure Summary, Intake/Output, MAR, Recent Results, Cardiac Rhythm SR,ST and Alarm Parameters .

## 2021-12-17 NOTE — PROGRESS NOTES
Case Management:    RUR 13%(low Risk)  LOS 27 days,GLOS 12.4    Pt remains intubated and sedated. Pt remains on fentanyl,propofol,versed,sarah,and vasopresin. Pt is on TPN    I am continuing to follow pt for discharge needs.     Jmaes Emerson

## 2021-12-17 NOTE — PROGRESS NOTES
[address] [facility] [address] [facility], Jakob Cueva    Office [phone]  Fax [phone]          Assessment and Plan     Pardeep Guidry is a 39 y.o. male with a PMH of HTN, HLD, JESUS admitted for AHRF and severe sepsis 2/2 COVID PNA. Patient was admitted on 11/19/2021. Interval Events:  Family conversation with Palliative yesterday, confirm full code status per wife and continued plans for possible transplant in the future. Covid test Negative x2, precautions removed. Fevers overnight to 102, given Tylenol and fluids, EKG showing Sinus Tach, albumin given. AHRF and ARDS 2/2 COVID PNA: Intubated and sedated since 11/30/21. Discussion w/ [facility], considering lung transplant but not until x5-6wks s/p symptom onset, will need CT ~ 12/24/21 prior to progress towards transplant to confirm end stage fibrosis. Now off precautions 12/16/21.   - Daily weights  - Prone as tolerated  - Goal sats 88% or higher  - CBC, CMP daily. No need for further trending Covid labs  - Continue Decadron 6mg IV daily  - Duo-neb or Combivent Respimat Q6H  - Holding COVID meds (Atorvastatin 20mg every day, Vit C 500mg BID, Zinc 220mg daily)  - Pulm following, appreciate recs       Severe sepsis 2/2 COVID PNA:. Leukocytosis stable. Fungal Cx, BCx, UCx NGTD. S/p x7 course Maria Fernanda/Vanc/Eraxis, now off abx 12/8. Fever overnight to 102. - Continued on Sanjeev to maintain MAP > 65 - -Running at 20 this morning.   - Given continued fever and tachycardia, will pursue UA, BCx/UCx. Will hold off on antibiotics at this time. Low suspicion for PE and patient would not be able to tolerate CTA. Nutritional status:  Nutrition consulted for TPN, appreciate recs. - continue TPN per nutrition recs. Elevated LFTs: ALT of 352 and AST of 122. Suspect possible etiology of TPN. - will discuss with ICU and nutrition. Per ICU Propofol possibly contributing.     Oozing (improved): Mild bleeding with suctioning otherwise no other findings while prone.     - On prophylactic Lovenox 40 mg BID    Pneumomediastinum/Pneumothorax: Possibly barotrauma 2/2 high pressures needed for COVID ARDS PNA vs lung frailty. Repeat CXR 12/1 showing stable pneumomediastinum, and resolved pneumothorax. - Monitor for signs of worsening barotrauma     Insomnia/anxiety: Pt with new insomnia and anxiety, specifically overnight. - Intubated, sedated    HTN: Pt normotensive off meds. Currently hypotensive. At home is on Amlodipine 10mg daily and HCTZ 12.5mg daily  - Will continue to monitor      HLD: Chronic, stable. Last lipids 07/2021 , HDL 54, LDL 98.6, . On Atorvastatin 10mg daily.  - Holding Atorvastatin 20mg daily     T2DM (diet-controlled): Prior A1c 5.8% in 05/2021. No meds outpatient. On steroids now for COVID.  - SSI (resistant given morbid obesity) w/ q6h glucose checks  - Increase Lantus from 18 to 22 daily.      JESUS: On CPAP at night. - Intubated, sedated.     Obesity: Body mass index is 42.4 kg/m² on presentation. Reduced to 33.5 one month later. - Encouraging lifestyle modifications and further follow up outpatient. At-risk JAMIR: RESOLVED. POA Cr 1.31, BL 0.8. Likely IVVD in setting of poor PO intake. - Daily CMP    Chest pain: RESOLVED. Description is pleuritic in nature, worse with cough. EKG without evidence of acute ischemia. Troponin of 9 makes CAD less concerning.   - Cardiac monitoring, will continue to monitor     Diarrhea: RESOLVED. Watery diarrhea prior to admission. Likely 2/2 COVID infection. Improved since presentation. Enteric bacteria panel negative. Abdominal pain: RESOLVED. Secondary to gas pains after starting diet. - Continue Simethicone       FEN/GI - TPN  Activity - Ambulate with assistance  DVT prophylaxis - Lovenox (ppx)  GI prophylaxis - Protonix  Disposition - Plan to d/c to TBD. Code Status - Full. Discussed with patient / caregivers.   Next of Kim 69 Name and Contact - Jun Quintanillater,  Spouse - 1 [facility], MD  Family Medicine Resident    Patient discussed with Family Medicine Attending: Dr. Cespedes Degree / Objective   Subjective:   Patient intubated and proned, on pressors. Sedated. No acute distress. Respiratory: O2 Flow Rate (L/min): 60 l/min O2 Device: Ventilator   Visit Vitals  BP (!) 107/58   Pulse (!) 117   Temp (!) 102 °F (38.9 °C)   Resp 28   Ht 5' 10.98\" (1.803 m)   Wt 240 lb (108.9 kg)   SpO2 95%   BMI 33.49 kg/m²     General: Intubated, sedated. Appears comfortable. Respiratory: Transmitted mechanical sounds appreciated bilaterally, unchanged. Good air movement. Cardiovascular: regular rate, regular rhythm. GI: + bowel sounds. Nontender. Extremities:  No LE edema. Extremities well perfused and warm. Skin: Warm, dry. PICC line in place  Neuro: Intubated sedated  Mitchell in place     I/O:  Date 12/16/21 0700 - 12/17/21 0659 12/17/21 0700 - 12/18/21 0659   Shift 8626-4995 4347-9903 24 Hour Total 6242-2841 2653-5849 24 Hour Total   INTAKE   P.O. 0  0        P. O. 0  0      I. V.(mL/kg/hr) 1724. 5(1.3) 2000.7 3725.1        Versed Volume 156 177.6 333.6        Nimbex Volume 178.8 141.8 320.6        Fentanyl Volume 72 60 132        Diprivan Volume 403.2 335.4 738. 6        Phenylephrine Volume 222.5 151.9 374.4        Volume (TPN ADULT - CENTRAL)         Volume (TPN ADULT - CENTRAL) 62 630 692      Shift Total(mL/kg) 1724. 5(15.8) 2000. 7(18.4) 3725. 1(34.2)      OUTPUT   Urine(mL/kg/hr) 1890(1.4) 1110 3000        Urine Output (mL) (Urinary Catheter 12/11/21 Mitchell) 1890 1110 3000      Shift Total(mL/kg) 5438(69.9) 1110(10.2) 3000(27.6)      NET -165.6 890.7 725.1      Weight (kg) 108.9 108.9 108.9 108.9 108.9 108.9       CBC:  Recent Labs     12/17/21  0326 12/16/21  0311 12/15/21  0625   WBC 11.5* 12.6* 11.2*   HGB 10.7* 13.0 13.0   HCT 34.7* 41.0 41.1    293 919       Metabolic Panel:  Recent Labs     12/17/21 0326 12/16/21 0311 12/15/21  0625   * 147* 146*   K 4.5 4.4 4.4   * 106 105   CO2 35* 36* 35*   BUN 38* 43* 41*   CREA 0.61* 0.60* 0.59*   * 271* 297*   CA 8.6 9.3 9.3   MG 2.0 2.1 1.9   PHOS  --  5.0* 4.5   ALB 2.7* 2.7* 2.7*   * 365* 352*   INR 1.1 1.1 1.1          For Billing    Chief Complaint   Patient presents with   [facility] Problems  Date Reviewed: 12/3/2021          Codes Class Noted POA    Oozing skin inflammation ICD-10-CM: L08.9  ICD-9-CM: 686.9  12/3/2021 Unknown        Positive D dimer ICD-10-CM: R79.89  ICD-9-CM: 790.92  12/3/2021 Unknown        Other hyperlipidemia ICD-10-CM: E78.49  ICD-9-CM: 272.4  12/3/2021 Unknown        Elevated serum creatinine ICD-10-CM: R79.89  ICD-9-CM: 790.99  12/3/2021 Unknown        Community acquired pneumonia ICD-10-CM: J18.9  ICD-9-CM: 848  12/3/2021 Unknown        ARDS (adult respiratory distress syndrome) ([facility] ICD-10-CM: J80  ICD-9-CM: 518.52  12/1/2021 No        Septic shock (HCC) ICD-10-CM: A41.9, R65.21  ICD-9-CM: 038.9, 785.52, 995.92  11/26/2021 Yes        Pneumomediastinum ([facility] ICD-10-CM: J98.2  ICD-9-CM: 518.1  11/26/2021 No        Pneumothorax on left ICD-10-CM: J93.9  ICD-9-CM: 512.89  11/26/2021 No        Hyponatremia ICD-10-CM: E87.1  ICD-9-CM: 276.1  11/26/2021 Yes        Diabetes mellitus type 2, controlled ([facility] ICD-10-CM: E11.9  ICD-9-CM: 250.00  11/25/2021 Yes        * (Principal) Acute hypoxemic respiratory failure due to COVID-19 [facility]) ICD-10-CM: U07.1, J96.01  ICD-9-CM: 518.81, 079.89, 799.02  11/19/2021 Yes        Obesity, morbid ([address] ICD-10-CM: E66.01  ICD-9-CM: 278.01  4/10/2018 Yes        Elevated liver enzymes ICD-10-CM: R74.8  ICD-9-CM: 790.5  8/5/2016 Yes        JESUS (obstructive sleep apnea) ICD-10-CM: G47.33  ICD-9-CM: 327.23  10/15/2014 Yes        Hypertension ICD-10-CM: I10  ICD-9-CM: 401.9  10/15/2014 Yes        Pure hypercholesterolemia ICD-10-CM: E78.00  ICD-9-CM: 272.0  Unknown Yes

## 2021-12-17 NOTE — PROGRESS NOTES
Progress Note  Date:2021       Room:[address] [place] [address]  Patient Name:Pan Blanco     YOB: 1985     Age:36 y.o. Subjective    Subjective remains intubated and sedated; able to tolerate supine position but remained on nimbex  Review of Systems aníbal given ams  Objective         Vitals Last 24 Hours:  TEMPERATURE:  Temp  Av.5 °F (38.6 °C)  Min: 99 °F (37.2 °C)  Max: 102.5 °F (39.2 °C)  RESPIRATIONS RANGE: Resp  Av.4  Min: 25  Max: 32  PULSE OXIMETRY RANGE: SpO2  Av.9 %  Min: 87 %  Max: 95 %  PULSE RANGE: Pulse  Av.7  Min: 98  Max: 145  BLOOD PRESSURE RANGE: Systolic (96TSW), VNF:994 , Min:90 , OES:456   ; Diastolic (40TDC), LUE:38, Min:53, Max:73    I/O (24Hr):     Intake/Output Summary (Last 24 hours) at 2021 1448  Last data filed at 2021 1411  Gross per 24 hour   Intake 3945.43 ml   Output 2590 ml   Net 1355.43 ml     Objective   Exam facilitated by use of telemedicine platform and with assistance from in house team  Gen - intubated and sedated; nad  Head - nc/at  Eyes - slight facial edema; anicteric sclera  Ent - ett circuit without secretions or hemorrhage  Cvs - sinus rhythm without external evidence of hypoperfusion  Pulm - symmetric air rise with ventilation  Gi-obscured as prone  Ext - no c/c; edema present  Msk - normal bulk  Neuro - intubated, sedated; paralyzed; no spont motor activity  Labs/Imaging/Diagnostics    Labs:  CBC:  Recent Labs     21  0326 12/16/21  0311 12/15/21  0625   WBC 11.5* 12.6* 11.2*   RBC 3.51* 4.16 4.26   HGB 10.7* 13.0 13.0   HCT 34.7* 41.0 41.1   MCV 98.9 98.6 96.5   RDW 13.6 13.7 13.8    293 282     CHEMISTRIES:  Recent Labs     21  0326 21  0311 12/15/21  0625   * 147* 146*   K 4.5 4.4 4.4   * 106 105   CO2 35* 36* 35*   BUN 38* 43* 41*   CA 8.6 9.3 9.3   PHOS  --  5.0* 4.5   MG 2.0 2.1 1.9   PT/INR:  Recent Labs     21  0326 12/16/21  0311 12/15/21  8449 INR 1.1 1.1 1.1     APTT:  Recent Labs     12/17/21  0326 12/16/21  0311 12/15/21  0625   APTT 21.9* 21.7* <20.0*     LIVER PROFILE:  Recent Labs     12/17/21  0326 12/16/21  0311 12/15/21  0625   AST 64* 98* 122*   * 365* 352*     Lab Results   Component Value Date/Time    ALT (SGPT) 236 (H) 12/17/2021 03:26 AM    AST (SGOT) 64 (H) 12/17/2021 03:26 AM    Alk. phosphatase 101 12/17/2021 03:26 AM    Bilirubin, direct 0.3 (H) 12/16/2021 03:11 AM    Bilirubin, total 0.6 12/17/2021 03:26 AM       Imaging Last 24 Hours:  No results found.   Assessment//Plan   Principal Problem:    Acute hypoxemic respiratory failure due to COVID-19 [facility]) (11/19/2021)    Active Problems:    Pure hypercholesterolemia ()      JESUS (obstructive sleep apnea) (10/15/2014)      Hypertension (10/15/2014)      Elevated liver enzymes (8/5/2016)      Obesity, morbid ([facility]) (4/10/2018)      Diabetes mellitus type 2, controlled ([facility]) (11/25/2021)      Septic shock ([facility]) (11/26/2021)      Pneumomediastinum ([facility]) (11/26/2021)      Pneumothorax on left (11/26/2021)      Hyponatremia (11/26/2021)      ARDS (adult respiratory distress syndrome) ([facility]) (12/1/2021)      Oozing skin inflammation (12/3/2021)      Positive D dimer (12/3/2021)      Other hyperlipidemia (12/3/2021)      Elevated serum creatinine (12/3/2021)      Community acquired pneumonia (12/3/2021)      Assessment & Plan   Acute hypoxic resp failure  ARDS  Covid  DM  JESUS        NEURO - remains intubated and sedated with more negative goal rass given severity of hypoxia and the need for NMBs/proning  -LFTs slowly climbing as such we increased ceiling for fentanyl and versed in an effort to wean/dc the propofol      CVS - continued vasopressors to ensure MAPs at goal; no evidence of hypoperfusion and suspect degree of hypotension related to depth of sedation   -Echo demonstrated EF 50-55%  -h/o HLD - can resume statin once LFTs permit      Pulm -ongoing ARDs secondary to multifocal pneumonia from covid; he had responded to prone posititioning with variable success but Pa: FiO2 remains in mod-severe ARDs range; remains supine - as the mortality benefit to proning has passed, we are utilizing it as  a rescue maneuver and will re-prone if desaturartion occurs; trial off the nimbex and can use prn dosing if discordance-related hypoxia    - Pt is not a candidate for ECMO given BMI and prolonged mechanical ventilation     - his IBW is 75kg and 8,7,6ml/kg translates to 600,525, and 450 - acceptable supine Pplat and ventilation     -s/p potential regimen for covid and s/p tocilizumab earlier in course  - no extension of/resolution of pneumomediastinum and prior pneumothorax, respectively  - h/o reji     GI-contined residuals/draining from OGT - remains npo  - Remains on MADELYN proph     -remains at high risk for JAMIR with sepsis, hypotension, hypoxia, and covid infection; that said, would run pt on drier side given limited pulm reserve (see below)     HEME -  no immediate need for transfusion of blood products     ID - s/p prior course of empiric abx and antifungals given uptrending wbc and increased vasopressor requirements; presently being monitored off abx with surveilance cultures; lewis exchanged and no need to culture; already on anticoagulation     ENDO - DM (diet-controlled prior to ad mission) and A1c 5.8% in 05/2021 but hyperglycemic in setting of steroids and critical illness; long acting insulin adjusted; insulin also in the tpn  - follow accu checks and make additional adjustments in regimen as needed      FEN - goal negative as possible but dropped the diuretic dose given tachycardia - will need to reassess on a daily basis  - monitor and correct electrolytes as needed  - Remains on tpn     CCT 40 minutres excluding teaching and procedures  I performed all aspects of the physical examination via Telemedicine associated with two way audio and video communication and with the on-site assistance of the bedside nurse. Alyramyaclarice Tierney am located in [place] and the patient is located in [place] at [address] [place] [address]   The patient is critically ill in the ICU. Adela Anderson  reviewed the pertinent medical records, laboratory/ pathology data and radiographic images.  The decision making regarding this patient is as documented above, which was generated  following  discussion  with the multidisciplinary ICU team and creation of a treatment plan for  the patient.  We discussed the patient's interval history and future coordination of care and Claudia Carmen patient's medications  were reviewed and changes made as stipulated above.  Due to  critical illness impairing one or more vital organs of this patient resulting in life threatening clinical situation  I have provided direct, frequent personal  assessment and manipulation in management plan.       Electronically signed by Tanja Loya MD on 12/17/2021 at 2:48 PM

## 2021-12-18 NOTE — PROGRESS NOTES
[address] [facility] [address] [place] [address]   Office [phone]  Fax [phone]          Assessment and Plan     Fredrick Arriola is a 39 y.o. male with a PMH of HTN, HLD, JESUS admitted for AHRF and severe sepsis 2/2 COVID PNA. Patient was admitted on 11/19/2021. Interval Events:  NAEO. AHRF and ARDS 2/2 COVID PNA: Intubated and sedated since 11/30/21. Discussion w/ [facility], considering lung transplant but not until x5-6wks s/p symptom onset, will need CT ~ 12/24/21 prior to progress towards transplant to confirm end stage fibrosis. Now off precautions 12/16/21.   - Daily weights  - Prone as tolerated  - Goal sats 88% or higher  - CBC, CMP daily. No need for further trending Covid labs  - Continue Decadron 6mg IV daily  - Duo-neb or Combivent Respimat Q6H  - Holding COVID meds (Vit C 500mg BID, Zinc 220mg daily)  - Restarting Atorvastatin 20mg every day  - Pulm following, appreciate recs     Severe sepsis 2/2 COVID PNA:. Leukocytosis stable. Fungal Cx, BCx, UCx NGTD. S/p x7 course Maria Fernanda/Vanc/Eraxis, now off abx 12/8. Fever overnight to 102. - Continued on Sanjeev to maintain MAP > 65 - -Running at 24 this morning.   - F/u BCx    Nutritional status:  Nutrition consulted for TPN, appreciate recs. - continue TPN per nutrition recs. Elevated LFTs: Improving. ALT of 352 and AST of 122. Suspect possible etiology of TPN. - will discuss with ICU and nutrition. Per ICU Propofol possibly contributing. Oozing (improved): Mild bleeding with suctioning otherwise no other findings while prone.     - On prophylactic Lovenox 40 mg BID    Pneumomediastinum/Pneumothorax: Possibly barotrauma 2/2 high pressures needed for COVID ARDS PNA vs lung frailty. Repeat CXR 12/1 showing stable pneumomediastinum, and resolved pneumothorax. - Monitor for signs of worsening barotrauma     Insomnia/anxiety: Pt with new insomnia and anxiety, specifically overnight.   - Intubated, sedated    HTN: Pt normotensive off meds. Currently hypotensive. At home is on Amlodipine 10mg daily and HCTZ 12.5mg daily  - Will continue to monitor      HLD: Chronic, stable. Last lipids 07/2021 , HDL 54, LDL 98.6, . On Atorvastatin 10mg daily.  - Holding Atorvastatin 20mg daily     T2DM (diet-controlled): Prior A1c 5.8% in 05/2021. No meds outpatient. On steroids now for COVID.  - SSI (resistant given morbid obesity) w/ q6h glucose checks  - Increase Lantus from 18 to 22 daily.      JESUS: On CPAP at night. - Intubated, sedated.     Obesity: Body mass index is 42.4 kg/m² on presentation. Reduced to 33.5 one month later. - Encouraging lifestyle modifications and further follow up outpatient. At-risk JAMIR: RESOLVED. POA Cr 1.31, BL 0.8. Likely IVVD in setting of poor PO intake. - Daily CMP    Chest pain: RESOLVED. Description is pleuritic in nature, worse with cough. EKG without evidence of acute ischemia. Troponin of 9 makes CAD less concerning.   - Cardiac monitoring, will continue to monitor     Diarrhea: RESOLVED. Watery diarrhea prior to admission. Likely 2/2 COVID infection. Improved since presentation. Enteric bacteria panel negative. Abdominal pain: RESOLVED. Secondary to gas pains after starting diet. - Continue Simethicone       FEN/GI - TPN  Activity - Ambulate with assistance  DVT prophylaxis - Lovenox (ppx)  GI prophylaxis - Protonix  Disposition - Plan to d/c to TBD. Code Status - Full. Discussed with patient / caregivers. Next of [facility] 69 Name and Contact - Tasneem Wolf MD  Family Medicine Resident    Patient discussed with Family Medicine Attending: Dr. Lazarus Cedar / Objective   Subjective:   Patient seen and examined at bedside.  Patient is intubated and sedated    Respiratory: O2 Flow Rate (L/min): 60 l/min O2 Device: Ventilator   Visit Vitals  BP (!) 99/59   Pulse (!) 112   Temp (!) 102.7 °F (39.3 °C)   Resp 30   Ht 5' 10.98\" (1.803 m)   Wt 240 lb (108.9 kg)   SpO2 92%   BMI 33.49 kg/m²     General: Intubated, sedated. Appears comfortable. Respiratory: Transmitted mechanical sounds appreciated bilaterally, unchanged. Good air movement. Cardiovascular: regular rate, regular rhythm. GI: + bowel sounds. Nontender. Extremities:  No LE edema. Extremities well perfused and warm. Skin: Warm, dry. PICC line in place  Neuro: Intubated sedated  Mitchell in place     I/O:  Date 12/17/21 0700 - 12/18/21 0659 12/18/21 0700 - 12/19/21 0659   Shift 9064-2669 5483-9767 24 Hour Total 6666-3196 1581-8862 24 Hour Total   INTAKE   P.O. 0  0        P. O. 0  0      I. V.(mL/kg/hr) 1662.4(1.3) 1331.4 2993.8        Versed Volume 240 180.4 420.4        Nimbex Volume 62.7  62.7        Fentanyl Volume 72 60 132        Diprivan Volume 403.2 336 739.2        Phenylephrine Volume 129.5 125 254.5        Volume (TPN ADULT - CENTRAL) 703.5  703.5        Volume (TPN ADULT - CENTRAL) 51.5 630 681.5      Shift Total(mL/kg) 1662.4(15.3) 1331.4(12.2) 4924.7(14.3)      OUTPUT   Urine(mL/kg/hr) 920(0.7) 1065 1985        Urine Output (mL) (Urinary Catheter 12/11/21 Mitchell) 920 1065 1985      Stool 1  1        Stool 1  1      Shift Total(mL/kg) 921(8.5) 1065(9.8) 7570(22.4)      .4 266.4 1007.8      Weight (kg) 108.9 108.9 108.9 108.9 108.9 108.9       CBC:  Recent Labs     12/18/21  0317 12/17/21  0326 12/16/21 0311   WBC 11.5* 11.5* 12.6*   HGB 10.1* 10.7* 13.0   HCT 33.1* 34.7* 41.0    255 871       Metabolic Panel:  Recent Labs     12/18/21 0317 12/17/21  0326 12/16/21  0311 12/15/21  0625 12/15/21  0625   * 147* 147*   < > 146*   K 4.6 4.5 4.4   < > 4.4   * 110* 106   < > 105   CO2 33* 35* 36*   < > 35*   BUN 29* 38* 43*   < > 41*   CREA 0.50* 0.61* 0.60*   < > 0.59*   * 238* 271*   < > 297*   CA 8.9 8.6 9.3   < > 9.3   MG 2.2 2.0 2.1   < > 1.9   PHOS 3.4  --  5.0*  --  4.5   ALB 2.6* 2.7* 2.7*   < > 2.7*   * 236* 365*   < > 352*   INR 1.1 1.1 1.1   < > 1.1    < > = values in this interval not displayed.           For Billing    Chief Complaint   Patient presents with   [facility] Problems  Date Reviewed: 12/3/2021          Codes Class Noted POA    Oozing skin inflammation ICD-10-CM: L08.9  ICD-9-CM: 686.9  12/3/2021 Unknown        Positive D dimer ICD-10-CM: R79.89  ICD-9-CM: 790.92  12/3/2021 Unknown        Other hyperlipidemia ICD-10-CM: E78.49  ICD-9-CM: 272.4  12/3/2021 Unknown        Elevated serum creatinine ICD-10-CM: R79.89  ICD-9-CM: 790.99  12/3/2021 Unknown        Community acquired pneumonia ICD-10-CM: J18.9  ICD-9-CM: 562  12/3/2021 Unknown        ARDS (adult respiratory distress syndrome) ([facility] ICD-10-CM: Kathy Sneddon  ICD-9-CM: 518.52  12/1/2021 No        Septic shock ([facility] ICD-10-CM: A41.9, R65.21  ICD-9-CM: 038.9, 785.52, 995.92  11/26/2021 Yes        Pneumomediastinum ([facility] ICD-10-CM: J98.2  ICD-9-CM: 518.1  11/26/2021 No        Pneumothorax on left ICD-10-CM: J93.9  ICD-9-CM: 512.89  11/26/2021 No        Hyponatremia ICD-10-CM: E87.1  ICD-9-CM: 276.1  11/26/2021 Yes        Diabetes mellitus type 2, controlled ([facility] ICD-10-CM: E11.9  ICD-9-CM: 250.00  11/25/2021 Yes        * (Principal) Acute hypoxemic respiratory failure due to COVID-19 [facility]) ICD-10-CM: U07.1, J96.01  ICD-9-CM: 518.81, 079.89, 799.02  11/19/2021 Yes        Obesity, morbid ([address] ICD-10-CM: E66.01  ICD-9-CM: 278.01  4/10/2018 Yes        Elevated liver enzymes ICD-10-CM: R74.8  ICD-9-CM: 790.5  8/5/2016 Yes        JESUS (obstructive sleep apnea) ICD-10-CM: G47.33  ICD-9-CM: 327.23  10/15/2014 Yes        Hypertension ICD-10-CM: I10  ICD-9-CM: 401.9  10/15/2014 Yes        Pure hypercholesterolemia ICD-10-CM: E78.00  ICD-9-CM: 272.0  Unknown Yes

## 2021-12-18 NOTE — PROGRESS NOTES
[facility]. [facility] Res[facility] Program       Resident Progress Note in Brief    S: Patient seen and examined at bedside. Pt intubated and sedated. No concerns from Nursing. O:  Visit Vitals  /61   Pulse 94   Temp 99.5 °F (37.5 °C)   Resp 28   Ht 5' 10.98\" (1.803 m)   Wt 240 lb (108.9 kg)   SpO2 95%   BMI 33.49 kg/m²     Physical Examination:   General: Intubated, sedated. Appears comfortable. Supine. Respiratory: Transmitted mechanical sounds appreciated bilaterally, unchanged. Cardiovascular: regular rate, regular rhythm. Extremities:  No LE edema. Extremities well perfused and warm. Skin: Warm, dry. Neuro: Intubated sedated  Mitchell in place     A/P: 39 y. o. male with a PMH of HTN, HLD, JESUS admitted for AHRF and ARDS 2/2 COVID PNA. Currently intubated and sedated. Patient was admitted on 11/19/2021. AHRF and ARDS 2/2 COVID PNA: Intubated and sedated since 11/30/21. Discussion w/ [facility], considering lung transplant but not until x5-6wks s/p symptom onset, will need CT ~ 12/24/21 prior to progress towards transplant to confirm end stage fibrosis. Now off precautions 12/16/21.   - Daily weights  - Prone as tolerated  - Goal sats 88% or higher  - CBC, CMP daily. No need for further trending Covid labs  - Continue Decadron 6mg IV daily  - Duo-neb or Combivent Respimat Q6H  - Holding COVID meds (Atorvastatin 20mg every day, Vit C 500mg BID, Zinc 220mg daily)  - Pulm following, appreciate recs         Severe sepsis 2/2 COVID PNA:. Leukocytosis stable. Fungal Cx, BCx, UCx NGTD. S/p x7 course Maria Fernanda/Vanc/Eraxis, now off abx 12/8.   - Continued on Sanjeev to maintain MAP > 65 - -Running at 20 this morning.   - Given continued fever and tachycardia, will pursue UA, BCx/UCx. Will hold off on antibiotics at this time. Low suspicion for PE and patient would not be able to tolerate CTA.     Nutritional status:  Nutrition consulted for TPN, appreciate recs.     - continue TPN per nutrition recs. Please see the primary team's daily progress note for the patient's full plan.     Aren Jarrett MD  Family Medicine Resident

## 2021-12-18 NOTE — PROGRESS NOTES
[facility]. Medicine Residency Program       Resident Progress Note in Brief    S: Patient seen and examined at bedside. Pt intubated and sedated. O:  Visit Vitals  /63   Pulse (!) 104   Temp (!) 101.9 °F (38.8 °C)   Resp (!) 32   Ht 5' 10.98\" (1.803 m)   Wt 240 lb (108.9 kg)   SpO2 91%   BMI 33.49 kg/m²     Physical Examination:   General: Intubated, sedated. Appears comfortable. Supine. Respiratory: Transmitted mechanical sounds appreciated bilaterally, unchanged. Cardiovascular: regular rate, regular rhythm. Extremities:  No LE edema. Skin: Warm, dry. Neuro: Intubated sedated  Mitchell in place     A/P: 39 y. o. male with a PMH of HTN, HLD, JESUS admitted for AHRF and ARDS 2/2 COVID PNA. Currently intubated and sedated. Patient was admitted on 11/19/2021. AHRF and ARDS 2/2 COVID PNA: Intubated and sedated since 11/30/21. Discussion w/ [facility], considering lung transplant but not until x5-6wks s/p symptom onset, will need CT ~ 12/24/21 prior to progress towards transplant to confirm end stage fibrosis. Now off precautions 12/16/21.   - Daily weights  - Prone as tolerated  - Goal sats 88% or higher  - CBC, CMP daily. No need for further trending Covid labs  - Continue Decadron 6mg IV daily  - Duo-neb or Combivent Respimat Q6H  - Holding COVID meds (Atorvastatin 20mg every day, Vit C 500mg BID, Zinc 220mg daily)  - Pulm following, appreciate recs      Severe sepsis 2/2 COVID PNA:. Leukocytosis stable. Fungal Cx, BCx, UCx NGTD. S/p x7 course Maria Fernanda/Vanc/Eraxis, now off abx 12/8.   - Continued on Sanjeev to maintain MAP > 65 - -Running at 20 this morning.   - Given continued fever and tachycardia, will pursue UA, BCx/UCx. Will hold off on antibiotics at this time. Low suspicion for PE and patient would not be able to tolerate CTA.      Nutritional status:  Nutrition consulted for TPN, appreciate recs. - continue TPN per nutrition recs.        Please see the primary team's daily progress note for the patient's full plan.     Leonela May MD  Family Medicine Resident

## 2021-12-18 NOTE — PROGRESS NOTES
Bedside and Verbal shift change report given to Michelle Kohler (oncoming nurse) by Mickey Wolf (offgoing nurse). Report included the following information SBAR, Kardex and Recent Results.

## 2021-12-18 NOTE — PROGRESS NOTES
Critical Care    Progress Note  Date:2021       Room:[address] [place] [address]  Patient Name:Pan Blanco     YOB: 1985     Age:36 y.o. Subjective    Subjective remains intubated and sedated; in supine position but remained off nimbex O2:80%. On Phenylephrine   Review of Systems aníbal given ams  Objective         Vitals Last 24 Hours:  TEMPERATURE:  Temp  Av.8 °F (38.8 °C)  Min: 99.5 °F (37.5 °C)  Max: 102.8 °F (39.3 °C)  RESPIRATIONS RANGE: Resp  Av.1  Min: 28  Max: 34  PULSE OXIMETRY RANGE: SpO2  Av.8 %  Min: 86 %  Max: 95 %  PULSE RANGE: Pulse  Av.1  Min: 93  Max: 126  BLOOD PRESSURE RANGE: Systolic (34HUF), LE , Min:99 , ZWP:254   ; Diastolic (09YGN), FFR:06, Min:53, Max:65    I/O (24Hr): Intake/Output Summary (Last 24 hours) at 2021 1500  Last data filed at 2021 1200  Gross per 24 hour   Intake 2900.55 ml   Output 2011 ml   Net 889.55 ml     Objective:  Vital signs: (most recent): Blood pressure 109/65, pulse (!) 101, temperature (!) 102.3 °F (39.1 °C), resp. rate 28, height 5' 10.98\" (1.803 m), weight 108.9 kg (240 lb), SpO2 91 %. I examined the patient via telemedicine, with its associated limitations.   I beamed in and examined the patient with assistance of house staff     On exam:    Gen: sedated   HEENT:  Intubated   Chest: symmetrical chest rise  CV:S1,S2  Abd: soft,   Ext: +ve edema  Neuro: No commands      Labs/Imaging/Diagnostics    Labs:  CBC:  Recent Labs     21  0317 21  0326 21  031   WBC 11.5* 11.5* 12.6*   RBC 3.33* 3.51* 4.16   HGB 10.1* 10.7* 13.0   HCT 33.1* 34.7* 41.0   MCV 99.4* 98.9 98.6   RDW 13.9 13.6 13.7    255 293     CHEMISTRIES:  Recent Labs     21   * 147* 147*   K 4.6 4.5 4.4   * 110* 106   CO2 33* 35* 36*   BUN 29* 38* 43*   CA 8.9 8.6 9.3   PHOS 3.4  --  5.0*   MG 2.2 2.0 2.1   PT/INR:  Recent Labs     21 12/16/21 0311   INR 1.1 1.1 1.1     APTT:  Recent Labs     12/18/21 0317 12/17/21  0326 12/16/21 0311   APTT 21.4* 21.9* 21.7*     LIVER PROFILE:  Recent Labs     12/18/21 0317 12/17/21 0326 12/16/21 0311   AST 65* 64* 98*   * 236* 365*     Lab Results   Component Value Date/Time    ALT (SGPT) 190 (H) 12/18/2021 03:17 AM    AST (SGOT) 65 (H) 12/18/2021 03:17 AM    Alk. phosphatase 101 12/18/2021 03:17 AM    Bilirubin, direct 0.3 (H) 12/16/2021 03:11 AM    Bilirubin, total 0.6 12/18/2021 03:17 AM       Imaging Last 24 Hours:  XR CHEST PORT    Result Date: 12/18/2021  INDICATION: Fever COMPARISON: 12/16/2021 FINDINGS: Single AP portable view of the chest obtained at 8:57 AM demonstrates no change in position of the lines and tubes. The cardiomediastinal silhouette is stable. Diffuse bilateral interstitial and alveolar opacities are not significantly changed. No pneumothorax is seen. There is no evidence of pleural effusion. Unchanged bilateral interstitial and alveolar opacities, compatible with COVID pneumonia.      Assessment//Plan   Principal Problem:    Acute hypoxemic respiratory failure due to COVID-19 [facility]) (11/19/2021)    Active Problems:    Pure hypercholesterolemia ()      JESUS (obstructive sleep apnea) (10/15/2014)      Hypertension (10/15/2014)      Elevated liver enzymes (8/5/2016)      Obesity, morbid ([address] (4/10/2018)      Diabetes mellitus type 2, controlled ([address] (11/25/2021)      Septic shock ([address] (11/26/2021)      Pneumomediastinum ([address] (11/26/2021)      Pneumothorax on left (11/26/2021)      Hyponatremia (11/26/2021)      ARDS (adult respiratory distress syndrome) ([address] (12/1/2021)      Oozing skin inflammation (12/3/2021)      Positive D dimer (12/3/2021)      Other hyperlipidemia (12/3/2021)      Elevated serum creatinine (12/3/2021)      Community acquired pneumonia (12/3/2021)      Assessment & Plan   Acute hypoxic resp failure  ARDS  Covid  DM  JESUS        NEURO - remains intubated and sedated with more negative goal rass given severity of hypoxia and the need for NMBs/proning  -LFTs slowly climbing as such we increased ceiling for fentanyl and versed in an effort to wean/dc the propofol   -Start to wean sedation slowly      CVS - continued vasopressors to ensure MAPs at goal; no evidence of hypoperfusion and suspect degree of hypotension related to depth of sedation   -Echo demonstrated EF 50-55%  -h/o HLD - can resume statin once LFTs permit  -Wean Phenylephrine as tolerated       Pulm -ongoing ARDs secondary to multifocal pneumonia from covid; he had responded to prone posititioning with variable success but Pa: FiO2 remains in mod-severe ARDs range; remains supine - as the mortality benefit to proning has passed, we are utilizing it as  a rescue maneuver and will re-prone if desaturartion occurs; trial off the nimbex and can use prn dosing if discordance-related hypoxia. continue to keep off Nimbex.   -continue to wean O2 as tolerated.      - Pt is not a candidate for ECMO at [facility] given BMI and prolonged mechanical ventilation   - May be considered to ECMO at [facility] after 6 weeks of mechanical ventilation for consideration of Lung Transplant.   - his IBW is 75kg and 8,7,6ml/kg translates to 600,525, and 450 - acceptable supine Pplat and ventilation       -s/p potential regimen for covid and s/p tocilizumab earlier in course  - no extension of/resolution of pneumomediastinum and prior pneumothorax, respectively  - h/o reji     GI-contined residuals/draining from OGT - remains npo  - Remains on MADELYN proph     -remains at high risk for JAMIR with sepsis, hypotension, hypoxia, and covid infection; that said, would run pt on drier side given limited pulm reserve (see below)     HEME -  no immediate need for transfusion of blood products     ID - s/p prior course of empiric abx and antifungals given uptrending wbc and increased vasopressor requirements; presently being monitored off abx with surveilance cultures; lewis exchanged and no need to culture; already on anticoagulation. Check Sputum today   Low threshold to add ABx.      ENDO - DM (diet-controlled prior to ad mission) and A1c 5.8% in 05/2021 but hyperglycemic in setting of steroids and critical illness; long acting insulin adjusted; insulin also in the tpn  - follow accu checks and make additional adjustments in regimen as needed      FEN - goal negative as possible but dropped the diuretic dose given tachycardia - will need to reassess on a daily basis  - monitor and correct electrolytes as needed  - Remains on tpn     CCT 35 minutres excluding teaching and procedures    I performed all aspects of the physical examination via Telemedicine associated with two way audio and video communication and with the on-site assistance of the bedside nurse. Delaney Babinramesh am located in [facility] and the patient is located in [place] at [address] [place] [address]   The patient is critically ill in the ICU. Bartolo Christian  reviewed the pertinent medical records, laboratory/ pathology data and radiographic images.  The decision making regarding this patient is as documented above, which was generated  following  discussion  with the multidisciplinary ICU team and creation of a treatment plan for  the patient.  We discussed the patient's interval history and future coordination of care and Mina Loja patient's medications  were reviewed and changes made as stipulated above.  Due to  critical illness impairing one or more vital organs of this patient resulting in life threatening clinical situation  I have provided direct, frequent personal  assessment and manipulation in management plan.       Electronically signed by Lisa Zaman MD on 12/18/2021 at 2:48 PM

## 2021-12-19 NOTE — ROUTINE PROCESS
Change of Shift Report:    1900:  Bedside and Verbal shift change report given to Fifi Meyers RN (oncoming nurse) by Enedina Llanes RN (offgoing nurse). Report included the following information SBAR, Kardex, ED Summary, Intake/Output, MAR, Accordion, Recent Results and Cardiac Rhythm NSR. Shift Summary:     2000:  Patient assessment completed. Patient intubated and sedated. Patient coughing with suctioning. Fentanyl going at 300 mcg/hr, versed running at 15 mg/hr, propofol running at 45 mcg/kg/min. Patient a little tachy in the low 100s, with fever of 101.5. Patient's BPs stable with MAPs>65 with sarah running at 24 mcg/min. TPN started at 1802 by day shift RN, running at 63 mL/hr. Patient's lung sounds diminished, O2 sat 91%. Patient on PRVC mode, , RR 28, PEEP 13, FiO2 80%. 2344:  Patient titrated off sarah. 0200:  Patient not pulling at lines, and sedated. Restraints discontinued. End of Shift Report:    0700:  Bedside and Verbal shift change report given to SAUL Mcadams (oncoming nurse) by Paulina Monk RN (offgoing nurse). Report included the following information SBAR, Kardex, ED Summary, Intake/Output, MAR, Accordion, Recent Results and Cardiac Rhythm NSR/Sinus Tach.

## 2021-12-19 NOTE — PROGRESS NOTES
Problem: Non-Violent Restraints  Goal: Removal from restraints as soon as assessed to be safe  Outcome: Resolved/Met  Goal: No harm/injury to patient while restraints in use  Outcome: Resolved/Met  Goal: Patient's dignity will be maintained  Outcome: Resolved/Met  Goal: Patient Interventions  Outcome: Resolved/Met

## 2021-12-19 NOTE — PROGRESS NOTES
[address] [facility] [address] [facility]   Office [phone]  Fax [phone]          Assessment and Plan     Serafin Harris is a 39 y.o. male with a PMH of HTN, HLD, JESUS admitted for AHRF and severe sepsis 2/2 COVID PNA. Patient was admitted on 11/19/2021. Interval Events:  NAEO. AHRF and ARDS 2/2 COVID PNA: Intubated and sedated since 11/30/21. Discussion w/ [facility], considering lung transplant but not until x5-6wks s/p symptom onset, will need CT ~ 12/24/21 prior to progress towards transplant to confirm end stage fibrosis. Now off precautions 12/16/21.   - Prone as tolerated  - Goal sats 88% or higher  - CBC, CMP daily. No need for further trending Covid labs  - Continue Decadron 6mg IV daily  - Duo-neb or Combivent Respimat Q6H  - Holding COVID meds (Vit C 500mg BID, Zinc 220mg daily)  - Atorvastatin 20mg every day  - Pulm following, appreciate recs     Severe sepsis 2/2 COVID PNA:. Leukocytosis stable. Fungal Cx, BCx, UCx NGTD. S/p x7 course Maria Fernanda/Vanc/Eraxis, now off abx 12/8. Cultures continue to show no growth. - Now off pressors. Continue to monitor  - f/u sputum cx per ICU    Nutritional status:  Nutrition consulted for TPN, appreciate recs. - continue TPN per nutrition recs. Elevated LFTs: Improving. ALT of 352 and AST of 122. Suspect possible etiology of TPN. - will discuss with ICU and nutrition. Per ICU Propofol possibly contributing. Oozing (improved): Mild bleeding with suctioning otherwise no other findings while prone.     - On prophylactic Lovenox 40 mg BID    Pneumomediastinum/Pneumothorax: Possibly barotrauma 2/2 high pressures needed for COVID ARDS PNA vs lung frailty. Repeat CXR 12/1 showing stable pneumomediastinum, and resolved pneumothorax. - Monitor for signs of worsening barotrauma     Insomnia/anxiety: Pt with new insomnia and anxiety, specifically overnight. - Intubated, sedated    HTN: Pt normotensive off meds.  Currently hypotensive. At home is on Amlodipine 10mg daily and HCTZ 12.5mg daily  - Will continue to monitor      HLD: Chronic, stable. Last lipids 07/2021 , HDL 54, LDL 98.6, . On Atorvastatin 10mg daily.  - Holding Atorvastatin 20mg daily     T2DM (diet-controlled): Prior A1c 5.8% in 05/2021. No meds outpatient. On steroids now for COVID.  - SSI (resistant given morbid obesity) w/ q6h glucose checks  - Increase Lantus to 30 daily.      JESUS: On CPAP at night. - Intubated, sedated.     Obesity: Body mass index is 42.4 kg/m² on presentation. Reduced to 33.5 one month later. - Encouraging lifestyle modifications and further follow up outpatient. At-risk JAMIR: RESOLVED. POA Cr 1.31, BL 0.8. Likely IVVD in setting of poor PO intake. - Daily CMP    Chest pain: RESOLVED. Description is pleuritic in nature, worse with cough. EKG without evidence of acute ischemia. Troponin of 9 makes CAD less concerning.   - Cardiac monitoring, will continue to monitor     Diarrhea: RESOLVED. Watery diarrhea prior to admission. Likely 2/2 COVID infection. Improved since presentation. Enteric bacteria panel negative. Abdominal pain: RESOLVED. Secondary to gas pains after starting diet. - Continue Simethicone       FEN/GI - TPN  Activity - Ambulate with assistance  DVT prophylaxis - Lovenox (ppx)  GI prophylaxis - Protonix  Disposition - Plan to d/c to TBD. Code Status - Full. Discussed with patient / caregivers. [facility] Name and [address] [facility],  Spouse - [phone]      Tone Singh MD  Family Medicine Resident    Patient discussed with Family Medicine Attending: Dr. Janet Angela / Objective   Subjective:   Patient seen and examined at bedside.  Patient is intubated and sedated    Respiratory: O2 Flow Rate (L/min): 60 l/min O2 Device: Endotracheal tube,Ventilator   Visit Vitals  BP (!) 145/77   Pulse (!) 113   Temp 99.6 °F (37.6 °C)   Resp 28   Ht 5' 10.98\" (1.803 m)   Wt 240 lb (108.9 kg)   SpO2 92%   BMI 33.49 kg/m²     General: Intubated, sedated. Appears comfortable. Respiratory: Transmitted mechanical sounds appreciated bilaterally, unchanged. Present air movement bilaterally. Cardiovascular: regular rate, regular rhythm. GI: + bowel sounds. Nontender. Extremities:  No LE edema. Extremities well perfused and warm. Skin: Warm, dry. Neuro: Intubated sedated  Mitchell in place     I/O:  Date 12/18/21 0700 - 12/19/21 0659 12/19/21 0700 - 12/20/21 0659   Shift [id number] 1900-0659 24 Hour Total 0943-7036 5715-7164 24 Hour Total   INTAKE   I.V.(mL/kg/hr) 1296(1) 2135.6(1.6) 3431.6(1.3) 223  223     Versed Volume 150 181.5 331.5 22.7  22.7     Fentanyl Volume 60 84 144 12  12     Diprivan Volume 336 469.4 805.4 62.3  62.3     Phenylephrine Volume 120 79.8 199.8 0  0     Volume (TPN ADULT - CENTRAL)         Volume (TPN ADULT - CENTRAL)  753.9 753.9 126  126   NG/GT 0  0        Water Flush Volume (mL) (Orogastric Tube 11/30/21) 0  0        Intake (ml) (Orogastric Tube 11/30/21) 0  0      Shift Total(mL/kg) 3645(61.1) 0049.9(14.9) 3431. 6(31.5) 223(2)  223(2)   OUTPUT   Urine(mL/kg/hr) 600(0.5) 1040(0.8) 1640(0.6) 175  175     Urine Output (mL) (Urinary Catheter 12/11/21 Mitchell) 600 1040 1640 175  175   Shift Total(mL/kg) 600(5.5) 1040(9.6) 1640(15.1) 175(1.6)  175(1.6)    1095.6 1791.6 48  48   Weight (kg) 108.9 108.9 108.9 108.9 108.9 108.9       CBC:  Recent Labs     12/19/21  0332 12/18/21  0317 12/17/21  0326   WBC 12.3* 11.5* 11.5*   HGB 10.2* 10.1* 10.7*   HCT 32.4* 33.1* 34.7*    268 957       Metabolic Panel:  Recent Labs     12/19/21  0332 12/18/21 0317 12/17/21 0326    146* 147*   K 4.2 4.6 4.5   * 109* 110*   CO2 33* 33* 35*   BUN 28* 29* 38*   CREA 0.37* 0.50* 0.61*   * 234* 238*   CA 9.0 8.9 8.6   MG 2.2 2.2 2.0   PHOS 4.4 3.4  --    ALB 2.4* 2.6* 2.7*   * 190* 236*   INR 1.1 1.1 1.1          For Billing    Chief Complaint Patient presents with   [facility] Problems  Date Reviewed: 12/3/2021          Codes Class Noted POA    Oozing skin inflammation ICD-10-CM: L08.9  ICD-9-CM: 686.9  12/3/2021 Unknown        Positive D dimer ICD-10-CM: R79.89  ICD-9-CM: 790.92  12/3/2021 Unknown        Other hyperlipidemia ICD-10-CM: E78.49  ICD-9-CM: 272.4  12/3/2021 Unknown        Elevated serum creatinine ICD-10-CM: R79.89  ICD-9-CM: 790.99  12/3/2021 Unknown        Community acquired pneumonia ICD-10-CM: J18.9  ICD-9-CM: 795  12/3/2021 Unknown        ARDS (adult respiratory distress syndrome) ([facility] ICD-10-CM: Lawernce Tre  ICD-9-CM: 518.52  12/1/2021 No        Septic shock ([facility] ICD-10-CM: A41.9, R65.21  ICD-9-CM: 038.9, 785.52, 995.92  11/26/2021 Yes        Pneumomediastinum ([facility] ICD-10-CM: J98.2  ICD-9-CM: 518.1  11/26/2021 No        Pneumothorax on left ICD-10-CM: J93.9  ICD-9-CM: 512.89  11/26/2021 No        Hyponatremia ICD-10-CM: E87.1  ICD-9-CM: 276.1  11/26/2021 Yes        Diabetes mellitus type 2, controlled ([facility] ICD-10-CM: E11.9  ICD-9-CM: 250.00  11/25/2021 Yes        * (Principal) Acute hypoxemic respiratory failure due to COVID-19 [facility]) ICD-10-CM: U07.1, J96.01  ICD-9-CM: 518.81, 079.89, 799.02  11/19/2021 Yes        Obesity, morbid ([facility] ICD-10-CM: E66.01  ICD-9-CM: 278.01  4/10/2018 Yes        Elevated liver enzymes ICD-10-CM: R74.8  ICD-9-CM: 790.5  8/5/2016 Yes        JESUS (obstructive sleep apnea) ICD-10-CM: G47.33  ICD-9-CM: 327.23  10/15/2014 Yes        Hypertension ICD-10-CM: I10  ICD-9-CM: 401.9  10/15/2014 Yes        Pure hypercholesterolemia ICD-10-CM: E78.00  ICD-9-CM: 272.0  Unknown Yes

## 2021-12-19 NOTE — PROGRESS NOTES
[facility]  [facility]. Medicine Residency Program       Resident Progress Note in Brief    S: Patient seen and examined at bedside. Pt intubated and sedated. O:  Visit Vitals  /64   Pulse 99   Temp 99.9 °F (37.7 °C)   Resp 28   Ht 5' 10.98\" (1.803 m)   Wt 240 lb (108.9 kg)   SpO2 93%   BMI 33.49 kg/m²     Physical Examination:   General: Intubated, sedated. Appears comfortable. Supine. Respiratory: Transmitted mechanical sounds appreciated bilaterally, unchanged. Cardiovascular:Tachycardic, regular rhythm. Extremities:  No LE edema. Skin: Warm, dry. Neuro: Intubated sedated  Mitchell in place     A/P: 39 y. o. male with a PMH of HTN, HLD, JESUS admitted for AHRF and ARDS 2/2 COVID PNA. Currently intubated and sedated. Patient was admitted on 11/19/2021. AHRF and ARDS 2/2 COVID PNA: Intubated and sedated since 11/30/21. Discussion w/ [facility], considering lung transplant but not until x5-6wks s/p symptom onset, will need CT ~ 12/24/21 prior to progress towards transplant to confirm end stage fibrosis. Now off precautions 12/16/21.   - Daily weights  - Prone as tolerated  - Goal sats 88% or higher  - CBC, CMP daily. No need for further trending Covid labs  - Continue Decadron 6mg IV daily  - Duo-neb or Combivent Respimat Q6H  - Holding COVID meds (Atorvastatin 20mg every day, Vit C 500mg BID, Zinc 220mg daily)  - Pulm following, appreciate recs      Severe sepsis 2/2 COVID PNA:. Leukocytosis stable. Fungal Cx, BCx, UCx NGTD. S/p x7 course Maria Fernanda/Vanc/Eraxis, now off abx 12/8.   - Continued on Sanjeev to maintain MAP > 65   - Given continued fever and tachycardia, will pursue UA, BCx/UCx. Will hold off on antibiotics at this time. Low suspicion for PE and patient would not be able to tolerate CTA.      Nutritional status:  Nutrition consulted for TPN, appreciate recs. - continue TPN per nutrition recs.        Please see the primary team's daily progress note for the patient's full plan.     Robin Garcia MD  Family Medicine Resident

## 2021-12-19 NOTE — PROGRESS NOTES
Critical Care    Progress Note  Date:2021       Room:[address] [place] [address]  Patient Name:Pan Blanco     YOB: 1985     Age:36 y.o. Subjective    Subjective remains intubated and sedated;desat with sedation weaning in supine position but remained off nimbex O2:90%. In positive fluid balance. Temp trending down. Review of Systems aníbal given ams  Objective         Vitals Last 24 Hours:  TEMPERATURE:  Temp  Av.1 °F (37.8 °C)  Min: 98 °F (36.7 °C)  Max: 101.9 °F (38.8 °C)  RESPIRATIONS RANGE: Resp  Av.5  Min: 22  Max: 34  PULSE OXIMETRY RANGE: SpO2  Av %  Min: 86 %  Max: 98 %  PULSE RANGE: Pulse  Av.9  Min: 79  Max: 122  BLOOD PRESSURE RANGE: Systolic (20FJK), DRT:116 , Min:89 , RSU:703   ; Diastolic (84UGZ), VLK:43, Min:53, Max:86    I/O (24Hr): Intake/Output Summary (Last 24 hours) at 2021 1318  Last data filed at 2021 1200  Gross per 24 hour   Intake 3338.23 ml   Output 2365 ml   Net 973.23 ml     Objective:  Vital signs: (most recent): Blood pressure 109/64, pulse (!) 103, temperature 100.3 °F (37.9 °C), resp. rate 30, height 5' 10.98\" (1.803 m), weight 108.9 kg (240 lb), SpO2 94 %. I examined the patient via telemedicine, with its associated limitations.   I beamed in and examined the patient with assistance of house staff     On exam:    Gen: sedated   HEENT:  Intubated   Chest: symmetrical chest rise  CV:S1,S2  Abd: soft,   Ext: +ve edema  Neuro: No commands      Labs/Imaging/Diagnostics    Labs:  CBC:  Recent Labs     21  0332 21  0317 21  0326   WBC 12.3* 11.5* 11.5*   RBC 3.26* 3.33* 3.51*   HGB 10.2* 10.1* 10.7*   HCT 32.4* 33.1* 34.7*   MCV 99.4* 99.4* 98.9   RDW 13.9 13.9 13.6    268 255     CHEMISTRIES:  Recent Labs     21  0332 21  0317 21  0326    146* 147*   K 4.2 4.6 4.5   * 109* 110*   CO2 33* 33* 35*   BUN 28* 29* 38*   CA 9.0 8.9 8.6   PHOS 4.4 3.4  --    MG 2.2 2.2 2.0 PT/INR:  Recent Labs     12/19/21  0332 12/18/21  0317 12/17/21  0326   INR 1.1 1.1 1.1     APTT:  Recent Labs     12/19/21  0332 12/18/21  0317 12/17/21  0326   APTT 20.3* 21.4* 21.9*     LIVER PROFILE:  Recent Labs     12/19/21  0332 12/18/21  0317 12/17/21  0326   AST 76* 65* 64*   * 190* 236*     Lab Results   Component Value Date/Time    ALT (SGPT) 205 (H) 12/19/2021 03:32 AM    AST (SGOT) 76 (H) 12/19/2021 03:32 AM    Alk. phosphatase 100 12/19/2021 03:32 AM    Bilirubin, direct 0.3 (H) 12/16/2021 03:11 AM    Bilirubin, total 0.6 12/19/2021 03:32 AM       Imaging Last 24 Hours:  No results found.   Assessment//Plan   Principal Problem:    Acute hypoxemic respiratory failure due to COVID-19 [facility]) (11/19/2021)    Active Problems:    Pure hypercholesterolemia ()      JESUS (obstructive sleep apnea) (10/15/2014)      Hypertension (10/15/2014)      Elevated liver enzymes (8/5/2016)      Obesity, morbid ([address] (4/10/2018)      Diabetes mellitus type 2, controlled ([address] (11/25/2021)      Septic shock ([address] (11/26/2021)      Pneumomediastinum ([address] (11/26/2021)      Pneumothorax on left (11/26/2021)      Hyponatremia (11/26/2021)      ARDS (adult respiratory distress syndrome) ([address] (12/1/2021)      Oozing skin inflammation (12/3/2021)      Positive D dimer (12/3/2021)      Other hyperlipidemia (12/3/2021)      Elevated serum creatinine (12/3/2021)      Community acquired pneumonia (12/3/2021)      Assessment & Plan   Acute hypoxic resp failure  ARDS  Covid  DM  JESUS        NEURO - remains intubated and sedated with more negative goal rass given severity of hypoxia and the need for NMBs/proning  -LFTs slowly climbing as such we increased ceiling for fentanyl and versed in an effort to wean/dc the propofol   -continue to attempt to wean sedation slowly      CVS - continued vasopressors to ensure MAPs at goal; no evidence of hypoperfusion and suspect degree of hypotension related to depth of sedation   -Echo demonstrated EF 50-55%  -h/o HLD - can resume statin once LFTs permit  -Monitor hemodynamics      Pulm -ongoing ARDs secondary to multifocal pneumonia from covid; he had responded to prone posititioning with variable success but Pa: FiO2 remains in mod-severe ARDs range; remains supine - as the mortality benefit to proning has passed, we are utilizing it as  a rescue maneuver and will re-prone if desaturartion occurs; trial off the nimbex and can use prn dosing if discordance-related hypoxia. continue to keep off Nimbex.   -continue to wean O2 as tolerated to keep sat>92%    CXR and ABG in AM     - Pt is not a candidate for ECMO at [facility] given BMI and prolonged mechanical ventilation   - May be considered to ECMO at [facility] after 6 weeks of mechanical ventilation for consideration of Lung Transplant.   - his IBW is 75kg and 8,7,6ml/kg translates to 600,525, and 450 - acceptable supine Pplat and ventilation       -s/p potential regimen for covid and s/p tocilizumab earlier in course  - no extension of/resolution of pneumomediastinum and prior pneumothorax, respectively  - h/o reji     GI-contined residuals/draining from OGT - remains npo  - Remains on MADELYN proph     -remains at high risk for JAMIR with sepsis, hypotension, hypoxia, and covid infection; that said, would run pt on drier side given limited pulm reserve (see below)     HEME -  no immediate need for transfusion of blood products     ID - s/p prior course of empiric abx and antifungals given uptrending wbc and increased vasopressor requirements; presently being monitored off abx with surveilance cultures; lewis exchanged and no need to culture; already on anticoagulation.   Follow sputum cx   Low threshold to add ABx.      ENDO - DM (diet-controlled prior to ad mission) and A1c 5.8% in 05/2021 but hyperglycemic in setting of steroids and critical illness; long acting insulin adjusted; insulin also in the tpn  - follow accu checks and make additional adjustments in regimen as needed      FEN - goal negative as possible but dropped the diuretic dose given tachycardia - will need to reassess on a daily basis  - monitor and correct electrolytes as needed  - Remains on tpn  -consider trickle feed as he is off paralytics. KUB in AM.   Add Lasix to keep fluid balance even or negative as able      CCT 35 minutres excluding teaching and procedures    I performed all aspects of the physical examination via Telemedicine associated with two way audio and video communication and with the on-site assistance of the bedside nurse. Vj Radha am located in [place] and the patient is located in [place] at [address] [facility]   The patient is critically ill in the ICU. Joseph Avelar  reviewed the pertinent medical records, laboratory/ pathology data and radiographic images.  The decision making regarding this patient is as documented above, which was generated  following  discussion  with the multidisciplinary ICU team and creation of a treatment plan for  the patient.  We discussed the patient's interval history and future coordination of care and Anisjovanna Blush patient's medications  were reviewed and changes made as stipulated above.  Due to  critical illness impairing one or more vital organs of this patient resulting in life threatening clinical situation  I have provided direct, frequent personal  assessment and manipulation in management plan.       Electronically signed by Wendy Landis MD on 12/19/2021 at 2:48 PM

## 2021-12-20 NOTE — PROGRESS NOTES
[facility] Res[facility]       Resident Progress Note in Brief    S: Patient seen and examined at bedside. Pt intubated and sedated. O:  Visit Vitals  BP (!) 95/52   Pulse (!) 138   Temp 99.9 °F (37.7 °C)   Resp 30   Ht 5' 10.98\" (1.803 m)   Wt 240 lb (108.9 kg)   SpO2 (!) 87%   BMI 33.49 kg/m²     Physical Examination:    General: Intubated, sedated. Appears comfortable. Supine. Respiratory: Transmitted mechanical sounds appreciated bilaterally, unchanged. Cardiovascular:Tachycardic, regular rhythm. Extremities:  No LE edema. Skin: Warm, dry. Neuro: Intubated sedated  Mitchell in place     A/P: 39 y. o. male with a PMH of HTN, HLD, JSEUS admitted for AHRF and ARDS 2/2 COVID PNA. Currently intubated and sedated. Patient was admitted on 11/19/2021. AHRF and ARDS 2/2 COVID PNA: Intubated and sedated since 11/30/21. Discussion w/ [facility], considering lung transplant but not until x5-6wks s/p symptom onset, will need CT ~ 12/24/21 prior to progress towards transplant to confirm end stage fibrosis. Now off precautions 12/16/21.   - Prone as tolerated  - Goal sats 88% or higher  - CBC, CMP daily. No need for further trending Covid labs  - Continue Decadron 6mg IV daily  - Duo-neb or Combivent Respimat Q6H  - Atorvastatin 20mg every day  - Pulm following, appreciate recs   - Lasix 20mg BID      Severe sepsis 2/2 COVID PNA:. Leukocytosis stable. Fungal Cx, BCx, UCx NGTD. S/p x7 course Maria Fernanda/Vanc/Eraxis, now off abx 12/8. Cultures continue to show no growth. - Now off pressors. Continue to monitor  - f/u sputum cx per ICU  - mild fevers to 100.4 last night, under cooler blanket per ICU     Nutritional status:  Nutrition consulted for TPN, appreciate recs. - continue TPN per nutrition recs. Please see the primary team's daily progress note for the patient's full plan.     Jaden Sparks MD  Family Medicine Resident

## 2021-12-20 NOTE — PROGRESS NOTES
Progress Note  Date:2021       Room:[address] [place] [address]  Patient Name:Pan Blanco     YOB: 1985     Age:36 y.o. Subjective    CC: intubated and unable to obtain    24 HOUR: remains on vent. 100% and 13 of PEEP. Leaking cuff on ETT. Objective         Vitals Last 24 Hours:  TEMPERATURE:  Temp  Av °F (37.8 °C)  Min: 99.5 °F (37.5 °C)  Max: 100.4 °F (38 °C)  RESPIRATIONS RANGE: Resp  Av  Min: 19  Max: 33  PULSE OXIMETRY RANGE: SpO2  Av.8 %  Min: 89 %  Max: 98 %  PULSE RANGE: Pulse  Av.2  Min: 95  Max: 126  BLOOD PRESSURE RANGE: Systolic (85CSC), KXP:377 , Min:100 , ZAS:042   ; Diastolic (45VHG), DNF:50, Min:52, Max:83    I/O (24Hr):     Intake/Output Summary (Last 24 hours) at 2021 1020  Last data filed at 2021 0800  Gross per 24 hour   Intake 2809.35 ml   Output 2985 ml   Net -175.65 ml     Objective   Gen: intubated, sedated  HEENT: ETT in place  Chest: symmetrical chest rise  CV: r/r/r  Abd: non distended  Ext: no c/c/e  Neuro: not moving spontaneously or following commands at time of my eval.    Labs/Imaging/Diagnostics    Labs:  CBC:  Recent Labs     21  0333 12/19/21  03321   WBC 10.3 12.3* 11.5*   RBC 3.07* 3.26* 3.33*   HGB 10.2* 10.2* 10.1*   HCT 31.0* 32.4* 33.1*   .0* 99.4* 99.4*   RDW 14.2 13.9 13.9    295 268     CHEMISTRIES:  Recent Labs     21  0333 12/19/21  03321    144 146*   K 3.8 4.2 4.6    109* 109*   CO2 29 33* 33*   BUN 25* 28* 29*   CA 7.9* 9.0 8.9   PHOS 3.8 4.4 3.4   MG 1.9 2.2 2.2   PT/INR:  Recent Labs     21  0332 21   INR 1.1 1.1     APTT:  Recent Labs     21  0332 21   APTT 20.3* 21.4*     LIVER PROFILE:  Recent Labs     21  0333 21  0332 21   AST 83* 76* 65*   * 205* 190*     Lab Results   Component Value Date/Time    ALT (SGPT) 210 (H) 2021 03:33 AM    AST (SGOT) 83 (H) 12/20/2021 03:33 AM    Alk. phosphatase 105 12/20/2021 03:33 AM    Bilirubin, direct 0.3 (H) 12/16/2021 03:11 AM    Bilirubin, total 1.0 12/20/2021 03:33 AM       Imaging Last 24 Hours:  XR CHEST PORT    Result Date: 12/20/2021  EXAM: XR CHEST PORT INDICATION: fever COMPARISON: 12/18/2021 FINDINGS: A portable AP radiograph of the chest was obtained at 0431 hours . The patient is on a cardiac monitor. There is a stable interstitial process. . The cardiac and mediastinal contours are stable. The bones and soft tissues are grossly within normal limits. The PICC line terminates at the cavoatrial junction. The endotracheal tube terminates at the thoracic inlet. NG tube extends below the hemidiaphragm. Stable interstitial process    XR ABD PORT  1 V    Result Date: 12/20/2021  Portable abdomen 2 views dated 12/20/2021 History is ileus 2 portable AP supine views of the abdomen were obtained. There is marked gaseous distention of the stomach. The nasogastric tube tip is situated over the antral portion of the stomach. Several prominent loops of small bowel are noted on this side of abdomen. These loops of small bowel measure approximately 3 cm in transverse diameter. If clinically indicated, a horizontal beam film (upright or left lateral decubitus) may render additional information regarding the possibility of abnormal air-fluid levels and/or free intraperitoneal air. 1. Abnormal appearing small bowel gas pattern as described above. Further studies recommended. 2. Marked gaseous distention of the stomach. 3. Nasogastric tube tip likely situated within the antral portion of the stomach. Assessment//Plan     38 y/o with hx HTN, HLD, JESUS admitted 11/19 with COVID    COVID:  -- remains on dex  -- s/p toci  -- off COVID precautions as of 12/16. Pneumonia:  -- s/p abx and antifungals  -- currently off    Acute Resp Failure/ARDS:  -- secondary to above.    -- out of window for established mortality benefit of proning  -- continue rescue proning for refractory hypoxia as needed  -- per notes, pt is being considered for lung transplant at [facility] but would need to be 5-6 weeks post symptom onset and needs CT around 12/24 to confirm fibrosis. -- likely on too much support currently to safely do trach    Pneumomediastinum:  -- improved. HTN:   -- holding home amlodipine and HCTZ. HLD:  -- holding home statin      Elevated Transaminases:  -- thought to potentially be related to TPN. -- follow. DM:  -- SSI and lantus    Obesity:  -- complicates mgmt of above. -- BMI 42 on admission. CCM time 35min. Pt at risk of life threatening deterioration from COVID. Pt seen and evaluated via tele interaction. Audio and video used for this encounter.       Electronically signed by Floreen Paget, DO on 12/20/2021 at 10:20 AM

## 2021-12-20 NOTE — PROCEDURES
Intubation Note    Name:  Loretta Stubbs  Age:  39 y.o. MRN:  [id number]  CSN:  [id number]  :  1985    Referring physician: Cristal Kwan, *     Called to bedside secondary to  respiratory failure. Patient pre-oxygenated with 100% oxygen. Smooth RSI with Propofol 200 mg + Succinylcholine 140 mg IV. DVL x 1    8.0 ETT taped and secured at 23 cm at the teeth.    + Bilateral BS, + Chest rise, + ETCO2    VSS. CXR pending.     Care turned over to covering Attending MD. Jonh Leal, DO

## 2021-12-20 NOTE — PROGRESS NOTES
Problem: Falls - Risk of  Goal: *Absence of Falls  Description: Document Danne Lobe Fall Risk and appropriate interventions in the flowsheet.   Outcome: Progressing Towards Goal  Note: Fall Risk Interventions:  Mobility Interventions: Bed/chair exit alarm,Communicate number of staff needed for ambulation/transfer,PT Consult for mobility concerns    Mentation Interventions: Adequate sleep, hydration, pain control,Bed/chair exit alarm,Reorient patient,Increase mobility    Medication Interventions: Evaluate medications/consider consulting pharmacy,Bed/chair exit alarm    Elimination Interventions: Bed/chair exit alarm,Call light in reach,Patient to call for help with toileting needs              Problem: Ventilator Management  Goal: *Adequate oxygenation and ventilation  Outcome: Progressing Towards Goal  Goal: *Patient maintains clear airway/free of aspiration  Outcome: Progressing Towards Goal  Goal: *Absence of infection signs and symptoms  Outcome: Progressing Towards Goal  Goal: *Normal spontaneous ventilation  Outcome: Progressing Towards Goal     Problem: Nutrition Deficit  Goal: *Optimize nutritional status  Outcome: Progressing Towards Goal

## 2021-12-20 NOTE — PROGRESS NOTES
RUR 14%(low RUR)    LOS 30 fays,GLOS 12.4    Today:  fevers  ETT exchanged due to cuff leak  Peep 13. Fio2 100%  Code blue today-30-40 seconds of sustained vtach  Pt did convert on his own back to SR  Oxygen desaturations to the 60s with slow recovery  Wife was called in by attending as pt was clinically unstable for awhile.   Started back on nimbex  On fentanyl,propofol and versed gtts  veletri    [place]

## 2021-12-20 NOTE — PROGRESS NOTES
[address] [facility] [address] [place] [address] [facility]   Office [phone]  Fax [phone]          Assessment and Plan     Pardeep Guidry is a 39 y.o. male with a PMH of HTN, HLD, JESUS admitted for AHRF and severe sepsis 2/2 COVID PNA. Patient was admitted on 11/19/2021. Interval Events:  NAEO. AHRF and ARDS 2/2 COVID PNA: Intubated and sedated since 11/30/21. Discussion w/ [facility], considering lung transplant but not until x5-6wks s/p symptom onset, will need CT ~ 12/24/21 prior to progress towards transplant to confirm end stage fibrosis. Now off precautions 12/16/21.   - Prone as tolerated  - Goal sats 88% or higher  - CBC, CMP daily. No need for further trending Covid labs  - Continue Decadron 6mg IV daily  - Duo-neb or Combivent Respimat Q6H  - Holding COVID meds (Vit C 500mg BID, Zinc 220mg daily)  - Atorvastatin 20mg every day  - Pulm following, appreciate recs   - Lasix 20mg BID     Severe sepsis 2/2 COVID PNA:. Leukocytosis stable. Fungal Cx, BCx, UCx NGTD. S/p x7 course Maria Fernanda/Vanc/Eraxis, now off abx 12/8. Cultures continue to show no growth. - Now off pressors. Continue to monitor  - f/u sputum cx per ICU  - mild fevers to 100.4 overnight, under cooler blanket per ICU    Nutritional status:  Nutrition consulted for TPN, appreciate recs. - continue TPN per nutrition recs. - Per ICU with get KUB today, if clear will attempt trickle tube feeds    Elevated LFTs: Improving. ALT of 352 and AST of 122. Suspect possible etiology of TPN. - will discuss with ICU and nutrition. Weaning propofol as able    Oozing (improved): Mild bleeding with suctioning otherwise no other findings while prone.     - On prophylactic Lovenox 40 mg BID    Pneumomediastinum/Pneumothorax: Possibly barotrauma 2/2 high pressures needed for COVID ARDS PNA vs lung frailty. Repeat CXR 12/1 showing stable pneumomediastinum, and resolved pneumothorax.    - Monitor for signs of worsening barotrauma Insomnia/anxiety: Pt with new insomnia and anxiety, specifically overnight. - Intubated, sedated    HTN: Pt normotensive off meds. Currently hypotensive. At home is on Amlodipine 10mg daily and HCTZ 12.5mg daily  - Will continue to monitor      HLD: Chronic, stable. Last lipids 07/2021 , HDL 54, LDL 98.6, . On Atorvastatin 10mg daily.  - Holding Atorvastatin 20mg daily     T2DM (diet-controlled): Prior A1c 5.8% in 05/2021. No meds outpatient. On steroids now for COVID.  - SSI (resistant given morbid obesity) w/ q6h glucose checks  - Increase Lantus to 30 daily.      JESUS: On CPAP at night. - Intubated, sedated.     Obesity: Body mass index is 42.4 kg/m² on presentation. Reduced to 33.5 one month later. - Encouraging lifestyle modifications and further follow up outpatient. At-risk JAMIR: RESOLVED. POA Cr 1.31, BL 0.8. Likely IVVD in setting of poor PO intake. - Daily CMP    Chest pain: RESOLVED. Description is pleuritic in nature, worse with cough. EKG without evidence of acute ischemia. Troponin of 9 makes CAD less concerning.   - Cardiac monitoring, will continue to monitor     Diarrhea: RESOLVED. Watery diarrhea prior to admission. Likely 2/2 COVID infection. Improved since presentation. Enteric bacteria panel negative. Abdominal pain: RESOLVED. Secondary to gas pains after starting diet. - Continue Simethicone       FEN/GI - TPN  Activity - Ambulate with assistance  DVT prophylaxis - Lovenox (ppx)  GI prophylaxis - Protonix  Disposition - Plan to d/c to TBD. Code Status - Full. Discussed with patient / caregivers. Next of [facility] Name and [address] [facility],  Spouse - [phone]      Joann Zayas MD  Family Medicine Resident    Patient discussed with Family Medicine Attending: Dr. Ian Ko / Objective   Subjective:   Patient seen and examined at bedside.  Patient is intubated and sedated    Respiratory: O2 Flow Rate (L/min): 60 l/min O2 Device: Ventilator   Visit Vitals  /67   Pulse 99   Temp 99.7 °F (37.6 °C)   Resp 28   Ht 5' 10.98\" (1.803 m)   Wt 240 lb (108.9 kg)   SpO2 94%   BMI 33.49 kg/m²     General: Intubated, sedated. Appears comfortable. Respiratory: Transmitted mechanical sounds appreciated bilaterally, unchanged. Present air movement bilaterally. Cardiovascular: regular rate, regular rhythm. GI: + bowel sounds. Nontender. Extremities:  No LE edema. Extremities well perfused and warm. Skin: Warm, dry. Neuro: Intubated sedated  Mitchell in place     I/O:  Date 12/19/21 0700 - 12/20/21 0659 12/20/21 0700 - 12/21/21 0659   Shift 4095-6312 1308-3099 24 Hour Total 1685-3634 8096-5875 24 Hour Total   INTAKE   I.V.(mL/kg/hr) 1146.7(0.9) 927.7(0.7) 2074.3(0.8)        Versed Volume 125.5 182 307.5        Fentanyl Volume 60 84 144        Diprivan Volume 331.1 498.9 830        Phenylephrine Volume 0  0        Volume (TPN ADULT - CENTRAL) 630 162.8 792.8      Shift Total(mL/kg) 1146. 7(10.5) 927. 7(8.5) 2074. 3(19.1)      OUTPUT   Urine(mL/kg/hr) 1700(1.3) 1210(0.9) 2910(1.1)        Urine Output (mL) (Urinary Catheter 12/11/21 Mitchell) 1700 1210 2910      Shift Total(mL/kg) 1700(15.6) 1210(11.1) 2910(26.7)      NET -553.3 -282.4 -835.7      Weight (kg) 108.9 108.9 108.9 108.9 108.9 108.9       CBC:  Recent Labs     12/20/21  0333 12/19/21  0332 12/18/21  0317   WBC 10.3 12.3* 11.5*   HGB 10.2* 10.2* 10.1*   HCT 31.0* 32.4* 33.1*    295 335       Metabolic Panel:  Recent Labs     12/20/21  0333 12/19/21  0332 12/18/21 0317    144 146*   K 3.8 4.2 4.6    109* 109*   CO2 29 33* 33*   BUN 25* 28* 29*   CREA 0.46* 0.37* 0.50*   * 243* 234*   CA 7.9* 9.0 8.9   MG 1.9 2.2 2.2   PHOS 3.8 4.4 3.4   ALB 2.2* 2.4* 2.6*   * 205* 190*   INR  --  1.1 1.1          For Billing    Chief Complaint   Patient presents with   [address] [facility] Problems  Date Reviewed: 12/3/2021          Codes Class Noted POA Oozing skin inflammation ICD-10-CM: L08.9  ICD-9-CM: 686.9  12/3/2021 Unknown        Positive D dimer ICD-10-CM: R79.89  ICD-9-CM: 790.92  12/3/2021 Unknown        Other hyperlipidemia ICD-10-CM: E78.49  ICD-9-CM: 272.4  12/3/2021 Unknown        Elevated serum creatinine ICD-10-CM: R79.89  ICD-9-CM: 790.99  12/3/2021 Unknown        Community acquired pneumonia ICD-10-CM: J18.9  ICD-9-CM: 486  12/3/2021 Unknown        ARDS (adult respiratory distress syndrome) ([facility] ICD-10-CM: J80  ICD-9-CM: 518.52  12/1/2021 No        Septic shock (HCC) ICD-10-CM: A41.9, R65.21  ICD-9-CM: 038.9, 785.52, 995.92  11/26/2021 Yes        Pneumomediastinum ([facility] ICD-10-CM: J98.2  ICD-9-CM: 518.1  11/26/2021 No        Pneumothorax on left ICD-10-CM: J93.9  ICD-9-CM: 512.89  11/26/2021 No        Hyponatremia ICD-10-CM: E87.1  ICD-9-CM: 276.1  11/26/2021 Yes        Diabetes mellitus type 2, controlled ([facility] ICD-10-CM: E11.9  ICD-9-CM: 250.00  11/25/2021 Yes        * (Principal) Acute hypoxemic respiratory failure due to COVID-19 [facility]) ICD-10-CM: U07.1, J96.01  ICD-9-CM: 518.81, 079.89, 799.02  11/19/2021 Yes        Obesity, morbid ([facility] ICD-10-CM: E66.01  ICD-9-CM: 278.01  4/10/2018 Yes        Elevated liver enzymes ICD-10-CM: R74.8  ICD-9-CM: 790.5  8/5/2016 Yes        JESUS (obstructive sleep apnea) ICD-10-CM: G47.33  ICD-9-CM: 327.23  10/15/2014 Yes        Hypertension ICD-10-CM: I10  ICD-9-CM: 401.9  10/15/2014 Yes        Pure hypercholesterolemia ICD-10-CM: E78.00  ICD-9-CM: 272.0  Unknown Yes

## 2021-12-20 NOTE — PROGRESS NOTES
Follow-up spiritual care and family support for patient, Mr. Nava Guevara in ICU. Pt's wife was called in and is now tearful at bedside.  came in and introduced himself and provided ministry of care and supportive presence. Pt's wife, David Fuller, shared that they have two teenage children, that they have a strong elijah and  provided prayer for her and family. She expressed much thankfulness. Advised nurse to contact [facility] for any further referrals.     Chaplain Oneta Peabody, M.Div.  Maru Zepeda ([id number])

## 2021-12-20 NOTE — PROGRESS NOTES
Code Blue call for patient, Mr. Ruelas Fuelling in ICU. Medical team working with Pt who is under contact restriction.  consulted with staff of [facility], advised staff to contact [facility] for any further referrals.     Chaplain Reina Simms M.Div.  Elise Briones ([id number])

## 2021-12-20 NOTE — PROGRESS NOTES
[address] [place] [address] with [address] [place] [address]       Resident Progress Note in Brief    S: Patient seen and examined at bedside. Code blue was called at 1312 as pt's ET tube was being exchanged as pt had 30-40 seconds of sustained Vtach. Pt spontaneously converted back to sinus rhythm but has had persistent oxygen desaturation. O:  Visit Vitals  /82   Pulse (!) 110   Temp 99.8 °F (37.7 °C)   Resp (!) 33   Ht 5' 10.98\" (1.803 m)   Wt 240 lb (108.9 kg)   SpO2 93%   BMI 33.49 kg/m²     Physical Examination:   General appearance - intubated, sedated  Heart - tachycardic, regular rhythm   Abdomen - mildly distended     A/P:     Episode of sustained Vtach: 30-40 second episode as pt's ET tube was being exchanged. Now in sinus tach, no medical intervention or CPR required. -CBC, BMP, Mg, TSH pending  -If pt continues to have runs of Vtach can consider amio bolus or amio gtt  -Replete electrolytes as needed    Oxygen desaturation in s/o ET tube exchange: Pt w/ sats down to 60s, slowly improving. -STAT CXR ordered to verify ET tube placement  -ABG pending    Please see the primary team's daily progress note for the patient's full plan.     Alecia Lazcano MD  Family Medicine Resident

## 2021-12-20 NOTE — PROGRESS NOTES
1310- ETT exchanged by anesthesia () for severe cuff leak compromising patient's tidal volumes. Patient hypoxic to oxygen saturation of 40s during tube exchange, vtach with pulse for approximately 45 seconds- code blue called for near respiratory arrest. Defib pads placed on patient, labs ordered and sent. One time dose of 65mg rocuronium ordered by - administered at 1321. Patient ambu-bagged for approximately 20 minutes until oxygen saturations above 80% then patient placed on vent with peep of 14.0 and fiO2 100%. Chest xray completed with satisfactory positioning of newly placed ETT (24 @ the teeth). Patient already sedated to RASS -4 prior to tube exchange, so subsequently started on nimbex @3 for ventilator compliance. 1mg mag given per order. Notified  of patient's heart rate sustained at 145 sinus tach- new order received for 1L LR bolus and administered. Oxygen saturations slowly improved to 89% by 1500. Heart rate gradually decreasing- 138 by 1520.     1415- Patient's wife to bedside- this RN updated family member and answered all questions. Family practice residents to room to talk with wife as well.

## 2021-12-20 NOTE — WOUND CARE
Wound Consult: Follow up Visit. Chart reviewed. Spoke with patients nurse, [facility]. Verna Roberson RN at bedside  Patient is resting on a Kimberley bed with supportive mattress. Heels off loaded with heel boots, hyperglide,lewis, intubated, sedated; requires 2 assists to move side to side in bed. Assessment:  Bilateral heels- no redness, skin intact. Right nare- abrasion, pink. Midline nare- small scab with pink, moist.  Chin- scab-0.5x0.5cm, no drainage  Buttocks/sacrum- mepilex sacral- lifted to assess- no redness noted. mepilex reapplied. Skin Care / PI Prevention Recommendations:  1. Minimize friction/shear: minimize layers of linen/pads under patient. 2. Off load pressure/reposition: turn and reposition approximately every 2 hours; float heels with pillows or use off loading heel boots; waffle cushion for sitting; position wedge. 3. Manage Moisture - keep skin folds dry; incontinence skin care with incontinence wipes; lewis in use to help contain urine. 4. Continue to monitor nutrition, pain, and skin risk scale, and skin assessment. Plan: We will continue to reassess  and as needed. Please re-consult should concerns arise despite continued skin/PI prevention measures.   [address] [place], Wound / [address] [facility] Healing Office [phone]

## 2021-12-21 NOTE — PROGRESS NOTES
Problem: Falls - Risk of  Goal: *Absence of Falls  Description: Document [place] Fall Risk and appropriate interventions in the flowsheet.   Outcome: Progressing Towards Goal  Note: Fall Risk Interventions:  Mobility Interventions: Communicate number of staff needed for ambulation/transfer    Mentation Interventions: Adequate sleep, hydration, pain control,Bed/chair exit alarm,Evaluate medications/consider consulting pharmacy,Reorient patient,More frequent rounding,Increase mobility    Medication Interventions: Assess postural VS orthostatic hypotension,Bed/chair exit alarm,Evaluate medications/consider consulting pharmacy    Elimination Interventions: Bed/chair exit alarm,Call light in reach              Problem: Ventilator Management  Goal: *Adequate oxygenation and ventilation  Outcome: Progressing Towards Goal  Goal: *Patient maintains clear airway/free of aspiration  Outcome: Progressing Towards Goal  Goal: *Absence of infection signs and symptoms  Outcome: Progressing Towards Goal  Goal: *Normal spontaneous ventilation  Outcome: Progressing Towards Goal     Problem: Nutrition Deficit  Goal: *Optimize nutritional status  Outcome: Progressing Towards Goal

## 2021-12-21 NOTE — PROGRESS NOTES
Bedside and Verbal shift change report given to Little Lang RN (oncoming nurse) by Douglas Giraldo RN (offgoing nurse). Report included the following information SBAR, Kardex, Intake/Output, MAR and Cardiac Rhythm Sinus Tach.    0700: Patient resting. Ventilated. Sedated. On Nimbex. 1250: Updated wife Radha Pearl) on 's status. 1550: Wife at bedside. 1800: Tube feed started. Bedside and Verbal shift change report given to Emily Graff RN (oncoming nurse) by Little Lang RN (offgoing nurse). Report included the following information SBAR, Kardex, MAR and Cardiac Rhythm Sinus tach.

## 2021-12-21 NOTE — PROGRESS NOTES
Comprehensive Nutrition Assessment    Type and Reason for Visit: Reassess    Nutrition Recommendations/Plan:   1. Resume Tube Feeding below:   · Peptide based High protein (Vital HP) @ 15 mL x24hrs    · RD will advance once tolerance is established  · FWF 10mL Q3H        TF would provide: 330 ml of TF, 330 kcal, 29 g protein, 36 g carb, 355 mL of free water from TF & flushes    2. Continue TPN:     · Continue D15/AA7% @ 63 mL/h  · If intol of TF & propofol reduced increase rate to 75 mL/h      TPN @ 63mL/h provides: 1194 kcal (2181 kcal/d including propofol), 106 g protein, 227 g dextrose. Providing ~18.9 kcal/kg & 0.9 g pro/kg    Nutrition Assessment:    12/21: RD attended & discussed patient during interdisciplinary rounds on unit. OG tube replaced yesterday & being used today for meds. Abd xray shows no dilated loops & OG in mid-stomach. Pt remains intubated, sedated, paralyzed and on 1 pressor. Propofol maxed @ 37.4 mL/h providing ~987 kcal/d from lipids. Triglycerides elevated, discussed reducing prop as tolerated. TPN continues. If unable to tolerate trickle feeds, will suggest increasing TPN to 75 mL/hr. New goal TPN @75mL/h would provide: 1.8 L/d, 1422 kcal, 126 g pro, 270 g dextrose. Providing 12.3 kcal/kg & 1.1 g pro/kg (without propofol). Lab Results   Component Value Date/Time    Triglyceride 618 (H) 12/21/2021 03:32 AM    Triglyceride 258 (H) 12/08/2021 03:29 AM    Triglyceride 299 (H) 12/06/2021 04:11 AM    Triglyceride 358 (H) 12/03/2021 02:28 PM    Triglyceride 390 (H) 12/03/2021 03:54 AM        Intake/Output Summary (Last 24 hours) at 12/21/2021 1613  Last data filed at 12/21/2021 1150  Gross per 24 hour   Intake 1992.75 ml   Output 3600 ml   Net -1607.25 ml       12/16: Follow up. TPN @ goal 63. Propofol currently at 33.6 mL/hour, providing 887 kcal/day. Receiving 100% kcal and 91% protein needs at this time. Na and Phos elevated. Per MD notes, palliative meeting today with wife.      12/13: Follow up. TPN continues at 63 mL/hour. No BM since last RD encounter. Propofol at 37.4 mL/hour provides 987 kcal/day. Updated weight - BMI now 33.40, possible local ECMO transfer due to BMI <35. Needs recalculated. Meeting 100% kcal and 91% protein needs at this time. 12/9: TF remains off. Discussed restarting for trial at slow rate. Check tolerance. Continue TPN today to monitor TF tolerance. If pt tolerates TF, wean/d/c TPN. Propofol at 37.4 mL/hr providing 987 kcal per day. Noted: \"possible local ECMO transfer with BMI <35\" Currently BMI 35.5. Still awaiting availability at [place] [address]. No beds available. Triglycerides down to 258 from 299. BG mid to high 100's.      12/6: pt remains intubated, sedated and paralyzed. Not tolerating TF, placed prone today, getting propofol at max dose (adds ~ 985 kcal/d from lipids), TGL rechecked today & improved, no plans to reduce propofol, TPN started today. If pt is able to handle higher volume in a couple days, increasing TPN to 75 mL/h. This would more closely meet protein needs but exceed initial calorie needs for critically ill patient. Pt is awaiting possible transfer to a higher level of care facility once bed is available. Appreciate additional bowel regimen.       12/3: RD attended & discussed patient during interdisciplinary rounds on unit. TF off with NG in place to low suction per RN. Pt with bleeding noted. If unable to resume PO, once more fluid stable consider TPN as rec'd above. No lipids. Awaiting bed available fro ECMO. Wt is down from admit - BMI = 36.28.     TF @35mL provides: 770ml of tf, 1010 kcal (1995 kcal/d including protein & propofol), 133 g protein, 85 g carb, ~955 ml of free water from TF & flushes. 17 kcal/kg & 1.1g of pro/kg. TPN @ 63mL/h provides[de-identified] ~1.5L, 1330 kcal (2315 kcal including propofol) & 76 g protein.  (~11 kcal/kg or 20 kcal/kg including propofol, 0.6 g pro/kg)      11/22: pt admitted for Acute hypoxemic respiratory failure due to COVID-19 [facility]) [U07.1, J96.01] who  has a past medical history of Hypercholesteremia, Hypertension (10/15/2014), and S/P vasectomy (2014). Pt was sitting up in chair in room. Pt has been switching between High Flow NC and Bipap for oxygen needs. He is asking to be able to eat. Call to MDs to request diet or supplements when safe. Pt is still accepting meds 2-3 times daily. Review of history notes blood glucose much improved from earlier this year. On admission, notes reviewed pt had nausea and diarrhea for 2 days prior, has had clear liquids & crackers. Pt has been NPO x4 days (since evening of admission date). Morbidly obese male with high calorie/protein needs. Malnutrition Assessment:  Malnutrition Status: Moderate malnutrition    Context:  Acute illness     Findings of the 6 clinical characteristics of malnutrition:   Energy Intake:  1 - 75% or less of est energy req for 7 or more days  Weight Loss:  7.0 - Greater than 5% over 1 month     Body Fat Loss:  Unable to assess,     Muscle Mass Loss:  Unable to assess,    Fluid Accumulation:  1 - Mild, Generalized   Strength:  Not performed     Estimated Daily Nutrient Needs:  Energy (kcal): 2675 kcal/d (PS b); Weight Used for Energy Requirements: Current  Protein (g): 117-156 (1.5-2.0 g/kg IBW); Weight Used for Protein Requirements: Ideal  Fluid (ml/day): 2675 mL; Method Used for Fluid Requirements: 1 ml/kcal     Oxygen Needs: 13 l/min    Temp (24hrs), Av.2 °F (37.9 °C), Min:98.5 °F (36.9 °C), Max:101.7 °F (38.7 °C)    Nutrition Related Findings:    ABD: obese, intact, hypoactive   Last BM: , loose    Edema:   +1 generalized; scleral facial; +1 all extremities noted 12/15  Non-pitting ()    Nutr.  Labs:  Lab Results   Component Value Date/Time    GFR est AA >60 2021 03:32 AM    GFR est non-AA >60 2021 03:32 AM    Creatinine 0.40 (L) 2021 03:32 AM    BUN 25 (H) 2021 03:32 AM    Sodium 140 2021 03:32 AM    Potassium 3.6 12/21/2021 03:32 AM    Chloride 101 12/21/2021 03:32 AM    CO2 33 (H) 12/21/2021 03:32 AM     Lab Results   Component Value Date/Time    Glucose 211 (H) 12/21/2021 03:32 AM    Glucose (POC) 168 (H) 12/21/2021 12:25 PM     Lab Results   Component Value Date/Time    Hemoglobin A1c 5.8 (H) 07/30/2021 10:25 AM     Nutr. Meds:  Lipitor, Nimbex, Decadron, Lovenox, Fentanyl, lasix, Lantus, Humalog, Versed, Protonix, Sanjeev-syn*, Miralax, Propofol, Pericolace   PRN: dulcolax    Wounds:    Skin tears (left cheek from OG/ET tube)       Current Nutrition Therapies:  DIET NPO  DIET ONE TIME MESSAGE  TPN ADULT - CENTRAL  TPN ADULT - CENTRAL    Anthropometric Measures:  · Height:  5' 11\" (180.3 cm)  · Current Body Wt:  115.4 kg (254 lb 6.6 oz)   · Admission Body Wt:  304 lb    · Usual Body Wt:  136.1 kg (300 lb)     · Ideal Body Wt:  172 lbs:  147.9 %   Body mass index is 35.5 kg/m². · BMI Category:  Obese class 2 (BMI 35.0-39. 9)       Last 3 Recorded Weights in this Encounter    12/13/21 0835 12/15/21 0809 12/21/21 0805   Weight: 108.6 kg (239 lb 8 oz) 108.9 kg (240 lb) 115.4 kg (254 lb 8 oz)     Wt Readings from Last 6 Encounters:   12/21/21 115.4 kg (254 lb 8 oz)   07/30/21 137.9 kg (304 lb)   04/30/21 133.8 kg (295 lb)   04/07/20 145.2 kg (320 lb)   12/06/19 145.2 kg (320 lb)   10/04/19 142.4 kg (314 lb)     Nutrition Diagnosis:   · Inadequate protein-energy intake related to catabolic illness,impaired respiratory function as evidenced by NPO or clear liquid status due to medical condition,intubation,nutrition support-enteral nutrition   · Moderate malnutrition related to catabolic illness as evidenced by intake 26-50%; weight loss greater than or equal to 2% in 1 week    Nutrition Interventions:   Food and/or Nutrient Delivery: Continue parenteral nutrition, begin EN - trickle feeds  Nutrition Education and Counseling: No recommendations at this time  Coordination of Nutrition Care: Continue to monitor while inpatient,Interdisciplinary rounds    Goals:  Provide >/= 80% estimated protein needs within 3 - 5 days       Nutrition Monitoring and Evaluation:   Behavioral-Environmental Outcomes: None identified  Food/Nutrient Intake Outcomes: Parenteral nutrition intake/tolerance  Physical Signs/Symptoms Outcomes: Biochemical data,Weight,Hemodynamic status    Discharge Planning:     Too soon to determine     Electronically signed by Bryan Gonzales RD, MS on 12/21/2021  Contact: [phone] or via [employer]

## 2021-12-21 NOTE — PROGRESS NOTES
[address] [facility] [address] [place] [address] [place] [address]   Office [phone]  Fax [phone]          Assessment and Plan     Sasha Yusuf is a 39 y.o. male with a PMH of HTN, HLD, JESUS admitted for AHRF and severe sepsis 2/2 COVID PNA. Patient was admitted on 11/19/2021. Interval Events:  Code Blue called yesterday ~13:00 for VTach after endotrach tube adjustment, less than 1min and spont resolution. No interventions required. AHRF and ARDS 2/2 COVID PNA: Intubated and sedated since 11/30/21. Discussion w/ [facility], considering lung transplant but not until x5-6wks s/p symptom onset, will need CT ~ 12/24/21 prior to progress towards transplant to confirm end stage fibrosis. Now off precautions 12/16/21.    - Addition of Epoprostenol yesterday per ICU  - CXR today per ICU  - Prone as tolerated  - Goal sats 88% or higher  - CBC, CMP daily. No need for further trending Covid labs  - Continue Decadron 6mg IV daily  - Duo-neb or Combivent Respimat Q6H  - Holding COVID meds (Vit C 500mg BID, Zinc 220mg daily)  - Atorvastatin 20mg every day  - Pulm following, appreciate recs   - Lasix 20mg BID     Tachycardia: S/pVTach Code Blue 12/20, spontaneous resolution.    - HR low 100s overnight. Severe sepsis 2/2 COVID PNA:. Leukocytosis stable. Fungal Cx, BCx, UCx NGTD. S/p x7 course Maria Fernanda/Vanc/Eraxis, now off abx 12/8. Cultures continue to show no growth. - Now off pressors. Continue to monitor    Nutritional status:  Nutrition consulted for TPN, appreciate recs. KUB 12/20 showing increased bowel gas. - continue TPN per nutrition recs. - Repeat KUB today. Elevated LFTs: Improving. ALT of 352 and AST of 122. Suspect possible etiology of TPN. - will discuss with ICU and nutrition.  Weaning propofol as able    Oozing (improved): Mild bleeding with suctioning otherwise no other findings while prone.     - On prophylactic Lovenox 40 mg BID    Pneumomediastinum/Pneumothorax: Possibly barotrauma 2/2 high pressures needed for COVID ARDS PNA vs lung frailty. Repeat CXR 12/1 showing stable pneumomediastinum, and resolved pneumothorax. - Monitor for signs of worsening barotrauma     Insomnia/anxiety: Pt with new insomnia and anxiety, specifically overnight. - Intubated, sedated    HTN: Pt normotensive off meds. Currently hypotensive. At home is on Amlodipine 10mg daily and HCTZ 12.5mg daily  - Will continue to monitor      HLD: Chronic, stable. Last lipids 07/2021 , HDL 54, LDL 98.6, . On Atorvastatin 10mg daily.  - Holding Atorvastatin 20mg daily     T2DM (diet-controlled): Prior A1c 5.8% in 05/2021. No meds outpatient. On steroids now for COVID.  - SSI (resistant given morbid obesity) w/ q6h glucose checks  - Continue Lantus to 30 daily.      JESUS: On CPAP at night. - Intubated, sedated.     Obesity: Body mass index is 42.4 kg/m² on presentation. Reduced to 33.5 one month later. - Encouraging lifestyle modifications and further follow up outpatient. At-risk JAMIR: RESOLVED. POA Cr 1.31, BL 0.8. Likely IVVD in setting of poor PO intake. - Daily CMP    Chest pain: RESOLVED. Description is pleuritic in nature, worse with cough. EKG without evidence of acute ischemia. Troponin of 9 makes CAD less concerning.   - Cardiac monitoring, will continue to monitor     Diarrhea: RESOLVED. Watery diarrhea prior to admission. Likely 2/2 COVID infection. Improved since presentation. Enteric bacteria panel negative. Abdominal pain: RESOLVED. Secondary to gas pains after starting diet. - Continue Simethicone       FEN/GI - TPN  Activity - Ambulate with assistance  DVT prophylaxis - Lovenox (ppx)  GI prophylaxis - Protonix  Disposition - Plan to d/c to TBD. Code Status - Full. Discussed with patient / caregivers.   Next of Kim [address],  Spouse - [phone]      Rupal Gonzalez MD  Family Medicine Resident    Patient discussed with Family Medicine Attending: Dr. Vipul Fitch Subjective / Objective   Subjective:   Patient seen and examined at bedside. Patient is intubated and sedated, unchanged. Respiratory: O2 Flow Rate (L/min): 60 l/min O2 Device: Endotracheal tube   Visit Vitals  /68   Pulse (!) 106   Temp 99.5 °F (37.5 °C)   Resp 30   Ht 5' 10.98\" (1.803 m)   Wt 254 lb 8 oz (115.4 kg)   SpO2 90%   BMI 35.51 kg/m²     General: Intubated, sedated. Appears comfortable. Respiratory: Transmitted mechanical sounds appreciated bilaterally, unchanged. Present air movement bilaterally. Cardiovascular: regular rate, regular rhythm. GI: + bowel sounds. Nontender. Extremities:  No LE edema. Extremities well perfused and warm. Skin: Warm, dry. Neuro: Intubated sedated  Mitchell in place     I/O:  Date 12/20/21 0700 - 12/21/21 0659 12/21/21 0700 - 12/22/21 0659   Shift 4199-2560 6416-7095 24 Hour Total 5044-9558 1882-7353 24 Hour Total   INTAKE   I.V.(mL/kg/hr) 3045.3(2.3) 1992.8(1.5) 5038.1(1.9)        Versed Volume 139.2 216.9 356.1        Nimbex Volume 22.9 137.2 160.1        Fentanyl Volume 60 84 144        Diprivan Volume 374 523.6 897.6        Volume (lactated ringers bolus infusion 1,000 mL) 1000  1000        Volume (magnesium sulfate 1 g/100 ml IVPB (premix or compounded)) 100  100        Volume (TPN ADULT - CENTRAL) 1349.3 252 1601.3        Volume (TPN ADULT - CENTRAL)  779.1 779.1      Shift Total(mL/kg) 5480.1(60) 6706.2(77.2) 5038. 1(46.3)      OUTPUT   Urine(mL/kg/hr) 3000(2.3) 1205(0.9) 4205(1.6) 70  70     Urine Output (mL) (Urinary Catheter 12/11/21 Mitchell) 3000 1205 4205 70  70   Shift Total(mL/kg) 3000(27.6) 1205(11.1) 4205(38.6) 70(0.6)  70(0.6)   NET 45.3 787.8 833.1 -70  -70   Weight (kg) 108.9 108.9 108.9 115.4 115.4 115.4       CBC:  Recent Labs     12/21/21  0332 12/20/21  1400 12/20/21  0333   WBC 12.0* 11.7* 10.3   HGB 10.2* 10.2* 10.2*   HCT 32.5* 34.4* 31.0*    273 616       Metabolic Panel:  Recent Labs     12/21/21  0332 12/20/21  0333 12/19/21  0332    139 144   K 3.6 3.8 4.2    102 109*   CO2 33* 29 33*   BUN 25* 25* 28*   CREA 0.40* 0.46* 0.37*   * 226* 243*   CA 8.4* 7.9* 9.0   MG 2.0 1.9 2.2   PHOS 3.6 3.8 4.4   ALB 2.2* 2.2* 2.4*   * 210* 205*   INR  --   --  1.1          For Billing    Chief Complaint   Patient presents with   [facility] Problems  Date Reviewed: 12/3/2021          Codes Class Noted POA    Oozing skin inflammation ICD-10-CM: L08.9  ICD-9-CM: 686.9  12/3/2021 Unknown        Positive D dimer ICD-10-CM: R79.89  ICD-9-CM: 790.92  12/3/2021 Unknown        Other hyperlipidemia ICD-10-CM: E78.49  ICD-9-CM: 272.4  12/3/2021 Unknown        Elevated serum creatinine ICD-10-CM: R79.89  ICD-9-CM: 790.99  12/3/2021 Unknown        Community acquired pneumonia ICD-10-CM: J18.9  ICD-9-CM: 926  12/3/2021 Unknown        ARDS (adult respiratory distress syndrome) ([facility] ICD-10-CM: J80  ICD-9-CM: 518.52  12/1/2021 No        Septic shock (HCC) ICD-10-CM: A41.9, R65.21  ICD-9-CM: 038.9, 785.52, 995.92  11/26/2021 Yes        Pneumomediastinum ([facility] ICD-10-CM: J98.2  ICD-9-CM: 518.1  11/26/2021 No        Pneumothorax on left ICD-10-CM: J93.9  ICD-9-CM: 512.89  11/26/2021 No        Hyponatremia ICD-10-CM: E87.1  ICD-9-CM: 276.1  11/26/2021 Yes        Diabetes mellitus type 2, controlled ([facility] ICD-10-CM: E11.9  ICD-9-CM: 250.00  11/25/2021 Yes        * (Principal) Acute hypoxemic respiratory failure due to COVID-19 [facility]) ICD-10-CM: U07.1, J96.01  ICD-9-CM: 518.81, 079.89, 799.02  11/19/2021 Yes        Obesity, morbid ([address] ICD-10-CM: E66.01  ICD-9-CM: 278.01  4/10/2018 Yes        Elevated liver enzymes ICD-10-CM: R74.8  ICD-9-CM: 790.5  8/5/2016 Yes        JESUS (obstructive sleep apnea) ICD-10-CM: G47.33  ICD-9-CM: 327.23  10/15/2014 Yes        Hypertension ICD-10-CM: I10  ICD-9-CM: 401.9  10/15/2014 Yes        Pure hypercholesterolemia ICD-10-CM: E78.00  ICD-9-CM: 272.0  Unknown Yes

## 2021-12-21 NOTE — PROGRESS NOTES
[facility]. [facility] Res[facility]       Resident Progress Note in Brief    S: Patient seen and examined at bedside. Pt intubated and sedated. O:  Visit Vitals  BP (!) 162/77   Pulse (!) 114   Temp 99.3 °F (37.4 °C)   Resp 30   Ht 5' 11\" (1.803 m)   Wt 254 lb 8 oz (115.4 kg)   SpO2 (!) 89%   BMI 35.50 kg/m²     Physical Examination:    General: Intubated, sedated. Appears comfortable. Supine. Respiratory: Transmitted mechanical sounds appreciated bilaterally, unchanged. Cardiovascular:Tachycardic, regular rhythm. Extremities:  No LE edema. Skin: Warm, dry. Neuro: Intubated sedated  Mitchell in place     A/P: 39 y. o. male with a PMH of HTN, HLD, JESUS admitted for AHRF and ARDS 2/2 COVID PNA. Currently intubated and sedated. Patient was admitted on 11/19/2021. AHRF and ARDS 2/2 COVID PNA: Intubated and sedated since 11/30/21. Discussion w/ [facility], considering lung transplant but not until x5-6wks s/p symptom onset, will need CT ~ 12/24/21 prior to progress towards transplant to confirm end stage fibrosis. Now off precautions 12/16/21.   - Prone as tolerated  - Goal sats 88% or higher  - CBC, CMP daily. - Continue Decadron 6mg IV daily  - Duo-neb or Combivent Respimat Q6H  - Atorvastatin 20mg every day  - Pulm following, appreciate recs   - Lasix 20mg BID      Severe sepsis 2/2 COVID PNA:. Leukocytosis stable. Fungal Cx, BCx, UCx NGTD. S/p x7 course Maria Fernanda/Vanc/Eraxis, now off abx 12/8. Cultures continue to show no growth. - On Sanjeev with MAP goal of >65  - Febrile to 101.7 last night, but afebrile today; no repeat cultures per intensivist      Nutritional status:  Nutrition consulted for TPN, appreciate recs. - continue TPN per nutrition recs. Please see the primary team's daily progress note for the patient's full plan.     Arthur Garcia MD  Family Medicine Resident

## 2021-12-21 NOTE — PROGRESS NOTES
Progress Note  Date:2021       Room:[address] [place] [id number]  Patient Name:Pan Blanco     YOB: 1985     Age:36 y.o. Subjective    CC: intubated and unable to obtain    24 HOUR: ETT exchanged  due to leaking cuff. Pt de-recruited and required addition of veletri, NMB to help improve oxygenation post procedure. Objective         Vitals Last 24 Hours:  TEMPERATURE:  Temp  Av.4 °F (38 °C)  Min: 99.5 °F (37.5 °C)  Max: 101.7 °F (38.7 °C)  RESPIRATIONS RANGE: Resp  Av.6  Min: 16  Max: 36  PULSE OXIMETRY RANGE: SpO2  Av.2 %  Min: 40 %  Max: 99 %  PULSE RANGE: Pulse  Av.8  Min: 102  Max: 152  BLOOD PRESSURE RANGE: Systolic (76MJH), DESHAUN:759 , Min:94 , FKI:871   ; Diastolic (15VWN), KYK:69, Min:52, Max:97    I/O (24Hr): Intake/Output Summary (Last 24 hours) at 2021 1052  Last data filed at 2021 0725  Gross per 24 hour   Intake 4080.02 ml   Output 4025 ml   Net 55.02 ml     Objective:  Vital signs: (most recent): Blood pressure 119/68, pulse (!) 105, temperature 99.5 °F (37.5 °C), resp. rate 28, height 5' 10.98\" (1.803 m), weight 115.4 kg (254 lb 8 oz), SpO2 99 %.        Gen: intubated, sedated  HEENT: ETT in place  Chest: symmetrical chest rise  CV: r/r/r  Abd: non distended  Ext: no c/c/e  Neuro: NMB    Labs/Imaging/Diagnostics    Labs:  CBC:  Recent Labs     21  0332 21  1400 21  0333   WBC 12.0* 11.7* 10.3   RBC 3.34* 3.34* 3.07*   HGB 10.2* 10.2* 10.2*   HCT 32.5* 34.4* 31.0*   MCV 97.3 103.0* 101.0*   RDW 14.6* 14.3 14.2    273 280     CHEMISTRIES:  Recent Labs     21  0332 21  0333 21  033    139 144   K 3.6 3.8 4.2    102 109*   CO2 33* 29 33*   BUN 25* 25* 28*   CA 8.4* 7.9* 9.0   PHOS 3.6 3.8 4.4   MG 2.0 1.9 2.2   PT/INR:  Recent Labs     21  0332   INR 1.1     APTT:  Recent Labs     21  0332   APTT 20.3*     LIVER PROFILE:  Recent Labs     21  62 12/20/21  0333 12/19/21  0332   AST 64* 83* 76*   * 210* 205*     Lab Results   Component Value Date/Time    ALT (SGPT) 209 (H) 12/21/2021 03:32 AM    AST (SGOT) 64 (H) 12/21/2021 03:32 AM    Alk. phosphatase 103 12/21/2021 03:32 AM    Bilirubin, direct 0.3 (H) 12/16/2021 03:11 AM    Bilirubin, total 0.8 12/21/2021 03:32 AM       Imaging Last 24 Hours:  XR CHEST PORT    Result Date: 12/21/2021  EXAM: XR CHEST PORT INDICATION: Hypoxemia last respiratory failure COMPARISON: Chest dated 12/20/2021 TECHNIQUE: Single frontal view FINDINGS: There continues to be bilateral airspace disease. There has been no significant interval change since the previous examination. The endotracheal tube, nasogastric tube and PICC line on the right remain unchanged in position. No significant interval change. XR CHEST PORT    Result Date: 12/20/2021  EXAM: XR CHEST PORT INDICATION: ET Tube placement post exchange COMPARISON: 12/20/2021 FINDINGS: A portable AP radiograph of the chest was obtained at 1336 hours. The patient is on a cardiac monitor. There has been interval intubation with the endotracheal tube terminating about 2 cm above the jassi. Diffuse bilateral airspace disease is unchanged to slightly worse diffusely, allowing for this slightly smaller degree of inspiration. The PICC line terminates at the cavoatrial junction. The cardiac and mediastinal contours are stable. The bones and soft tissues are grossly within normal limits. Interval intubation. Airspace disease stable to slightly worse diffusely    XR ABD PORT  1 V    Result Date: 12/21/2021  EXAM: XR ABD PORT  1 V INDICATION: OGT placement COMPARISON: Abdominal radiograph 12/20/2021. TECHNIQUE: AP portable semiupright abdomen FINDINGS: Interval retraction of the enteric tube now terminating over the mid stomach. No dilated loops of bowel in the field-of-view. Interval retraction of the enteric tube now terminating over the mid stomach. Assessment//Plan     40 y/o with hx HTN, HLD, JESUS admitted 11/19 with COVID    COVID:  -- remains on dex  -- s/p toci  -- off COVID precautions as of 12/16. Pneumonia:  -- s/p abx and antifungals  -- currently off    Acute Resp Failure/ARDS:  -- secondary to above. -- out of window for established mortality benefit of proning  -- continue rescue proning for refractory hypoxia as needed  -- per notes, pt is being considered for lung transplant at [facility] but would need to be 5-6 weeks post symptom onset and needs CT around 12/24 to confirm fibrosis. -- likely on too much support currently to safely do trach  -- had to add NMB, veletri 12/20 after ETT change. Still recovering. Hope to be able to stop NMB and/or veletri once FiO2 requirements decrease. -- out of the window for proving effectiveness of proning. Pneumomediastinum:  -- improved. HTN:   -- holding home amlodipine and HCTZ. HLD:  -- holding home statin      Elevated Transaminases:  -- thought to potentially be related to TPN. -- follow. DM:  -- SSI and lantus    Obesity:  -- complicates mgmt of above. -- BMI 42 on admission. CCM time 35min. Pt at risk of life threatening deterioration from COVID. Pt seen and evaluated via tele interaction. Audio and video used for this encounter.       Electronically signed by Bakari Holder DO on 12/21/2021 at 10:20 AM

## 2021-12-22 NOTE — PROGRESS NOTES
Problem: Falls - Risk of  Goal: *Absence of Falls  Description: Document Earma Shaheed Fall Risk and appropriate interventions in the flowsheet.   Outcome: Progressing Towards Goal  Note: Fall Risk Interventions:  Mobility Interventions: Communicate number of staff needed for ambulation/transfer    Mentation Interventions: Door open when patient unattended,More frequent rounding,Room close to nurse's station    Medication Interventions: Bed/chair exit alarm    Elimination Interventions: Bed/chair exit alarm              Problem: Patient Education: Go to Patient Education Activity  Goal: Patient/Family Education  Outcome: Progressing Towards Goal     Problem: Ventilator Management  Goal: *Adequate oxygenation and ventilation  Outcome: Progressing Towards Goal  Goal: *Patient maintains clear airway/free of aspiration  Outcome: Progressing Towards Goal  Goal: *Absence of infection signs and symptoms  Outcome: Progressing Towards Goal  Goal: *Normal spontaneous ventilation  Outcome: Progressing Towards Goal     Problem: Patient Education: Go to Patient Education Activity  Goal: Patient/Family Education  Outcome: Progressing Towards Goal     Problem: Nutrition Deficit  Goal: *Optimize nutritional status  Outcome: Progressing Towards Goal

## 2021-12-22 NOTE — PROGRESS NOTES
RUR 15%    LOS 32 days,GLOS 12 days    Update:  I appreciate intensivist's follow-up with [facility] regarding lung transplant for pt. Please omar his detailed notes.     Sarai Speaks

## 2021-12-22 NOTE — MANAGEMENT PLAN
Pt vomited and aspirated. Now having issues with both oxygenation and ventilation. Already on NMB, veletri. Increasing TV due to critically low pH, but comes with risk of barotrauma. Called wife and updated her. She'd like to know if ECMO and transplant is still on table and if timeline can be moved up. Will call to find out, but advised that it's unlikely.

## 2021-12-22 NOTE — MANAGEMENT PLAN
TRANSPLANT DISCUSSION:    Several notes discussing possible transplant but details surrounding these conversations are scant. Discussed in depth with Dr. Bola Trejo at [facility] 12/22. In order for pt to be considered for transplant he would need to be awake, walking/talking, etc in order to prove that he's strong enough to tolerate transplant. ECMO might be considered as a bridge to help get pt to this point. Unfortunately, [facility] has no ECMO beds available and do not anticipate any coming available at any point in the near future. They also have 3 in the hospital on the transplant list and don't have capacity to take on another. Dr. Bola Trejo suggested trying to find another ECMO center but he and I both agree that 1 month into the course of this illness, pt would not be candidate for ECMO outside of center that was using this as a bridge to ECMO. Centers such as this are limited and [facility] is the only center in the area. Also called [facility] and pt has been on a vent too long to be considered for ECMO, even as a bridge to transplant.

## 2021-12-22 NOTE — PROGRESS NOTES
0700 Bedside and Verbal shift change report given to [address] [place] [address] Heladio, RN (oncoming nurse) by Ilene Ortiz, SAUL (offgoing nurse). Report included the following information SBAR, Kardex, ED Summary, OR Summary, Intake/Output, MAR, Recent Results, Med Rec Status, Cardiac Rhythm NSR- Sinus tach, Alarm Parameters , Quality Measures and Dual Neuro Assessment. Primary Nurse Joao Barnes RN and Ilene Ortiz RN performed a dual skin assessment on this patient Impairment noted- see wound doc flow sheet  Cameron score, see flowsheet.     0729 Pt assessed, see flowsheet. Pt intubated, sedated and paralyzed. RASS -5, no movement with any stimulus, absent cough with suction. TOF 50mA, 4/4 twitches, see MAR for paralytic titrations. PERRLA. VENT , FIO2 100%, PEEP 14, RR 30. SATs 95%. Synchronous with vent. Suctioned ETT and orally, no secretions assessed. NSR on monitor, BP stable within pt parameters, no vasopressor infusing. Non-pitting trace edema assessed on upper and lower extremities bilaterally. OGT in place infusing, see flowsheet for rate. 40ml of green gastric residual assessed, and returned to pt, placement verified via air auscultation. BS absent X4. Mitchell present, draining satya clear urine. Cooling blanket on pt, removed, Temp stable- will continue to monitor. 2904 Dr. Brigette Torres at bedside via telemed. Updated regarding pt assessment, VS, labs, medications and plan of care. Orders to stop paralytic and monitor respiratory status, wean FIO2 as tolerated. Paralytic stopped per Dr. Brigette Torres. 0851 RT at bedside, Increased PEEP to 15. Messaged Dr. Brigette Torres related to vent changes. Dr. Brigette Torres at bedside via Telemed to speak with RT. No new orders, will continue to monitor pts ability to tolerate changes. 0803 Updated Dr. Brigette Torres of pts -125, frequent PVCs, rising bp 158/82, SATs 88%.    0945 Orders to restart Paralytic.   0948 TOF baseline 50mA with 4/4 twitches, see MAR and flowsheet for nimbex titrations and sedation assessment. 1100 IDR. Updated Dr. Pankaj Penn regarding increasing HR 130s-140s, HTN and declining SATs 85%. Orders for CXR, ABG and to increase sedation. See MAR.   1108 Versed IV increased, see MAR  1115 Propofol IV increased, see MAR.    1130 SATs 82%, RT at bedside, sat pt up in bed, pt vomiting moderate amount of tan secretions, TF stopped, pt suctioned orally and via ETT, OGT hooked to suction and removed 100ml of green gastric contents. Suctioned small amount of tan secretions via ETT. Notified Dr. Pankaj Penn. Per Dr. Pankaj Penn, he will call the wife Kike Mancilla and update her regarding events and pt condition. 1200 Pt reassessed, see flowsheet. 1502 Notified RT of peak airway pressures in the 40s. Per Dr. Pankaj Penn obtain another ABG, RT notified. 1505 RT at bedside, water in vent tubing, peak airway pressures now below 40.   1600 Pt reassessed, see flowsheet. Assessment unchanged. 80 Notified Dr. Pankaj Penn of HR in 130s, SATs 88%, TV 280s. Orders for bicarb received. RT at bedside, no vent changes made. Orders to Ketamine IV to be started for potential sedation issue. See MAR for medication administrations and titrations. 1900 Bedside and Verbal shift change report given to [address], RN (oncoming nurse) by Ivanna Martin RN (offgoing nurse). Report included the following information SBAR, Kardex, ED Summary, Procedure Summary, Intake/Output, MAR, Recent Results, Med Rec Status, Cardiac Rhythm sinus tach, Alarm Parameters , Quality Measures and Dual Neuro Assessment.

## 2021-12-22 NOTE — PROGRESS NOTES
Progress Note  Date:2021       Room:[address] [place] [address]  Patient Name:Pan Blanco     YOB: 1985     Age:36 y.o. Subjective    CC: intubated and unable to obtain    24 HOUR: Remains on 100% and still requiring  NMB for refractory hypoxia. Objective         Vitals Last 24 Hours:  TEMPERATURE:  Temp  Av.1 °F (37.3 °C)  Min: 98.3 °F (36.8 °C)  Max: 101.1 °F (38.4 °C)  RESPIRATIONS RANGE: Resp  Av  Min: 28  Max: 32  PULSE OXIMETRY RANGE: SpO2  Av.6 %  Min: 85 %  Max: 98 %  PULSE RANGE: Pulse  Av.1  Min: 80  Max: 124  BLOOD PRESSURE RANGE: Systolic (96BCR), HMK:025 , Min:100 , LMD:895   ; Diastolic (29YBR), NVA:48, Min:50, Max:85    I/O (24Hr): Intake/Output Summary (Last 24 hours) at 2021 1039  Last data filed at 2021 1000  Gross per 24 hour   Intake 3552.8 ml   Output 3950 ml   Net -397.2 ml     Objective:  Vital signs: (most recent): Blood pressure (!) 158/82, pulse (!) 113, temperature 98.3 °F (36.8 °C), resp. rate 28, height 5' 11\" (1.803 m), weight 115.4 kg (254 lb 8 oz), SpO2 91 %. Gen: intubated, sedated, paralyzed  HEENT: ETT in place  Chest: symmetrical chest rise  CV: r/r/r  Abd: non distended  Ext: no c/c/e  Neuro: NMB    Labs/Imaging/Diagnostics    Labs:  CBC:  Recent Labs     21  0311 21  0332 21  1400   WBC 13.1* 12.0* 11.7*   RBC 3.36* 3.34* 3.34*   HGB 10.3* 10.2* 10.2*   HCT 32.3* 32.5* 34.4*   MCV 96.1 97.3 103.0*   RDW 14.8* 14.6* 14.3    276 273     CHEMISTRIES:  Recent Labs     21  0311 21  0332 21  0333    140 139   K 4.0 3.6 3.8    101 102   CO2 34* 33* 29   BUN 29* 25* 25*   CA 8.9 8.4* 7.9*   PHOS 3.6 3.6 3.8   MG 2.2 2.0 1.9   PT/INR:  No results for input(s): INR, INREXT, INREXT in the last 72 hours. No lab exists for component: PROTIME  APTT:  No results for input(s): APTT in the last 72 hours.   LIVER PROFILE:  Recent Labs 12/22/21  0311 12/21/21  0332 12/20/21  0333   AST 43* 64* 83*   * 209* 210*     Lab Results   Component Value Date/Time    ALT (SGPT) 166 (H) 12/22/2021 03:11 AM    AST (SGOT) 43 (H) 12/22/2021 03:11 AM    Alk. phosphatase 102 12/22/2021 03:11 AM    Bilirubin, direct 0.3 (H) 12/16/2021 03:11 AM    Bilirubin, total 0.5 12/22/2021 03:11 AM       Imaging Last 24 Hours:  No results found. Assessment//Plan     38 y/o with hx HTN, HLD, JESUS admitted 11/19 with COVID    COVID:  -- remains on dex  -- s/p toci  -- off COVID precautions as of 12/16. Pneumonia:  -- s/p abx and antifungals (7 days of jimbo/vanc/eraxis)  -- currently off    Acute Resp Failure/ARDS:  -- secondary to above. -- out of window for established mortality benefit of proning  -- continue rescue proning for refractory hypoxia as needed  -- per notes, pt is being considered for lung transplant at [facility] but would need to be 5-6 weeks post symptom onset and needs CT around 12/24 to confirm fibrosis. -- likely on too much support currently to safely do trach  -- had to add NMB, veletri 12/20 after ETT change. Still recovering. Hope to be able to stop NMB and/or veletri once FiO2 requirements decrease, but still requiring as of 12/22. -- out of the window for proving effectiveness of proning.    -- pt 5'11\". 6ml/kg is 452 and pt getting about 432 currently. Pneumomediastinum:  -- improved. HTN:   -- holding home amlodipine and HCTZ. HLD:  -- holding home statin      Elevated Transaminases:  -- thought to potentially be related to TPN. -- follow. DM:  -- SSI and lantus    Obesity:  -- complicates mgmt of above. -- BMI 42 on admission. CCM time 35min. Pt at risk of life threatening deterioration from COVID. Pt seen and evaluated via tele interaction. Audio and video used for this encounter.       Electronically signed by Irish Young DO on 12/22/2021 at 10:20 AM

## 2021-12-22 NOTE — FAMILY MEETING
Long conversation with Cindy Carter regarding transplant discussion, prognosis, etc.  Cindy Carter asked for her cousin Carolina Jeanna to be contacted. Carolina Meeks is DNP and is helping family understand some of the medical issues. Discussed above issues with Carolina Meeks as well.

## 2021-12-22 NOTE — PROGRESS NOTES
Spoke with patient's wife Eric Schmidt concerning recent updates, as already stated in prior notes from Dr. Darshan Patiño. She is aware that the patient is not a transplant candidate currently, and that he will need to be much more stable before this can become an option. We will discuss with Transfer Center possible other locations besides [facility] and [facility] that may offer ECMO bridge to transplant, but wife understands it is very unlikely a transfer will be possible at this time. Eric Schmidt is understanding of the poor prognosis, she is engaged and asks good questions. She remains hopeful but realistic in her expectations. We will continue to update daily.      Dwight Cherry MD  Family Medicine Resident, PGY1

## 2021-12-22 NOTE — PROGRESS NOTES
[address] [facility] [address] [place] [address]   Office [phone]  Fax [phone]          Assessment and Plan     Shilo Kate is a 39 y.o. male with a PMH of HTN, HLD, JESUS admitted for AHRF and severe sepsis 2/2 COVID PNA. Patient was admitted on 11/19/2021. Interval Events:  Patient required a few hours of Sanjeev yesterday evening but currently not requiring. Started on tube feeds + TPN per nutrition. AHRF and ARDS 2/2 COVID PNA: Intubated and sedated since 11/30/21. Discussion w/ [facility], considering lung transplant but not until x5-6wks s/p symptom onset, will need CT ~ 12/24/21 prior to progress towards transplant to confirm end stage fibrosis. Now off precautions 12/16/21.    Ericka Plant per ICU  - Prone as tolerated  - Goal sats 88% or higher  - CBC, CMP daily. No need for further trending Covid labs  - Continue Decadron 6mg IV daily  - Duo-neb or Combivent Respimat Q6H  - Holding COVID meds (Vit C 500mg BID, Zinc 220mg daily)  - Atorvastatin 20mg every day  - Pulm following, appreciate recs   - Lasix 20mg BID     Tachycardia: S/pVTach Code Blue 12/20, spontaneous resolution.    - HR low 100s, no medications at this time    Severe sepsis 2/2 COVID PNA:. Leukocytosis stable. Fungal Cx, BCx, UCx NGTD. S/p x7 course Maria Fernanda/Vanc/Eraxis, now off abx 12/8. Cultures continue to show no growth. - Now off pressors. Continue to monitor    Nutritional status:  Nutrition consulted for TPN, appreciate recs. KUB 12/20 showing increased bowel gas. - tube feeds and TPN per nutrition recs     Elevated LFTs: Improving. ALT of 352 and AST of 122. Suspect possible etiology of TPN. - will discuss with ICU and nutrition. Weaning propofol as able    Oozing (improved): Mild bleeding with suctioning otherwise no other findings while prone.     - On prophylactic Lovenox 40 mg BID    Pneumomediastinum/Pneumothorax: Possibly barotrauma 2/2 high pressures needed for COVID ARDS PNA vs lung frailty. Repeat CXR 12/1 showing stable pneumomediastinum, and resolved pneumothorax. - Monitor for signs of worsening barotrauma     Insomnia/anxiety: Pt with new insomnia and anxiety, specifically overnight. - Intubated, sedated    HTN: Pt normotensive off meds. Currently hypotensive. At home is on Amlodipine 10mg daily and HCTZ 12.5mg daily  - Will continue to monitor      HLD: Chronic, stable. Last lipids 07/2021 , HDL 54, LDL 98.6, . On Atorvastatin 10mg daily.  - Atorvastatin 20mg daily     T2DM (diet-controlled): Prior A1c 5.8% in 05/2021. No meds outpatient. On steroids now for COVID.  - SSI (resistant given morbid obesity) w/ q6h glucose checks  - Increase Lantus to 38 daily.      JESUS: On CPAP at night. - Intubated, sedated.     Obesity: Body mass index is 42.4 kg/m² on presentation. Reduced to 33.5 one month later. - Encouraging lifestyle modifications and further follow up outpatient. At-risk JAMIR: RESOLVED. POA Cr 1.31, BL 0.8. Likely IVVD in setting of poor PO intake. - Daily CMP    Chest pain: RESOLVED. Description is pleuritic in nature, worse with cough. EKG without evidence of acute ischemia. Troponin of 9 makes CAD less concerning.   - Cardiac monitoring, will continue to monitor     Diarrhea: RESOLVED. Watery diarrhea prior to admission. Likely 2/2 COVID infection. Improved since presentation. Enteric bacteria panel negative. Abdominal pain: RESOLVED. Secondary to gas pains after starting diet. - Continue Simethicone       FEN/GI - TPN + Tube feeds  Activity - Ambulate with assistance  DVT prophylaxis - Lovenox (ppx)  GI prophylaxis - Protonix  Disposition - Plan to d/c to TBD. Code Status - Full. Discussed with patient / caregivers.   Next of Kin Name and Contact - Obi Guardadosin,  Spouse - [phone]      Roseanne Woo MD  Family Medicine Resident    Patient discussed with Family Medicine Attending: Sushil Tristan MD         Subjective / Objective   Subjective:   Patient seen and examined at bedside. Patient is intubated and sedated, unchanged. Respiratory: O2 Flow Rate (L/min): 60 l/min O2 Device: Ventilator   Visit Vitals  BP (!) 158/82   Pulse (!) 113   Temp 98.3 °F (36.8 °C)   Resp 28   Ht 5' 11\" (1.803 m)   Wt 254 lb 8 oz (115.4 kg)   SpO2 91%   BMI 35.50 kg/m²     General: Intubated, sedated. Appears comfortable. Respiratory: Transmitted mechanical sounds appreciated bilaterally, unchanged. Present air movement bilaterally. Cardiovascular: regular rate, regular rhythm. GI: + bowel sounds. Tube feeds. Extremities:  No LE edema. Extremities well perfused and warm. Skin: Warm, dry. Neuro: Intubated sedated  Mitcehll in place     I/O:  Date 12/21/21 0700 - 12/22/21 0659 12/22/21 0700 - 12/23/21 0659   Shift 7025-2455 6693-8815 24 Hour Total 5030-9634 9086-8459 24 Hour Total   INTAKE   I.V.(mL/kg/hr)  5541.6(8.5) 2086.7(0.8) 1056. 1  1056. 1     Versed Volume    448  448     Nimbex Volume  235.2 235.2 20.1  20.1     Epoprostenol Volume    192.4  192.4     Fentanyl Volume  144 144 24  24     Diprivan Volume  814.3 814.3 119.6  119.6     Phenylephrine Volume  80.5 80.5 0  0     Volume (TPN ADULT - CENTRAL)  812.7 812.7 252  252   NG/GT  225 225 185  185     Water Flush Volume (mL) (Orogastric Tube 12/21/21)  60 60 10  10     Medication Volume (Orogastric Tube 12/21/21)    60  60     Intake (ml) (Orogastric Tube 12/21/21)  165 165 115  115   Shift Total(mL/kg)  2311.7(20) 2311.7(20) 1241.1(10.8)  1241.1(10.8)   OUTPUT   Urine(mL/kg/hr) 1395(1) 1775(1.3) 3170(1.1) 850  850     Urine Output (mL) (Urinary Catheter 12/11/21 Mitchell) 1395 1775 3170 850  850   Shift Total(mL/kg) 1395(12.1) 1775(15.4) 3170(27.5) 850(7.4)  850(7.4)   NET -1395 536.7 -858. 3 391.1  391.1   Weight (kg) 115.4 115.4 115.4 115.4 115.4 115.4       CBC:  Recent Labs     12/22/21  0311 12/21/21  0332 12/20/21  1400   WBC 13.1* 12.0* 11.7*   HGB 10.3* 10.2* 10.2*   HCT 32.3* 32.5* 34.4*    276 273 Metabolic Panel:  Recent Labs     12/22/21  0311 12/21/21  0332 12/20/21  0333    140 139   K 4.0 3.6 3.8    101 102   CO2 34* 33* 29   BUN 29* 25* 25*   CREA 0.33* 0.40* 0.46*   * 211* 226*   CA 8.9 8.4* 7.9*   MG 2.2 2.0 1.9   PHOS 3.6 3.6 3.8   ALB 2.3* 2.2* 2.2*   * 209* 210*          For Billing    Chief Complaint   Patient presents with   [facility] Problems  Date Reviewed: 12/3/2021          Codes Class Noted POA    Oozing skin inflammation ICD-10-CM: L08.9  ICD-9-CM: 686.9  12/3/2021 Unknown        Positive D dimer ICD-10-CM: R79.89  ICD-9-CM: 790.92  12/3/2021 Unknown        Other hyperlipidemia ICD-10-CM: E78.49  ICD-9-CM: 272.4  12/3/2021 Unknown        Elevated serum creatinine ICD-10-CM: R79.89  ICD-9-CM: 790.99  12/3/2021 Unknown        Community acquired pneumonia ICD-10-CM: J18.9  ICD-9-CM: 640  12/3/2021 Unknown        ARDS (adult respiratory distress syndrome) ([facility] ICD-10-CM: J80  ICD-9-CM: 518.52  12/1/2021 No        Septic shock (HCC) ICD-10-CM: A41.9, R65.21  ICD-9-CM: 038.9, 785.52, 995.92  11/26/2021 Yes        Pneumomediastinum ([facility] ICD-10-CM: J98.2  ICD-9-CM: 518.1  11/26/2021 No        Pneumothorax on left ICD-10-CM: J93.9  ICD-9-CM: 512.89  11/26/2021 No        Hyponatremia ICD-10-CM: E87.1  ICD-9-CM: 276.1  11/26/2021 Yes        Diabetes mellitus type 2, controlled ([facility] ICD-10-CM: E11.9  ICD-9-CM: 250.00  11/25/2021 Yes        * (Principal) Acute hypoxemic respiratory failure due to COVID-19 [facility]) ICD-10-CM: U07.1, J96.01  ICD-9-CM: 518.81, 079.89, 799.02  11/19/2021 Yes        Obesity, morbid ([facility] ICD-10-CM: E66.01  ICD-9-CM: 278.01  4/10/2018 Yes        Elevated liver enzymes ICD-10-CM: R74.8  ICD-9-CM: 790.5  8/5/2016 Yes        JESUS (obstructive sleep apnea) ICD-10-CM: G47.33  ICD-9-CM: 327.23  10/15/2014 Yes        Hypertension ICD-10-CM: I10  ICD-9-CM: 401.9  10/15/2014 Yes        Pure hypercholesterolemia ICD-10-CM: E78.00  ICD-9-CM: 272.0  Unknown Yes

## 2021-12-23 NOTE — PROGRESS NOTES
Responded to Code Blue in the ICU. Mr. Compa Mccoy wife was in a chair outside the room as staff worked with Mr. Blanco. She was tearful. Present when doctor informed her of his death. Her father arrived shortly afterwards. The family has strong elijah. He provided prayer. Escorted them to the waiting area. Consulted with nurse.  Provided assurance of prayer as they left the hospital.  Nissa Flor., MS., [address] [facility] Joelle ([id number])

## 2021-12-23 NOTE — DISCHARGE SUMMARY
[address] [facility]   Death Summary      Patient: Naveen Mederos       MRN: [id number]       YOB: 1985       Age: 39 y.o. Date of admission:  11/19/2021    Date of death:  12/23/2021    Admitting provider:  Obinna Camarillo MD    Discharging provider:  Farrah Mark MD     Primary care provider:  Annamarie Cagle NP     Consultations  · IP CONSULT TO PULMONOLOGY  · IP CONSULT TO INTENSIVIST  · IP CONSULT TO PALLIATIVE CARE - PROVIDER    Procedures  · Intubation    Admission diagnoses  · Acute hypoxemic respiratory failure due to COVID-19 ([facility] [U07.1, J96.01]    Please refer to the admission history and physical for details on the presenting problem. Final discharge diagnoses and brief hospital course    AHRF and ARDS 2/2 COVID PNA: Intubated and sedated since 11/30/21. He slowly decompensated and remained intubated and sedated until he had a cardiac arrest on 12/23/21. After 30 minutes of CPR and ACLS protocol patient remained without a pulse and pronounced dead at 5:11am.  - Pronounced dead at 5:11am on 12/23/21.     Tachycardia: S/pVTach Code Blue 12/20, spontaneous resolution.    - Pronounced dead at 5:11am on 12/23/21.     Severe sepsis 2/2 COVID PNA:. S/p x7 course Maria Fernanda/Vanc/Eraxis, now off abx 12/8. Cultures continued to show no growth. - Pronounced dead at 5:11am on 12/23/21.     Nutritional status: Received Tube feeds and TPN per nutrition recs   - Pronounced dead at 5:11am on 12/23/21.     Elevated LFTs: Improving. Suspect possible etiology of TPN. - Pronounced dead at 5:11am on 12/23/21.     Oozing (improved): Mild bleeding with suctioning otherwise no other findings while prone. Was on prophylactic Lovenox 40 mg BID   - Pronounced dead at 5:11am on 12/23/21.     Pneumomediastinum/Pneumothorax: Possibly barotrauma 2/2 high pressures needed for COVID ARDS PNA vs lung frailty.   Repeat CXR 12/1 showing stable pneumomediastinum, and resolved pneumothorax. - Pronounced dead at 5:11am on 12/23/21.     Insomnia/anxiety: Pt with new insomnia and anxiety, specifically overnight prior to intubation.   - Pronounced dead at 5:11am on 12/23/21.     HTN:  Required pressors on and off throughout the hospital stay. - Pronounced dead at 5:11am on 12/23/21.     HLD: Was on Atorvastatin 10mg daily.  - Pronounced dead at 5:11am on 12/23/21.     T2DM (diet-controlled): Had sliding scale while inpatient. - Pronounced dead at 5:11am on 12/23/21.     JESUS: Intubated throughout the hospital stay. - Pronounced dead at 5:11am on 12/23/21.         Last vital signs recorded  Visit Vitals  BP (!) 78/44   Pulse (!) 141   Temp (!) 101.3 °F (38.5 °C)   Resp 30   Ht 5' 11\" (1.803 m)   Wt 254 lb 8 oz (115.4 kg)   SpO2 (!) 73%   BMI 35.50 kg/m²          Labs  Recent Labs     12/23/21 0412 12/22/21 0311 12/21/21  0332   WBC 22.6* 13.1* 12.0*   HGB 10.0* 10.3* 10.2*   HCT 33.0* 32.3* 32.5*    264 276     Recent Labs     12/23/21 0412 12/22/21 0311 12/21/21  0332    139 140   K 4.4 4.0 3.6   CL 95* 101 101   CO2 30 34* 33*   BUN 32* 29* 25*   CREA 0.98 0.33* 0.40*   * 235* 211*   CA 7.8* 8.9 8.4*   MG 2.3 2.2 2.0   PHOS 6.2* 3.6 3.6     Recent Labs     12/23/21 0412 12/22/21 0311 12/21/21  0332    102 103   TP 5.8* 6.6 6.4   ALB 1.9* 2.3* 2.2*   GLOB 3.9 4.3* 4.2*     Recent Labs     12/23/21 0412   INR 1.2*   PTP 12.1*   APTT 25.3      No results for input(s): FE, TIBC, PSAT, FERR in the last 72 hours. No results for input(s): PH, PCO2, PO2 in the last 72 hours. No results for input(s): CPK, CKMB in the last 72 hours.     No lab exists for component: TROPONINI  No components found for: [place]    Pertinent imaging studies    ---------------------------------    Chronic Diagnoses    Problem List as of 12/23/2021 Date Reviewed: 12/3/2021          Codes Class Noted - Resolved    Oozing skin inflammation ICD-10-CM: L08.9  ICD-9-CM: 686.9  12/3/2021 - Present        Positive D dimer ICD-10-CM: R79.89  ICD-9-CM: 790.92  12/3/2021 - Present        Other hyperlipidemia ICD-10-CM: E78.49  ICD-9-CM: 272.4  12/3/2021 - Present        Elevated serum creatinine ICD-10-CM: R79.89  ICD-9-CM: 790.99  12/3/2021 - Present        Community acquired pneumonia ICD-10-CM: J18.9  ICD-9-CM: 344  12/3/2021 - Present        ARDS (adult respiratory distress syndrome) ([facility] ICD-10-CM: J80  ICD-9-CM: 518.52  12/1/2021 - Present        Septic shock (HCC) ICD-10-CM: A41.9, R65.21  ICD-9-CM: 038.9, 785.52, 995.92  11/26/2021 - Present        Pneumomediastinum ([facility] ICD-10-CM: J98.2  ICD-9-CM: 518.1  11/26/2021 - Present        Pneumothorax on left ICD-10-CM: J93.9  ICD-9-CM: 512.89  11/26/2021 - Present        Hyponatremia ICD-10-CM: E87.1  ICD-9-CM: 276.1  11/26/2021 - Present        Diabetes mellitus type 2, controlled ([facility] ICD-10-CM: E11.9  ICD-9-CM: 250.00  11/25/2021 - Present        * (Principal) Acute hypoxemic respiratory failure due to COVID-19 [facility]) ICD-10-CM: U07.1, J96.01  ICD-9-CM: 518.81, 079.89, 799.02  11/19/2021 - Present        Obesity, morbid ([facility] ICD-10-CM: E66.01  ICD-9-CM: 278.01  4/10/2018 - Present        Decreased hearing of left ear ICD-10-CM: H91.92  ICD-9-CM: 389.9  10/9/2017 - Present        Skin lesion of scalp ICD-10-CM: L98.9  ICD-9-CM: 709.9  7/18/2017 - Present        Acute right-sided thoracic back pain ICD-10-CM: M54.6  ICD-9-CM: 724.1  9/27/2016 - Present        Elevated liver enzymes ICD-10-CM: R74.8  ICD-9-CM: 790.5  8/5/2016 - Present        Strep throat exposure ICD-10-CM: Z20.818  ICD-9-CM: V01.89  10/20/2015 - Present        JESUS (obstructive sleep apnea) ICD-10-CM: G47.33  ICD-9-CM: 327.23  10/15/2014 - Present        Hypertension ICD-10-CM: I10  ICD-9-CM: 401.9  10/15/2014 - Present        S/P vasectomy ICD-10-CM: J69.88  ICD-9-CM: V26.52  8/6/2014 - Present        Pure hypercholesterolemia ICD-10-CM: E78.00  ICD-9-CM: 272.0  Unknown - Present RESOLVED: Acute respiratory distress syndrome (ARDS) due to COVID-19 virus [facility]) ICD-10-CM: U07.1, J80  ICD-9-CM: 518.82, 079.89  12/1/2021 - 12/1/2021        RESOLVED: Physical exam, routine ICD-10-CM: Z00.00  ICD-9-CM: V70.0  10/20/2016 - 9/25/2019        RESOLVED: Screening for thyroid disorder ICD-10-CM: Z13.29  ICD-9-CM: V77.0  10/14/2015 - 9/26/2019        RESOLVED: Poison ivy ICD-10-CM: L23.7  ICD-9-CM: 692.6  9/15/2015 - 11/26/2021          ·       Signed by: Elise Caro MD  Resident, PGY-1    December 23, 2021   5:44 AM

## 2021-12-23 NOTE — PROGRESS NOTES
Patient has worsening respiratory acidosis and hypoxia (as evidenced both by his SaO2 and PaO2). He has responded to diuretics but without improvement in oxygenation. CXR ordered to see if there are potential, modifiable etiology to his decompensation (for example mucous plug, ett migration, ptx). His airway pressures remain high. Given his trajectory and instability, he is a poor candidate for rescue proning.   Case, etiology, plan discussed with bedside team and will update primary medical team now as he is high risk for cardiac arrest.

## 2021-12-23 NOTE — DEATH NOTE
Family Medicine Death Pronouncement      12/23/2021    After over 30 min of CPR and ACLS protocol patient remained without pulse. No response to verbal and tactile stimuli. No respiratory effort without support. Absent heart sounds and pulses. Pupils fixed and dilated. Patient pronounced dead at 5:11 AM hours.     Signed by: Zain Martin MD      December 23, 2021

## 2021-12-23 NOTE — PROGRESS NOTES
Primary Nurse: Miguel Whitney  Participants: Chey Roger, RN  Sienna Hutton RN  Doctors: MD Roopa Jain MD Mikell Leatherwood, MD    8459: Code Blue-Start CPR  3859: Epi 1mg  given  8800: Defibrillator on-PEA  0444: pulse check-PEA  0444: Epi 1mg  0446: pulse check-PEA  0446: BiCarb  0448: Epi 1mg  0448: pulse check-PEA  0451: Epi 1mg  0452: pulse check-PEA  1533: Epi 1mg  1926:  BiCarb  0454: Calcium  0455: pulse check-Asystole  0457: pulse check-PEA  0457: Epi 1mg  0459: pulse check-PEA  0459: Epi 1mg  0501: BiCarb  0501: pulse check-PEA  0502: Epi 1mg  0503: pulse check-PEA  0504: Epi 1mg  0506: pulse check-PEA  0508: VFIB + shock delivered  0510: pulse check-Asystole  0510: STOP CPR  0511: Time of Death

## 2021-12-23 NOTE — PROGRESS NOTES
[facility]. Medicine Residency Program       Resident Progress Note in Brief    S: Patient seen and examined at bedside. Pt intubated and sedated. He is persistently tachy and desats to 80s. Received additional diuretics. O:  Visit Vitals  /70   Pulse (!) 151   Temp (!) 101.3 °F (38.5 °C)   Resp 30   Ht 5' 11\" (1.803 m)   Wt 254 lb 8 oz (115.4 kg)   SpO2 (!) 77%   BMI 35.50 kg/m²     Physical Examination:    General: Intubated, sedated. Appears comfortable. Supine. Respiratory: Transmitted mechanical sounds appreciated bilaterally, unchanged. Cardiovascular:Tachycardic, regular rhythm. Extremities:  No LE edema. Skin: Warm, dry. Neuro: Intubated sedated  Mitchell in place     A/P: 39 y. o. male with a PMH of HTN, HLD, JESUS admitted for AHRF and ARDS 2/2 COVID PNA. Currently intubated and sedated. Patient was admitted on 11/19/2021. AHRF and ARDS 2/2 COVID PNA: Intubated and sedated since 11/30/21. Now off precautions 12/16/21. Per intensivist notes pt is not a candidate for transfer for ECHO or to be placed on transplant list at any nearby facilities. - Prone as tolerated  - Decadron 6mg IV daily  - Duo-neb or Combivent Respimat Q6H  - Atorvastatin 20mg every day  - Lasix 20mg BID   - S/p Bumex per intensivist, however, continues to sat in 80s  - Checking ABG  - Poor prognosis, appreciate specialists help     Severe sepsis 2/2 COVID PNA:. Fungal Cx, BCx, UCx NGTD. S/p x7 course Maria Fernanda/Vanc/Eraxis, now off abx 12/8. Cultures continue to show no growth. - Currently of pressure support, sarah ordered to start if needed to keep MAP>65  - Monitor vitals and labs      Nutritional status:  Nutrition consulted for TPN, appreciate recs. - continue TPN     Please see the primary team's daily progress note for the patient's full plan. ADDENDUM: 1:17 AM  Contacted by ICU team stating that pt was looking worse, and could code soon. Pt evaluated at bedside.  EKG with prominent changes, including ST depression in leads I and aVL, and ST elevation in V1 and V2, concerning for an inferolateral infarct. CXR appears grossly unchanged from yesterday, but awaiting final reading. Patient's wife was contacted and updated of the worsening condition of patient and all her questions were answered. She is planning to come to the hospital as soon as she can.      Guero Chavez MD  Family Medicine Resident

## 2021-12-23 NOTE — PROGRESS NOTES
CODE NOTE:     Patient found pulseless at 0439 AM, code blue was called, CPR and ACLS protocol initiated and run by me, Dr Yoon Goodrich [facility]) arrived promptly and was present and available for assistance throughout the code. Patient received total on 9 doses of epi, 3 doses of bicarb, 1 dose of calcium, he was shocked once for VFib. Patient remained without a pulse throughout the entire time of code. CPR was stopped and time of death called at 56 AM. See code narrator note for details. Patient's wife was at the ICU and updated in real time by medical team, she was attended by . Pt's wife and father-in-law spent a moment in the room and left due to emotional distress. All questions answered, emotional support provided.     Lennox Encarnacion MD

## 2021-12-24 LAB
BACTERIA SPEC CULT: NORMAL
SERVICE CMNT-IMP: NORMAL

## 2021-12-27 ENCOUNTER — TELEPHONE (OUTPATIENT)
Dept: FAMILY MEDICINE CLINIC | Age: 36
End: 2021-12-27

## 2021-12-27 NOTE — TELEPHONE ENCOUNTER
Call center called, [facility] from the  home was trying to receive death certificate. Wanted to know if Dr Man Fabian was current PCP, pt was last seen 21 in our office. Did not speak with Chavez directly, was unable to get contact information.

## 2021-12-27 NOTE — TELEPHONE ENCOUNTER
Practice Supervisor Note    Received transferred call from [place] at [address] - seeking death certificate signature for Ruth Bernabe who passed away at [facility] on 12/23. I confirmed with him that one of our attendings, Stephon Rolle, was the cosigning attending on the death discharge notes, meaning he would be the doctor that needs to sign Pan's death certificate. I informed [place] that Dr. Luis Miguel Egan is off this week on vacation but that I would make a few calls and get back with him to see about obtaining a signature on his behalf - [place] was unable to find Dr. Luis Miguel Egan in the [facility] system. Will follow back up with Ronda

## 2022-04-12 NOTE — PROGRESS NOTES
Chart accessed to change propofol for primary RN and place CXR post tube exchange during emergency. Resume normal activities.